# Patient Record
Sex: MALE | Race: WHITE | NOT HISPANIC OR LATINO | Employment: FULL TIME | URBAN - METROPOLITAN AREA
[De-identification: names, ages, dates, MRNs, and addresses within clinical notes are randomized per-mention and may not be internally consistent; named-entity substitution may affect disease eponyms.]

---

## 2017-06-15 ENCOUNTER — RECORDS - HEALTHEAST (OUTPATIENT)
Dept: LAB | Facility: CLINIC | Age: 52
End: 2017-06-15

## 2017-06-15 LAB
CHOLEST SERPL-MCNC: 234 MG/DL
FASTING STATUS PATIENT QL REPORTED: ABNORMAL
HDLC SERPL-MCNC: 64 MG/DL
LDLC SERPL CALC-MCNC: 132 MG/DL
TRIGL SERPL-MCNC: 189 MG/DL

## 2017-12-05 ENCOUNTER — COMMUNICATION - HEALTHEAST (OUTPATIENT)
Dept: TELEHEALTH | Facility: CLINIC | Age: 52
End: 2017-12-05

## 2017-12-05 ENCOUNTER — OFFICE VISIT - HEALTHEAST (OUTPATIENT)
Dept: FAMILY MEDICINE | Facility: CLINIC | Age: 52
End: 2017-12-05

## 2017-12-05 DIAGNOSIS — F41.9 ANXIETY: ICD-10-CM

## 2017-12-05 DIAGNOSIS — K57.90 DIVERTICULOSIS: ICD-10-CM

## 2017-12-05 DIAGNOSIS — K63.5 COLON POLYPS: ICD-10-CM

## 2017-12-05 DIAGNOSIS — J45.20 MILD INTERMITTENT ASTHMA WITHOUT COMPLICATION: ICD-10-CM

## 2017-12-05 DIAGNOSIS — E78.5 HYPERLIPIDEMIA, UNSPECIFIED HYPERLIPIDEMIA TYPE: ICD-10-CM

## 2017-12-05 DIAGNOSIS — M1A.9XX0 CHRONIC GOUT: ICD-10-CM

## 2017-12-05 ASSESSMENT — MIFFLIN-ST. JEOR: SCORE: 2217.9

## 2018-01-23 ENCOUNTER — COMMUNICATION - HEALTHEAST (OUTPATIENT)
Dept: FAMILY MEDICINE | Facility: CLINIC | Age: 53
End: 2018-01-23

## 2018-01-23 DIAGNOSIS — F41.9 ANXIETY: ICD-10-CM

## 2018-02-06 ENCOUNTER — COMMUNICATION - HEALTHEAST (OUTPATIENT)
Dept: FAMILY MEDICINE | Facility: CLINIC | Age: 53
End: 2018-02-06

## 2018-02-06 ENCOUNTER — OFFICE VISIT - HEALTHEAST (OUTPATIENT)
Dept: FAMILY MEDICINE | Facility: CLINIC | Age: 53
End: 2018-02-06

## 2018-02-06 DIAGNOSIS — R07.81 RIB PAIN ON LEFT SIDE: ICD-10-CM

## 2018-03-27 ENCOUNTER — COMMUNICATION - HEALTHEAST (OUTPATIENT)
Dept: FAMILY MEDICINE | Facility: CLINIC | Age: 53
End: 2018-03-27

## 2018-03-27 DIAGNOSIS — F41.9 ANXIETY: ICD-10-CM

## 2018-04-30 ENCOUNTER — COMMUNICATION - HEALTHEAST (OUTPATIENT)
Dept: FAMILY MEDICINE | Facility: CLINIC | Age: 53
End: 2018-04-30

## 2018-04-30 DIAGNOSIS — F41.9 ANXIETY: ICD-10-CM

## 2018-05-23 ENCOUNTER — OFFICE VISIT - HEALTHEAST (OUTPATIENT)
Dept: FAMILY MEDICINE | Facility: CLINIC | Age: 53
End: 2018-05-23

## 2018-05-23 DIAGNOSIS — F41.9 ANXIETY: ICD-10-CM

## 2018-05-23 RX ORDER — LORATADINE 10 MG/1
10 TABLET ORAL DAILY PRN
Status: SHIPPED | COMMUNITY
Start: 2018-05-23

## 2018-05-23 RX ORDER — IBUPROFEN 400 MG/1
400 TABLET, FILM COATED ORAL EVERY 6 HOURS PRN
Status: ON HOLD | COMMUNITY
Start: 2018-05-23 | End: 2023-11-20

## 2018-05-23 RX ORDER — CLONAZEPAM 0.5 MG/1
TABLET ORAL
Qty: 30 TABLET | Refills: 0 | Status: SHIPPED | OUTPATIENT
Start: 2018-05-23 | End: 2021-12-01

## 2018-05-23 ASSESSMENT — MIFFLIN-ST. JEOR: SCORE: 2020.13

## 2018-07-02 ENCOUNTER — OFFICE VISIT - HEALTHEAST (OUTPATIENT)
Dept: FAMILY MEDICINE | Facility: CLINIC | Age: 53
End: 2018-07-02

## 2018-07-02 DIAGNOSIS — N52.9 ED (ERECTILE DYSFUNCTION): ICD-10-CM

## 2018-07-02 DIAGNOSIS — F41.9 ANXIETY: ICD-10-CM

## 2018-07-02 DIAGNOSIS — J45.990 EXERCISE-INDUCED ASTHMA: ICD-10-CM

## 2018-07-02 RX ORDER — PAROXETINE 10 MG/1
10 TABLET, FILM COATED ORAL EVERY MORNING
Status: SHIPPED | COMMUNITY
Start: 2018-07-02 | End: 2021-11-10

## 2018-08-05 ENCOUNTER — COMMUNICATION - HEALTHEAST (OUTPATIENT)
Dept: FAMILY MEDICINE | Facility: CLINIC | Age: 53
End: 2018-08-05

## 2018-08-05 DIAGNOSIS — F41.9 ANXIETY: ICD-10-CM

## 2018-09-10 ENCOUNTER — COMMUNICATION - HEALTHEAST (OUTPATIENT)
Dept: FAMILY MEDICINE | Facility: CLINIC | Age: 53
End: 2018-09-10

## 2018-09-10 DIAGNOSIS — F41.9 ANXIETY: ICD-10-CM

## 2018-09-10 DIAGNOSIS — J45.20 MILD INTERMITTENT ASTHMA WITHOUT COMPLICATION: ICD-10-CM

## 2018-09-10 RX ORDER — AMITRIPTYLINE HYDROCHLORIDE 10 MG/1
10 TABLET ORAL AT BEDTIME
Qty: 90 TABLET | Refills: 2 | Status: SHIPPED | OUTPATIENT
Start: 2018-09-10 | End: 2021-12-01

## 2018-09-10 RX ORDER — ALBUTEROL SULFATE 90 UG/1
2 AEROSOL, METERED RESPIRATORY (INHALATION) EVERY 6 HOURS PRN
Qty: 3 INHALER | Refills: 2 | Status: SHIPPED | OUTPATIENT
Start: 2018-09-10 | End: 2023-04-11

## 2018-09-11 RX ORDER — AMITRIPTYLINE HYDROCHLORIDE 10 MG/1
TABLET ORAL
Qty: 90 TABLET | Refills: 2 | Status: SHIPPED | OUTPATIENT
Start: 2018-09-11 | End: 2021-12-01

## 2018-10-23 ENCOUNTER — COMMUNICATION - HEALTHEAST (OUTPATIENT)
Dept: FAMILY MEDICINE | Facility: CLINIC | Age: 53
End: 2018-10-23

## 2018-10-23 DIAGNOSIS — J45.990 EXERCISE-INDUCED ASTHMA: ICD-10-CM

## 2018-10-26 ENCOUNTER — COMMUNICATION - HEALTHEAST (OUTPATIENT)
Dept: FAMILY MEDICINE | Facility: CLINIC | Age: 53
End: 2018-10-26

## 2018-10-26 DIAGNOSIS — F41.9 ANXIETY: ICD-10-CM

## 2018-10-26 RX ORDER — TADALAFIL 5 MG/1
5 TABLET ORAL DAILY PRN
Qty: 30 TABLET | Refills: 1 | Status: SHIPPED | OUTPATIENT
Start: 2018-10-26

## 2019-01-01 ENCOUNTER — COMMUNICATION - HEALTHEAST (OUTPATIENT)
Dept: FAMILY MEDICINE | Facility: CLINIC | Age: 54
End: 2019-01-01

## 2019-01-01 DIAGNOSIS — F41.9 ANXIETY: ICD-10-CM

## 2019-01-03 ENCOUNTER — COMMUNICATION - HEALTHEAST (OUTPATIENT)
Dept: FAMILY MEDICINE | Facility: CLINIC | Age: 54
End: 2019-01-03

## 2019-01-03 DIAGNOSIS — J45.20 MILD INTERMITTENT ASTHMA WITHOUT COMPLICATION: ICD-10-CM

## 2019-01-09 ENCOUNTER — OFFICE VISIT - HEALTHEAST (OUTPATIENT)
Dept: FAMILY MEDICINE | Facility: CLINIC | Age: 54
End: 2019-01-09

## 2019-01-09 ENCOUNTER — RECORDS - HEALTHEAST (OUTPATIENT)
Dept: GENERAL RADIOLOGY | Facility: CLINIC | Age: 54
End: 2019-01-09

## 2019-01-09 DIAGNOSIS — M25.561 PAIN IN RIGHT KNEE: ICD-10-CM

## 2019-01-09 DIAGNOSIS — M25.561 ACUTE PAIN OF RIGHT KNEE: ICD-10-CM

## 2019-01-09 RX ORDER — NAPROXEN 500 MG/1
500 TABLET ORAL 2 TIMES DAILY WITH MEALS
Qty: 60 TABLET | Refills: 1 | Status: SHIPPED | OUTPATIENT
Start: 2019-01-09 | End: 2022-05-16

## 2019-01-17 ENCOUNTER — COMMUNICATION - HEALTHEAST (OUTPATIENT)
Dept: FAMILY MEDICINE | Facility: CLINIC | Age: 54
End: 2019-01-17

## 2019-01-17 DIAGNOSIS — J45.990 EXERCISE-INDUCED ASTHMA: ICD-10-CM

## 2019-02-12 ENCOUNTER — COMMUNICATION - HEALTHEAST (OUTPATIENT)
Dept: FAMILY MEDICINE | Facility: CLINIC | Age: 54
End: 2019-02-12

## 2019-02-12 DIAGNOSIS — E78.5 HYPERLIPIDEMIA, UNSPECIFIED HYPERLIPIDEMIA TYPE: ICD-10-CM

## 2019-03-26 ENCOUNTER — COMMUNICATION - HEALTHEAST (OUTPATIENT)
Dept: FAMILY MEDICINE | Facility: CLINIC | Age: 54
End: 2019-03-26

## 2019-03-26 DIAGNOSIS — J45.990 EXERCISE-INDUCED ASTHMA: ICD-10-CM

## 2019-04-01 ENCOUNTER — OFFICE VISIT - HEALTHEAST (OUTPATIENT)
Dept: FAMILY MEDICINE | Facility: CLINIC | Age: 54
End: 2019-04-01

## 2019-04-01 DIAGNOSIS — J45.41 MODERATE PERSISTENT ASTHMA WITH EXACERBATION: ICD-10-CM

## 2019-04-01 DIAGNOSIS — F41.1 GENERALIZED ANXIETY DISORDER: ICD-10-CM

## 2019-04-01 DIAGNOSIS — T50.905A ADVERSE EFFECT OF DRUG, INITIAL ENCOUNTER: ICD-10-CM

## 2019-04-01 RX ORDER — ALBUTEROL SULFATE 90 UG/1
2 AEROSOL, METERED RESPIRATORY (INHALATION) EVERY 6 HOURS PRN
Qty: 1 INHALER | Refills: 6 | Status: SHIPPED | OUTPATIENT
Start: 2019-04-01 | End: 2021-11-10

## 2019-04-01 ASSESSMENT — MIFFLIN-ST. JEOR: SCORE: 2025.8

## 2019-05-21 ENCOUNTER — COMMUNICATION - HEALTHEAST (OUTPATIENT)
Dept: FAMILY MEDICINE | Facility: CLINIC | Age: 54
End: 2019-05-21

## 2019-05-21 DIAGNOSIS — F41.9 ANXIETY: ICD-10-CM

## 2019-08-19 ENCOUNTER — COMMUNICATION - HEALTHEAST (OUTPATIENT)
Dept: FAMILY MEDICINE | Facility: CLINIC | Age: 54
End: 2019-08-19

## 2019-08-19 DIAGNOSIS — F41.1 GENERALIZED ANXIETY DISORDER: ICD-10-CM

## 2019-08-26 ENCOUNTER — COMMUNICATION - HEALTHEAST (OUTPATIENT)
Dept: FAMILY MEDICINE | Facility: CLINIC | Age: 54
End: 2019-08-26

## 2019-08-26 DIAGNOSIS — J45.20 MILD INTERMITTENT ASTHMA WITHOUT COMPLICATION: ICD-10-CM

## 2019-11-22 ENCOUNTER — COMMUNICATION - HEALTHEAST (OUTPATIENT)
Dept: FAMILY MEDICINE | Facility: CLINIC | Age: 54
End: 2019-11-22

## 2019-11-22 DIAGNOSIS — J45.20 MILD INTERMITTENT ASTHMA WITHOUT COMPLICATION: ICD-10-CM

## 2020-01-29 ENCOUNTER — COMMUNICATION - HEALTHEAST (OUTPATIENT)
Dept: FAMILY MEDICINE | Facility: CLINIC | Age: 55
End: 2020-01-29

## 2020-01-29 DIAGNOSIS — J45.20 MILD INTERMITTENT ASTHMA WITHOUT COMPLICATION: ICD-10-CM

## 2020-03-28 ENCOUNTER — COMMUNICATION - HEALTHEAST (OUTPATIENT)
Dept: SCHEDULING | Facility: CLINIC | Age: 55
End: 2020-03-28

## 2020-03-28 ENCOUNTER — COMMUNICATION - HEALTHEAST (OUTPATIENT)
Dept: FAMILY MEDICINE | Facility: CLINIC | Age: 55
End: 2020-03-28

## 2020-03-28 DIAGNOSIS — J45.20 MILD INTERMITTENT ASTHMA WITHOUT COMPLICATION: ICD-10-CM

## 2020-04-18 ENCOUNTER — COMMUNICATION - HEALTHEAST (OUTPATIENT)
Dept: SCHEDULING | Facility: CLINIC | Age: 55
End: 2020-04-18

## 2020-04-18 DIAGNOSIS — J45.20 MILD INTERMITTENT ASTHMA WITHOUT COMPLICATION: ICD-10-CM

## 2020-05-11 ENCOUNTER — COMMUNICATION - HEALTHEAST (OUTPATIENT)
Dept: FAMILY MEDICINE | Facility: CLINIC | Age: 55
End: 2020-05-11

## 2020-05-11 DIAGNOSIS — J45.20 MILD INTERMITTENT ASTHMA WITHOUT COMPLICATION: ICD-10-CM

## 2020-06-11 ENCOUNTER — COMMUNICATION - HEALTHEAST (OUTPATIENT)
Dept: FAMILY MEDICINE | Facility: CLINIC | Age: 55
End: 2020-06-11

## 2020-06-11 DIAGNOSIS — F41.9 ANXIETY: ICD-10-CM

## 2020-07-27 ENCOUNTER — COMMUNICATION - HEALTHEAST (OUTPATIENT)
Dept: FAMILY MEDICINE | Facility: CLINIC | Age: 55
End: 2020-07-27

## 2020-07-27 DIAGNOSIS — J45.20 MILD INTERMITTENT ASTHMA WITHOUT COMPLICATION: ICD-10-CM

## 2020-08-14 ENCOUNTER — COMMUNICATION - HEALTHEAST (OUTPATIENT)
Dept: FAMILY MEDICINE | Facility: CLINIC | Age: 55
End: 2020-08-14

## 2020-08-14 DIAGNOSIS — J45.20 MILD INTERMITTENT ASTHMA WITHOUT COMPLICATION: ICD-10-CM

## 2020-08-27 ENCOUNTER — COMMUNICATION - HEALTHEAST (OUTPATIENT)
Dept: FAMILY MEDICINE | Facility: CLINIC | Age: 55
End: 2020-08-27

## 2020-08-27 DIAGNOSIS — J45.20 MILD INTERMITTENT ASTHMA WITHOUT COMPLICATION: ICD-10-CM

## 2020-09-10 ENCOUNTER — COMMUNICATION - HEALTHEAST (OUTPATIENT)
Dept: FAMILY MEDICINE | Facility: CLINIC | Age: 55
End: 2020-09-10

## 2020-09-10 DIAGNOSIS — F41.1 GENERALIZED ANXIETY DISORDER: ICD-10-CM

## 2020-09-10 DIAGNOSIS — F41.9 ANXIETY: ICD-10-CM

## 2020-09-10 DIAGNOSIS — E78.5 HYPERLIPIDEMIA, UNSPECIFIED HYPERLIPIDEMIA TYPE: ICD-10-CM

## 2020-10-06 ENCOUNTER — COMMUNICATION - HEALTHEAST (OUTPATIENT)
Dept: FAMILY MEDICINE | Facility: CLINIC | Age: 55
End: 2020-10-06

## 2020-10-06 DIAGNOSIS — J45.20 MILD INTERMITTENT ASTHMA WITHOUT COMPLICATION: ICD-10-CM

## 2020-10-06 DIAGNOSIS — F41.1 GENERALIZED ANXIETY DISORDER: ICD-10-CM

## 2020-10-12 ENCOUNTER — OFFICE VISIT - HEALTHEAST (OUTPATIENT)
Dept: FAMILY MEDICINE | Facility: CLINIC | Age: 55
End: 2020-10-12

## 2020-10-12 ENCOUNTER — COMMUNICATION - HEALTHEAST (OUTPATIENT)
Dept: FAMILY MEDICINE | Facility: CLINIC | Age: 55
End: 2020-10-12

## 2020-10-12 DIAGNOSIS — J45.20 MILD INTERMITTENT ASTHMA WITHOUT COMPLICATION: ICD-10-CM

## 2020-10-12 DIAGNOSIS — F41.1 GENERALIZED ANXIETY DISORDER: ICD-10-CM

## 2020-10-12 RX ORDER — ALBUTEROL SULFATE 90 UG/1
2 AEROSOL, METERED RESPIRATORY (INHALATION) EVERY 6 HOURS PRN
Qty: 18 G | Refills: 12 | Status: SHIPPED | OUTPATIENT
Start: 2020-10-12 | End: 2021-11-10

## 2020-10-12 ASSESSMENT — MIFFLIN-ST. JEOR: SCORE: 2131.5

## 2020-12-21 ENCOUNTER — COMMUNICATION - HEALTHEAST (OUTPATIENT)
Dept: FAMILY MEDICINE | Facility: CLINIC | Age: 55
End: 2020-12-21

## 2020-12-21 DIAGNOSIS — F41.9 ANXIETY: ICD-10-CM

## 2020-12-23 RX ORDER — HYDROXYZINE HYDROCHLORIDE 10 MG/1
TABLET, FILM COATED ORAL
Qty: 90 TABLET | Refills: 3 | Status: SHIPPED | OUTPATIENT
Start: 2020-12-23 | End: 2021-11-19

## 2021-01-11 ENCOUNTER — COMMUNICATION - HEALTHEAST (OUTPATIENT)
Dept: FAMILY MEDICINE | Facility: CLINIC | Age: 56
End: 2021-01-11

## 2021-01-11 DIAGNOSIS — J45.990 EXERCISE-INDUCED ASTHMA: ICD-10-CM

## 2021-01-11 DIAGNOSIS — E78.5 HYPERLIPIDEMIA, UNSPECIFIED HYPERLIPIDEMIA TYPE: ICD-10-CM

## 2021-03-05 ENCOUNTER — COMMUNICATION - HEALTHEAST (OUTPATIENT)
Dept: FAMILY MEDICINE | Facility: CLINIC | Age: 56
End: 2021-03-05

## 2021-03-05 DIAGNOSIS — J45.990 EXERCISE-INDUCED ASTHMA: ICD-10-CM

## 2021-03-07 RX ORDER — BECLOMETHASONE DIPROPIONATE HFA 40 UG/1
AEROSOL, METERED RESPIRATORY (INHALATION)
Qty: 10.6 G | Refills: 0 | Status: SHIPPED | OUTPATIENT
Start: 2021-03-07 | End: 2022-05-16

## 2021-04-13 ENCOUNTER — IMMUNIZATION (OUTPATIENT)
Dept: NURSING | Facility: CLINIC | Age: 56
End: 2021-04-13
Payer: COMMERCIAL

## 2021-04-13 PROCEDURE — 91300 PR COVID VAC PFIZER DIL RECON 30 MCG/0.3 ML IM: CPT

## 2021-04-13 PROCEDURE — 0001A PR COVID VAC PFIZER DIL RECON 30 MCG/0.3 ML IM: CPT

## 2021-05-01 ENCOUNTER — COMMUNICATION - HEALTHEAST (OUTPATIENT)
Dept: FAMILY MEDICINE | Facility: CLINIC | Age: 56
End: 2021-05-01

## 2021-05-01 DIAGNOSIS — E78.5 HYPERLIPIDEMIA, UNSPECIFIED HYPERLIPIDEMIA TYPE: ICD-10-CM

## 2021-05-02 RX ORDER — SIMVASTATIN 20 MG
20 TABLET ORAL AT BEDTIME
Qty: 90 TABLET | Refills: 1 | Status: SHIPPED | OUTPATIENT
Start: 2021-05-02 | End: 2021-12-02

## 2021-05-04 ENCOUNTER — IMMUNIZATION (OUTPATIENT)
Dept: NURSING | Facility: CLINIC | Age: 56
End: 2021-05-04
Attending: INTERNAL MEDICINE
Payer: COMMERCIAL

## 2021-05-04 PROCEDURE — 0002A PR COVID VAC PFIZER DIL RECON 30 MCG/0.3 ML IM: CPT

## 2021-05-04 PROCEDURE — 91300 PR COVID VAC PFIZER DIL RECON 30 MCG/0.3 ML IM: CPT

## 2021-05-09 ENCOUNTER — HEALTH MAINTENANCE LETTER (OUTPATIENT)
Age: 56
End: 2021-05-09

## 2021-05-27 NOTE — PROGRESS NOTES
Assessment:     1. Generalized anxiety disorder  amitriptyline (ELAVIL) 25 MG tablet   2. Adverse effect of drug, initial encounter     3. Moderate persistent asthma with exacerbation  albuterol (PROAIR HFA;PROVENTIL HFA;VENTOLIN HFA) 90 mcg/actuation inhaler       Plan:     1. Generalized anxiety disorder  We will increase his amitriptyline from 10 mg to 25 mg; he may use Atarax for breakthrough anxiety 10 mg  - amitriptyline (ELAVIL) 25 MG tablet; Take 1 tablet (25 mg total) by mouth at bedtime.  Dispense: 60 tablet; Refill: 1    2. Adverse effect of drug, initial encounter  Patient gets little relief from Proventil inhalers; he uses too many inhalations per day of Proventil; previously the patient is tolerated plain albuterol non-Proventil inhaler; gets best relief from Ventolin inhaler and we wrote a written prescription to dispense the latter    3. Moderate persistent asthma with exacerbation  Prescription for the following  - albuterol (PROAIR HFA;PROVENTIL HFA;VENTOLIN HFA) 90 mcg/actuation inhaler; Inhale 2 puffs every 6 (six) hours as needed for wheezing.  Dispense: 1 Inhaler; Refill: 6      Subjective:   Patient is here for medication adjustment is having breakthrough anxiety and dysphoric symptoms on minimal dosage of amitriptyline specifically 10 mg.  We have titrated him off of his clonazepam successfully but patient is having recurrence of anxiety.  He does use Atarax for acute anxiety.  I plan out the range of effective dose for amitriptyline goes anywhere from 5 mg to 150 mg/day we will increase his individual dosage to 25 mg at at bedtime along with every 4-6 hours Atarax if he develops acute anxiety attacks.  We are not anxious to place the patient on any addictive medications since he successfully been titrated off of benzodiazepines.  Patient is also having difficulty with his asthma medication the only thing that works for him is Ventolin or plain albuterol not Proventil which is insurance  "company insists that he use.  I have written a new prescription for d.a.w. Ventolin in the end the patient may have to go to [place] or [place] to obtain the appropriate medication to provide him with relief.  He uses too many inhalations of this generic Proventil which is not in his best interest.  Of course Ventolin is available here in [place] and in my medical opinion this is what the insurance company should give him.  I will follow him in 2 months.25 minutes    Review of Systems: A complete 14 point review of systems was obtained and is negative or as stated in the history of present illness.    Past Medical History:   Diagnosis Date     Pyloric stenosis      Family History   Problem Relation Age of Onset     Breast cancer Mother      Heart disease Father      Mental illness Father      Hyperlipidemia Father      Mental illness Brother      Past Surgical History:   Procedure Laterality Date     HERNIA REPAIR       ORTHOPEDIC SURGERY      Both ankles, left knee, right shoulder     Social History     Tobacco Use     Smoking status: Former Smoker     Last attempt to quit: 1997     Years since quittin.8     Smokeless tobacco: Never Used   Substance Use Topics     Alcohol use: No     Drug use: No         Objective:   /80   Pulse (!) 101   Ht 6' 4\" (1.93 m)   Wt (!) 241 lb 4 oz (109.4 kg)   BMI 29.37 kg/m      General Appearance:  Alert, cooperative, no distress  Head:  Normocephalic, no obvious abnormality  Ears: TM anatomy normal  Eyes:  PERRL, EOM's intact, conjunctiva and corneas clear  Nose:  Nares symmetrical, septum midline, mucosa pink, no sinus tenderness  Throat:  Lips, tongue, and mucosa are moist, pink, and intact  Neck:  Supple, symmetrical, trachea midline, no adenopathy; thyroid: no enlargement, symmetric,no tenderness/mass/nodules; no carotid bruit, no JVD  Back:  Symmetrical, no curvature, ROM normal, no CVA tenderness  Chest/Breast:  No mass or tenderness  Lungs:  " Clear to auscultation bilaterally, respirations unlabored   Heart:  Normal PMI, regular rate & rhythm, S1 and S2 normal, no murmurs, rubs, or gallops  Abdomen:  Soft, non-tender, bowel sounds active all four quadrants, no mass, or organomegaly  Musculoskeletal:  Tone and strength strong and symmetrical, all extremities  Lymphatic:  No adenopathy  Skin/Hair/Nails:  Skin warm, dry, and intact, no rashes  Neurologic:  Alert and oriented x3, no cranial nerve deficits, normal strength and tone, gait steady  Extremities:  No edema.  Jennifer's sign negative.    Genitourinary: deferred  Pulses:  Equal bilaterally           This note has been dictated using voice recognition software. Any grammatical or context distortions are unintentional and inherent to the the software.

## 2021-05-27 NOTE — TELEPHONE ENCOUNTER
RN cannot approve Refill Request    RN can NOT refill this medication med is not covered by policy/route to provider     . Last office visit: 1/9/2019 Wil Hernandez MD Last Physical: Visit date not found Last MTM visit: Visit date not found Last visit same specialty: 1/9/2019 Wil Hernandez MD.  Next visit within 3 mo: Visit date not found  Next physical within 3 mo: Visit date not found      Lila Nagel, [facility] Triage/Med Refill 3/26/2019    Requested Prescriptions   Pending Prescriptions Disp Refills     QVAR REDIHALER 40 mcg/actuation HFAB inhaler [Pharmacy Med Name: Qvar RediHaler Inhalation Aerosol Breath Activated 40 MCG/ACT] 10.6 g 0     Sig: INHALE TWO PUFFS BY MOUTH TWICE DAILY    There is no refill protocol information for this order

## 2021-05-29 NOTE — TELEPHONE ENCOUNTER
Refill Approved    Rx renewed per Medication Renewal Policy. Medication was last renewed on 1/1/19.    Lila Nagel, [facility] Triage/Med Refill 5/22/2019     Requested Prescriptions   Pending Prescriptions Disp Refills     hydrOXYzine HCl (ATARAX) 10 MG tablet [Pharmacy Med Name: hydrOXYzine HCl Oral Tablet 10 MG] 90 tablet 0     Sig: Take 1 tablet (10 mg total) by mouth 3 (three) times a day as needed for itching       Antihistamine Refill Protocol Passed - 5/21/2019  9:43 AM        Passed - Patient has had office visit/physical in last year     Last office visit with prescriber/PCP: 4/1/2019 Wil Hernandez MD OR same dept: 4/1/2019 Wil Hernandez MD OR same specialty: 4/1/2019 Wil Hernandez MD  Last physical: Visit date not found Last MTM visit: Visit date not found   Next visit within 3 mo: Visit date not found  Next physical within 3 mo: Visit date not found  Prescriber OR PCP: Wil Hernandez MD  Last diagnosis associated with med order: 1. Anxiety  - hydrOXYzine HCl (ATARAX) 10 MG tablet [Pharmacy Med Name: hydrOXYzine HCl Oral Tablet 10 MG]; Take 1 tablet (10 mg total) by mouth 3 (three) times a day as needed for itching.   Dispense: 90 tablet; Refill: 0    If protocol passes may refill for 12 months if within 3 months of last provider visit (or a total of 15 months).

## 2021-05-31 VITALS — WEIGHT: 283.6 LBS | BODY MASS INDEX: 34.54 KG/M2 | HEIGHT: 76 IN

## 2021-05-31 VITALS — WEIGHT: 266 LBS | BODY MASS INDEX: 32.38 KG/M2

## 2021-05-31 NOTE — TELEPHONE ENCOUNTER
Refill Approved    Rx renewed per Medication Renewal Policy. Medication was last renewed on 1/4/19.    Lila Nagel, [facility] Connection Triage/Med Refill 8/27/2019     Requested Prescriptions   Pending Prescriptions Disp Refills     albuterol (PROAIR HFA;PROVENTIL HFA;VENTOLIN HFA) 90 mcg/actuation inhaler [Pharmacy Med Name: Albuterol Sulfate HFA Inhalation Aerosol Solution 108 (90 Base) MCG/ACT] 18 g 3     Sig: Inhale 2 puffs by mouth every 6 (six) hours as needed for wheezing.       Albuterol/Levalbuterol Refill Protocol Passed - 8/26/2019  9:09 PM        Passed - PCP or prescribing provider visit in last year     Last office visit with prescriber/PCP: 12/5/2017 Jennifer José DO OR same dept: 4/1/2019 Wil Hernandez MD OR same specialty: 4/1/2019 Wil Hernandez MD Last physical: Visit date not found       Next appt within 3 mo: Visit date not found  Next physical within 3 mo: Visit date not found  Prescriber OR PCP: Jennifer José DO  Last diagnosis associated with med order: 1. Mild intermittent asthma without complication  - albuterol (PROAIR HFA;PROVENTIL HFA;VENTOLIN HFA) 90 mcg/actuation inhaler [Pharmacy Med Name: Albuterol Sulfate HFA Inhalation Aerosol Solution 108 (90 Base) MCG/ACT]; Inhale 2 puffs by mouth every 6 (six) hours as needed for wheezing.  Dispense: 18 g; Refill: 3    If protocol passes may refill for 6 months if within 3 months of last provider visit (or a total of 9 months). If patient requesting >1 inhaler per month refill x 6 months and have patient make appointment with provider.

## 2021-05-31 NOTE — TELEPHONE ENCOUNTER
Refill Approved    Rx renewed per Medication Renewal Policy. Medication was last renewed on 4/1/2019 for 60/1.  Last OV 4/1/2019  Karine Buck, [facility] Triage/Med Refill 8/20/2019     Requested Prescriptions   Pending Prescriptions Disp Refills     amitriptyline (ELAVIL) 25 MG tablet [Pharmacy Med Name: Amitriptyline HCl Oral Tablet 25 MG] 60 tablet 0     Sig: Take 1 tablet (25 mg total) by mouth at bedtime.       Tricyclics/Misc Antidepressant/Antianxiety Meds Refill Protocol Passed - 8/19/2019 12:26 PM        Passed - PCP or prescribing provider visit in last year     Last office visit with prescriber/PCP: 4/1/2019 Wil Hernandez MD OR same dept: 4/1/2019 Wil Hernandez MD OR same specialty: 4/1/2019 Wil Hernandez MD  Last physical: Visit date not found Last MTM visit: Visit date not found   Next visit within 3 mo: Visit date not found  Next physical within 3 mo: Visit date not found  Prescriber OR PCP: Wil Hernandez MD  Last diagnosis associated with med order: 1. Generalized anxiety disorder  - amitriptyline (ELAVIL) 25 MG tablet [Pharmacy Med Name: Amitriptyline HCl Oral Tablet 25 MG]; Take 1 tablet (25 mg total) by mouth at bedtime.  Dispense: 60 tablet; Refill: 0    If protocol passes may refill for 12 months if within 3 months of last provider visit (or a total of 15 months).

## 2021-06-01 VITALS — HEIGHT: 76 IN | WEIGHT: 240 LBS | BODY MASS INDEX: 29.22 KG/M2

## 2021-06-01 VITALS — WEIGHT: 241.5 LBS | BODY MASS INDEX: 29.4 KG/M2

## 2021-06-02 VITALS — WEIGHT: 260.6 LBS | BODY MASS INDEX: 31.72 KG/M2

## 2021-06-02 VITALS — HEIGHT: 76 IN | BODY MASS INDEX: 29.38 KG/M2 | WEIGHT: 241.25 LBS

## 2021-06-03 NOTE — TELEPHONE ENCOUNTER
Refill Approved    Rx renewed per Medication Renewal Policy. Medication was last renewed on 8/27/19.    Lila Nagel, [facility] Triage/Med Refill 11/22/2019     Requested Prescriptions   Pending Prescriptions Disp Refills     albuterol (PROAIR HFA;PROVENTIL HFA;VENTOLIN HFA) 90 mcg/actuation inhaler [Pharmacy Med Name: Albuterol Sulfate HFA Inhalation Aerosol Solution 108 (90 Base) MCG/ACT] 18 g 2     Sig: INHALE TWO PUFFS BY MOUTH EVERY SIX HOURS AS NEEDED FOR WHEEZING       Albuterol/Levalbuterol Refill Protocol Passed - 11/22/2019  2:30 PM        Passed - PCP or prescribing provider visit in last year     Last office visit with prescriber/PCP: 4/1/2019 Wil Hernandez MD OR same dept: 4/1/2019 Wil Hernandez MD OR same specialty: 4/1/2019 Wil Hernandez MD Last physical: Visit date not found       Next appt within 3 mo: Visit date not found  Next physical within 3 mo: Visit date not found  Prescriber OR PCP: Wil Hernandez MD  Last diagnosis associated with med order: 1. Mild intermittent asthma without complication  - albuterol (PROAIR HFA;PROVENTIL HFA;VENTOLIN HFA) 90 mcg/actuation inhaler [Pharmacy Med Name: Albuterol Sulfate HFA Inhalation Aerosol Solution 108 (90 Base) MCG/ACT]; INHALE TWO PUFFS BY MOUTH EVERY SIX HOURS AS NEEDED FOR WHEEZING  Dispense: 18 g; Refill: 2    If protocol passes may refill for 6 months if within 3 months of last provider visit (or a total of 9 months). If patient requesting >1 inhaler per month refill x 6 months and have patient make appointment with provider.

## 2021-06-05 VITALS
BODY MASS INDEX: 33.12 KG/M2 | HEIGHT: 75 IN | DIASTOLIC BLOOD PRESSURE: 84 MMHG | SYSTOLIC BLOOD PRESSURE: 110 MMHG | HEART RATE: 80 BPM | OXYGEN SATURATION: 97 % | WEIGHT: 266.4 LBS

## 2021-06-05 NOTE — TELEPHONE ENCOUNTER
Refill Approved    Rx renewed per Medication Renewal Policy. Medication was last renewed on 11/22/19.    Lila Nagel, [facility] Connection Triage/Med Refill 1/29/2020     Requested Prescriptions   Pending Prescriptions Disp Refills     albuterol (PROAIR HFA;PROVENTIL HFA;VENTOLIN HFA) 90 mcg/actuation inhaler [Pharmacy Med Name: Albuterol Sulfate HFA Inhalation Aerosol Solution 108 (90 Base) MCG/ACT] 18 g 1     Sig: INHALE TWO PUFFS BY MOUTH EVERY SIX HOURS AS NEEDED FOR WHEEZING       Albuterol/Levalbuterol Refill Protocol Passed - 1/29/2020 11:54 AM        Passed - PCP or prescribing provider visit in last year     Last office visit with prescriber/PCP: 4/1/2019 Wil Hernandez MD OR same dept: 4/1/2019 Wil Hernandez MD OR same specialty: 4/1/2019 Wil Hernandez MD Last physical: Visit date not found       Next appt within 3 mo: Visit date not found  Next physical within 3 mo: Visit date not found  Prescriber OR PCP: Wil Hernandez MD  Last diagnosis associated with med order: 1. Mild intermittent asthma without complication  - albuterol (PROAIR HFA;PROVENTIL HFA;VENTOLIN HFA) 90 mcg/actuation inhaler [Pharmacy Med Name: Albuterol Sulfate HFA Inhalation Aerosol Solution 108 (90 Base) MCG/ACT]; INHALE TWO PUFFS BY MOUTH EVERY SIX HOURS AS NEEDED FOR WHEEZING  Dispense: 18 g; Refill: 1    If protocol passes may refill for 6 months if within 3 months of last provider visit (or a total of 9 months). If patient requesting >1 inhaler per month refill x 6 months and have patient make appointment with provider.

## 2021-06-07 NOTE — TELEPHONE ENCOUNTER
RN cannot approve Refill Request    RN can NOT refill this medication Protocol failed and NO refill given.    Lila Nagel, [facility] Triage/Med Refill 4/20/2020    Requested Prescriptions   Pending Prescriptions Disp Refills     albuterol (VENTOLIN HFA) 90 mcg/actuation inhaler [Pharmacy Med Name: Ventolin HFA Inhalation Aerosol Solution 108 (90 Base) MCG/ACT] 18 g 0     Sig: INHALE TWO PUFFS BY MOUTH EVERY SIX HOURS AS NEEDED FOR WHEEZING       Albuterol/Levalbuterol Refill Protocol Failed - 4/18/2020  4:35 PM        Failed - PCP or prescribing provider visit in last year     Last office visit with prescriber/PCP: 4/1/2019 Wil Hernandez MD OR same dept: Visit date not found OR same specialty: Visit date not found Last physical: Visit date not found       Next appt within 3 mo: Visit date not found  Next physical within 3 mo: Visit date not found  Prescriber OR PCP: Wil Hernandez MD  Last diagnosis associated with med order: 1. Mild intermittent asthma without complication  - VENTOLIN HFA 90 mcg/actuation inhaler [Pharmacy Med Name: Ventolin HFA Inhalation Aerosol Solution 108 (90 Base) MCG/ACT]; INHALE TWO PUFFS BY MOUTH EVERY SIX HOURS AS NEEDED FOR WHEEZING  Dispense: 18 g; Refill: 0    If protocol passes may refill for 6 months if within 3 months of last provider visit (or a total of 9 months). If patient requesting >1 inhaler per month refill x 6 months and have patient make appointment with provider.

## 2021-06-07 NOTE — TELEPHONE ENCOUNTER
"Jim low on inhaler, albuteral. Doing okay but doesn't want to be with out. Denies fever, shortness of breath. Does note slight \"cold\" symptoms.   Sent refill.  Discussed [email or web address] if needed.     COVID 19 Nurse Triage Plan    Please be aware that novel coronavirus (COVID-19) may be circulating in the community. If you develop symptoms such as fever, cough, or SOB or if you have concerns about the presence of another infection including coronavirus (COVID-19), please contact your health care provider or visit [email or web address].     Patient HAS NO known exposure, fever, cough or SOB in addition to reason for call.    Additional General Information About COVID-19    COVID-19 - General Information:  Regardless of if you have been tested or not:  Patient who have symptoms (cough, fever, or shortness of breath), need to isolate for 7 days from when symptoms started AND 72 hours after fever resolves (without fever reducing medications) AND improvement of respiratory symptoms (whichever is longer).      Isolate yourself at home (in own room/own bathroom if possible)    Do Not allow any visitors    Do Not go to work or school    Do Not go to [employer],  centers, shopping, or other public places.    Do Not shake hands.    Avoid close and intimate contact with others (hugging, kissing).    Follow CDC recommendations for household cleaning of frequently touched services.     After the initial 7 days, continue to isolate yourself from household members as much as possible. To continue decrease the risk of community spread and exposure, you and any members of your household should limit activities in public for 14 days after starting home isolation.     You can reference the following CDC link for helpful home isolation/care tips:  https://www.cdc.gov/coronavirus/2019-ncov/downloads/10Things.pdf    COVID-19 - Symptoms:     The COVID-19 can cause a respiratory illness, such as bronchitis or pneumonia.    The most common " symptoms are: cough, fever, and shortness of breath.     Other symptoms are: body aches, chills, diarrhea, fatigue, headache, runny nose, and sore throat     COVID-19 - Exposure Risk Factors:    Exposure to a person who has been diagnosed with COVID-19 .    Travel from an area with recent local transmission of COVID-19 .    The CDC (www.cdc.gov) has the most up-to-date list of where the COVID-19 outbreak is occurring.    COVID-19 - Spreading:     The virus likely spreads through respiratory droplets produced when a person coughs or sneezes. These respiratory droplets can travel approximately 6 feet and can remain on surfaces.  Common disinfectants will kill the virus.    The CDC currently does not recommend healthy people wearing masks.    COVID-19 - Protect Yourself:     Avoid close contact with people known to have this new COVID-19 infection.    Wash hands often with soap and water or alcohol-based hand .    Avoid touching the eyes, nose or mouth.       Thank you for limiting contact with others, wearing a simple mask to cover your cough, practice good hand hygiene habits and accessing our virtual services where possible to limit the spread of this virus.    For more information about COVID19 and options for caring for yourself at home, please visit the CDC website at https://www.cdc.gov/coronavirus/2019-ncov/about/steps-when-sick.html  For more options for care at [facility], please visit our website at [email or web address]

## 2021-06-08 NOTE — TELEPHONE ENCOUNTER
RN cannot approve Refill Request    RN can NOT refill this medication PCP messaged that patient is overdue for Office Visit. Last office visit: 4/1/2019 Wil Hernandez MD Last Physical: Visit date not found Last MTM visit: Visit date not found Last visit same specialty: 4/1/2019 Wil Hernandez MD.  Next visit within 3 mo: Visit date not found  Next physical within 3 mo: Visit date not found      [facility], [facility] Triage/Med Refill 6/13/2020    Requested Prescriptions   Pending Prescriptions Disp Refills     hydrOXYzine HCL (ATARAX) 10 MG tablet [Pharmacy Med Name: hydrOXYzine HCl Oral Tablet 10 MG] 90 tablet 0     Sig: Take 1 tablet (10 mg total) by mouth 3 (three) times a day as needed for itching       Antihistamine Refill Protocol Failed - 6/11/2020  2:18 PM        Failed - Patient has had office visit/physical in last year     Last office visit with prescriber/PCP: 4/1/2019 Wil Hernandez MD OR same dept: Visit date not found OR same specialty: 4/1/2019 Wil Hernandez MD  Last physical: Visit date not found Last MTM visit: Visit date not found   Next visit within 3 mo: Visit date not found  Next physical within 3 mo: Visit date not found  Prescriber OR PCP: Wil Hernandez MD  Last diagnosis associated with med order: 1. Anxiety  - hydrOXYzine HCL (ATARAX) 10 MG tablet [Pharmacy Med Name: hydrOXYzine HCl Oral Tablet 10 MG]; Take 1 tablet (10 mg total) by mouth 3 (three) times a day as needed for itching  Dispense: 90 tablet; Refill: 0    If protocol passes may refill for 12 months if within 3 months of last provider visit (or a total of 15 months).

## 2021-06-08 NOTE — TELEPHONE ENCOUNTER
Fax received from [facility] requesting a refill of Ventolin inhaler.

## 2021-06-10 NOTE — TELEPHONE ENCOUNTER
RN cannot approve Refill Request    RN can NOT refill this medication Protocol failed and NO refill given. Last office visit: 4/1/2019 Wil Hernandez MD Last Physical: Visit date not found Last MTM visit: Visit date not found Last visit same specialty: 4/1/2019 Wil Hernandez MD.  Next visit within 3 mo: Visit date not found  Next physical within 3 mo: Visit date not found      Lila Nagel, [facility] Connection Triage/Med Refill 7/27/2020    Requested Prescriptions   Pending Prescriptions Disp Refills     albuterol (PROAIR HFA;PROVENTIL HFA;VENTOLIN HFA) 90 mcg/actuation inhaler [Pharmacy Med Name: Albuterol Sulfate HFA Inhalation Aerosol Solution 108 (90 Base) MCG/ACT] 18 g 0     Sig: INHALE TWO PUFFS BY MOUTH EVERY SIX HOURS AS NEEDED FOR WHEEZING       Albuterol/Levalbuterol Refill Protocol Failed - 7/27/2020  1:27 PM        Failed - PCP or prescribing provider visit in last year     Last office visit with prescriber/PCP: 4/1/2019 Wil Hernandez MD OR same dept: Visit date not found OR same specialty: 4/1/2019 Wil Hernandez MD Last physical: Visit date not found       Next appt within 3 mo: Visit date not found  Next physical within 3 mo: Visit date not found  Prescriber OR PCP: Wil Hernandez MD  Last diagnosis associated with med order: 1. Mild intermittent asthma without complication  - albuterol (PROAIR HFA;PROVENTIL HFA;VENTOLIN HFA) 90 mcg/actuation inhaler [Pharmacy Med Name: Albuterol Sulfate HFA Inhalation Aerosol Solution 108 (90 Base) MCG/ACT]; INHALE TWO PUFFS BY MOUTH EVERY SIX HOURS AS NEEDED FOR WHEEZING  Dispense: 18 g; Refill: 0    If protocol passes may refill for 6 months if within 3 months of last provider visit (or a total of 9 months). If patient requesting >1 inhaler per month refill x 6 months and have patient make appointment with provider.

## 2021-06-10 NOTE — TELEPHONE ENCOUNTER
RN cannot approve Refill Request    RN can NOT refill this medication Protocol failed and NO refill given. Last office visit: 4/1/2019 Wil Hernandez MD Last Physical: Visit date not found Last MTM visit: Visit date not found Last visit same specialty: 4/1/2019 Wil Hernandez MD.  Next visit within 3 mo: Visit date not found  Next physical within 3 mo: Visit date not found      Lila Nagel, [facility] Connection Triage/Med Refill 8/27/2020    Requested Prescriptions   Pending Prescriptions Disp Refills     albuterol (PROAIR HFA;PROVENTIL HFA;VENTOLIN HFA) 90 mcg/actuation inhaler [Pharmacy Med Name: Albuterol Sulfate HFA Inhalation Aerosol Solution 108 (90 Base) MCG/ACT] 18 g 0     Sig: INHALE 2 PUFFS BY MOUTH EVERY 6 HOURS AS NEEDED FOR WHEEZING       Albuterol/Levalbuterol Refill Protocol Failed - 8/27/2020  2:35 PM        Failed - PCP or prescribing provider visit in last year     Last office visit with prescriber/PCP: 4/1/2019 Wil Hernandez MD OR same dept: Visit date not found OR same specialty: 4/1/2019 Wil Hernandez MD Last physical: Visit date not found       Next appt within 3 mo: Visit date not found  Next physical within 3 mo: Visit date not found  Prescriber OR PCP: Wil Hernandez MD  Last diagnosis associated with med order: 1. Mild intermittent asthma without complication  - albuterol (PROAIR HFA;PROVENTIL HFA;VENTOLIN HFA) 90 mcg/actuation inhaler [Pharmacy Med Name: Albuterol Sulfate HFA Inhalation Aerosol Solution 108 (90 Base) MCG/ACT]; INHALE 2 PUFFS BY MOUTH EVERY 6 HOURS AS NEEDED FOR WHEEZING  Dispense: 18 g; Refill: 0    If protocol passes may refill for 6 months if within 3 months of last provider visit (or a total of 9 months). If patient requesting >1 inhaler per month refill x 6 months and have patient make appointment with provider.

## 2021-06-10 NOTE — TELEPHONE ENCOUNTER
Who is calling:  Patient   Reason for Call:  Caller stated that he is out and is really hoping to  refills today.   Date of last appointment with primary care:   Okay to leave a detailed message: Yes

## 2021-06-10 NOTE — TELEPHONE ENCOUNTER
Patient is requesting for multiple refills on prescription and also patient is out of medication requesting to process as soon as possible.  Refill Request  Did you contact pharmacy: No  Medication name:   Requested Prescriptions     Pending Prescriptions Disp Refills     albuterol (PROAIR HFA;PROVENTIL HFA;VENTOLIN HFA) 90 mcg/actuation inhaler [Pharmacy Med Name: Albuterol Sulfate HFA Inhalation Aerosol Solution 108 (90 Base) MCG/ACT] 18 g 0     Sig: INHALE 2 PUFFS BY MOUTH EVERY 6 HOURS AS NEEDED FOR WHEEZING   Who prescribed the medication: MANJULA NGUYEN  Requested Pharmacy: [facility] # [id number]  Is patient out of medication: Yes  Patient notified refills processed in 3 business days:  yes  Okay to leave a detailed message: yes

## 2021-06-11 NOTE — TELEPHONE ENCOUNTER
RN cannot approve Refill Request    RN can NOT refill this medication Protocol failed and NO refill given. Last office visit: 4/1/2019 Wil Hernandez MD Last Physical: Visit date not found Last MTM visit: Visit date not found Last visit same specialty: 4/1/2019 Wil Hernandez MD.  Next visit within 3 mo: Visit date not found  Next physical within 3 mo: Visit date not found      Lila Nagel, [facility] Connection Triage/Med Refill 9/11/2020    Requested Prescriptions   Pending Prescriptions Disp Refills     amitriptyline (ELAVIL) 25 MG tablet [Pharmacy Med Name: Amitriptyline HCl Oral Tablet 25 MG] 90 tablet 0     Sig: Take 1 tablet (25 mg total) by mouth at bedtime.       Tricyclics/Misc Antidepressant/Antianxiety Meds Refill Protocol Failed - 9/10/2020  6:44 PM        Failed - PCP or prescribing provider visit in last year     Last office visit with prescriber/PCP: 4/1/2019 Wil Hernandez MD OR same dept: Visit date not found OR same specialty: 4/1/2019 Wil Hernandez MD  Last physical: Visit date not found Last MTM visit: Visit date not found   Next visit within 3 mo: Visit date not found  Next physical within 3 mo: Visit date not found  Prescriber OR PCP: Wil Hernandez MD  Last diagnosis associated with med order: 1. Generalized anxiety disorder  - amitriptyline (ELAVIL) 25 MG tablet [Pharmacy Med Name: Amitriptyline HCl Oral Tablet 25 MG]; Take 1 tablet (25 mg total) by mouth at bedtime.   Dispense: 90 tablet; Refill: 0    2. Anxiety  - hydrOXYzine HCL (ATARAX) 10 MG tablet [Pharmacy Med Name: hydrOXYzine HCl Oral Tablet 10 MG]; Take 1 tablet (10 mg total) by mouth 3 (three) times a day as needed for itching  Dispense: 90 tablet; Refill: 0    If protocol passes may refill for 12 months if within 3 months of last provider visit (or a total of 15 months).                hydrOXYzine HCL (ATARAX) 10 MG tablet [Pharmacy Med Name: hydrOXYzine HCl Oral Tablet 10 MG] 90 tablet 0     Sig: Take 1 tablet (10 mg total) by  mouth 3 (three) times a day as needed for itching.       Antihistamine Refill Protocol Failed - 9/10/2020  6:44 PM        Failed - Patient has had office visit/physical in last year     Last office visit with prescriber/PCP: 4/1/2019 Wil Hernandez MD OR same dept: Visit date not found OR same specialty: 4/1/2019 Wil Hernandez MD  Last physical: Visit date not found Last MTM visit: Visit date not found   Next visit within 3 mo: Visit date not found  Next physical within 3 mo: Visit date not found  Prescriber OR PCP: Wil Hernandez MD  Last diagnosis associated with med order: 1. Generalized anxiety disorder  - amitriptyline (ELAVIL) 25 MG tablet [Pharmacy Med Name: Amitriptyline HCl Oral Tablet 25 MG]; Take 1 tablet (25 mg total) by mouth at bedtime.   Dispense: 90 tablet; Refill: 0    2. Anxiety  - hydrOXYzine HCL (ATARAX) 10 MG tablet [Pharmacy Med Name: hydrOXYzine HCl Oral Tablet 10 MG]; Take 1 tablet (10 mg total) by mouth 3 (three) times a day as needed for itching  Dispense: 90 tablet; Refill: 0    If protocol passes may refill for 12 months if within 3 months of last provider visit (or a total of 15 months).

## 2021-06-11 NOTE — TELEPHONE ENCOUNTER
RN cannot approve Refill Request    RN can NOT refill this medication Protocol failed and NO refill given. Last office visit: Visit date not found Last Physical: Visit date not found Last MTM visit: Visit date not found Last visit same specialty: 4/1/2019 Wil Hernandez MD.  Next visit within 3 mo: Visit date not found  Next physical within 3 mo: Visit date not found      Lila Nagel, [facility] Connection Triage/Med Refill 9/11/2020    Requested Prescriptions   Pending Prescriptions Disp Refills     simvastatin (ZOCOR) 20 MG tablet [Pharmacy Med Name: Simvastatin Oral Tablet 20 MG] 90 tablet 0     Sig: TAKE ONE TABLET BY MOUTH AT BEDTIME       Statins Refill Protocol (Hmg CoA Reductase Inhibitors) Failed - 9/10/2020  6:44 PM        Failed - PCP or prescribing provider visit in past 12 months      Last office visit with prescriber/PCP: Visit date not found OR same dept: Visit date not found OR same specialty: 4/1/2019 Wil Hernandez MD  Last physical: Visit date not found Last MTM visit: Visit date not found   Next visit within 3 mo: Visit date not found  Next physical within 3 mo: Visit date not found  Prescriber OR PCP: Velma Waller MD  Last diagnosis associated with med order: 1. Hyperlipidemia, unspecified hyperlipidemia type  - simvastatin (ZOCOR) 20 MG tablet [Pharmacy Med Name: Simvastatin Oral Tablet 20 MG]; TAKE ONE TABLET BY MOUTH AT BEDTIME   Dispense: 90 tablet; Refill: 0    If protocol passes may refill for 12 months if within 3 months of last provider visit (or a total of 15 months).

## 2021-06-12 NOTE — TELEPHONE ENCOUNTER
RN cannot approve Refill Request    RN can NOT refill this medication PCP messaged that patient is overdue for Office Visit. Last office visit: 4/1/2019 Wil Hernandez MD Last Physical: Visit date not found Last MTM visit: Visit date not found Last visit same specialty: 4/1/2019 Wil Hernandez MD.  Next visit within 3 mo: Visit date not found  Next physical within 3 mo: Visit date not found      Nova Lopes, [facility] Triage/Med Refill 10/8/2020    Requested Prescriptions   Pending Prescriptions Disp Refills     albuterol (PROAIR HFA;PROVENTIL HFA;VENTOLIN HFA) 90 mcg/actuation inhaler [Pharmacy Med Name: Albuterol Sulfate HFA Inhalation Aerosol Solution 108 (90 Base) MCG/ACT] 18 g 0     Sig: INHALE 2 PUFFS BY MOUTH EVERY 6 HOURS AS NEEDED FOR WHEEZING       Albuterol/Levalbuterol Refill Protocol Failed - 10/8/2020  4:58 PM        Failed - PCP or prescribing provider visit in last year     Last office visit with prescriber/PCP: 4/1/2019 Wil Hernandez MD OR same dept: Visit date not found OR same specialty: 4/1/2019 Wil Hernandez MD Last physical: Visit date not found       Next appt within 3 mo: Visit date not found  Next physical within 3 mo: Visit date not found  Prescriber OR PCP: Wil Hernandez MD  Last diagnosis associated with med order: 1. Mild intermittent asthma without complication  - albuterol (PROAIR HFA;PROVENTIL HFA;VENTOLIN HFA) 90 mcg/actuation inhaler [Pharmacy Med Name: Albuterol Sulfate HFA Inhalation Aerosol Solution 108 (90 Base) MCG/ACT]; INHALE 2 PUFFS BY MOUTH EVERY 6 HOURS AS NEEDED FOR WHEEZING  Dispense: 18 g; Refill: 0    2. Generalized anxiety disorder  - amitriptyline (ELAVIL) 25 MG tablet [Pharmacy Med Name: Amitriptyline HCl Oral Tablet 25 MG]; Take 1 tablet (25 mg total) by mouth at bedtime.   Dispense: 90 tablet; Refill: 0    If protocol passes may refill for 6 months if within 3 months of last provider visit (or a total of 9 months). If patient requesting >1  inhaler per month refill x 6 months and have patient make appointment with provider.             amitriptyline (ELAVIL) 25 MG tablet [Pharmacy Med Name: Amitriptyline HCl Oral Tablet 25 MG] 90 tablet 0     Sig: Take 1 tablet (25 mg total) by mouth at bedtime.       Tricyclics/Misc Antidepressant/Antianxiety Meds Refill Protocol Failed - 10/8/2020  4:58 PM        Failed - PCP or prescribing provider visit in last year     Last office visit with prescriber/PCP: 4/1/2019 Wil Hernandez MD OR same dept: Visit date not found OR same specialty: 4/1/2019 Wil Hernandez MD  Last physical: Visit date not found Last MTM visit: Visit date not found   Next visit within 3 mo: Visit date not found  Next physical within 3 mo: Visit date not found  Prescriber OR PCP: Wil Hernandez MD  Last diagnosis associated with med order: 1. Mild intermittent asthma without complication  - albuterol (PROAIR HFA;PROVENTIL HFA;VENTOLIN HFA) 90 mcg/actuation inhaler [Pharmacy Med Name: Albuterol Sulfate HFA Inhalation Aerosol Solution 108 (90 Base) MCG/ACT]; INHALE 2 PUFFS BY MOUTH EVERY 6 HOURS AS NEEDED FOR WHEEZING  Dispense: 18 g; Refill: 0    2. Generalized anxiety disorder  - amitriptyline (ELAVIL) 25 MG tablet [Pharmacy Med Name: Amitriptyline HCl Oral Tablet 25 MG]; Take 1 tablet (25 mg total) by mouth at bedtime.   Dispense: 90 tablet; Refill: 0    If protocol passes may refill for 12 months if within 3 months of last provider visit (or a total of 15 months).

## 2021-06-12 NOTE — TELEPHONE ENCOUNTER
Who is calling:  Patient   Reason for Call:  Patient has scheduled an ofv appt to discuss what's needed for the refill. If someone on the care team could please call him as soon as able to discuss anything else that may be needed from him. He could not be transferred to [facility] at the time of call due to work.    Okay to leave a detailed message: Yes

## 2021-06-12 NOTE — TELEPHONE ENCOUNTER
"Refill Request  Did you contact pharmacy: Yes.  Date: 10/6/20  Medication name: albuterol (PROAIR HFA;PROVENTIL HFA;VENTOLIN HFA) 90 mcg/actuation inhaler   Who prescribed the medication: Wil Hernandez MD   Requested [facility]acy[facility]  Is patient out of medication: Yes  Patient notified refills processed in 3 business days:  yes  Okay to leave a detailed message: yes  The patient would like additional refills. The patient is yelling and very angry on the phone with writer because he has to call in each time for a refill \"this is neglect.\"       "

## 2021-06-12 NOTE — PROGRESS NOTES
Assessment:     1. Mild intermittent asthma without complication  albuterol (PROAIR HFA;PROVENTIL HFA;VENTOLIN HFA) 90 mcg/actuation inhaler   2. Generalized anxiety disorder  amitriptyline (ELAVIL) 25 MG tablet       Plan:     1. Mild intermittent asthma without complication  Renew the following medication  - albuterol (PROAIR HFA;PROVENTIL HFA;VENTOLIN HFA) 90 mcg/actuation inhaler; Inhale 2 puffs every 6 (six) hours as needed for wheezing.  Dispense: 18 g; Refill: 12    2. Generalized anxiety disorder  Patient is doing very well we will renew the following medication for 1 year  - amitriptyline (ELAVIL) 25 MG tablet; Take 1 tablet (25 mg total) by mouth at bedtime.  Dispense: 90 tablet; Refill: 3      Subjective:   Jim presents for follow-up examination.  He does have allergies which are more severe this year than last.  He cannot afford his Qvar inhaler because of cost and the fact that he has been furloughed from his job and it is over $300 a month.  He seems to be getting by with his moderate asthma with acute exacerbations by using albuterol more often so we will authorize 12 refills for him.  Hopefully the situation will improve as he is a very vigorous young man.  With regards to his general anxiety disorder as we note and congratulated him on in the past he is off of his benzodiazepines and is doing very well on his amitriptyline 25 mg.  We will authorize refills on this for 1 year.  He feels generally well system review unremarkable we will see him for follow-up for his annual examination when that time comes very nice to see him again and I enjoyed the visit.    Review of Systems: A complete 14 point review of systems was obtained and is negative or as stated in the history of present illness.    Past Medical History:   Diagnosis Date     Pyloric stenosis      Family History   Problem Relation Age of Onset     Breast cancer Mother      Heart disease Father      Mental illness Father      Hyperlipidemia  "Father      Mental illness Brother      Past Surgical History:   Procedure Laterality Date     HERNIA REPAIR       ORTHOPEDIC SURGERY      Both ankles, left knee, right shoulder     Social History     Tobacco Use     Smoking status: Former Smoker     Quit date: 1997     Years since quittin.4     Smokeless tobacco: Never Used   Substance Use Topics     Alcohol use: No     Drug use: No         Objective:   /84 (Patient Site: Left Arm, Patient Position: Sitting, Cuff Size: Adult Large)   Pulse 80   Ht 6' 3.47\" (1.917 m)   Wt (!) 266 lb 6.4 oz (120.8 kg)   SpO2 97%   BMI 32.88 kg/m      General Appearance:  Normal  Head:  Normal  Ears: Normal  Eyes:  Normal  Nose:  Normal  Throat:  Normal  Neck:  Normal  Back:  Normal  Chest/Breast:Normal  Lungs:  Normal  Heart:  Normal  Abdomen:  Normal  Musculoskeletal:  Normal  Lymphatic:  Normal  Skin/Hair/Nails:  Normal  Neurologic:  Normal  Extremities:  Normal  Genitourinary: Normal  Pulses:  Normal           This note has been dictated using voice recognition software. Any grammatical or context distortions are unintentional and inherent to the the software.   "

## 2021-06-14 NOTE — TELEPHONE ENCOUNTER
Refill Approved    Rx renewed per Medication Renewal Policy. Medication was last renewed on 9/13/2020.    Nova Lopes, [facility] Triage/Med Refill 12/23/2020     Requested Prescriptions   Pending Prescriptions Disp Refills     hydrOXYzine HCL (ATARAX) 10 MG tablet [Pharmacy Med Name: hydrOXYzine HCl Oral Tablet 10 MG] 90 tablet 0     Sig: TAKE ONE TABLET BY MOUTH THREE TIMES DAILY AS NEEDED FOR ITCHING       Antihistamine Refill Protocol Passed - 12/21/2020  1:02 PM        Passed - Patient has had office visit/physical in last year     Last office visit with prescriber/PCP: 10/12/2020 Wil Hernandez MD OR same dept: 10/12/2020 Wil Hernandez MD OR same specialty: 10/12/2020 Wil Hernandez MD  Last physical: Visit date not found Last MTM visit: Visit date not found   Next visit within 3 mo: Visit date not found  Next physical within 3 mo: Visit date not found  Prescriber OR PCP: Wil Hernandez MD  Last diagnosis associated with med order: 1. Anxiety  - hydrOXYzine HCL (ATARAX) 10 MG tablet [Pharmacy Med Name: hydrOXYzine HCl Oral Tablet 10 MG]; TAKE ONE TABLET BY MOUTH THREE TIMES DAILY AS NEEDED FOR ITCHING   Dispense: 90 tablet; Refill: 0    If protocol passes may refill for 12 months if within 3 months of last provider visit (or a total of 15 months).

## 2021-06-14 NOTE — PROGRESS NOTES
Assessment/Plan:     1. Anxiety  Spent time discussing medication and that it can potentially be addictive or habit forming medication can develop tolerance as patient has experienced.  He plans to use the medication sparingly.  He declines trial of other daily medications at this time will try some over-the-counter supplements declines psychology let us know if symptoms worsen or do not improve.  - clonazePAM (KLONOPIN) 0.5 MG tablet; Take 1 tablet (0.5 mg total) by mouth 3 (three) times a day as needed for anxiety.  Dispense: 45 tablet; Refill: 1    2. Mild intermittent asthma without complication  Pines daily suppressive inhaler at this time will let us know if you would like to restart Qvar Singulair or similar.  Provided referral of albuterol  - albuterol (PROAIR HFA;PROVENTIL HFA;VENTOLIN HFA) 90 mcg/actuation inhaler; Inhale 2 puffs every 6 (six) hours as needed for wheezing.  Dispense: 1 Inhaler; Refill: 12    3. Colon polyps  4. Diverticulosis  Diverticulosis well controlled with dietary changes.  He is up-to-date on colon cancer screening signed records release.    5. Chronic gout  Currently under fair control intermittent flareups has indomethacin on hand does not need refill at this time.    6.  Hyperlipidemia  He is on simvastatin has refills at this time does not need refill on states he is not due for labs but will plan to give refill when needed.    Subjective:      Jim Titus is a 52 y.o. male comes in today as a new patient to establish care.  States he was previously followed for the last 20 years by an outside provider at a private office.  He states he was then recently seen by the [facility] system.  Twice in the last few months.  He states that he was on clonazepam taking it regularly 3 times a day for his anxiety for many years.  He states that it did work well for his anxiety but he would notice he would sometimes get some jittering in between doses.  He states he was not aware  that this was an addictive or habit forming medication until he then went to switch providers.  They had worked out a plan to wean him off of the medication.  They tried Zoloft which he states seems to make symptoms worse and then had tried Wellbutrin which may have given him some hives so he stopped the medication.  He states he had weaned down and was taking one half tab of clonazepam once daily for the last week or so.  He still has times when he gets anxious he is afraid of flying and in small rooms.  He has had a long history of diarrhea due to his diverticulitis and states that he often gets anxiety if he is in the place and he does not know where that your stress room is.  He would still like to take clonazepam as needed but plans to use it very sparingly.  At this point he would like to hold off on other daily anxiety and depression medications.  He also has asthma.  He was previously on Symbicort and then Qvar for asthma but has been off he states asthma is under fair control but needs to have albuterol on hand.  He also took Singulair at one point.  He is without insurance right now so he does not want to restart a medication other than albuterol does not feel he is an acute flare.  Reviewed past medical surgical social family history up to the chart as necessary.  He also takes simvastatin he has a few refills and does not need them at this time he has had labs within the last few months.  He gets gout occasionally has some indomethacin on hand to use as needed.  Has had colonoscopy about 2-1/2 years ago had some polyps and so will need another one next year around July per patient.  Will sign records for release.  No other acute concerns today.    Current Outpatient Prescriptions   Medication Sig Dispense Refill     albuterol (PROAIR HFA;PROVENTIL HFA;VENTOLIN HFA) 90 mcg/actuation inhaler Inhale 2 puffs every 6 (six) hours as needed for wheezing. 1 Inhaler 12     beclomethasone (QVAR) 40 mcg/actuation  "inhaler Inhale 2 puffs 2 (two) times a day.       clonazePAM (KLONOPIN) 0.5 MG tablet Take 1 tablet (0.5 mg total) by mouth 3 (three) times a day as needed for anxiety. 45 tablet 1     simvastatin (ZOCOR) 20 MG tablet Take 20 mg by mouth at bedtime.       No current facility-administered medications for this visit.      Allergies   Allergen Reactions     Wellbutrin [Bupropion Hcl]      Past Medical History, Family History, and Social History reviewed.  Past Medical History:   Diagnosis Date     Pyloric stenosis      Past Surgical History:   Procedure Laterality Date     HERNIA REPAIR       ORTHOPEDIC SURGERY      Both ankles, left knee, right shoulder     Wellbutrin [bupropion hcl]  Family History   Problem Relation Age of Onset     Breast cancer Mother      Heart disease Father      Mental illness Father      Hyperlipidemia Father      Mental illness Brother      Social History     Social History     Marital status: Single     Spouse name: N/A     Number of children: N/A     Years of education: N/A     Occupational History     Not on file.     Social History Main Topics     Smoking status: Never Smoker     Smokeless tobacco: Never Used     Alcohol use No     Drug use: No     Sexual activity: Not on file     Other Topics Concern     Not on file     Social History Narrative     No narrative on file         Review of systems is as stated in HPI, and the remainder of the 10 system review is otherwise negative.    Objective:     Vitals:    12/05/17 1408   BP: 128/82   Patient Site: Right Arm   Patient Position: Sitting   Cuff Size: Adult Large   Pulse: 81   SpO2: 95%   Weight: (!) 283 lb 9.6 oz (128.6 kg)   Height: 6' 4\" (1.93 m)    Body mass index is 34.52 kg/(m^2).  Wt Readings from Last 3 Encounters:   12/05/17 (!) 283 lb 9.6 oz (128.6 kg)       General Appearance:    Alert, cooperative, no distress, appears stated age    Head:    Normocephalic, without obvious abnormality, atraumatic   Eyes:    PERRL, EOM's intact, " no conjunctivitis    Ears:    Normal TM's and external ear canals   Nose:   Mucosa normal, no drainage     or sinus tenderness   Throat:   Oropharynx is clear   Neck:   Supple, symmetrical, no adenopathy, no thyromegally, no carotid bruit        Lungs:     Clear to auscultation bilaterally, respirations unlabored   Chest Wall:    No tenderness or deformity    Heart:    Regular rate and rhythm, S1 and S2 normal, no murmur, rub    or gallop       Abdomen:     Soft, non-tender, bowel sounds active all four quadrants,     no masses, no organomegaly           Extremities:   Extremities normal, atraumatic, no cyanosis or edema   Pulses:   2+ and symmetric all extremities   Neuro:   cranial nerves grossly intact, grossly normal sensation and reflexes in extremities    Psych:   grossly normal mood and affect without acute anxiety or psychosis    Skin:   No rashes or lesions   :          This note has been dictated using voice recognition software. Any grammatical or context distortions are unintentional and inherent to the software.

## 2021-06-14 NOTE — TELEPHONE ENCOUNTER
RN cannot approve Refill Request    RN can NOT refill this medication Protocol failed and NO refill given. Last office visit: 10/12/2020 Wil Hernandez MD Last Physical: Visit date not found Last MTM visit: Visit date not found Last visit same specialty: 10/12/2020 Wil Hernandez MD.  Next visit within 3 mo: Visit date not found  Next physical within 3 mo: Visit date not found      [facility], [facility] Triage/Med Refill 1/12/2021    Requested Prescriptions   Pending Prescriptions Disp Refills     simvastatin (ZOCOR) 20 MG tablet [Pharmacy Med Name: Simvastatin Oral Tablet 20 MG] 90 tablet 0     Sig: TAKE 1 TABLET BY MOUTH EVERY NIGHT AT BEDTIME       Statins Refill Protocol (Hmg CoA Reductase Inhibitors) Passed - 1/11/2021 11:27 AM        Passed - PCP or prescribing provider visit in past 12 months      Last office visit with prescriber/PCP: 10/12/2020 Wil Hernandez MD OR same dept: 10/12/2020 Wil Hernandez MD OR same specialty: 10/12/2020 Wil Hernandez MD  Last physical: Visit date not found Last MTM visit: Visit date not found   Next visit within 3 mo: Visit date not found  Next physical within 3 mo: Visit date not found  Prescriber OR PCP: Wil Hernandez MD  Last diagnosis associated with med order: 1. Hyperlipidemia, unspecified hyperlipidemia type  - simvastatin (ZOCOR) 20 MG tablet [Pharmacy Med Name: Simvastatin Oral Tablet 20 MG]; TAKE 1 TABLET BY MOUTH EVERY NIGHT AT BEDTIME  Dispense: 90 tablet; Refill: 0    2. Exercise-induced asthma  - QVAR REDIHALER 40 mcg/actuation HFAB inhaler [Pharmacy Med Name: Qvar RediHaler Inhalation Aerosol Breath Activated 40 MCG/ACT]; INHALE TWO PUFFS BY MOUTH TWICE DAILY  Dispense: 10.6 g; Refill: 0    If protocol passes may refill for 12 months if within 3 months of last provider visit (or a total of 15 months).                QVAR REDIHALER 40 mcg/actuation HFAB inhaler [Pharmacy Med Name: Qvar RediHaler Inhalation Aerosol Breath Activated 40 MCG/ACT]  10.6 g 0     Sig: INHALE TWO PUFFS BY MOUTH TWICE DAILY       There is no refill protocol information for this order

## 2021-06-15 NOTE — PROGRESS NOTES
"  Assessment:       Left rib pain, likely secondary to a muscle strain vs joint pain.      Plan:       Provided reassurance  OTC analgesics discussed  Ice/heat pads  Rest, with occasional light stretching  Discussed signs of worsening symptoms and when to follow-up with PCP if no symptom improvement.       Patient Instructions   You were seen today for rib discomfort. This is likely due to a strained muscle or joint. There were no signs of an abnormal mass.    Symptom management:  - Ibuprofen up to 400-600mg at a time with food every 6-8 hours  - Ice/heat pads  - Rest with light stretching    If no symptom improvement in 1-2 weeks, follow-up with your primary care provider.    Subjective:        Jim Titus is a 52 y.o. male here for evaluation of left side pain. Onset was 3-4 days ago. Pain described as cramping. Severity is 7/10 at its worst and 5/10 at its best. No known event that caused the discomfort. Aggravating factors include sitting down or crossing legs. Relieving factors include standing up. Patient also notes a palpable lump at the area of discomfort. Patient denies fever, unintended weight loss, poor appetite, night sweats and new onset fatigue. Patient does note periodic fatigue that has been present for months. Treatment has included magnesium due to patient's previous history of muscle cramps. Patient also notes that they have a history of anxiety and are currently weaning off of those medications. During these symptoms, patient did have a \"panic attack\" and took a quarter pill of the Clonazepam.    The following portions of the patient's history were reviewed and updated as appropriate: allergies, current medications and problem list.    Review of Systems  Pertinent items are noted in HPI.     Allergies  Allergies   Allergen Reactions     Wellbutrin [Bupropion Hcl]          Objective:       /60  Pulse 72  Temp 97.6  F (36.4  C) (Oral)   Resp 18  Wt (!) 266 lb (120.7 kg)  SpO2 96%  BMI " 32.38 kg/m2  General appearance: alert, appears stated age, cooperative, no distress and non-toxic  Head: Normocephalic, without obvious abnormality, atraumatic  Lungs: clear to auscultation bilaterally  Chest wall: tenderness to left anteriolateral ribs directly over false ribs  Heart: regular rate and rhythm, S1, S2 normal, no murmur, click, rub or gallop  Abdomen: soft, non-tender; bowel sounds normal; no masses,  no organomegaly   Skin: no ecchymosis, erythema, or swelling

## 2021-06-15 NOTE — TELEPHONE ENCOUNTER
RN cannot approve Refill Request    RN can NOT refill this medication Protocol failed and NO refill given. Last office visit: 10/12/2020 Wil Hernandez MD Last Physical: Visit date not found Last MTM visit: Visit date not found Last visit same specialty: 10/12/2020 Wil Hernandez MD.  Next visit within 3 mo: Visit date not found  Next physical within 3 mo: Visit date not found      [facility] Triage/Med Refill 3/5/2021    Requested Prescriptions   Pending Prescriptions Disp Refills     QVAR REDIHALER 40 mcg/actuation HFAB inhaler [Pharmacy Med Name: Qvar RediHaler Inhalation Aerosol Breath Activated 40 MCG/ACT] 10.6 g 0     Sig: INHALE TWO PUFFS BY MOUTH TWICE DAILY       There is no refill protocol information for this order

## 2021-06-16 PROBLEM — E78.5 HYPERLIPEMIA: Status: ACTIVE | Noted: 2017-12-05

## 2021-06-16 PROBLEM — K63.5 COLON POLYPS: Status: ACTIVE | Noted: 2017-12-05

## 2021-06-16 PROBLEM — F41.9 ANXIETY: Status: ACTIVE | Noted: 2017-12-05

## 2021-06-17 NOTE — TELEPHONE ENCOUNTER
Refill Approved    Rx renewed per Medication Renewal Policy. Medication was last renewed on 1/12/21, last OV 10/12/20.    [facility] Triage/Med Refill 5/2/2021     Requested Prescriptions   Pending Prescriptions Disp Refills     simvastatin (ZOCOR) 20 MG tablet [Pharmacy Med Name: Simvastatin Oral Tablet 20 MG] 90 tablet 0     Sig: TAKE 1 TABLET BY MOUTH EVERY NIGHT AT BEDTIME       Statins Refill Protocol (Hmg CoA Reductase Inhibitors) Passed - 5/1/2021 10:51 AM        Passed - PCP or prescribing provider visit in past 12 months      Last office visit with prescriber/PCP: 10/12/2020 Wil Hernandez MD OR same dept: 10/12/2020 Wil Hernandez MD OR same specialty: 10/12/2020 Wil Hernandez MD  Last physical: Visit date not found Last MTM visit: Visit date not found   Next visit within 3 mo: Visit date not found  Next physical within 3 mo: Visit date not found  Prescriber OR PCP: Wil Hernandez MD  Last diagnosis associated with med order: 1. Hyperlipidemia, unspecified hyperlipidemia type  - simvastatin (ZOCOR) 20 MG tablet [Pharmacy Med Name: Simvastatin Oral Tablet 20 MG]; TAKE 1 TABLET BY MOUTH EVERY NIGHT AT BEDTIME  Dispense: 90 tablet; Refill: 0    If protocol passes may refill for 12 months if within 3 months of last provider visit (or a total of 15 months).

## 2021-06-18 NOTE — PROGRESS NOTES
Assessment:     1. Anxiety  PARoxetine (PAXIL) 10 MG tablet    clonazePAM (KLONOPIN) 0.5 MG tablet       Plan:     1. Anxiety  Patient has a history of chronic anxiety without breaks of acute anxiety going back 10 years: Patient was on clonazepam continuously for 8-10 years; experience withdrawal when he tried to quit.  We are going to slowly withdraw the patient over 6 week.  By giving him 0.25 mg of clonazepam daily for 2 weeks then 0.125 of clonazepam daily for 2 weeks then 0.125 of clonazepam every other day until the 30 tablets total supply of 0.5 mg is exhausted; I will start him on paroxetine today 10 mg in the evening  - PARoxetine (PAXIL) 10 MG tablet; Take 1 tablet (10 mg total) by mouth every morning.  Dispense: 30 tablet; Refill: 1  - clonazePAM (KLONOPIN) 0.5 MG tablet; Take one half tab let daily for 10 days then one quarter tablet daily for ten days then one quarter tablet every other day for twenty days  Dispense: 30 tablet; Refill: 0      Subjective:   Patient is being seen for follow-up.  He has established primary care here with Dr. Jennifer José who discussed getting off of clonazepam with him.  Patient suffers chronic anxiety with acute outbreaks for the last 10-15 years.  He has never seen a psychiatrist or psychologist for this problem.  We discussed his problem and discussed that he needs to come off the benzodiazepines but slowly therefore I will withdraw him as outlined in the plan and institute therapy with paroxetine at the same time.  The patient will not get active insurance for a month at which time a 1 him to return we will then place a referral for a psychological psychiatric evaluation.  No refill was authorized I did give him 30 tablets of clonazepam and discuss the withdrawal program.  All issues were discussed risk-benefit ratio of withdrawal from benzodiazepines well understood by patient.  Follow-up 6 weeks.  Total chart review was done today.  We will authorize refills of his  "noncontrolled medications as necessary.    Review of Systems: A complete 14 point review of systems was obtained and is negative or as stated in the history of present illness.    Past Medical History:   Diagnosis Date     Pyloric stenosis      Family History   Problem Relation Age of Onset     Breast cancer Mother      Heart disease Father      Mental illness Father      Hyperlipidemia Father      Mental illness Brother      Past Surgical History:   Procedure Laterality Date     HERNIA REPAIR       ORTHOPEDIC SURGERY      Both ankles, left knee, right shoulder     Social History   Substance Use Topics     Smoking status: Former Smoker     Quit date: 5/23/1997     Smokeless tobacco: Never Used     Alcohol use No         Objective:   /76  Pulse (!) 58  Ht 6' 4\" (1.93 m)  Wt (!) 240 lb (108.9 kg)  SpO2 98%  BMI 29.21 kg/m2    General Appearance:  Alert, cooperative, no distress  Head:  Normocephalic, no obvious abnormality  Ears: TM anatomy normal  Eyes:  PERRL, EOM's intact, conjunctiva and corneas clear  Nose:  Nares symmetrical, septum midline, mucosa pink, no sinus tenderness  Throat:  Lips, tongue, and mucosa are moist, pink, and intact  Neck:  Supple, symmetrical, trachea midline, no adenopathy; thyroid: no enlargement, symmetric,no tenderness/mass/nodules; no carotid bruit, no JVD  Back:  Symmetrical, no curvature, ROM normal, no CVA tenderness  Chest/Breast:  No mass or tenderness  Lungs:  Clear to auscultation bilaterally, respirations unlabored   Heart:  Normal PMI, regular rate & rhythm, S1 and S2 normal, no murmurs, rubs, or gallops  Abdomen:  Soft, non-tender, bowel sounds active all four quadrants, no mass, or organomegaly  Musculoskeletal:  Tone and strength strong and symmetrical, all extremities  Lymphatic:  No adenopathy  Skin/Hair/Nails:  Skin warm, dry, and intact, no rashes  Neurologic:  Alert and oriented x3, no cranial nerve deficits, normal strength and tone, gait " steady  Extremities:  No edema.  Jennifer's sign negative.    Genitourinary: deferred  Pulses:  Equal bilaterally           This note has been dictated using voice recognition software. Any grammatical or context distortions are unintentional and inherent to the the software.

## 2021-06-19 NOTE — PROGRESS NOTES
Assessment:     1. Anxiety  amitriptyline (ELAVIL) 10 MG tablet    hydrOXYzine HCl (ATARAX) 10 MG tablet    tadalafil (CIALIS) 5 MG tablet   2. Exercise-induced asthma  beclomethasone (QVAR) 40 mcg/actuation inhaler   3. ED (erectile dysfunction)         Plan:     1. Anxiety  Patient will discontinue the Paxil; institute therapy at bedtime with Elavil; for acute anxiety he may use hydroxyzine  - amitriptyline (ELAVIL) 10 MG tablet; Take 1 tablet (10 mg total) by mouth at bedtime.  Dispense: 60 tablet; Refill: 0  - hydrOXYzine HCl (ATARAX) 10 MG tablet; Take 1 tablet (10 mg total) by mouth 3 (three) times a day as needed for itching.  Dispense: 30 tablet; Refill: 0  - tadalafil (CIALIS) 5 MG tablet; Take 1 tablet (5 mg total) by mouth daily as needed for erectile dysfunction.  Dispense: 30 tablet; Refill: 1    2. Exercise-induced asthma  Refilled his inhaler  - beclomethasone (QVAR) 40 mcg/actuation inhaler; Inhale 2 puffs 2 (two) times a day.  Dispense: 1 Inhaler; Refill: 0    3. ED (erectile dysfunction)  I refilled his Cialis 30 tablets      Subjective:   I am seeing the patient for follow-up; I saw him 6 weeks ago in her decision was to get him off of clonazepam which she had been on for years.  He does have chronic anxiety with exacerbations.  We placed him on paroxetine he did not like feeling and has had for 5 good days but had an anxiety attack today.  Patient has not had clonazepam for 10 days now.  I told him to expect another difficult 20-30 days allowing the lipid like storage effect of benzodiazepines to washout.  I will institute therapy with Elavil 10 mg at bedtime.  For breakthrough anxiety we will use hydroxyzine 1 tablet as needed.  He also needs a refill of his asthma medication which we will honor.  I will also refill his Cialis for ED.  System reviewed no headaches visual disturbances dysphagia shortness of breath dyspnea chest pain abdominal pain diarrhea constipation he is only shaky today  because of anxiety.  Cialis provides good relief for his intermittent erectile dysfunction.  All medical questions or were asked were answered I personally reviewed family social history surgeries allergies problems list I will see the patient for follow-up 2 months.    Review of Systems: A complete 14 point review of systems was obtained and is negative or as stated in the history of present illness.    Past Medical History:   Diagnosis Date     Pyloric stenosis      Family History   Problem Relation Age of Onset     Breast cancer Mother      Heart disease Father      Mental illness Father      Hyperlipidemia Father      Mental illness Brother      Past Surgical History:   Procedure Laterality Date     HERNIA REPAIR       ORTHOPEDIC SURGERY      Both ankles, left knee, right shoulder     Social History   Substance Use Topics     Smoking status: Former Smoker     Quit date: 5/23/1997     Smokeless tobacco: Never Used     Alcohol use No         Objective:   /74 (Patient Site: Left Arm, Patient Position: Sitting, Cuff Size: Adult Regular)  Pulse 76  Wt (!) 241 lb 8 oz (109.5 kg)  BMI 29.4 kg/m2    General Appearance:  Alert, cooperative, no distress  Head:  Normocephalic, no obvious abnormality  Ears: TM anatomy normal  Eyes:  PERRL, EOM's intact, conjunctiva and corneas clear  Nose:  Nares symmetrical, septum midline, mucosa pink, no sinus tenderness  Throat:  Lips, tongue, and mucosa are moist, pink, and intact  Neck:  Supple, symmetrical, trachea midline, no adenopathy; thyroid: no enlargement, symmetric,no tenderness/mass/nodules; no carotid bruit, no JVD  Back:  Symmetrical, no curvature, ROM normal, no CVA tenderness  Chest/Breast:  No mass or tenderness  Lungs:  Clear to auscultation bilaterally, respirations unlabored   Heart:  Normal PMI, regular rate & rhythm, S1 and S2 normal, no murmurs, rubs, or gallops  Abdomen:  Soft, non-tender, bowel sounds active all four quadrants, no mass, or  organomegaly  Musculoskeletal:  Tone and strength strong and symmetrical, all extremities  Lymphatic:  No adenopathy  Skin/Hair/Nails:  Skin warm, dry, and intact, no rashes  Neurologic:  Alert and oriented x3, no cranial nerve deficits, normal strength and tone, gait steady  Extremities:  No edema.  Jennifer's sign negative.    Genitourinary: deferred  Pulses:  Equal bilaterally           This note has been dictated using voice recognition software. Any grammatical or context distortions are unintentional and inherent to the the software.

## 2021-06-21 ENCOUNTER — COMMUNICATION - HEALTHEAST (OUTPATIENT)
Dept: FAMILY MEDICINE | Facility: CLINIC | Age: 56
End: 2021-06-21

## 2021-06-21 DIAGNOSIS — J45.990 EXERCISE-INDUCED ASTHMA: ICD-10-CM

## 2021-06-21 RX ORDER — BECLOMETHASONE DIPROPIONATE HFA 40 UG/1
AEROSOL, METERED RESPIRATORY (INHALATION)
Qty: 10.6 G | Refills: 0 | Status: SHIPPED | OUTPATIENT
Start: 2021-06-21 | End: 2021-11-10

## 2021-06-22 NOTE — TELEPHONE ENCOUNTER
Refill Approved    Rx renewed per Medication Renewal Policy. Medication was last renewed on 9/10/18.    Lila [facility]el, [facility] Triage/Med Refill 1/4/2019     Requested Prescriptions   Pending Prescriptions Disp Refills     VENTOLIN HFA 90 mcg/actuation inhaler [Pharmacy Med Name: Ventolin HFA Inhalation Aerosol Solution 108 (90 Base) MCG/ACT] 18 g 11     Sig: Inhale 2 puffs every 6 (six) hours as needed for wheezing.    Albuterol/Levalbuterol Refill Protocol Passed - 1/3/2019 10:05 AM       Passed - PCP or prescribing provider visit in last year    Last office visit with prescriber/PCP: 12/5/2017 Jennifer José DO OR same dept: 7/2/2018 Wil Hernandez MD OR same specialty: 7/2/2018 Wil Hernandez MD Last physical: Visit date not found       Next appt within 3 mo: Visit date not found  Next physical within 3 mo: Visit date not found  Prescriber OR PCP: Jennifer José DO  Last diagnosis associated with med order: 1. Mild intermittent asthma without complication  - VENTOLIN HFA 90 mcg/actuation inhaler [Pharmacy Med Name: Ventolin HFA Inhalation Aerosol Solution 108 (90 Base) MCG/ACT]; Inhale 2 puffs every 6 (six) hours as needed for wheezing.  Dispense: 18 g; Refill: 11    If protocol passes may refill for 6 months if within 3 months of last provider visit (or a total of 9 months). If patient requesting >1 inhaler per month refill x 6 months and have patient make appointment with provider.

## 2021-06-22 NOTE — PROGRESS NOTES
Assessment:     1. Acute pain of right knee  XR Knee Right 1 or 2 VWS    naproxen (NAPROSYN) 500 MG tablet       Plan:     1. Acute pain of right knee  Patient will ice the area; discontinue his squats as he is trying to get into condition to play baseball; give it 3 weeks with anti-inflammatory treatment he may continue his physical therapy alert his physiatry wrist that he does have a inflammatory condition of his right lateral collateral ligament and follow-up with us as needed  - XR Knee Right 1 or 2 VWS; Future  - naproxen (NAPROSYN) 500 MG tablet; Take 1 tablet (500 mg total) by mouth 2 (two) times a day with meals.  Dispense: 60 tablet; Refill: 1      Subjective:   Patient was in his usual state of health when he decided to get into a fitness program and saw a physical therapy conditioning expert who has prescribed some lower extremity quad strengthening exercises including squats as well as some low back strengthening exercises.  Patient is a  and gets in and out of cars may have inadvertently struck his right lateral collateral ligament area in any event he has a small movable mass measuring 1-1/2 x 2 cm in this area which is tender patient has noticed that it is not red but has been bothering him now for about 10 days.  Patient can walk and run without discomfort and is actively doing his physical therapy.  This may be some type of soft tissue injury there was no change in color of the area hematoma.  We will have radiology review this but I feel this is a soft tissue injury will keep an eye on it prescribe anti-inflammatories will prescribe ice to the area for 20 minutes 3 times daily no elevated at night he may do his other physical therapy but to avoid squats follow-up within 3 weeks if no improvement.  Patient also mentioned he may need some antianxiety agent to fly and this will be okay I will prescribe    Review of Systems: A complete 14 point review of systems was obtained and is  negative or as stated in the history of present illness.    Past Medical History:   Diagnosis Date     Pyloric stenosis      Family History   Problem Relation Age of Onset     Breast cancer Mother      Heart disease Father      Mental illness Father      Hyperlipidemia Father      Mental illness Brother      Past Surgical History:   Procedure Laterality Date     HERNIA REPAIR       ORTHOPEDIC SURGERY      Both ankles, left knee, right shoulder     Social History     Tobacco Use     Smoking status: Former Smoker     Last attempt to quit: 1997     Years since quittin.6     Smokeless tobacco: Never Used   Substance Use Topics     Alcohol use: No     Drug use: No         Objective:   /70   Pulse 72   Wt (!) 260 lb 9.6 oz (118.2 kg)   BMI 31.72 kg/m      General Appearance:  Alert, cooperative, no distress  Head:  Normocephalic, no obvious abnormality  Ears: TM anatomy normal  Eyes:  PERRL, EOM's intact, conjunctiva and corneas clear  Nose:  Nares symmetrical, septum midline, mucosa pink, no sinus tenderness  Throat:  Lips, tongue, and mucosa are moist, pink, and intact  Neck:  Supple, symmetrical, trachea midline, no adenopathy; thyroid: no enlargement, symmetric,no tenderness/mass/nodules; no carotid bruit, no JVD  Back:  Symmetrical, no curvature, ROM normal, no CVA tenderness  Chest/Breast:  No mass or tenderness  Lungs:  Clear to auscultation bilaterally, respirations unlabored   Heart:  Normal PMI, regular rate & rhythm, S1 and S2 normal, no murmurs, rubs, or gallops  Abdomen:  Soft, non-tender, bowel sounds active all four quadrants, no mass, or organomegaly  Musculoskeletal: Patient has a tender subcutaneous swelling in the area of the right lateral collateral ligament area which is soft movable its between that ligament and the lateral part of the infrapatellar ligament x-ray reveals some mild swelling in that area but no definitive mass  Lymphatic:  No adenopathy  Skin/Hair/Nails:  Skin warm,  dry, and intact, no rashes  Neurologic:  Alert and oriented x3, no cranial nerve deficits, normal strength and tone, gait steady  Extremities:  No edema.  Jennifer's sign negative.    Genitourinary: deferred  Pulses:  Equal bilaterally           This note has been dictated using voice recognition software. Any grammatical or context distortions are unintentional and inherent to the the software.

## 2021-06-22 NOTE — TELEPHONE ENCOUNTER
Refill Approved    Rx renewed per Medication Renewal Policy. Medication was last renewed on 8/5/18 .    Eleanor Yoo, [facility] Triage/Med Refill 1/1/2019     Requested Prescriptions   Pending Prescriptions Disp Refills     hydrOXYzine HCl (ATARAX) 10 MG tablet [Pharmacy Med Name: HydrOXYzine HCl Oral Tablet 10 MG] 30 tablet 1     Sig: Take 1 tablet (10 mg total) by mouth 3 (three) times a day as needed for itching.    Antihistamine Refill Protocol Passed - 1/1/2019  5:53 PM       Passed - Patient has had office visit/physical in last year    Last office visit with prescriber/PCP: 7/2/2018 Wil Hernandez MD OR same dept: 7/2/2018 Wil Hernandez MD OR same specialty: 7/2/2018 Wil Hernandez MD  Last physical: Visit date not found Last MTM visit: Visit date not found   Next visit within 3 mo: Visit date not found  Next physical within 3 mo: Visit date not found  Prescriber OR PCP: Wil Hernandez MD  Last diagnosis associated with med order: 1. Anxiety  - hydrOXYzine HCl (ATARAX) 10 MG tablet [Pharmacy Med Name: HydrOXYzine HCl Oral Tablet 10 MG]; Take 1 tablet (10 mg total) by mouth 3 (three) times a day as needed for itching.  Dispense: 30 tablet; Refill: 1    If protocol passes may refill for 12 months if within 3 months of last provider visit (or a total of 15 months).

## 2021-06-24 NOTE — TELEPHONE ENCOUNTER
Refill Approved    Rx renewed per Medication Renewal Policy. Medication was last renewed on 3/27/18.    Lila Nagel, [facility] Triage/Med Refill 2/14/2019     Requested Prescriptions   Pending Prescriptions Disp Refills     simvastatin (ZOCOR) 20 MG tablet [Pharmacy Med Name: Simvastatin Oral Tablet 20 MG] 30 tablet 0     Sig: TAKE ONE TABLET BY MOUTH AT BEDTIME    Statins Refill Protocol (Hmg CoA Reductase Inhibitors) Passed - 2/12/2019  1:08 PM       Passed - PCP or prescribing provider visit in past 12 months     Last office visit with prescriber/PCP: Visit date not found OR same dept: 1/9/2019 Wil Hernandez MD OR same specialty: 1/9/2019 Wil Hernandez MD  Last physical: Visit date not found Last MTM visit: Visit date not found   Next visit within 3 mo: Visit date not found  Next physical within 3 mo: Visit date not found  Prescriber OR PCP: Velma Waller MD  Last diagnosis associated with med order: There are no diagnoses linked to this encounter.  If protocol passes may refill for 12 months if within 3 months of last provider visit (or a total of 15 months).

## 2021-06-26 NOTE — TELEPHONE ENCOUNTER
RN cannot approve Refill Request    RN can NOT refill this medication med is not covered by policy/route to provider. Last office visit: 10/12/2020 Wil Hernandez MD Last Physical: Visit date not found Last MTM visit: Visit date not found Last visit same specialty: 10/12/2020 Wil Hernandez MD.  Next visit within 3 mo: Visit date not found  Next physical within 3 mo: Visit date not found      Velvet Patrick, [facility] Triage/Med Refill 6/21/2021    Requested Prescriptions   Pending Prescriptions Disp Refills     QVAR REDIHALER 40 mcg/actuation HFAB inhaler [Pharmacy Med Name: Qvar RediHaler Inhalation Aerosol Breath Activated 40 MCG/ACT] 10.6 g 0     Sig: INHALE TWO PUFFS BY MOUTH TWICE DAILY       There is no refill protocol information for this order

## 2021-08-10 DIAGNOSIS — J45.20 MILD INTERMITTENT ASTHMA WITHOUT COMPLICATION: Primary | ICD-10-CM

## 2021-08-12 RX ORDER — ALBUTEROL SULFATE 90 UG/1
AEROSOL, METERED RESPIRATORY (INHALATION)
Qty: 18 G | Refills: 0 | Status: SHIPPED | OUTPATIENT
Start: 2021-08-12 | End: 2021-09-14

## 2021-08-12 NOTE — TELEPHONE ENCOUNTER
"Last Written Prescription       Last office visit provider:  10/12/2020     Requested Prescriptions   Pending Prescriptions Disp Refills     albuterol (PROAIR HFA/PROVENTIL HFA/VENTOLIN HFA) 108 (90 Base) MCG/ACT inhaler [Pharmacy Med Name: Albuterol Sulfate HFA Inhalation Aerosol Solution 108 (90 Base) MCG/ACT] 18 g 0     Sig: Inhale 2 puffs every 6 (six) hours as needed for wheezing.       Asthma Maintenance Inhalers - Anticholinergics Passed - 8/10/2021  2:22 PM        Passed - Patient is age 12 years or older        Passed - Recent (12 mo) or future (30 days) visit within the authorizing provider's specialty     Patient has had an office visit with the authorizing provider or a provider within the authorizing providers department within the previous 12 mos or has a future within next 30 days. See \"Patient Info\" tab in inbasket, or \"Choose Columns\" in Meds & Orders section of the refill encounter.              Passed - Medication is active on med list       Short-Acting Beta Agonist Inhalers Protocol  Passed - 8/10/2021  2:22 PM        Passed - Patient is age 12 or older        Passed - Recent (12 mo) or future (30 days) visit within the authorizing provider's specialty     Patient has had an office visit with the authorizing provider or a provider within the authorizing providers department within the previous 12 mos or has a future within next 30 days. See \"Patient Info\" tab in inbasket, or \"Choose Columns\" in Meds & Orders section of the refill encounter.              Passed - Medication is active on med list             Cecilia Morrissey RN 08/12/21 1:50 PM  "

## 2021-09-13 DIAGNOSIS — J45.20 MILD INTERMITTENT ASTHMA WITHOUT COMPLICATION: ICD-10-CM

## 2021-09-14 RX ORDER — ALBUTEROL SULFATE 90 UG/1
AEROSOL, METERED RESPIRATORY (INHALATION)
Qty: 18 G | Refills: 0 | Status: SHIPPED | OUTPATIENT
Start: 2021-09-14 | End: 2021-10-06

## 2021-09-14 NOTE — TELEPHONE ENCOUNTER
"Routing refill request to provider for review/approval because:  ACT score    Last Written Prescription Date:  8/12/21  Last Fill Quantity: 18 g,  # refills: 0   Last office visit provider:  10/12/20     Requested Prescriptions   Pending Prescriptions Disp Refills     albuterol (PROAIR HFA/PROVENTIL HFA/VENTOLIN HFA) 108 (90 Base) MCG/ACT inhaler [Pharmacy Med Name: Albuterol Sulfate HFA Inhalation Aerosol Solution 108 (90 Base) MCG/ACT] 18 g 0     Sig: Inhale 2 puffs by mouth every 6 hours as needed for wheezing.       Asthma Maintenance Inhalers - Anticholinergics Failed - 9/13/2021 10:45 AM        Failed - Asthma control assessment score within normal limits in last 6 months     Please review ACT score.           Failed - Recent (6 mo) or future (30 days) visit within the authorizing provider's specialty     Patient had office visit in the last 6 months or has a visit in the next 30 days with authorizing provider or within the authorizing provider's specialty.  See \"Patient Info\" tab in inbasket, or \"Choose Columns\" in Meds & Orders section of the refill encounter.            Passed - Patient is age 12 years or older        Passed - Medication is active on med list       Short-Acting Beta Agonist Inhalers Protocol  Failed - 9/13/2021 10:45 AM        Failed - Asthma control assessment score within normal limits in last 6 months     Please review ACT score.           Failed - Recent (6 mo) or future (30 days) visit within the authorizing provider's specialty     Patient had office visit in the last 6 months or has a visit in the next 30 days with authorizing provider or within the authorizing provider's specialty.  See \"Patient Info\" tab in inbasket, or \"Choose Columns\" in Meds & Orders section of the refill encounter.            Passed - Patient is age 12 or older        Passed - Medication is active on med list             Dewayne Stiles RN 09/14/21 7:12 AM  "

## 2021-10-05 DIAGNOSIS — J45.20 MILD INTERMITTENT ASTHMA WITHOUT COMPLICATION: ICD-10-CM

## 2021-10-05 NOTE — TELEPHONE ENCOUNTER
"Routing refill request to provider for review/approval because:  Patient needs to be seen because:  It has almost been 1 year since last seen. No upcoming appointments on file.  No current ACT score on file.    Last Written Prescription Date:  09/14/2021  Last Fill Quantity: 18g ,  # refills: 0   Last office visit provider:  10/12/2020 with Dr Hernandez.     Requested Prescriptions   Pending Prescriptions Disp Refills     QVAR REDIHALER 40 MCG/ACT inhaler [Pharmacy Med Name: Qvar RediHaler Inhalation Aerosol Breath Activated 40 MCG/ACT] 10.6 g 0     Sig: INHALE TWO PUFFS BY MOUTH TWICE DAILY       Inhaled Steroids Protocol Failed - 10/5/2021 12:59 PM        Failed - Asthma control assessment score within normal limits in last 6 months     Please review ACT score.           Failed - Recent (6 mo) or future (30 days) visit within the authorizing provider's specialty     Patient had office visit in the last 6 months or has a visit in the next 30 days with authorizing provider or within the authorizing provider's specialty.  See \"Patient Info\" tab in inbasket, or \"Choose Columns\" in Meds & Orders section of the refill encounter.            Passed - Patient is age 12 or older        Passed - Medication is active on med list           albuterol (PROAIR HFA/PROVENTIL HFA/VENTOLIN HFA) 108 (90 Base) MCG/ACT inhaler [Pharmacy Med Name: Albuterol Sulfate HFA Inhalation Aerosol Solution 108 (90 Base) MCG/ACT] 18 g 0     Sig: Inhale 2 puffs by mouth every 6 hours as needed for wheezing       Asthma Maintenance Inhalers - Anticholinergics Failed - 10/5/2021 12:59 PM        Failed - Asthma control assessment score within normal limits in last 6 months     Please review ACT score.           Failed - Recent (6 mo) or future (30 days) visit within the authorizing provider's specialty     Patient had office visit in the last 6 months or has a visit in the next 30 days with authorizing provider or within the authorizing provider's " "specialty.  See \"Patient Info\" tab in inbasket, or \"Choose Columns\" in Meds & Orders section of the refill encounter.            Passed - Patient is age 12 years or older        Passed - Medication is active on med list       Short-Acting Beta Agonist Inhalers Protocol  Failed - 10/5/2021 12:59 PM        Failed - Asthma control assessment score within normal limits in last 6 months     Please review ACT score.           Failed - Recent (6 mo) or future (30 days) visit within the authorizing provider's specialty     Patient had office visit in the last 6 months or has a visit in the next 30 days with authorizing provider or within the authorizing provider's specialty.  See \"Patient Info\" tab in inbasket, or \"Choose Columns\" in Meds & Orders section of the refill encounter.            Passed - Patient is age 12 or older        Passed - Medication is active on med list             Rachel Banks 10/05/21 4:55 PM  "

## 2021-10-06 RX ORDER — BECLOMETHASONE DIPROPIONATE HFA 40 UG/1
AEROSOL, METERED RESPIRATORY (INHALATION)
Qty: 10.6 G | Refills: 0 | Status: SHIPPED | OUTPATIENT
Start: 2021-10-06 | End: 2022-05-16

## 2021-10-06 RX ORDER — ALBUTEROL SULFATE 90 UG/1
AEROSOL, METERED RESPIRATORY (INHALATION)
Qty: 18 G | Refills: 0 | Status: SHIPPED | OUTPATIENT
Start: 2021-10-06 | End: 2021-10-26

## 2021-10-24 ENCOUNTER — HEALTH MAINTENANCE LETTER (OUTPATIENT)
Age: 56
End: 2021-10-24

## 2021-10-26 DIAGNOSIS — J45.20 MILD INTERMITTENT ASTHMA WITHOUT COMPLICATION: ICD-10-CM

## 2021-10-26 RX ORDER — ALBUTEROL SULFATE 90 UG/1
AEROSOL, METERED RESPIRATORY (INHALATION)
Qty: 18 G | Refills: 0 | Status: SHIPPED | OUTPATIENT
Start: 2021-10-26 | End: 2021-11-12

## 2021-10-26 NOTE — TELEPHONE ENCOUNTER
Discharge instructions reviewed by provider; pt verbalized understanding of discharge and medication use. Vitals updated, IV DC'ed and pt ambulated from department with steady gait. No apparent distress noted. Pt called again about his inhaler. He states he needs it filled today. Pt states he will end up with serious complications without it and he thought he had more refills but the pharmacy informed him he did not.     Please approve ASAP.     Pt has office visit scheduled with Dr. Hernandez on 11/10/2021.

## 2021-11-10 ENCOUNTER — OFFICE VISIT (OUTPATIENT)
Dept: FAMILY MEDICINE | Facility: CLINIC | Age: 56
End: 2021-11-10
Payer: COMMERCIAL

## 2021-11-10 VITALS
BODY MASS INDEX: 35.25 KG/M2 | HEART RATE: 81 BPM | HEIGHT: 75 IN | SYSTOLIC BLOOD PRESSURE: 122 MMHG | OXYGEN SATURATION: 98 % | WEIGHT: 283.5 LBS | DIASTOLIC BLOOD PRESSURE: 68 MMHG

## 2021-11-10 DIAGNOSIS — Z00.00 ANNUAL PHYSICAL EXAM: ICD-10-CM

## 2021-11-10 DIAGNOSIS — Z23 HIGH PRIORITY FOR 2019-NCOV VACCINE: Primary | ICD-10-CM

## 2021-11-10 DIAGNOSIS — F41.9 ANXIETY: ICD-10-CM

## 2021-11-10 DIAGNOSIS — E66.01 MORBID OBESITY (H): ICD-10-CM

## 2021-11-10 DIAGNOSIS — J45.20 MILD INTERMITTENT ASTHMA WITHOUT COMPLICATION: ICD-10-CM

## 2021-11-10 LAB
ALBUMIN SERPL-MCNC: 3.9 G/DL (ref 3.5–5)
ALBUMIN UR-MCNC: NEGATIVE MG/DL
ALP SERPL-CCNC: 72 U/L (ref 45–120)
ALT SERPL W P-5'-P-CCNC: 23 U/L (ref 0–45)
ANION GAP SERPL CALCULATED.3IONS-SCNC: 12 MMOL/L (ref 5–18)
APPEARANCE UR: CLEAR
AST SERPL W P-5'-P-CCNC: 16 U/L (ref 0–40)
BILIRUB SERPL-MCNC: 0.5 MG/DL (ref 0–1)
BILIRUB UR QL STRIP: NEGATIVE
BUN SERPL-MCNC: 14 MG/DL (ref 8–22)
CALCIUM SERPL-MCNC: 9.6 MG/DL (ref 8.5–10.5)
CHLORIDE BLD-SCNC: 107 MMOL/L (ref 98–107)
CHOLEST SERPL-MCNC: 233 MG/DL
CO2 SERPL-SCNC: 21 MMOL/L (ref 22–31)
COLOR UR AUTO: YELLOW
CREAT SERPL-MCNC: 0.82 MG/DL (ref 0.7–1.3)
ERYTHROCYTE [DISTWIDTH] IN BLOOD BY AUTOMATED COUNT: 12.1 % (ref 10–15)
FASTING STATUS PATIENT QL REPORTED: NO
GFR SERPL CREATININE-BSD FRML MDRD: >90 ML/MIN/1.73M2
GLUCOSE BLD-MCNC: 97 MG/DL (ref 70–125)
GLUCOSE UR STRIP-MCNC: NEGATIVE MG/DL
HCT VFR BLD AUTO: 39.9 % (ref 40–53)
HDLC SERPL-MCNC: 52 MG/DL
HGB BLD-MCNC: 13.6 G/DL (ref 13.3–17.7)
HGB UR QL STRIP: NEGATIVE
KETONES UR STRIP-MCNC: NEGATIVE MG/DL
LDLC SERPL CALC-MCNC: 106 MG/DL
LEUKOCYTE ESTERASE UR QL STRIP: NEGATIVE
MCH RBC QN AUTO: 32.9 PG (ref 26.5–33)
MCHC RBC AUTO-ENTMCNC: 34.1 G/DL (ref 31.5–36.5)
MCV RBC AUTO: 97 FL (ref 78–100)
NITRATE UR QL: NEGATIVE
PH UR STRIP: 5.5 [PH] (ref 5–8)
PLATELET # BLD AUTO: 235 10E3/UL (ref 150–450)
POTASSIUM BLD-SCNC: 4.3 MMOL/L (ref 3.5–5)
PROT SERPL-MCNC: 6.9 G/DL (ref 6–8)
PSA SERPL-MCNC: 0.41 UG/L (ref 0–3.5)
RBC # BLD AUTO: 4.13 10E6/UL (ref 4.4–5.9)
SODIUM SERPL-SCNC: 140 MMOL/L (ref 136–145)
SP GR UR STRIP: 1.02 (ref 1–1.03)
TRIGL SERPL-MCNC: 374 MG/DL
UROBILINOGEN UR STRIP-ACNC: 0.2 E.U./DL
WBC # BLD AUTO: 5.6 10E3/UL (ref 4–11)

## 2021-11-10 PROCEDURE — 36415 COLL VENOUS BLD VENIPUNCTURE: CPT | Performed by: FAMILY MEDICINE

## 2021-11-10 PROCEDURE — 80061 LIPID PANEL: CPT | Performed by: FAMILY MEDICINE

## 2021-11-10 PROCEDURE — 81003 URINALYSIS AUTO W/O SCOPE: CPT | Performed by: FAMILY MEDICINE

## 2021-11-10 PROCEDURE — 85027 COMPLETE CBC AUTOMATED: CPT | Performed by: FAMILY MEDICINE

## 2021-11-10 PROCEDURE — 99214 OFFICE O/P EST MOD 30 MIN: CPT | Mod: 25 | Performed by: FAMILY MEDICINE

## 2021-11-10 PROCEDURE — 91300 COVID-19,PF,PFIZER (12+ YRS): CPT | Performed by: FAMILY MEDICINE

## 2021-11-10 PROCEDURE — G0103 PSA SCREENING: HCPCS | Performed by: FAMILY MEDICINE

## 2021-11-10 PROCEDURE — 0004A COVID-19,PF,PFIZER (12+ YRS): CPT | Performed by: FAMILY MEDICINE

## 2021-11-10 PROCEDURE — 80053 COMPREHEN METABOLIC PANEL: CPT | Performed by: FAMILY MEDICINE

## 2021-11-10 SDOH — ECONOMIC STABILITY: TRANSPORTATION INSECURITY
IN THE PAST 12 MONTHS, HAS LACK OF TRANSPORTATION KEPT YOU FROM MEETINGS, WORK, OR FROM GETTING THINGS NEEDED FOR DAILY LIVING?: NO

## 2021-11-10 SDOH — ECONOMIC STABILITY: FOOD INSECURITY: WITHIN THE PAST 12 MONTHS, THE FOOD YOU BOUGHT JUST DIDN'T LAST AND YOU DIDN'T HAVE MONEY TO GET MORE.: NEVER TRUE

## 2021-11-10 SDOH — ECONOMIC STABILITY: TRANSPORTATION INSECURITY
IN THE PAST 12 MONTHS, HAS THE LACK OF TRANSPORTATION KEPT YOU FROM MEDICAL APPOINTMENTS OR FROM GETTING MEDICATIONS?: NO

## 2021-11-10 SDOH — ECONOMIC STABILITY: INCOME INSECURITY: IN THE LAST 12 MONTHS, WAS THERE A TIME WHEN YOU WERE NOT ABLE TO PAY THE MORTGAGE OR RENT ON TIME?: NO

## 2021-11-10 SDOH — ECONOMIC STABILITY: INCOME INSECURITY: HOW HARD IS IT FOR YOU TO PAY FOR THE VERY BASICS LIKE FOOD, HOUSING, MEDICAL CARE, AND HEATING?: NOT HARD AT ALL

## 2021-11-10 SDOH — HEALTH STABILITY: PHYSICAL HEALTH: ON AVERAGE, HOW MANY MINUTES DO YOU ENGAGE IN EXERCISE AT THIS LEVEL?: 30 MIN

## 2021-11-10 SDOH — ECONOMIC STABILITY: FOOD INSECURITY: WITHIN THE PAST 12 MONTHS, YOU WORRIED THAT YOUR FOOD WOULD RUN OUT BEFORE YOU GOT MONEY TO BUY MORE.: NEVER TRUE

## 2021-11-10 SDOH — HEALTH STABILITY: PHYSICAL HEALTH: ON AVERAGE, HOW MANY DAYS PER WEEK DO YOU ENGAGE IN MODERATE TO STRENUOUS EXERCISE (LIKE A BRISK WALK)?: 1 DAY

## 2021-11-10 ASSESSMENT — ANXIETY QUESTIONNAIRES
GAD7 TOTAL SCORE: 3
1. FEELING NERVOUS, ANXIOUS, OR ON EDGE: SEVERAL DAYS
7. FEELING AFRAID AS IF SOMETHING AWFUL MIGHT HAPPEN: NOT AT ALL
5. BEING SO RESTLESS THAT IT IS HARD TO SIT STILL: NOT AT ALL
8. IF YOU CHECKED OFF ANY PROBLEMS, HOW DIFFICULT HAVE THESE MADE IT FOR YOU TO DO YOUR WORK, TAKE CARE OF THINGS AT HOME, OR GET ALONG WITH OTHER PEOPLE?: NOT DIFFICULT AT ALL
6. BECOMING EASILY ANNOYED OR IRRITABLE: SEVERAL DAYS
7. FEELING AFRAID AS IF SOMETHING AWFUL MIGHT HAPPEN: NOT AT ALL
2. NOT BEING ABLE TO STOP OR CONTROL WORRYING: NOT AT ALL
GAD7 TOTAL SCORE: 3
4. TROUBLE RELAXING: SEVERAL DAYS
GAD7 TOTAL SCORE: 3
3. WORRYING TOO MUCH ABOUT DIFFERENT THINGS: NOT AT ALL

## 2021-11-10 ASSESSMENT — PATIENT HEALTH QUESTIONNAIRE - PHQ9
10. IF YOU CHECKED OFF ANY PROBLEMS, HOW DIFFICULT HAVE THESE PROBLEMS MADE IT FOR YOU TO DO YOUR WORK, TAKE CARE OF THINGS AT HOME, OR GET ALONG WITH OTHER PEOPLE: NOT DIFFICULT AT ALL
SUM OF ALL RESPONSES TO PHQ QUESTIONS 1-9: 2
SUM OF ALL RESPONSES TO PHQ QUESTIONS 1-9: 2

## 2021-11-10 ASSESSMENT — LIFESTYLE VARIABLES
HOW OFTEN DO YOU HAVE A DRINK CONTAINING ALCOHOL: 4 OR MORE TIMES A WEEK
HOW OFTEN DO YOU HAVE SIX OR MORE DRINKS ON ONE OCCASION: WEEKLY
HOW MANY STANDARD DRINKS CONTAINING ALCOHOL DO YOU HAVE ON A TYPICAL DAY: 5 OR 6

## 2021-11-10 ASSESSMENT — SOCIAL DETERMINANTS OF HEALTH (SDOH)
ARE YOU MARRIED, WIDOWED, DIVORCED, SEPARATED, NEVER MARRIED, OR LIVING WITH A PARTNER?: NEVER MARRIED
HOW OFTEN DO YOU ATTEND CHURCH OR RELIGIOUS SERVICES?: NEVER
HOW OFTEN DO YOU GET TOGETHER WITH FRIENDS OR RELATIVES?: ONCE A WEEK
DO YOU BELONG TO ANY CLUBS OR ORGANIZATIONS SUCH AS CHURCH GROUPS UNIONS, FRATERNAL OR ATHLETIC GROUPS, OR SCHOOL GROUPS?: NO
IN A TYPICAL WEEK, HOW MANY TIMES DO YOU TALK ON THE PHONE WITH FAMILY, FRIENDS, OR NEIGHBORS?: ONCE A WEEK

## 2021-11-10 ASSESSMENT — MIFFLIN-ST. JEOR: SCORE: 2204.7

## 2021-11-10 NOTE — PROGRESS NOTES
SUBJECTIVE:   CC: Jim Titus is an 56 year old male who presents for preventative health visit.     Patient is being seen for a general checkup and renewal of his medications.  He does have chronic anxiety is well managed with hydroxyzine during the day.  Patient does take amitriptyline in the evening.  He reports occasional some floating anxiety he has some clonazepam that he rarely takes may be 1/4-1/2 of a tablet per month.  We did discuss that it is really a good idea to get off of all benzodiazepines and he is done very well by himself he used to take at least 1 tablet/day and is titrated himself off of benzodiazepines over the last few years.  He lives with his mother who needs attention.  He is happy with his work.  His weight is gone up mainly due to inactivity.  He promises to work on that and try to get 30 to 40 minutes of daily aerobic exercise.  We did discuss methodology such as deep breathing fresh air and other techniques to deal with chronic anxiety.  Is gout is under good control he is not on medication for that but occasionally does take a nap naproxen if he does get a slight flare.  All medical questions asked were answered I really enjoyed seeing Jim again here today and we should see him on an annual basis we will do a PSA check and comprehensive profile hemogram today.  Patient has been advised of split billing requirements and indicates understanding: Yes  Healthy Habits:     Getting at least 3 servings of Calcium per day:  NO    Bi-annual eye exam:  NO    Dental care twice a year:  NO    Sleep apnea or symptoms of sleep apnea:  None    Diet:  Regular (no restrictions)    Frequency of exercise:  1 day/week    Duration of exercise:  Less than 15 minutes    Taking medications regularly:  Yes    Medication side effects:  None    PHQ-2 Total Score: 0    Additional concerns today:  Yes          PROBLEMS TO ADD ON...- NA    Today's PHQ-2 Score:   PHQ-2 ( 1999 [employer]) 11/10/2021   Q1: Little  interest or pleasure in doing things 0   Q2: Feeling down, depressed or hopeless 0   PHQ-2 Score 0   Q1: Little interest or pleasure in doing things Not at all   Q2: Feeling down, depressed or hopeless Not at all   PHQ-2 Score 0       Abuse: Current or Past(Physical, Sexual or Emotional)- No  Do you feel safe in your environment? Yes    Have you ever done Advance Care Planning? (For example, a Health Directive, POLST, or a discussion with a medical provider or your loved ones about your wishes): No, advance care planning information given to patient to review.  Patient declined advance care planning discussion at this time.    Social History     Tobacco Use     Smoking status: Former Smoker     Quit date: 1997     Years since quittin.4     Smokeless tobacco: Never Used   Substance Use Topics     Alcohol use: No     If you drink alcohol do you typically have >3 drinks per day or >7 drinks per week? No    Alcohol Use 11/10/2021   Prescreen: >3 drinks/day or >7 drinks/week? Yes   AUDIT SCORE  10     AUDIT - Alcohol Use Disorders Identification Test - Reproduced from the World Health Organization Audit 2001 (Second Edition) 11/10/2021   1.  How often do you have a drink containing alcohol? 2 to 3 times a week   2.  How many drinks containing alcohol do you have on a typical day when you are drinking? 5 or 6   3.  How often do you have five or more drinks on one occasion? Weekly   4.  How often during the last year have you found that you were not able to stop drinking once you had started? Never   5.  How often during the last year have you failed to do what was normally expected of you because of drinking? Never   6.  How often during the last year have you needed a first drink in the morning to get yourself going after a heavy drinking session? Less than monthly   7.  How often during the last year have you had a feeling of guilt or remorse after drinking? Less than monthly   8.  How often during the last  year have you been unable to remember what happened the night before because of your drinking? Never   9.  Have you or someone else been injured because of your drinking? No   10. Has a relative, friend, doctor or other health care worker been concerned about your drinking or suggested you cut down? No   TOTAL SCORE 10       Last PSA: No results found for: PSA    Reviewed orders with patient. Reviewed health maintenance and updated orders accordingly - Yes  Lab work is in process    Reviewed and updated as needed this visit by clinical staff    Meds             Reviewed and updated as needed this visit by Provider                   Review of Systems  CONSTITUTIONAL: NEGATIVE for fever, chills, change in weight  INTEGUMENTARY/SKIN: NEGATIVE for worrisome rashes, moles or lesions  EYES: NEGATIVE for vision changes or irritation  ENT: NEGATIVE for ear, mouth and throat problems  RESP: NEGATIVE for significant cough or SOB  CV: NEGATIVE for chest pain, palpitations or peripheral edema  GI: NEGATIVE for nausea, abdominal pain, heartburn, or change in bowel habits   male: negative for dysuria, hematuria, decreased urinary stream, erectile dysfunction, urethral discharge  MUSCULOSKELETAL: NEGATIVE for significant arthralgias or myalgia  NEURO: NEGATIVE for weakness, dizziness or paresthesias  PSYCHIATRIC: NEGATIVE for changes in mood or affect    OBJECTIVE:   There were no vitals taken for this visit.    Physical Exam  GENERAL: healthy, alert and no distress  NECK: no adenopathy, no asymmetry, masses, or scars and thyroid normal to palpation  RESP: lungs clear to auscultation - no rales, rhonchi or wheezes  CV: regular rate and rhythm, normal S1 S2, no S3 or S4, no murmur, click or rub, no peripheral edema and peripheral pulses strong  ABDOMEN: soft, nontender, no hepatosplenomegaly, no masses and bowel sounds normal  MS: no gross musculoskeletal defects noted, no edema        ASSESSMENT/PLAN:       ICD-10-CM    1. High  "priority for 2019-nCoV vaccine  Z23 COVID-19,PF,PFIZER (12+ Yrs PURPLE LABEL)   2. Annual physical exam  Z00.00    3. Morbid obesity (H)  E66.01    4. Anxiety  F41.9        Patient has been advised of split billing requirements and indicates understanding: Yes  COUNSELING:       Estimated body mass index is 32.88 kg/m  as calculated from the following:    Height as of 10/12/20: 1.917 m (6' 3.47\").    Weight as of 10/12/20: 120.8 kg (266 lb 6.4 oz).     Weight management plan: Discussed healthy diet and exercise guidelines    He reports that he quit smoking about 24 years ago. He has never used smokeless tobacco.      Counseling Resources:  ATP IV Guidelines  Pooled Cohorts Equation Calculator  FRAX Risk Assessment  ICSI Preventive Guidelines  Dietary Guidelines for Americans, 2010  USDA's MyPlate  ASA Prophylaxis  Lung CA Screening    Wil Hernandez MD  [facility]  Answers for HPI/ROS submitted by the patient on 11/10/2021  If you checked off any problems, how difficult have these problems made it for you to do your work, take care of things at home, or get along with other people?: Not difficult at all  PHQ9 TOTAL SCORE: 2  IVY 7 TOTAL SCORE: 3      "

## 2021-11-11 ASSESSMENT — PATIENT HEALTH QUESTIONNAIRE - PHQ9: SUM OF ALL RESPONSES TO PHQ QUESTIONS 1-9: 2

## 2021-11-11 ASSESSMENT — ANXIETY QUESTIONNAIRES: GAD7 TOTAL SCORE: 3

## 2021-11-11 NOTE — TELEPHONE ENCOUNTER
"Routing refill request to provider for review/approval because:  Labs not current:  ACT    Last Written Prescription Date:  10/26/21  Last Fill Quantity: 18G,  # refills: 0   Last office visit provider:   11/10/21    Requested Prescriptions   Pending Prescriptions Disp Refills     albuterol (PROAIR HFA/PROVENTIL HFA/VENTOLIN HFA) 108 (90 Base) MCG/ACT inhaler [Pharmacy Med Name: Albuterol Sulfate HFA Inhalation Aerosol Solution 108 (90 Base) MCG/ACT] 18 g 0     Sig: Inhale 2 puffs by mouth every 6 hours as needed for wheezing       Asthma Maintenance Inhalers - Anticholinergics Failed - 11/10/2021  2:41 PM        Failed - Asthma control assessment score within normal limits in last 6 months     Please review ACT score.           Passed - Patient is age 12 years or older        Passed - Medication is active on med list        Passed - Recent (6 mo) or future (30 days) visit within the authorizing provider's specialty     Patient had office visit in the last 6 months or has a visit in the next 30 days with authorizing provider or within the authorizing provider's specialty.  See \"Patient Info\" tab in inbasket, or \"Choose Columns\" in Meds & Orders section of the refill encounter.           Short-Acting Beta Agonist Inhalers Protocol  Failed - 11/10/2021  2:41 PM        Failed - Asthma control assessment score within normal limits in last 6 months     Please review ACT score.           Passed - Patient is age 12 or older        Passed - Medication is active on med list        Passed - Recent (6 mo) or future (30 days) visit within the authorizing provider's specialty     Patient had office visit in the last 6 months or has a visit in the next 30 days with authorizing provider or within the authorizing provider's specialty.  See \"Patient Info\" tab in inbasket, or \"Choose Columns\" in Meds & Orders section of the refill encounter.                 Susan Treadwell RN 11/11/21 3:44 PM  "

## 2021-11-12 RX ORDER — ALBUTEROL SULFATE 90 UG/1
AEROSOL, METERED RESPIRATORY (INHALATION)
Qty: 18 G | Refills: 0 | Status: SHIPPED | OUTPATIENT
Start: 2021-11-12 | End: 2021-12-01

## 2021-11-17 ENCOUNTER — TELEPHONE (OUTPATIENT)
Dept: FAMILY MEDICINE | Facility: CLINIC | Age: 56
End: 2021-11-17
Payer: COMMERCIAL

## 2021-11-17 DIAGNOSIS — F41.9 ANXIETY: ICD-10-CM

## 2021-11-19 RX ORDER — HYDROXYZINE HYDROCHLORIDE 10 MG/1
TABLET, FILM COATED ORAL
Qty: 270 TABLET | Refills: 3 | Status: SHIPPED | OUTPATIENT
Start: 2021-11-19 | End: 2022-05-16

## 2021-11-19 NOTE — TELEPHONE ENCOUNTER
Disp Refills Start End BERNARD   amitriptyline (ELAVIL) 25 MG tablet (Discontinued) 90 tablet 3 10/12/2020 11/10/2021 No   Sig: [AMITRIPTYLINE (ELAVIL) 25 MG TABLET] Take 1 tablet (25 mg total) by mouth at bedtime.   Patient not taking: Reported on 11/10/2021        Class: Normal   Reason for Discontinue: Therapy completed   Order: [id number]

## 2021-11-19 NOTE — TELEPHONE ENCOUNTER
"Last Written Prescription Date:  12/23/20  Last Fill Quantity: 90,  # refills: 3   Last office visit provider:  11/10/21     Requested Prescriptions   Pending Prescriptions Disp Refills     amitriptyline (ELAVIL) 25 MG tablet [Pharmacy Med Name: Amitriptyline HCl Oral Tablet 25 MG] 90 tablet 0     Sig: Take 1 tablet (25 mg total) by mouth at bedtime.       Tricyclic Agents ( Annual appt and no PHQ9) Passed - 11/17/2021  3:37 PM        Passed - Blood Pressure under 140/90 in past 12 mos     BP Readings from Last 3 Encounters:   11/10/21 122/68   10/12/20 110/84                 Passed - Recent (12 mo) or future (30 days) visit within authorizing provider's specialty     Patient has had an office visit with the authorizing provider or a provider within the authorizing providers department within the previous 12 mos or has a future within next 30 days. See \"Patient Info\" tab in inbasket, or \"Choose Columns\" in Meds & Orders section of the refill encounter.              Passed - Medication is active on med list        Passed - Patient is age 18 or older           hydrOXYzine (ATARAX) 10 MG tablet [Pharmacy Med Name: hydrOXYzine HCl Oral Tablet 10 MG] 90 tablet 0     Sig: TAKE ONE TABLET BY MOUTH THREE TIMES DAILY AS NEEDED FOR ITCHING       Antihistamines Protocol Passed - 11/17/2021  3:37 PM        Passed - Recent (12 mo) or future (30 days) visit within the authorizing provider's specialty     Patient has had an office visit with the authorizing provider or a provider within the authorizing providers department within the previous 12 mos or has a future within next 30 days. See \"Patient Info\" tab in inbasket, or \"Choose Columns\" in Meds & Orders section of the refill encounter.              Passed - Patient is age 3 or older     Apply age and/or weight-based dosing for peds patients age 3 and older.    Forward request to provider for patients under the age of 3.          Passed - Medication is active on med list       "       Dewayne Stiles RN 11/19/21 1:13 PM

## 2021-11-30 DIAGNOSIS — E78.5 HYPERLIPIDEMIA, UNSPECIFIED HYPERLIPIDEMIA TYPE: ICD-10-CM

## 2021-12-02 RX ORDER — SIMVASTATIN 20 MG
TABLET ORAL
Qty: 90 TABLET | Refills: 3 | Status: SHIPPED | OUTPATIENT
Start: 2021-12-02 | End: 2022-05-16

## 2021-12-02 NOTE — TELEPHONE ENCOUNTER
"Last Written Prescription Date:  5/2/21  Last Fill Quantity: 90,  # refills: 1   Last office visit provider:  11/10/21     Requested Prescriptions   Pending Prescriptions Disp Refills     simvastatin (ZOCOR) 20 MG tablet [Pharmacy Med Name: Simvastatin Oral Tablet 20 MG] 90 tablet 0     Sig: Take 1 tablet (20 mg total) by mouth at bedtime       Statins Protocol Passed - 11/30/2021  5:02 PM        Passed - LDL on file in past 12 months     Recent Labs   Lab Test 11/10/21  1427                Passed - No abnormal creatine kinase in past 12 months     No lab results found.             Passed - Recent (12 mo) or future (30 days) visit within the authorizing provider's specialty     Patient has had an office visit with the authorizing provider or a provider within the authorizing providers department within the previous 12 mos or has a future within next 30 days. See \"Patient Info\" tab in inbasket, or \"Choose Columns\" in Meds & Orders section of the refill encounter.              Passed - Medication is active on med list        Passed - Patient is age 18 or older             Dewayne Stiles RN 12/02/21 12:40 PM  "

## 2022-02-21 DIAGNOSIS — J45.990 EXERCISE-INDUCED ASTHMA: Primary | ICD-10-CM

## 2022-02-22 RX ORDER — ALBUTEROL SULFATE 90 UG/1
AEROSOL, METERED RESPIRATORY (INHALATION)
Qty: 18 G | Refills: 0 | Status: SHIPPED | OUTPATIENT
Start: 2022-02-22 | End: 2022-03-23

## 2022-02-22 NOTE — TELEPHONE ENCOUNTER
"Received a call from patient who is currently at the pharmacy wanting to  his rx before leaving to go out of town. Needing this sent \"right now\" he stated.  "

## 2022-03-21 DIAGNOSIS — J45.990 EXERCISE-INDUCED ASTHMA: ICD-10-CM

## 2022-03-22 NOTE — TELEPHONE ENCOUNTER
"Routing refill request to provider for review/approval because:  No ACT score ever done.    Last Written Prescription Date:  02/22/2022  Last Fill Quantity: 18 g,  # refills: 0   Last office visit provider:   11/10/2021 with Dr Hernandez.    Requested Prescriptions   Pending Prescriptions Disp Refills     albuterol (PROAIR HFA/PROVENTIL HFA/VENTOLIN HFA) 108 (90 Base) MCG/ACT inhaler [Pharmacy Med Name: Albuterol Sulfate HFA Inhalation Aerosol Solution 108 (90 Base) MCG/ACT] 18 g 0     Sig: INHALE TWO PUFFS BY MOUTH EVERY SIX HOURS AS NEEDED FOR WHEEZING       Asthma Maintenance Inhalers - Anticholinergics Failed - 3/21/2022  5:23 PM        Failed - Asthma control assessment score within normal limits in last 6 months     Please review ACT score.           Passed - Patient is age 12 years or older        Passed - Medication is active on med list        Passed - Recent (6 mo) or future (30 days) visit within the authorizing provider's specialty     Patient had office visit in the last 6 months or has a visit in the next 30 days with authorizing provider or within the authorizing provider's specialty.  See \"Patient Info\" tab in inbasket, or \"Choose Columns\" in Meds & Orders section of the refill encounter.           Short-Acting Beta Agonist Inhalers Protocol  Failed - 3/21/2022  5:23 PM        Failed - Asthma control assessment score within normal limits in last 6 months     Please review ACT score.           Passed - Patient is age 12 or older        Passed - Medication is active on med list        Passed - Recent (6 mo) or future (30 days) visit within the authorizing provider's specialty     Patient had office visit in the last 6 months or has a visit in the next 30 days with authorizing provider or within the authorizing provider's specialty.  See \"Patient Info\" tab in inbasket, or \"Choose Columns\" in Meds & Orders section of the refill encounter.                 Rachel Banks 03/22/22 3:10 PM  "

## 2022-03-23 RX ORDER — ALBUTEROL SULFATE 90 UG/1
AEROSOL, METERED RESPIRATORY (INHALATION)
Qty: 18 G | Refills: 0 | Status: SHIPPED | OUTPATIENT
Start: 2022-03-23 | End: 2022-04-17

## 2022-04-10 DIAGNOSIS — J45.990 EXERCISE-INDUCED ASTHMA: ICD-10-CM

## 2022-04-12 RX ORDER — ALBUTEROL SULFATE 90 UG/1
AEROSOL, METERED RESPIRATORY (INHALATION)
Qty: 18 G | Refills: 0 | OUTPATIENT
Start: 2022-04-12

## 2022-04-12 NOTE — TELEPHONE ENCOUNTER
"Routing refill request to provider for review/approval because:  Less than 1 month since last refill, see below.   Message sent to pharmacy.     Last Written Prescription Date:  3/23/2022  Last filled 3/23--verified per pharmacy, it may have been an \"auto-fill request\". Too soon for refill.   Last Fill Quantity:18 g,  # refills: 0   Last office visit provider: 11/10/2021 with PCP Dr ELENA Hernandez     Requested Prescriptions   Pending Prescriptions Disp Refills     albuterol (PROAIR HFA/PROVENTIL HFA/VENTOLIN HFA) 108 (90 Base) MCG/ACT inhaler [Pharmacy Med Name: Albuterol Sulfate HFA Inhalation Aerosol Solution 108 (90 Base) MCG/ACT] 18 g 0     Sig: INHALE TWO PUFFS BY MOUTH EVERY SIX HOURS AS NEEDED FOR WHEEZING       Asthma Maintenance Inhalers - Anticholinergics Failed - 4/10/2022  4:55 PM        Failed - Asthma control assessment score within normal limits in last 6 months     Please review ACT score.           Passed - Patient is age 12 years or older        Passed - Medication is active on med list        Passed - Recent (6 mo) or future (30 days) visit within the authorizing provider's specialty     Patient had office visit in the last 6 months or has a visit in the next 30 days with authorizing provider or within the authorizing provider's specialty.  See \"Patient Info\" tab in inbasket, or \"Choose Columns\" in Meds & Orders section of the refill encounter.           Short-Acting Beta Agonist Inhalers Protocol  Failed - 4/10/2022  4:55 PM        Failed - Asthma control assessment score within normal limits in last 6 months     Please review ACT score.           Passed - Patient is age 12 or older        Passed - Medication is active on med list        Passed - Recent (6 mo) or future (30 days) visit within the authorizing provider's specialty     Patient had office visit in the last 6 months or has a visit in the next 30 days with authorizing provider or within the authorizing provider's specialty.  See \"Patient " "Info\" tab in inbasket, or \"Choose Columns\" in Meds & Orders section of the refill encounter.                 Meredith Queen RN 04/12/22 5:11 PM  "

## 2022-04-15 DIAGNOSIS — J45.990 EXERCISE-INDUCED ASTHMA: ICD-10-CM

## 2022-04-15 NOTE — TELEPHONE ENCOUNTER
Pt is out of his inhaler at this time. Pt stated he was seen in Nov and was to have refills for 1 yr on his inhaler. Pt is frustrated that he has to call every month to get a refill. Please refill asap.

## 2022-04-17 NOTE — TELEPHONE ENCOUNTER
"Routing refill request to provider for review/approval because:  Overdue for ACT  Patient requesting additional refills on Rx. RN added note for pharmacy that pt needs f/u appt for additional refills (as it's been almost 6 months since last visit.)  Last Written Prescription Date:  3/23/22  Last Fill Quantity: 18g,  # refills: 0   Last office visit provider:  11/10/21    Requested Prescriptions   Pending Prescriptions Disp Refills     albuterol (PROAIR HFA/PROVENTIL HFA/VENTOLIN HFA) 108 (90 Base) MCG/ACT inhaler [Pharmacy Med Name: Albuterol Sulfate HFA Inhalation Aerosol Solution 108 (90 Base) MCG/ACT] 18 g 0     Sig: INHALE TWO PUFFS BY MOUTH EVERY SIX HOURS AS NEEDED FOR WHEEZING       Asthma Maintenance Inhalers - Anticholinergics Failed - 4/15/2022 12:35 PM        Failed - Asthma control assessment score within normal limits in last 6 months     Please review ACT score.           Passed - Patient is age 12 years or older        Passed - Medication is active on med list        Passed - Recent (6 mo) or future (30 days) visit within the authorizing provider's specialty     Patient had office visit in the last 6 months or has a visit in the next 30 days with authorizing provider or within the authorizing provider's specialty.  See \"Patient Info\" tab in inbasket, or \"Choose Columns\" in Meds & Orders section of the refill encounter.           Short-Acting Beta Agonist Inhalers Protocol  Failed - 4/15/2022 12:35 PM        Failed - Asthma control assessment score within normal limits in last 6 months     Please review ACT score.           Passed - Patient is age 12 or older        Passed - Medication is active on med list        Passed - Recent (6 mo) or future (30 days) visit within the authorizing provider's specialty     Patient had office visit in the last 6 months or has a visit in the next 30 days with authorizing provider or within the authorizing provider's specialty.  See \"Patient Info\" tab in inbasket, or " "\"Choose Columns\" in Meds & Orders section of the refill encounter.                 Anuradha Willett RN 04/17/22 11:50 AM  "

## 2022-04-18 RX ORDER — ALBUTEROL SULFATE 90 UG/1
AEROSOL, METERED RESPIRATORY (INHALATION)
Qty: 18 G | Refills: 0 | Status: SHIPPED | OUTPATIENT
Start: 2022-04-18 | End: 2022-05-16

## 2022-05-16 ENCOUNTER — OFFICE VISIT (OUTPATIENT)
Dept: FAMILY MEDICINE | Facility: CLINIC | Age: 57
End: 2022-05-16
Payer: COMMERCIAL

## 2022-05-16 VITALS
HEART RATE: 80 BPM | SYSTOLIC BLOOD PRESSURE: 120 MMHG | TEMPERATURE: 97.2 F | WEIGHT: 266.6 LBS | RESPIRATION RATE: 14 BRPM | BODY MASS INDEX: 33.15 KG/M2 | DIASTOLIC BLOOD PRESSURE: 72 MMHG | OXYGEN SATURATION: 93 %

## 2022-05-16 DIAGNOSIS — J30.1 SEASONAL ALLERGIC RHINITIS DUE TO POLLEN: ICD-10-CM

## 2022-05-16 DIAGNOSIS — M1A.0791 CHRONIC IDIOPATHIC GOUT INVOLVING TOE WITH TOPHUS, UNSPECIFIED LATERALITY: ICD-10-CM

## 2022-05-16 DIAGNOSIS — Z11.4 SCREENING FOR HIV (HUMAN IMMUNODEFICIENCY VIRUS): ICD-10-CM

## 2022-05-16 DIAGNOSIS — Z23 HIGH PRIORITY FOR 2019-NCOV VACCINE: Primary | ICD-10-CM

## 2022-05-16 DIAGNOSIS — F41.9 ANXIETY: ICD-10-CM

## 2022-05-16 DIAGNOSIS — J45.990 EXERCISE-INDUCED ASTHMA: ICD-10-CM

## 2022-05-16 DIAGNOSIS — Z00.00 HEALTHCARE MAINTENANCE: ICD-10-CM

## 2022-05-16 DIAGNOSIS — K57.90 DIVERTICULOSIS: ICD-10-CM

## 2022-05-16 DIAGNOSIS — J45.40 MODERATE PERSISTENT ASTHMA WITHOUT COMPLICATION: ICD-10-CM

## 2022-05-16 DIAGNOSIS — F41.1 GAD (GENERALIZED ANXIETY DISORDER): ICD-10-CM

## 2022-05-16 DIAGNOSIS — E78.5 HYPERLIPIDEMIA, UNSPECIFIED HYPERLIPIDEMIA TYPE: ICD-10-CM

## 2022-05-16 DIAGNOSIS — J45.20 MILD INTERMITTENT ASTHMA WITHOUT COMPLICATION: ICD-10-CM

## 2022-05-16 DIAGNOSIS — Z11.59 ENCOUNTER FOR HEPATITIS C SCREENING TEST FOR LOW RISK PATIENT: ICD-10-CM

## 2022-05-16 PROBLEM — M1A.9XX0 CHRONIC GOUT: Status: ACTIVE | Noted: 2017-12-05

## 2022-05-16 PROBLEM — E66.811 CLASS 1 OBESITY DUE TO EXCESS CALORIES WITHOUT SERIOUS COMORBIDITY WITH BODY MASS INDEX (BMI) OF 33.0 TO 33.9 IN ADULT: Status: ACTIVE | Noted: 2021-11-10

## 2022-05-16 PROBLEM — E66.09 CLASS 1 OBESITY DUE TO EXCESS CALORIES WITHOUT SERIOUS COMORBIDITY WITH BODY MASS INDEX (BMI) OF 33.0 TO 33.9 IN ADULT: Status: ACTIVE | Noted: 2021-11-10

## 2022-05-16 LAB — HIV 1+2 AB+HIV1 P24 AG SERPL QL IA: NEGATIVE

## 2022-05-16 PROCEDURE — 36415 COLL VENOUS BLD VENIPUNCTURE: CPT | Performed by: STUDENT IN AN ORGANIZED HEALTH CARE EDUCATION/TRAINING PROGRAM

## 2022-05-16 PROCEDURE — 0054A COVID-19,PF,PFIZER (12+ YRS): CPT | Performed by: STUDENT IN AN ORGANIZED HEALTH CARE EDUCATION/TRAINING PROGRAM

## 2022-05-16 PROCEDURE — 90732 PPSV23 VACC 2 YRS+ SUBQ/IM: CPT | Performed by: STUDENT IN AN ORGANIZED HEALTH CARE EDUCATION/TRAINING PROGRAM

## 2022-05-16 PROCEDURE — 99214 OFFICE O/P EST MOD 30 MIN: CPT | Mod: 25 | Performed by: STUDENT IN AN ORGANIZED HEALTH CARE EDUCATION/TRAINING PROGRAM

## 2022-05-16 PROCEDURE — 90471 IMMUNIZATION ADMIN: CPT | Performed by: STUDENT IN AN ORGANIZED HEALTH CARE EDUCATION/TRAINING PROGRAM

## 2022-05-16 PROCEDURE — 86803 HEPATITIS C AB TEST: CPT | Performed by: STUDENT IN AN ORGANIZED HEALTH CARE EDUCATION/TRAINING PROGRAM

## 2022-05-16 PROCEDURE — 87389 HIV-1 AG W/HIV-1&-2 AB AG IA: CPT | Performed by: STUDENT IN AN ORGANIZED HEALTH CARE EDUCATION/TRAINING PROGRAM

## 2022-05-16 PROCEDURE — 91305 COVID-19,PF,PFIZER (12+ YRS): CPT | Performed by: STUDENT IN AN ORGANIZED HEALTH CARE EDUCATION/TRAINING PROGRAM

## 2022-05-16 RX ORDER — BECLOMETHASONE DIPROPIONATE HFA 40 UG/1
2 AEROSOL, METERED RESPIRATORY (INHALATION) 2 TIMES DAILY
Qty: 10.6 G | Refills: 11 | Status: SHIPPED | OUTPATIENT
Start: 2022-05-16 | End: 2023-04-03

## 2022-05-16 RX ORDER — MIRTAZAPINE 15 MG/1
TABLET, FILM COATED ORAL
COMMUNITY
Start: 2022-04-23 | End: 2022-05-16

## 2022-05-16 RX ORDER — CLONAZEPAM 0.5 MG/1
.5-1 TABLET ORAL DAILY PRN
COMMUNITY
Start: 2022-04-23

## 2022-05-16 RX ORDER — SIMVASTATIN 40 MG
40 TABLET ORAL AT BEDTIME
Qty: 90 TABLET | Refills: 3 | Status: SHIPPED | OUTPATIENT
Start: 2022-05-16 | End: 2023-06-16

## 2022-05-16 RX ORDER — HYDROXYZINE HYDROCHLORIDE 25 MG/1
25 TABLET, FILM COATED ORAL EVERY 6 HOURS PRN
Qty: 90 TABLET | Refills: 3 | Status: SHIPPED | OUTPATIENT
Start: 2022-05-16 | End: 2023-08-16

## 2022-05-16 RX ORDER — ALBUTEROL SULFATE 90 UG/1
2 AEROSOL, METERED RESPIRATORY (INHALATION) EVERY 4 HOURS PRN
Qty: 18 G | Refills: 11 | Status: SHIPPED | OUTPATIENT
Start: 2022-05-16 | End: 2022-11-15

## 2022-05-16 ASSESSMENT — PAIN SCALES - GENERAL: PAINLEVEL: MILD PAIN (2)

## 2022-05-16 ASSESSMENT — ASTHMA QUESTIONNAIRES: ACT_TOTALSCORE: 20

## 2022-05-16 NOTE — PATIENT INSTRUCTIONS
Take 500-1000 mg tylenol instead of ibuprofen. Concerned about your stomach    Hydroxyzine as needed at night to sleep, 1-2 tabs    Call insurance about shingles vaccine

## 2022-05-16 NOTE — PROGRESS NOTES
Assessment and Plan     57-year-old male with possible history of gout, hyperlipidemia, asthma, diverticulosis, generalized anxiety disorder who presents for establishment of care and for medication refills.    1. Hyperlipidemia, unspecified hyperlipidemia type  10 year ASCVD 6.8%. decreased to 5% with increased statin dose so increased from 20 to 40 5/16/2. Cholesterol from 11/10/22.  - simvastatin (ZOCOR) 40 MG tablet; Take 1 tablet (40 mg) by mouth At Bedtime  Dispense: 90 tablet; Refill: 3    2. Moderate persistent asthma without complication  3. Mild intermittent asthma without complication  Has not had spirometry. Was told exercise induced asthma.   - albuterol (PROAIR HFA/PROVENTIL HFA/VENTOLIN HFA) 108 (90 Base) MCG/ACT inhaler; Inhale 2 puffs into the lungs every 4 hours as needed for shortness of breath / dyspnea or wheezing  Dispense: 18 g; Refill: 11  - beclomethasone HFA (QVAR REDIHALER) 40 MCG/ACT inhaler; Inhale 2 puffs into the lungs 2 times daily  Dispense: 10.6 g; Refill: 11    4. Diverticulosis  Patient was told her had crohns or IBS at one. Time significant anxiety around needing a bathroom. Told to increase fiber and has resolved frequent stooling. Also will take imodium prophylactically.     5. Anxiety  7. IVY (generalized anxiety disorder)  Patient has a long history of anxiety.  Has been on various antidepressants though taking this incorrectly as needed instead of daily.  He was once on clonazepam and felt this worked well for him.  Was titrated off this by his previous PCP and so sought the care of a psychiatric physician to prescribe this to him.  He now does regular phone visits with this provider who prescribes it.  We discussed in detail that if I were to take over management of this I would attempt to titrate him off this from daily use to as needed no more than once per week  And start him on a consistent SSRI.  - hydrOXYzine (ATARAX) 25 MG tablet; Take 1 tablet (25 mg) by mouth  every 6 hours as needed for itching  Dispense: 90 tablet; Refill: 3    6. 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 Also removed a bunion. Last gout attack in 2020.     8. Seasonal allergic rhinitis due to pollen    9. High priority for 2019-nCoV vaccine  - COVID-19,PF,PFIZER (12+ Yrs GRAY LABEL)    10. Encounter for hepatitis C screening test for low risk patient  - Hepatitis C antibody; Future    11. Screening for HIV (human immunodeficiency virus)  - HIV Antigen Antibody Combo; Future      Follow up: 1 year for physical  Options for treatment and follow-up care were reviewed with the patient and/or guardian. Jim Titus and/or guardian engaged in the decision making process and verbalized understanding of the options discussed and agreed with the final plan.    Dr. Luis Alberto Shoemaker         HPI:   Jim Titus is a 57 year old  male who presents for:    Chief Complaint   Patient presents with     Establish Care     Refills      Patient is here to establish care with myself also to have several medicines refilled.  He feels his last provider was not adequately addressing his problems and not communicating effectively.         PMHX:     Patient Active Problem List   Diagnosis     Diverticulosis     Colon polyps     Anxiety     Chronic gout     Hyperlipemia     Morbid obesity (H)       Current Outpatient Medications   Medication Sig Dispense Refill     albuterol (PROAIR HFA/PROVENTIL HFA/VENTOLIN HFA) 108 (90 Base) MCG/ACT inhaler INHALE TWO PUFFS BY MOUTH EVERY SIX HOURS AS NEEDED FOR WHEEZING 18 g 0     albuterol (PROAIR HFA/PROVENTIL HFA/VENTOLIN HFA) 108 (90 Base) MCG/ACT inhaler Inhale 2 puffs by mouth every 6 hours as needed for wheezing 18 g 0     albuterol (PROAIR HFA;PROVENTIL HFA;VENTOLIN HFA) 90 mcg/actuation inhaler [ALBUTEROL (PROAIR HFA;PROVENTIL HFA;VENTOLIN HFA) 90 MCG/ACTUATION INHALER]  Inhale 2 puffs every 6 (six) hours as needed for wheezing. 3 Inhaler 2     amitriptyline (ELAVIL) 10 MG tablet TAKE 1 TABLET BY MOUTH AT BEDTIME. 90 tablet 3     hydrOXYzine (ATARAX) 10 MG tablet TAKE ONE TABLET BY MOUTH THREE TIMES DAILY AS NEEDED FOR ITCHING 270 tablet 3     ibuprofen (ADVIL,MOTRIN) 400 MG tablet [IBUPROFEN (ADVIL,MOTRIN) 400 MG TABLET] Take 400 mg by mouth every 6 (six) hours as needed for pain.       loperamide HCl (IMODIUM A-D ORAL) [LOPERAMIDE HCL (IMODIUM A-D ORAL)] Take by mouth.       loratadine (CLARITIN) 10 mg tablet [LORATADINE (CLARITIN) 10 MG TABLET] Take 10 mg by mouth daily.       QVAR REDIHALER 40 MCG/ACT inhaler INHALE TWO PUFFS BY MOUTH TWICE DAILY   10.6 g 0     QVAR REDIHALER 40 mcg/actuation HFAB inhaler [QVAR REDIHALER 40 MCG/ACTUATION HFAB INHALER] INHALE TWO PUFFS BY MOUTH TWICE DAILY  10.6 g 0     simvastatin (ZOCOR) 20 MG tablet Take 1 tablet (20 mg total) by mouth at bedtime 90 tablet 3     tadalafil (CIALIS) 5 MG tablet [TADALAFIL (CIALIS) 5 MG TABLET] Take 1 tablet (5 mg total) by mouth daily as needed for erectile dysfunction. 30 tablet 1     clonazePAM (KLONOPIN) 0.5 MG tablet Take 1-2 tablets by mouth daily as needed       cyanocobalamin 1000 MCG tablet [CYANOCOBALAMIN 1000 MCG TABLET] Take 1,000 mcg by mouth daily. (Patient not taking: No sig reported)       mirtazapine (REMERON) 15 MG tablet Take 1 tablet by mouth every night       multivitamin therapeutic tablet [MULTIVITAMIN THERAPEUTIC TABLET] Take 1 tablet by mouth daily. (Patient not taking: Reported on 2022)       naproxen (NAPROSYN) 500 MG tablet [NAPROXEN (NAPROSYN) 500 MG TABLET] Take 1 tablet (500 mg total) by mouth 2 (two) times a day with meals. (Patient not taking: Reported on 2022) 60 tablet 1       Social History     Tobacco Use     Smoking status: Former Smoker     Quit date: 1997     Years since quittin.9     Smokeless tobacco: Never Used   Substance Use Topics     Alcohol use:  No     Drug use: No       Social History     Social History Narrative     Not on file       Allergies   Allergen Reactions     Chicken [Chicken Protein] Other (See Comments)     Throat closes to turkey as well.     Cantaloupe [Cantaloupe Extract Allergy Skin Test] Unknown     Wellbutrin [Bupropion] Unknown     Egg Derived [Chicken-Derived Products (Egg)] Rash     Grass Pollen [Grass] Rash     Turkey [Poultry Meal] Rash       No results found for this or any previous visit (from the past 24 hour(s)).         Review of Systems:    ROS: 10 point ROS neg other than the symptoms noted above in the HPI.         Physical Exam:     Vitals:    05/16/22 1318   BP: 120/72   BP Location: Right arm   Patient Position: Sitting   Cuff Size: Adult Large   Pulse: 80   Resp: 14   Temp: 97.2  F (36.2  C)   SpO2: 93%   Weight: 120.9 kg (266 lb 9.6 oz)     Body mass index is 33.15 kg/m .    General appearance: Alert, cooperative, no distress, appears stated age  Head: Normocephalic, atraumatic, without obvious abnormality  Eyes: Pupils equal round, reactive.  Conjunctiva clear.  Nose: Nares normal, no drainage.  Throat: Lips, mucosa, tongue normal mucosa pink and moist  Neck: Supple, symmetric, trachea midline

## 2022-05-17 LAB — HCV AB SERPL QL IA: NEGATIVE

## 2022-05-17 RX ORDER — ALBUTEROL SULFATE 90 UG/1
AEROSOL, METERED RESPIRATORY (INHALATION)
Qty: 8.5 G | Refills: 0 | OUTPATIENT
Start: 2022-05-17

## 2022-05-17 NOTE — RESULT ENCOUNTER NOTE
I called the patient but no answer. Left message explaining recent clinic results and next steps. Advised to call clinic or send [employer] message with questions.    Dr. Luis Alberto Shoemaker

## 2022-10-16 ENCOUNTER — HEALTH MAINTENANCE LETTER (OUTPATIENT)
Age: 57
End: 2022-10-16

## 2022-11-15 DIAGNOSIS — J45.20 MILD INTERMITTENT ASTHMA WITHOUT COMPLICATION: ICD-10-CM

## 2022-11-15 RX ORDER — ALBUTEROL SULFATE 90 UG/1
2 AEROSOL, METERED RESPIRATORY (INHALATION) EVERY 4 HOURS PRN
Qty: 18 G | Refills: 11 | Status: SHIPPED | OUTPATIENT
Start: 2022-11-15 | End: 2023-04-11

## 2022-11-15 NOTE — TELEPHONE ENCOUNTER
Pending Prescriptions:                       Disp   Refills    albuterol (PROAIR HFA/PROVENTIL HFA/MARIA R*18 g   11           Sig: Inhale 2 puffs into the lungs every 4 hours as           needed for shortness of breath / dyspnea or           wheezing    Pt is out of medication.

## 2022-12-03 ENCOUNTER — HEALTH MAINTENANCE LETTER (OUTPATIENT)
Age: 57
End: 2022-12-03

## 2023-03-28 ENCOUNTER — APPOINTMENT (OUTPATIENT)
Dept: URBAN - METROPOLITAN AREA CLINIC 256 | Age: 58
Setting detail: DERMATOLOGY
End: 2023-03-28

## 2023-03-28 VITALS — HEIGHT: 76 IN | WEIGHT: 245 LBS

## 2023-03-28 DIAGNOSIS — L82.1 OTHER SEBORRHEIC KERATOSIS: ICD-10-CM

## 2023-03-28 DIAGNOSIS — L57.8 OTHER SKIN CHANGES DUE TO CHRONIC EXPOSURE TO NONIONIZING RADIATION: ICD-10-CM

## 2023-03-28 DIAGNOSIS — D485 NEOPLASM OF UNCERTAIN BEHAVIOR OF SKIN: ICD-10-CM

## 2023-03-28 PROBLEM — D48.5 NEOPLASM OF UNCERTAIN BEHAVIOR OF SKIN: Status: ACTIVE | Noted: 2023-03-28

## 2023-03-28 PROCEDURE — 11102 TANGNTL BX SKIN SINGLE LES: CPT

## 2023-03-28 PROCEDURE — OTHER BIOPSY BY SHAVE METHOD: OTHER

## 2023-03-28 PROCEDURE — 99203 OFFICE O/P NEW LOW 30 MIN: CPT | Mod: 25

## 2023-03-28 PROCEDURE — OTHER REASSURANCE: OTHER

## 2023-03-28 PROCEDURE — OTHER MIPS QUALITY: OTHER

## 2023-03-28 PROCEDURE — OTHER COUNSELING: OTHER

## 2023-03-28 ASSESSMENT — LOCATION SIMPLE DESCRIPTION DERM
LOCATION SIMPLE: LEFT SHOULDER
LOCATION SIMPLE: CHEST
LOCATION SIMPLE: LEFT UPPER BACK
LOCATION SIMPLE: LEFT UPPER BACK
LOCATION SIMPLE: RIGHT LOWER BACK
LOCATION SIMPLE: RIGHT SHOULDER
LOCATION SIMPLE: RIGHT FOREHEAD
LOCATION SIMPLE: RIGHT UPPER BACK

## 2023-03-28 ASSESSMENT — LOCATION DETAILED DESCRIPTION DERM
LOCATION DETAILED: LEFT LATERAL UPPER BACK
LOCATION DETAILED: LEFT SUPERIOR UPPER BACK
LOCATION DETAILED: RIGHT ANTERIOR SHOULDER
LOCATION DETAILED: LEFT MEDIAL SUPERIOR CHEST
LOCATION DETAILED: LEFT ANTERIOR SHOULDER
LOCATION DETAILED: RIGHT SUPERIOR UPPER BACK
LOCATION DETAILED: RIGHT SUPERIOR MEDIAL MIDBACK
LOCATION DETAILED: RIGHT SUPERIOR LATERAL FOREHEAD

## 2023-03-28 ASSESSMENT — LOCATION ZONE DERM
LOCATION ZONE: TRUNK
LOCATION ZONE: ARM
LOCATION ZONE: FACE
LOCATION ZONE: TRUNK

## 2023-03-28 NOTE — PROCEDURE: BIOPSY BY SHAVE METHOD
Dressing: bandage
Render Post-Care Instructions In Note?: no
X Size Of Lesion In Cm: 0
Electrodesiccation Text: The wound bed was treated with electrodesiccation after the biopsy was performed.
Cryotherapy Text: The wound bed was treated with cryotherapy after the biopsy was performed.
Post-Care Instructions: I reviewed with the patient in detail post-care instructions. Patient is to keep the biopsy site dry overnight, and then apply bacitracin twice daily until healed. Patient may apply hydrogen peroxide soaks to remove any crusting.
Silver Nitrate Text: The wound bed was treated with silver nitrate after the biopsy was performed.
Wound Care: Petrolatum
Electrodesiccation And Curettage Text: The wound bed was treated with electrodesiccation and curettage after the biopsy was performed.
Information: Selecting Yes will display possible errors in your note based on the variables you have selected. This validation is only offered as a suggestion for you. PLEASE NOTE THAT THE VALIDATION TEXT WILL BE REMOVED WHEN YOU FINALIZE YOUR NOTE. IF YOU WANT TO FAX A PRELIMINARY NOTE YOU WILL NEED TO TOGGLE THIS TO 'NO' IF YOU DO NOT WANT IT IN YOUR FAXED NOTE.
Anesthesia Type: 1% lidocaine with epinephrine
Detail Level: Detailed
Was A Bandage Applied: Yes
Type Of Destruction Used: Curettage
Anesthesia Volume In Cc (Will Not Render If 0): 0.5
Biopsy Type: H and E
Consent: Written consent was obtained and risks were reviewed including but not limited to scarring, infection, bleeding, scabbing, incomplete removal, nerve damage and allergy to anesthesia.
Notification Instructions: Patient will be notified of biopsy results. However, patient instructed to call the office if not contacted within 2 weeks.
Curettage Text: The wound bed was treated with curettage after the biopsy was performed.
Depth Of Biopsy: dermis
Biopsy Method: Dermablade
Hemostasis: Drysol
Billing Type: Third-Party Bill

## 2023-03-28 NOTE — PROCEDURE: MIPS QUALITY
Quality 130: Documentation Of Current Medications In The Medical Record: Current Medications Documented
Detail Level: Detailed
Quality 265: Biopsy Follow-Up: Biopsy results reviewed, communicated, tracked, and documented
Quality 431: Preventive Care And Screening: Unhealthy Alcohol Use - Screening: Patient not identified as an unhealthy alcohol user when screened for unhealthy alcohol use using a systematic screening method

## 2023-03-28 NOTE — HPI: EVALUATION OF SKIN LESION(S)
Have Your Spot(S) Been Treated In The Past?: has not been treated
Hpi Title: Evaluation of Skin Lesions
Additional History: Patient reports no self history or family history of skin cancer. Patient reports having an irritated lesion on his lower right back that he noticed recently. He reports that the lesion bleed. Patient also has several lesions that are new on the chest and face that he would like evaluated as well.

## 2023-04-03 ENCOUNTER — TELEPHONE (OUTPATIENT)
Dept: FAMILY MEDICINE | Facility: CLINIC | Age: 58
End: 2023-04-03
Payer: COMMERCIAL

## 2023-04-03 DIAGNOSIS — J45.40 MODERATE PERSISTENT ASTHMA WITHOUT COMPLICATION: Primary | ICD-10-CM

## 2023-04-03 RX ORDER — FLUTICASONE PROPIONATE 110 UG/1
1 AEROSOL, METERED RESPIRATORY (INHALATION) 2 TIMES DAILY
Qty: 12 G | Refills: 11 | Status: SHIPPED | OUTPATIENT
Start: 2023-04-03 | End: 2024-01-10

## 2023-04-03 NOTE — TELEPHONE ENCOUNTER
Reason for call:  Other   Patient called regarding (reason for call): prescription  Additional comments: annika from [facility] pharmacy is calling to ask for an alterative for  (QVAR REDIHALER. Other options are flovent or pulmicort due to insurance please advise and call pharmacy back please and thank you.    Phone number to reach patient:  Other phone number:  [phone]    Best Time: ant     Can we leave a detailed message on this number?  YES

## 2023-04-03 NOTE — TELEPHONE ENCOUNTER
I have sent flovent in for the patient to try instead. May not be covered by insurance as well so sent a referral to [facility] pharmacist to assist patient in finding an inhaler covered by insurance    Luis Alberto Shoemaker MD

## 2023-04-04 ENCOUNTER — TELEPHONE (OUTPATIENT)
Dept: FAMILY MEDICINE | Facility: CLINIC | Age: 58
End: 2023-04-04
Payer: COMMERCIAL

## 2023-04-04 NOTE — TELEPHONE ENCOUNTER
MTM referral from: [facility] visit (referral by provider) for asthma inhaler coverage    MTM referral outreach attempt on April 4, 2023 at 10:33 AM      Outcome: Patient is not interested at this time because he is happy with his pharmacist whom is assisting him at the pharmacy does not want to schedule an appointment just for coverage, will route to [facility] Pharmacist/Provider as an FYI. Thank you for the referral.     Jose Galvin [facility]     Patient has Private Pay coverage

## 2023-04-06 ENCOUNTER — TELEPHONE (OUTPATIENT)
Dept: FAMILY MEDICINE | Facility: CLINIC | Age: 58
End: 2023-04-06

## 2023-04-06 ENCOUNTER — TRANSFERRED RECORDS (OUTPATIENT)
Dept: HEALTH INFORMATION MANAGEMENT | Facility: CLINIC | Age: 58
End: 2023-04-06
Payer: COMMERCIAL

## 2023-04-06 NOTE — TELEPHONE ENCOUNTER
Reason for Call:  Appointment Request    Patient requesting this type of appt: Pre-op    Requested provider: Luis Alberto Fatima    Reason patient unable to be scheduled: Not within requested timeframe    When does patient want to be seen/preferred time: 3-7 days    Comments: Patient having exploratory surgery on lymph nodes with possible biopsy, [facility], Dr. Bowling     Could we send this information to you in [employer] or would you prefer to receive a phone call?:   Patient would prefer a phone call   Okay to leave a detailed message?: Yes at Cell number on file:    Telephone Information:   Mobile [phone]       Call taken on 4/6/2023 at 1:09 PM by Iris Juan, [facility]

## 2023-04-07 NOTE — TELEPHONE ENCOUNTER
Contacted Jim on 04/07/23 and left a message. If patient calls back please help Pt schedule a pre-op apt. Ok to use approval request slots .

## 2023-04-10 ASSESSMENT — ASTHMA QUESTIONNAIRES
QUESTION_3 LAST FOUR WEEKS HOW OFTEN DID YOUR ASTHMA SYMPTOMS (WHEEZING, COUGHING, SHORTNESS OF BREATH, CHEST TIGHTNESS OR PAIN) WAKE YOU UP AT NIGHT OR EARLIER THAN USUAL IN THE MORNING: NOT AT ALL
QUESTION_1 LAST FOUR WEEKS HOW MUCH OF THE TIME DID YOUR ASTHMA KEEP YOU FROM GETTING AS MUCH DONE AT WORK, SCHOOL OR AT HOME: NONE OF THE TIME
QUESTION_2 LAST FOUR WEEKS HOW OFTEN HAVE YOU HAD SHORTNESS OF BREATH: NOT AT ALL
QUESTION_4 LAST FOUR WEEKS HOW OFTEN HAVE YOU USED YOUR RESCUE INHALER OR NEBULIZER MEDICATION (SUCH AS ALBUTEROL): THREE OR MORE TIMES PER DAY
ACT_TOTALSCORE: 20
ACT_TOTALSCORE: 20
QUESTION_5 LAST FOUR WEEKS HOW WOULD YOU RATE YOUR ASTHMA CONTROL: WELL CONTROLLED

## 2023-04-11 ENCOUNTER — OFFICE VISIT (OUTPATIENT)
Dept: FAMILY MEDICINE | Facility: CLINIC | Age: 58
End: 2023-04-11
Payer: COMMERCIAL

## 2023-04-11 VITALS
DIASTOLIC BLOOD PRESSURE: 70 MMHG | HEIGHT: 76 IN | TEMPERATURE: 98.2 F | OXYGEN SATURATION: 97 % | SYSTOLIC BLOOD PRESSURE: 110 MMHG | HEART RATE: 72 BPM | WEIGHT: 268.19 LBS | BODY MASS INDEX: 32.66 KG/M2

## 2023-04-11 DIAGNOSIS — Z00.00 ANNUAL PHYSICAL EXAM: ICD-10-CM

## 2023-04-11 DIAGNOSIS — J45.40 MODERATE PERSISTENT ASTHMA WITHOUT COMPLICATION: ICD-10-CM

## 2023-04-11 DIAGNOSIS — J45.20 MILD INTERMITTENT ASTHMA WITHOUT COMPLICATION: ICD-10-CM

## 2023-04-11 DIAGNOSIS — F41.1 GAD (GENERALIZED ANXIETY DISORDER): ICD-10-CM

## 2023-04-11 DIAGNOSIS — Z01.818 PREOP GENERAL PHYSICAL EXAM: Primary | ICD-10-CM

## 2023-04-11 DIAGNOSIS — Z12.5 SCREENING FOR PROSTATE CANCER: ICD-10-CM

## 2023-04-11 LAB
CHOLEST SERPL-MCNC: 189 MG/DL
ERYTHROCYTE [DISTWIDTH] IN BLOOD BY AUTOMATED COUNT: 13.4 % (ref 10–15)
HCT VFR BLD AUTO: 41.2 % (ref 40–53)
HDLC SERPL-MCNC: 54 MG/DL
HGB BLD-MCNC: 14 G/DL (ref 13.3–17.7)
LDLC SERPL CALC-MCNC: 96 MG/DL
MCH RBC QN AUTO: 31.5 PG (ref 26.5–33)
MCHC RBC AUTO-ENTMCNC: 34 G/DL (ref 31.5–36.5)
MCV RBC AUTO: 93 FL (ref 78–100)
NONHDLC SERPL-MCNC: 135 MG/DL
PLATELET # BLD AUTO: 259 10E3/UL (ref 150–450)
PSA SERPL DL<=0.01 NG/ML-MCNC: 0.42 NG/ML (ref 0–3.5)
RBC # BLD AUTO: 4.44 10E6/UL (ref 4.4–5.9)
TRIGL SERPL-MCNC: 193 MG/DL
WBC # BLD AUTO: 4.9 10E3/UL (ref 4–11)

## 2023-04-11 PROCEDURE — 80061 LIPID PANEL: CPT | Performed by: STUDENT IN AN ORGANIZED HEALTH CARE EDUCATION/TRAINING PROGRAM

## 2023-04-11 PROCEDURE — 36415 COLL VENOUS BLD VENIPUNCTURE: CPT | Performed by: STUDENT IN AN ORGANIZED HEALTH CARE EDUCATION/TRAINING PROGRAM

## 2023-04-11 PROCEDURE — G0103 PSA SCREENING: HCPCS | Performed by: STUDENT IN AN ORGANIZED HEALTH CARE EDUCATION/TRAINING PROGRAM

## 2023-04-11 PROCEDURE — 80053 COMPREHEN METABOLIC PANEL: CPT | Performed by: STUDENT IN AN ORGANIZED HEALTH CARE EDUCATION/TRAINING PROGRAM

## 2023-04-11 PROCEDURE — 99214 OFFICE O/P EST MOD 30 MIN: CPT | Performed by: STUDENT IN AN ORGANIZED HEALTH CARE EDUCATION/TRAINING PROGRAM

## 2023-04-11 PROCEDURE — 85027 COMPLETE CBC AUTOMATED: CPT | Performed by: STUDENT IN AN ORGANIZED HEALTH CARE EDUCATION/TRAINING PROGRAM

## 2023-04-11 RX ORDER — ALBUTEROL SULFATE 90 UG/1
2 AEROSOL, METERED RESPIRATORY (INHALATION) EVERY 6 HOURS PRN
Qty: 18 G | Refills: 11 | Status: ON HOLD | OUTPATIENT
Start: 2023-04-11 | End: 2024-02-27

## 2023-04-11 RX ORDER — ESCITALOPRAM OXALATE 10 MG/1
1 TABLET ORAL
COMMUNITY
Start: 2023-03-13 | End: 2023-10-23

## 2023-04-11 RX ORDER — METHYLPREDNISOLONE 4 MG
TABLET, DOSE PACK ORAL
COMMUNITY
Start: 2023-01-18 | End: 2023-04-11

## 2023-04-11 RX ORDER — SIMVASTATIN 20 MG
1 TABLET ORAL AT BEDTIME
COMMUNITY
Start: 2021-05-02 | End: 2023-04-11

## 2023-04-11 RX ORDER — INDOMETHACIN 50 MG/1
50 CAPSULE ORAL 3 TIMES DAILY PRN
Status: ON HOLD | COMMUNITY
Start: 2023-03-24 | End: 2023-11-20

## 2023-04-11 ASSESSMENT — PAIN SCALES - GENERAL: PAINLEVEL: NO PAIN (0)

## 2023-04-11 NOTE — PROGRESS NOTES
[facility]  [address]  [facility] [address]  Phone: [phone]  Fax: [phone]  Primary Provider: Codi Fatima  Pre-op Performing Provider: CODI FATIMA    PREOPERATIVE EVALUATION:  Today's date: 4/11/2023    Jim Titus is a 58 year old male who presents for a preoperative evaluation.      5/16/2022     1:15 PM   Additional Questions   Roomed by Gina     Surgical Information:  Surgery/Procedure: Plastic and reconstructive surgery   Surgery Location: [facility]   Surgeon: Zach Bellamy MD  Surgery Date: 04/14/2023  Time of Surgery: 10:16  Where patient plans to recover: At home with family  Fax number for surgical facility: [phone]    Assessment & Plan     58-year-old male with past with history of moderate persistent asthma, gout, generalized anxiety disorder, obesity who presents for preop evaluation to have Mohs procedure for melanoma.  Patient is overall low risk with well-controlled medical issues including asthma.  He recently had a disruption in his controller inhaler but has Flovent ready to  at the pharmacy which she will start using.  His anxiety is controlled with SSRI as well as a daily benzodiazepine prescribed through his psychiatrist.  I do not believe he needs any lab evaluation but patient is due regardless as he has not had a physical in 2 years we will obtain his physical labs and make sure these are not in critical range for surgery.  Patient is approved to proceed with surgery as long as no critical values on these    1. Annual physical exam  - Comprehensive metabolic panel (BMP + Alb, Alk Phos, ALT, AST, Total. Bili, TP); Future  - CBC with platelets; Future  - Lipid panel reflex to direct LDL Fasting; Future    2. Screening for prostate cancer  - PSA, screen; Future    3. Preop general physical exam    4. Moderate persistent asthma without complication    5. IVY (generalized anxiety  disorder)      The proposed surgical procedure is considered LOW risk.    Medication Instructions:  hold tablets take inhalers day of    RECOMMENDATION:  APPROVAL GIVEN to proceed with proposed procedure pending review of diagnostic evaluation.    Subjective     HPI related to upcoming procedure: Patient tells me approximately month or 2 ago he was toweling off and noticed blood on the towel from his back.  He went and had a skin exam from a dermatologist who recommended he have a biopsy due to a concerning lesion.  This was found to be melanoma 2 weeks ago.  He had a PET scan recently that was negative.  He is undergoing Mohs procedure in 3 days.          4/10/2023     8:02 AM   Preop Questions   1. Have you ever had a heart attack or stroke? No   2. Have you ever had surgery on your heart or blood vessels, such as a stent placement, a coronary artery bypass, or surgery on an artery in your head, neck, heart, or legs? No   3. Do you have chest pain with activity? No   4. Do you have a history of  heart failure? No   5. Do you currently have a cold, bronchitis or symptoms of other infection? No   6. Do you have a cough, shortness of breath, or wheezing? No   7. Do you or anyone in your family have previous history of blood clots? No   8. Do you or does anyone in your family have a serious bleeding problem such as prolonged bleeding following surgeries or cuts? No   9. Have you ever had problems with anemia or been told to take iron pills? No   10. Have you had any abnormal blood loss such as black, tarry or bloody stools? No   11. Have you ever had a blood transfusion? No   12. Are you willing to have a blood transfusion if it is medically needed before, during, or after your surgery? Yes   13. Have you or any of your relatives ever had problems with anesthesia? No   14. Do you have sleep apnea, excessive snoring or daytime drowsiness? No   15. Do you have any artifical heart valves or other implanted medical  devices like a pacemaker, defibrillator, or continuous glucose monitor? No   16. Do you have artificial joints? No   17. Are you allergic to latex? No       METS >4? No    Greater than 65? No    Health Care Directive:  Patient does not have a Health Care Directive or Living Will: Full COde    Preoperative Review of :   reviewed - no record of controlled substances prescribed.    Status of Chronic Conditions:  See problem list for active medical problems.  Problems all longstanding and stable, except as noted/documented.  See ROS for pertinent symptoms related to these conditions.      Review of Systems  Complete ROS is negative except as noted in HPI    Patient Active Problem List    Diagnosis Date Noted     Moderate persistent asthma without complication 05/16/2022     Priority: Medium     Has not had spirometry. Was told exercise induced asthma.        Allergic rhinitis due to pollen 05/16/2022     Priority: Medium     Healthcare maintenance 05/16/2022     Priority: Medium     10 year ASCVD 6.8%. decreased to 5% with increased statin dose so increased from 20 to 40 5/16/2. Cholesterol from 11/10/22.       Class 1 obesity due to excess calories without serious comorbidity with body mass index (BMI) of 33.0 to 33.9 in adult 11/10/2021     Priority: Medium     Diverticulosis 12/05/2017     Priority: Medium     Patient was told her had crohns or IBS at one. Time significant anxiety around needing a bathroom. Told to increase fiber and has resolved frequent stooling. Also will take imodium prophylactically.        Colon polyps 12/05/2017     Priority: Medium     IVY (generalized anxiety disorder) 12/05/2017     Priority: Medium     Patient has a long history of anxiety.  Has been on various antidepressants though taking this incorrectly as needed instead of daily.  He was once on clonazepam and felt this worked well for him.  Was titrated off this by his previous PCP and so sought the care of a psychiatric  physician to prescribe this to him.  He now does regular phone visits with this provider who prescribes it.  We discussed in detail that if I were to take over management of this I would attempt to titrate him off this from daily use to as needed no more than once per week  And start him on a consistent SSRI.       Chronic gout 12/05/2017     Priority: Medium     Hx of gout. 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 Also removed a bunion. Last gout attack in 2020.        Hyperlipemia 12/05/2017     Priority: Medium         Past Surgical History:   Procedure Laterality Date     HERNIA REPAIR       ORTHOPEDIC SURGERY      Both ankles, left knee, right shoulder     pyloric stenosis repair         Current Outpatient Medications   Medication     albuterol (PROAIR HFA;PROVENTIL HFA;VENTOLIN HFA) 90 mcg/actuation inhaler     clonazePAM (KLONOPIN) 0.5 MG tablet     escitalopram (LEXAPRO) 10 MG tablet     hydrOXYzine (ATARAX) 25 MG tablet     ibuprofen (ADVIL,MOTRIN) 400 MG tablet     indomethacin (INDOCIN) 50 MG capsule     loperamide HCl (IMODIUM A-D ORAL)     loratadine (CLARITIN) 10 mg tablet     simvastatin (ZOCOR) 40 MG tablet     tadalafil (CIALIS) 5 MG tablet     albuterol (PROAIR HFA/PROVENTIL HFA/VENTOLIN HFA) 108 (90 Base) MCG/ACT inhaler     fluticasone (FLOVENT HFA) 110 MCG/ACT inhaler     methylPREDNISolone (MEDROL DOSEPAK) 4 MG tablet therapy pack     simvastatin (ZOCOR) 20 MG tablet     No current facility-administered medications for this visit.          Allergies   Allergen Reactions     Chicken [Chicken Protein] Other (See Comments)     Throat closes to turkey as well.     Cantaloupe [Cantaloupe Extract Allergy Skin Test] Unknown     Wellbutrin [Bupropion] Unknown     Egg Derived [Chicken-Derived Products (Egg)] Rash     Grass Pollen [Grass] Rash     Turkey [Poultry Meal] Rash        Social History     Socioeconomic History     Marital status: Single     Spouse name: Not on file      Number of children: Not on file     Years of education: Not on file     Highest education level: Not on file   Occupational History     Not on file   Tobacco Use     Smoking status: Never     Smokeless tobacco: Former     Types: Chew     Quit date: 1998   Vaping Use     Vaping status: Not on file   Substance and Sexual Activity     Alcohol use: Yes     Comment: 3-4 times per week, 5-6 beers     Drug use: Yes     Types: Marijuana     Sexual activity: Not Currently     Partners: Female   Other Topics Concern     Not on file   Social History Narrative     Not on file     Social Determinants of Health     Financial Resource Strain: Low Risk  (11/10/2021)    Overall Financial Resource Strain (CARDIA)      Difficulty of Paying Living Expenses: Not hard at all   Food Insecurity: No Food Insecurity (11/10/2021)    Hunger Vital Sign      Worried About Running Out of Food in the Last Year: Never true      Ran Out of Food in the Last Year: Never true   Transportation Needs: No Transportation Needs (11/10/2021)    PRAPARE - Transportation      Lack of Transportation (Medical): No      Lack of Transportation (Non-Medical): No   Physical Activity: Insufficiently Active (11/10/2021)    Exercise Vital Sign      Days of Exercise per Week: 1 day      Minutes of Exercise per Session: 30 min   Stress: Stress Concern Present (11/10/2021)    [employer] [place] [employer] - Occupational Stress Questionnaire      Feeling of Stress : To some extent   Social Connections: Socially Isolated (11/10/2021)    Social Connection and Isolation Panel [NHANES]      Frequency of Communication with Friends and Family: Once a week      Frequency of Social Gatherings with Friends and Family: Once a week      Attends Druze Services: Never      Active Member of Clubs or Organizations: No      Attends Club or Organization Meetings: Not on file      Marital Status: Never    Intimate Partner Violence: Not on file   Housing Stability:  "Low Risk  (11/10/2021)    Housing Stability Vital Sign      Unable to Pay for Housing in the Last Year: No      Number of Places Lived in the Last Year: 1      Unstable Housing in the Last Year: No       Family History   Problem Relation Age of Onset     Breast Cancer Mother      Heart Disease Father      Mental Illness Father      Hyperlipidemia Father      Mental Illness Brother          Objective   /70   Pulse 72   Temp 98.2  F (36.8  C)   Ht 1.93 m (6' 4\")   Wt 121.6 kg (268 lb 3 oz)   SpO2 97%   BMI 32.64 kg/m      General appearance: Alert, cooperative, no distress, appears stated age  Head: Normocephalic, atraumatic, without obvious abnormality  Eyes: Pupils equal round, reactive.  Conjunctiva clear.  Nose: Nares normal, no drainage.  Throat: Lips, mucosa, tongue normal mucosa pink and moist  Neck: Supple, symmetric, trachea midline, no adenopathy.  No thyroid enlargement, tenderness or nodules.    Back: Symmetric, no CVA tenderness.  Lungs: Clear to auscultation bilaterally, no wheezing or crackles present.  Respirations unlabored  Heart: Regular rate and rhythm, normal S1 and S2, no murmur, rub or gallop.  Abdomen: Soft, nontender, nondistended.  Bowel sounds active in all 4 quadrants.  No masses or organomegaly.  Extremities: Extremities normal, atraumatic.  No cyanosis or edema.  Skin: Skin color, texture, turgor normal no rashes or lesions on limited skin exam  Neurologic: CN II through XII intact, normal strength.    Diagnostics:  Labs pending at this time.  Results will be reviewed when available.   No EKG required for low risk surgery (cataract, skin procedure, breast biopsy, etc).    Revised Cardiac Risk Index (RCRI):  The patient has the following serious cardiovascular risks for perioperative complications:   - No serious cardiac risks = 0 points     RCRI Interpretation: 0 points: Class I (very low risk - 0.4% complication rate)       Signed Electronically by: Luis Alberto Fatima MD  Copy " of this evaluation report is provided to requesting physician.}

## 2023-04-12 PROBLEM — Z00.00 HEALTHCARE MAINTENANCE: Status: ACTIVE | Noted: 2022-05-16

## 2023-04-12 LAB
ALBUMIN SERPL BCG-MCNC: 4.6 G/DL (ref 3.5–5.2)
ALP SERPL-CCNC: 78 U/L (ref 40–129)
ALT SERPL W P-5'-P-CCNC: 23 U/L (ref 10–50)
ANION GAP SERPL CALCULATED.3IONS-SCNC: 16 MMOL/L (ref 7–15)
AST SERPL W P-5'-P-CCNC: 21 U/L (ref 10–50)
BILIRUB SERPL-MCNC: 0.4 MG/DL
BUN SERPL-MCNC: 16.4 MG/DL (ref 6–20)
CALCIUM SERPL-MCNC: 10 MG/DL (ref 8.6–10)
CHLORIDE SERPL-SCNC: 103 MMOL/L (ref 98–107)
CREAT SERPL-MCNC: 0.87 MG/DL (ref 0.67–1.17)
DEPRECATED HCO3 PLAS-SCNC: 21 MMOL/L (ref 22–29)
GFR SERPL CREATININE-BSD FRML MDRD: >90 ML/MIN/1.73M2
GLUCOSE SERPL-MCNC: 95 MG/DL (ref 70–99)
POTASSIUM SERPL-SCNC: 4.8 MMOL/L (ref 3.4–5.3)
PROT SERPL-MCNC: 7.5 G/DL (ref 6.4–8.3)
SODIUM SERPL-SCNC: 140 MMOL/L (ref 136–145)

## 2023-04-12 NOTE — RESULT ENCOUNTER NOTE
Jim,  Your results from your recent clinic visit show:  Your CMP was normal with normal electrolytes, kidney function, and liver function  Your cholesterol is at goal  Your PSA, which is a screen for prostate cancer, was normal  Your CBC is normal with no anemia or signs of infection seen    If you have more questions please call the clinic at [phone] or send me a [email or web address] message    Dr. Luis Alberto Shoemaker

## 2023-04-27 ENCOUNTER — TRANSFERRED RECORDS (OUTPATIENT)
Dept: HEALTH INFORMATION MANAGEMENT | Facility: CLINIC | Age: 58
End: 2023-04-27
Payer: COMMERCIAL

## 2023-05-04 ENCOUNTER — TRANSFERRED RECORDS (OUTPATIENT)
Dept: HEALTH INFORMATION MANAGEMENT | Facility: CLINIC | Age: 58
End: 2023-05-04
Payer: COMMERCIAL

## 2023-05-05 ENCOUNTER — TRANSFERRED RECORDS (OUTPATIENT)
Dept: HEALTH INFORMATION MANAGEMENT | Facility: CLINIC | Age: 58
End: 2023-05-05
Payer: COMMERCIAL

## 2023-06-02 ENCOUNTER — TRANSFERRED RECORDS (OUTPATIENT)
Dept: HEALTH INFORMATION MANAGEMENT | Facility: CLINIC | Age: 58
End: 2023-06-02
Payer: COMMERCIAL

## 2023-06-15 DIAGNOSIS — E78.5 HYPERLIPIDEMIA, UNSPECIFIED HYPERLIPIDEMIA TYPE: ICD-10-CM

## 2023-06-16 RX ORDER — SIMVASTATIN 40 MG
40 TABLET ORAL AT BEDTIME
Qty: 90 TABLET | Refills: 2 | Status: ON HOLD | OUTPATIENT
Start: 2023-06-16 | End: 2023-11-13

## 2023-06-16 NOTE — TELEPHONE ENCOUNTER
Prescription approved per [facility] Refill Protocol.  EDDIE GarciaN, RN  [facility]

## 2023-06-23 ENCOUNTER — TRANSFERRED RECORDS (OUTPATIENT)
Dept: HEALTH INFORMATION MANAGEMENT | Facility: CLINIC | Age: 58
End: 2023-06-23
Payer: COMMERCIAL

## 2023-06-23 NOTE — TELEPHONE ENCOUNTER
RN cannot approve Refill Request    RN can NOT refill this medication med is not covered by policy/route to provider     . Last office visit: 1/9/2019 Wil Hernandez MD Last Physical: Visit date not found Last MTM visit: Visit date not found Last visit same specialty: 1/9/2019 Wil Hernandez MD.  Next visit within 3 mo: Visit date not found  Next physical within 3 mo: Visit date not found      Lila Nagel, [facility] Triage/Med Refill 1/18/2019    Requested Prescriptions   Pending Prescriptions Disp Refills     QVAR REDIHALER 40 mcg/actuation HFAB inhaler [Pharmacy Med Name: Qvar RediHaler Inhalation Aerosol Breath Activated 40 MCG/ACT] 10.6 g 0     Sig: INHALE TWO PUFFS BY MOUTH TWICE DAILY    There is no refill protocol information for this order            Dr Umer Hughes [phone] Dr Umer Hughes [phone] Fax [phone]

## 2023-08-04 ENCOUNTER — TRANSFERRED RECORDS (OUTPATIENT)
Dept: HEALTH INFORMATION MANAGEMENT | Facility: CLINIC | Age: 58
End: 2023-08-04
Payer: COMMERCIAL

## 2023-08-16 ENCOUNTER — OFFICE VISIT (OUTPATIENT)
Dept: FAMILY MEDICINE | Facility: CLINIC | Age: 58
End: 2023-08-16
Payer: COMMERCIAL

## 2023-08-16 VITALS
DIASTOLIC BLOOD PRESSURE: 82 MMHG | TEMPERATURE: 98.6 F | RESPIRATION RATE: 18 BRPM | BODY MASS INDEX: 34.54 KG/M2 | HEART RATE: 74 BPM | WEIGHT: 283.6 LBS | OXYGEN SATURATION: 96 % | HEIGHT: 76 IN | SYSTOLIC BLOOD PRESSURE: 128 MMHG

## 2023-08-16 DIAGNOSIS — M1A.0791 CHRONIC IDIOPATHIC GOUT INVOLVING TOE WITH TOPHUS, UNSPECIFIED LATERALITY: ICD-10-CM

## 2023-08-16 DIAGNOSIS — E78.5 HYPERLIPIDEMIA, UNSPECIFIED HYPERLIPIDEMIA TYPE: ICD-10-CM

## 2023-08-16 DIAGNOSIS — Z00.00 HEALTHCARE MAINTENANCE: ICD-10-CM

## 2023-08-16 DIAGNOSIS — C43.9 MELANOMA OF SKIN (H): ICD-10-CM

## 2023-08-16 DIAGNOSIS — E66.811 CLASS 1 OBESITY DUE TO EXCESS CALORIES WITHOUT SERIOUS COMORBIDITY WITH BODY MASS INDEX (BMI) OF 33.0 TO 33.9 IN ADULT: ICD-10-CM

## 2023-08-16 DIAGNOSIS — J45.40 MODERATE PERSISTENT ASTHMA WITHOUT COMPLICATION: ICD-10-CM

## 2023-08-16 DIAGNOSIS — E66.09 CLASS 1 OBESITY DUE TO EXCESS CALORIES WITHOUT SERIOUS COMORBIDITY WITH BODY MASS INDEX (BMI) OF 33.0 TO 33.9 IN ADULT: ICD-10-CM

## 2023-08-16 DIAGNOSIS — Z12.11 SCREENING FOR COLON CANCER: ICD-10-CM

## 2023-08-16 DIAGNOSIS — M25.511 PAIN, JOINT, SHOULDER, RIGHT: Primary | ICD-10-CM

## 2023-08-16 DIAGNOSIS — F41.1 GAD (GENERALIZED ANXIETY DISORDER): ICD-10-CM

## 2023-08-16 DIAGNOSIS — K57.90 DIVERTICULOSIS: ICD-10-CM

## 2023-08-16 PROBLEM — K63.5 COLON POLYPS: Status: RESOLVED | Noted: 2017-12-05 | Resolved: 2023-08-16

## 2023-08-16 PROCEDURE — 90471 IMMUNIZATION ADMIN: CPT | Performed by: STUDENT IN AN ORGANIZED HEALTH CARE EDUCATION/TRAINING PROGRAM

## 2023-08-16 PROCEDURE — 99214 OFFICE O/P EST MOD 30 MIN: CPT | Mod: 25 | Performed by: STUDENT IN AN ORGANIZED HEALTH CARE EDUCATION/TRAINING PROGRAM

## 2023-08-16 PROCEDURE — 99396 PREV VISIT EST AGE 40-64: CPT | Mod: 25 | Performed by: STUDENT IN AN ORGANIZED HEALTH CARE EDUCATION/TRAINING PROGRAM

## 2023-08-16 PROCEDURE — 90677 PCV20 VACCINE IM: CPT | Performed by: STUDENT IN AN ORGANIZED HEALTH CARE EDUCATION/TRAINING PROGRAM

## 2023-08-16 RX ORDER — HYDROCODONE BITARTRATE AND ACETAMINOPHEN 5; 325 MG/1; MG/1
TABLET ORAL
COMMUNITY
Start: 2023-04-14 | End: 2023-08-16

## 2023-08-16 RX ORDER — MIRTAZAPINE 15 MG/1
15 TABLET, FILM COATED ORAL AT BEDTIME
COMMUNITY
Start: 2023-08-11

## 2023-08-16 RX ORDER — OXYCODONE HYDROCHLORIDE 5 MG/1
5 TABLET ORAL EVERY 6 HOURS PRN
Qty: 10 TABLET | Refills: 0 | Status: SHIPPED | OUTPATIENT
Start: 2023-08-16 | End: 2023-08-19

## 2023-08-16 ASSESSMENT — PAIN SCALES - GENERAL: PAINLEVEL: EXTREME PAIN (9)

## 2023-08-16 NOTE — PROGRESS NOTES
Assessment/ Plan   58-year-old male with past with history of obesity, asthma, melanoma, gout, hyperlipidemia, generalized anxiety disorder who presents for annual physical exam but with concern of severe right-sided shoulder pain over the last 2 weeks.    1. Pain, joint, shoulder, right  Patient having significant pain in his right shoulder going on the last 2 weeks.  Seems to be intermittent as he actually played golf this last weekend.  Today he can barely move his arm without significant pain.  Arm is hanging.  He does have strength though.  On testing unable to do any active range of motion due to pain but on his passive range of motion this actually appears normal.  Given the stiffness and amount of pain I think frozen shoulder is a possibility but his passive range of motion being intact goes against this.  Difficult to know what sort of tendon injury or joint injury he could have as he cannot do any maneuvers with amount of pain he is in.  Given his history of previous surgery on his shoulder and playing baseball I recommended more detailed work-up through orthopedics with likely imaging.  I placed an urgent referral to them.  I gave him 10 tablets of oxycodone but I discussed that he should not take this with his clonazepam he is chronically on through his psychiatrist.  Discussed respiratory suppression risk.  I discussed that I would not fill more tabs.  Recommended to use Tylenol and ibuprofen before this.  Checked  and no concerns.  - Orthopedic  Referral; Future  - oxyCODONE (ROXICODONE) 5 MG tablet; Take 1 tablet (5 mg) by mouth every 6 hours as needed for severe pain  Dispense: 10 tablet; Refill: 0    2. IVY (generalized anxiety disorder)  Depression/IVY HX:  Anxiety with crowds and public bathrooms    Current Treatment:  Lexapro 10 mg daily, clonezepam takes 0.5 to 1 daily, remeron 15 mg daily        11/10/2021     9:43 AM 4/4/2023     8:27 AM   PHQ   PHQ-9 Total Score 2 4   Q9: Thoughts  of better off dead/self-harm past 2 weeks Not at all Not at all           11/10/2021     9:44 AM   IVY-7 SCORE   Total Score 3 (minimal anxiety)   Total Score 3       3. Melanoma of skin (H)  Goes to [facility]. Had Mohs in 2023. Doing keytruda for a year    4. Healthcare maintenance    5. Screening for colon cancer  - Colonoscopy Screening  Referral; Future    6. Moderate persistent asthma without complication  Has not had spirometry. Was told exercise induced asthma. On flovent daily and albuterol as needed        5/16/2022     2:34 PM 4/10/2023     8:05 AM   ACT Total Scores   ACT TOTAL SCORE (Goal Greater than or Equal to 20) 20 20   In the past 12 months, how many times did you visit the emergency room for your asthma without being admitted to the hospital? 0 0   In the past 12 months, how many times were you hospitalized overnight because of your asthma? 0 0       7. Class 1 obesity due to excess calories without serious comorbidity with body mass index (BMI) of 33.0 to 33.9 in adult    8. 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 Also removed a bunion. Last gout attack in 2020.     9. Diverticulosis  Patient was told her had crohns or IBS at one. Time significant anxiety around needing a bathroom. Told to increase fiber and has resolved frequent stooling. Also will take imodium prophylactically.     10. Hyperlipidemia, unspecified hyperlipidemia type    Follow-up in: 1 year for physical    Luis Alberto Shoemaker MD    Subjective:     Jim Titus is a 58 year old male who presents for an annual exam.     Chief Complaint   Patient presents with    Recheck Medication    Physical    Musculoskeletal Problem     R arm pain worsening over two weeks     HPI: pain in right shoulder severe with significant stiffness and inability to move due to pain. Was a  professionally and has had two  shoulder surgeries there. Played golf last weekend with shoulder  Right Shoulder pain:   Location: entire shoulder   Duration: 2 weeks   Injury? Inciting activity? none   Radiation: none   Numbness/tingling? none   Weakness? none   Instability? none   Clicking/ catching? none   Imaging? none   Treatment? Tylenol and ibuprofen    Colonoscopy:  PSA:  Prostate Specific Antigen Screen   Date Value Ref Range Status   04/11/2023 0.42 0.00 - 3.50 ng/mL Final   11/10/2021 0.41 0.00 - 3.50 ug/L Final       Immunization History   Administered Date(s) Administered    COVID-19 Bivalent 18+ (Moderna) 12/23/2022    COVID-19 MONOVALENT 12+ (Pfizer) 04/13/2021, 05/04/2021, 11/10/2021    COVID-19 Monovalent 12+ (Pfizer 2022) 05/16/2022    Flu, Unspecified 11/01/2018    Influenza Vaccine 50-64 or 18-64 w/egg allergy (Flublok) 09/14/2021    Influenza Vaccine >6 months (Alfuria,Fluzone) 10/23/2018, 12/23/2022    Pneumococcal 23 valent 05/16/2022    TD,PF 7+ (Tenivac) 07/19/2017    TDAP (Adacel,Boostrix) 07/19/2017     Immunization status: due today.     Current Outpatient Medications   Medication Sig Dispense Refill    albuterol (PROAIR HFA/PROVENTIL HFA/VENTOLIN HFA) 108 (90 Base) MCG/ACT inhaler Inhale 2 puffs into the lungs every 6 hours as needed 18 g 11    clonazePAM (KLONOPIN) 0.5 MG tablet Take 1-2 tablets by mouth daily as needed      escitalopram (LEXAPRO) 10 MG tablet Take 1 tablet by mouth daily at 2 pm      fluticasone (FLOVENT HFA) 110 MCG/ACT inhaler Inhale 1 puff into the lungs 2 times daily 12 g 11    HYDROcodone-acetaminophen (NORCO) 5-325 MG tablet Take 1 to 2 Tablets by mouth every 4 hours if needed for Pain. Max acetaminophen dose: 4000 mg in 24 hrs.      hydrOXYzine (ATARAX) 25 MG tablet Take 1 tablet (25 mg) by mouth every 6 hours as needed for itching 90 tablet 3    ibuprofen (ADVIL,MOTRIN) 400 MG tablet [IBUPROFEN (ADVIL,MOTRIN) 400 MG TABLET] Take 400 mg by mouth every 6 (six) hours as needed for pain.       indomethacin (INDOCIN) 50 MG capsule Take 50 mg by mouth 3 times daily      loperamide HCl (IMODIUM A-D ORAL) [LOPERAMIDE HCL (IMODIUM A-D ORAL)] Take by mouth.      loratadine (CLARITIN) 10 mg tablet [LORATADINE (CLARITIN) 10 MG TABLET] Take 10 mg by mouth daily.      mirtazapine (REMERON) 15 MG tablet TAKE ONE TABLET BY MOUTH AT BEDTIME*      simvastatin (ZOCOR) 40 MG tablet Take 1 tablet (40 mg) by mouth At Bedtime 90 tablet 2    tadalafil (CIALIS) 5 MG tablet [TADALAFIL (CIALIS) 5 MG TABLET] Take 1 tablet (5 mg total) by mouth daily as needed for erectile dysfunction. 30 tablet 1     No past medical history on file.  Past Surgical History:   Procedure Laterality Date    HERNIA REPAIR      ORTHOPEDIC SURGERY      Both ankles, left knee, right shoulder    pyloric stenosis repair       Chicken [chicken protein], Cantaloupe [cantaloupe extract allergy skin test], Wellbutrin [bupropion], Egg derived [chicken-derived products (egg)], Grass pollen [grass], and Turkey [poultry meal]  Family History   Problem Relation Age of Onset    Breast Cancer Mother     Heart Disease Father     Mental Illness Father     Hyperlipidemia Father     Mental Illness Brother      Social History     Socioeconomic History    Marital status: Single     Spouse name: Not on file    Number of children: Not on file    Years of education: Not on file    Highest education level: Not on file   Occupational History    Not on file   Tobacco Use    Smoking status: Never    Smokeless tobacco: Former     Types: Chew     Quit date: 1998   Substance and Sexual Activity    Alcohol use: Yes     Comment: 3-4 times per week, 5-6 beers    Drug use: Yes     Types: Marijuana    Sexual activity: Not Currently     Partners: Female   Other Topics Concern    Not on file   Social History Narrative    Not on file     Social Determinants of Health     Financial Resource Strain: Low Risk  (11/10/2021)    Overall Financial Resource Strain (CARDIA)     Difficulty of Paying  "Living Expenses: Not hard at all   Food Insecurity: No Food Insecurity (11/10/2021)    Hunger Vital Sign     Worried About Running Out of Food in the Last Year: Never true     Ran Out of Food in the Last Year: Never true   Transportation Needs: No Transportation Needs (11/10/2021)    PRAPARE - Transportation     Lack of Transportation (Medical): No     Lack of Transportation (Non-Medical): No   Physical Activity: Insufficiently Active (11/10/2021)    Exercise Vital Sign     Days of Exercise per Week: 1 day     Minutes of Exercise per Session: 30 min   Stress: Stress Concern Present (11/10/2021)    [employer] - Occupational Stress Questionnaire     Feeling of Stress : To some extent   Social Connections: Socially Isolated (11/10/2021)    Social Connection and Isolation Panel [NHANES]     Frequency of Communication with Friends and Family: Once a week     Frequency of Social Gatherings with Friends and Family: Once a week     Attends Zoroastrian Services: Never     Active Member of Clubs or Organizations: No     Attends Club or Organization Meetings: Not on file     Marital Status: Never    Intimate Partner Violence: Not on file   Housing Stability: Low Risk  (11/10/2021)    Housing Stability Vital Sign     Unable to Pay for Housing in the Last Year: No     Number of Places Lived in the Last Year: 1     Unstable Housing in the Last Year: No       Review of Systems  Complete ROS negative except as noted in the HPI    Objective:      Vitals:    08/16/23 1010   BP: 128/82   BP Location: Left arm   Patient Position: Sitting   Cuff Size: Adult Large   Pulse: 74   Resp: 18   Temp: 98.6  F (37  C)   TempSrc: Temporal   SpO2: 96%   Weight: 128.6 kg (283 lb 9.6 oz)   Height: 1.922 m (6' 3.67\")       General appearance: Alert, cooperative, no distress, appears stated age, obese  Head: Normocephalic, atraumatic, without obvious abnormality  EARS: TM's gray dull with structures seen " bilaterally  Eyes: Pupils equal round, reactive.  Conjunctiva clear.  Nose: Nares normal, no drainage.  Throat: Lips, mucosa, tongue normal mucosa pink and moist  Neck: Supple, symmetric, trachea midline, no adenopathy.  No thyroid enlargement, tenderness or nodules.    Lungs: Clear to auscultation bilaterally, no wheezing or crackles present.  Respirations unlabored  Heart: Regular rate and rhythm, normal S1 and S2, no murmur, rub or gallop.  Abdomen: Soft, nontender, nondistended.  Bowel sounds active in all 4 quadrants.  No masses or organomegaly.  Extremities: Extremities normal, atraumatic.  No cyanosis or edema.  Skin: Skin color, texture, turgor normal no rashes or lesions on limited skin exam  Neurologic: CN II through XII intact, normal strength.    Right Shoulder:   Inspection: asymmetry? none; dyskinesis? yes   ROM:   Active - all motions limited by pain  Passive- forward flexion - full/ abduction - full   Strength: deltoid/supraspinatous - 5/5; infraspinatous/ teres minor - 5/5; subscapularis - 5/5       Luis Alberto Fatima MD

## 2023-08-25 ENCOUNTER — TRANSFERRED RECORDS (OUTPATIENT)
Dept: HEALTH INFORMATION MANAGEMENT | Facility: CLINIC | Age: 58
End: 2023-08-25
Payer: COMMERCIAL

## 2023-09-15 ENCOUNTER — TRANSFERRED RECORDS (OUTPATIENT)
Dept: HEALTH INFORMATION MANAGEMENT | Facility: CLINIC | Age: 58
End: 2023-09-15
Payer: COMMERCIAL

## 2023-10-06 ENCOUNTER — TRANSFERRED RECORDS (OUTPATIENT)
Dept: HEALTH INFORMATION MANAGEMENT | Facility: CLINIC | Age: 58
End: 2023-10-06
Payer: COMMERCIAL

## 2023-10-19 ENCOUNTER — TELEPHONE (OUTPATIENT)
Dept: FAMILY MEDICINE | Facility: CLINIC | Age: 58
End: 2023-10-19
Payer: COMMERCIAL

## 2023-10-19 ENCOUNTER — TELEPHONE (OUTPATIENT)
Dept: NURSING | Facility: CLINIC | Age: 58
End: 2023-10-19
Payer: COMMERCIAL

## 2023-10-19 NOTE — TELEPHONE ENCOUNTER
Reason for Call:  Appointment Request    Patient requesting this type of appt:  Office Visit to rule out pneumonia     Requested provider: Luis Alberto Fatima    Reason patient unable to be scheduled: Not within requested timeframe    When does patient want to be seen/preferred time: Same day    Comments: Patient states that his oncology team won't allow him to have treatment done until pneumonia is ruled out after patient started having a cough/congestion. Patient is currently scheduled with [facility] next Wednesday but is hoping to be fit in with PCP. Pt offered urgent care , virtual visit or evisit, all declined.     Could we send this information to you in [employer] or would you prefer to receive a phone call?:   Patient would prefer a phone call   Okay to leave a detailed message?: Yes at Cell number on file:    Telephone Information:   Mobile [phone]       Call taken on 10/19/2023 at 3:18 PM by Jose Perez

## 2023-10-19 NOTE — TELEPHONE ENCOUNTER
Left message to pt that looks like he is already scheduled to be seen by Dr Fatima on 10/23/23. If he has any other questions then to return call.    EDDIE LiN, RN  [facility]

## 2023-10-19 NOTE — TELEPHONE ENCOUNTER
Pt called stating he needs to get in asap with his clinic for a repeat xray to be able to continue his CA treatments. Scheduling can't get pt in until next Wednesday. Writer transferred pt to his clinic to speak with the nurses to see if there's any gaps the provider can squeeze him into sooner. No need for triage at this time.        Bekah Pruett RN on 10/19/2023 at 3:23 PM

## 2023-10-23 ENCOUNTER — APPOINTMENT (OUTPATIENT)
Dept: CT IMAGING | Facility: CLINIC | Age: 58
End: 2023-10-23
Attending: EMERGENCY MEDICINE
Payer: COMMERCIAL

## 2023-10-23 ENCOUNTER — OFFICE VISIT (OUTPATIENT)
Dept: FAMILY MEDICINE | Facility: CLINIC | Age: 58
End: 2023-10-23
Payer: COMMERCIAL

## 2023-10-23 ENCOUNTER — HOSPITAL ENCOUNTER (INPATIENT)
Facility: CLINIC | Age: 58
LOS: 2 days | Discharge: HOME | End: 2023-10-25
Attending: EMERGENCY MEDICINE | Admitting: HOSPITALIST
Payer: COMMERCIAL

## 2023-10-23 VITALS
DIASTOLIC BLOOD PRESSURE: 70 MMHG | HEIGHT: 75 IN | RESPIRATION RATE: 18 BRPM | WEIGHT: 260.4 LBS | OXYGEN SATURATION: 92 % | SYSTOLIC BLOOD PRESSURE: 110 MMHG | HEART RATE: 95 BPM | BODY MASS INDEX: 32.38 KG/M2 | TEMPERATURE: 99.5 F

## 2023-10-23 DIAGNOSIS — J96.01 ACUTE RESPIRATORY FAILURE WITH HYPOXIA (H): ICD-10-CM

## 2023-10-23 DIAGNOSIS — J45.40 MODERATE PERSISTENT ASTHMA WITHOUT COMPLICATION: Primary | ICD-10-CM

## 2023-10-23 DIAGNOSIS — J18.9 PNEUMONIA OF BOTH LUNGS DUE TO INFECTIOUS ORGANISM, UNSPECIFIED PART OF LUNG: ICD-10-CM

## 2023-10-23 DIAGNOSIS — J18.9 PNEUMONIA DUE TO INFECTIOUS ORGANISM, UNSPECIFIED LATERALITY, UNSPECIFIED PART OF LUNG: ICD-10-CM

## 2023-10-23 LAB
ANION GAP SERPL CALCULATED.3IONS-SCNC: 10 MMOL/L (ref 7–15)
BASO+EOS+MONOS # BLD AUTO: ABNORMAL 10*3/UL
BASO+EOS+MONOS NFR BLD AUTO: ABNORMAL %
BASOPHILS # BLD AUTO: 0.1 10E3/UL (ref 0–0.2)
BASOPHILS NFR BLD AUTO: 1 %
BUN SERPL-MCNC: 11.6 MG/DL (ref 6–20)
CALCIUM SERPL-MCNC: 9.8 MG/DL (ref 8.6–10)
CHLORIDE SERPL-SCNC: 101 MMOL/L (ref 98–107)
CREAT SERPL-MCNC: 0.94 MG/DL (ref 0.67–1.17)
DEPRECATED HCO3 PLAS-SCNC: 26 MMOL/L (ref 22–29)
EGFRCR SERPLBLD CKD-EPI 2021: >90 ML/MIN/1.73M2
EOSINOPHIL # BLD AUTO: 0.3 10E3/UL (ref 0–0.7)
EOSINOPHIL NFR BLD AUTO: 3 %
ERYTHROCYTE [DISTWIDTH] IN BLOOD BY AUTOMATED COUNT: 13.2 % (ref 10–15)
FLUAV RNA SPEC QL NAA+PROBE: NEGATIVE
FLUBV RNA RESP QL NAA+PROBE: NEGATIVE
GLUCOSE SERPL-MCNC: 118 MG/DL (ref 70–99)
HCT VFR BLD AUTO: 35.4 % (ref 40–53)
HGB BLD-MCNC: 11.2 G/DL (ref 13.3–17.7)
IMM GRANULOCYTES # BLD: 0.1 10E3/UL
IMM GRANULOCYTES NFR BLD: 1 %
LACTATE SERPL-SCNC: 0.8 MMOL/L (ref 0.7–2)
LYMPHOCYTES # BLD AUTO: 0.8 10E3/UL (ref 0.8–5.3)
LYMPHOCYTES NFR BLD AUTO: 7 %
MCH RBC QN AUTO: 29.8 PG (ref 26.5–33)
MCHC RBC AUTO-ENTMCNC: 31.6 G/DL (ref 31.5–36.5)
MCV RBC AUTO: 94 FL (ref 78–100)
MONOCYTES # BLD AUTO: 0.7 10E3/UL (ref 0–1.3)
MONOCYTES NFR BLD AUTO: 7 %
MRSA DNA SPEC QL NAA+PROBE: NEGATIVE
NEUTROPHILS # BLD AUTO: 9.2 10E3/UL (ref 1.6–8.3)
NEUTROPHILS NFR BLD AUTO: 81 %
NRBC # BLD AUTO: 0 10E3/UL
NRBC BLD AUTO-RTO: 0 /100
NT-PROBNP SERPL-MCNC: 96 PG/ML (ref 0–900)
PLATELET # BLD AUTO: 548 10E3/UL (ref 150–450)
POTASSIUM SERPL-SCNC: 4.3 MMOL/L (ref 3.4–5.3)
RBC # BLD AUTO: 3.76 10E6/UL (ref 4.4–5.9)
RSV RNA SPEC NAA+PROBE: NEGATIVE
SA TARGET DNA: NEGATIVE
SARS-COV-2 RNA RESP QL NAA+PROBE: NEGATIVE
SODIUM SERPL-SCNC: 137 MMOL/L (ref 135–145)
TROPONIN T SERPL HS-MCNC: 7 NG/L
WBC # BLD AUTO: 11.1 10E3/UL (ref 4–11)

## 2023-10-23 PROCEDURE — 258N000003 HC RX IP 258 OP 636: Performed by: HOSPITALIST

## 2023-10-23 PROCEDURE — 87637 SARSCOV2&INF A&B&RSV AMP PRB: CPT | Performed by: EMERGENCY MEDICINE

## 2023-10-23 PROCEDURE — 94640 AIRWAY INHALATION TREATMENT: CPT

## 2023-10-23 PROCEDURE — 250N000011 HC RX IP 250 OP 636: Performed by: EMERGENCY MEDICINE

## 2023-10-23 PROCEDURE — 99285 EMERGENCY DEPT VISIT HI MDM: CPT | Mod: 25

## 2023-10-23 PROCEDURE — 71275 CT ANGIOGRAPHY CHEST: CPT

## 2023-10-23 PROCEDURE — 83880 ASSAY OF NATRIURETIC PEPTIDE: CPT | Performed by: EMERGENCY MEDICINE

## 2023-10-23 PROCEDURE — 84484 ASSAY OF TROPONIN QUANT: CPT | Performed by: EMERGENCY MEDICINE

## 2023-10-23 PROCEDURE — 82310 ASSAY OF CALCIUM: CPT | Performed by: EMERGENCY MEDICINE

## 2023-10-23 PROCEDURE — 83605 ASSAY OF LACTIC ACID: CPT | Performed by: EMERGENCY MEDICINE

## 2023-10-23 PROCEDURE — 250N000011 HC RX IP 250 OP 636: Performed by: HOSPITALIST

## 2023-10-23 PROCEDURE — 258N000003 HC RX IP 258 OP 636: Performed by: EMERGENCY MEDICINE

## 2023-10-23 PROCEDURE — 120N000001 HC R&B MED SURG/OB

## 2023-10-23 PROCEDURE — 99213 OFFICE O/P EST LOW 20 MIN: CPT | Performed by: STUDENT IN AN ORGANIZED HEALTH CARE EDUCATION/TRAINING PROGRAM

## 2023-10-23 PROCEDURE — 999N000157 HC STATISTIC RCP TIME EA 10 MIN

## 2023-10-23 PROCEDURE — 250N000013 HC RX MED GY IP 250 OP 250 PS 637: Performed by: HOSPITALIST

## 2023-10-23 PROCEDURE — 87641 MR-STAPH DNA AMP PROBE: CPT | Performed by: HOSPITALIST

## 2023-10-23 PROCEDURE — 250N000009 HC RX 250: Performed by: EMERGENCY MEDICINE

## 2023-10-23 PROCEDURE — 85004 AUTOMATED DIFF WBC COUNT: CPT | Performed by: EMERGENCY MEDICINE

## 2023-10-23 PROCEDURE — 36415 COLL VENOUS BLD VENIPUNCTURE: CPT | Performed by: EMERGENCY MEDICINE

## 2023-10-23 PROCEDURE — 99222 1ST HOSP IP/OBS MODERATE 55: CPT | Performed by: HOSPITALIST

## 2023-10-23 PROCEDURE — 93005 ELECTROCARDIOGRAM TRACING: CPT | Performed by: EMERGENCY MEDICINE

## 2023-10-23 PROCEDURE — 87040 BLOOD CULTURE FOR BACTERIA: CPT | Performed by: EMERGENCY MEDICINE

## 2023-10-23 RX ORDER — ESCITALOPRAM OXALATE 20 MG/1
20 TABLET ORAL
Status: DISCONTINUED | OUTPATIENT
Start: 2023-10-23 | End: 2023-10-25 | Stop reason: HOSPADM

## 2023-10-23 RX ORDER — SIMVASTATIN 40 MG
40 TABLET ORAL AT BEDTIME
Status: DISCONTINUED | OUTPATIENT
Start: 2023-10-23 | End: 2023-10-25 | Stop reason: HOSPADM

## 2023-10-23 RX ORDER — FLUTICASONE PROPIONATE 110 UG/1
1 AEROSOL, METERED RESPIRATORY (INHALATION) 2 TIMES DAILY
Status: DISCONTINUED | OUTPATIENT
Start: 2023-10-23 | End: 2023-10-25 | Stop reason: HOSPADM

## 2023-10-23 RX ORDER — PIPERACILLIN SODIUM, TAZOBACTAM SODIUM 3; .375 G/15ML; G/15ML
3.38 INJECTION, POWDER, LYOPHILIZED, FOR SOLUTION INTRAVENOUS ONCE
Status: COMPLETED | OUTPATIENT
Start: 2023-10-23 | End: 2023-10-23

## 2023-10-23 RX ORDER — IPRATROPIUM BROMIDE AND ALBUTEROL SULFATE 2.5; .5 MG/3ML; MG/3ML
3 SOLUTION RESPIRATORY (INHALATION) ONCE
Status: COMPLETED | OUTPATIENT
Start: 2023-10-23 | End: 2023-10-23

## 2023-10-23 RX ORDER — ESCITALOPRAM OXALATE 20 MG/1
20 TABLET ORAL
COMMUNITY

## 2023-10-23 RX ORDER — MIRTAZAPINE 15 MG/1
15 TABLET, FILM COATED ORAL AT BEDTIME
Status: DISCONTINUED | OUTPATIENT
Start: 2023-10-23 | End: 2023-10-25 | Stop reason: HOSPADM

## 2023-10-23 RX ORDER — PIPERACILLIN SODIUM, TAZOBACTAM SODIUM 3; .375 G/15ML; G/15ML
3.38 INJECTION, POWDER, LYOPHILIZED, FOR SOLUTION INTRAVENOUS EVERY 8 HOURS
Status: DISCONTINUED | OUTPATIENT
Start: 2023-10-23 | End: 2023-10-25

## 2023-10-23 RX ORDER — CEFTRIAXONE 2 G/1
2 INJECTION, POWDER, FOR SOLUTION INTRAMUSCULAR; INTRAVENOUS ONCE
Status: COMPLETED | OUTPATIENT
Start: 2023-10-23 | End: 2023-10-23

## 2023-10-23 RX ORDER — IOPAMIDOL 755 MG/ML
90 INJECTION, SOLUTION INTRAVASCULAR ONCE
Status: COMPLETED | OUTPATIENT
Start: 2023-10-23 | End: 2023-10-23

## 2023-10-23 RX ORDER — ENOXAPARIN SODIUM 100 MG/ML
40 INJECTION SUBCUTANEOUS DAILY
Status: DISCONTINUED | OUTPATIENT
Start: 2023-10-24 | End: 2023-10-25 | Stop reason: HOSPADM

## 2023-10-23 RX ORDER — AZITHROMYCIN 500 MG/5ML
500 INJECTION, POWDER, LYOPHILIZED, FOR SOLUTION INTRAVENOUS ONCE
Status: COMPLETED | OUTPATIENT
Start: 2023-10-23 | End: 2023-10-23

## 2023-10-23 RX ADMIN — IOPAMIDOL 90 ML: 755 INJECTION, SOLUTION INTRAVENOUS at 11:46

## 2023-10-23 RX ADMIN — CEFTRIAXONE SODIUM 2 G: 2 INJECTION, POWDER, FOR SOLUTION INTRAMUSCULAR; INTRAVENOUS at 13:58

## 2023-10-23 RX ADMIN — VANCOMYCIN HYDROCHLORIDE 2000 MG: 5 INJECTION, POWDER, LYOPHILIZED, FOR SOLUTION INTRAVENOUS at 15:41

## 2023-10-23 RX ADMIN — IPRATROPIUM BROMIDE AND ALBUTEROL SULFATE 3 ML: .5; 3 SOLUTION RESPIRATORY (INHALATION) at 10:56

## 2023-10-23 RX ADMIN — PIPERACILLIN AND TAZOBACTAM 3.38 G: 3; .375 INJECTION, POWDER, LYOPHILIZED, FOR SOLUTION INTRAVENOUS at 16:17

## 2023-10-23 RX ADMIN — FLUTICASONE PROPIONATE 1 PUFF: 110 AEROSOL, METERED RESPIRATORY (INHALATION) at 21:50

## 2023-10-23 RX ADMIN — SODIUM CHLORIDE 1000 ML: 9 INJECTION, SOLUTION INTRAVENOUS at 13:59

## 2023-10-23 RX ADMIN — AZITHROMYCIN MONOHYDRATE 500 MG: 500 INJECTION, POWDER, LYOPHILIZED, FOR SOLUTION INTRAVENOUS at 14:15

## 2023-10-23 RX ADMIN — MIRTAZAPINE 15 MG: 15 TABLET, FILM COATED ORAL at 21:51

## 2023-10-23 RX ADMIN — ESCITALOPRAM OXALATE 20 MG: 20 TABLET ORAL at 15:42

## 2023-10-23 RX ADMIN — SODIUM CHLORIDE 1000 ML: 9 INJECTION, SOLUTION INTRAVENOUS at 16:18

## 2023-10-23 RX ADMIN — PIPERACILLIN AND TAZOBACTAM 3.38 G: 3; .375 INJECTION, POWDER, LYOPHILIZED, FOR SOLUTION INTRAVENOUS at 21:50

## 2023-10-23 RX ADMIN — SIMVASTATIN 40 MG: 40 TABLET, FILM COATED ORAL at 21:51

## 2023-10-23 ASSESSMENT — ENCOUNTER SYMPTOMS
COUGH: 1
SHORTNESS OF BREATH: 1
FEVER: 1
LIGHT-HEADEDNESS: 1

## 2023-10-23 ASSESSMENT — ACTIVITIES OF DAILY LIVING (ADL)
HEARING_DIFFICULTY_OR_DEAF: NO
ADLS_ACUITY_SCORE: 35
DRESSING/BATHING_DIFFICULTY: NO
ADLS_ACUITY_SCORE: 35
DIFFICULTY_EATING/SWALLOWING: NO
ADLS_ACUITY_SCORE: 35
ADLS_ACUITY_SCORE: 18
CHANGE_IN_FUNCTIONAL_STATUS_SINCE_ONSET_OF_CURRENT_ILLNESS/INJURY: NO
ADLS_ACUITY_SCORE: 18
CONCENTRATING,_REMEMBERING_OR_MAKING_DECISIONS_DIFFICULTY: NO
ADLS_ACUITY_SCORE: 35
WALKING_OR_CLIMBING_STAIRS_DIFFICULTY: NO
DOING_ERRANDS_INDEPENDENTLY_DIFFICULTY: NO
DEPENDENT_IADLS:: INDEPENDENT
TOILETING_ISSUES: NO
ADLS_ACUITY_SCORE: 18
FALL_HISTORY_WITHIN_LAST_SIX_MONTHS: NO
WEAR_GLASSES_OR_BLIND: NO
DIFFICULTY_COMMUNICATING: NO

## 2023-10-23 ASSESSMENT — PAIN SCALES - GENERAL: PAINLEVEL: NO PAIN (0)

## 2023-10-23 ASSESSMENT — ASTHMA QUESTIONNAIRES: ACT_TOTALSCORE: 15

## 2023-10-23 NOTE — H&P
"[facility]    History and Physical - Hospitalist Service       Date of Admission:  10/23/2023    Assessment & Plan      Jim Titus is a 58 year old male presenting with cough.  Evaluation is notable for crackles on exam which correlate with multifocal infiltrates on imaging.  He is clinically stable and requires supplemental oxygen.  He already completed a course of antibiotics and had worsening of symptoms after completion of therapy.  Presentation most concerning for a resistant organisms, such as MRSA or pseudomonas, as the underlying cause.  A viral process is also in the differential.  Lower on the differential is an immune mediated pneumonitis due to keytruda.  Lack of clinical improvement on broad spectrum abx would prompt further evaluation of this etiology.    # Multifocal pneumonia  - vancomycin, pip/tazo  - MRSA nasal swab  - sputum cultures    # Hx melanoma on keytruda    # HLD  - simvastatin            Diet: Regular Diet Adult  Lamas Catheter: Not present  Lines: None     Cardiac Monitoring: None  Code Status: Full Code    Clinically Significant Risk Factors Present on Admission                       # Obesity: Estimated body mass index is 31.65 kg/m  as calculated from the following:    Height as of this encounter: 1.93 m (6' 4\").    Weight as of this encounter: 117.9 kg (260 lb).       # Asthma: noted on problem list        Disposition Plan      Expected Discharge Date: 10/25/2023                  Ezra Lee MD  Hospitalist Service  [facility]  Securely message with [employer] (more info)  Text page via [employer] Paging/Directory     ______________________________________________________________________    Chief Complaint   Cough    History is obtained from the patient    History of Present Illness   Jim Titus is a 58 year old male who presents with a chief complaint of cough.    The patient reports he takes immunotherapy every few weeks.  He " went for an appointment on 10/6/23.  He reports having general malaise at that time.  There was concern for an infection and he was diagnosed with a pneumonia.  He was treated with appx a 10 day course of augmentin.  After completing antibiotics a week ago, he felt better overall but not back to normal.  In the last week, his cough has become worse.  It is nonproductive.  He has associated dyspnea when he coughs, as well as headache.  He notes more pronounced dyspnea on exertion in the last week.  Endorses fevers and chills.  Denies chest pain/pressure.  Denies sick contacts.  He endorses nausea with coughing.  Denies vomiting or abdominal pain. Denies taking other antibiotics.    He reports a six month history of melanoma, which was excised.  He denies having known metastases.  He started keytruda in May 2023.      Past Medical History    No past medical history on file.    Past Surgical History   Past Surgical History:   Procedure Laterality Date    HERNIA REPAIR      ORTHOPEDIC SURGERY      Both ankles, left knee, right shoulder    pyloric stenosis repair         Prior to Admission Medications   Prior to Admission Medications   Prescriptions Last Dose Informant Patient Reported? Taking?   albuterol (PROAIR HFA/PROVENTIL HFA/VENTOLIN HFA) 108 (90 Base) MCG/ACT inhaler Past Week at Has with  No Yes   Sig: Inhale 2 puffs into the lungs every 6 hours as needed   clonazePAM (KLONOPIN) 0.5 MG tablet 10/23/2023 at am  Yes Yes   Sig: Take 0.5-1 mg by mouth daily as needed for anxiety   escitalopram (LEXAPRO) 20 MG tablet 10/22/2023 at lunch  Yes Yes   Sig: Take 20 mg by mouth daily (with lunch)   fluticasone (FLOVENT HFA) 110 MCG/ACT inhaler Past Week at Ran out  No Yes   Sig: Inhale 1 puff into the lungs 2 times daily   ibuprofen (ADVIL,MOTRIN) 400 MG tablet Past Month  Yes Yes   Sig: Take 400 mg by mouth every 6 hours as needed for moderate pain   indomethacin (INDOCIN) 50 MG capsule More than a month  Yes Yes   Sig:  Take 50 mg by mouth 3 times daily as needed for other (for gout flair)   loperamide HCl (IMODIUM A-D ORAL) Past Month  Yes Yes   Sig: Take 2 mg by mouth 4 times daily as needed (diarrhea)   loratadine (CLARITIN) 10 mg tablet Past Week  Yes Yes   Sig: Take 10 mg by mouth daily as needed for allergies   mirtazapine (REMERON) 15 MG tablet 10/22/2023 at pm  Yes Yes   Sig: Take 15 mg by mouth at bedtime   simvastatin (ZOCOR) 40 MG tablet 10/22/2023 at pm  No Yes   Sig: Take 1 tablet (40 mg) by mouth At Bedtime   tadalafil (CIALIS) 5 MG tablet More than a month  No Yes   Sig: [TADALAFIL (CIALIS) 5 MG TABLET] Take 1 tablet (5 mg total) by mouth daily as needed for erectile dysfunction.      Facility-Administered Medications: None           Physical Exam   Vital Signs: Temp: 97.6  F (36.4  C) Temp src: Temporal BP: 111/72 Pulse: 82   Resp: 24 SpO2: 95 % O2 Device: Nasal cannula    Weight: 260 lbs 0 oz    Gen: lying in bed in on acute distress  Neuro: alert, conversant  CV: borderline bradycarda, regular rhythm  Pulm: no acute resp distress, bibasilar crackles R>L  GI:  abdomen soft, NTTP    Medical Decision Making             Data   Reviewed:  Na 137  K 4.3  BUN 12  Cr 0.94    WBC 11  Hgb 11  Plts 548    CT PE  1.  Asymmetric, right greater than left multilobar pneumonia with significant involvement of the right lower, middle and upper lobes. No parapneumonic effusions.  2.  Minimal reactive adenopathy.  3.  No evidence of pulmonary emboli.  4.  No evidence of metastasis.

## 2023-10-23 NOTE — ED NOTES
Expected Patient Referral to ED  9:23 AM    Referring Clinic/Provider:  [facility]    Reason for referral/Clinical facts:  History of melanoma.  Recently diagnosed with pneumonia and on Augmentin.  Reportedly ill-appearing. Normal blood pressure, O2 sat 92%.  Feels may need IV antibiotics or nebs    Recommendations provided:  Send to ED for further evaluation    Caller was informed that this institution does possess the capabilities and/or resources to provide for patient and should be transferred to our facility.    Discussed that if direct admit is sought and any hurdles are encountered, this ED would be happy to see the patient and evaluate.    Informed caller that recommendations provided are recommendations based only on the facts provided and that they responsible to accept or reject the advice, or to seek a formal in person consultation as needed and that this ED will see/treat patient should they arrive.      Ludwin Linares MD  [facility] EMERGENCY ROOM  [address] [id number]  [phone]       Ludwin Linares MD  10/23/23 0936

## 2023-10-23 NOTE — PROGRESS NOTES
"  Assessment and Plan     58-year-old male with relevant past medical history of moderate asthma, melanoma on Keytruda immunotherapy who presents with cough and significant shortness of breath occurring over the last 5 weeks.  Patient seen by his oncologist in early October and treated for pneumonia after chest x-ray that I cannot see apparently showed this.  Treated with Augmentin.  This helped mildly but symptoms returned after.  Today his oxygen is low for a 58-year-old male with a mild temp and pulse that is almost tachycardic with lower blood pressures.  Patient also appears unwell and his lungs show wheezing throughout on exam.  Due to these things I recommended more immediate work-up and management through ER.  I called and gave signout to [facility] ER physician.  Patient will go there by private vehicle.    1. Moderate persistent asthma without complication    2. Pneumonia due to infectious organism, unspecified laterality, unspecified part of lung    Follow up: PRN    Options for treatment and follow-up care were reviewed with the patient and/or guardian. Jim Titus and/or guardian engaged in the decision making process and verbalized understanding of the options discussed and agreed with the final plan.    Dr. Luis Alberto Shoemaekr         HPI:   Jim Titus is a 58 year old  male who presents for:    Chief Complaint   Patient presents with    Cough     Since 10/06     Patient tells me he has had a cough and shortness of breath of the last 5 weeks.  He was seen by his oncologist in early October and had a CBC that showed abnormal blood counts.  Apparently had a chest x-ray that showed a pneumonia and patient was treated with a course of Augmentin.  Felt better a little bit better for a time with this but worsening symptoms after.  He tells me this is the worst \"he is ever felt in his life\".  He feels very short of breath now and is coughing constantly.  His vitals show temp of 99.5, pulse of 95 /70, " "respirations of 18.  Most concerning way his oxygen looks lower at 92%.         PMHX:     Patient Active Problem List   Diagnosis    Diverticulosis    IVY (generalized anxiety disorder)    Chronic gout    Hyperlipemia    Class 1 obesity due to excess calories without serious comorbidity with body mass index (BMI) of 33.0 to 33.9 in adult    Moderate persistent asthma without complication    Allergic rhinitis due to pollen    Healthcare maintenance    Melanoma of skin (H)       Social History     Tobacco Use    Smoking status: Never    Smokeless tobacco: Former     Types: Chew     Quit date: 1998   Substance Use Topics    Alcohol use: Yes     Comment: 3-4 times per week, 5-6 beers    Drug use: Yes     Types: Marijuana       Social History     Social History Narrative    Not on file       Allergies   Allergen Reactions    Chicken [Chicken Protein] Other (See Comments)     Throat closes to turkey as well.    Cantaloupe [Cantaloupe Extract Allergy Skin Test] Unknown    Wellbutrin [Bupropion] Unknown    Egg Derived [Chicken-Derived Products (Egg)] Rash    Grass Pollen [Grass] Rash    Turkey [Poultry Meal] Rash       No results found for this or any previous visit (from the past 24 hour(s)).         Review of Systems:    ROS: 10 point ROS neg other than the symptoms noted above in the HPI.         Physical Exam:     Vitals:    10/23/23 0905   BP: 110/70   Pulse: 95   Resp: 18   Temp: 99.5  F (37.5  C)   TempSrc: Temporal   SpO2: 92%   Weight: 118.1 kg (260 lb 6.4 oz)   Height: 1.905 m (6' 3\")     Body mass index is 32.55 kg/m .    General appearance: Alert, cooperative, no distress, appears stated age  Head: Normocephalic, atraumatic, without obvious abnormality  Eyes: Pupils equal round, reactive.  Conjunctiva clear.  Nose: Nares normal, no drainage.  Throat: Lips, mucosa, tongue normal mucosa pink and moist  Neck: Supple, symmetric, trachea midline,  Lungs: wheezing throughout. Does not appear to be in respiratory " distress  Heart: Regular rate and rhythm, normal S1 and S2, no murmur, rub or gallop.  Extremities: Extremities normal, atraumatic.  No cyanosis or edema.  Skin: Skin color, texture, turgor normal no rashes or lesions on limited skin exam  Neurologic: CN II through XII intact, normal strength.

## 2023-10-23 NOTE — PHARMACY-VANCOMYCIN DOSING SERVICE
"Pharmacy Vancomycin Initial Note  Date of Service 2023  Patient's  1965  58 year old, male    Indication: Community Acquired Pneumonia    Current estimated CrCl = Estimated Creatinine Clearance: 120.2 mL/min (based on SCr of 0.94 mg/dL).    Creatinine for last 3 days  10/23/2023: 10:41 AM Creatinine 0.94 mg/dL    Recent Vancomycin Level(s) for last 3 days  No results found for requested labs within last 3 days.      Vancomycin IV Administrations (past 72 hours)        No vancomycin orders with administrations in past 72 hours.                    Nephrotoxins and other renal medications (From now, onward)      Start     Dose/Rate Route Frequency Ordered Stop    10/24/23 0330  vancomycin (VANCOCIN) 1,250 mg in 0.9% NaCl 250 mL intermittent infusion        See Hyperspace for full Linked Orders Report.    1,250 mg  over 90 Minutes Intravenous EVERY 12 HOURS 10/23/23 1516      10/23/23 2230  piperacillin-tazobactam (ZOSYN) 3.375 g vial to attach to  mL bag        Note to Pharmacy: For [facility], [facility] and [facility]: For Zosyn-naive patients, use the \"Zosyn initial dose + extended infusion\" order panel.    3.375 g  over 240 Minutes Intravenous EVERY 8 HOURS 10/23/23 1442      10/23/23 1630  piperacillin-tazobactam (ZOSYN) 3.375 g vial to attach to  mL bag         3.375 g  over 30 Minutes Intravenous ONCE 10/23/23 1442      10/23/23 1530  vancomycin (VANCOCIN) 2,000 mg in 0.9% NaCl 500 mL intermittent infusion        See Hyperspace for full Linked Orders Report.    2,000 mg  over 2 Hours Intravenous ONCE 10/23/23 1516              Contrast Orders - past 72 hours (72h ago, onward)      Start     Dose/Rate Route Frequency Stop    10/23/23 1200  iopamidol (ISOVUE-370) solution 90 mL         90 mL Intravenous ONCE 10/23/23 1146            Insight[employer] Prediction of Planned Initial Vancomycin Regimen  Loading dose: 2000 mg at 16:00 10/23/2023.  Regimen: 1250 mg IV every 12 hours.  Start time: 15:15 on " 10/23/2023  Exposure target: AUC24 (range)400-600 mg/L.hr   AUC24,ss: 479 mg/L.hr  Probability of AUC24 > 400: 69 %  Ctrough,ss: 15.5 mg/L  Probability of Ctrough,ss > 20: 29 %  Probability of nephrotoxicity (Lodise BELIA 2009): 11 %          Plan:  Start vancomycin  1250 mg IV q12h with a 2000 mg loading dose.   Vancomycin monitoring method: AUC  Vancomycin therapeutic monitoring goal: 400-600 mg*h/L  Pharmacy will check vancomycin levels as appropriate in 1-3 Days.    Serum creatinine levels will be ordered daily for the first week of therapy and at least twice weekly for subsequent weeks.      Justin Hernandez RPH

## 2023-10-23 NOTE — ED PROVIDER NOTES
EMERGENCY DEPARTMENT ENCOUNTER      NAME: Jim Titus  AGE: 58 year old male  YOB: 1965  MRN: [id number]  EVALUATION DATE & TIME: No admission date for patient encounter.    PCP: Luis Alberto Fatima    ED PROVIDER: Adeel Linares MD        Chief Complaint   Patient presents with    Shortness of Breath         FINAL IMPRESSION:  1. Pneumonia of both lungs due to infectious organism, unspecified part of lung    2. Acute respiratory failure with hypoxia (H)          ED COURSE & MEDICAL DECISION MAKING:    Pertinent Labs & Imaging studies reviewed. (See chart for details)  58 year old male presents to the Emergency Department for evaluation of cough and shortness of breath.  He is on Keytruda for melanoma.    Recently had x-ray done through his oncology clinic and was told he had pneumonia and was given Augmentin.  Symptoms have not improved    Went to clinic and was sent here.  Mildly hypoxic here.  On 2 L of oxygen.  Inspiratory crackles and wheezing on right.  Differential includes metastasis to lung, pneumonia, asthma exacerbation, pulmonary emboli, pneumonitis    Plan for CTA PE, CBC, blood cultures, BMP, DuoNeb, EKG, troponin, influenza and COVID          ED Course as of 10/23/23 1359   Mon Oct 23, 2023   1037 I met with the patient, obtained history, performed an initial exam, and discussed options and plan for diagnostics and treatment here in the ED.    1349 1:49 PM I spoke with the accepting hospitalist, Dr. Lee.   1358 SARS CoV2 PCR: Negative   1358 WBC(!): 11.1   1358 Hemoglobin(!): 11.2   1358 Platelet Count(!): 548   1358 Lactic Acid: 0.8   1358 Troponin T, High Sensitivity: 7   1358 N-Terminal Pro Bnp: 96   1358 CTA shows multifocal pneumonia right greater than left.  With patient being hypoxic plan for admission.  Ceftriaxone azithromycin ordered.   1359 Lactic Acid: 0.8   1359 Spoke with hospitalist who agreed to admit patient to [facility].   1359 Community-acquired pneumonia but also  considered pneumonitis secondary to Keytruda         Medical Decision Making    History:  Supplemental history from: Documented in chart, if applicable  External Record(s) reviewed: Documented in chart, if applicable.    Work Up:  Chart documentation includes differential considered and any EKGs or imaging independently interpreted by provider, where specified.  In additional to work up documented, I considered the following work up: Documented in chart, if applicable.    External consultation:  Discussion of management with another provider: Documented in chart, if applicable    Complicating factors:  Care impacted by chronic illness: Cancer/Chemotherapy and Hyperlipidemia  Care affected by social determinants of health: N/A    Disposition considerations: Admit.          Voice recognition software was used in the creation of this note. Any grammatical or nonsensical errors are due to inherent errors with the software and are not the intention of the writer.         At the conclusion of the encounter I discussed the results of all of the tests and the disposition. The questions were answered. The patient or family acknowledged understanding and was agreeable with the care plan.               MEDICATIONS GIVEN IN THE EMERGENCY:  Medications   cefTRIAXone (ROCEPHIN) 2 g vial to attach to  ml bag for ADULTS or NS 50 ml bag for PEDS (2 g Intravenous $New Bag 10/23/23 1355)   azithromycin 500 mg (ZITHROMAX) in 0.9% NaCl 250 mL intermittent infusion 500 mg (has no administration in time range)   ipratropium - albuterol 0.5 mg/2.5 mg/3 mL (DUONEB) neb solution 3 mL (3 mLs Nebulization $Given 10/23/23 1056)   iopamidol (ISOVUE-370) solution 90 mL (90 mLs Intravenous $Given 10/23/23 1146)   sodium chloride 0.9% BOLUS 1,000 mL (1,000 mLs Intravenous $New Bag 10/23/23 1358)       NEW PRESCRIPTIONS STARTED AT TODAY'S ER VISIT  New Prescriptions    No medications on file       "    =================================================================    HPI    Triage note  \"Pt arrives to ED with c/o ongoing and worsening shortness of breath since he was diagnosed with pneumonia on 10/6. Finished antibiotics 1 week ago. Pt has asthma been using inhaler with little to no relief. Denies chest pain. Arrived on 89%. Applied 2L nasal cannula and now 93%. Less labored breathing now.      Triage Assessment (Adult)       Row Name 10/23/23 0935          Triage Assessment    Airway WDL WDL        Respiratory WDL    Respiratory WDL X;rhythm/pattern     Rhythm/Pattern, Respiratory shortness of breath;dyspnea upon exertion        Cardiac WDL    Cardiac WDL WDL        Peripheral/Neurovascular WDL    Peripheral Neurovascular WDL WDL        Cognitive/Neuro/Behavioral WDL    Cognitive/Neuro/Behavioral WDL WDL                     \"      Patient information was obtained from: patient     Use of : N/A      Jim Titus is a 58 year old male with a pertinent history of hyperlipidemia and melanoma who presents to this ED via walk-in for evaluation of shortness of breath.    The patient arrives from PCP clinic with 2 weeks of worsening shortness of breath since he was diagnosed with pneumonia on 10/6. He has also had intermittent fevers, lightheadedness on exertion, and a cough. He had been prescribed Augmentin and finished the course 1 week ago. Symptoms never fully resolved and have slowly worsened over the last week. Over the last 2 weeks he has taken 6 COVID tests that have all been negative. He has an SpO2 of 95% here on 2L. He has a history of asthma and has a Flovent inhaler and albuterol inhaler. He took 2 puffs of the albuterol inhaler at 8:00 AM this morning. He does not smoke and denies any pain breathing or any other complaints at this time.     Of note, the patient gets Keytruda (pembrolizumab) infusions at [facility]. He had a melanoma excised on his mid back in 4/2023 and began infusions " "on 5/2023. On 10/6, the day he was diagnosed with pneumonia, he was going to receive the infusion but told the doctor he felt \"crummy\" so he was sent to get an x-ray and blood work done at a clinic. He was diagnosed with pneumonia there.      Chart Review:  The patient was seen earlier this morning by Dr. Fatima (PCP) for evaluation of shortness of breath and cough. He was sent to the ED because of his low SpO2 and for further pneumonia workup.       REVIEW OF SYSTEMS   Review of Systems   Constitutional:  Positive for fever.   Respiratory:  Positive for cough and shortness of breath.    Neurological:  Positive for light-headedness.   All other systems reviewed and are negative.       PAST MEDICAL HISTORY:  No past medical history on file.    PAST SURGICAL HISTORY:  Past Surgical History:   Procedure Laterality Date    HERNIA REPAIR      ORTHOPEDIC SURGERY      Both ankles, left knee, right shoulder    pyloric stenosis repair             CURRENT MEDICATIONS:    albuterol (PROAIR HFA/PROVENTIL HFA/VENTOLIN HFA) 108 (90 Base) MCG/ACT inhaler  clonazePAM (KLONOPIN) 0.5 MG tablet  escitalopram (LEXAPRO) 20 MG tablet  fluticasone (FLOVENT HFA) 110 MCG/ACT inhaler  ibuprofen (ADVIL,MOTRIN) 400 MG tablet  indomethacin (INDOCIN) 50 MG capsule  loperamide HCl (IMODIUM A-D ORAL)  loratadine (CLARITIN) 10 mg tablet  mirtazapine (REMERON) 15 MG tablet  simvastatin (ZOCOR) 40 MG tablet  tadalafil (CIALIS) 5 MG tablet        ALLERGIES:  Allergies   Allergen Reactions    Chicken [Chicken Protein] Other (See Comments)     Throat closes to turkey as well.    Cantaloupe [Cantaloupe Extract Allergy Skin Test] Unknown    Wellbutrin [Bupropion] Unknown    Grass Pollen [Grass] Rash    Turkey [Poultry Meal] Rash       FAMILY HISTORY:  Family History   Problem Relation Age of Onset    Breast Cancer Mother     Heart Disease Father     Mental Illness Father     Hyperlipidemia Father     Mental Illness Brother        SOCIAL HISTORY:   Social " History     Socioeconomic History    Marital status: Single   Tobacco Use    Smoking status: Never    Smokeless tobacco: Former     Types: Chew     Quit date: 1998   Substance and Sexual Activity    Alcohol use: Yes     Comment: 3-4 times per week, 5-6 beers    Drug use: Yes     Types: Marijuana    Sexual activity: Not Currently     Partners: Female     Social Determinants of Health     Financial Resource Strain: Low Risk  (10/23/2023)    Financial Resource Strain     Within the past 12 months, have you or your family members you live with been unable to get utilities (heat, electricity) when it was really needed?: No   Food Insecurity: Low Risk  (10/23/2023)    Food Insecurity     Within the past 12 months, did you worry that your food would run out before you got money to buy more?: No     Within the past 12 months, did the food you bought just not last and you didn t have money to get more?: No   Transportation Needs: Low Risk  (10/23/2023)    Transportation Needs     Within the past 12 months, has lack of transportation kept you from medical appointments, getting your medicines, non-medical meetings or appointments, work, or from getting things that you need?: No   Physical Activity: Insufficiently Active (11/10/2021)    Exercise Vital Sign     Days of Exercise per Week: 1 day     Minutes of Exercise per Session: 30 min   Stress: Stress Concern Present (11/10/2021)    [place] [employer] - Occupational Stress Questionnaire     Feeling of Stress : To some extent   Social Connections: Socially Isolated (11/10/2021)    Social Connection and Isolation Panel [NHANES]     Frequency of Communication with Friends and Family: Once a week     Frequency of Social Gatherings with Friends and Family: Once a week     Attends Jew Services: Never     Active Member of Clubs or Organizations: No     Marital Status: Never    Interpersonal Safety: Low Risk  (10/23/2023)    Interpersonal Safety      "Do you feel physically and emotionally safe where you currently live?: Yes     Within the past 12 months, have you been hit, slapped, kicked or otherwise physically hurt by someone?: No     Within the past 12 months, have you been humiliated or emotionally abused in other ways by your partner or ex-partner?: No   Housing Stability: Low Risk  (10/23/2023)    Housing Stability     Do you have housing? : Yes     Are you worried about losing your housing?: No       VITALS:  /72   Pulse 82   Temp 97.6  F (36.4  C) (Temporal)   Resp 24   Ht 1.93 m (6' 4\")   Wt 117.9 kg (260 lb)   SpO2 95%   BMI 31.65 kg/m      PHYSICAL EXAM      Vitals: /72   Pulse 82   Temp 97.6  F (36.4  C) (Temporal)   Resp 24   Ht 1.93 m (6' 4\")   Wt 117.9 kg (260 lb)   SpO2 95%   BMI 31.65 kg/m    General: Appears in no acute distress, awake, alert, interactive.  Eyes: Conjunctivae non-injected. Sclera anicteric.  HENT: Atraumatic.  Neck: Supple.  Respiratory/Chest: Respiration unlabored. Inspiratory wheezes. Crackles and wheezes in right posterior lung field. Dry cough.  Heart: Regular rate and rhythm.  2+ radial pulses  Abdomen: non distended  Musculoskeletal: Normal extremities. No edema or erythema.  Skin: Normal color. No rash or diaphoresis.  Neurologic: Face symmetric, moves all extremities spontaneously. Speech clear.  Psychiatric: Oriented to person, place, and time. Affect appropriate.       LAB:  All pertinent labs reviewed and interpreted.  Results for orders placed or performed during the hospital encounter of 10/23/23   CT Chest Pulmonary Embolism w Contrast    Impression    IMPRESSION:  1.  Asymmetric, right greater than left multilobar pneumonia with significant involvement of the right lower, middle and upper lobes. No parapneumonic effusions.  2.  Minimal reactive adenopathy.  3.  No evidence of pulmonary emboli.  4.  No evidence of metastasis.   Basic metabolic panel   Result Value Ref Range    Sodium 137 " 135 - 145 mmol/L    Potassium 4.3 3.4 - 5.3 mmol/L    Chloride 101 98 - 107 mmol/L    Carbon Dioxide (CO2) 26 22 - 29 mmol/L    Anion Gap 10 7 - 15 mmol/L    Urea Nitrogen 11.6 6.0 - 20.0 mg/dL    Creatinine 0.94 0.67 - 1.17 mg/dL    GFR Estimate >90 >60 mL/min/1.73m2    Calcium 9.8 8.6 - 10.0 mg/dL    Glucose 118 (H) 70 - 99 mg/dL   Troponin T, High Sensitivity (now)   Result Value Ref Range    Troponin T, High Sensitivity 7 <=22 ng/L   N terminal pro BNP outpatient   Result Value Ref Range    N Terminal Pro BNP Outpatient 96 0 - 900 pg/mL   Symptomatic Influenza A/B, RSV, & SARS-CoV2 PCR (COVID-19) Nose    Specimen: Nose; Swab   Result Value Ref Range    Influenza A PCR Negative Negative    Influenza B PCR Negative Negative    RSV PCR Negative Negative    SARS CoV2 PCR Negative Negative   Lactic acid whole blood   Result Value Ref Range    Lactic Acid 0.8 0.7 - 2.0 mmol/L   CBC with platelets and differential   Result Value Ref Range    WBC Count 11.1 (H) 4.0 - 11.0 10e3/uL    RBC Count 3.76 (L) 4.40 - 5.90 10e6/uL    Hemoglobin 11.2 (L) 13.3 - 17.7 g/dL    Hematocrit 35.4 (L) 40.0 - 53.0 %    MCV 94 78 - 100 fL    MCH 29.8 26.5 - 33.0 pg    MCHC 31.6 31.5 - 36.5 g/dL    RDW 13.2 10.0 - 15.0 %    Platelet Count 548 (H) 150 - 450 10e3/uL    % Neutrophils 81 %    % Lymphocytes 7 %    % Monocytes 7 %    Mids % (Monos, Eos, Basos)      % Eosinophils 3 %    % Basophils 1 %    % Immature Granulocytes 1 %    NRBCs per 100 WBC 0 <1 /100    Absolute Neutrophils 9.2 (H) 1.6 - 8.3 10e3/uL    Absolute Lymphocytes 0.8 0.8 - 5.3 10e3/uL    Absolute Monocytes 0.7 0.0 - 1.3 10e3/uL    Mids Abs (Monos, Eos, Basos)      Absolute Eosinophils 0.3 0.0 - 0.7 10e3/uL    Absolute Basophils 0.1 0.0 - 0.2 10e3/uL    Absolute Immature Granulocytes 0.1 <=0.4 10e3/uL    Absolute NRBCs 0.0 10e3/uL       RADIOLOGY:  Reviewed all pertinent imaging. Please see official radiology report.  CT Chest Pulmonary Embolism w Contrast   Final Result    IMPRESSION:   1.  Asymmetric, right greater than left multilobar pneumonia with significant involvement of the right lower, middle and upper lobes. No parapneumonic effusions.   2.  Minimal reactive adenopathy.   3.  No evidence of pulmonary emboli.   4.  No evidence of metastasis.          EKG:    Performed at: October 23, 2023, 9:44 AM, [facility] EMERGENCY ROOM    Impression: Sinus tachycardia. When compared with ECG from 1-MAR-2014, no significant change was found.    Rate: 103 BPM  Rhythm: Sinus tachycardia  Axis: 24 30 31   MO Interval: 148 ms  QRS Interval: 84 ms  QTc Interval: 334/437 ms  ST Changes: No ST changes  Comparison: When compared with ECG from 1-MAR-2014, no significant change was found.    I have independently reviewed and interpreted the EKG(s) documented above.      I, Surendra French, am serving as a scribe to document services personally performed by Ludwin Linares MD based on my observation and the provider's statements to me. I, Dr. Ludwin Linares, attest that Surendra French is acting in a scribe capacity, has observed my performance of the services and has documented them in accordance with my direction.    Ludwin Linares MD  Emergency Medicine  [facility] EMERGENCY ROOM  [address] [facility] [address]  [phone]       Ludwin Linares MD  10/23/23 1400

## 2023-10-23 NOTE — PROGRESS NOTES
Care Management Initial Consult    General Information  Assessment completed with: Patient,       Primary Care Provider verified and updated as needed: Yes   Readmission within the last 30 days: no previous admission in last 30 days         Advance Care Planning: Advance Care Planning Reviewed:  (no HCD on file, declines HCD at this time)       Communication Assessment  Patient's communication style: spoken language (English or Bilingual)      Cognitive  Cognitive/Neuro/Behavioral: WDL                      Living Environment:   People in home: parent(s) (Mom Nishi)  Mom Nishi  Current living Arrangements: house (2 level)      Able to return to prior arrangements: yes     Family/Social Support:  Care provided by: self  Provides care for: no one  Marital Status: Single       Description of Support System: supportive, involved.            Current Resources:   Patient receiving home care services: No     Community Resources:  (OP Immunotherapy every 3 weeks. OP Oncology at [facility].)  Equipment/supplies currently used at home: None    Employment/Financial:  Employment Status: employed full-time (bathroom designs)     Employment/ Comments: no  or [employer]  Financial Concerns: none   Referral to Financial Worker: No     Does the patient's insurance plan have a 3 day qualifying hospital stay waiver?  No    Lifestyle & Psychosocial Needs:  Social Determinants of Health     Food Insecurity: Low Risk  (10/23/2023)    Food Insecurity     Within the past 12 months, did you worry that your food would run out before you got money to buy more?: No     Within the past 12 months, did the food you bought just not last and you didn t have money to get more?: No   Depression: Not at risk (4/11/2023)    PHQ-2     PHQ-2 Score: 1   Housing Stability: Low Risk  (10/23/2023)    Housing Stability     Do you have housing? : Yes     Are you worried about losing your housing?: No   Tobacco Use: Medium Risk (10/23/2023)    Patient  History     Smoking Tobacco Use: Never     Smokeless Tobacco Use: Former     Passive Exposure: Not on file   Financial Resource Strain: Low Risk  (10/23/2023)    Financial Resource Strain     Within the past 12 months, have you or your family members you live with been unable to get utilities (heat, electricity) when it was really needed?: No   Alcohol Use: Alcohol Misuse (11/10/2021)    AUDIT-C     Frequency of Alcohol Consumption: 4 or more times a week     Average Number of Drinks: 5 or 6     Frequency of Binge Drinking: Weekly   Transportation Needs: Low Risk  (10/23/2023)    Transportation Needs     Within the past 12 months, has lack of transportation kept you from medical appointments, getting your medicines, non-medical meetings or appointments, work, or from getting things that you need?: No   Physical Activity: Insufficiently Active (11/10/2021)    Exercise Vital Sign     Days of Exercise per Week: 1 day     Minutes of Exercise per Session: 30 min   Interpersonal Safety: Low Risk  (10/23/2023)    Interpersonal Safety     Do you feel physically and emotionally safe where you currently live?: Yes     Within the past 12 months, have you been hit, slapped, kicked or otherwise physically hurt by someone?: No     Within the past 12 months, have you been humiliated or emotionally abused in other ways by your partner or ex-partner?: No   Stress: Stress Concern Present (11/10/2021)    [place] [employer] - Occupational Stress Questionnaire     Feeling of Stress : To some extent   Social Connections: Socially Isolated (11/10/2021)    Social Connection and Isolation Panel [NHANES]     Frequency of Communication with Friends and Family: Once a week     Frequency of Social Gatherings with Friends and Family: Once a week     Attends Evangelical Services: Never     Active Member of Clubs or Organizations: No     Attends Club or Organization Meetings: Not on file     Marital Status: Never   "    Functional Status:  Prior to admission patient needed assistance:   Dependent ADLs:: Independent  Dependent IADLs:: Independent     Mental Health Status:  Mental Health Status:  (hx of anxiety, has Psychiatrist. No Hx of IP MH admits.)  Mental Health Management: Medication, Psychiatrist    Chemical Dependency Status:  Chemical Dependency Status: No Current Concerns (denies.)           Values/Beliefs:  Spiritual, Cultural Beliefs, Bahai Practices, Values that affect care: no          Values/Beliefs Comment: \"Samaritan\"    Additional Information:  Chart reviewed.     CM met with patient; assessment completed. Lives in private home with his Mom. He is independent with all cares, including driving and still works full time. No assistive devices. No private duty or skilled HC services. PCP confirmed.     He is hopeful to return home (with his Mom) without additional needs or services.   Currently requiring 02 and not on at home/baseline.   Plans to drive self home- car in hospital parking lot.   He states his Mom is also independent and safe to be at home alone while he is in the hospital.       Jessica Lal RN      "

## 2023-10-23 NOTE — PLAN OF CARE
Goal Outcome Evaluation:    Patient receiving IV antibiotics and received a 1000ml IV fluid bolus. Blood pressure is improving at 103/65. Patient ambulating in room independently. Occasional non-productive cough. Lungs have fine crackles. Patient is on 2L of oxygen via [place]. Has some SOB with activity.

## 2023-10-23 NOTE — ED TRIAGE NOTES
Pt arrives to ED with c/o ongoing and worsening shortness of breath since he was diagnosed with pneumonia on 10/6. Finished antibiotics 1 week ago. Pt has asthma been using inhaler with little to no relief. Denies chest pain. Arrived on 89%. Applied 2L nasal cannula and now 93%. Less labored breathing now.      Triage Assessment (Adult)       Row Name 10/23/23 0935          Triage Assessment    Airway WDL WDL        Respiratory WDL    Respiratory WDL X;rhythm/pattern     Rhythm/Pattern, Respiratory shortness of breath;dyspnea upon exertion        Cardiac WDL    Cardiac WDL WDL        Peripheral/Neurovascular WDL    Peripheral Neurovascular WDL WDL        Cognitive/Neuro/Behavioral WDL    Cognitive/Neuro/Behavioral WDL WDL

## 2023-10-23 NOTE — PHARMACY-ADMISSION MEDICATION HISTORY
Pharmacist Admission Medication History    Admission medication history is complete. The information provided in this note is only as accurate as the sources available at the time of the update.    Information Source(s): Patient and CareEverywhere/SureScripts via in-person    Pertinent Information:     Changes made to PTA medication list:  Added: None  Deleted: None  Changed: Escitalopram increased from 10 mg, indomethacin to prn    Medication Affordability:  Not including over the counter (OTC) medications, was there a time in the past 3 months when you did not take your medications as prescribed because of cost?: No    Allergies reviewed with patient and updates made in EHR: yes    Medication History Completed By: Justin Hernandez [facility] 10/23/2023 1:32 PM    PTA Med List   Medication Sig Last Dose    albuterol (PROAIR HFA/PROVENTIL HFA/VENTOLIN HFA) 108 (90 Base) MCG/ACT inhaler Inhale 2 puffs into the lungs every 6 hours as needed Past Week at Has with    clonazePAM (KLONOPIN) 0.5 MG tablet Take 0.5-1 mg by mouth daily as needed for anxiety 10/23/2023 at am    escitalopram (LEXAPRO) 20 MG tablet Take 20 mg by mouth daily (with lunch) 10/22/2023 at lunch    fluticasone (FLOVENT HFA) 110 MCG/ACT inhaler Inhale 1 puff into the lungs 2 times daily Past Week at Ran out    ibuprofen (ADVIL,MOTRIN) 400 MG tablet Take 400 mg by mouth every 6 hours as needed for moderate pain Past Month    indomethacin (INDOCIN) 50 MG capsule Take 50 mg by mouth 3 times daily as needed for other (for gout flair) More than a month    loperamide HCl (IMODIUM A-D ORAL) Take 2 mg by mouth 4 times daily as needed (diarrhea) Past Month    loratadine (CLARITIN) 10 mg tablet Take 10 mg by mouth daily as needed for allergies Past Week    mirtazapine (REMERON) 15 MG tablet Take 15 mg by mouth at bedtime 10/22/2023 at pm    simvastatin (ZOCOR) 40 MG tablet Take 1 tablet (40 mg) by mouth At Bedtime 10/22/2023 at pm    tadalafil (CIALIS) 5 MG tablet  [TADALAFIL (CIALIS) 5 MG TABLET] Take 1 tablet (5 mg total) by mouth daily as needed for erectile dysfunction. More than a month

## 2023-10-24 PROCEDURE — 258N000003 HC RX IP 258 OP 636: Performed by: HOSPITALIST

## 2023-10-24 PROCEDURE — 250N000013 HC RX MED GY IP 250 OP 250 PS 637: Performed by: INTERNAL MEDICINE

## 2023-10-24 PROCEDURE — 99232 SBSQ HOSP IP/OBS MODERATE 35: CPT | Performed by: HOSPITALIST

## 2023-10-24 PROCEDURE — 120N000001 HC R&B MED SURG/OB

## 2023-10-24 PROCEDURE — 87205 SMEAR GRAM STAIN: CPT | Performed by: HOSPITALIST

## 2023-10-24 PROCEDURE — 87070 CULTURE OTHR SPECIMN AEROBIC: CPT | Performed by: HOSPITALIST

## 2023-10-24 PROCEDURE — 250N000011 HC RX IP 250 OP 636: Performed by: HOSPITALIST

## 2023-10-24 PROCEDURE — 250N000013 HC RX MED GY IP 250 OP 250 PS 637: Performed by: HOSPITALIST

## 2023-10-24 RX ORDER — ACETAMINOPHEN 325 MG/1
650 TABLET ORAL EVERY 4 HOURS PRN
Status: DISCONTINUED | OUTPATIENT
Start: 2023-10-24 | End: 2023-10-25 | Stop reason: HOSPADM

## 2023-10-24 RX ORDER — CLONAZEPAM 0.5 MG/1
0.5 TABLET ORAL DAILY PRN
Status: DISCONTINUED | OUTPATIENT
Start: 2023-10-24 | End: 2023-10-25 | Stop reason: HOSPADM

## 2023-10-24 RX ORDER — LOPERAMIDE HCL 2 MG
2 CAPSULE ORAL ONCE
Status: COMPLETED | OUTPATIENT
Start: 2023-10-24 | End: 2023-10-24

## 2023-10-24 RX ADMIN — SIMVASTATIN 40 MG: 40 TABLET, FILM COATED ORAL at 21:04

## 2023-10-24 RX ADMIN — CLONAZEPAM 0.5 MG: 0.5 TABLET ORAL at 13:05

## 2023-10-24 RX ADMIN — PIPERACILLIN AND TAZOBACTAM 3.38 G: 3; .375 INJECTION, POWDER, LYOPHILIZED, FOR SOLUTION INTRAVENOUS at 06:37

## 2023-10-24 RX ADMIN — PIPERACILLIN AND TAZOBACTAM 3.38 G: 3; .375 INJECTION, POWDER, LYOPHILIZED, FOR SOLUTION INTRAVENOUS at 21:50

## 2023-10-24 RX ADMIN — FLUTICASONE PROPIONATE 1 PUFF: 110 AEROSOL, METERED RESPIRATORY (INHALATION) at 21:05

## 2023-10-24 RX ADMIN — LOPERAMIDE HYDROCHLORIDE 2 MG: 2 CAPSULE ORAL at 14:02

## 2023-10-24 RX ADMIN — PIPERACILLIN AND TAZOBACTAM 3.38 G: 3; .375 INJECTION, POWDER, LYOPHILIZED, FOR SOLUTION INTRAVENOUS at 14:03

## 2023-10-24 RX ADMIN — ACETAMINOPHEN 650 MG: 325 TABLET ORAL at 21:17

## 2023-10-24 RX ADMIN — Medication 1 MG: at 21:05

## 2023-10-24 RX ADMIN — FLUTICASONE PROPIONATE 1 PUFF: 110 AEROSOL, METERED RESPIRATORY (INHALATION) at 08:41

## 2023-10-24 RX ADMIN — VANCOMYCIN HYDROCHLORIDE 1250 MG: 5 INJECTION, POWDER, LYOPHILIZED, FOR SOLUTION INTRAVENOUS at 02:28

## 2023-10-24 RX ADMIN — ENOXAPARIN SODIUM 40 MG: 100 INJECTION SUBCUTANEOUS at 08:41

## 2023-10-24 RX ADMIN — MIRTAZAPINE 15 MG: 15 TABLET, FILM COATED ORAL at 21:04

## 2023-10-24 RX ADMIN — ESCITALOPRAM OXALATE 20 MG: 20 TABLET ORAL at 12:28

## 2023-10-24 RX ADMIN — Medication 1 MG: at 02:28

## 2023-10-24 ASSESSMENT — ACTIVITIES OF DAILY LIVING (ADL)
ADLS_ACUITY_SCORE: 18

## 2023-10-24 NOTE — PLAN OF CARE
Problem: Pneumonia  Goal: Fluid Balance  Outcome: Progressing     Problem: Pneumonia  Goal: Effective Oxygenation and Ventilation  Outcome: Progressing   Goal Outcome Evaluation:         VSS. Remains on 2 L nasal cannula. Patient reports feeling short of breath with activity and non-productive cough. Lungs diminished with crackles. Receiving IV antibiotics. Encouraging PO intake for hydration. Pt is voiding adequate amounts. Up independently in room.

## 2023-10-24 NOTE — PROGRESS NOTES
"CLINICAL NUTRITION SERVICES - ASSESSMENT NOTE     Nutrition Prescription    RECOMMENDATIONS FOR MDs/PROVIDERS TO ORDER:  None    Malnutrition Status:    % Weight Loss:  weight loss >5% with in the past 2mo  % Intake:  </= 50% for >/= 1 month (severe malnutrition)  Subcutaneous Fat Loss:  None observed  Muscle Loss:  None observed  Fluid Retention:  None noted    Malnutrition Diagnosis:  Severe malnutrition  In Context of:  Chronic illness or disease    Recommendations already ordered by Registered Dietitian (RD):  Increase intake     Future/Additional Recommendations:  Will monitor progress towards goals     REASON FOR ASSESSMENT  Jim Titus is a/an 58 year old male assessed by the dietitian for Admission Nutrition Risk Screen for positive    Pertinent Medical Admission/History: Pt admitted with hypoxia suspected 2/2 multifocal bacterial pneumonia.    NUTRITION HISTORY  Allergies: Chicken protein(High), cantaloupe extract, Turkey (poultry meal)  Pt report he had poor appetite for the past 5 week and he had loss his taste for food and drink. Suspect related to melanoma related with the treatment.    Pt had different meal from the usual during summer (such as Grills, Beer).  He had use  premier protein supplement at his home.     CURRENT NUTRITION ORDERS  Diet: Regular  Intake/Tolerance: He report 10/24 he had only take one Bagel     PHYSICAL FINDINGS  See malnutrition section below.  Per flowsheet:  Edema- None noted   GI- None noted  Skin- No skin breakdown   Pain- denies  Oral- None noted     LABS  Labs reviewed  Glucose=118(H)    MEDICATIONS  Medications reviewed  Lovenox, Zosyn    ANTHROPOMETRICS  Height: 193 cm (6' 4\")  Most Recent Weight: 117.9 kg (260 lb)    IBW: 91.8kg (202lb)  BMI: Obesity Grade I BMI 30-34.9  Weight History:   Wt Readings from Last 15 Encounters:   10/23/23 117.9 kg (260 lb)   10/23/23 118.1 kg (260 lb 6.4 oz)   08/16/23 128.6 kg (283 lb 9.6 oz)   04/11/23 121.6 kg (268 lb 3 oz) "   05/16/22 120.9 kg (266 lb 9.6 oz)   11/10/21 128.6 kg (283 lb 8 oz)   10/12/20 120.8 kg (266 lb 6.4 oz)   04/01/19 109.4 kg (241 lb 4 oz)   01/09/19 118.2 kg (260 lb 9.6 oz)   07/02/18 109.5 kg (241 lb 8 oz)   05/23/18 108.9 kg (240 lb)   02/06/18 120.7 kg (266 lb)   12/05/17 128.6 kg (283 lb 9.6 oz)   Patient loss weight about 9% the past 2mo. Patient report recently loss his weight about  24-30lb.  Pt  think that  seasonal change of his meal might be the reason for fluctuate his weight.    ASSESSED NUTRITION NEEDS  Dosing Weight: 91.8kg (IBW)  Estimated Energy Needs: (7007-5663)kcals/day (20 - 25 kcals/kg)  Justification: Maintenance  Estimated Protein Needs: (119.3-183.6)grams protein/day (1.3 - 2 grams of pro/kg)  Justification: Maintenance  Estimated Fluid Needs: (5009-1063)mL/day (1 mL/kcal)   Justification: Maintenance        MALNUTRITION:  % Weight Loss:  weight loss >5% with in the past 2mo  % Intake:  </= 50% for >/= 1 month (severe malnutrition)  Subcutaneous Fat Loss:  None observed  Muscle Loss:  None observed  Fluid Retention:  None noted    Malnutrition Diagnosis:  Severe malnutrition  In Context of:  Chronic illness or disease    NUTRITION DIAGNOSIS  Inadequate oral intake related to poor appetite and loss of taste  as  evidenced by  weight loss and less PO intake.    INTERVENTIONS  Implementation  Encourage intake including nutrition supplement drink. Pt did not want to use ensure supplement at this time.  Education Needs- None    Goals  Patient to consume % of nutritionally adequate meals three times per day, or the equivalent with supplements/snacks.     Monitoring/Evaluation  Monitoring PO intake and weight maintenance

## 2023-10-24 NOTE — PROGRESS NOTES
"[facility]    Medicine Progress Note - Hospitalist Service    Date of Admission:  10/23/2023    Assessment & Plan   Jim Titus is a 58 year old male admitted with hypoxia suspected 2/2 multifocal bacterial pneumonia.  He is overall clinically stable.  Vancomycin discontinued due to negative MRSA nasal swab.  Continue with empiric pip/tazo, particularly for pseudomonas coverage.      # Multifocal pneumonia  - pip/tazo  - sputum cultures, blood cultures     # Hx melanoma on keytruda     # HLD  - simvastatin          Diet: Regular Diet Adult    Lamas Catheter: Not present  Lines: None     Cardiac Monitoring: None  Code Status: Full Code      Clinically Significant Risk Factors Present on Admission                       # Obesity: Estimated body mass index is 31.65 kg/m  as calculated from the following:    Height as of this encounter: 1.93 m (6' 4\").    Weight as of this encounter: 117.9 kg (260 lb).       # Financial/Environmental Concerns: none  # Asthma: noted on problem list        Disposition Plan     Expected Discharge Date: 10/25/2023      Destination: home with family              Ezra Lee MD  Hospitalist Service  [facility]  Securely message with [employer] (more info)  Text page via [employer] Paging/Directory   ______________________________________________________________________    Interval History   Breathing mildly improved since yesterday.  Denies chest pain/pressure.  Had trouble sleeping last night.  Cough persists.    Physical Exam   Vital Signs: Temp: 98.6  F (37  C) Temp src: Oral BP: 107/58 Pulse: 67   Resp: 18 SpO2: 98 % O2 Device: Nasal cannula Oxygen Delivery: 2 LPM  Weight: 260 lbs 0 oz    Gen: lying in bed in no acute distress  Neuro: alert, conversant  CV:  nl rate, regular rhythm  Pulm: no acute resp distress, decreased breath sounds and crackles at right base  GI:  abdomen soft, NTTP    Medical Decision Making             Data     "

## 2023-10-25 VITALS
WEIGHT: 260 LBS | DIASTOLIC BLOOD PRESSURE: 67 MMHG | TEMPERATURE: 100 F | HEART RATE: 80 BPM | OXYGEN SATURATION: 93 % | SYSTOLIC BLOOD PRESSURE: 114 MMHG | HEIGHT: 76 IN | BODY MASS INDEX: 31.66 KG/M2 | RESPIRATION RATE: 16 BRPM

## 2023-10-25 LAB
BACTERIA SPT CULT: NORMAL
GRAM STAIN RESULT: NORMAL

## 2023-10-25 PROCEDURE — 99238 HOSP IP/OBS DSCHRG MGMT 30/<: CPT | Performed by: HOSPITALIST

## 2023-10-25 PROCEDURE — 250N000011 HC RX IP 250 OP 636: Mod: JZ | Performed by: HOSPITALIST

## 2023-10-25 PROCEDURE — 250N000013 HC RX MED GY IP 250 OP 250 PS 637: Performed by: INTERNAL MEDICINE

## 2023-10-25 PROCEDURE — 250N000013 HC RX MED GY IP 250 OP 250 PS 637: Performed by: HOSPITALIST

## 2023-10-25 RX ORDER — LEVOFLOXACIN 750 MG/1
750 TABLET, FILM COATED ORAL DAILY
Qty: 6 TABLET | Refills: 0 | Status: SHIPPED | OUTPATIENT
Start: 2023-10-26 | End: 2023-11-02

## 2023-10-25 RX ORDER — GUAIFENESIN 200 MG/10ML
100 LIQUID ORAL EVERY 4 HOURS PRN
Qty: 236 ML | Refills: 0 | Status: ON HOLD | OUTPATIENT
Start: 2023-10-25 | End: 2023-11-20

## 2023-10-25 RX ORDER — LEVOFLOXACIN 750 MG/1
750 TABLET, FILM COATED ORAL DAILY
Status: DISCONTINUED | OUTPATIENT
Start: 2023-10-25 | End: 2023-10-25 | Stop reason: HOSPADM

## 2023-10-25 RX ORDER — AZITHROMYCIN 250 MG/1
500 TABLET, FILM COATED ORAL DAILY
Status: DISCONTINUED | OUTPATIENT
Start: 2023-10-25 | End: 2023-10-25

## 2023-10-25 RX ADMIN — FLUTICASONE PROPIONATE 1 PUFF: 110 AEROSOL, METERED RESPIRATORY (INHALATION) at 08:21

## 2023-10-25 RX ADMIN — CLONAZEPAM 0.5 MG: 0.5 TABLET ORAL at 08:26

## 2023-10-25 RX ADMIN — LEVOFLOXACIN 750 MG: 750 TABLET, FILM COATED ORAL at 10:42

## 2023-10-25 RX ADMIN — ENOXAPARIN SODIUM 40 MG: 100 INJECTION SUBCUTANEOUS at 08:16

## 2023-10-25 RX ADMIN — ACETAMINOPHEN 650 MG: 325 TABLET ORAL at 08:16

## 2023-10-25 RX ADMIN — PIPERACILLIN AND TAZOBACTAM 3.38 G: 3; .375 INJECTION, POWDER, LYOPHILIZED, FOR SOLUTION INTRAVENOUS at 06:24

## 2023-10-25 ASSESSMENT — ACTIVITIES OF DAILY LIVING (ADL)
ADLS_ACUITY_SCORE: 18

## 2023-10-25 NOTE — PROGRESS NOTES
Care Management Discharge Note    Discharge Date: 10/25/2023       Discharge Disposition: Home    Discharge Services: None    Discharge DME:  (TBD- currently requiring 02 and not on at home/baseline)    Discharge Transportation: car, drives self (car in hospital parking lot)    Private pay costs discussed: Not applicable    PAS Confirmation Code:  not applicable   Patient/family educated on Medicare website which has current facility and service quality ratings:  (Declines at this time)    Education Provided on the Discharge Plan: Yes (AVS per bedside RN)  Persons Notified of Discharge Plans: yes  Patient/Family in Agreement with the Plan: yes    Handoff Referral Completed: NA    Additional Information:  Patient discharging home to prior living environment.  No CM needs identified. Family transporting.     JENNIFER Heredia

## 2023-10-25 NOTE — PROGRESS NOTES
"[facility]    Medicine Progress Note - Hospitalist Service    Date of Admission:  10/23/2023    Assessment & Plan   Jim Titus is a 58 year old male admitted with hypoxia suspected 2/2 multifocal bacterial pneumonia.  He is overall clinically stable.  Borderline fever.  Hypoxia has resolved.  Transition to oral antibiotics.  Medically ready for discharge today.  I advised him to communicate with his oncology office regarding upcoming planned immunotherapy.    # Multifocal pneumonia  - levofloxacin to complete 7 days of therapy     # Hx melanoma on keytruda     # HLD  - simvastatin          Diet: Regular Diet Adult    Lamas Catheter: Not present  Lines: None     Cardiac Monitoring: None  Code Status: Full Code      Clinically Significant Risk Factors                         # Obesity: Estimated body mass index is 31.65 kg/m  as calculated from the following:    Height as of this encounter: 1.93 m (6' 4\").    Weight as of this encounter: 117.9 kg (260 lb)., PRESENT ON ADMISSION  # Severe Malnutrition: based on nutrition assessment, PRESENT ON ADMISSION   # Financial/Environmental Concerns: none  # Asthma: noted on problem list        Disposition Plan     Expected Discharge Date: 10/25/2023      Destination: home with family              Ezra Lee MD  Hospitalist Service  [facility]  Securely message with [employer] (more info)  Text page via [employer] Paging/Directory   ______________________________________________________________________    Interval History   Denies dyspnea at rest or with exertion.  Had notable coughing last night.  Feels overall better.  Reports chest soreness worse with coughing.    Physical Exam   Vital Signs: Temp: 100  F (37.8  C) Temp src: Oral BP: 114/67 Pulse: 80   Resp: 16 SpO2: 93 % O2 Device: None (Room air)    Weight: 260 lbs 0 oz    Gen:  lying in bed in no acute distress  Neuro: alert, conversant  CV:  nl rate, regular rhythm  Pulm: "  no acute resp distress, crackles on right senior care up the posterior fields  GI:  abdomen soft, NTTP    Medical Decision Making             Data

## 2023-10-25 NOTE — PLAN OF CARE
Problem: Adult Inpatient Plan of Care  Goal: Optimal Comfort and Wellbeing  Outcome: Progressing  Intervention: Monitor Pain and Promote Comfort  Recent Flowsheet Documentation  Taken 10/24/2023 2117 by Jose Millard, RN  Pain Management Interventions:   medication (see MAR)   rest   Goal Outcome Evaluation:               VSS. Prn tylenol given for soreness in chest due to coughing. Lung sounds diminished with some crackles present. Pt receiving IV zosyn. Able to rest between cares.

## 2023-10-25 NOTE — PLAN OF CARE
Goal Outcome Evaluation:       Patient denies pain. Vitals have been stable. Weaned off of oxygen and is on RA. Some SOB present with activity. Patient coughing up small amounts of clear secretions. Up independently in room. Receiving IV Zosyn. Patient has a very poor appetite and only ate 1 bagel today.       Problem: Adult Inpatient Plan of Care  Goal: Absence of Hospital-Acquired Illness or Injury  Outcome: Progressing  Intervention: Identify and Manage Fall Risk  Recent Flowsheet Documentation  Taken 10/24/2023 0820 by Sivan Ignacio, RN  Safety Promotion/Fall Prevention:   clutter free environment maintained   room near nurse's station

## 2023-10-25 NOTE — DISCHARGE SUMMARY
"[facility]  Hospitalist Discharge Summary      Date of Admission:  10/23/2023  Date of Discharge:  10/25/2023  Discharging Provider: Ezra Lee MD  Discharge Service: Hospitalist Service    Discharge Diagnoses   Community acquired pneumonia    Clinically Significant Risk Factors     # Obesity: Estimated body mass index is 31.65 kg/m  as calculated from the following:    Height as of this encounter: 1.93 m (6' 4\").    Weight as of this encounter: 117.9 kg (260 lb).  # Severe Malnutrition: based on nutrition assessment      Follow-ups Needed After Discharge   Follow-up Appointments     Follow-up and recommended labs and tests       Follow up with primary care provider, Luis Alberto Fatima, within 7 days for   hospital follow- up.  No follow up labs or test are needed.    Follow up with your oncologist to inform them of your hospital stay as   this may affect your treatment for melanoma.            Unresulted Labs Ordered in the Past 30 Days of this Admission       Date and Time Order Name Status Description    10/23/2023 10:29 AM Blood Culture Line, venous Preliminary     10/23/2023 10:29 AM Blood Culture Peripheral Blood Preliminary         These results will be followed up by PCP    Discharge Disposition   Discharged to home  Condition at discharge: Stable    Hospital Course   The patient was admitted with hypoxia in the setting of community acquired pneumonia.  He was treated with IV antibiotics.  He had resolution of hypoxia by discharge.  He was transitioned to oral antibiotics and discharged home to complete 9 days of therapy.    Consultations This Hospital Stay   PHARMACY TO DOSE VANCO    Code Status   Full Code    Time Spent on this Encounter   I, Ezra Lee MD, personally saw the patient today and spent less than or equal to 30 minutes discharging this patient.       Ezra Lee MD  [facility] [address]  [facility] [facility] " [id number]  Phone: [phone]  Fax: [phone]  ______________________________________________________________________    Physical Exam   Vital Signs: Temp: 100  F (37.8  C) Temp src: Oral BP: 114/67 Pulse: 80   Resp: 16 SpO2: 93 % O2 Device: None (Room air)    Weight: 260 lbs 0 oz    See progress note       Primary Care Physician   Luis Alberto Fatima    Discharge Orders      Reason for your hospital stay    You were admitted to the hospital with low oxygen levels due to pneumonia.  You were treated with IV antibiotics and did not require oxygen by discharge.  You had overall improvement and were discharged home.     Follow-up and recommended labs and tests     Follow up with primary care provider, Luis Alberto Fatima, within 7 days for hospital follow- up.  No follow up labs or test are needed.    Follow up with your oncologist to inform them of your hospital stay as this may affect your treatment for melanoma.     Activity    Your activity upon discharge: activity as tolerated     When to contact your care team    Seek medical attention if you have any of the following: persistent fevers, difficulty breathing, or chest pressure.     Diet    Follow this diet upon discharge: Orders Placed This Encounter      Regular Diet Adult       Significant Results and Procedures   Most Recent 3 CBC's:  Recent Labs   Lab Test 10/23/23  1041 04/11/23  1255 11/10/21  1427   WBC 11.1* 4.9 5.6   HGB 11.2* 14.0 13.6   MCV 94 93 97   * 259 235     Most Recent 3 BMP's:  Recent Labs   Lab Test 10/23/23  1041 04/11/23  1255 11/10/21  1427    140 140   POTASSIUM 4.3 4.8 4.3   CHLORIDE 101 103 107   CO2 26 21* 21*   BUN 11.6 16.4 14   CR 0.94 0.87 0.82   ANIONGAP 10 16* 12   MELISSA 9.8 10.0 9.6   * 95 97   ,   Results for orders placed or performed during the hospital encounter of 10/23/23   CT Chest Pulmonary Embolism w Contrast    Narrative    EXAM: CT CHEST PULMONARY EMBOLISM W CONTRAST  LOCATION: [facility]  HOSPITAL  DATE: 10/23/2023    INDICATION: Right back melanoma diagnosed April 2023. On immunotherapy. Two weeks of cough and morrison, finished augmentin 1 week ago for pneumonia  COMPARISON: PET  CT 04/10/2023  TECHNIQUE: CT chest pulmonary angiogram during arterial phase injection of IV contrast. Multiplanar reformats and MIP reconstructions were performed. Dose reduction techniques were used.   CONTRAST: Isovue 370 90mL    FINDINGS:  ANGIOGRAM CHEST: Adequate opacification of the pulmonary arteries and branches. No evidence of pulmonary emboli. Aorta and great vessels are normal.      LUNGS AND PLEURA: Asymmetric, right greater than left areas of consolidation with air bronchograms are noted. This involves most of the right upper, lower and middle lobes with smaller area of involvement of the left upper lobe and in the medial basilar   segments of the left lower lobe. Consolidated lung enhances normally. No effusions.    MEDIASTINUM/AXILLAE: Few less than 1 cm right paratracheal and subcarinal nodes are present.    CORONARY ARTERY CALCIFICATION: Minimal    UPPER ABDOMEN: No liver lesions. Fatty atrophy of the spleen.    MUSCULOSKELETAL: No suspicious  lesions. Marked gynecomastia.      Impression    IMPRESSION:  1.  Asymmetric, right greater than left multilobar pneumonia with significant involvement of the right lower, middle and upper lobes. No parapneumonic effusions.  2.  Minimal reactive adenopathy.  3.  No evidence of pulmonary emboli.  4.  No evidence of metastasis.       Discharge Medications   Current Discharge Medication List        START taking these medications    Details   guaiFENesin (ROBITUSSIN) 20 mg/mL liquid Take 5 mLs (100 mg) by mouth every 4 hours as needed for cough  Qty: 236 mL, Refills: 0    Associated Diagnoses: Pneumonia of both lungs due to infectious organism, unspecified part of lung      levofloxacin (LEVAQUIN) 750 MG tablet Take 1 tablet (750 mg) by mouth daily  Qty: 6 tablet, Refills: 0     Associated Diagnoses: Pneumonia of both lungs due to infectious organism, unspecified part of lung           CONTINUE these medications which have NOT CHANGED    Details   albuterol (PROAIR HFA/PROVENTIL HFA/VENTOLIN HFA) 108 (90 Base) MCG/ACT inhaler Inhale 2 puffs into the lungs every 6 hours as needed  Qty: 18 g, Refills: 11    Comments: Pharmacy may dispense brand covered by insurance (Proair, or proventil or ventolin or generic albuterol inhaler)  Associated Diagnoses: Mild intermittent asthma without complication      clonazePAM (KLONOPIN) 0.5 MG tablet Take 0.5-1 mg by mouth daily as needed for anxiety      escitalopram (LEXAPRO) 20 MG tablet Take 20 mg by mouth daily (with lunch)      fluticasone (FLOVENT HFA) 110 MCG/ACT inhaler Inhale 1 puff into the lungs 2 times daily  Qty: 12 g, Refills: 11    Comments: Pharmacy may dispense brand if preferred by insurance.  Associated Diagnoses: Moderate persistent asthma without complication      ibuprofen (ADVIL,MOTRIN) 400 MG tablet Take 400 mg by mouth every 6 hours as needed for moderate pain      indomethacin (INDOCIN) 50 MG capsule Take 50 mg by mouth 3 times daily as needed for other (for gout flair)      loperamide HCl (IMODIUM A-D ORAL) Take 2 mg by mouth 4 times daily as needed (diarrhea)      loratadine (CLARITIN) 10 mg tablet Take 10 mg by mouth daily as needed for allergies      mirtazapine (REMERON) 15 MG tablet Take 15 mg by mouth at bedtime      simvastatin (ZOCOR) 40 MG tablet Take 1 tablet (40 mg) by mouth At Bedtime  Qty: 90 tablet, Refills: 2    Associated Diagnoses: Hyperlipidemia, unspecified hyperlipidemia type      tadalafil (CIALIS) 5 MG tablet [TADALAFIL (CIALIS) 5 MG TABLET] Take 1 tablet (5 mg total) by mouth daily as needed for erectile dysfunction.  Qty: 30 tablet, Refills: 1    Associated Diagnoses: Anxiety           Allergies   Allergies   Allergen Reactions    Chicken [Chicken Protein] Other (See Comments)     Throat closes to  turkey as well.    Cantaloupe [Cantaloupe Extract Allergy Skin Test] Unknown    Wellbutrin [Bupropion] Unknown    Grass Pollen [Grass] Rash    Turkey [Poultry Meal] Rash

## 2023-10-27 ENCOUNTER — PATIENT OUTREACH (OUTPATIENT)
Dept: CARE COORDINATION | Facility: CLINIC | Age: 58
End: 2023-10-27
Payer: COMMERCIAL

## 2023-10-27 NOTE — PROGRESS NOTES
[facility] Care Resource Center Contact  [facility]/Voicemail     Clinical Data: Post-Discharge Outreach     Outreach attempted x 2.  Left message on patient's voicemail, providing [facility]'s central phone number of [phone] ([phone]) for questions/concerns and/or to schedule an appt with an [facility] provider, if they do not have a PCP.      Plan:  [facility] will do no further outreaches at this time.       Brynn Mathias  Community Health Worker  [facility], [facility]  [phone]      *Connected Care Resource Team does NOT follow patient ongoing. Referrals are identified based on internal discharge reports and the outreach is to ensure patient has an understanding of their discharge instructions.

## 2023-10-28 LAB
BACTERIA BLD CULT: NO GROWTH
BACTERIA BLD CULT: NO GROWTH

## 2023-11-02 ENCOUNTER — APPOINTMENT (OUTPATIENT)
Dept: CT IMAGING | Facility: CLINIC | Age: 58
End: 2023-11-02
Payer: COMMERCIAL

## 2023-11-02 ENCOUNTER — HOSPITAL ENCOUNTER (INPATIENT)
Facility: CLINIC | Age: 58
LOS: 18 days | Discharge: HOME | End: 2023-11-20
Attending: EMERGENCY MEDICINE | Admitting: STUDENT IN AN ORGANIZED HEALTH CARE EDUCATION/TRAINING PROGRAM
Payer: COMMERCIAL

## 2023-11-02 ENCOUNTER — APPOINTMENT (OUTPATIENT)
Dept: ULTRASOUND IMAGING | Facility: CLINIC | Age: 58
End: 2023-11-02
Attending: EMERGENCY MEDICINE
Payer: COMMERCIAL

## 2023-11-02 DIAGNOSIS — A41.9 SEPSIS, DUE TO UNSPECIFIED ORGANISM, UNSPECIFIED WHETHER ACUTE ORGAN DYSFUNCTION PRESENT (H): ICD-10-CM

## 2023-11-02 DIAGNOSIS — T50.905A DRUG-INDUCED PNEUMONITIS: ICD-10-CM

## 2023-11-02 DIAGNOSIS — I48.91 ATRIAL FIBRILLATION WITH RVR (H): ICD-10-CM

## 2023-11-02 DIAGNOSIS — G93.40 ACUTE ENCEPHALOPATHY: ICD-10-CM

## 2023-11-02 DIAGNOSIS — J45.40 MODERATE PERSISTENT ASTHMA WITHOUT COMPLICATION: ICD-10-CM

## 2023-11-02 DIAGNOSIS — G47.33 OSA (OBSTRUCTIVE SLEEP APNEA): Primary | ICD-10-CM

## 2023-11-02 DIAGNOSIS — D64.9 NORMOCYTIC ANEMIA: ICD-10-CM

## 2023-11-02 DIAGNOSIS — J98.4 DRUG-INDUCED PNEUMONITIS: ICD-10-CM

## 2023-11-02 DIAGNOSIS — J96.01 ACUTE RESPIRATORY FAILURE WITH HYPOXIA (H): ICD-10-CM

## 2023-11-02 DIAGNOSIS — R74.8 ELEVATED ALKALINE PHOSPHATASE LEVEL: ICD-10-CM

## 2023-11-02 DIAGNOSIS — I26.99 ACUTE PULMONARY EMBOLISM, UNSPECIFIED PULMONARY EMBOLISM TYPE, UNSPECIFIED WHETHER ACUTE COR PULMONALE PRESENT (H): ICD-10-CM

## 2023-11-02 DIAGNOSIS — M17.0 OSTEOARTHRITIS OF BOTH KNEES, UNSPECIFIED OSTEOARTHRITIS TYPE: ICD-10-CM

## 2023-11-02 LAB
ALBUMIN SERPL BCG-MCNC: 3.3 G/DL (ref 3.5–5.2)
ALP SERPL-CCNC: 275 U/L (ref 40–129)
ALT SERPL W P-5'-P-CCNC: 39 U/L (ref 0–70)
ANION GAP SERPL CALCULATED.3IONS-SCNC: 13 MMOL/L (ref 7–15)
AST SERPL W P-5'-P-CCNC: 34 U/L (ref 0–45)
ATRIAL RATE - MUSE: 109 BPM
BASE EXCESS BLDA CALC-SCNC: 2.2 MMOL/L
BASE EXCESS BLDV CALC-SCNC: 3.4 MMOL/L
BASOPHILS # BLD AUTO: 0.1 10E3/UL (ref 0–0.2)
BASOPHILS NFR BLD AUTO: 1 %
BILIRUB SERPL-MCNC: 0.4 MG/DL
BUN SERPL-MCNC: 5.4 MG/DL (ref 6–20)
CALCIUM SERPL-MCNC: 9.3 MG/DL (ref 8.6–10)
CHLORIDE SERPL-SCNC: 99 MMOL/L (ref 98–107)
COHGB MFR BLD: 96.7 % (ref 96–97)
CREAT SERPL-MCNC: 0.79 MG/DL (ref 0.67–1.17)
CREAT SERPL-MCNC: 0.81 MG/DL (ref 0.67–1.17)
CRP SERPL-MCNC: 199 MG/L
DEPRECATED HCO3 PLAS-SCNC: 24 MMOL/L (ref 22–29)
DIASTOLIC BLOOD PRESSURE - MUSE: NORMAL MMHG
EGFRCR SERPLBLD CKD-EPI 2021: >90 ML/MIN/1.73M2
EGFRCR SERPLBLD CKD-EPI 2021: >90 ML/MIN/1.73M2
EOSINOPHIL # BLD AUTO: 0.4 10E3/UL (ref 0–0.7)
EOSINOPHIL NFR BLD AUTO: 4 %
ERYTHROCYTE [DISTWIDTH] IN BLOOD BY AUTOMATED COUNT: 14.4 % (ref 10–15)
FLUAV RNA SPEC QL NAA+PROBE: NEGATIVE
FLUBV RNA RESP QL NAA+PROBE: NEGATIVE
GLUCOSE SERPL-MCNC: 123 MG/DL (ref 70–99)
HCO3 BLD-SCNC: 26 MMOL/L (ref 23–29)
HCO3 BLDV-SCNC: 27 MMOL/L (ref 24–30)
HCT VFR BLD AUTO: 31.8 % (ref 40–53)
HGB BLD-MCNC: 10.1 G/DL (ref 13.3–17.7)
HOLD SPECIMEN: NORMAL
IMM GRANULOCYTES # BLD: 0.1 10E3/UL
IMM GRANULOCYTES NFR BLD: 1 %
INTERPRETATION ECG - MUSE: NORMAL
L PNEUMO1 AG UR QL IA: NEGATIVE
LACTATE SERPL-SCNC: 1.1 MMOL/L (ref 0.7–2)
LYMPHOCYTES # BLD AUTO: 0.8 10E3/UL (ref 0.8–5.3)
LYMPHOCYTES NFR BLD AUTO: 7 %
MCH RBC QN AUTO: 29 PG (ref 26.5–33)
MCHC RBC AUTO-ENTMCNC: 31.8 G/DL (ref 31.5–36.5)
MCV RBC AUTO: 91 FL (ref 78–100)
MONOCYTES # BLD AUTO: 0.8 10E3/UL (ref 0–1.3)
MONOCYTES NFR BLD AUTO: 8 %
MRSA DNA SPEC QL NAA+PROBE: NEGATIVE
NEUTROPHILS # BLD AUTO: 9 10E3/UL (ref 1.6–8.3)
NEUTROPHILS NFR BLD AUTO: 79 %
NRBC # BLD AUTO: 0 10E3/UL
NRBC BLD AUTO-RTO: 0 /100
NT-PROBNP SERPL-MCNC: 247 PG/ML (ref 0–900)
OXYHGB MFR BLD: 94.9 % (ref 96–97)
OXYHGB MFR BLDV: 87.2 % (ref 70–75)
P AXIS - MUSE: 26 DEGREES
PCO2 BLD: 38 MM HG (ref 35–45)
PCO2 BLDV: 39 MM HG (ref 35–50)
PH BLD: 7.45 [PH] (ref 7.37–7.44)
PH BLDV: 7.45 [PH] (ref 7.35–7.45)
PLATELET # BLD AUTO: 668 10E3/UL (ref 150–450)
PO2 BLD: 77 MM HG (ref 80–90)
PO2 BLDV: 53 MM HG (ref 25–47)
POTASSIUM SERPL-SCNC: 3.9 MMOL/L (ref 3.4–5.3)
PR INTERVAL - MUSE: 154 MS
PROCALCITONIN SERPL IA-MCNC: 0.11 NG/ML
PROT SERPL-MCNC: 6.9 G/DL (ref 6.4–8.3)
QRS DURATION - MUSE: 96 MS
QT - MUSE: 330 MS
QTC - MUSE: 444 MS
R AXIS - MUSE: 42 DEGREES
RBC # BLD AUTO: 3.48 10E6/UL (ref 4.4–5.9)
RSV RNA SPEC NAA+PROBE: NEGATIVE
S PNEUM AG SPEC QL: NEGATIVE
SA TARGET DNA: NEGATIVE
SAO2 % BLDV: 88.2 % (ref 70–75)
SARS-COV-2 RNA RESP QL NAA+PROBE: NEGATIVE
SODIUM SERPL-SCNC: 136 MMOL/L (ref 135–145)
SYSTOLIC BLOOD PRESSURE - MUSE: NORMAL MMHG
T AXIS - MUSE: 48 DEGREES
TEMPERATURE: 37 DEGREES C
TROPONIN T SERPL HS-MCNC: 8 NG/L
VENTRICULAR RATE- MUSE: 109 BPM
WBC # BLD AUTO: 11.3 10E3/UL (ref 4–11)

## 2023-11-02 PROCEDURE — 94640 AIRWAY INHALATION TREATMENT: CPT

## 2023-11-02 PROCEDURE — 87899 AGENT NOS ASSAY W/OPTIC: CPT | Performed by: STUDENT IN AN ORGANIZED HEALTH CARE EDUCATION/TRAINING PROGRAM

## 2023-11-02 PROCEDURE — 250N000011 HC RX IP 250 OP 636: Performed by: STUDENT IN AN ORGANIZED HEALTH CARE EDUCATION/TRAINING PROGRAM

## 2023-11-02 PROCEDURE — 87899 AGENT NOS ASSAY W/OPTIC: CPT | Performed by: INTERNAL MEDICINE

## 2023-11-02 PROCEDURE — 999N000157 HC STATISTIC RCP TIME EA 10 MIN

## 2023-11-02 PROCEDURE — 258N000003 HC RX IP 258 OP 636: Performed by: STUDENT IN AN ORGANIZED HEALTH CARE EDUCATION/TRAINING PROGRAM

## 2023-11-02 PROCEDURE — 250N000013 HC RX MED GY IP 250 OP 250 PS 637

## 2023-11-02 PROCEDURE — 200N000001 HC R&B ICU

## 2023-11-02 PROCEDURE — 96365 THER/PROPH/DIAG IV INF INIT: CPT | Mod: 59

## 2023-11-02 PROCEDURE — 250N000013 HC RX MED GY IP 250 OP 250 PS 637: Performed by: INTERNAL MEDICINE

## 2023-11-02 PROCEDURE — 80053 COMPREHEN METABOLIC PANEL: CPT

## 2023-11-02 PROCEDURE — 71275 CT ANGIOGRAPHY CHEST: CPT

## 2023-11-02 PROCEDURE — 87637 SARSCOV2&INF A&B&RSV AMP PRB: CPT

## 2023-11-02 PROCEDURE — 250N000009 HC RX 250: Performed by: STUDENT IN AN ORGANIZED HEALTH CARE EDUCATION/TRAINING PROGRAM

## 2023-11-02 PROCEDURE — 86140 C-REACTIVE PROTEIN: CPT | Performed by: STUDENT IN AN ORGANIZED HEALTH CARE EDUCATION/TRAINING PROGRAM

## 2023-11-02 PROCEDURE — 36415 COLL VENOUS BLD VENIPUNCTURE: CPT | Performed by: INTERNAL MEDICINE

## 2023-11-02 PROCEDURE — 83605 ASSAY OF LACTIC ACID: CPT

## 2023-11-02 PROCEDURE — 36600 WITHDRAWAL OF ARTERIAL BLOOD: CPT

## 2023-11-02 PROCEDURE — 84484 ASSAY OF TROPONIN QUANT: CPT

## 2023-11-02 PROCEDURE — 83880 ASSAY OF NATRIURETIC PEPTIDE: CPT

## 2023-11-02 PROCEDURE — 250N000011 HC RX IP 250 OP 636

## 2023-11-02 PROCEDURE — 96361 HYDRATE IV INFUSION ADD-ON: CPT

## 2023-11-02 PROCEDURE — 87449 NOS EACH ORGANISM AG IA: CPT | Performed by: INTERNAL MEDICINE

## 2023-11-02 PROCEDURE — 94150 VITAL CAPACITY TEST: CPT

## 2023-11-02 PROCEDURE — 250N000013 HC RX MED GY IP 250 OP 250 PS 637: Performed by: STUDENT IN AN ORGANIZED HEALTH CARE EDUCATION/TRAINING PROGRAM

## 2023-11-02 PROCEDURE — 250N000011 HC RX IP 250 OP 636: Performed by: EMERGENCY MEDICINE

## 2023-11-02 PROCEDURE — 99285 EMERGENCY DEPT VISIT HI MDM: CPT | Mod: 25

## 2023-11-02 PROCEDURE — 82805 BLOOD GASES W/O2 SATURATION: CPT

## 2023-11-02 PROCEDURE — 99223 1ST HOSP IP/OBS HIGH 75: CPT | Performed by: STUDENT IN AN ORGANIZED HEALTH CARE EDUCATION/TRAINING PROGRAM

## 2023-11-02 PROCEDURE — 87641 MR-STAPH DNA AMP PROBE: CPT | Performed by: STUDENT IN AN ORGANIZED HEALTH CARE EDUCATION/TRAINING PROGRAM

## 2023-11-02 PROCEDURE — 87385 HISTOPLASMA CAPSUL AG IA: CPT | Performed by: INTERNAL MEDICINE

## 2023-11-02 PROCEDURE — 85025 COMPLETE CBC W/AUTO DIFF WBC: CPT

## 2023-11-02 PROCEDURE — 86612 BLASTOMYCES ANTIBODY: CPT | Performed by: INTERNAL MEDICINE

## 2023-11-02 PROCEDURE — 36415 COLL VENOUS BLD VENIPUNCTURE: CPT

## 2023-11-02 PROCEDURE — 87040 BLOOD CULTURE FOR BACTERIA: CPT

## 2023-11-02 PROCEDURE — 84145 PROCALCITONIN (PCT): CPT | Performed by: STUDENT IN AN ORGANIZED HEALTH CARE EDUCATION/TRAINING PROGRAM

## 2023-11-02 PROCEDURE — 82565 ASSAY OF CREATININE: CPT | Performed by: STUDENT IN AN ORGANIZED HEALTH CARE EDUCATION/TRAINING PROGRAM

## 2023-11-02 PROCEDURE — 76705 ECHO EXAM OF ABDOMEN: CPT

## 2023-11-02 PROCEDURE — 258N000003 HC RX IP 258 OP 636

## 2023-11-02 PROCEDURE — 99254 IP/OBS CNSLTJ NEW/EST MOD 60: CPT | Performed by: INTERNAL MEDICINE

## 2023-11-02 PROCEDURE — 36415 COLL VENOUS BLD VENIPUNCTURE: CPT | Performed by: STUDENT IN AN ORGANIZED HEALTH CARE EDUCATION/TRAINING PROGRAM

## 2023-11-02 PROCEDURE — 93005 ELECTROCARDIOGRAM TRACING: CPT | Performed by: EMERGENCY MEDICINE

## 2023-11-02 PROCEDURE — 250N000009 HC RX 250

## 2023-11-02 RX ORDER — ALBUTEROL SULFATE 90 UG/1
2 AEROSOL, METERED RESPIRATORY (INHALATION) EVERY 6 HOURS PRN
Status: DISCONTINUED | OUTPATIENT
Start: 2023-11-02 | End: 2023-11-20 | Stop reason: HOSPADM

## 2023-11-02 RX ORDER — PIPERACILLIN SODIUM, TAZOBACTAM SODIUM 3; .375 G/15ML; G/15ML
3.38 INJECTION, POWDER, LYOPHILIZED, FOR SOLUTION INTRAVENOUS EVERY 8 HOURS
Status: COMPLETED | OUTPATIENT
Start: 2023-11-02 | End: 2023-11-11

## 2023-11-02 RX ORDER — IPRATROPIUM BROMIDE AND ALBUTEROL SULFATE 2.5; .5 MG/3ML; MG/3ML
3 SOLUTION RESPIRATORY (INHALATION) 4 TIMES DAILY
Status: DISCONTINUED | OUTPATIENT
Start: 2023-11-02 | End: 2023-11-04

## 2023-11-02 RX ORDER — IOPAMIDOL 755 MG/ML
100 INJECTION, SOLUTION INTRAVASCULAR ONCE
Status: COMPLETED | OUTPATIENT
Start: 2023-11-02 | End: 2023-11-02

## 2023-11-02 RX ORDER — IPRATROPIUM BROMIDE AND ALBUTEROL SULFATE 2.5; .5 MG/3ML; MG/3ML
3 SOLUTION RESPIRATORY (INHALATION) ONCE
Status: COMPLETED | OUTPATIENT
Start: 2023-11-02 | End: 2023-11-02

## 2023-11-02 RX ORDER — CLONAZEPAM 0.5 MG/1
.5-1 TABLET ORAL 2 TIMES DAILY PRN
Status: DISCONTINUED | OUTPATIENT
Start: 2023-11-02 | End: 2023-11-07

## 2023-11-02 RX ORDER — ESCITALOPRAM OXALATE 20 MG/1
20 TABLET ORAL
Status: DISCONTINUED | OUTPATIENT
Start: 2023-11-03 | End: 2023-11-02

## 2023-11-02 RX ORDER — ACETAMINOPHEN 325 MG/1
325-650 TABLET ORAL EVERY 6 HOURS PRN
COMMUNITY

## 2023-11-02 RX ORDER — ENOXAPARIN SODIUM 100 MG/ML
40 INJECTION SUBCUTANEOUS EVERY 24 HOURS
Status: DISCONTINUED | OUTPATIENT
Start: 2023-11-02 | End: 2023-11-13

## 2023-11-02 RX ORDER — MIRTAZAPINE 15 MG/1
15 TABLET, FILM COATED ORAL AT BEDTIME
Status: DISCONTINUED | OUTPATIENT
Start: 2023-11-02 | End: 2023-11-13

## 2023-11-02 RX ORDER — CLONAZEPAM 0.5 MG/1
.5-1 TABLET ORAL DAILY PRN
Status: DISCONTINUED | OUTPATIENT
Start: 2023-11-02 | End: 2023-11-02

## 2023-11-02 RX ORDER — ACETAMINOPHEN 325 MG/1
975 TABLET ORAL ONCE
Status: COMPLETED | OUTPATIENT
Start: 2023-11-02 | End: 2023-11-02

## 2023-11-02 RX ORDER — PIPERACILLIN SODIUM, TAZOBACTAM SODIUM 3; .375 G/15ML; G/15ML
3.38 INJECTION, POWDER, LYOPHILIZED, FOR SOLUTION INTRAVENOUS ONCE
Status: COMPLETED | OUTPATIENT
Start: 2023-11-02 | End: 2023-11-02

## 2023-11-02 RX ORDER — SODIUM CHLORIDE 9 MG/ML
INJECTION, SOLUTION INTRAVENOUS CONTINUOUS
Status: DISCONTINUED | OUTPATIENT
Start: 2023-11-02 | End: 2023-11-04

## 2023-11-02 RX ORDER — AZITHROMYCIN 500 MG/5ML
500 INJECTION, POWDER, LYOPHILIZED, FOR SOLUTION INTRAVENOUS ONCE
Status: COMPLETED | OUTPATIENT
Start: 2023-11-02 | End: 2023-11-02

## 2023-11-02 RX ORDER — AZITHROMYCIN 500 MG/5ML
500 INJECTION, POWDER, LYOPHILIZED, FOR SOLUTION INTRAVENOUS EVERY 24 HOURS
Status: DISCONTINUED | OUTPATIENT
Start: 2023-11-02 | End: 2023-11-02

## 2023-11-02 RX ORDER — ESCITALOPRAM OXALATE 10 MG/1
20 TABLET ORAL
Status: DISCONTINUED | OUTPATIENT
Start: 2023-11-02 | End: 2023-11-20 | Stop reason: HOSPADM

## 2023-11-02 RX ADMIN — IOPAMIDOL 100 ML: 755 INJECTION, SOLUTION INTRAVENOUS at 09:31

## 2023-11-02 RX ADMIN — PIPERACILLIN AND TAZOBACTAM 3.38 G: 3; .375 INJECTION, POWDER, LYOPHILIZED, FOR SOLUTION INTRAVENOUS at 18:08

## 2023-11-02 RX ADMIN — IPRATROPIUM BROMIDE AND ALBUTEROL SULFATE 3 ML: .5; 3 SOLUTION RESPIRATORY (INHALATION) at 09:46

## 2023-11-02 RX ADMIN — ACETAMINOPHEN 975 MG: 325 TABLET ORAL at 11:12

## 2023-11-02 RX ADMIN — AZITHROMYCIN MONOHYDRATE 500 MG: 500 INJECTION, POWDER, LYOPHILIZED, FOR SOLUTION INTRAVENOUS at 09:51

## 2023-11-02 RX ADMIN — MIRTAZAPINE 15 MG: 15 TABLET, FILM COATED ORAL at 22:03

## 2023-11-02 RX ADMIN — IPRATROPIUM BROMIDE AND ALBUTEROL SULFATE 3 ML: .5; 3 SOLUTION RESPIRATORY (INHALATION) at 19:53

## 2023-11-02 RX ADMIN — SODIUM CHLORIDE: 9 INJECTION, SOLUTION INTRAVENOUS at 22:03

## 2023-11-02 RX ADMIN — ESCITALOPRAM OXALATE 20 MG: 20 TABLET ORAL at 19:27

## 2023-11-02 RX ADMIN — SODIUM CHLORIDE: 9 INJECTION, SOLUTION INTRAVENOUS at 11:14

## 2023-11-02 RX ADMIN — SODIUM CHLORIDE: 9 INJECTION, SOLUTION INTRAVENOUS at 08:54

## 2023-11-02 RX ADMIN — CLONAZEPAM 0.5 MG: 0.5 TABLET ORAL at 17:10

## 2023-11-02 RX ADMIN — PIPERACILLIN AND TAZOBACTAM 3.38 G: 3; .375 INJECTION, POWDER, LYOPHILIZED, FOR SOLUTION INTRAVENOUS at 08:52

## 2023-11-02 RX ADMIN — VANCOMYCIN HYDROCHLORIDE 2000 MG: 5 INJECTION, POWDER, LYOPHILIZED, FOR SOLUTION INTRAVENOUS at 18:37

## 2023-11-02 RX ADMIN — Medication 5 MG: at 22:03

## 2023-11-02 ASSESSMENT — ACTIVITIES OF DAILY LIVING (ADL)
ADLS_ACUITY_SCORE: 35
DEPENDENT_IADLS:: INDEPENDENT
ADLS_ACUITY_SCORE: 35

## 2023-11-02 NOTE — PHARMACY-ADMISSION MEDICATION HISTORY
Pharmacist Admission Medication History    Admission medication history is complete. The information provided in this note is only as accurate as the sources available at the time of the update.    Information Source(s): Patient via in-person    Pertinent Information:     Finished levoflox since last admission    Changes made to PTA medication list:  Added: apap  Deleted: levoflox  Changed: None    Medication Affordability:       Allergies reviewed with patient and updates made in EHR: yes    Medication History Completed By: Markie Nice [facility] 11/2/2023 11:06 AM    PTA Med List   Medication Sig Last Dose    acetaminophen (TYLENOL) 325 MG tablet Take 325-650 mg by mouth every 6 hours as needed for mild pain 11/2/2023 at 0900    albuterol (PROAIR HFA/PROVENTIL HFA/VENTOLIN HFA) 108 (90 Base) MCG/ACT inhaler Inhale 2 puffs into the lungs every 6 hours as needed 11/1/2023    clonazePAM (KLONOPIN) 0.5 MG tablet Take 0.5-1 mg by mouth daily as needed for anxiety Past Week    escitalopram (LEXAPRO) 20 MG tablet Take 20 mg by mouth daily (with lunch) 11/1/2023 at 1200    fluticasone (FLOVENT HFA) 110 MCG/ACT inhaler Inhale 1 puff into the lungs 2 times daily 11/2/2023 at 0900    guaiFENesin (ROBITUSSIN) 20 mg/mL liquid Take 5 mLs (100 mg) by mouth every 4 hours as needed for cough 11/2/2023 at 0900    ibuprofen (ADVIL,MOTRIN) 400 MG tablet Take 400 mg by mouth every 6 hours as needed for moderate pain 11/1/2023    indomethacin (INDOCIN) 50 MG capsule Take 50 mg by mouth 3 times daily as needed for other (for gout flair) Unknown    loperamide HCl (IMODIUM A-D ORAL) Take 2 mg by mouth 4 times daily as needed (diarrhea) Unknown    loratadine (CLARITIN) 10 mg tablet Take 10 mg by mouth daily as needed for allergies Unknown    mirtazapine (REMERON) 15 MG tablet Take 15 mg by mouth at bedtime 11/1/2023 at 2100    simvastatin (ZOCOR) 40 MG tablet Take 1 tablet (40 mg) by mouth At Bedtime 11/1/2023 at 2100    tadalafil  (CIALIS) 5 MG tablet [TADALAFIL (CIALIS) 5 MG TABLET] Take 1 tablet (5 mg total) by mouth daily as needed for erectile dysfunction. Unknown

## 2023-11-02 NOTE — CONSULTS
[facility] Pulmonary/Critical Care Consult Team Note    Jim Titus,  1965, MRN [id number]  Admitting Dx: Elevated alkaline phosphatase level [R74.8]  Date / Time of Admission:  2023  7:52 AM    Recent Events:  He states the first time he was treated as an outpt with oral antibiotics and he felt better and then it came back and he was admitted to the hospital with IV antibiotics and then transitioned to oral and he felt better then the first time. However he never felt that his infection every really went away. He has a dry cough. He has dyspnea with any activity. He denies fevers. No new exposures. He denies wheezing. He denies sick contacts. He has not been traveling.     Assessment/Plan: Jim Titus is a 58 year old male with PMHx of asthma, multiple myeloma on Keytruda, depression/anxiety recurrent hospitalization for pneumonia, discharged home on 10/25/2023 return to the hospital due to worsening cough and shortness of breath found to have increased multifocal pneumonia.     - legionella, histo, blasto, fungal antibodies, Fungitel, coccidio  - plan for bronch tomorrow  - NPO at midnight  - O2 to keep sats >90  - agree with broad spectrum Abx for now, vanc, zosyn, and azithro      Medical Care Time excluding procedures and family discussions greater than: 1 Hour        Risk Factors Present on Admission:  Clinically Significant Risk Factors Present on Admission              # Hypoalbuminemia: Lowest albumin = 3.3 g/dL at 2023  8:45 AM, will monitor as appropriate          # Obesity: Estimated body mass index is 33.91 kg/m  as calculated from the following:    Height as of this encounter: 1.829 m (6').    Weight as of this encounter: 113.4 kg (250 lb).       # Financial/Environmental Concerns:    # Asthma: noted on problem list     # Anemia: based on hgb <11           Reny Garcia DO  Pulmonary and Critical Care Attending  pgr [phone]    Allergies   Allergen Reactions    Chicken  [Chicken Protein] Other (See Comments)     Throat closes to turkey as well.    Cantaloupe [Cantaloupe Extract Allergy Skin Test] Unknown    Wellbutrin [Bupropion] Unknown    Grass Pollen [Grass] Rash    Turkey [Poultry Meal] Rash       Meds: See MAR    Physical Exam:  /57   Pulse 93   Temp (!) 101.4  F (38.6  C) (Oral)   Resp 21   Ht 1.829 m (6')   Wt 113.4 kg (250 lb)   SpO2 94%   BMI 33.91 kg/m    Intake/Output this shift:  I/O this shift:  In: 100 [IV Piggyback:100]  Out: -   GEN: sitting up in bed, NAD  HEENT: MMM, NC in place  CVS: regular rhythm, no murmurs  RESP: Coarse diffuse rhonchi, no wheezing  ABD: Soft, No abdominal pain with palpation, no guarding, no rigidity  EXT: Warm, well perfused, no edema  NEURO: moving all extremities, nonfocal  PSYCH: pleasant    Pertinent Labs: Latest lab results in EHR personally reviewed.   CMP  Recent Labs   Lab 11/02/23  0845      POTASSIUM 3.9   CHLORIDE 99   CO2 24   ANIONGAP 13   *   BUN 5.4*   CR 0.79   GFRESTIMATED >90   MELISSA 9.3   PROTTOTAL 6.9   ALBUMIN 3.3*   BILITOTAL 0.4   ALKPHOS 275*   AST 34   ALT 39     CBC  Recent Labs   Lab 11/02/23  0845   WBC 11.3*   RBC 3.48*   HGB 10.1*   HCT 31.8*   MCV 91   MCH 29.0   MCHC 31.8   RDW 14.4   *     INRNo lab results found in last 7 days.  Arterial Blood Gas  Recent Labs   Lab 11/02/23  0957   PH 7.45*   PCO2 38   PO2 77*   HCO3 26       Cultures: not yet available.    Imaging: personally reviewed.   Results for orders placed or performed during the hospital encounter of 11/02/23   CT Chest Pulmonary Embolism w Contrast    Narrative    EXAM: CT CHEST PULMONARY EMBOLISM W CONTRAST  LOCATION: [facility]  DATE: 11/2/2023    INDICATION: cough, hypoxia, recent hospitalization x 9 days  for CAP  COMPARISON: CTA chest 10/23/2023  TECHNIQUE: CT chest pulmonary angiogram during arterial phase injection of IV contrast. Multiplanar reformats and MIP reconstructions were  performed. Dose reduction techniques were used.   CONTRAST: Isovue 370 100mL     FINDINGS:  ANGIOGRAM CHEST: Pulmonary arteries are normal caliber and negative for pulmonary emboli. Thoracic aorta is negative for dissection. No CT evidence of right heart strain.    LUNGS AND PLEURA: Increased consolidation within the right upper, right middle, right lower, left upper, and left lower lobes. Atoll sign in the left upper lobe (7/46). No effusion or empyema. No pneumothorax.    MEDIASTINUM/AXILLAE: Normal heart size. No pericardial effusion. Reactive mediastinal lymphadenopathy.    CORONARY ARTERY CALCIFICATION: Mild.    UPPER ABDOMEN: Hepatic steatosis.    MUSCULOSKELETAL: No acute abnormality.      Impression    IMPRESSION:  1.  No pulmonary embolism.   2.  Increased consolidation associated with the multifocal pneumonia (right greater than left lungs). Appearance is nonspecific but could be seen in the setting of atypical infection (including fungal organisms) or bacterial pneumonia. No empyema or   other complication.   Abdomen US, limited (RUQ only)    Narrative    EXAM: US ABDOMEN LIMITED  LOCATION: [facility]  DATE: 11/2/2023    INDICATION: sepsis, cough, difficulty taking in big breath, poor appetite, elevating alk phos with recent hospitalization.  COMPARISON: None.  TECHNIQUE: Limited abdominal ultrasound.    FINDINGS:    GALLBLADDER: Normal. No gallstones, wall thickening, or pericholecystic fluid. Negative sonographic Aguilar's sign.    BILE DUCTS: No biliary dilatation. The common duct measures 3 mm.    LIVER: Normal parenchyma with smooth contour. No focal mass.    RIGHT KIDNEY: No hydronephrosis.    PANCREAS: The visualized portions are normal.    No ascites.      Impression    IMPRESSION:  Normal limited abdominal ultrasound.           Patient Active Problem List   Diagnosis    Diverticulosis    IVY (generalized anxiety disorder)    Chronic gout    Hyperlipemia    Class 1 obesity  due to excess calories without serious comorbidity with body mass index (BMI) of 33.0 to 33.9 in adult    Moderate persistent asthma without complication    Allergic rhinitis due to pollen    Healthcare maintenance    Melanoma of skin (H)    Pneumonia    Elevated alkaline phosphatase level    Acute respiratory failure with hypoxia (H)    Sepsis, due to unspecified organism, unspecified whether acute organ dysfunction present (H)         Surgical History  He  has a past surgical history that includes hernia repair; orthopedic surgery; and pyloric stenosis repair.    Family History  Reviewed, and family history includes Breast Cancer in his mother; Heart Disease in his father; Hyperlipidemia in his father; Mental Illness in his brother and father.    Social History  Reviewed, and he  reports that he has never smoked. He quit smokeless tobacco use about 25 years ago.  His smokeless tobacco use included chew. He reports current alcohol use. He reports current drug use. Drug: Marijuana.    Allergies  Allergies   Allergen Reactions    Chicken [Chicken Protein] Other (See Comments)     Throat closes to turkey as well.    Cantaloupe [Cantaloupe Extract Allergy Skin Test] Unknown    Wellbutrin [Bupropion] Unknown    Grass Pollen [Grass] Rash    Turkey [Poultry Meal] Rash              Reny Garcia,   Pulmonary and Critical Care Attending  [facility] [phone]    Securely message with the Vocera Web Console (learn more here)

## 2023-11-02 NOTE — ED PROVIDER NOTES
Emergency Department Encounter   NAME: Jim Titus ; AGE: 58 year old male ; YOB: 1965 ; MRN: [id number] ; PCP: Luis Alberto Fatima   ED PROVIDER: Zulema Holly PA-C    Evaluation Date & Time:   11/2/2023  7:52 AM    CHIEF COMPLAINT:  Shortness of Breath      Impression and Plan   Medical Decision Making    Jim Titus is a 58 year old male with a history of moderate asthma and melanoma currently treated on immunotherapy every 3 weeks who presents for evaluation of cough and worsening shortness of breath.  The symptoms have been ongoing for 6 weeks.  Was seen by his oncologist for his routine annual immunotherapy which is every 3 weeks.  At that time, told his oncologist that he was not feeling very well, they did blood work and imaging.  Was diagnosed with pneumonia.  Placed on 10 days of antibiotics outpatient.  He was not getting better.  So he was seen in the emergency department on 10/23/2023.  She was still on Augmentin at that time.  Oxygen was satting at 92%.  He was admitted for IV antibiotics and nebulizer therapy.  He was in the hospital for 9 days.  After receiving IV antibiotics and feeling a bit better.  Was discharged home on 7 days of Levaquin.  He finished that last tablet on Tuesday.  Wednesday states his cough was worsening again.  Increasing shortness of breath.  Was up all night Wednesday into Thursday.  This morning he was having difficulty breathing.  Came to the ED to be evaluated. He was febrile with a temperature of 101.4.  Respiratory rate 30.  In triage his oxygen was 81% on room air.  It jumped up to 92% on 3 L.  Speaking in shorter sentences with labored breathing crackles in the right lower and middle lung bases.    Differential diagnosis includes but not limited to resistant community acquired pneumonia, hospital acquired pneumonia, legionella, pneumonitis secondary to immunotherapy, asthma exacerbation, endocarditis, pulmonary edema secondary to CHF, COVID/Flu/RSV,  to name a few.   Patient was given tylenol, IV fluids, and a duoneb for symptoms.   Work up revealed evidence of infiltrates in the lung, specifically the right lung>left. pH 7.45 and spco2 38. Covid/flu/rsv negative.  Procalcitonin elevated at 11. CBC showed mild leukocytosis at 11.3. Hgb 10.1. Got a dose of IV zosyn and azithro here in the ED to cover for higher risk CAP. Spoke to Dr. Porfirio Cui regarding the admission. Could be pneumonitis secondary to immunotherapy which matches the timeline with onset in May versus resistant community acquired pneumonia. Sputum cultures are pending. He will consult pulmonology for further recommendations including adding on steroids or further lung testing. Patient will need to be admitted for further management with IV antibiotics given his oxygen requirements and failure of outpatient therapy.    History:  Supplemental history from: Documented in chart, if applicable and N/A  External Record(s) reviewed: Documented in chart, if applicable. and Outpatient Record: seen in the ED on 10/23/23 with cough, worsening SOB, and confirmed diagnosis of CAP on Augmentin. Symptoms were worsening then. Was admitted to the  hospital     Work Up:  Chart documentation includes differential considered and any EKGs or imaging independently interpreted by provider, where specified.  In additional to work up documented, I considered the following work up: n/a    External consultation:  Discussion of management with another provider: admitting doctor    Complicating factors:  Care impacted by chronic illness: Other: immunosuppression being treated with immunotherapy for melanoma  Care affected by social determinants of health: n/a    Disposition considerations: Admit.    ED COURSE:  0804 I met and introduced myself to the patient. I gathered initial history and performed my physical exam. We discussed plan for initial workup.   9260 I have staffed the patient with Dr. Eileen Rodriguez, ED MD, who  has evaluated the patient and agrees with all aspects of today's care.     ED Course as of 11/02/23 1446   Thu Nov 02, 2023   1055 Spoke to Dr. Porfirio Cui hospitalist regarding the admission. He will get pulmonology involved regarding pneumonitis secondary to immunotherapy vs resistant pneumonia and maybe adding on steroids.      FINAL IMPRESSION:    ICD-10-CM    1. Acute respiratory failure with hypoxia (H)  J96.01       2. Sepsis, due to unspecified organism, unspecified whether acute organ dysfunction present (H)  A41.9       3. Elevated alkaline phosphatase level  R74.8           MEDICATIONS GIVEN IN THE EMERGENCY DEPARTMENT:  Medications   sodium chloride (PF) 0.9% PF flush 3 mL (has no administration in time range)   sodium chloride (PF) 0.9% PF flush 3 mL (3 mLs Intracatheter Not Given 11/2/23 0900)   sodium chloride 0.9 % infusion ( Intravenous $New Bag 11/2/23 1114)   piperacillin-tazobactam (ZOSYN) 3.375 g vial to attach to  mL bag (0 g Intravenous Stopped 11/2/23 0930)   azithromycin 500 mg (ZITHROMAX) in 0.9% NaCl 250 mL intermittent infusion 500 mg (0 mg Intravenous Stopped 11/2/23 1114)   ipratropium - albuterol 0.5 mg/2.5 mg/3 mL (DUONEB) neb solution 3 mL (3 mLs Nebulization $Given 11/2/23 0946)   iopamidol (ISOVUE-370) solution 100 mL (100 mLs Intravenous $Given 11/2/23 0931)   acetaminophen (TYLENOL) tablet 975 mg (975 mg Oral $Given 11/2/23 1112)       NEW PRESCRIPTIONS STARTED AT TODAY'S ED VISIT:  New Prescriptions    No medications on file       HPI   Patient information was obtained from: patient   Use of Intrepreter: N/A     Jim Titus is a 58 year old male with a history of moderate asthma and melanoma currently treated on immunotherapy every 3 weeks who presents for evaluation of cough and worsening shortness of breath.    The symptoms have been ongoing for 6 weeks.  Was seen by his oncologist for his routine annual immunotherapy which is every 3 weeks.  At that time, told his  oncologist that he was not feeling very well, they did blood work and imaging.  Was diagnosed with pneumonia.  Placed on 10 days of antibiotics outpatient.  He was not getting better.  So he was seen in the emergency department on 10/23/2023.  She was still on Augmentin at that time.  Oxygen was satting at 92%.  He was admitted for IV antibiotics and nebulizer therapy.  He was in the hospital for 9 days.  After receiving IV antibiotics and feeling a bit better.  Was discharged home on 7 days of Levaquin.  He finished that last tablet on Tuesday.  Wednesday states his cough was worsening again.  Increasing shortness of breath.  Was up all night Wednesday into Thursday.  This morning he was having difficulty breathing.  Came to the ED to be evaluated. He was febrile with a temperature of 101.4.  Respiratory rate 30.  In triage his oxygen was 81% on room air.  It jumped up to 92% on 3 L.  Speaking in shorter sentences with labored breathing crackles in the right lower and middle lung bases.    Medical History     History reviewed. No pertinent past medical history.    Past Surgical History:   Procedure Laterality Date    HERNIA REPAIR      ORTHOPEDIC SURGERY      Both ankles, left knee, right shoulder    pyloric stenosis repair         Family History   Problem Relation Age of Onset    Breast Cancer Mother     Heart Disease Father     Mental Illness Father     Hyperlipidemia Father     Mental Illness Brother        Social History     Tobacco Use    Smoking status: Never    Smokeless tobacco: Former     Types: Chew     Quit date: 1998   Substance Use Topics    Alcohol use: Yes     Comment: 3-4 times per week, 5-6 beers    Drug use: Yes     Types: Marijuana       acetaminophen (TYLENOL) 325 MG tablet  albuterol (PROAIR HFA/PROVENTIL HFA/VENTOLIN HFA) 108 (90 Base) MCG/ACT inhaler  clonazePAM (KLONOPIN) 0.5 MG tablet  escitalopram (LEXAPRO) 20 MG tablet  fluticasone (FLOVENT HFA) 110 MCG/ACT inhaler  guaiFENesin  (ROBITUSSIN) 20 mg/mL liquid  ibuprofen (ADVIL,MOTRIN) 400 MG tablet  indomethacin (INDOCIN) 50 MG capsule  loperamide HCl (IMODIUM A-D ORAL)  loratadine (CLARITIN) 10 mg tablet  mirtazapine (REMERON) 15 MG tablet  simvastatin (ZOCOR) 40 MG tablet  tadalafil (CIALIS) 5 MG tablet        Physical Exam     First Vitals:  Patient Vitals for the past 24 hrs:   BP Temp Temp src Pulse Resp SpO2 Height Weight   11/02/23 1356 -- -- -- 73 -- 96 % -- --   11/02/23 1311 -- -- -- 73 -- 95 % -- --   11/02/23 1233 -- 98.6  F (37  C) Oral -- -- 96 % -- --   11/02/23 1211 -- -- -- 80 21 95 % -- --   11/02/23 1130 96/54 -- -- 86 27 94 % -- --   11/02/23 1100 103/57 -- -- 93 -- 94 % -- --   11/02/23 1017 -- (!) 101.4  F (38.6  C) Oral -- -- -- -- --   11/02/23 0831 106/49 -- -- 100 21 95 % -- --   11/02/23 0807 128/72 -- -- 109 30 92 % -- --   11/02/23 0805 -- -- -- -- 27 93 % -- --   11/02/23 0753 126/74 -- -- 113 -- -- -- --   11/02/23 0748 109/57 (!) 101.4  F (38.6  C) -- 115 24 (!) 81 % 1.829 m (6') 113.4 kg (250 lb)       PHYSICAL EXAM:   Physical Exam  Vitals and nursing note reviewed.   Constitutional:       General: He is in acute distress.      Appearance: Normal appearance. He is ill-appearing. He is not diaphoretic.   HENT:      Head: Atraumatic.      Nose: Nose normal. No congestion or rhinorrhea.      Mouth/Throat:      Mouth: Mucous membranes are moist.   Eyes:      General: No scleral icterus.        Right eye: No discharge.         Left eye: No discharge.      Extraocular Movements: Extraocular movements intact.      Conjunctiva/sclera: Conjunctivae normal.      Pupils: Pupils are equal, round, and reactive to light.   Cardiovascular:      Rate and Rhythm: Normal rate and regular rhythm.   Pulmonary:      Effort: Tachypnea and accessory muscle usage present. No respiratory distress.      Breath sounds: Examination of the right-upper field reveals rales. Examination of the right-middle field reveals rales. Examination  of the right-lower field reveals rales. Examination of the left-lower field reveals rales. Rales present.   Abdominal:      General: Abdomen is flat. There is no distension.      Palpations: Abdomen is soft.      Tenderness: There is no abdominal tenderness. There is no guarding or rebound.   Musculoskeletal:         General: No swelling or signs of injury. Normal range of motion.      Cervical back: Normal range of motion and neck supple.      Right lower leg: No edema.      Left lower leg: No edema.   Skin:     General: Skin is warm and dry.      Capillary Refill: Capillary refill takes less than 2 seconds.      Coloration: Skin is not jaundiced.      Findings: No erythema or rash.   Neurological:      General: No focal deficit present.      Mental Status: He is alert.      Cranial Nerves: No cranial nerve deficit.      Sensory: No sensory deficit.      Motor: No weakness.   Psychiatric:         Mood and Affect: Mood normal.       Constitutional: Generally well-appearing male , no acute distress.  EYES: Conjunctivae clear  HENT:  Normocephalic, atraumatic. Normal neck range of motion, supple. Moist mucous membranes. No scleral icterus.   Respiratory:  Respirations even, unlabored, in no acute respiratory distress.  Cardiovascular:  Regular rate and rhythm, good peripheral perfusion.  GI: Soft, nondistended, nontender, no palpable masses, no rebound, no guarding.   Musculoskeletal:  No edema. No cyanosis. Range of motion of major extremities intact.    Integument: Warm, Dry, No erythema, No rash.   Neurologic: AxOx4. CN II-XII grossly intact, but not individually tested. No focal neurological deficits.   Psych: Thoughts linear and responses appropriate. Cooperative. Appropriate mood and affect.    Results     LAB:  All pertinent labs reviewed and interpreted  Labs Ordered and Resulted from Time of ED Arrival to Time of ED Departure   COMPREHENSIVE METABOLIC PANEL - Abnormal       Result Value    Sodium 136       Potassium 3.9      Carbon Dioxide (CO2) 24      Anion Gap 13      Urea Nitrogen 5.4 (*)     Creatinine 0.79      GFR Estimate >90      Calcium 9.3      Chloride 99      Glucose 123 (*)     Alkaline Phosphatase 275 (*)     AST 34      ALT 39      Protein Total 6.9      Albumin 3.3 (*)     Bilirubin Total 0.4     BLOOD GAS VENOUS - Abnormal    pH Venous 7.45      pCO2 Venous 39      pO2 Venous 53 (*)     Bicarbonate Venous 27      Base Excess/Deficit 3.4      Oxyhemoglobin Venous 87.2 (*)     O2 Sat, Venous 88.2 (*)    CBC WITH PLATELETS AND DIFFERENTIAL - Abnormal    WBC Count 11.3 (*)     RBC Count 3.48 (*)     Hemoglobin 10.1 (*)     Hematocrit 31.8 (*)     MCV 91      MCH 29.0      MCHC 31.8      RDW 14.4      Platelet Count 668 (*)     % Neutrophils 79      % Lymphocytes 7      % Monocytes 8      % Eosinophils 4      % Basophils 1      % Immature Granulocytes 1      NRBCs per 100 WBC 0      Absolute Neutrophils 9.0 (*)     Absolute Lymphocytes 0.8      Absolute Monocytes 0.8      Absolute Eosinophils 0.4      Absolute Basophils 0.1      Absolute Immature Granulocytes 0.1      Absolute NRBCs 0.0     BLOOD GAS ARTERIAL - Abnormal    pH Arterial 7.45 (*)     pCO2 Arterial 38      pO2 Arterial 77 (*)     Bicarbonate Arterial 26      O2 Sat, Arterial 96.7      Oxyhemoglobin 94.9 (*)     Base Excess/Deficit 2.2      Sample Stabilized Temperature 37.0     PROCALCITONIN - Abnormal    Procalcitonin 0.11 (*)    LACTIC ACID WHOLE BLOOD - Normal    Lactic Acid 1.1     INFLUENZA A/B, RSV, & SARS-COV2 PCR - Normal    Influenza A PCR Negative      Influenza B PCR Negative      RSV PCR Negative      SARS CoV2 PCR Negative     TROPONIN T, HIGH SENSITIVITY - Normal    Troponin T, High Sensitivity 8     N TERMINAL PRO BNP OUTPATIENT - Normal    N Terminal Pro BNP Outpatient 247     1,3-BETA D GLUCAN FUNGITELL   BLOOD CULTURE   BLOOD CULTURE   RESPIRATORY AEROBIC BACTERIAL CULTURE       RADIOLOGY:  Abdomen US, limited (RUQ only)    Final Result   IMPRESSION:   Normal limited abdominal ultrasound.            CT Chest Pulmonary Embolism w Contrast   Final Result   IMPRESSION:   1.  No pulmonary embolism.    2.  Increased consolidation associated with the multifocal pneumonia (right greater than left lungs). Appearance is nonspecific but could be seen in the setting of atypical infection (including fungal organisms) or bacterial pneumonia. No empyema or    other complication.        ECG:    Performed at: 0805  Impression: sinus tachycardia    Rate: 109  Rhythm: sinus tachycardia    W[place] Interval: 154  QRS Interval: 96  QTc Interval: 444  ST Changes: none  Comparison: none    EKG results reviewed and interpreted by Dr. Eileen Rodriguez, ED MD.       Zulema Holly PA-C  Emergency Medicine   [facility] EMERGENCY ROOM       Zulema Holly PA-C  11/02/23 0253

## 2023-11-02 NOTE — ED TRIAGE NOTES
Was in the hospital Monday-Wednesaday last week.  Went home with an antibiotic, but it has completed the course.  Has had difficulty breathing for the last couple for days, and not sleeping.  Drove self tot he emergency department.     Triage Assessment (Adult)       Row Name 11/02/23 0749          Triage Assessment    Airway WDL WDL        Respiratory WDL    Respiratory WDL X;rhythm/pattern        Skin Circulation/Temperature WDL    Skin Circulation/Temperature WDL WDL        Cardiac WDL    Cardiac WDL WDL        Peripheral/Neurovascular WDL    Peripheral Neurovascular WDL WDL        Cognitive/Neuro/Behavioral WDL    Cognitive/Neuro/Behavioral WDL WDL

## 2023-11-02 NOTE — H&P
[facility]    History and Physical - Hospitalist Service       Date of Admission:  11/2/2023    Assessment & Plan      Jim Titus is a 58 year old male admitted on 11/2/2023.  Has a past medical history significant for asthma, multiple myeloma on Keytruda, depression/anxiety recurrent hospitalization for pneumonia, discharged home on 10/25/2023 return to the hospital due to worsening cough and shortness of breath.    Sepsis due multifocal pneumonia  Acute hypoxic respiratory failure  -3 weeks of persistent/recurrent pneumonia  -Patient is on Keytruda.  Discussed case with pulmonologist--> suggested less likely immune mediated pneumonitis, more likely pneumonia.  -Patient is high risk for MRSA given recent antibiotic use    Plan  -Consult pulmonology  -Start vancomycin, Zosyn and azithromycin  -Sputum culture if possible  -MRSA nares  -Check CRP  -Continue normal saline at 150 cc/h    Asthma  -No signs of exacerbation    Plan  -Albuterol as needed  -DuoNebs  -Patient pulmonology recommendation    Anxiety/depression    Plan  -Continue home Lexapro and clonazepam                  Diet: Combination Diet Regular Diet AdultRegular diet  DVT Prophylaxis: Enoxaparin (Lovenox) SQ  Lamas Catheter: Not present  Lines: None     Cardiac Monitoring: None  Code Status: Full CodeFull code    Clinically Significant Risk Factors Present on Admission              # Hypoalbuminemia: Lowest albumin = 3.3 g/dL at 11/2/2023  8:45 AM, will monitor as appropriate          # Obesity: Estimated body mass index is 33.91 kg/m  as calculated from the following:    Height as of this encounter: 1.829 m (6').    Weight as of this encounter: 113.4 kg (250 lb).         # Financial/Environmental Concerns:    # Asthma: noted on problem list        Disposition Plan admit to inpatient     Expected Discharge Date: 11/04/2023                  LA CHEN MD  Hospitalist Service  [facility]  Securely  message with Emanuel (more info)  Text page via [facility] Paging/Directory     ______________________________________________________________________    Chief Complaint   Fever, shortness of breath    History is obtained from the patient    History of Present Illness   Jim Titus is a 58 year old male admitted on 11/2/2023.  Has a past medical history significant for asthma, multiple myeloma on Keytruda, depression/anxiety recurrent hospitalization for pneumonia, discharged home on 10/25/2023 return to the hospital due to worsening cough and shortness of breath.    Per patient, he was first diagnosed with possible pneumonia around 3 weeks ago and received a course of?  Augmentin.  His symptoms initially improved, however, he started to experience recurrences of shortness of breath, cough, fevers and therefore he was hospitalized at [facility] on 10/23 until 10/25.  At that time he was again diagnosed with pneumonia and discharged home on oral antibiotic (Levaquin) for 6 days.  Per patient, completed his antibiotic 2 days ago.  Over the last 2 days he started to experience dry cough, intermittent fever, worsening shortness of breath.  Denies any chest pain.  He reported generalized weakness and decreased appetite.    Vitals on presentation: Blood pressure around 120/70, heart rate around 110, SPO2 around 80% on room air.  Labs significant for mildly elevated leukocytosis, positive procalcitonin.  CTA chest showed consolidation mainly on the right side suggestive of multifocal pneumonia.  No pulmonary embolism.         Past Medical History    History reviewed. No pertinent past medical history.    Past Surgical History   Past Surgical History:   Procedure Laterality Date    HERNIA REPAIR      ORTHOPEDIC SURGERY      Both ankles, left knee, right shoulder    pyloric stenosis repair         Prior to Admission Medications   Prior to Admission Medications   Prescriptions Last Dose Informant Patient Reported? Taking?    acetaminophen (TYLENOL) 325 MG tablet 11/2/2023 at 0900  Yes Yes   Sig: Take 325-650 mg by mouth every 6 hours as needed for mild pain   albuterol (PROAIR HFA/PROVENTIL HFA/VENTOLIN HFA) 108 (90 Base) MCG/ACT inhaler 11/1/2023  No Yes   Sig: Inhale 2 puffs into the lungs every 6 hours as needed   clonazePAM (KLONOPIN) 0.5 MG tablet Past Week  Yes Yes   Sig: Take 0.5-1 mg by mouth daily as needed for anxiety   escitalopram (LEXAPRO) 20 MG tablet 11/1/2023 at 1200  Yes Yes   Sig: Take 20 mg by mouth daily (with lunch)   fluticasone (FLOVENT HFA) 110 MCG/ACT inhaler 11/2/2023 at 0900  No Yes   Sig: Inhale 1 puff into the lungs 2 times daily   guaiFENesin (ROBITUSSIN) 20 mg/mL liquid 11/2/2023 at 0900  No Yes   Sig: Take 5 mLs (100 mg) by mouth every 4 hours as needed for cough   ibuprofen (ADVIL,MOTRIN) 400 MG tablet 11/1/2023  Yes Yes   Sig: Take 400 mg by mouth every 6 hours as needed for moderate pain   indomethacin (INDOCIN) 50 MG capsule Unknown  Yes Yes   Sig: Take 50 mg by mouth 3 times daily as needed for other (for gout flair)   loperamide HCl (IMODIUM A-D ORAL) Unknown  Yes Yes   Sig: Take 2 mg by mouth 4 times daily as needed (diarrhea)   loratadine (CLARITIN) 10 mg tablet Unknown  Yes Yes   Sig: Take 10 mg by mouth daily as needed for allergies   mirtazapine (REMERON) 15 MG tablet 11/1/2023 at 2100  Yes Yes   Sig: Take 15 mg by mouth at bedtime   simvastatin (ZOCOR) 40 MG tablet 11/1/2023 at 2100  No Yes   Sig: Take 1 tablet (40 mg) by mouth At Bedtime   tadalafil (CIALIS) 5 MG tablet Unknown  No Yes   Sig: [TADALAFIL (CIALIS) 5 MG TABLET] Take 1 tablet (5 mg total) by mouth daily as needed for erectile dysfunction.      Facility-Administered Medications: None          Physical Exam   Vital Signs: Temp: 98.7  F (37.1  C) Temp src: Oral BP: 103/58 Pulse: 76   Resp: 20 SpO2: 93 % O2 Device: Nasal cannula Oxygen Delivery: 3 LPM  Weight: 250 lbs 0 oz    Physical Exam  Constitutional:       General: He is not  in acute distress.     Appearance: He is ill-appearing. He is not toxic-appearing.   Cardiovascular:      Rate and Rhythm: Normal rate.   Pulmonary:      Effort: Pulmonary effort is normal. No respiratory distress.      Comments: Crackles on the right lung.  No wheezing.  Skin:     General: Skin is warm and dry.   Psychiatric:         Mood and Affect: Mood normal.          Medical Decision Making       75 MINUTES SPENT BY ME on the date of service doing chart review, history, exam, documentation & further activities per the note.      Data     I have personally reviewed the following data over the past 24 hrs:    11.3 (H)  \   10.1 (L)   / 668 (H)     136 99 5.4 (L) /  123 (H)   3.9 24 0.79 \     ALT: 39 AST: 34 AP: 275 (H) TBILI: 0.4   ALB: 3.3 (L) TOT PROTEIN: 6.9 LIPASE: N/A     Trop: 8 BNP: 247     Procal: 0.11 (H) CRP: N/A Lactic Acid: 1.1         Imaging results reviewed over the past 24 hrs:   Recent Results (from the past 24 hour(s))   CT Chest Pulmonary Embolism w Contrast    Narrative    EXAM: CT CHEST PULMONARY EMBOLISM W CONTRAST  LOCATION: [facility]  DATE: 11/2/2023    INDICATION: cough, hypoxia, recent hospitalization x 9 days  for CAP  COMPARISON: CTA chest 10/23/2023  TECHNIQUE: CT chest pulmonary angiogram during arterial phase injection of IV contrast. Multiplanar reformats and MIP reconstructions were performed. Dose reduction techniques were used.   CONTRAST: Isovue 370 100mL     FINDINGS:  ANGIOGRAM CHEST: Pulmonary arteries are normal caliber and negative for pulmonary emboli. Thoracic aorta is negative for dissection. No CT evidence of right heart strain.    LUNGS AND PLEURA: Increased consolidation within the right upper, right middle, right lower, left upper, and left lower lobes. Atoll sign in the left upper lobe (7/46). No effusion or empyema. No pneumothorax.    MEDIASTINUM/AXILLAE: Normal heart size. No pericardial effusion. Reactive mediastinal  lymphadenopathy.    CORONARY ARTERY CALCIFICATION: Mild.    UPPER ABDOMEN: Hepatic steatosis.    MUSCULOSKELETAL: No acute abnormality.      Impression    IMPRESSION:  1.  No pulmonary embolism.   2.  Increased consolidation associated with the multifocal pneumonia (right greater than left lungs). Appearance is nonspecific but could be seen in the setting of atypical infection (including fungal organisms) or bacterial pneumonia. No empyema or   other complication.   Abdomen US, limited (RUQ only)    Narrative    EXAM: US ABDOMEN LIMITED  LOCATION: [facility]  DATE: 11/2/2023    INDICATION: sepsis, cough, difficulty taking in big breath, poor appetite, elevating alk phos with recent hospitalization.  COMPARISON: None.  TECHNIQUE: Limited abdominal ultrasound.    FINDINGS:    GALLBLADDER: Normal. No gallstones, wall thickening, or pericholecystic fluid. Negative sonographic Aguilar's sign.    BILE DUCTS: No biliary dilatation. The common duct measures 3 mm.    LIVER: Normal parenchyma with smooth contour. No focal mass.    RIGHT KIDNEY: No hydronephrosis.    PANCREAS: The visualized portions are normal.    No ascites.      Impression    IMPRESSION:  Normal limited abdominal ultrasound.

## 2023-11-02 NOTE — ED PROVIDER NOTES
History:  I have seen the patient with Zulema Holly PA-C and agree with their assessment, physical, and plan.  I was present for key parts of the physical and any procedures performed.  I personally saw the patient and performed a substantive portion of the visit including all aspects of the medical decision making.  My additional impression on seeing the patient is that Jim Titus is a 58 year old  who presents with shortness of breath. He has had ~6 weeks of symptoms. Patient was recently admitted to the hospital for pneumonia where he was treated with IV antibiotics after he had been treated prior with Augmentin for ~10 days without relief of symptoms. He was given a week long course of Levaquin and finished a few days ago. He notes that he still has a productive cough and shortness of breath. Chest pain while coughing. Started to have nausea and dry heaves prior to arrival which prompted him to the ED. Started immunosuppressive therapy in May (~6 months ago).    Medical Decision Making:  Patient is having recurrent bouts of cough, chest tightness, fever over the course of the past 6 weeks.  Certainly may be that because of his immune suppression from his treatment for skin cancer, he has difficulty fighting off of a bacterial pneumonia.  Also would have to consider something like Boop's in case he is sort of loculating off his pneumonia because of underlying lung damage from previous asthma and or immunologic medications.  Lastly would also consider the possibility of interstitial lung disease like pneumonitis because of his immune therapy.  Certainly also if he has had effusions developed he may be developing an empyema that will be more difficult to treat.  Given the fever I think it is less likely but would have to consider the possibility also of something like myositis or congestive heart failure complicating his recovery again could be tied to his medications.  Additionally because of recurrent  hospitalizations and being sedentary more with these illnesses over the last 6 weeks he can see the fever cough due to pleural irritation from PE.  I do not see any signs of DVT in his extremities but again may need to do CT imaging to evaluate that.  Otherwise this may be low asthma exacerbation with that feeling of difficulty taking air in.  He was hypoxic initially and so do recommend hospitalization for further treatment.  Because he has a fever and known recent infection we will start him on antibiotics with signs of sepsis guided towards resistant community-acquired bacteria.  Otherwise, he may end up needing steroids but will leave that up to the primary care/admitting physician as he is not in distress at this moment and does not appear to be fatiguing.  Also I do not hear wheezing at this time.  Will likely need pulmonary consult and so we are getting baseline pulmonary function test considering the possibility of pneumonitis.  Consider legionella as well for atypical.    10:08 AM ct reviewed and shows worsening lung involvement.  There is some bilateral findings so consider endocarditis but I do not hear a murmur and other causes are ore likely.  Findings on left lung are more firm.  Will see radiology read to see if concerning for mass on left lung or just b infiltrates.  Cannot see through to gallbladder so will do ultrasound for the newly elevated alk phos.    Medications:  Medications   sodium chloride (PF) 0.9% PF flush 3 mL (has no administration in time range)   sodium chloride (PF) 0.9% PF flush 3 mL (3 mLs Intracatheter Not Given 11/2/23 0900)   azithromycin 500 mg (ZITHROMAX) in 0.9% NaCl 250 mL intermittent infusion 500 mg (500 mg Intravenous $New Bag 11/2/23 1373)   sodium chloride 0.9 % infusion ( Intravenous $New Bag 11/2/23 2730)   piperacillin-tazobactam (ZOSYN) 3.375 g vial to attach to  mL bag (3.375 g Intravenous $New Bag 11/2/23 6367)   ipratropium - albuterol 0.5 mg/2.5 mg/3 mL  (DUONEB) neb solution 3 mL (3 mLs Nebulization $Given 11/2/23 0928)   iopamidol (ISOVUE-370) solution 100 mL (100 mLs Intravenous $Given 11/2/23 0939)       Additional ROS: Positive for productive cough, chest pain, shortness of breath, nausea, and dry heaves.  All other systems are otherwise negative.    Additional PE:   Constitutional: Sitting up in bed   HENT:  Normocephalic, posterior pharynx wnl, wearing mask  Eyes:  PERRL, EOMI, Conjunctiva normal, No discharge, no scleral icterus.  Respiratory: Crackles diffusely on right side and at left lung base, able to have good air exchange, uses abdominal muscles to aid in respiration, mild tachypnea, struggles to get out words because of tightness in the chest  Cardiovascular:  Regular rate and rhythm, nl s1s2 0 murmurs, rubs, or gallops.  Peripheral pulses dp, pt, and radial are wnl.  No peripheral edema   GI:  Bowel sounds normal, Soft, No tenderness, No flank tenderness, nondistended.  :No CVA tenderness.   Musculoskeletal:  Moves all extremities.  No erythematous or swollen major joints,   Integument:  Normal color  Lymphatic:  No cervical lymphadenopathy  Neurologic:  Alert & oriented x 3, Normal motor function, Normal sensory function, No focal deficits noted. Normal speech.  Psychiatric:  Affect normal, Judgment normal, Mood normal.     Results for orders placed or performed during the hospital encounter of 11/02/23   CT Chest Pulmonary Embolism w Contrast     Status: None    Narrative    EXAM: CT CHEST PULMONARY EMBOLISM W CONTRAST  LOCATION: [facility]  DATE: 11/2/2023    INDICATION: cough, hypoxia, recent hospitalization x 9 days  for CAP  COMPARISON: CTA chest 10/23/2023  TECHNIQUE: CT chest pulmonary angiogram during arterial phase injection of IV contrast. Multiplanar reformats and MIP reconstructions were performed. Dose reduction techniques were used.   CONTRAST: Isovue 370 100mL     FINDINGS:  ANGIOGRAM CHEST: Pulmonary  arteries are normal caliber and negative for pulmonary emboli. Thoracic aorta is negative for dissection. No CT evidence of right heart strain.    LUNGS AND PLEURA: Increased consolidation within the right upper, right middle, right lower, left upper, and left lower lobes. Atoll sign in the left upper lobe (7/46). No effusion or empyema. No pneumothorax.    MEDIASTINUM/AXILLAE: Normal heart size. No pericardial effusion. Reactive mediastinal lymphadenopathy.    CORONARY ARTERY CALCIFICATION: Mild.    UPPER ABDOMEN: Hepatic steatosis.    MUSCULOSKELETAL: No acute abnormality.      Impression    IMPRESSION:  1.  No pulmonary embolism.   2.  Increased consolidation associated with the multifocal pneumonia (right greater than left lungs). Appearance is nonspecific but could be seen in the setting of atypical infection (including fungal organisms) or bacterial pneumonia. No empyema or   other complication.   Comprehensive metabolic panel     Status: Abnormal   Result Value Ref Range    Sodium 136 135 - 145 mmol/L    Potassium 3.9 3.4 - 5.3 mmol/L    Carbon Dioxide (CO2) 24 22 - 29 mmol/L    Anion Gap 13 7 - 15 mmol/L    Urea Nitrogen 5.4 (L) 6.0 - 20.0 mg/dL    Creatinine 0.79 0.67 - 1.17 mg/dL    GFR Estimate >90 >60 mL/min/1.73m2    Calcium 9.3 8.6 - 10.0 mg/dL    Chloride 99 98 - 107 mmol/L    Glucose 123 (H) 70 - 99 mg/dL    Alkaline Phosphatase 275 (H) 40 - 129 U/L    AST 34 0 - 45 U/L    ALT 39 0 - 70 U/L    Protein Total 6.9 6.4 - 8.3 g/dL    Albumin 3.3 (L) 3.5 - 5.2 g/dL    Bilirubin Total 0.4 <=1.2 mg/dL   Lactic acid whole blood     Status: Normal   Result Value Ref Range    Lactic Acid 1.1 0.7 - 2.0 mmol/L   Blood gas venous     Status: Abnormal   Result Value Ref Range    pH Venous 7.45 7.35 - 7.45    pCO2 Venous 39 35 - 50 mm Hg    pO2 Venous 53 (H) 25 - 47 mm Hg    Bicarbonate Venous 27 24 - 30 mmol/L    Base Excess/Deficit 3.4   mmol/L    Oxyhemoglobin Venous 87.2 (H) 70.0 - 75.0 %    O2 Sat, Venous 88.2  (H) 70.0 - 75.0 %   Symptomatic Influenza A/B, RSV, & SARS-CoV2 PCR (COVID-19) Nasopharyngeal     Status: Normal    Specimen: Nasopharyngeal; Swab   Result Value Ref Range    Influenza A PCR Negative Negative    Influenza B PCR Negative Negative    RSV PCR Negative Negative    SARS CoV2 PCR Negative Negative    Narrative    Testing was performed using the Xpert Xpress CoV2/Flu/RSV Assay on the [employer] GeneXpert Instrument. This test should be ordered for the detection of SARS-CoV-2, influenza, and RSV viruses in individuals who meet clinical and/or epidemiological criteria. Test performance is unknown in asymptomatic patients. This test is for in vitro diagnostic use under the FDA EUA for laboratories certified under CLIA to perform high or moderate complexity testing. This test has not been FDA cleared or approved. A negative result does not rule out the presence of PCR inhibitors in the specimen or target RNA in concentration below the limit of detection for the assay. If only one viral target is positive but coinfection with multiple targets is suspected, the sample should be re-tested with another FDA cleared, approved, or authorized test, if coinfection would change clinical management. This test was validated by the [facility]. These laboratories are certified under the Clinical Laboratory Improvement Amendments of 1988 (CLIA-88) as qualified to perform high complexity laboratory testing.   CBC with platelets and differential     Status: Abnormal   Result Value Ref Range    WBC Count 11.3 (H) 4.0 - 11.0 10e3/uL    RBC Count 3.48 (L) 4.40 - 5.90 10e6/uL    Hemoglobin 10.1 (L) 13.3 - 17.7 g/dL    Hematocrit 31.8 (L) 40.0 - 53.0 %    MCV 91 78 - 100 fL    MCH 29.0 26.5 - 33.0 pg    MCHC 31.8 31.5 - 36.5 g/dL    RDW 14.4 10.0 - 15.0 %    Platelet Count 668 (H) 150 - 450 10e3/uL    % Neutrophils 79 %    % Lymphocytes 7 %    % Monocytes 8 %    % Eosinophils 4 %    % Basophils 1 %    % Immature  Granulocytes 1 %    NRBCs per 100 WBC 0 <1 /100    Absolute Neutrophils 9.0 (H) 1.6 - 8.3 10e3/uL    Absolute Lymphocytes 0.8 0.8 - 5.3 10e3/uL    Absolute Monocytes 0.8 0.0 - 1.3 10e3/uL    Absolute Eosinophils 0.4 0.0 - 0.7 10e3/uL    Absolute Basophils 0.1 0.0 - 0.2 10e3/uL    Absolute Immature Granulocytes 0.1 <=0.4 10e3/uL    Absolute NRBCs 0.0 10e3/uL   Blood gas arterial     Status: Abnormal   Result Value Ref Range    pH Arterial 7.45 (H) 7.37 - 7.44    pCO2 Arterial 38 35 - 45 mm Hg    pO2 Arterial 77 (L) 80 - 90 mm Hg    Bicarbonate Arterial 26 23 - 29 mmol/L    O2 Sat, Arterial 96.7 96.0 - 97.0 %    Oxyhemoglobin 94.9 (L) 96.0 - 97.0 %    Base Excess/Deficit 2.2   mmol/L    Sample Stabilized Temperature 37.0 degrees C   Troponin T, High Sensitivity (now)     Status: Normal   Result Value Ref Range    Troponin T, High Sensitivity 8 <=22 ng/L   N terminal pro BNP outpatient     Status: Normal   Result Value Ref Range    N Terminal Pro BNP Outpatient 247 0 - 900 pg/mL   ECG 12-LEAD WITH MUSE ([facility])     Status: None   Result Value Ref Range    Systolic Blood Pressure  mmHg    Diastolic Blood Pressure  mmHg    Ventricular Rate 109 BPM    Atrial Rate 109 BPM    MN Interval 154 ms    QRS Duration 96 ms     ms    QTc 444 ms    P Axis 26 degrees    R AXIS 42 degrees    T Axis 48 degrees    Interpretation ECG       Sinus tachycardia  Otherwise normal ECG  When compared with ECG of 23-OCT-2023 09:44,  No significant change was found  Confirmed by SEE ED PROVIDER NOTE FOR, ECG INTERPRETATION ([id number]),  MADAN MIN ([id number]) on 11/2/2023 8:19:13 AM     CBC with platelets differential     Status: Abnormal    Narrative    The following orders were created for panel order CBC with platelets differential.  Procedure                               Abnormality         Status                     ---------                               -----------         ------                     CBC with platelets and  d...[[id number]]  Abnormal            Final result                 Please view results for these tests on the individual orders.         EKG:   Performed at: 0805    Impression: sinus tachycardia, normal EKG    Rate: 109  Rhythm: sinus tachycardia  Axis: 42  NC Interval: 154  QRS Interval: 96  QTc Interval: 444  ST Changes: none  Comparison: when compared to EKG from 10/23, no change found    I have independently reviewed and interpreted the EKG(s) documented above.        Diagnosis:    1. Acute respiratory failure with hypoxia (H)    2. Sepsis, due to unspecified organism, unspecified whether acute organ dysfunction present (H)    3. Elevated alkaline phosphatase level         Eileen Rodriguez MD  11/02/23 6306

## 2023-11-02 NOTE — PHARMACY-VANCOMYCIN DOSING SERVICE
"Pharmacy Vancomycin Initial Note  Date of Service 2023  Patient's  1965  58 year old, male    Indication: Community Acquired Pneumonia    Current estimated CrCl = Estimated Creatinine Clearance: 132.5 mL/min (based on SCr of 0.79 mg/dL).    Creatinine for last 3 days  2023:  8:45 AM Creatinine 0.79 mg/dL    Recent Vancomycin Level(s) for last 3 days  No results found for requested labs within last 3 days.      Vancomycin IV Administrations (past 72 hours)        No vancomycin orders with administrations in past 72 hours.                    Nephrotoxins and other renal medications (From now, onward)      Start     Dose/Rate Route Frequency Ordered Stop    23 0600  vancomycin (VANCOCIN) 1,250 mg in 0.9% NaCl 250 mL intermittent infusion         1,250 mg  over 90 Minutes Intravenous EVERY 12 HOURS 23 1751      23 1800  vancomycin (VANCOCIN) 2,000 mg in 0.9% NaCl 500 mL intermittent infusion         2,000 mg  over 2 Hours Intravenous ONCE 23 1748      23 1730  piperacillin-tazobactam (ZOSYN) 3.375 g vial to attach to  mL bag        Note to Pharmacy: For [facility], [facility] and [facility]: For Zosyn-naive patients, use the \"Zosyn initial dose + extended infusion\" order panel.    3.375 g  over 240 Minutes Intravenous EVERY 8 HOURS 23 1719              Contrast Orders - past 72 hours (72h ago, onward)      Start     Dose/Rate Route Frequency Stop    23 0930  iopamidol (ISOVUE-370) solution 100 mL         100 mL Intravenous ONCE 23 0931            [employer] Prediction of Planned Initial Vancomycin Regimen  Loading dose: 2000 mg at 18:00 2023.  Regimen: 1250 mg IV every 12 hours.  Start time: 17:53 on 2023  Exposure target: AUC24 (range)400-600 mg/L.hr   AUC24,ss: 460 mg/L.hr  Probability of AUC24 > 400: 65 %  Ctrough,ss: 14.5 mg/L  Probability of Ctrough,ss > 20: 25 %  Probability of nephrotoxicity (Lodise BELIA ): 10 %        Plan:  Start vancomycin "  2000 mg IV, followed by 1250 mg IV q12h.   Vancomycin monitoring method: AUC  Vancomycin therapeutic monitoring goal: 400-600 mg*h/L  Pharmacy will check vancomycin levels as appropriate in 1-3 Days.    Serum creatinine levels will be ordered daily for the first week of therapy and at least twice weekly for subsequent weeks.      Teresa Madrid RP

## 2023-11-03 ENCOUNTER — APPOINTMENT (OUTPATIENT)
Dept: GASTROENTEROLOGY | Facility: CLINIC | Age: 58
End: 2023-11-03
Attending: INTERNAL MEDICINE
Payer: COMMERCIAL

## 2023-11-03 LAB
ALBUMIN SERPL BCG-MCNC: 2.7 G/DL (ref 3.5–5.2)
ALP SERPL-CCNC: 226 U/L (ref 40–129)
ALT SERPL W P-5'-P-CCNC: 23 U/L (ref 0–70)
ANION GAP SERPL CALCULATED.3IONS-SCNC: 11 MMOL/L (ref 7–15)
APPEARANCE FLD: CLEAR
AST SERPL W P-5'-P-CCNC: 14 U/L (ref 0–45)
BASOPHILS # BLD AUTO: 0.1 10E3/UL (ref 0–0.2)
BASOPHILS NFR BLD AUTO: 1 %
BILIRUB SERPL-MCNC: 0.5 MG/DL
BUN SERPL-MCNC: 5.1 MG/DL (ref 6–20)
C PNEUM DNA SPEC QL NAA+PROBE: NOT DETECTED
CALCIUM SERPL-MCNC: 8.4 MG/DL (ref 8.6–10)
CELL COUNT BODY FLUID SOURCE: NORMAL
CHLORIDE SERPL-SCNC: 104 MMOL/L (ref 98–107)
COLOR FLD: COLORLESS
CREAT SERPL-MCNC: 0.84 MG/DL (ref 0.67–1.17)
DEPRECATED HCO3 PLAS-SCNC: 25 MMOL/L (ref 22–29)
EGFRCR SERPLBLD CKD-EPI 2021: >90 ML/MIN/1.73M2
EOSINOPHIL # BLD AUTO: 0.4 10E3/UL (ref 0–0.7)
EOSINOPHIL NFR BLD AUTO: 5 %
ERYTHROCYTE [DISTWIDTH] IN BLOOD BY AUTOMATED COUNT: 14.7 % (ref 10–15)
FLUAV H1 2009 PAND RNA SPEC QL NAA+PROBE: NOT DETECTED
FLUAV H1 RNA SPEC QL NAA+PROBE: NOT DETECTED
FLUAV H3 RNA SPEC QL NAA+PROBE: NOT DETECTED
FLUAV RNA SPEC QL NAA+PROBE: NOT DETECTED
FLUBV RNA SPEC QL NAA+PROBE: NOT DETECTED
GLUCOSE SERPL-MCNC: 99 MG/DL (ref 70–99)
GRAM STAIN RESULT: ABNORMAL
GRAM STAIN RESULT: ABNORMAL
HADV DNA SPEC QL NAA+PROBE: NOT DETECTED
HCOV PNL SPEC NAA+PROBE: NOT DETECTED
HCT VFR BLD AUTO: 29.5 % (ref 40–53)
HGB BLD-MCNC: 9 G/DL (ref 13.3–17.7)
HMPV RNA SPEC QL NAA+PROBE: NOT DETECTED
HOLD SPECIMEN: NORMAL
HPIV1 RNA SPEC QL NAA+PROBE: NOT DETECTED
HPIV2 RNA SPEC QL NAA+PROBE: NOT DETECTED
HPIV3 RNA SPEC QL NAA+PROBE: NOT DETECTED
HPIV4 RNA SPEC QL NAA+PROBE: NOT DETECTED
IMM GRANULOCYTES # BLD: 0.1 10E3/UL
IMM GRANULOCYTES NFR BLD: 1 %
KOH PREPARATION: NORMAL
KOH PREPARATION: NORMAL
LYMPHOCYTES # BLD AUTO: 1 10E3/UL (ref 0.8–5.3)
LYMPHOCYTES NFR BLD AUTO: 10 %
LYMPHOCYTES NFR FLD MANUAL: 31 %
M PNEUMO DNA SPEC QL NAA+PROBE: NOT DETECTED
MCH RBC QN AUTO: 28.6 PG (ref 26.5–33)
MCHC RBC AUTO-ENTMCNC: 30.5 G/DL (ref 31.5–36.5)
MCV RBC AUTO: 94 FL (ref 78–100)
MONOCYTES # BLD AUTO: 0.8 10E3/UL (ref 0–1.3)
MONOCYTES NFR BLD AUTO: 9 %
MONOS+MACROS NFR FLD MANUAL: 8 %
NEUTROPHILS # BLD AUTO: 7.1 10E3/UL (ref 1.6–8.3)
NEUTROPHILS NFR BLD AUTO: 74 %
NEUTS BAND NFR FLD MANUAL: 61 %
NRBC # BLD AUTO: 0 10E3/UL
NRBC BLD AUTO-RTO: 0 /100
PLATELET # BLD AUTO: 541 10E3/UL (ref 150–450)
POTASSIUM SERPL-SCNC: 3.7 MMOL/L (ref 3.4–5.3)
PROT SERPL-MCNC: 5.9 G/DL (ref 6.4–8.3)
RBC # BLD AUTO: 3.15 10E6/UL (ref 4.4–5.9)
RSV RNA SPEC QL NAA+PROBE: NOT DETECTED
RSV RNA SPEC QL NAA+PROBE: NOT DETECTED
RV+EV RNA SPEC QL NAA+PROBE: NOT DETECTED
SODIUM SERPL-SCNC: 140 MMOL/L (ref 135–145)
WBC # BLD AUTO: 9.5 10E3/UL (ref 4–11)
WBC # FLD AUTO: 4 /UL

## 2023-11-03 PROCEDURE — 88365 INSITU HYBRIDIZATION (FISH): CPT | Mod: 26 | Performed by: PATHOLOGY

## 2023-11-03 PROCEDURE — 31624 DX BRONCHOSCOPE/LAVAGE: CPT | Performed by: INTERNAL MEDICINE

## 2023-11-03 PROCEDURE — 99254 IP/OBS CNSLTJ NEW/EST MOD 60: CPT | Performed by: INTERNAL MEDICINE

## 2023-11-03 PROCEDURE — 87106 FUNGI IDENTIFICATION YEAST: CPT | Performed by: INTERNAL MEDICINE

## 2023-11-03 PROCEDURE — 88341 IMHCHEM/IMCYTCHM EA ADD ANTB: CPT | Mod: 26 | Performed by: PATHOLOGY

## 2023-11-03 PROCEDURE — 31624 DX BRONCHOSCOPE/LAVAGE: CPT

## 2023-11-03 PROCEDURE — G0500 MOD SEDAT ENDO SERVICE >5YRS: HCPCS | Performed by: INTERNAL MEDICINE

## 2023-11-03 PROCEDURE — 87081 CULTURE SCREEN ONLY: CPT | Performed by: INTERNAL MEDICINE

## 2023-11-03 PROCEDURE — 250N000013 HC RX MED GY IP 250 OP 250 PS 637: Performed by: STUDENT IN AN ORGANIZED HEALTH CARE EDUCATION/TRAINING PROGRAM

## 2023-11-03 PROCEDURE — 87210 SMEAR WET MOUNT SALINE/INK: CPT | Performed by: INTERNAL MEDICINE

## 2023-11-03 PROCEDURE — 250N000011 HC RX IP 250 OP 636: Mod: JZ | Performed by: STUDENT IN AN ORGANIZED HEALTH CARE EDUCATION/TRAINING PROGRAM

## 2023-11-03 PROCEDURE — 87798 DETECT AGENT NOS DNA AMP: CPT | Performed by: INTERNAL MEDICINE

## 2023-11-03 PROCEDURE — 36415 COLL VENOUS BLD VENIPUNCTURE: CPT | Performed by: STUDENT IN AN ORGANIZED HEALTH CARE EDUCATION/TRAINING PROGRAM

## 2023-11-03 PROCEDURE — 87071 CULTURE AEROBIC QUANT OTHER: CPT | Performed by: INTERNAL MEDICINE

## 2023-11-03 PROCEDURE — 120N000004 HC R&B MS OVERFLOW

## 2023-11-03 PROCEDURE — 87102 FUNGUS ISOLATION CULTURE: CPT | Performed by: INTERNAL MEDICINE

## 2023-11-03 PROCEDURE — 250N000013 HC RX MED GY IP 250 OP 250 PS 637: Performed by: INTERNAL MEDICINE

## 2023-11-03 PROCEDURE — 85025 COMPLETE CBC W/AUTO DIFF WBC: CPT | Performed by: STUDENT IN AN ORGANIZED HEALTH CARE EDUCATION/TRAINING PROGRAM

## 2023-11-03 PROCEDURE — 80053 COMPREHEN METABOLIC PANEL: CPT | Performed by: STUDENT IN AN ORGANIZED HEALTH CARE EDUCATION/TRAINING PROGRAM

## 2023-11-03 PROCEDURE — 250N000009 HC RX 250: Performed by: STUDENT IN AN ORGANIZED HEALTH CARE EDUCATION/TRAINING PROGRAM

## 2023-11-03 PROCEDURE — 87206 SMEAR FLUORESCENT/ACID STAI: CPT | Performed by: INTERNAL MEDICINE

## 2023-11-03 PROCEDURE — 99233 SBSQ HOSP IP/OBS HIGH 50: CPT | Performed by: STUDENT IN AN ORGANIZED HEALTH CARE EDUCATION/TRAINING PROGRAM

## 2023-11-03 PROCEDURE — 88305 TISSUE EXAM BY PATHOLOGIST: CPT | Mod: 26 | Performed by: PATHOLOGY

## 2023-11-03 PROCEDURE — 258N000003 HC RX IP 258 OP 636: Performed by: INTERNAL MEDICINE

## 2023-11-03 PROCEDURE — 87581 M.PNEUMON DNA AMP PROBE: CPT | Performed by: INTERNAL MEDICINE

## 2023-11-03 PROCEDURE — 88342 IMHCHEM/IMCYTCHM 1ST ANTB: CPT | Mod: 26 | Performed by: PATHOLOGY

## 2023-11-03 PROCEDURE — 999N000157 HC STATISTIC RCP TIME EA 10 MIN

## 2023-11-03 PROCEDURE — 250N000009 HC RX 250: Performed by: INTERNAL MEDICINE

## 2023-11-03 PROCEDURE — 89050 BODY FLUID CELL COUNT: CPT | Performed by: INTERNAL MEDICINE

## 2023-11-03 PROCEDURE — 258N000003 HC RX IP 258 OP 636: Performed by: STUDENT IN AN ORGANIZED HEALTH CARE EDUCATION/TRAINING PROGRAM

## 2023-11-03 PROCEDURE — 999N000215 HC STATISTIC HFNC ADULT NON-CPAP

## 2023-11-03 PROCEDURE — 250N000011 HC RX IP 250 OP 636: Performed by: INTERNAL MEDICINE

## 2023-11-03 PROCEDURE — 87205 SMEAR GRAM STAIN: CPT | Performed by: INTERNAL MEDICINE

## 2023-11-03 PROCEDURE — 99418 PROLNG IP/OBS E/M EA 15 MIN: CPT | Performed by: STUDENT IN AN ORGANIZED HEALTH CARE EDUCATION/TRAINING PROGRAM

## 2023-11-03 PROCEDURE — 94640 AIRWAY INHALATION TREATMENT: CPT

## 2023-11-03 PROCEDURE — 0B9D8ZX DRAINAGE OF RIGHT MIDDLE LUNG LOBE, VIA NATURAL OR ARTIFICIAL OPENING ENDOSCOPIC, DIAGNOSTIC: ICD-10-PCS | Performed by: INTERNAL MEDICINE

## 2023-11-03 PROCEDURE — 88312 SPECIAL STAINS GROUP 1: CPT | Mod: 26 | Performed by: PATHOLOGY

## 2023-11-03 PROCEDURE — 94799 UNLISTED PULMONARY SVC/PX: CPT

## 2023-11-03 PROCEDURE — 94640 AIRWAY INHALATION TREATMENT: CPT | Mod: 76

## 2023-11-03 PROCEDURE — 88305 TISSUE EXAM BY PATHOLOGIST: CPT | Mod: TC | Performed by: INTERNAL MEDICINE

## 2023-11-03 PROCEDURE — 258N000003 HC RX IP 258 OP 636

## 2023-11-03 RX ORDER — LIDOCAINE HYDROCHLORIDE 40 MG/ML
INJECTION, SOLUTION RETROBULBAR PRN
Status: DISCONTINUED | OUTPATIENT
Start: 2023-11-03 | End: 2023-11-04

## 2023-11-03 RX ORDER — SODIUM CHLORIDE 9 MG/ML
INJECTION, SOLUTION INTRAVENOUS CONTINUOUS PRN
Status: DISCONTINUED | OUTPATIENT
Start: 2023-11-03 | End: 2023-11-06

## 2023-11-03 RX ORDER — LOPERAMIDE HCL 2 MG
2 CAPSULE ORAL 4 TIMES DAILY PRN
Status: DISCONTINUED | OUTPATIENT
Start: 2023-11-03 | End: 2023-11-12

## 2023-11-03 RX ORDER — LIDOCAINE HYDROCHLORIDE 20 MG/ML
SOLUTION OROPHARYNGEAL PRN
Status: DISCONTINUED | OUTPATIENT
Start: 2023-11-03 | End: 2023-11-20 | Stop reason: HOSPADM

## 2023-11-03 RX ORDER — ACETAMINOPHEN 325 MG/1
650 TABLET ORAL EVERY 6 HOURS PRN
Status: DISCONTINUED | OUTPATIENT
Start: 2023-11-03 | End: 2023-11-20 | Stop reason: HOSPADM

## 2023-11-03 RX ORDER — LIDOCAINE HYDROCHLORIDE 10 MG/ML
INJECTION, SOLUTION INFILTRATION; PERINEURAL PRN
Status: DISCONTINUED | OUTPATIENT
Start: 2023-11-03 | End: 2023-11-20 | Stop reason: HOSPADM

## 2023-11-03 RX ORDER — FENTANYL CITRATE 50 UG/ML
INJECTION, SOLUTION INTRAMUSCULAR; INTRAVENOUS PRN
Status: DISCONTINUED | OUTPATIENT
Start: 2023-11-03 | End: 2023-11-03

## 2023-11-03 RX ORDER — ALBUTEROL SULFATE 0.83 MG/ML
2.5 SOLUTION RESPIRATORY (INHALATION)
Status: DISCONTINUED | OUTPATIENT
Start: 2023-11-03 | End: 2023-11-12

## 2023-11-03 RX ORDER — LIDOCAINE 40 MG/G
CREAM TOPICAL
Status: DISCONTINUED | OUTPATIENT
Start: 2023-11-03 | End: 2023-11-06

## 2023-11-03 RX ADMIN — MIDAZOLAM 0.5 MG: 1 INJECTION INTRAMUSCULAR; INTRAVENOUS at 11:32

## 2023-11-03 RX ADMIN — FENTANYL CITRATE 25 MCG: 50 INJECTION INTRAMUSCULAR; INTRAVENOUS at 11:24

## 2023-11-03 RX ADMIN — LIDOCAINE HYDROCHLORIDE 15 ML: 20 SOLUTION ORAL; TOPICAL at 11:15

## 2023-11-03 RX ADMIN — SODIUM CHLORIDE: 9 INJECTION, SOLUTION INTRAVENOUS at 20:34

## 2023-11-03 RX ADMIN — IPRATROPIUM BROMIDE AND ALBUTEROL SULFATE 3 ML: .5; 3 SOLUTION RESPIRATORY (INHALATION) at 16:03

## 2023-11-03 RX ADMIN — AZITHROMYCIN MONOHYDRATE 250 MG: 500 INJECTION, POWDER, LYOPHILIZED, FOR SOLUTION INTRAVENOUS at 16:24

## 2023-11-03 RX ADMIN — CLONAZEPAM 1 MG: 0.5 TABLET ORAL at 12:21

## 2023-11-03 RX ADMIN — ENOXAPARIN SODIUM 40 MG: 100 INJECTION SUBCUTANEOUS at 19:09

## 2023-11-03 RX ADMIN — VANCOMYCIN HYDROCHLORIDE 1250 MG: 5 INJECTION, POWDER, LYOPHILIZED, FOR SOLUTION INTRAVENOUS at 06:34

## 2023-11-03 RX ADMIN — MIDAZOLAM 0.5 MG: 1 INJECTION INTRAMUSCULAR; INTRAVENOUS at 11:33

## 2023-11-03 RX ADMIN — SODIUM CHLORIDE: 9 INJECTION, SOLUTION INTRAVENOUS at 12:32

## 2023-11-03 RX ADMIN — LIDOCAINE HYDROCHLORIDE 4 ML: 40 INJECTION, SOLUTION RETROBULBAR; TOPICAL at 11:56

## 2023-11-03 RX ADMIN — LIDOCAINE HYDROCHLORIDE 6 ML: 10 INJECTION, SOLUTION INFILTRATION; PERINEURAL at 11:30

## 2023-11-03 RX ADMIN — ALBUTEROL SULFATE 3 ML: 2.5 SOLUTION RESPIRATORY (INHALATION) at 11:44

## 2023-11-03 RX ADMIN — IPRATROPIUM BROMIDE AND ALBUTEROL SULFATE 3 ML: .5; 3 SOLUTION RESPIRATORY (INHALATION) at 12:56

## 2023-11-03 RX ADMIN — ESCITALOPRAM OXALATE 20 MG: 20 TABLET ORAL at 12:32

## 2023-11-03 RX ADMIN — SODIUM CHLORIDE: 9 INJECTION, SOLUTION INTRAVENOUS at 04:53

## 2023-11-03 RX ADMIN — PIPERACILLIN AND TAZOBACTAM 3.38 G: 3; .375 INJECTION, POWDER, LYOPHILIZED, FOR SOLUTION INTRAVENOUS at 12:26

## 2023-11-03 RX ADMIN — ACETAMINOPHEN 650 MG: 325 TABLET ORAL at 21:01

## 2023-11-03 RX ADMIN — MIRTAZAPINE 15 MG: 15 TABLET, FILM COATED ORAL at 21:44

## 2023-11-03 RX ADMIN — Medication 5 MG: at 21:53

## 2023-11-03 RX ADMIN — FENTANYL CITRATE 50 MCG: 50 INJECTION INTRAMUSCULAR; INTRAVENOUS at 11:21

## 2023-11-03 RX ADMIN — IPRATROPIUM BROMIDE AND ALBUTEROL SULFATE 3 ML: .5; 3 SOLUTION RESPIRATORY (INHALATION) at 19:32

## 2023-11-03 RX ADMIN — MIDAZOLAM 1.5 MG: 1 INJECTION INTRAMUSCULAR; INTRAVENOUS at 11:21

## 2023-11-03 RX ADMIN — PIPERACILLIN AND TAZOBACTAM 3.38 G: 3; .375 INJECTION, POWDER, LYOPHILIZED, FOR SOLUTION INTRAVENOUS at 19:09

## 2023-11-03 RX ADMIN — CLONAZEPAM 0.5 MG: 0.5 TABLET ORAL at 19:09

## 2023-11-03 RX ADMIN — IPRATROPIUM BROMIDE AND ALBUTEROL SULFATE 3 ML: .5; 3 SOLUTION RESPIRATORY (INHALATION) at 07:47

## 2023-11-03 RX ADMIN — SODIUM CHLORIDE 50 ML/HR: 9 INJECTION, SOLUTION INTRAVENOUS at 10:40

## 2023-11-03 RX ADMIN — PIPERACILLIN AND TAZOBACTAM 3.38 G: 3; .375 INJECTION, POWDER, LYOPHILIZED, FOR SOLUTION INTRAVENOUS at 02:11

## 2023-11-03 RX ADMIN — MIDAZOLAM 1 MG: 1 INJECTION INTRAMUSCULAR; INTRAVENOUS at 11:24

## 2023-11-03 RX ADMIN — FENTANYL CITRATE 50 MCG: 50 INJECTION INTRAMUSCULAR; INTRAVENOUS at 11:30

## 2023-11-03 RX ADMIN — LOPERAMIDE HYDROCHLORIDE 2 MG: 2 CAPSULE ORAL at 16:28

## 2023-11-03 RX ADMIN — LIDOCAINE HYDROCHLORIDE 5 ML: 40 INJECTION, SOLUTION RETROBULBAR; TOPICAL at 10:34

## 2023-11-03 ASSESSMENT — ACTIVITIES OF DAILY LIVING (ADL)
ADLS_ACUITY_SCORE: 35
ADLS_ACUITY_SCORE: 35
ADLS_ACUITY_SCORE: 37
ADLS_ACUITY_SCORE: 37
ADLS_ACUITY_SCORE: 35
ADLS_ACUITY_SCORE: 37
ADLS_ACUITY_SCORE: 35
ADLS_ACUITY_SCORE: 35
ADLS_ACUITY_SCORE: 37
ADLS_ACUITY_SCORE: 37

## 2023-11-03 NOTE — PLAN OF CARE
Problem: Pneumonia  Goal: Resolution of Infection Signs and Symptoms  Outcome: Not Progressing  Goal: Effective Oxygenation and Ventilation  Outcome: Not Progressing  Intervention: Promote Airway Secretion Clearance  Recent Flowsheet Documentation  Taken 11/2/2023 1515 by Gerardo Raines RN  Cough And Deep Breathing: done independently per patient    Pt alert and oriented x 4. VSS. Lung sounds diminished with crackles to RML, RLL and LLL. On 3 L/min NC; sat in mid 90s. Continues to be on duo nebs. Has non productive cough. Sputum sample still needed. Will be NPO @ [facility] for possible Bronchoscopy in am. Schedule lovenox held per MD. Urine sample sent.

## 2023-11-03 NOTE — PROGRESS NOTES
[facility]    Medicine Progress Note - Hospitalist Service    Date of Admission:  11/2/2023    Assessment & Plan      Jim Titus is a 58 year old male admitted on 11/2/2023.  Has a past medical history significant for asthma, multiple myeloma on Keytruda, depression/anxiety recurrent hospitalization for pneumonia, discharged home on 10/25/2023 return to the hospital due to worsening cough and shortness of breath.  CT scan showed bilateral infiltrate worse on the right side.  Evaluated by pulmonology.  Currently being treated with broad-spectrum antibiotic for multifocal pneumonia.  Worsening respiratory status over the last 24 hours.  Increased oxygen requirement.  Status post bronchoscopy on 11/3, aborted early as the patient was not tolerating the procedure.  It showed erythema without significant secretion.  Less likely bacterial pneumonia.      Acute hypoxic respiratory failure  Bilateral infiltrate possibly due to multifocal pneumonia  Sepsis due to possible lung infection  -3 weeks of persistent/recurrent pneumonia  -Patient is on Keytruda.  Discussed case with pulmonologist--> suggested less likely immune mediated pneumonitis, more likely pneumonia.  -Despite starting antibiotic respiratory status appears to be worsening over the last 24 hours.  -Per patient, his mother was diagnosed with cryptogenic organizing pneumonia after radiation therapy.  -Status post bronchoscopy  -Procalcitonin is mildly elevated, CRP is significantly elevated above 200.  -His presentation is not quite consistent with bacterial pneumonia at this point.  If his symptoms are not improving over the next 24 hours, it is reasonable to consider other diagnoses such as pneumonitis and possibly start steroid.    Plan  -Continue current antibiotic.  Will most likely stop vancomycin on 11/4 given negative MRSA nares.  -Follow-up with cultures collected during bronchoscopy.  -Continue normal saline at 150  cc/h    Asthma  -No signs of exacerbation    Plan  -Albuterol as needed  -DuoNebs  -Appreciate pulmonary recommendations    Anxiety/depression    Plan  -Continue home Lexapro and clonazepam                  Diet: NPO per Anesthesia Guidelines for Procedure/Surgery Except for: Meds    DVT Prophylaxis: Enoxaparin (Lovenox) SQ  Lamas Catheter: Not present  Lines: None     Cardiac Monitoring: None  Code Status: Full Code      Clinically Significant Risk Factors           # Hypercalcemia: corrected calcium is >10.1, will monitor as appropriate    # Hypoalbuminemia: Lowest albumin = 2.7 g/dL at 11/3/2023  5:57 AM, will monitor as appropriate            # Obesity: Estimated body mass index is 33.91 kg/m  as calculated from the following:    Height as of this encounter: 1.829 m (6').    Weight as of this encounter: 113.4 kg (250 lb)., PRESENT ON ADMISSION       # Financial/Environmental Concerns: none  # Asthma: noted on problem list        Disposition Plan      Expected Discharge Date: 11/06/2023,  9:00 AM    Destination: home with family              LA CHEN MD  Hospitalist Service  [facility]  Securely message with [employer] (more info)  Text page via [facility] Paging/Directory   ______________________________________________________________________    Interval History   Patient is feeling more short of breath and tired over the last 24 hours.  Continues to experience intermittent dry cough.  Denies any chest pain.  Stated that he was very winded with ambulation.  Continues to experience intermittent fever.    Physical Exam   Vital Signs: Temp: 99.1  F (37.3  C) Temp src: Oral BP: 104/68 Pulse: 100   Resp: 26 SpO2: 92 % O2 Device: High Flow Nasal Cannula (HFNC) Oxygen Delivery: 60 LPM  Weight: 250 lbs 0 oz    Physical Exam  Constitutional:       General: He is not in acute distress.     Appearance: He is not toxic-appearing.   Cardiovascular:      Rate and Rhythm: Tachycardia present.    Pulmonary:      Effort: Pulmonary effort is normal. No respiratory distress.      Breath sounds: No wheezing.      Comments: Crackles mainly on the right lung.  Skin:     General: Skin is warm and dry.   Neurological:      Mental Status: He is alert.   Psychiatric:         Mood and Affect: Mood normal.          Medical Decision Making       65 MINUTES SPENT BY ME on the date of service doing chart review, history, exam, documentation & further activities per the note.      Data     I have personally reviewed the following data over the past 24 hrs:    9.5  \   9.0 (L)   / 541 (H)     140 104 5.1 (L) /  99   3.7 25 0.84 \     ALT: 23 AST: 14 AP: 226 (H) TBILI: 0.5   ALB: 2.7 (L) TOT PROTEIN: 5.9 (L) LIPASE: N/A     Procal: N/A CRP: N/A Lactic Acid: N/A         Imaging results reviewed over the past 24 hrs:   No results found for this or any previous visit (from the past 24 hour(s)).

## 2023-11-03 NOTE — ED NOTES
Pt ambulated to the bathroom with no oxygen. Patient's oxygen saturation dropped to 60% on room air and started to have increased work of breathing and coughing. Placed on 15L oxymask with improvement.     Patient now on 4LPM nasal canula with an oxygen saturation at 92%, work of breathing has improved.

## 2023-11-03 NOTE — PROGRESS NOTES
Pt has been alternating between HFNC and oxymask. Currently pt on 15L oxymask and 5L nc sating 93%. Volara and neb treatment done as scheduled. BS diminished;crackles. RT will continue to follow.

## 2023-11-03 NOTE — PROVIDER NOTIFICATION
11/02/23 1953   Vital Signs   Resp 20   Pulse 72   Oximeter Heart Rate 78 bpm   Patient Position Fowlers   Oxygen Therapy   SpO2 98 %   O2 Device Nasal cannula   Oxygen Delivery 3 LPM   Assessment   Respiratory WDL X;breath sounds   Rhythm/Pattern, Respiratory dyspnea upon exertion   Expansion/Accessory Muscles/Retractions no use of accessory muscles   Cough Frequency infrequent   Cough Type nonproductive   Breath Sounds   Breath Sounds RLL;RML;JENNA;LLL;RUL   LLL Breath Sounds Posterior:;clear   RLL Breath Sounds Posterior:;diminished   RML Breath Sounds Posterior:;diminished   RUL Breath Sounds clear   JENNA Breath Sounds clear   Nebulizer Assessment & Treatment   $RT Use ONLY Delivery Method Nebulizer - Initial   Nebulizer Device Mouthpiece   Pretreatment Heart Rate (beats/min) 78   Pretreatment Resp Rate (breaths/min) 20   Pretreatment O2 sats - (TCU only) 98   Pretreat Breath Sounds - Bilat - All Lobes diminished;clear   Pretreat Breath Sounds - JENNA clear   Pretreat Breath Sounds - LLL clear   Pretreat Breath Sounds - RUL clear   Pretreat Breath Sounds - RML diminished   Pretreat Breath Sounds - RLL diminished   Patient Position HOB elevated   Respiratory Treatment Status (SVN) given   Breath Sounds Post-Respiratory Treatment   Posttreatment Heart Rate (beats/min) 79   Posttreatment Resp Rate (breaths/min) 20   Post treatment O2 Sats - (TCU only) 99   Posttreatment Assessment (SVN) increased aeration   Signs of Intolerance (SVN) none   Breath Sounds Posttreatment All Fields JENNA;LLL;RUL;RML;RLL   Breath Sounds Posttreatment All Fields Posterior:;aeration increased   Breath Sounds Posttreatment JENNA diminished   Breath Sounds Posttreatment LLL diminished   Breath Sounds Posttreatment RUL crackles   Breath Sounds Posttreatment RML crackles   Breath Sounds Posttreatment RLL crackles   Treatment/Therapy   Cough And Deep Breathing done independently per patient     RT to follow as needed

## 2023-11-03 NOTE — PROVIDER NOTIFICATION
Pt was on 3L oxymask at rest. Pt ambulated to the bathroom on 6L oxymask. Pt's work of breathing and oxygen need increased and  Pt oxygen saturation dropped to 80%.Pt now on 12L oxymask sating 94%.

## 2023-11-03 NOTE — PROCEDURES
FIBEROPTIC BRONCHOSCOPY PROCEDURE NOTE (NON-OR)  Date of Procedure: 11/3/2023  Performing Physician: Reny Garcia DO  Pre-Procedure Diagnosis:   multifocal PNA  Post-Procedure Diagnosis:  Same as Pre-Procedure Diagnosis  Procedure:  Diagnostic Flexible Fiberoptic Bronchoscopy   Indications:  Jim Titus is a 58 year old male with PMHx of asthma, multiple myeloma on Keytruda, depression/anxiety recurrent hospitalization for pneumonia, discharged home on 10/25/2023 return to the hospital due to worsening cough and shortness of breath found to have increased multifocal pneumonia.     Preop evaluation:  Procedure:  Intravenous Sedation.    Expected level:  Moderate sedation  ASA Class: 3  Mallampati:  2.  Anesthesia: Please see RN flowsheet for medications, 4ml of 4% nebulized lidocaine, 4ml 4% lidocaine to vocal cords,  4ml 1% Xylocaine sub glottic   Specimen:  BAL RML  Estimated Blood Loss:  Minimal ml  Complications:  Pt had desaturation and significant coughing during the procedure. Was able to get BAL and briefly see right side of lungs, however had to abort the procedure for pt safety (hypoxia) on 15L mask and nasal cannula with O2 sats <90.  TB RIsk: low and therefore procedure not done in a negative pressure room    Findings:  After obtaining informed consent and time out performed the bronchoscope was introduced through the mouth. The nasopharynx/oropharynx appears:  Normal .  The larynx appears normal.  The vocal cords are normal and moved normally with phonation and breathing rate is normal.  The trachea is of  Normal  caliber.  The ingrid is sharp.  Pt had desaturation and significant coughing during the procedure. Was able to get BAL (15cc) and briefly see right side of lungs, however had to abort the procedure for pt safety (hypoxia) on 15L mask and nasal cannula with O2 sats <90. In my limited exam bronchial mucosa and anatomy were  Abnormal, with erythema and easily friable mucosa. There were not  significant secretions and thin and clear. Not indicative of bacterial PNA   .      Specimens were sent for microbiological analysis, cytology, pathology.     Procedure Details:   A history and physical exam has been performed. Patients medications and allergies reviewed. The risks, benefits of the procedure as well  sedation options and risks were discussed as were potential complications and expected outcomes were discussed with patien. The risks and potential complications of their problem and proposed treatment include but are not limited to infection, bleeding, pain, adverse drug reaction, pulmonary aspiration, the need for additional procedures, failure to diagnose a condition, creating a complication requiring transfusion or operation, and complication secondary to the anesthetic. All questions were answered and informed consent was obtained.     Patient identification and proposed procedure were verified prior to the procedure by the physician.  Mental status examination: Normal, Airway examination: Normal, ASA grade assessment: 3.  After reviewing the risks and benefits the patient was deemed in satisfactory condition to undergo the procedure.  Immediately prior to administration of medications patient was reassessed for adequacy to receive sedatives.  Heart rate, respiratory rate, oxygen saturations, blood pressure, adequacy of pulmonary ventilation, response to care were monitored throughout the procedure.  The physical status of the patient was reassessed after the procedure.      Reny Garcia DO, 11/3/2023, 12:23 PM    Referring Physician: * No referring provider recorded for this case *  Attending Physician: Porfirio Cui MD  Primary Care Physician: Luis Alberto Fatima

## 2023-11-03 NOTE — PROGRESS NOTES
Assisted with a bronchoscopy in the procedure room with DOMINIQUE and Dr. Gonzalez. Patient tolerated the procedure fairly well, patient did desaturate with procedure and needed increased FIO2 to recover. Specimens sent. Will continue to follow pt.     Nan Bhatti RT

## 2023-11-03 NOTE — CONSULTS
Care Management Initial Consult    General Information  Assessment completed with: Patient,    Type of CM/SW Visit: Initial Assessment  Primary Care Provider verified and updated as needed: Yes   Readmission within the last 30 days: previous discharge plan unsuccessful   Return Category: Exacerbation of disease  Reason for Consult: discharge planning, health care directive, transportation  Advance Care Planning: Advance Care Planning Reviewed: no concerns identified        Communication Assessment  Patient's communication style: spoken language (English or Bilingual)        Cognitive  Cognitive/Neuro/Behavioral: WDL                      Living Environment:   People in home: parent(s) (Pt's mother lives in the home as well)     Current living Arrangements: house      Able to return to prior arrangements: yes     Family/Social Support:  Care provided by: self  Provides care for: parent(s) (As needed)  Marital Status: Single  Parent(s), Sibling(s)          Description of Support System: Involved, Supportive (As needed)    Support Assessment: Adequate family and caregiver support, Adequate social supports    Current Resources:   Patient receiving home care services: No  Community Resources: None  Equipment currently used at home:    Supplies currently used at home: None    Employment/Financial:  Employment Status: employed full-time     Financial Concerns: none   Referral to Financial Worker: No     Does the patient's insurance plan have a 3 day qualifying hospital stay waiver?  No    Lifestyle & Psychosocial Needs:  Social Determinants of Health     Food Insecurity: Low Risk  (10/23/2023)    Food Insecurity     Within the past 12 months, did you worry that your food would run out before you got money to buy more?: No     Within the past 12 months, did the food you bought just not last and you didn t have money to get more?: No   Depression: Not at risk (4/11/2023)    PHQ-2     PHQ-2 Score: 1   Housing Stability: Low  Risk  (10/23/2023)    Housing Stability     Do you have housing? : Yes     Are you worried about losing your housing?: No   Tobacco Use: Medium Risk (11/2/2023)    Patient History     Smoking Tobacco Use: Never     Smokeless Tobacco Use: Former     Passive Exposure: Not on file   Financial Resource Strain: Low Risk  (10/23/2023)    Financial Resource Strain     Within the past 12 months, have you or your family members you live with been unable to get utilities (heat, electricity) when it was really needed?: No   Alcohol Use: Alcohol Misuse (11/10/2021)    AUDIT-C     Frequency of Alcohol Consumption: 4 or more times a week     Average Number of Drinks: 5 or 6     Frequency of Binge Drinking: Weekly   Transportation Needs: Low Risk  (10/23/2023)    Transportation Needs     Within the past 12 months, has lack of transportation kept you from medical appointments, getting your medicines, non-medical meetings or appointments, work, or from getting things that you need?: No   Physical Activity: Insufficiently Active (11/10/2021)    Exercise Vital Sign     Days of Exercise per Week: 1 day     Minutes of Exercise per Session: 30 min   Interpersonal Safety: Low Risk  (10/23/2023)    Interpersonal Safety     Do you feel physically and emotionally safe where you currently live?: Yes     Within the past 12 months, have you been hit, slapped, kicked or otherwise physically hurt by someone?: No     Within the past 12 months, have you been humiliated or emotionally abused in other ways by your partner or ex-partner?: No   Stress: Stress Concern Present (11/10/2021)    [employer] [employer] - Occupational Stress Questionnaire     Feeling of Stress : To some extent   Social Connections: Socially Isolated (11/10/2021)    Social Connection and Isolation Panel [NHANES]     Frequency of Communication with Friends and Family: Once a week     Frequency of Social Gatherings with Friends and Family: Once a week      "Attends Mosque Services: Never     Active Member of Clubs or Organizations: No     Attends Club or Organization Meetings: Not on file     Marital Status: Never      Functional Status:  Prior to admission patient needed assistance:   Dependent ADLs:: Independent  Dependent IADLs:: Independent  Assessment of Functional Status: Not at  functional baseline    Mental Health Status:  Mental Health Status: Past Concern  Mental Health Management: Medication, Psychiatrist    Chemical Dependency Status:  Chemical Dependency Status: No Current Concerns           Values/Beliefs:  Spiritual, Cultural Beliefs, Mosque Practices, Values that affect care: no (None identified)             Additional Information:  Writer met with pt to introduce Care Management(CM), obtain an initial assessment, and discuss discharge. Pt shares a house with his mother Nishi and reports total I/ADL independence when at baseline. No services, DME, or discharge concerns expressed. Pt anticipates improvement and return home at time of discharge.    11/2 per , N.-\"Patient is having recurrent bouts of cough, chest tightness, fever over the course of the past 6 weeks.  Certainly may be that because of his immune suppression from his treatment for skin cancer, he has difficulty fighting off of a bacterial pneumonia.  Also would have to consider something like Boop's in case he is sort of loculating off his pneumonia because of underlying lung damage from previous asthma and or immunologic medications.  Lastly would also consider the possibility of interstitial lung disease like pneumonitis because of his immune therapy.  Certainly also if he has had effusions developed he may be developing an empyema that will be more difficult to treat.  Given the fever I think it is less likely but would have to consider the possibility also of something like myositis or congestive heart failure complicating his recovery again could be tied to his " "medications.  Additionally because of recurrent hospitalizations and being sedentary more with these illnesses over the last 6 weeks he can see the fever cough due to pleural irritation from PE.  I do not see any signs of DVT in his extremities but again may need to do CT imaging to evaluate that.  Otherwise this may be low asthma exacerbation with that feeling of difficulty taking air in.  He was hypoxic initially and so do recommend hospitalization for further treatment.  Because he has a fever and known recent infection we will start him on antibiotics with signs of sepsis guided towards resistant community-acquired bacteria.  Otherwise, he may end up needing steroids but will leave that up to the primary care/admitting physician as he is not in distress at this moment and does not appear to be fatiguing.  Also I do not hear wheezing at this time.  Will likely need pulmonary consult and so we are getting baseline pulmonary function test considering the possibility of pneumonitis.  Consider legionella as well for atypical.\"    Infectious Disease Consult pending. Anticipate improvement and return home when medically ready. CM to follow for changes in current anticipated discharge disposition. Pt has his own personal vehicle in the hospital parking lot and intends on self transport at discharge.    Ra Morales RN      "

## 2023-11-03 NOTE — PRE-PROCEDURE
GENERAL PRE-PROCEDURE:   Procedure:  Breonchoscopy with BAL  Date/Time:  11/3/2023 10:47 AM    Verbal consent obtained?: Yes    Written consent obtained?: Yes    Risks and benefits: Risks, benefits and alternatives were discussed    Consent given by:  Patient  Patient states understanding of procedure being performed: Yes    Patient's understanding of procedure matches consent: Yes    Procedure consent matches procedure scheduled: Yes    Expected level of sedation:  Moderate  Appropriately NPO:  Yes  ASA Class:  3  Mallampati  :  Grade 2- soft palate, base of uvula, tonsillar pillars, and portion of posterior pharyngeal wall visible  Lungs:  Crackles left base and crackles right base  Heart:  Normal heart sounds and rate  History & Physical reviewed:  History and physical reviewed and no updates needed  Statement of review:  I have reviewed the lab findings, diagnostic data, medications, and the plan for sedation

## 2023-11-03 NOTE — CONSULTS
Consultation - INFECTIOUS DISEASE CONSULTATION  Jim Titus ,  1965, MRN [id number]      Elevated alkaline phosphatase level [R74.8]    PCP: Luis Alberto Fatima, [phone]   Code status:  Full Code               Assessment:  Multifocal pneumonia, hypoxic respiratory failure: worsening despite recent courses of antibiotics. Elevated inflammatory markers. CT with increased consolidation R>L consistent. Bronchoscopy 11/3. Many results pending.   Recent hospitalization 10/23 for pneumonia-received 2 days vanc/zosyn before transitioning to levofloxacin on discharge  Hx melanoma on keytruda    Principal Problem:    Elevated alkaline phosphatase level  Active Problems:    Acute respiratory failure with hypoxia (H)    Sepsis, due to unspecified organism, unspecified whether acute organ dysfunction present (H)      Recommendations:   - continue zosyn IV  - continue vanc IV for now-if BC NGTD consider discontinuing (nares negative for staph)  - continue azithromycin for now-note has completed around 10 days of legionella coverage  - pending: fungitell, histo, fungal antibodies, BAL studies   - negative: urine legionella, strep  - further recommendations to follow clinical course  - ID will follow, thanks    Alyce Gallardo MD  [facility]  [phone]       HPI:    Jim Titus is a 58 year old male. History is provided by patient, chart, family.    Diagnosed with pneumonia a month ago, received augmentin, felt a bit better briefly but not back to baseline and worsened after abx completed.   10/23 hospitalized, received vanc/zosyn and azithromycin for 2 days, felt better and transitioned to levofloxacin at discharge.   Felt worse, increased dyspnea, increased coughing. No rashes. Felt much better after hospitalization than after PO abx but not back to baseline. Unclear if fevers or chills.   CT with increased multifocal pneumonia. Started on broad abx. Bronchoscopy planned today. Overall  is feeling worse since admission.   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 Hobbies are mostly golf.     Chief complaint: Principal Problem:    Elevated alkaline phosphatase level  Active Problems:    Acute respiratory failure with hypoxia (H)    Sepsis, due to unspecified organism, unspecified whether acute organ dysfunction present (H)      Medical History  Active Ambulatory Problems     Diagnosis Date Noted    Diverticulosis 12/05/2017    IVY (generalized anxiety disorder) 12/05/2017    Chronic gout 12/05/2017    Hyperlipemia 12/05/2017    Class 1 obesity due to excess calories without serious comorbidity with body mass index (BMI) of 33.0 to 33.9 in adult 11/10/2021    Moderate persistent asthma without complication 05/16/2022    Allergic rhinitis due to pollen 05/16/2022    Healthcare maintenance 05/16/2022    Melanoma of skin (H) 08/16/2023    Pneumonia 10/23/2023     Resolved Ambulatory Problems     Diagnosis Date Noted    Colon polyps 12/05/2017     No Additional Past Medical History         Surgical History  He  has a past surgical history that includes hernia repair; orthopedic surgery; and pyloric stenosis repair.       Social History  Reviewed, and he  reports that he has never smoked. He quit smokeless tobacco use about 25 years ago.  His smokeless tobacco use included chew. He reports current alcohol use. He reports current drug use. Drug: Marijuana.        Family History  Reviewed and noncontributory to present problem, and family history includes Breast Cancer in his mother; Heart Disease in his father; Hyperlipidemia in his father; Mental Illness in his brother and father. No FH frequent infections Psychosocial Needs  Social History     Social History Narrative    Not on file     Additional psychosocial needs reviewed per nursing assessment.       Allergies   Allergen Reactions    Chicken [Chicken Protein] Other (See Comments)     Throat closes to turkey as well.    Cantaloupe [Cantaloupe Extract Allergy Skin Test]  Unknown    Wellbutrin [Bupropion] Unknown    Grass Pollen [Grass] Rash    Turkey [Poultry Meal] Rash      (Not in a hospital admission)       Review of Systems:  A 12 point comprehensive review of systems was negative except as noted. Physical Exam:  Temp:  [98  F (36.7  C)-101.2  F (38.4  C)] 99.8  F (37.7  C)  Pulse:  [] 93  Resp:  [16-29] 20  BP: (101-135)/(58-79) 125/70  FiO2 (%):  [45 %] 45 %  SpO2:  [88 %-100 %] 96 %    GEN: alert and oriented x3, NAD  HEAD: atraumatic  ENT: moist membranes, no thrush, anicteric sclera   NECK: supple, no nuchal rigidity  CARDIOVASCULAR: regular rate and rhythm, no murmurs, rubs, or gallops  PULMONARY: course R>L, high flow O2.   ABDOMEN: soft, nontender, nondistended. Normal bowel sounds  SKIN: no rashes or lesions. No stigma of endocarditis  PSYCH: grossly intact  MUSCULOSKELETAL: no synovitis               Pertinent Labs  personally reviewed.   CBC RESULTS:   Recent Labs   Lab Test 11/03/23  0557   WBC 9.5   RBC 3.15*   HGB 9.0*   HCT 29.5*   MCV 94   MCH 28.6   MCHC 30.5*   RDW 14.7   *        Last Comprehensive Metabolic Panel:  Sodium   Date Value Ref Range Status   11/03/2023 140 135 - 145 mmol/L Final     Comment:     Reference intervals for this test were updated on 09/26/2023 to more accurately reflect our healthy population. There may be differences in the flagging of prior results with similar values performed with this method. Interpretation of those prior results can be made in the context of the updated reference intervals.      Potassium   Date Value Ref Range Status   11/03/2023 3.7 3.4 - 5.3 mmol/L Final   11/10/2021 4.3 3.5 - 5.0 mmol/L Final     Chloride   Date Value Ref Range Status   11/03/2023 104 98 - 107 mmol/L Final   11/10/2021 107 98 - 107 mmol/L Final     Carbon Dioxide (CO2)   Date Value Ref Range Status   11/03/2023 25 22 - 29 mmol/L Final   11/10/2021 21 (L) 22 - 31 mmol/L Final     Anion Gap   Date Value Ref Range Status  "  11/03/2023 11 7 - 15 mmol/L Final   11/10/2021 12 5 - 18 mmol/L Final     Glucose   Date Value Ref Range Status   11/03/2023 99 70 - 99 mg/dL Final   11/10/2021 97 70 - 125 mg/dL Final     Urea Nitrogen   Date Value Ref Range Status   11/03/2023 5.1 (L) 6.0 - 20.0 mg/dL Final   11/10/2021 14 8 - 22 mg/dL Final     Creatinine   Date Value Ref Range Status   11/03/2023 0.84 0.67 - 1.17 mg/dL Final     GFR Estimate   Date Value Ref Range Status   11/03/2023 >90 >60 mL/min/1.73m2 Final     Calcium   Date Value Ref Range Status   11/03/2023 8.4 (L) 8.6 - 10.0 mg/dL Final       No results found for: \"CRP\"     The following microbiology studies were personally reviewed:  No results found for: \"CULT\"    Urine Studies    Recent Labs   Lab Test 11/10/21  1438   LEUKEST Negative       Vancomycin Levels  No lab results found.    Invalid input(s): \"VANCO\"    MICROBIOLOGY DATA:    All cultures:  7-Day Micro Results       Collected Updated Procedure Result Status      11/02/2023 2126 11/02/2023 2341 Strep pneumo Agn Ur greater or equal to 13yrs or CSF any age [[id number]]   Urine, NOS    Final result Component Value   Streptococcus pneumoniae antigen Negative   A negative Streptococcus pneumoniae antigen result does not rule out infection with Streptococcus pneumoniae.            11/02/2023 2126 11/02/2023 2130 Histoplasma Galactomannan Antigen Quant by EIA, Urine [[id number]]   Urine, NOS    In process Component Value   No component results            11/02/2023 2126 11/02/2023 2341 Legionella pneumophila antigen urine [[id number]]   Urine, NOS    Final result Component Value   Legionella pneumophila serogroup 1 urinary antigen Negative   Suggests no recent or current infection. Infection due to Legionella cannot be ruled out, since other serogroups and species may cause disease, antigen may not be present in urine in early infection, and the level of antigen present in the urine may be below detectable limits of the test. "            11/02/2023 2050 11/02/2023 2054 1,3 Beta D glucan fungitell [[id number]]   Blood from Arm, Right    In process Component Value   No component results            11/02/2023 1842 11/02/2023 2324 MRSA/MSSA PCR [[id number]]    Swab from Nares, Bilateral    Final result Component Value   MRSA Target DNA Negative   SA Target DNA Negative            11/02/2023 1611 11/02/2023 1951 Fungal Antibodies with Reflex to Blastomyces dermatitidis Antibodies by Immunodiffusion [[id number]]   Blood from Arm, Right    In process Component Value   No component results            11/02/2023 0852 11/02/2023 0949 Symptomatic Influenza A/B, RSV, & SARS-CoV2 PCR (COVID-19) Nasopharyngeal [[id number]]    Swab from Nasopharyngeal    Final result Component Value   Influenza A PCR Negative   Influenza B PCR Negative   RSV PCR Negative   SARS CoV2 PCR Negative   NEGATIVE: SARS-CoV-2 (COVID-19) RNA not detected, presumed negative.            11/02/2023 0845 11/03/2023 0046 Blood Culture Peripheral Blood [[id number]]   Peripheral Blood    Preliminary result Component Value   Culture No growth after 12 hours  [P]                11/02/2023 0842 11/03/2023 0046 Blood Culture Peripheral Blood [[id number]]   Peripheral Blood    Preliminary result Component Value   Culture No growth after 12 hours  [P]                         Pertinent Radiology  personally reviewed.     Abdomen US, limited (RUQ only)    Result Date: 11/2/2023  EXAM: US ABDOMEN LIMITED LOCATION: [facility] DATE: 11/2/2023 INDICATION: sepsis, cough, difficulty taking in big breath, poor appetite, elevating alk phos with recent hospitalization. COMPARISON: None. TECHNIQUE: Limited abdominal ultrasound. FINDINGS: GALLBLADDER: Normal. No gallstones, wall thickening, or pericholecystic fluid. Negative sonographic Aguilar's sign. BILE DUCTS: No biliary dilatation. The common duct measures 3 mm. LIVER: Normal parenchyma with smooth contour. No  focal mass. RIGHT KIDNEY: No hydronephrosis. PANCREAS: The visualized portions are normal. No ascites.     IMPRESSION: Normal limited abdominal ultrasound.     CT Chest Pulmonary Embolism w Contrast    Result Date: 11/2/2023  EXAM: CT CHEST PULMONARY EMBOLISM W CONTRAST LOCATION: [facility] DATE: 11/2/2023 INDICATION: cough, hypoxia, recent hospitalization x 9 days  for CAP COMPARISON: CTA chest 10/23/2023 TECHNIQUE: CT chest pulmonary angiogram during arterial phase injection of IV contrast. Multiplanar reformats and MIP reconstructions were performed. Dose reduction techniques were used. CONTRAST: Isovue 370 100mL FINDINGS: ANGIOGRAM CHEST: Pulmonary arteries are normal caliber and negative for pulmonary emboli. Thoracic aorta is negative for dissection. No CT evidence of right heart strain. LUNGS AND PLEURA: Increased consolidation within the right upper, right middle, right lower, left upper, and left lower lobes. Atoll sign in the left upper lobe (7/46). No effusion or empyema. No pneumothorax. MEDIASTINUM/AXILLAE: Normal heart size. No pericardial effusion. Reactive mediastinal lymphadenopathy. CORONARY ARTERY CALCIFICATION: Mild. UPPER ABDOMEN: Hepatic steatosis. MUSCULOSKELETAL: No acute abnormality.     IMPRESSION: 1.  No pulmonary embolism. 2.  Increased consolidation associated with the multifocal pneumonia (right greater than left lungs). Appearance is nonspecific but could be seen in the setting of atypical infection (including fungal organisms) or bacterial pneumonia. No empyema or other complication.

## 2023-11-03 NOTE — PLAN OF CARE
Problem: Pneumonia  Goal: Effective Oxygenation and Ventilation  Outcome: Not Progressing  Intervention: Promote Airway Secretion Clearance  Recent Flowsheet Documentation  Taken 11/3/2023 0018 by Reyes, Dora, RN  Cough And Deep Breathing: done independently per patient     Problem: Adult Inpatient Plan of Care  Goal: Optimal Comfort and Wellbeing  Outcome: Progressing   Goal Outcome Evaluation:    Pt AxO, no acute changes, pt denies pain. Pt remains NPO since 00. Pt VSS on 3L via oxymask, pt continues to desaturate w/ambulation. IV w/NS running at 150ml/hr. IV zosyn and vanco administered this shift. Sputum sample was not able to be collected this shift. Pt resting comfortably in between cares, respirations even and unlabored, no acute distress noted w/assessment.

## 2023-11-04 ENCOUNTER — APPOINTMENT (OUTPATIENT)
Dept: RADIOLOGY | Facility: CLINIC | Age: 58
End: 2023-11-04
Attending: INTERNAL MEDICINE
Payer: COMMERCIAL

## 2023-11-04 LAB
1,3 BETA GLUCAN SER-MCNC: <31 PG/ML
ALBUMIN SERPL BCG-MCNC: 2.8 G/DL (ref 3.5–5.2)
ALP SERPL-CCNC: 293 U/L (ref 40–129)
ALT SERPL W P-5'-P-CCNC: 23 U/L (ref 0–70)
ANION GAP SERPL CALCULATED.3IONS-SCNC: 10 MMOL/L (ref 7–15)
AST SERPL W P-5'-P-CCNC: 21 U/L (ref 0–45)
BASE EXCESS BLDA CALC-SCNC: -2.4 MMOL/L
BASE EXCESS BLDA CALC-SCNC: -5.1 MMOL/L
BASE EXCESS BLDA CALC-SCNC: -6.8 MMOL/L
BASE EXCESS BLDV CALC-SCNC: 1.9 MMOL/L
BASOPHILS # BLD AUTO: 0.1 10E3/UL (ref 0–0.2)
BASOPHILS NFR BLD AUTO: 0 %
BILIRUB SERPL-MCNC: 0.9 MG/DL
BUN SERPL-MCNC: 4.3 MG/DL (ref 6–20)
CALCIUM SERPL-MCNC: 8.4 MG/DL (ref 8.6–10)
CHLORIDE SERPL-SCNC: 104 MMOL/L (ref 98–107)
COHGB MFR BLD: 99.2 % (ref 96–97)
COHGB MFR BLD: 99.9 % (ref 96–97)
COHGB MFR BLD: 99.9 % (ref 96–97)
CREAT SERPL-MCNC: 0.85 MG/DL (ref 0.67–1.17)
DEPRECATED HCO3 PLAS-SCNC: 25 MMOL/L (ref 22–29)
EGFRCR SERPLBLD CKD-EPI 2021: >90 ML/MIN/1.73M2
EOSINOPHIL # BLD AUTO: 0.2 10E3/UL (ref 0–0.7)
EOSINOPHIL NFR BLD AUTO: 2 %
ERYTHROCYTE [DISTWIDTH] IN BLOOD BY AUTOMATED COUNT: 14.7 % (ref 10–15)
GLUCOSE BLDC GLUCOMTR-MCNC: 158 MG/DL (ref 70–99)
GLUCOSE BLDC GLUCOMTR-MCNC: 281 MG/DL (ref 70–99)
GLUCOSE SERPL-MCNC: 102 MG/DL (ref 70–99)
HCO3 BLD-SCNC: 20 MMOL/L (ref 23–29)
HCO3 BLD-SCNC: 22 MMOL/L (ref 23–29)
HCO3 BLD-SCNC: 24 MMOL/L (ref 23–29)
HCO3 BLDV-SCNC: 27 MMOL/L (ref 24–30)
HCT VFR BLD AUTO: 27.9 % (ref 40–53)
HGB BLD-MCNC: 8.7 G/DL (ref 13.3–17.7)
IMM GRANULOCYTES # BLD: 0.1 10E3/UL
IMM GRANULOCYTES NFR BLD: 1 %
LACTATE SERPL-SCNC: 0.5 MMOL/L (ref 0.7–2)
LYMPHOCYTES # BLD AUTO: 0.7 10E3/UL (ref 0.8–5.3)
LYMPHOCYTES NFR BLD AUTO: 6 %
MAGNESIUM SERPL-MCNC: 2 MG/DL (ref 1.7–2.3)
MCH RBC QN AUTO: 28.8 PG (ref 26.5–33)
MCHC RBC AUTO-ENTMCNC: 31.2 G/DL (ref 31.5–36.5)
MCV RBC AUTO: 92 FL (ref 78–100)
MONOCYTES # BLD AUTO: 0.8 10E3/UL (ref 0–1.3)
MONOCYTES NFR BLD AUTO: 7 %
NEUTROPHILS # BLD AUTO: 9.5 10E3/UL (ref 1.6–8.3)
NEUTROPHILS NFR BLD AUTO: 84 %
NRBC # BLD AUTO: 0 10E3/UL
NRBC BLD AUTO-RTO: 0 /100
NT-PROBNP SERPL-MCNC: 1264 PG/ML (ref 0–900)
OBSERVATION IMP: NEGATIVE
OXYHGB MFR BLD: 97.5 % (ref 96–97)
OXYHGB MFR BLD: 98.3 % (ref 96–97)
OXYHGB MFR BLD: 98.5 % (ref 96–97)
OXYHGB MFR BLDV: 59.9 % (ref 70–75)
PCO2 BLD: 46 MM HG (ref 35–45)
PCO2 BLD: 49 MM HG (ref 35–45)
PCO2 BLD: 51 MM HG (ref 35–45)
PCO2 BLDV: 46 MM HG (ref 35–50)
PH BLD: 7.26 [PH] (ref 7.37–7.44)
PH BLD: 7.26 [PH] (ref 7.37–7.44)
PH BLD: 7.29 [PH] (ref 7.37–7.44)
PH BLDV: 7.38 [PH] (ref 7.35–7.45)
PLATELET # BLD AUTO: 452 10E3/UL (ref 150–450)
PO2 BLD: 132 MM HG (ref 80–90)
PO2 BLD: 149 MM HG (ref 80–90)
PO2 BLD: 155 MM HG (ref 80–90)
PO2 BLDV: 33 MM HG (ref 25–47)
POTASSIUM SERPL-SCNC: 3.7 MMOL/L (ref 3.4–5.3)
POTASSIUM SERPL-SCNC: 3.7 MMOL/L (ref 3.4–5.3)
PROCALCITONIN SERPL IA-MCNC: 0.27 NG/ML
PROT SERPL-MCNC: 6.2 G/DL (ref 6.4–8.3)
RBC # BLD AUTO: 3.02 10E6/UL (ref 4.4–5.9)
SAO2 % BLDV: 60.7 % (ref 70–75)
SODIUM SERPL-SCNC: 139 MMOL/L (ref 135–145)
TEMPERATURE: 37 DEGREES C
TROPONIN T SERPL HS-MCNC: 60 NG/L
TROPONIN T SERPL HS-MCNC: 64 NG/L
WBC # BLD AUTO: 11.3 10E3/UL (ref 4–11)

## 2023-11-04 PROCEDURE — 99207 PR NO BILLABLE SERVICE THIS VISIT: CPT | Performed by: INTERNAL MEDICINE

## 2023-11-04 PROCEDURE — 999N000065 XR CHEST PORT 1 VIEW

## 2023-11-04 PROCEDURE — 250N000009 HC RX 250: Performed by: INTERNAL MEDICINE

## 2023-11-04 PROCEDURE — 36569 INSJ PICC 5 YR+ W/O IMAGING: CPT

## 2023-11-04 PROCEDURE — 36415 COLL VENOUS BLD VENIPUNCTURE: CPT | Performed by: STUDENT IN AN ORGANIZED HEALTH CARE EDUCATION/TRAINING PROGRAM

## 2023-11-04 PROCEDURE — 200N000001 HC R&B ICU

## 2023-11-04 PROCEDURE — 83605 ASSAY OF LACTIC ACID: CPT | Performed by: STUDENT IN AN ORGANIZED HEALTH CARE EDUCATION/TRAINING PROGRAM

## 2023-11-04 PROCEDURE — 258N000003 HC RX IP 258 OP 636: Performed by: INTERNAL MEDICINE

## 2023-11-04 PROCEDURE — 99232 SBSQ HOSP IP/OBS MODERATE 35: CPT | Performed by: INTERNAL MEDICINE

## 2023-11-04 PROCEDURE — 83880 ASSAY OF NATRIURETIC PEPTIDE: CPT | Performed by: INTERNAL MEDICINE

## 2023-11-04 PROCEDURE — 83735 ASSAY OF MAGNESIUM: CPT | Performed by: INTERNAL MEDICINE

## 2023-11-04 PROCEDURE — 250N000012 HC RX MED GY IP 250 OP 636 PS 637: Performed by: INTERNAL MEDICINE

## 2023-11-04 PROCEDURE — 250N000013 HC RX MED GY IP 250 OP 250 PS 637: Performed by: INTERNAL MEDICINE

## 2023-11-04 PROCEDURE — 03HY32Z INSERTION OF MONITORING DEVICE INTO UPPER ARTERY, PERCUTANEOUS APPROACH: ICD-10-PCS | Performed by: INTERNAL MEDICINE

## 2023-11-04 PROCEDURE — 31624 DX BRONCHOSCOPE/LAVAGE: CPT | Performed by: INTERNAL MEDICINE

## 2023-11-04 PROCEDURE — 82805 BLOOD GASES W/O2 SATURATION: CPT | Performed by: INTERNAL MEDICINE

## 2023-11-04 PROCEDURE — 36620 INSERTION CATHETER ARTERY: CPT | Mod: 59 | Performed by: INTERNAL MEDICINE

## 2023-11-04 PROCEDURE — 250N000013 HC RX MED GY IP 250 OP 250 PS 637: Performed by: STUDENT IN AN ORGANIZED HEALTH CARE EDUCATION/TRAINING PROGRAM

## 2023-11-04 PROCEDURE — 3E043XZ INTRODUCTION OF VASOPRESSOR INTO CENTRAL VEIN, PERCUTANEOUS APPROACH: ICD-10-PCS | Performed by: INTERNAL MEDICINE

## 2023-11-04 PROCEDURE — 85025 COMPLETE CBC W/AUTO DIFF WBC: CPT | Performed by: STUDENT IN AN ORGANIZED HEALTH CARE EDUCATION/TRAINING PROGRAM

## 2023-11-04 PROCEDURE — 82565 ASSAY OF CREATININE: CPT | Performed by: STUDENT IN AN ORGANIZED HEALTH CARE EDUCATION/TRAINING PROGRAM

## 2023-11-04 PROCEDURE — 250N000011 HC RX IP 250 OP 636: Performed by: STUDENT IN AN ORGANIZED HEALTH CARE EDUCATION/TRAINING PROGRAM

## 2023-11-04 PROCEDURE — 94002 VENT MGMT INPAT INIT DAY: CPT

## 2023-11-04 PROCEDURE — 250N000009 HC RX 250: Performed by: STUDENT IN AN ORGANIZED HEALTH CARE EDUCATION/TRAINING PROGRAM

## 2023-11-04 PROCEDURE — 258N000003 HC RX IP 258 OP 636

## 2023-11-04 PROCEDURE — 258N000003 HC RX IP 258 OP 636: Performed by: STUDENT IN AN ORGANIZED HEALTH CARE EDUCATION/TRAINING PROGRAM

## 2023-11-04 PROCEDURE — 999N000157 HC STATISTIC RCP TIME EA 10 MIN

## 2023-11-04 PROCEDURE — 80053 COMPREHEN METABOLIC PANEL: CPT | Performed by: INTERNAL MEDICINE

## 2023-11-04 PROCEDURE — 5A1945Z RESPIRATORY VENTILATION, 24-96 CONSECUTIVE HOURS: ICD-10-PCS | Performed by: INTERNAL MEDICINE

## 2023-11-04 PROCEDURE — 84145 PROCALCITONIN (PCT): CPT | Performed by: STUDENT IN AN ORGANIZED HEALTH CARE EDUCATION/TRAINING PROGRAM

## 2023-11-04 PROCEDURE — 999N000289 HC STATISTIC BRONCHOSCOPY ASST

## 2023-11-04 PROCEDURE — 87040 BLOOD CULTURE FOR BACTERIA: CPT | Performed by: STUDENT IN AN ORGANIZED HEALTH CARE EDUCATION/TRAINING PROGRAM

## 2023-11-04 PROCEDURE — 94640 AIRWAY INHALATION TREATMENT: CPT | Mod: 76

## 2023-11-04 PROCEDURE — 84132 ASSAY OF SERUM POTASSIUM: CPT | Performed by: STUDENT IN AN ORGANIZED HEALTH CARE EDUCATION/TRAINING PROGRAM

## 2023-11-04 PROCEDURE — 250N000011 HC RX IP 250 OP 636: Mod: JZ

## 2023-11-04 PROCEDURE — 4A133B1 MONITORING OF ARTERIAL PRESSURE, PERIPHERAL, PERCUTANEOUS APPROACH: ICD-10-PCS | Performed by: INTERNAL MEDICINE

## 2023-11-04 PROCEDURE — 0BJ08ZZ INSPECTION OF TRACHEOBRONCHIAL TREE, VIA NATURAL OR ARTIFICIAL OPENING ENDOSCOPIC: ICD-10-PCS | Performed by: INTERNAL MEDICINE

## 2023-11-04 PROCEDURE — 83735 ASSAY OF MAGNESIUM: CPT | Performed by: STUDENT IN AN ORGANIZED HEALTH CARE EDUCATION/TRAINING PROGRAM

## 2023-11-04 PROCEDURE — 99291 CRITICAL CARE FIRST HOUR: CPT | Mod: 25 | Performed by: INTERNAL MEDICINE

## 2023-11-04 PROCEDURE — 272N000220 HC BRONCHOSCOPE, DISPOSABLE

## 2023-11-04 PROCEDURE — 250N000011 HC RX IP 250 OP 636: Mod: JZ | Performed by: INTERNAL MEDICINE

## 2023-11-04 PROCEDURE — 99233 SBSQ HOSP IP/OBS HIGH 50: CPT | Performed by: STUDENT IN AN ORGANIZED HEALTH CARE EDUCATION/TRAINING PROGRAM

## 2023-11-04 PROCEDURE — 272N000451 HC KIT SHRLOCK 5FR POWER PICC DOUBLE LUMEN

## 2023-11-04 PROCEDURE — 94660 CPAP INITIATION&MGMT: CPT

## 2023-11-04 PROCEDURE — 84484 ASSAY OF TROPONIN QUANT: CPT | Performed by: INTERNAL MEDICINE

## 2023-11-04 PROCEDURE — 31500 INSERT EMERGENCY AIRWAY: CPT | Mod: 59 | Performed by: INTERNAL MEDICINE

## 2023-11-04 RX ORDER — NOREPINEPHRINE BITARTRATE 0.02 MG/ML
.01-.6 INJECTION, SOLUTION INTRAVENOUS CONTINUOUS
Status: DISCONTINUED | OUTPATIENT
Start: 2023-11-04 | End: 2023-11-09

## 2023-11-04 RX ORDER — LIDOCAINE 40 MG/G
CREAM TOPICAL
Status: ACTIVE | OUTPATIENT
Start: 2023-11-04 | End: 2023-11-07

## 2023-11-04 RX ORDER — NALOXONE HYDROCHLORIDE 0.4 MG/ML
0.4 INJECTION, SOLUTION INTRAMUSCULAR; INTRAVENOUS; SUBCUTANEOUS
Status: DISCONTINUED | OUTPATIENT
Start: 2023-11-04 | End: 2023-11-20 | Stop reason: HOSPADM

## 2023-11-04 RX ORDER — PROPOFOL 10 MG/ML
5-75 INJECTION, EMULSION INTRAVENOUS CONTINUOUS
Status: DISCONTINUED | OUTPATIENT
Start: 2023-11-04 | End: 2023-11-08

## 2023-11-04 RX ORDER — METHYLPREDNISOLONE SODIUM SUCCINATE 125 MG/2ML
60 INJECTION, POWDER, LYOPHILIZED, FOR SOLUTION INTRAMUSCULAR; INTRAVENOUS EVERY 8 HOURS
Status: DISCONTINUED | OUTPATIENT
Start: 2023-11-04 | End: 2023-11-13

## 2023-11-04 RX ORDER — MINERAL OIL AND WHITE PETROLATUM 150; 830 MG/G; MG/G
1 OINTMENT OPHTHALMIC EVERY 8 HOURS
Status: DISCONTINUED | OUTPATIENT
Start: 2023-11-04 | End: 2023-11-04

## 2023-11-04 RX ORDER — NOREPINEPHRINE BITARTRATE 0.02 MG/ML
INJECTION, SOLUTION INTRAVENOUS
Status: DISPENSED
Start: 2023-11-04 | End: 2023-11-05

## 2023-11-04 RX ORDER — IPRATROPIUM BROMIDE AND ALBUTEROL SULFATE 2.5; .5 MG/3ML; MG/3ML
3 SOLUTION RESPIRATORY (INHALATION)
Status: DISCONTINUED | OUTPATIENT
Start: 2023-11-04 | End: 2023-11-07

## 2023-11-04 RX ORDER — ACETYLCYSTEINE 200 MG/ML
2 SOLUTION ORAL; RESPIRATORY (INHALATION) EVERY 6 HOURS
Status: DISCONTINUED | OUTPATIENT
Start: 2023-11-04 | End: 2023-11-04

## 2023-11-04 RX ORDER — FENTANYL CITRATE 50 UG/ML
50 INJECTION, SOLUTION INTRAMUSCULAR; INTRAVENOUS ONCE
Status: COMPLETED | OUTPATIENT
Start: 2023-11-04 | End: 2023-11-04

## 2023-11-04 RX ORDER — CHLORHEXIDINE GLUCONATE ORAL RINSE 1.2 MG/ML
15 SOLUTION DENTAL EVERY 12 HOURS
Status: DISCONTINUED | OUTPATIENT
Start: 2023-11-04 | End: 2023-11-09

## 2023-11-04 RX ORDER — DEXTROSE MONOHYDRATE 50 MG/ML
10-20 INJECTION, SOLUTION INTRAVENOUS EVERY 24 HOURS
Status: DISCONTINUED | OUTPATIENT
Start: 2023-11-04 | End: 2023-11-05

## 2023-11-04 RX ORDER — DEXTROSE MONOHYDRATE 25 G/50ML
25-50 INJECTION, SOLUTION INTRAVENOUS
Status: DISCONTINUED | OUTPATIENT
Start: 2023-11-04 | End: 2023-11-20 | Stop reason: HOSPADM

## 2023-11-04 RX ORDER — DIPHENHYDRAMINE HCL 25 MG
25 CAPSULE ORAL EVERY 24 HOURS
Status: DISCONTINUED | OUTPATIENT
Start: 2023-11-04 | End: 2023-11-05

## 2023-11-04 RX ORDER — NICOTINE POLACRILEX 4 MG
15-30 LOZENGE BUCCAL
Status: DISCONTINUED | OUTPATIENT
Start: 2023-11-04 | End: 2023-11-20 | Stop reason: HOSPADM

## 2023-11-04 RX ORDER — ACETYLCYSTEINE 200 MG/ML
2 SOLUTION ORAL; RESPIRATORY (INHALATION) EVERY 6 HOURS
Status: DISCONTINUED | OUTPATIENT
Start: 2023-11-04 | End: 2023-11-07

## 2023-11-04 RX ORDER — NALOXONE HYDROCHLORIDE 0.4 MG/ML
0.2 INJECTION, SOLUTION INTRAMUSCULAR; INTRAVENOUS; SUBCUTANEOUS
Status: DISCONTINUED | OUTPATIENT
Start: 2023-11-04 | End: 2023-11-20 | Stop reason: HOSPADM

## 2023-11-04 RX ORDER — DIPHENHYDRAMINE HYDROCHLORIDE 50 MG/ML
25 INJECTION INTRAMUSCULAR; INTRAVENOUS EVERY 6 HOURS PRN
Status: DISCONTINUED | OUTPATIENT
Start: 2023-11-04 | End: 2023-11-11

## 2023-11-04 RX ORDER — DEXMEDETOMIDINE HYDROCHLORIDE 4 UG/ML
.1-1.2 INJECTION, SOLUTION INTRAVENOUS CONTINUOUS
Status: DISCONTINUED | OUTPATIENT
Start: 2023-11-04 | End: 2023-11-05

## 2023-11-04 RX ORDER — ACETAMINOPHEN 325 MG/1
650 TABLET ORAL EVERY 24 HOURS
Status: DISCONTINUED | OUTPATIENT
Start: 2023-11-04 | End: 2023-11-05

## 2023-11-04 RX ORDER — FUROSEMIDE 10 MG/ML
20 INJECTION INTRAMUSCULAR; INTRAVENOUS EVERY 12 HOURS
Status: DISCONTINUED | OUTPATIENT
Start: 2023-11-04 | End: 2023-11-05

## 2023-11-04 RX ORDER — DIPHENHYDRAMINE HCL 25 MG
25 CAPSULE ORAL EVERY 6 HOURS PRN
Status: DISCONTINUED | OUTPATIENT
Start: 2023-11-04 | End: 2023-11-11

## 2023-11-04 RX ORDER — FENTANYL CITRATE 50 UG/ML
INJECTION, SOLUTION INTRAMUSCULAR; INTRAVENOUS
Status: CANCELLED | OUTPATIENT
Start: 2023-11-04

## 2023-11-04 RX ORDER — ETOMIDATE 2 MG/ML
20 INJECTION INTRAVENOUS ONCE
Status: COMPLETED | OUTPATIENT
Start: 2023-11-04 | End: 2023-11-04

## 2023-11-04 RX ORDER — DIPHENHYDRAMINE HYDROCHLORIDE 50 MG/ML
25 INJECTION INTRAMUSCULAR; INTRAVENOUS EVERY 24 HOURS
Status: DISCONTINUED | OUTPATIENT
Start: 2023-11-04 | End: 2023-11-05

## 2023-11-04 RX ADMIN — LOPERAMIDE HYDROCHLORIDE 2 MG: 2 CAPSULE ORAL at 06:32

## 2023-11-04 RX ADMIN — PIPERACILLIN AND TAZOBACTAM 3.38 G: 3; .375 INJECTION, POWDER, LYOPHILIZED, FOR SOLUTION INTRAVENOUS at 09:42

## 2023-11-04 RX ADMIN — PIPERACILLIN AND TAZOBACTAM 3.38 G: 3; .375 INJECTION, POWDER, LYOPHILIZED, FOR SOLUTION INTRAVENOUS at 20:28

## 2023-11-04 RX ADMIN — Medication 125 MCG/HR: at 16:12

## 2023-11-04 RX ADMIN — SODIUM CHLORIDE: 9 INJECTION, SOLUTION INTRAVENOUS at 03:14

## 2023-11-04 RX ADMIN — METHYLPREDNISOLONE SODIUM SUCCINATE 62.5 MG: 125 INJECTION, POWDER, FOR SOLUTION INTRAMUSCULAR; INTRAVENOUS at 20:23

## 2023-11-04 RX ADMIN — CHLORHEXIDINE GLUCONATE 15 ML: 1.2 SOLUTION ORAL at 20:17

## 2023-11-04 RX ADMIN — ACETAMINOPHEN 650 MG: 325 TABLET ORAL at 06:32

## 2023-11-04 RX ADMIN — PROPOFOL 60 MCG/KG/MIN: 10 INJECTION, EMULSION INTRAVENOUS at 23:39

## 2023-11-04 RX ADMIN — INSULIN ASPART 3 UNITS: 100 INJECTION, SOLUTION INTRAVENOUS; SUBCUTANEOUS at 22:02

## 2023-11-04 RX ADMIN — PROPOFOL 60 MCG/KG/MIN: 10 INJECTION, EMULSION INTRAVENOUS at 16:40

## 2023-11-04 RX ADMIN — NOREPINEPHRINE BITARTRATE 0.07 MCG/KG/MIN: 0.02 INJECTION, SOLUTION INTRAVENOUS at 20:19

## 2023-11-04 RX ADMIN — METHYLPREDNISOLONE SODIUM SUCCINATE 62.5 MG: 125 INJECTION, POWDER, FOR SOLUTION INTRAMUSCULAR; INTRAVENOUS at 09:43

## 2023-11-04 RX ADMIN — CISATRACURIUM BESYLATE 3 MCG/KG/MIN: 10 INJECTION, SOLUTION INTRAVENOUS at 17:40

## 2023-11-04 RX ADMIN — PIPERACILLIN AND TAZOBACTAM 3.38 G: 3; .375 INJECTION, POWDER, LYOPHILIZED, FOR SOLUTION INTRAVENOUS at 02:17

## 2023-11-04 RX ADMIN — PIPERACILLIN AND TAZOBACTAM 3.38 G: 3; .375 INJECTION, POWDER, LYOPHILIZED, FOR SOLUTION INTRAVENOUS at 21:47

## 2023-11-04 RX ADMIN — IPRATROPIUM BROMIDE AND ALBUTEROL SULFATE 3 ML: .5; 3 SOLUTION RESPIRATORY (INHALATION) at 11:25

## 2023-11-04 RX ADMIN — ENOXAPARIN SODIUM 40 MG: 100 INJECTION SUBCUTANEOUS at 20:17

## 2023-11-04 RX ADMIN — Medication 50 MCG/HR: at 14:37

## 2023-11-04 RX ADMIN — SODIUM CHLORIDE 500 ML: 9 INJECTION, SOLUTION INTRAVENOUS at 18:34

## 2023-11-04 RX ADMIN — DEXTROSE MONOHYDRATE 20 ML: 50 INJECTION, SOLUTION INTRAVENOUS at 18:03

## 2023-11-04 RX ADMIN — DIPHENHYDRAMINE HYDROCHLORIDE 25 MG: 50 INJECTION, SOLUTION INTRAMUSCULAR; INTRAVENOUS at 18:06

## 2023-11-04 RX ADMIN — CLONAZEPAM 1 MG: 0.5 TABLET ORAL at 06:32

## 2023-11-04 RX ADMIN — PROPOFOL 25 MCG/KG/MIN: 10 INJECTION, EMULSION INTRAVENOUS at 14:20

## 2023-11-04 RX ADMIN — FUROSEMIDE 20 MG: 10 INJECTION, SOLUTION INTRAMUSCULAR; INTRAVENOUS at 23:17

## 2023-11-04 RX ADMIN — ACETAMINOPHEN 650 MG: 325 TABLET ORAL at 17:56

## 2023-11-04 RX ADMIN — Medication 10 MG: at 16:11

## 2023-11-04 RX ADMIN — AZITHROMYCIN MONOHYDRATE 250 MG: 500 INJECTION, POWDER, LYOPHILIZED, FOR SOLUTION INTRAVENOUS at 09:49

## 2023-11-04 RX ADMIN — PROPOFOL 60 MCG/KG/MIN: 10 INJECTION, EMULSION INTRAVENOUS at 21:21

## 2023-11-04 RX ADMIN — VANCOMYCIN HYDROCHLORIDE 1250 MG: 5 INJECTION, POWDER, LYOPHILIZED, FOR SOLUTION INTRAVENOUS at 06:18

## 2023-11-04 RX ADMIN — MIDAZOLAM HYDROCHLORIDE 2 MG: 1 INJECTION, SOLUTION INTRAMUSCULAR; INTRAVENOUS at 16:17

## 2023-11-04 RX ADMIN — IPRATROPIUM BROMIDE AND ALBUTEROL SULFATE 3 ML: .5; 3 SOLUTION RESPIRATORY (INHALATION) at 07:51

## 2023-11-04 RX ADMIN — FENTANYL CITRATE 50 MCG: 50 INJECTION INTRAMUSCULAR; INTRAVENOUS at 14:20

## 2023-11-04 RX ADMIN — NOREPINEPHRINE BITARTRATE 0.03 MCG/KG/MIN: 0.02 INJECTION, SOLUTION INTRAVENOUS at 14:48

## 2023-11-04 RX ADMIN — FUROSEMIDE 20 MG: 10 INJECTION, SOLUTION INTRAMUSCULAR; INTRAVENOUS at 09:48

## 2023-11-04 RX ADMIN — ACETYLCYSTEINE 2 ML: 200 SOLUTION ORAL; RESPIRATORY (INHALATION) at 19:33

## 2023-11-04 RX ADMIN — LIDOCAINE HYDROCHLORIDE 2 ML: 10 INJECTION, SOLUTION EPIDURAL; INFILTRATION; INTRACAUDAL; PERINEURAL at 11:15

## 2023-11-04 RX ADMIN — Medication 10 MG: at 16:28

## 2023-11-04 RX ADMIN — VANCOMYCIN HYDROCHLORIDE 1250 MG: 5 INJECTION, POWDER, LYOPHILIZED, FOR SOLUTION INTRAVENOUS at 21:54

## 2023-11-04 RX ADMIN — SUCCINYLCHOLINE CHLORIDE 120 MG: 20 INJECTION, SOLUTION INTRAMUSCULAR; INTRAVENOUS; PARENTERAL at 14:20

## 2023-11-04 RX ADMIN — AMPHOTERICIN B 600 MG: 50 INJECTION, POWDER, LYOPHILIZED, FOR SOLUTION INTRAVENOUS at 19:04

## 2023-11-04 RX ADMIN — PROPOFOL 60 MCG/KG/MIN: 10 INJECTION, EMULSION INTRAVENOUS at 19:00

## 2023-11-04 RX ADMIN — DEXTROSE MONOHYDRATE 20 ML: 50 INJECTION, SOLUTION INTRAVENOUS at 21:39

## 2023-11-04 RX ADMIN — ETOMIDATE 20 MG: 2 INJECTION INTRAVENOUS at 14:16

## 2023-11-04 RX ADMIN — IPRATROPIUM BROMIDE AND ALBUTEROL SULFATE 3 ML: .5; 3 SOLUTION RESPIRATORY (INHALATION) at 19:33

## 2023-11-04 RX ADMIN — Medication 10 MG: at 15:55

## 2023-11-04 ASSESSMENT — ACTIVITIES OF DAILY LIVING (ADL)
ADLS_ACUITY_SCORE: 37
ADLS_ACUITY_SCORE: 39
ADLS_ACUITY_SCORE: 37

## 2023-11-04 NOTE — PROCEDURES
ENDOTRACHEAL INTUBATION PROCEDURE NOTE (NON-OR)    Date of Procedure:  11/4/2023  Procedural Physician: Jamie Huitron MD    Indication for endotracheal intubation:  respiratory failure  Route: orotracheal  Sedation: etomidate 20 mg IV  Paralytic: succinylcholine 120 mg IV  Lidocaine: no  Atropine: no  Equipment: GlideScope 4 blade, 7.5-mm endotracheal tube  Cricoid Pressure: no  Number of attempts: 2  ETT location confirmed by:  by auscultation, by CXR, and ETCO2 monitor.    Complications: none immediate.    Procedure Details:   Procedure done under emergency: yes  Verbal consent was obtained from the patient.     The patient was identified as Jim Titus with date of birth 1965 and the procedure verified as endotracheal intubation. A time out was held and the above information confirmed.    The vocal cords were visualized and the patient was intubated via the oral route. Initially attempted with glide 3 with unsuccessful intubation, without color change and SpO2 rambo of 65%. The airway appeared to be quite anterior. The ET tube was removed, oral airway inserted and the patient was bagged. His SpO2 recovered to 96% after a minute or so. We then reattempted intubation with a glide 4 blade. The anterior airway was able to be visualized and the ET tube was successfully placed, with positive bilateral BS and color change. The tip of the ET tube was also visualized in the airway with bronchoscopy (see separate procedure note).     The tube was taped at 24 cm at the teeth. Post intubation examination was carried out with auscultation, end tidal carbon dioxide detector, and a stat portable chest x-ray. SpO2 rambo was 65% during the procedure.    Condition: critical and unchanged.    Jamie (Demian) MD Elana  [facility] Pulmonary & Critical Care ([facility])  Clinic [phone]  Fax [phone]

## 2023-11-04 NOTE — PROGRESS NOTES
Intensivist update    Checked in on patient in the afternoon. He had to be placed back on BiPAP with FiO2 0.7-1.0. he got up to the side of the bed to use the urinal and desaturated with very slow recovery.   He appeared fairly comfortable on the BiPAP.  Unfortunately CXR done after PICC placement showed marked worsening of right-sided air space disease with near complete opacification of the right hemithorax concerning for mucus plugging and atelecasis.    Discussed intubation and bronchoscopy with the patient. He agreed.    Intubation and bronchoscopy were performed; see separate procedure notes.  Arterial line placed.  OG tube and norwood placed.     Bedside lung US: +lung sliding bilaterally (anterior lung windows). No significant pleural fluid seen on right.    Initial vent settings:  20/470/12/1.0  Vt 6cc/kg IBW  PIP 25, Pplat 21 with PEEP of 12 with DP of 9; Pplat increased to 24 with PEEP of 14 with DP of 10. Placed back on PEEP of 12 as optimal PEEP.    NE @0.07  Sedation: propofol, fentanyl infusions. Multiple fentanyl and propofol boluses given.    Initial p:f ratio 149 on FiO2 1.0 and 12 of PEEP -> will plan to manually prone for severe ARDS.    No indication for Veletri at the moment.   He may require cis-atra for paralysis.     Discussed clinical worsening with Dr. Tom from ID, who recommends addition of liposomal amphoterician     I updated his sister Luz Maria on the phone. She is going to update the rest of the family.    Jamie (Fawn Huitron MD  [facility] Pulmonary & Critical Care ([facility])  Clinic [phone]  Fax [phone]     Additional critical care time: 45 minutes excluding billable procedures, managing ARDS with severe hypoxemic respiratory failure, circulatory shock requiring continuous vasopressor infusions, ventilator management, ARDS management.

## 2023-11-04 NOTE — PROCEDURES
ARTERIAL LINE INSERTION PROCEDURE NOTE  (NON-OR)    Procedure Date:  11/4/2023   Performing Physician:  Jamie Huitron MD    Pre-Procedure Diagnosis:     SEPTIC SHOCK and RESPIRATORY FAILURE  Post-Procedure Diagnosis:  Same as Pre-Procedure Diagnosis    Procedure:  Arterial line insertion  Left  axillary  Indications:  HYPOTENSION, RESPIRATORY FAILURE, and HEMODYNAMIC SHOCK      Estimated Blood Loss: Minimal   Complications: none      Procedure Details: Emergent procedure due to respiratory failure and hemodynamic instability requiring emergent invasive BP monitoring    The site of the procedure was properly noted/marked. The patient was identified as Jim Titus with Date of Birth 1965 and the procedure verified as Arterial Line Insertion.  A Time Out was held and the above information confirmed.    In sterile fashion, the line site was prepped with Chlorhexidine.  Strict sterile conditions were maintained,  Cap, mask, and sterile gloves were worn by all participants.  The arterial line was placed in the Left  axillary  artery percutaneously,  without  difficulty.  The linewas  sutured in place  and an occlusive sterile dressing was applied.  The total number of needle stick attempts was 1.      Condition: critical and unchanged    Jamie Huitron MD, 11/4/2023, 3:52 PM

## 2023-11-04 NOTE — PLAN OF CARE
Problem: Pneumonia  Goal: Resolution of Infection Signs and Symptoms  Outcome: Not Progressing  Goal: Effective Oxygenation and Ventilation  Outcome: Not Progressing  Intervention: Promote Airway Secretion Clearance  Recent Flowsheet Documentation  Taken 11/4/2023 0400 by Tawnya Schneider RN  Cough And Deep Breathing: done independently per patient     Problem: Adult Inpatient Plan of Care  Goal: Absence of Hospital-Acquired Illness or Injury  Intervention: Identify and Manage Fall Risk  Recent Flowsheet Documentation  Taken 11/4/2023 0400 by Tawnya Schneider RN  Safety Promotion/Fall Prevention:   activity supervised   lighting adjusted   mobility aid in reach   nonskid shoes/slippers when out of bed   room door open   room near nurse's station   room organization consistent   safety round/check completed  Intervention: Prevent Skin Injury  Recent Flowsheet Documentation  Taken 11/4/2023 0400 by Tawnya Schneider RN  Body Position: position changed independently  Intervention: Prevent Infection  Recent Flowsheet Documentation  Taken 11/4/2023 0400 by Tawnya Schneider RN  Infection Prevention: rest/sleep promoted  Goal: Optimal Comfort and Wellbeing  Intervention: Provide Person-Centered Care  Recent Flowsheet Documentation  Taken 11/4/2023 0400 by Tawnya Schneider RN  Trust Relationship/Rapport:   care explained   choices provided   emotional support provided   empathic listening provided   Goal Outcome Evaluation:    Assumed care of approx 0200. Pt upgraded to ICU status for increased O2 needs. HFNC 60L / 70-90%. Pt tachnypnic and dyspneic w/ minimal activity. BiPap orders obtained. Placed by RT. Tylenol given per MAR for fevers. Tmax 101.9. IV abx administered per MAR.   Pt reports anxiety r/t no improvement in respiratory status. Prn clonazepam given. Safety rounds completed, bed alarm on for safety. Pt resting between cares. Using call light appropriately. Will continue to monitor and follow  plan of care.

## 2023-11-04 NOTE — PROCEDURES
FIBEROPTIC BRONCHOSCOPY PROCEDURE NOTE (NON-OR)  Date of Procedure: 11/4/2023  Performing Physician: Jamie Huitron MD  Pre-Procedure Diagnosis: respiratory failure, mucus plugging. ARDS.  Post-Procedure Diagnosis: same as pre-procedure diagnosis  Procedure: diagnostic flexible fiberoptic bronchoscopy   Indications: respiratory failure, mucus plugging  Preop evaluation:  Procedural Sedation: intravenous sedation   Expected level: moderate sedation  ASA Class: ASA 2 - Patient with mild systemic disease with no functional limitations  Mallampati: n/a, patient intubated  Respiratory Precautions: The patient was evaluated for TB risk prior to the procedure. The risk was judged to be low.  Anesthesia:  See ICU sedation MAR  Multiple propofol and fentanyl boluses needed prior to bronchoscopy  8mg IV vecuronium x1  4mg midazolam  Specimen: none  Estimated Blood Loss: minimal  Complications: desaturation to 84% with slow recovery with bagging.     Procedure Details and Findings: emergent procedure due to severe hypoxemic respiratory failure and concern for mucus plugging on the right.     The patient was identified as Jim Titus with date of birth 1965 and the procedure verified as diagnostic flexible fiberoptic bronchoscopy. A time out was held and the above information confirmed.    After application of anesthesia, the patient was placed in the supine position and the bronchoscope was passed through the ET tube. The ET tube tip was in good position above the main ingrid. Initial inspection of the right sided airways revealed significant erythema and edema with endobronchial collapse of the RML and RLL. The patient desaturated to low 80s and required bagging to bring the SPO2 back up.    After additional sedation and bolus injection of NMB, the bronchoscope was reinserted. This time the RML and RLL airways were more patent and able to be visualized. No obvious mucus plugs, endobronchial masses or lesions were seen.  There was no bleeding.     Treatment Plan Based on Findings:  N/a    Jamie Huitron MD (Avi)  [facility] Pulmonary & Critical Care [facility])  Clinic [phone]  Fax [phone]

## 2023-11-04 NOTE — PROGRESS NOTES
[facility]    Medicine Progress Note - Hospitalist Service    Date of Admission:  11/2/2023    Assessment & Plan   Jim Titus is a 58 year old male admitted on 11/2/2023.  Has a past medical history significant for asthma, multiple myeloma on Keytruda, depression/anxiety recurrent hospitalization for pneumonia, discharged home on 10/25/2023 return to the hospital due to worsening cough and shortness of breath.  CT scan showed bilateral infiltrate worse on the right side.  Evaluated by pulmonology.  Started on broad-spectrum antibiotic. Status post bronchoscopy on 11/3, aborted early as the patient was not tolerating the procedure.  It showed erythema without significant secretion.  Less likely bacterial pneumonia.  Worsening respiratory status over the last 24 hours.  Currently on high flow nasal cannula 60 L and 80%.  Working diagnosis multifocal pneumonia, versus organizing pneumonia versus immune mediated pneumonitis.  Currently in the ICU.  Started on steroid on 11/4.          Acute hypoxic respiratory failure  Bilateral infiltrate possibly due to multifocal pneumonia versus pneumonitis  Sepsis due to possible lung infection  -3 weeks of persistent/recurrent pneumonia  -Patient is on Keytruda.  On presentation, discussed case with pulmonologist--> suggested less likely immune mediated pneumonitis, more likely pneumonia.  -Despite starting antibiotic respiratory status appears to be worsening over the last 48 hours.  -Per patient, his mother was diagnosed with cryptogenic organizing pneumonia after radiation therapy.  -Status post bronchoscopy  -Procalcitonin is mildly elevated, CRP is significantly elevated above 200.  -His presentation is not quite consistent with bacterial pneumonia at this point.        Plan  -Continue broad-spectrum antibiotic.  -Appreciate intensivist recommendations--> started on IV steroid for possible pneumonitis  -Wean off high flow nasal cannula as  tolerated.  -Follow-up with cultures collected during bronchoscopy.  -IV fluids stopped and patient was started on Lasix as per intensivist.    Asthma  -No signs of exacerbation    Plan  -Albuterol as needed  -DuoNebs  -Appreciate pulmonary recommendations    Anxiety/depression    Plan  -Continue home Lexapro and clonazepam    Anemia  -No signs of active bleeding    Plan   -continue to monitor.                  Diet: NPO per Anesthesia Guidelines for Procedure/Surgery Except for: Meds    DVT Prophylaxis: Enoxaparin (Lovenox) SQ  Lamas Catheter: Not present  Lines: PRESENT      PICC 11/04/23 Double Lumen Right Basilic medication drips-Site Assessment: WDL    Cardiac Monitoring: None  Code Status: Full Code             Disposition Plan       Expected Discharge Date: 11/07/2023,  9:00 AM    Destination: home with family  Discharge Comments: ICU- BiPAP/HFNC, IV ABX            LA CHEN MD  Hospitalist Service  [facility]  Securely message with [id number] (more info)  Text page via [employer] Paging/Directory   ______________________________________________________________________    Interval History   Overnight, patient became more tachypneic and short of breath and therefore he was upgraded to the ICU.  He was placed on high flow nasal cannula.  This morning stated that he states feels slightly better than yesterday.  Continues to experience shortness of breath.  Reported intermittent fevers.  Denies any chest pain.    Physical Exam   Vital Signs: Temp: 98.9  F (37.2  C) Temp src: Oral BP: 107/65 Pulse: 84   Resp: 22 SpO2: 96 % O2 Device: BiPAP/CPAP Oxygen Delivery: 60 LPM  Weight: 266 lbs 9.6 oz    Physical Exam  Constitutional:       General: He is not in acute distress.     Appearance: He is not toxic-appearing.   Cardiovascular:      Rate and Rhythm: Normal rate.   Pulmonary:      Effort: Pulmonary effort is normal. No respiratory distress.      Breath sounds: No wheezing.      Comments:  Crackles mainly on the right lung.  Skin:     General: Skin is warm and dry.   Neurological:      General: No focal deficit present.      Mental Status: He is alert.   Psychiatric:         Mood and Affect: Mood normal.          Medical Decision Making       55 MINUTES SPENT BY ME on the date of service doing chart review, history, exam, documentation & further activities per the note.      Data     I have personally reviewed the following data over the past 24 hrs:    11.3 (H)  \   8.7 (L)   / 452 (H)     139 104 4.3 (L) /  102 (H)   3.7; 3.7 25 0.85 \     ALT: 23 AST: 21 AP: 293 (H) TBILI: 0.9   ALB: 2.8 (L) TOT PROTEIN: 6.2 (L) LIPASE: N/A     Trop: 60 (H) BNP: 1,264 (H)     Procal: 0.27 (H) CRP: N/A Lactic Acid: 0.5 (L)         Imaging results reviewed over the past 24 hrs:   Recent Results (from the past 24 hour(s))   XR Chest Port 1 View    Narrative    EXAM: XR CHEST PORT 1 VIEW  LOCATION: [facility]  DATE: 11/4/2023    INDICATION: PICC placement  COMPARISON: Chest radiograph 03/01/2014 and CTA chest 11/02/2023.      Impression    IMPRESSION: Right PICC is in place with tip overlying the low SVC. Increased right lung opacities with near complete opacification of the right hemithorax. Mild left upper lung opacities. No pneumothorax.

## 2023-11-04 NOTE — PROGRESS NOTES
On High Flow 60% -- 94%  Admitted for Multifocal PNA  Seen bedside - looked tachyp    Plans - Transfer to ICU. BiPAP. Check VBG, BNP, Drop IVF to 50 ml/hr (from 150 ml/hr). ICU consult     4A - Did talk to remote ICU Dr Burns - Agreed with BiPAP     5A - BNP over 1200.

## 2023-11-04 NOTE — PROCEDURES
"PICC Line Insertion Procedure Note        Pt. Name:  Jim Titus       MRN:   [id number]       Procedure: Insertion of a  Dual Lumen  5 fr  Bard SOLO (valved) Power PICC, Lot number [id number]     Indications: multiple medication drips     Contraindications : none     Procedure Details      Patient identified with 2 identifiers and \"Time Out\" conducted.  .      Central line insertion bundle followed: hand hygiene performed prior to procedure, site cleansed with cholraprep, hat, mask, sterile gloves,sterile gown worn, patient draped with maximum barrier head to toe drape, sterile field maintained.      The vein was assessed and found to be compressible and of adequate size. 2 ml 1% Lidocaine administered sq to the insertion site. A 5 Fr PICC was inserted into the basilic vein of the right arm with ultrasound guidance. 1 attempt(s) required to access vein.   Catheter threaded without difficulty. Good blood return noted.     Modified Seldinger Technique used for insertion.     The 8 sharps that are included in the PICC insertion kit were accounted for and disposed of in the sharps container prior to breakdown of the sterile field.     Catheter secured with Statlock, biopatch and Tegaderm dressing applied.     Findings:     Total catheter length  49 cm, with 1 cm exposed. Mid upper arm circumference is 35 cm. Catheter was flushed with 30 cc NS. Patient  tolerated procedure well.     Tip placement verified by Xray. Xray read by Dr. Rah Thompson. \"Right PICC is in place with tip overlying the low SVC.\"     CLABSI prevention brochure left at bedside.     Patient's primary RN notified PICC is ready for use.     Comments:   PICC OK TO USE    Floridalma Mccain RN     Vascular [facility] - [facility]   "

## 2023-11-04 NOTE — PROGRESS NOTES
INFECTIOUS DISEASE FOLLOW UP NOTE      ASSESSMENT:  Multifocal pneumonia, hypoxic respiratory failure: worsening despite recent courses of antibiotics. Elevated inflammatory markers. CT with increased consolidation R>L consistent. Bronchoscopy 11/3. Many results pending. worsening respiratory status 11/3-->4. Steroids starting . GPC on gram stain of BAL, await culture.   Recent hospitalization 10/23 for pneumonia-received 2 days vanc/zosyn before transitioning to levofloxacin on discharge  Hx melanoma on keytruda    PLAN:  continue zosyn IV  - continue vanc IV for pending BAL culture.  - continue azithromycin for now-note has completed around 10 days of legionella coverage  - pending: fungitell, histo, fungal antibodies, BAL studies   - negative: urine legionella, strep  - further recommendations to follow clinical course  Steroid started    Jonny Tom MD  [facility]  [phone] [facility] paging    ______________________________________________________________________    SUBJECTIVE / INTERVAL HISTORY: now on BiPAP. Steroid started today.     Dyspnea. Denies pain. Fevers ongoing.     ROS: All other systems negative except as listed above.        OBJECTIVE:  /65   Pulse 84   Temp 98.9  F (37.2  C) (Oral)   Resp 22   Ht 1.829 m (6')   Wt 120.9 kg (266 lb 9.6 oz)   SpO2 96%   BMI 36.16 kg/m    FiO2 (%): (S) 90 %    Vital Signs  Temp: 98.9  F (37.2  C)  Temp src: Oral  Resp: 22  Pulse: 84  Pulse Rate Source: Monitor  BP: 107/65  BP Location: Right arm  Pain Score: No Pain (0)    Temp (24hrs), Av.6  F (38.1  C), Min:98.9  F (37.2  C), Max:103.2  F (39.6  C)      GEN: bipap  RESPIRATORY:  clear anterior  CARDIOVASCULAR:  Regular rate and rhythm. Normal S1 and S2. No murmur  ABDOMEN:  Soft, normal bowel sounds, non-tender  EXTREMITIES: No edema.  SKIN/HAIR/NAILS:  No rashes  IV: peripheral        Antibiotics:  Vancomycin 11/2-  Pip/tazo 11/2-  Azithromycin  11/2-    Pertinent labs:    Recent Labs   Lab 11/04/23  0833 11/03/23  0557 11/02/23  0845   WBC 11.3* 9.5 11.3*   HGB 8.7* 9.0* 10.1*   HCT 27.9* 29.5* 31.8*   * 541* 668*        Recent Labs   Lab 11/04/23  0421 11/03/23  0557 11/02/23  0845    140 136   CO2 25 25 24   BUN 4.3* 5.1* 5.4*         Lab Results   Component Value Date    ALT 23 11/04/2023    AST 21 11/04/2023    ALKPHOS 293 (H) 11/04/2023         MICROBIOLOGY DATA:  BAL gram stain GPC    RADIOLOGY:  XR Chest Port 1 View    Result Date: 11/4/2023  EXAM: XR CHEST PORT 1 VIEW LOCATION: [facility] DATE: 11/4/2023 INDICATION: PICC placement COMPARISON: Chest radiograph 03/01/2014 and CTA chest 11/02/2023.     IMPRESSION: Right PICC is in place with tip overlying the low SVC. Increased right lung opacities with near complete opacification of the right hemithorax. Mild left upper lung opacities. No pneumothorax.    Abdomen US, limited (RUQ only)    Result Date: 11/2/2023  EXAM: US ABDOMEN LIMITED LOCATION: [facility] DATE: 11/2/2023 INDICATION: sepsis, cough, difficulty taking in big breath, poor appetite, elevating alk phos with recent hospitalization. COMPARISON: None. TECHNIQUE: Limited abdominal ultrasound. FINDINGS: GALLBLADDER: Normal. No gallstones, wall thickening, or pericholecystic fluid. Negative sonographic Aguilar's sign. BILE DUCTS: No biliary dilatation. The common duct measures 3 mm. LIVER: Normal parenchyma with smooth contour. No focal mass. RIGHT KIDNEY: No hydronephrosis. PANCREAS: The visualized portions are normal. No ascites.     IMPRESSION: Normal limited abdominal ultrasound.     CT Chest Pulmonary Embolism w Contrast    Result Date: 11/2/2023  EXAM: CT CHEST PULMONARY EMBOLISM W CONTRAST LOCATION: [facility] DATE: 11/2/2023 INDICATION: cough, hypoxia, recent hospitalization x 9 days  for CAP COMPARISON: CTA chest 10/23/2023 TECHNIQUE: CT chest  pulmonary angiogram during arterial phase injection of IV contrast. Multiplanar reformats and MIP reconstructions were performed. Dose reduction techniques were used. CONTRAST: Isovue 370 100mL FINDINGS: ANGIOGRAM CHEST: Pulmonary arteries are normal caliber and negative for pulmonary emboli. Thoracic aorta is negative for dissection. No CT evidence of right heart strain. LUNGS AND PLEURA: Increased consolidation within the right upper, right middle, right lower, left upper, and left lower lobes. Atoll sign in the left upper lobe (7/46). No effusion or empyema. No pneumothorax. MEDIASTINUM/AXILLAE: Normal heart size. No pericardial effusion. Reactive mediastinal lymphadenopathy. CORONARY ARTERY CALCIFICATION: Mild. UPPER ABDOMEN: Hepatic steatosis. MUSCULOSKELETAL: No acute abnormality.     IMPRESSION: 1.  No pulmonary embolism. 2.  Increased consolidation associated with the multifocal pneumonia (right greater than left lungs). Appearance is nonspecific but could be seen in the setting of atypical infection (including fungal organisms) or bacterial pneumonia. No empyema or other complication.    CT Chest Pulmonary Embolism w Contrast    Result Date: 10/23/2023  EXAM: CT CHEST PULMONARY EMBOLISM W CONTRAST LOCATION: [facility] DATE: 10/23/2023 INDICATION: Right back melanoma diagnosed April 2023. On immunotherapy. Two weeks of cough and morrison, finished augmentin 1 week ago for pneumonia COMPARISON: PET  CT 04/10/2023 TECHNIQUE: CT chest pulmonary angiogram during arterial phase injection of IV contrast. Multiplanar reformats and MIP reconstructions were performed. Dose reduction techniques were used. CONTRAST: Isovue 370 90mL FINDINGS: ANGIOGRAM CHEST: Adequate opacification of the pulmonary arteries and branches. No evidence of pulmonary emboli. Aorta and great vessels are normal.  LUNGS AND PLEURA: Asymmetric, right greater than left areas of consolidation with air bronchograms are noted.  This involves most of the right upper, lower and middle lobes with smaller area of involvement of the left upper lobe and in the medial basilar segments of the left lower lobe. Consolidated lung enhances normally. No effusions. MEDIASTINUM/AXILLAE: Few less than 1 cm right paratracheal and subcarinal nodes are present. CORONARY ARTERY CALCIFICATION: Minimal UPPER ABDOMEN: No liver lesions. Fatty atrophy of the spleen. MUSCULOSKELETAL: No suspicious  lesions. Marked gynecomastia.     IMPRESSION: 1.  Asymmetric, right greater than left multilobar pneumonia with significant involvement of the right lower, middle and upper lobes. No parapneumonic effusions. 2.  Minimal reactive adenopathy. 3.  No evidence of pulmonary emboli. 4.  No evidence of metastasis.

## 2023-11-04 NOTE — PROGRESS NOTES
CRITICAL CARE PROGRESS NOTE:    Assessment/Plan:  58M wih hx of asthma MM on keytruda since May 2023, depression/anxiety, recent hospitalization for CAP and respiratory failure, readmitted for worsening cough and SOB. CT showed worsening of right-sided infiltrates thought to be 2/2 worsening pneumonia. In ICU on HFNC and requiring intermittent BiPAP.     RESP:  Acute respiratory failure with hypoxemia requiring BiPAP/HFNC. Worsening right-sided infiltrates could be c/w recurrent pneumonia (HAP vs. HCAP) vs. Organizing pneumonia vs. Malignant infiltrates vs. Pneumonitis from immunotherapy.   Titrate FiO2 for goal SpO2 92% or greater  Encourage OOB, PT/OT, push IS when able  Abx per ID section below  Continue BiPAP prn increased WOB.   Start 60mg IV methylpred q8h for possible drug-related pneumonitis.   Continue duonebs QID    CV:  No issues, normal BP and HR. Bedside echo with grossly intact LV function, probably normal diastolic function, IVC looked somewhat plump with reduced inspiratory variation. RV not well seen. No formal echo on file.   Tele monitoring  MAP >65, not requiring pressors  Furosemide 20mg IV BID to keep on dry side  Stop continuous IVF  Holding PTA statin for now    NEURO:  No issues, no pain, normal mental status.   Tylenol prn pain  Avoid benzo's  Cautious use of narcotics  Continue PTA selective serotonin reuptake inhibitor   Cont PTA mirtazapine.     GI:  No issues. Elevated ALP likely from MM  ADAT  Bowel regimen prn    RENAL:  No issues, normal renal function.   Avoid nephrotoxins  Furosemide as above  Monitor BUN/Scr, lytes  No indication for norwood; can use external cath if needed    ID:  Possible recurrent pneumonia, HAP vs. HCAP. Immunosuppressed; at risk for fungal infection.  ID following, appreciate input. Abx per ID service  Follow up culture data    HEMATOLOGIC:  Hx of MM, on immunotherapy. Followed by [facility]. Stable mild anemia.   Hgb >7  Follow counts    ENDOCRINE:  No issues.  Expect FSBG to rise with steroids.   FSBG checks, insulin sliding scale/drip per ICU protocol     ICU PROPHYLAXIS:  LMWH daily    CODE STATUS, DISPOSITION, FAMILY COMMUNICATION: full code  Updated patient directly    Lines/Drains/Tubes:  PIV x2  Get PICC line today. High risk for decompensation.     Restraints  Not needed    Jamie (MD SAROJ Jimenez [facility] Pulmonary & Critical Care  Pager [phone]  Clinic [phone]  Fax [phone]     Clinically Significant Risk Factors              # Hypoalbuminemia: Lowest albumin = 2.7 g/dL at 11/3/2023  5:57 AM, will monitor as appropriate            # Obesity: Estimated body mass index is 36.16 kg/m  as calculated from the following:    Height as of this encounter: 1.829 m (6').    Weight as of this encounter: 120.9 kg (266 lb 9.6 oz)., PRESENT ON ADMISSION     # Financial/Environmental Concerns: none  # Asthma: noted on problem list                Overnight events:  No major events  Tolerated BiPAP overnight, 12/5 FiO2 0.55. he feels it helped with his breathing.  This AM, HFNC 0.8, 60Lpm with SpO2 high 90's.   Feels worse than when he came in.     Subjective:  Denies pain.     Objective:  Physical Exam:  Vent settings for last 24 hours:  FiO2 (%): 80 %  Resp: (!) 31      /73 (BP Location: Right arm)   Pulse 84   Temp (!) 101.9  F (38.8  C) (Axillary)   Resp (!) 31   Ht 1.829 m (6')   Wt 120.9 kg (266 lb 9.6 oz)   SpO2 97%   BMI 36.16 kg/m      Intake/Output last 3 shifts:  I/O last 3 completed shifts:  In: 919.71 [P.O.:480; I.V.:439.71]  Out: 2100 [Urine:2100]  Intake/Output this shift:  No intake/output data recorded.    Physical Exam  Gen: awake, alert, oriented, no distress  HEENT: no OP lesions, no DAVID  CV: RRR, no m/g/r  Resp: diminished BS anteriorly.   Abd: soft, nontender, BS+  Neuro: PERRL, nonfocal  Ext: no edema    LAB:  Recent Labs   Lab 11/04/23  0833   WBC 11.3*   HGB 8.7*   HCT 27.9*   *     Recent Labs    Lab 11/04/23  0421 11/03/23  0557 11/02/23  0845    140 136   CO2 25 25 24   BUN 4.3* 5.1* 5.4*   ALKPHOS 293* 226* 275*   ALT 23 23 39   AST 21 14 34     Micro  PCT 0.11 on 11/2  Covid neg  Flu A/B neg  RSV neg  Sputum pending  MRSA neg  Blood NGTD  Legionella urine Ag neg  Strep pneumo urine Ag neg    Fungitell assay pending  Histo urine Ag pending  Fungal Ab panel pending    Bronch/BAL 11/3  Gram stain + for GPC, culture pending  KOH prep neg, fungal culture pending  Total nuc cells 4, 61% PMN, 31% lymphs  RVP neg        No current outpatient medications on file.       Critical care attestation: 45 minutes spent managing the following issues: acute respiratory failure requiring NIPPV alternating with HFNC; severe pneumonia vs. Drug related pneumonitis with acute respiratory failure, at risk for progression to ARDS. Immunosuppressed patient.. High risk for organ deterioration and death requiring ICU level care.

## 2023-11-04 NOTE — PROGRESS NOTES
RT assisted intubation and bronch in ICU. Pt was intubated with ETT 7.5, 24@teeth. EtCO2 was positive and bilateral BS following intubation. Disposable bronch was used for bronch. No BAL was collected. Pt has soft bite block. RT will continue to follow.    Kike Bland, RT

## 2023-11-04 NOTE — PROGRESS NOTES
"/60 (BP Location: Right arm, Cuff Size: Adult Regular)   Pulse 88   Temp 99.7  F (37.6  C) (Oral)   Resp 30   Ht 1.829 m (6')   Wt 113.4 kg (250 lb)   SpO2 95%   BMI 33.91 kg/m        The PT was provided a neb and VOLARA TXs per MD order. BS were c/w scattered crackles, but not coarse both pre and post BOTH TX. The PT had good technique with the VOLARA in both HFO and PEP modes and his SATs remained acceptable (he was on the HFNC).    At the start of my shift, I was able to convince the PT to remain on the HFNC rather than remain on an OXYMASK (at 20 liters with SATs of 90%-92%). I overcame his primary objection (\"I'm a mouth breather\") by explaining that a high flow going through the nose (in this case 60 liters) makes that largely irrelevant. On the HFNC 60 liters @ 70%, SATs have been maintained 95% to 97%.    RT will follow as directed.      Addendum: The PT was established on the BIPAP per MD order. The settings of 12 over 5 are c/w that order. While the PT O2 needs are less on the BIPAP and he reports improved WOB, his mechanics remain elevated with a Ve of ~mid 20s (RR in the low to mid 20's with VT frequently > than a liter).  "

## 2023-11-04 NOTE — PROVIDER NOTIFICATION
11/04/23 1615   Ventilator Data   Vent Owner On Site Equipment   Ventilation Method Invasive   Ventilator Start - Date 11/04/23   Ventilator   Ventilator Adult   Ventilator Settings    Vent Mode CMV/AC  (Continuous Mandatory Ventilation/ Assist Control)   Resp Rate (Set) 24 breaths/min   Tidal Volume (Set, mL) 470 mL   FiO2 100 %   PEEP (cm H2O) 12 cmH2O   Inspiratory Time (sec) 0.75 sec   Vent Humidifier - Heater/HME Heater   Ventilator - Patient    Patient Resp Rate  29 breaths/min   Expiratory Vt (ml) 588   Minute Volume (ml) 13 L/min   Peak Inspiratory Pressure (cm H2O) 22 cmH2O   Mean Airway Pressure (cm H2O) 15 cmH2O   Dynamic Compliance (mL/cm H2O) 34 mL/cm H2O   Weaning Assessment   Pulse 90   Oxygen Therapy   SpO2 97 %   O2 Device Mechanical Ventilator   FiO2 (%) 100 %   ETT Cuffed Single Subglottic Suction 7.5 mm   Placement Date/Time: 11/04/23 1210   Mask Ventilation: Vent by mask  Induction Type: RSI  Ease of Intubation: Difficult (abnormal)  Laryngoscopy Technique: Video laryngoscopy  Cuffed: Cuffed  ETT Type: Single Subglottic Suction  Single Lumen Tube Size...   Secured at (cm) 24 cm   Measured from Teeth/Gums   Position Center   Secured by Cloth tape   Site Appearance Clean;Dry   Tube Care Securement device changed   Safety Measures Manual resuscitator/PEEP valve in room;Manual resuscitator/mask/valve in room   Respiratory Secretions Assessment   Suction Device ETT   Secretions Amount small   Secretions Color white;clear   Secretions Consistency thin   Suction Tolerance Tolerated well   Suctioning Adverse Effects None   Breath Sounds   Breath Sounds All Fields   All Lung Fields Breath Sounds coarse;diminished     Pt had an eventful shift. Transitioned from high-flow nasal cannula to Bipap due to desaturations. Ultimately pt was intubated approx 1415 due to impending respiratory failure. Pt was proned approximately 1700. Pt received Duonebs x3 this shift; Volera treatment x1 whiuch pt tolerated  well. RT will continue to follow.

## 2023-11-04 NOTE — PROGRESS NOTES
Progress Note  Restraint Application    I recognize that restraints are physical and/or chemical interventions intended to restrict a person's movements. Restraints are currently needed to ensure the safety of this patient and/or others. My clinical rationale appears below.    Category/Type of Restraint  Non Violent:  Soft limb restraint x 2  --  Behavior  Pulling at tubes/lines  --  Root Cause of the Behavior  Sedation/intubation  --  Less-Restrictive Measures that Failed  Non Violent Measures:  Close Observation  --  Response to the Restraint  Patient unable to pull at tubes/lines  --  Criteria for Release from the Restraint  Patient calm and off sedation

## 2023-11-05 ENCOUNTER — APPOINTMENT (OUTPATIENT)
Dept: CARDIOLOGY | Facility: CLINIC | Age: 58
End: 2023-11-05
Attending: INTERNAL MEDICINE
Payer: COMMERCIAL

## 2023-11-05 ENCOUNTER — APPOINTMENT (OUTPATIENT)
Dept: RADIOLOGY | Facility: CLINIC | Age: 58
End: 2023-11-05
Attending: INTERNAL MEDICINE
Payer: COMMERCIAL

## 2023-11-05 LAB
ANION GAP SERPL CALCULATED.3IONS-SCNC: 14 MMOL/L (ref 7–15)
BACTERIA BRONCH: NORMAL
BASE EXCESS BLDA CALC-SCNC: -0.1 MMOL/L
BASE EXCESS BLDA CALC-SCNC: -1 MMOL/L
BASE EXCESS BLDA CALC-SCNC: 0.6 MMOL/L
BUN SERPL-MCNC: 10.8 MG/DL (ref 6–20)
CALCIUM SERPL-MCNC: 8.7 MG/DL (ref 8.6–10)
CHLORIDE SERPL-SCNC: 104 MMOL/L (ref 98–107)
COHGB MFR BLD: 100 % (ref 96–97)
COHGB MFR BLD: 100 % (ref 96–97)
COHGB MFR BLD: 99.4 % (ref 96–97)
CREAT SERPL-MCNC: 0.81 MG/DL (ref 0.67–1.17)
CREAT SERPL-MCNC: 1.05 MG/DL (ref 0.67–1.17)
DEPRECATED HCO3 PLAS-SCNC: 23 MMOL/L (ref 22–29)
EGFRCR SERPLBLD CKD-EPI 2021: 82 ML/MIN/1.73M2
EGFRCR SERPLBLD CKD-EPI 2021: >90 ML/MIN/1.73M2
ERYTHROCYTE [DISTWIDTH] IN BLOOD BY AUTOMATED COUNT: 14.2 % (ref 10–15)
GLUCOSE BLDC GLUCOMTR-MCNC: 141 MG/DL (ref 70–99)
GLUCOSE BLDC GLUCOMTR-MCNC: 141 MG/DL (ref 70–99)
GLUCOSE BLDC GLUCOMTR-MCNC: 147 MG/DL (ref 70–99)
GLUCOSE BLDC GLUCOMTR-MCNC: 148 MG/DL (ref 70–99)
GLUCOSE BLDC GLUCOMTR-MCNC: 149 MG/DL (ref 70–99)
GLUCOSE BLDC GLUCOMTR-MCNC: 157 MG/DL (ref 70–99)
GLUCOSE BLDC GLUCOMTR-MCNC: 159 MG/DL (ref 70–99)
GLUCOSE BLDC GLUCOMTR-MCNC: 175 MG/DL (ref 70–99)
GLUCOSE BLDC GLUCOMTR-MCNC: 183 MG/DL (ref 70–99)
GLUCOSE BLDC GLUCOMTR-MCNC: 200 MG/DL (ref 70–99)
GLUCOSE BLDC GLUCOMTR-MCNC: 218 MG/DL (ref 70–99)
GLUCOSE BLDC GLUCOMTR-MCNC: 239 MG/DL (ref 70–99)
GLUCOSE BLDC GLUCOMTR-MCNC: 243 MG/DL (ref 70–99)
GLUCOSE BLDC GLUCOMTR-MCNC: 248 MG/DL (ref 70–99)
GLUCOSE SERPL-MCNC: 252 MG/DL (ref 70–99)
H CAPSUL AG UR QL IA: NOT DETECTED
H CAPSUL AG UR-MCNC: NOT DETECTED NG/ML
HBA1C MFR BLD: 6.3 %
HCO3 BLD-SCNC: 25 MMOL/L (ref 23–29)
HCO3 BLD-SCNC: 25 MMOL/L (ref 23–29)
HCO3 BLD-SCNC: 26 MMOL/L (ref 23–29)
HCT VFR BLD AUTO: 29.8 % (ref 40–53)
HGB BLD-MCNC: 9.2 G/DL (ref 13.3–17.7)
MAGNESIUM SERPL-MCNC: 2.1 MG/DL (ref 1.7–2.3)
MAGNESIUM SERPL-MCNC: 2.2 MG/DL (ref 1.7–2.3)
MCH RBC QN AUTO: 28.7 PG (ref 26.5–33)
MCHC RBC AUTO-ENTMCNC: 30.9 G/DL (ref 31.5–36.5)
MCV RBC AUTO: 93 FL (ref 78–100)
OXYHGB MFR BLD: 98.4 % (ref 96–97)
OXYHGB MFR BLD: 98.4 % (ref 96–97)
OXYHGB MFR BLD: >98.5 % (ref 96–97)
PCO2 BLD: 42 MM HG (ref 35–45)
PCO2 BLD: 43 MM HG (ref 35–45)
PCO2 BLD: 46 MM HG (ref 35–45)
PH BLD: 7.34 [PH] (ref 7.37–7.44)
PH BLD: 7.38 [PH] (ref 7.37–7.44)
PH BLD: 7.4 [PH] (ref 7.37–7.44)
PHOSPHATE SERPL-MCNC: 2 MG/DL (ref 2.5–4.5)
PLATELET # BLD AUTO: 622 10E3/UL (ref 150–450)
PO2 BLD: 123 MM HG (ref 80–90)
PO2 BLD: 127 MM HG (ref 80–90)
PO2 BLD: 212 MM HG (ref 80–90)
POTASSIUM SERPL-SCNC: 2.8 MMOL/L (ref 3.4–5.3)
POTASSIUM SERPL-SCNC: 2.9 MMOL/L (ref 3.4–5.3)
POTASSIUM SERPL-SCNC: 3.8 MMOL/L (ref 3.4–5.3)
RBC # BLD AUTO: 3.21 10E6/UL (ref 4.4–5.9)
SODIUM SERPL-SCNC: 141 MMOL/L (ref 135–145)
TEMPERATURE: 37 DEGREES C
WBC # BLD AUTO: 16.1 10E3/UL (ref 4–11)

## 2023-11-05 PROCEDURE — 250N000011 HC RX IP 250 OP 636: Performed by: STUDENT IN AN ORGANIZED HEALTH CARE EDUCATION/TRAINING PROGRAM

## 2023-11-05 PROCEDURE — 82310 ASSAY OF CALCIUM: CPT | Performed by: INTERNAL MEDICINE

## 2023-11-05 PROCEDURE — 85027 COMPLETE CBC AUTOMATED: CPT | Performed by: INTERNAL MEDICINE

## 2023-11-05 PROCEDURE — 94640 AIRWAY INHALATION TREATMENT: CPT | Mod: 76

## 2023-11-05 PROCEDURE — 250N000011 HC RX IP 250 OP 636: Mod: JZ | Performed by: INTERNAL MEDICINE

## 2023-11-05 PROCEDURE — 250N000012 HC RX MED GY IP 250 OP 636 PS 637: Performed by: INTERNAL MEDICINE

## 2023-11-05 PROCEDURE — 250N000009 HC RX 250: Performed by: INTERNAL MEDICINE

## 2023-11-05 PROCEDURE — 99233 SBSQ HOSP IP/OBS HIGH 50: CPT | Performed by: INTERNAL MEDICINE

## 2023-11-05 PROCEDURE — 258N000003 HC RX IP 258 OP 636: Performed by: STUDENT IN AN ORGANIZED HEALTH CARE EDUCATION/TRAINING PROGRAM

## 2023-11-05 PROCEDURE — 83735 ASSAY OF MAGNESIUM: CPT | Performed by: STUDENT IN AN ORGANIZED HEALTH CARE EDUCATION/TRAINING PROGRAM

## 2023-11-05 PROCEDURE — 84100 ASSAY OF PHOSPHORUS: CPT | Performed by: STUDENT IN AN ORGANIZED HEALTH CARE EDUCATION/TRAINING PROGRAM

## 2023-11-05 PROCEDURE — 255N000002 HC RX 255 OP 636: Performed by: STUDENT IN AN ORGANIZED HEALTH CARE EDUCATION/TRAINING PROGRAM

## 2023-11-05 PROCEDURE — 999N000157 HC STATISTIC RCP TIME EA 10 MIN

## 2023-11-05 PROCEDURE — 94640 AIRWAY INHALATION TREATMENT: CPT

## 2023-11-05 PROCEDURE — 99291 CRITICAL CARE FIRST HOUR: CPT | Performed by: INTERNAL MEDICINE

## 2023-11-05 PROCEDURE — 93306 TTE W/DOPPLER COMPLETE: CPT | Mod: 26 | Performed by: INTERNAL MEDICINE

## 2023-11-05 PROCEDURE — 99233 SBSQ HOSP IP/OBS HIGH 50: CPT | Performed by: STUDENT IN AN ORGANIZED HEALTH CARE EDUCATION/TRAINING PROGRAM

## 2023-11-05 PROCEDURE — 94003 VENT MGMT INPAT SUBQ DAY: CPT

## 2023-11-05 PROCEDURE — 94799 UNLISTED PULMONARY SVC/PX: CPT

## 2023-11-05 PROCEDURE — 82805 BLOOD GASES W/O2 SATURATION: CPT | Performed by: INTERNAL MEDICINE

## 2023-11-05 PROCEDURE — 84132 ASSAY OF SERUM POTASSIUM: CPT | Performed by: INTERNAL MEDICINE

## 2023-11-05 PROCEDURE — 83036 HEMOGLOBIN GLYCOSYLATED A1C: CPT | Performed by: INTERNAL MEDICINE

## 2023-11-05 PROCEDURE — 258N000003 HC RX IP 258 OP 636: Performed by: INTERNAL MEDICINE

## 2023-11-05 PROCEDURE — 71045 X-RAY EXAM CHEST 1 VIEW: CPT

## 2023-11-05 PROCEDURE — C9113 INJ PANTOPRAZOLE SODIUM, VIA: HCPCS | Mod: JZ | Performed by: INTERNAL MEDICINE

## 2023-11-05 PROCEDURE — 250N000013 HC RX MED GY IP 250 OP 250 PS 637: Performed by: INTERNAL MEDICINE

## 2023-11-05 PROCEDURE — 200N000001 HC R&B ICU

## 2023-11-05 RX ORDER — DEXTROSE MONOHYDRATE 25 G/50ML
25-50 INJECTION, SOLUTION INTRAVENOUS
Status: DISCONTINUED | OUTPATIENT
Start: 2023-11-05 | End: 2023-11-06

## 2023-11-05 RX ORDER — POTASSIUM CHLORIDE 20MEQ/15ML
40 LIQUID (ML) ORAL ONCE
Status: COMPLETED | OUTPATIENT
Start: 2023-11-05 | End: 2023-11-05

## 2023-11-05 RX ORDER — POTASSIUM CHLORIDE 29.8 MG/ML
20 INJECTION INTRAVENOUS
Status: ACTIVE | OUTPATIENT
Start: 2023-11-05 | End: 2023-11-05

## 2023-11-05 RX ORDER — POTASSIUM CHLORIDE 20MEQ/15ML
20 LIQUID (ML) ORAL ONCE
Status: COMPLETED | OUTPATIENT
Start: 2023-11-05 | End: 2023-11-05

## 2023-11-05 RX ORDER — FUROSEMIDE 10 MG/ML
40 INJECTION INTRAMUSCULAR; INTRAVENOUS EVERY 12 HOURS
Status: DISCONTINUED | OUTPATIENT
Start: 2023-11-05 | End: 2023-11-09

## 2023-11-05 RX ORDER — NICOTINE POLACRILEX 4 MG
15-30 LOZENGE BUCCAL
Status: DISCONTINUED | OUTPATIENT
Start: 2023-11-05 | End: 2023-11-06

## 2023-11-05 RX ORDER — DEXTROSE MONOHYDRATE 100 MG/ML
INJECTION, SOLUTION INTRAVENOUS CONTINUOUS PRN
Status: DISCONTINUED | OUTPATIENT
Start: 2023-11-05 | End: 2023-11-06

## 2023-11-05 RX ADMIN — Medication 50 MCG: at 19:10

## 2023-11-05 RX ADMIN — ACETYLCYSTEINE 2 ML: 200 SOLUTION ORAL; RESPIRATORY (INHALATION) at 19:56

## 2023-11-05 RX ADMIN — FUROSEMIDE 40 MG: 10 INJECTION, SOLUTION INTRAMUSCULAR; INTRAVENOUS at 22:18

## 2023-11-05 RX ADMIN — PROPOFOL 50 MCG/KG/MIN: 10 INJECTION, EMULSION INTRAVENOUS at 19:59

## 2023-11-05 RX ADMIN — CISATRACURIUM BESYLATE 4 MCG/KG/MIN: 10 INJECTION, SOLUTION INTRAVENOUS at 11:50

## 2023-11-05 RX ADMIN — INSULIN ASPART 2 UNITS: 100 INJECTION, SOLUTION INTRAVENOUS; SUBCUTANEOUS at 01:00

## 2023-11-05 RX ADMIN — MINERAL OIL AND WHITE PETROLATUM: 30; 940 OINTMENT OPHTHALMIC at 01:51

## 2023-11-05 RX ADMIN — INSULIN HUMAN 4 UNITS/HR: 1 INJECTION, SOLUTION INTRAVENOUS at 15:00

## 2023-11-05 RX ADMIN — CISATRACURIUM BESYLATE 4 MCG/KG/MIN: 10 INJECTION, SOLUTION INTRAVENOUS at 01:53

## 2023-11-05 RX ADMIN — PROPOFOL 60 MCG/KG/MIN: 10 INJECTION, EMULSION INTRAVENOUS at 09:40

## 2023-11-05 RX ADMIN — MICAFUNGIN SODIUM 100 MG: 50 INJECTION, POWDER, LYOPHILIZED, FOR SOLUTION INTRAVENOUS at 16:33

## 2023-11-05 RX ADMIN — ACETYLCYSTEINE 2 ML: 200 SOLUTION ORAL; RESPIRATORY (INHALATION) at 07:46

## 2023-11-05 RX ADMIN — POTASSIUM CHLORIDE 20 MEQ: 20 SOLUTION ORAL at 19:59

## 2023-11-05 RX ADMIN — Medication 10 MG: at 19:32

## 2023-11-05 RX ADMIN — VANCOMYCIN HYDROCHLORIDE 1250 MG: 5 INJECTION, POWDER, LYOPHILIZED, FOR SOLUTION INTRAVENOUS at 11:08

## 2023-11-05 RX ADMIN — IPRATROPIUM BROMIDE AND ALBUTEROL SULFATE 3 ML: .5; 3 SOLUTION RESPIRATORY (INHALATION) at 07:46

## 2023-11-05 RX ADMIN — FUROSEMIDE 20 MG: 10 INJECTION, SOLUTION INTRAMUSCULAR; INTRAVENOUS at 10:53

## 2023-11-05 RX ADMIN — POTASSIUM CHLORIDE 40 MEQ: 20 SOLUTION ORAL at 13:47

## 2023-11-05 RX ADMIN — POTASSIUM & SODIUM PHOSPHATES POWDER PACK 280-160-250 MG 1 PACKET: 280-160-250 PACK at 16:09

## 2023-11-05 RX ADMIN — CHLORHEXIDINE GLUCONATE 15 ML: 1.2 SOLUTION ORAL at 19:59

## 2023-11-05 RX ADMIN — METHYLPREDNISOLONE SODIUM SUCCINATE 62.5 MG: 125 INJECTION, POWDER, FOR SOLUTION INTRAMUSCULAR; INTRAVENOUS at 10:53

## 2023-11-05 RX ADMIN — PROPOFOL 60 MCG/KG/MIN: 10 INJECTION, EMULSION INTRAVENOUS at 07:16

## 2023-11-05 RX ADMIN — CHLORHEXIDINE GLUCONATE 15 ML: 1.2 SOLUTION ORAL at 09:51

## 2023-11-05 RX ADMIN — PIPERACILLIN AND TAZOBACTAM 3.38 G: 3; .375 INJECTION, POWDER, LYOPHILIZED, FOR SOLUTION INTRAVENOUS at 04:09

## 2023-11-05 RX ADMIN — Medication 200 MCG/HR: at 01:34

## 2023-11-05 RX ADMIN — MINERAL OIL AND WHITE PETROLATUM: 30; 940 OINTMENT OPHTHALMIC at 16:10

## 2023-11-05 RX ADMIN — IPRATROPIUM BROMIDE AND ALBUTEROL SULFATE 3 ML: .5; 3 SOLUTION RESPIRATORY (INHALATION) at 19:56

## 2023-11-05 RX ADMIN — PANTOPRAZOLE SODIUM 40 MG: 40 INJECTION, POWDER, FOR SOLUTION INTRAVENOUS at 08:42

## 2023-11-05 RX ADMIN — METHYLPREDNISOLONE SODIUM SUCCINATE 62.5 MG: 125 INJECTION, POWDER, FOR SOLUTION INTRAMUSCULAR; INTRAVENOUS at 18:41

## 2023-11-05 RX ADMIN — METHYLPREDNISOLONE SODIUM SUCCINATE 62.5 MG: 125 INJECTION, POWDER, FOR SOLUTION INTRAMUSCULAR; INTRAVENOUS at 02:23

## 2023-11-05 RX ADMIN — NOREPINEPHRINE BITARTRATE 0.07 MCG/KG/MIN: 0.02 INJECTION, SOLUTION INTRAVENOUS at 03:03

## 2023-11-05 RX ADMIN — PROPOFOL 60 MCG/KG/MIN: 10 INJECTION, EMULSION INTRAVENOUS at 05:22

## 2023-11-05 RX ADMIN — PIPERACILLIN AND TAZOBACTAM 3.38 G: 3; .375 INJECTION, POWDER, LYOPHILIZED, FOR SOLUTION INTRAVENOUS at 19:59

## 2023-11-05 RX ADMIN — PIPERACILLIN AND TAZOBACTAM 3.38 G: 3; .375 INJECTION, POWDER, LYOPHILIZED, FOR SOLUTION INTRAVENOUS at 13:40

## 2023-11-05 RX ADMIN — NOREPINEPHRINE BITARTRATE 0.05 MCG/KG/MIN: 0.02 INJECTION, SOLUTION INTRAVENOUS at 16:09

## 2023-11-05 RX ADMIN — IPRATROPIUM BROMIDE AND ALBUTEROL SULFATE 3 ML: .5; 3 SOLUTION RESPIRATORY (INHALATION) at 14:31

## 2023-11-05 RX ADMIN — PROPOFOL 60 MCG/KG/MIN: 10 INJECTION, EMULSION INTRAVENOUS at 11:40

## 2023-11-05 RX ADMIN — AZITHROMYCIN MONOHYDRATE 250 MG: 500 INJECTION, POWDER, LYOPHILIZED, FOR SOLUTION INTRAVENOUS at 09:29

## 2023-11-05 RX ADMIN — INSULIN ASPART 2 UNITS: 100 INJECTION, SOLUTION INTRAVENOUS; SUBCUTANEOUS at 04:16

## 2023-11-05 RX ADMIN — POTASSIUM CHLORIDE 40 MEQ: 20 SOLUTION ORAL at 08:52

## 2023-11-05 RX ADMIN — PROPOFOL 60 MCG/KG/MIN: 10 INJECTION, EMULSION INTRAVENOUS at 01:51

## 2023-11-05 RX ADMIN — Medication 200 MCG/HR: at 14:43

## 2023-11-05 RX ADMIN — POTASSIUM CHLORIDE 20 MEQ: 20 SOLUTION ORAL at 10:53

## 2023-11-05 RX ADMIN — INSULIN GLARGINE 15 UNITS: 100 INJECTION, SOLUTION SUBCUTANEOUS at 22:14

## 2023-11-05 RX ADMIN — ACETYLCYSTEINE 2 ML: 200 SOLUTION ORAL; RESPIRATORY (INHALATION) at 01:43

## 2023-11-05 RX ADMIN — ACETYLCYSTEINE 2 ML: 200 SOLUTION ORAL; RESPIRATORY (INHALATION) at 14:31

## 2023-11-05 RX ADMIN — INSULIN HUMAN 2.5 UNITS/HR: 1 INJECTION, SOLUTION INTRAVENOUS at 09:36

## 2023-11-05 RX ADMIN — PROPOFOL 50 MCG/KG/MIN: 10 INJECTION, EMULSION INTRAVENOUS at 22:49

## 2023-11-05 RX ADMIN — ENOXAPARIN SODIUM 40 MG: 100 INJECTION SUBCUTANEOUS at 19:59

## 2023-11-05 RX ADMIN — POTASSIUM CHLORIDE 20 MEQ: 20 SOLUTION ORAL at 16:09

## 2023-11-05 RX ADMIN — CISATRACURIUM BESYLATE 4 MCG/KG/MIN: 10 INJECTION, SOLUTION INTRAVENOUS at 22:49

## 2023-11-05 RX ADMIN — POTASSIUM & SODIUM PHOSPHATES POWDER PACK 280-160-250 MG 1 PACKET: 280-160-250 PACK at 13:45

## 2023-11-05 RX ADMIN — MINERAL OIL AND WHITE PETROLATUM: 30; 940 OINTMENT OPHTHALMIC at 09:51

## 2023-11-05 RX ADMIN — IPRATROPIUM BROMIDE AND ALBUTEROL SULFATE 3 ML: .5; 3 SOLUTION RESPIRATORY (INHALATION) at 01:42

## 2023-11-05 RX ADMIN — PROPOFOL 30 MCG/KG/MIN: 10 INJECTION, EMULSION INTRAVENOUS at 16:04

## 2023-11-05 RX ADMIN — PROPOFOL 60 MCG/KG/MIN: 10 INJECTION, EMULSION INTRAVENOUS at 03:03

## 2023-11-05 RX ADMIN — PERFLUTREN 2 ML: 6.52 INJECTION, SUSPENSION INTRAVENOUS at 11:42

## 2023-11-05 RX ADMIN — POTASSIUM & SODIUM PHOSPHATES POWDER PACK 280-160-250 MG 1 PACKET: 280-160-250 PACK at 08:52

## 2023-11-05 ASSESSMENT — ACTIVITIES OF DAILY LIVING (ADL)
ADLS_ACUITY_SCORE: 39
ADLS_ACUITY_SCORE: 47
ADLS_ACUITY_SCORE: 47
ADLS_ACUITY_SCORE: 39

## 2023-11-05 NOTE — PROGRESS NOTES
Intensivist update    Patient proned ~2:30pm without complications.    Right lung US performed in prone position: minimal lung sliding on right with consolidated lung. He has not been getting volara treatments while prone. RT to try some manual percussion to the right lung. Consider repeat bronchoscopy tomorrow after supination if his oxygenation continues to improve.    Jamie (Demian) MD Elana  [facility] Pulmonary & Critical Care ([facility])  Clinic [phone]  Fax [phone]

## 2023-11-05 NOTE — PROGRESS NOTES
RT PROGRESS NOTE    VENT DAY# 2    CURRENT SETTINGS:   Vent Mode: CMV/AC  (Continuous Mandatory Ventilation/ Assist Control)  FiO2 (%): 60 %  Resp Rate (Set): 28 breaths/min  Tidal Volume (Set, mL): 470 mL  PEEP (cm H2O): 10 cmH2O  Resp: 25      PATIENT PARAMETERS:  PIP 24-28  Pplat:  20-21  Pmean:  14-16  Compliance: 47.5-49.8  No Weaning done  Secretions:  scant thin white  02 Sats:  97-99%  BS: diminished for most of this shift, but with increased aeration after patient was in prone position and Volara treatment was completed at 1430.    ETT SIZE 7.5 Secured at 24 cm at teeth/gums    Respiratory Medications: 2 ml 20% Mucomyst/Duoneb Q6     ABG: @1026 pH 7.38; pCO2 43; pO2 127; HCO3 25, %O2 Sat 98.4, BE -0.1 on 70% FIO2.    NOTE / SHIFT SUMMARY:   Jim remained on ventilator this shift with the above settings. FIO2 weaned down from 90% to 60% and PEEP weaned from 12 to 10. Jim is to remain on these settings overnight without any further weaning. MM/Duonebs given Q6 per orders. Volara completed X 1 this shift. CHFO set at 23 and patient tolerated treatment X 20 minutes. Patient was placed in supine position at 0925 and was placed in prone position at 1430. ETT remains taped in place.    Chiquita Spangler, EDDIE, RRT, CTTS

## 2023-11-05 NOTE — PROGRESS NOTES
RT Note:    Received pt prone on mechanical ventilator. Vent settings:RR:28 VT:470 PEEP:+12 FiO2:90%. Pt head turned every 2 hours to prevent skin break down. Scheduled neb treatment given per order. Will continue to monitor and assess pt.

## 2023-11-05 NOTE — PROGRESS NOTES
CRITICAL CARE PROGRESS NOTE:    Assessment/Plan:  58M wih hx of asthma, MM on keytruda since May 2023, depression/anxiety, recent hospitalization for CAP and respiratory failure, readmitted for worsening cough and SOB. CT showed worsening of right-sided infiltrates thought to be 2/2 worsening pneumonia. In ICU on HFNC and requiring intermittent BiPAP. Failed NIPPV and required intubation on 11/4.     RESP:  ARDS. Acute respiratory failure with hypoxemia requiring intubation/IMV on 11/4 PM. Worsening right-sided infiltrates could be c/w recurrent pneumonia (HAP vs. HCAP) vs. Organizing pneumonia vs. Malignant infiltrates vs. Pneumonitis from immunotherapy.   Cont LPV, Vt 6cc/kg IBW  Titrate FiO2 for goal SpO2 88-92% or PaO2 55 mm Hg or greater  Low PEEP/high FiO2 ARDSnet protocol  Pplat <30; DP goal 10-15. Optimal PEEP based on DP seems to be 10-12.   Abx per ID section below  Continue 60mg IV methylpred q8h for possible drug-related pneumonitis.   Continue scheduled duonebs, mucomyst nebs  Supinate this AM  CXR after supination  Recheck ABG around noon; will decide if he needs to be re-proned based on supine p:f ratio midday.     CV:  Shock, likely vasoplegia from sedation. Requiring low dose NE. Bedside echo 11/4 with grossly intact LV function, probably normal diastolic function, IVC looked somewhat plump with reduced inspiratory variation. RV not well seen. No formal echo on file. More bradycardic today compared to yesterday; HR in high 40s - likely from propofol   Tele monitoring  MAP >65, not requiring pressors  Furosemide 20mg IV BID to keep on dry side  Formal TTE today  Holding PTA statin for now  Lactate low on 11/4; no need to trend further.     NEURO:  Requiring therapeutic sedation and NMB for severe ARDS.   Continue prop, fent for RASS goal -2 to -3  Monitor HR closely while on propofol   Continue cis-atra for NMB  Tylenol prn pain  Midazolam 2mg IV q1h prn additional sedation  Hold PTA mirtazapine and  selective serotonin reuptake inhibitor while on fentanyl.     GI:  No issues. Elevated ALP likely from MM. OG tube in place  RD consult for TF initiation today  IV PPI for stress ulcer ppx  Bowel regimen prn    RENAL:  No issues, normal renal function. Slight bump in Scr today  Avoid nephrotoxins  Furosemide as above  Monitor BUN/Scr, lytes  Maintain norwood    ID:  Possible recurrent pneumonia, HAP vs. HCAP. Immunosuppressed; at risk for fungal infection. Multiple tests from BAL still pending.   ID following, appreciate input  Continue vanco + pip/tazo + azithromycin  Continue ampho B - discussed with ID yesterday. May be able to stop as fungitell assay was negative; will review with ID.   Follow up BAL cultures.    HEMATOLOGIC:  Hx of MM, on immunotherapy. Followed by [facility]DIS[facility]. Stable mild anemia.   Hgb >7  Follow counts    ENDOCRINE:  Hyperglycemia due to critical illness, steroids.   Insulin drip today    ICU PROPHYLAXIS:  LMWH daily  IV PPI  Chlorhexidine rinses    CODE STATUS, DISPOSITION, FAMILY COMMUNICATION: full code  Updated his sister Luz Maria yesterday.    Lines/Drains/Tubes:  PICC 11/4  L ax art line 11/4  PIV 11/4  OG tube 11/4  Norwood 11/4  ETT 7.5mm 11/4    Restraints  Progress Note  Restraint Application    I recognize that restraints are physical and/or chemical interventions intended to restrict a person's movements. Restraints are currently needed to ensure the safety of this patient and/or others. My clinical rationale appears below.    Category/Type of Restraint  Non Violent:  Soft limb restraint x 2  --  Behavior  Pulling at tubes/lines  --  Root Cause of the Behavior  Sedation/intubation  --  Less-Restrictive Measures that Failed  Non Violent Measures:  Close Observation  --  Response to the Restraint  Patient unable to pull at tubes/lines  --  Criteria for Release from the Restraint  Patient calm and off sedation     Jamie Huitron MD (Avi)  [facility]  Pager [phone]  Clinic [phone]  Fax [phone]     Clinically Significant Risk Factors        # Hypokalemia: Lowest K = 2.9 mmol/L in last 2 days, will replace as needed       # Hypoalbuminemia: Lowest albumin = 2.7 g/dL at 11/3/2023  5:57 AM, will monitor as appropriate            # Obesity: Estimated body mass index is 36.16 kg/m  as calculated from the following:    Height as of this encounter: 1.829 m (6').    Weight as of this encounter: 120.9 kg (266 lb 9.6 oz)., PRESENT ON ADMISSION       # Financial/Environmental Concerns: none  # Asthma: noted on problem list                Overnight events:  No major events overnight.  Remains proned, paralyzed, sedated    P:f 236 prone this AM. 149 supine yesterday    NE @0.05  Bradycardic compared to yesterday - HR in high 40s.   Good UOP with furosemide.     Prop/fent for sedation.  Cis-atra    Subjective:  Denies pain.     Objective:  Physical Exam:  Vent settings for last 24 hours:  Vent Mode: CMV/AC  (Continuous Mandatory Ventilation/ Assist Control)  FiO2 (%): 90 %  Resp Rate (Set): 28 breaths/min  Tidal Volume (Set, mL): 470 mL  PEEP (cm H2O): 12 cmH2O  Resp: 28      /68   Pulse (!) 47   Temp (!) 95.2  F (35.1  C)   Resp 28   Ht 1.829 m (6')   Wt 120.9 kg (266 lb 9.6 oz)   SpO2 99%   BMI 36.16 kg/m      Intake/Output last 3 shifts:  I/O last 3 completed shifts:  In: 3304.52 [I.V.:2704.52; NG/GT:100; IV Piggyback:500]  Out: 3525 [Urine:3525]  Intake/Output this shift:  No intake/output data recorded.    Physical Exam  Gen: prone; intubated, paralyzed, sedated.   HEENT: no OP lesions, no DAVID  CV: RRR, no m/g/r  Resp: diminished posteriorly (examined in prone position)  Abd: soft, nontender, BS+  Neuro: PERRL, nonfocal  Ext: no edema    LAB:  Recent Labs   Lab 11/05/23  0532   WBC 16.1*   HGB 9.2*   HCT 29.8*   *     Recent Labs   Lab 11/05/23  0532 11/04/23  0421 11/03/23  0557 11/02/23  0845    139 140 136   CO2 23 25  25 24   BUN 10.8 4.3* 5.1* 5.4*   ALKPHOS  --  293* 226* 275*   ALT  --  23 23 39   AST  --  21 14 34     Micro  PCT 0.11 on 11/2  Covid neg  Flu A/B neg  RSV neg  Sputum pending  MRSA neg  Blood NGTD  Legionella urine Ag neg  Strep pneumo urine Ag neg    Fungitell assay neg  Histo urine Ag pending  Fungal Ab panel pending    Bronch/BAL 11/3  Gram stain + for GPC, culture pending  KOH prep neg, fungal culture pending  Total nuc cells 4, 61% PMN, 31% lymphs  RVP neg  Legionella cx pending        No current outpatient medications on file.       Critical care attestation: 45 minutes spent managing the following issues: acute respiratory failure requiring intubation/IMV; severe pneumonia vs. Drug related pneumonitis with acute respiratory failure, ARDS. Immunosuppressed patient.. High risk for organ deterioration and death requiring ICU level care.

## 2023-11-05 NOTE — PROGRESS NOTES
[facility]    Medicine Progress Note - Hospitalist Service    Date of Admission:  11/2/2023    Assessment & Plan   Jim Ttius is a 58 year old male admitted on 11/2/2023.  Has a past medical history significant for asthma, multiple myeloma on Keytruda, depression/anxiety recurrent hospitalization for pneumonia, discharged home on 10/25/2023 return to the hospital due to worsening cough and shortness of breath.  CT scan showed bilateral infiltrate worse on the right side.  Evaluated by pulmonology.  Started on broad-spectrum antibiotic. Status post bronchoscopy on 11/3, aborted early as the patient was not tolerating the procedure.  It showed erythema without significant secretion.  Less likely bacterial pneumonia.  Worsening respiratory status over the last 24 hours--> status post intubation on 11/4. Working diagnosis multifocal pneumonia, versus organizing pneumonia versus immune mediated pneumonitis. Started on steroid on 11/4.      Acute hypoxic respiratory failure  Bilateral infiltrate possibly due to multifocal pneumonia versus pneumonitis  Sepsis due to possible lung infection  -3 weeks of persistent/recurrent pneumonia  -Patient is on Keytruda.  On presentation, discussed case with pulmonologist--> suggested less likely immune mediated pneumonitis, more likely pneumonia.  -Procalcitonin is mildly elevated, CRP is significantly elevated above 200.  -Status post bronchoscopy on 11/3.  -Despite starting antibiotic respiratory worsened over last 48 hours.  Status post intubation on 11/4.  -Started on steroid for possible pneumonitis on 11/4.  -11/5, hypotension requiring Levophed most likely distributive shock.    Plan  -Currently patient being managed by intensivist.  -Continue broad-spectrum antibiotic and antifungal.  -Continue steroid.  -Follow-up with cultures collected during bronchoscopy.    Hyperglycemia  -Worsening hyperglycemia due to steroid use    Plan  -Started on regular  insulin drip by intensivist.    Asthma  -No signs of exacerbation    Anxiety/depression  -At home on Lexapro on mirtazapine    Plan  -Holding given respiratory status and status post intubation    Anemia  -No signs of active bleeding  -Hemoglobin stable around 9    Plan   -Continue to monitor.            Diet: NPO per Anesthesia Guidelines for Procedure/Surgery Except for: Meds    DVT Prophylaxis: Enoxaparin (Lovenox) SQ  Lamas Catheter: PRESENT, indication: Deep Sedation/Paralysis  Lines: PRESENT      PICC 11/04/23 Double Lumen Right Basilic medication drips-Site Assessment: WDL  Arterial Line 11/04/23 Other (Comment)-Site Assessment: WDL    Cardiac Monitoring: None  Code Status: Full Code             Disposition Plan remains in the ICU.  Expected prolonged hospitalization.     Expected Discharge Date: 11/10/2023,  9:00 AM    Destination: home with family  Discharge Comments: ICU- BiPAP/HFNC, IV ABX            LA CHEN MD  Hospitalist Service  [facility]  Securely message with [employer] (more info)  Text page via [employer] Paging/Directory   ______________________________________________________________________    Interval History   Intubated and sedated.    Physical Exam   Vital Signs: Temp: (!) 96.1  F (35.6  C) Temp src: Esophageal BP: 138/68 Pulse: 53   Resp: 28 SpO2: 98 % O2 Device: Mechanical Ventilator    Weight: 266 lbs 9.6 oz    Physical Exam  Constitutional:       Comments: Intubated, proned, sedated   Pulmonary:      Comments: On mechanical ventilation, significant crackles across the right lung  Skin:     General: Skin is warm and dry.          Medical Decision Making       50 MINUTES SPENT BY ME on the date of service doing chart review, history, exam, documentation & further activities per the note.      Data     I have personally reviewed the following data over the past 24 hrs:    16.1 (H)  \   9.2 (L)   / 622 (H)     141 104 10.8 /  218 (H)   2.9 (L) 23 1.05 \       Imaging  results reviewed over the past 24 hrs:   Recent Results (from the past 24 hour(s))   XR Chest Port 1 View    Narrative    EXAM: XR CHEST PORT 1 VIEW  LOCATION: [facility]  DATE: 2023    INDICATION: Endotracheal tube positioning  COMPARISON: 2023 at 1200 hours and chest CTA 2023      Impression    IMPRESSION: Endotracheal tube is 3.5 cm above the ingrid. Orogastric tube tip is below diaphragm. Heart size magnified in AP projection. Unchanged near complete opacification of the right hemithorax do to airspace consolidation +/- small pleural   effusion, with some sparing of the apex. Minimal left basilar subsegmental atelectasis. No pneumothorax.   Echocardiogram Complete    Narrative    [id number]  [id number]  [id number]  [id number]^DANE^[place] [id number]     Name: MISSY ALMANZA  MRN: [id number]  : 1965  Study Date: 2023 11:09 AM  Age: 58 yrs  Gender: Male  Patient Location: [facility]  Reason For Study: Abn EKG  Ordering Physician: FIDEL VELA  Performed By: MB     BSA: 2.4 m2  Height: 72 in  Weight: 266 lb  HR: 65  BP: 105/56 mmHg  ______________________________________________________________________________  Procedure  Complete Echo Adult. Definity (NDC #92120-085) given intravenously.  ______________________________________________________________________________  Interpretation Summary     1. Technically difficult study (useful imaging essentially could only be  obtained from the subcostal view).  2. The left ventricle is normal in size. Image quality does not provide for  detailed assessment of LV systolic function, but is felt to be normal with a  visually estimated ejection fraction of roughly 55-60%.  3. No significant valvular heart disease is identified on this study though  the sensitivity, particularly of regurgitant lesions, is reduced due to poor  Doppler acoustics.  4. On selected views, the right  ventricle appears mildly enlarged with  moderately reduced systolic performance (the right ventricle is only  visualized from the subcostal view).  5. There is biatrial enlargement (difficult to quantify due to suboptimal  acoustic imaging)  6. The IVC appears dilated with decreased phasic variation in caval diameter  consistent with elevated right atrial pressure.  ______________________________________________________________________________  Left ventricle:  The left ventricle is normal in size. Image quality does not provide for  detailed assessment of LV systolic function, but is felt to be normal with a  visually estimated ejection fraction of roughly 55-60%. There is normal  regional wall motion based on limited views available. Left ventricular wall  thickness is normal.     Assessment of LV Diastolic Function: The cumulative findings are indeterminate  in the evaluation of diastolic function.     Right ventricle:  On selected views, the right ventricle appears mildly enlarged with moderately  reduced systolic performance (the right ventricle is only visualized from the  subcostal view).     Left atrium:  The left atrium appears enlarged (difficult to quantify)     Right atrium:  The right atrium appears enlarged (difficult to quantify).     IVC:  The IVC appears dilated with decreased phasic variation in caval diameter  consistent with elevated right atrial pressure.     Aortic valve:  The aortic valve is not well visualized, but suspected to be comprised of  three cusps. No significant aortic stenosis or aortic insufficiency is  detected on this study.     Mitral valve:  The mitral valve appears morphologically normal.     Tricuspid valve:  The tricuspid valve is grossly morphologically normal.     Pulmonic valve:  The pulmonic valve is not well visualized.     Thoracic aorta:  The aortic root and proximal ascending aorta are of normal dimension.     Pericardium:  There is no significant pericardial  effusion.  ______________________________________________________________________________  ______________________________________________________________________________  MMode/2D Measurements & Calculations  IVSd: 0.94 cm  LVIDd: 5.0 cm  LVIDs: 3.5 cm  LVPWd: 0.84 cm  FS: 30.1 %  LV mass(C)d: 155.0 grams  LV mass(C)dI: 64.5 grams/m2  LVOT diam: 2.3 cm  LVOT area: 4.1 cm2  RWT: 0.34     Doppler Measurements & Calculations  MV E max ryne: 56.1 cm/sec  MV A max ryne: 53.1 cm/sec  MV E/A: 1.1  MV dec slope: 164.8 cm/sec2  MV dec time: 0.34 sec  Ao V2 max: 98.9 cm/sec  Ao max P.0 mmHg  Ao V2 mean: 64.2 cm/sec  Ao mean P.9 mmHg  Ao V2 VTI: 19.0 cm  JACQUE(I,D): 4.2 cm2  JACQUE(V,D): 3.8 cm2  LV V1 max PG: 3.3 mmHg  LV V1 max: 90.4 cm/sec  LV V1 VTI: 19.3 cm  SV(LVOT): 79.8 ml  SI(LVOT): 33.2 ml/m2  AV Ryne Ratio (DI): 0.91     JACQUE Index (cm2/m2): 1.7  E/E' av.6  Lateral E/e': 5.2  Medial E/e': 7.9     ______________________________________________________________________________  Report approved by: Mark Lomeli 2023 12:00 PM

## 2023-11-05 NOTE — PLAN OF CARE
Problem: Adult Inpatient Plan of Care  Goal: Absence of Hospital-Acquired Illness or Injury  Intervention: Prevent Skin Injury  Recent Flowsheet Documentation  Taken 11/4/2023 1600 by Genesis Kennedy RN  Body Position:   position maintained   sitting up in bed  Taken 11/4/2023 1200 by Genesis Kennedy RN  Body Position:   position maintained   sitting up in bed  Goal: Optimal Comfort and Wellbeing  Intervention: Provide Person-Centered Care  Recent Flowsheet Documentation  Taken 11/4/2023 1200 by Genesis Kennedy RN  Trust Relationship/Rapport: care explained     Problem: Pneumonia  Goal: Fluid Balance  Outcome: Progressing  Goal: Resolution of Infection Signs and Symptoms  Outcome: Not Progressing  Pt placed on vent and proned.  Goal: Effective Oxygenation and Ventilation  Outcome: Not Progressing  Intervention: Optimize Oxygenation and Ventilation  Recent Flowsheet Documentation  Taken 11/4/2023 1600 by Genesis Kennedy RN  Airway/Ventilation Management: calming measures promoted  Head of Bed (HOB) Positioning: HOB at 30-45 degrees  Taken 11/4/2023 1200 by Genesis Kennedy RN  Airway/Ventilation Management: calming measures promoted  Head of Bed (HOB) Positioning: HOB at 30-45 degrees  Pt proned and paralyzed, adjustment to vent as needed.     Problem: Risk for Delirium  Goal: Optimal Coping  Outcome: Unable to Meet  Goal: Improved Behavioral Control  Outcome: Unable to Meet  Pt sedated on vent.      Intervention: Minimize Safety Risk  Recent Flowsheet Documentation  Taken 11/4/2023 1600 by Genesis Kennedy RN  Communication Enhancement Strategies:   call light answered in person   communication board used   device use encouraged  Taken 11/4/2023 1200 by Genesis Kennedy RN  Communication Enhancement Strategies:   call light answered in person   communication board used   device use encouraged  Trust Relationship/Rapport: care explained  Goal: Improved Attention and  Thought Clarity  Outcome: Unable to Meet  Intervention: Maximize Cognitive Function  Recent Flowsheet Documentation  Taken 11/4/2023 1600 by Genesis Kennedy RN  Reorientation Measures: clock in view  Taken 11/4/2023 1200 by Genesis Kennedy RN  Reorientation Measures: clock in view  Goal: Improved Sleep  Outcome: Unable to Meet     Problem: Mechanical Ventilation Invasive  Goal: Effective Communication  Outcome: Unable to Meet  Intervention: Ensure Effective Communication  Recent Flowsheet Documentation  Taken 11/4/2023 1600 by Genesis Kennedy RN  Communication Enhancement Strategies:   call light answered in person   communication board used   device use encouraged  Taken 11/4/2023 1200 by Genesis Kennedy RN  Communication Enhancement Strategies:   call light answered in person   communication board used   device use encouraged  Trust Relationship/Rapport: care explained  Goal: Optimal Device Function  Outcome: Progressing  Intervention: Optimize Device Care and Function  Recent Flowsheet Documentation  Taken 11/4/2023 1600 by Genesis Kennedy RN  Airway Safety Measures: all equipment/monitors on and audible  Airway/Ventilation Management: calming measures promoted  Taken 11/4/2023 1200 by Genesis Kennedy RN  Airway Safety Measures: all equipment/monitors on and audible  Airway/Ventilation Management: calming measures promoted  Goal: Mechanical Ventilation Liberation  Outcome: Not Progressing  Goal: Optimal Nutrition Delivery  Outcome: Not Progressing  Goal: Absence of Device-Related Skin and Tissue Injury  Outcome: Progressing  Goal: Absence of Ventilator-Induced Lung Injury  Outcome: Progressing  Intervention: Prevent Ventilator-Associated Pneumonia  Recent Flowsheet Documentation  Taken 11/4/2023 1600 by Genesis Kennedy RN  Head of Bed (HOB) Positioning: HOB at 30-45 degrees  Taken 11/4/2023 1200 by Genesis Kennedy RN  Head of Bed (HOB) Positioning: HOB at  30-45 degrees   Goal Outcome Evaluation:Pt palced on vent and proned with paralyzation. Pt resp status adjusted based on improvement.

## 2023-11-05 NOTE — PROGRESS NOTES
INFECTIOUS DISEASE FOLLOW UP NOTE      ASSESSMENT:  Multifocal pneumonia, hypoxic respiratory failure: worsening despite recent courses of antibiotics. Elevated inflammatory markers. CT with increased consolidation R>L. Bronchoscopy 11/3. Worsening respiratory status 11/3-->4 required intubated 11/4. Steroids starting 11/4. GPC on gram stain of BAL, culture with normal dustin including yeast (usually candida not requiring treatment). Beta-D-glucan negative, makes PJP unlikely (and lung abnormality is unilateral, would be atypical for PJP); also makes invasive candidiasis unlikely. Urine histo antigen negative. Seems most likely either bacterial pneumonia or post-pneumonia inflammation.  Keytruda pneumonitis also possible, although I would think this would show bilateral lung involvement. MRSA screen negative. At this point, looking at risk/benefit of empiric therapies, can stop vancomycin and amphotericin.   Recent hospitalization 10/23 for pneumonia-received 2 days vanc/zosyn before transitioning to levofloxacin on discharge  Hx melanoma on keytruda    PLAN:  continue zosyn IV and azithromycin  - note has completed around 10 days of legionella coverage  - stop vancomycin and hold more ampho dosing  Will have on micafungin for yeast coverage at minimum for prophylaxis with broad spectrum antibiotic and steroid ongoing.   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 Voriconazole can affect fentanyl levels, so we went with ambisome.    Bronchoscopy yesterday due to white-out lung on x ray; no mucus plugging, but airways were inflamed.      O2 was better with proned position, now supine but may need to be proned again.     Reviewed results with  family at the bedside. They recall that he had been sick for weeks to a couple of months.       ROS: All other systems negative except as listed above.        OBJECTIVE:  /68   Pulse (!) 49   Temp (!) 96.1  F (35.6  C)   Resp 28   Ht 1.829 m (6')   Wt 120.9 kg (266 lb 9.6 oz)   SpO2 98%   BMI 36.16 kg/m    FiO2 (%): (S) 90 %    Vital Signs  Temp: 98.9  F (37.2  C)  Temp src: Oral  Resp: 22  Pulse: 84  Pulse Rate Source: Monitor  BP: 107/65  BP Location: Right arm  Pain Score: No Pain (0)    Temp (24hrs), Av.6  F (38.1  C), Min:98.9  F (37.2  C), Max:103.2  F (39.6  C)      GEN: intubated, sedated  RESPIRATORY: breath sounds bilaterally  CARDIOVASCULAR:  Regular rate and rhythm. Normal S1 and S2. No murmur  ABDOMEN:  Soft, normal bowel sounds, non-tender  EXTREMITIES: No edema.  SKIN/HAIR/NAILS:  No rashes  IV: PICC L UE        Antibiotics:  Vancomycin -  Pip/tazo -  Azithromycin -  Ampho liposomal     Pertinent labs:    Recent Labs   Lab 23  0532 23  0833 23  0557   WBC 16.1* 11.3* 9.5   HGB 9.2* 8.7* 9.0*   HCT 29.8* 27.9* 29.5*   * 452* 541*        Recent Labs   Lab 23  0532 23  0421 23  0557    139 140   CO2 23 25 25   BUN 10.8 4.3* 5.1*         Lab Results   Component Value Date    ALT 23 2023    AST 21 2023    ALKPHOS 293 (H) 2023         MICROBIOLOGY DATA:  BAL gram stain GPC; culture with normal dustin including yeast  Beta-D-glucan negative   MRSA negative  Strep pn, Leg ag negative  Histo urine Ag negative.       RADIOLOGY:  Echocardiogram Complete    Result Date: 2023  [id number] ^SOFER^[place] [address]  Name: MISSY ALMANZA MRN: [id number] : 1965 Study Date: 2023 11:09 AM Age: 58 yrs Gender: Male [place] [facility] Reason For Study: Abn EKG Ordering Physician: FIDEL VELA Performed By: MB  BSA: 2.4 m2 Height:  72 in Weight: 266 lb HR: 65 BP: 105/56 mmHg ______________________________________________________________________________ Procedure Complete Echo Adult. Definity (NDC #64658-862) given intravenously. ______________________________________________________________________________ Interpretation Summary  1. Technically difficult study (useful imaging essentially could only be obtained from the subcostal view). 2. The left ventricle is normal in size. Image quality does not provide for detailed assessment of LV systolic function, but is felt to be normal with a visually estimated ejection fraction of roughly 55-60%. 3. No significant valvular heart disease is identified on this study though the sensitivity, particularly of regurgitant lesions, is reduced due to poor Doppler acoustics. 4. On selected views, the right ventricle appears mildly enlarged with moderately reduced systolic performance (the right ventricle is only visualized from the subcostal view). 5. There is biatrial enlargement (difficult to quantify due to suboptimal acoustic imaging) 6. The IVC appears dilated with decreased phasic variation in caval diameter consistent with elevated right atrial pressure. ______________________________________________________________________________ Left ventricle: The left ventricle is normal in size. Image quality does not provide for detailed assessment of LV systolic function, but is felt to be normal with a visually estimated ejection fraction of roughly 55-60%. There is normal regional wall motion based on limited views available. Left ventricular wall thickness is normal.  Assessment of LV Diastolic Function: The cumulative findings are indeterminate in the evaluation of diastolic function.  Right ventricle: On selected views, the right ventricle appears mildly enlarged with moderately reduced systolic performance (the right ventricle is only visualized from the subcostal view).  Left atrium: The left atrium  appears enlarged (difficult to quantify)  Right atrium: The right atrium appears enlarged (difficult to quantify).  IVC: The IVC appears dilated with decreased phasic variation in caval diameter consistent with elevated right atrial pressure.  Aortic valve: The aortic valve is not well visualized, but suspected to be comprised of three cusps. No significant aortic stenosis or aortic insufficiency is detected on this study.  Mitral valve: The mitral valve appears morphologically normal.  Tricuspid valve: The tricuspid valve is grossly morphologically normal.  Pulmonic valve: The pulmonic valve is not well visualized.  Thoracic aorta: The aortic root and proximal ascending aorta are of normal dimension.  Pericardium: There is no significant pericardial effusion. ______________________________________________________________________________ ______________________________________________________________________________ MMode/2D Measurements & Calculations IVSd: 0.94 cm LVIDd: 5.0 cm LVIDs: 3.5 cm LVPWd: 0.84 cm FS: 30.1 % LV mass(C)d: 155.0 grams LV mass(C)dI: 64.5 grams/m2 LVOT diam: 2.3 cm LVOT area: 4.1 cm2 RWT: 0.34  Doppler Measurements & Calculations MV E max ryne: 56.1 cm/sec MV A max ryne: 53.1 cm/sec MV E/A: 1.1 MV dec slope: 164.8 cm/sec2 MV dec time: 0.34 sec Ao V2 max: 98.9 cm/sec Ao max P.0 mmHg Ao V2 mean: 64.2 cm/sec Ao mean P.9 mmHg Ao V2 VTI: 19.0 cm JACQUE(I,D): 4.2 cm2 JACQUE(V,D): 3.8 cm2 LV V1 max PG: 3.3 mmHg LV V1 max: 90.4 cm/sec LV V1 VTI: 19.3 cm SV(LVOT): 79.8 ml SI(LVOT): 33.2 ml/m2 AV Ryne Ratio (DI): 0.91  JACQUE Index (cm2/m2): 1.7 E/E' av.6 Lateral E/e': 5.2 Medial E/e': 7.9  ______________________________________________________________________________ Report approved by: Mark Lomeli 2023 12:00 PM       XR Chest Port 1 View    Result Date: 2023  EXAM: XR CHEST PORT 1 VIEW LOCATION: [facility] DATE: 2023 INDICATION: Endotracheal tube  positioning COMPARISON: 11/04/2023 at 1200 hours and chest CTA 11/02/2023     IMPRESSION: Endotracheal tube is 3.5 cm above the ingrid. Orogastric tube tip is below diaphragm. Heart size magnified in AP projection. Unchanged near complete opacification of the right hemithorax do to airspace consolidation +/- small pleural effusion, with some sparing of the apex. Minimal left basilar subsegmental atelectasis. No pneumothorax.    XR Chest Port 1 View    Result Date: 11/4/2023  EXAM: XR CHEST PORT 1 VIEW LOCATION: [facility] DATE: 11/4/2023 INDICATION: PICC placement COMPARISON: Chest radiograph 03/01/2014 and CTA chest 11/02/2023.     IMPRESSION: Right PICC is in place with tip overlying the low SVC. Increased right lung opacities with near complete opacification of the right hemithorax. Mild left upper lung opacities. No pneumothorax.    Abdomen US, limited (RUQ only)    Result Date: 11/2/2023  EXAM: US ABDOMEN LIMITED LOCATION: [facility] DATE: 11/2/2023 INDICATION: sepsis, cough, difficulty taking in big breath, poor appetite, elevating alk phos with recent hospitalization. COMPARISON: None. TECHNIQUE: Limited abdominal ultrasound. FINDINGS: GALLBLADDER: Normal. No gallstones, wall thickening, or pericholecystic fluid. Negative sonographic Aguilar's sign. BILE DUCTS: No biliary dilatation. The common duct measures 3 mm. LIVER: Normal parenchyma with smooth contour. No focal mass. RIGHT KIDNEY: No hydronephrosis. PANCREAS: The visualized portions are normal. No ascites.     IMPRESSION: Normal limited abdominal ultrasound.     CT Chest Pulmonary Embolism w Contrast    Result Date: 11/2/2023  EXAM: CT CHEST PULMONARY EMBOLISM W CONTRAST LOCATION: [facility] DATE: 11/2/2023 INDICATION: cough, hypoxia, recent hospitalization x 9 days  for CAP COMPARISON: CTA chest 10/23/2023 TECHNIQUE: CT chest pulmonary angiogram during arterial phase  injection of IV contrast. Multiplanar reformats and MIP reconstructions were performed. Dose reduction techniques were used. CONTRAST: Isovue 370 100mL FINDINGS: ANGIOGRAM CHEST: Pulmonary arteries are normal caliber and negative for pulmonary emboli. Thoracic aorta is negative for dissection. No CT evidence of right heart strain. LUNGS AND PLEURA: Increased consolidation within the right upper, right middle, right lower, left upper, and left lower lobes. Atoll sign in the left upper lobe (7/46). No effusion or empyema. No pneumothorax. MEDIASTINUM/AXILLAE: Normal heart size. No pericardial effusion. Reactive mediastinal lymphadenopathy. CORONARY ARTERY CALCIFICATION: Mild. UPPER ABDOMEN: Hepatic steatosis. MUSCULOSKELETAL: No acute abnormality.     IMPRESSION: 1.  No pulmonary embolism. 2.  Increased consolidation associated with the multifocal pneumonia (right greater than left lungs). Appearance is nonspecific but could be seen in the setting of atypical infection (including fungal organisms) or bacterial pneumonia. No empyema or other complication.    CT Chest Pulmonary Embolism w Contrast    Result Date: 10/23/2023  EXAM: CT CHEST PULMONARY EMBOLISM W CONTRAST LOCATION: [facility] DATE: 10/23/2023 INDICATION: Right back melanoma diagnosed April 2023. On immunotherapy. Two weeks of cough and morrison, finished augmentin 1 week ago for pneumonia COMPARISON: PET  CT 04/10/2023 TECHNIQUE: CT chest pulmonary angiogram during arterial phase injection of IV contrast. Multiplanar reformats and MIP reconstructions were performed. Dose reduction techniques were used. CONTRAST: Isovue 370 90mL FINDINGS: ANGIOGRAM CHEST: Adequate opacification of the pulmonary arteries and branches. No evidence of pulmonary emboli. Aorta and great vessels are normal.  LUNGS AND PLEURA: Asymmetric, right greater than left areas of consolidation with air bronchograms are noted. This involves most of the right upper,  lower and middle lobes with smaller area of involvement of the left upper lobe and in the medial basilar segments of the left lower lobe. Consolidated lung enhances normally. No effusions. MEDIASTINUM/AXILLAE: Few less than 1 cm right paratracheal and subcarinal nodes are present. CORONARY ARTERY CALCIFICATION: Minimal UPPER ABDOMEN: No liver lesions. Fatty atrophy of the spleen. MUSCULOSKELETAL: No suspicious  lesions. Marked gynecomastia.     IMPRESSION: 1.  Asymmetric, right greater than left multilobar pneumonia with significant involvement of the right lower, middle and upper lobes. No parapneumonic effusions. 2.  Minimal reactive adenopathy. 3.  No evidence of pulmonary emboli. 4.  No evidence of metastasis.

## 2023-11-05 NOTE — PLAN OF CARE
Problem: Adult Inpatient Plan of Care  Goal: Absence of Hospital-Acquired Illness or Injury  Outcome: Progressing  Intervention: Identify and Manage Fall Risk  Recent Flowsheet Documentation  Taken 11/5/2023 0400 by Tawnya Schneider RN  Safety Promotion/Fall Prevention: safety round/check completed  Taken 11/5/2023 0000 by Tawnya Schneider RN  Safety Promotion/Fall Prevention: safety round/check completed  Taken 11/4/2023 2000 by Tawnya Schneider RN  Safety Promotion/Fall Prevention: safety round/check completed  Intervention: Prevent Skin Injury  Recent Flowsheet Documentation  Taken 11/5/2023 0600 by Tawnya Schneider RN  Body Position: (swim right) prone  Taken 11/5/2023 0400 by Tawnya Schneider RN  Body Position: (swim left) prone  Taken 11/5/2023 0200 by Tawnya Schneider RN  Body Position: (swim right) prone  Taken 11/5/2023 0000 by Tawnya Schneider RN  Body Position: (swim left) prone  Taken 11/4/2023 2200 by Tawnya Schneider RN  Body Position: (swim right) prone  Taken 11/4/2023 2000 by Tawnya Schneider RN  Body Position: (swim left) prone     Problem: Pneumonia  Goal: Fluid Balance  Outcome: Progressing     Problem: Mechanical Ventilation Invasive  Goal: Optimal Device Function  Intervention: Optimize Device Care and Function  Recent Flowsheet Documentation  Taken 11/5/2023 0400 by Tawnya Schneider RN  Airway Safety Measures: all equipment/monitors on and audible  Oral Care:   swabbed with antiseptic solution   lip/mouth moisturizer applied  Taken 11/5/2023 0000 by Tawnya Schneider RN  Airway Safety Measures: all equipment/monitors on and audible  Oral Care: swabbed with antiseptic solution  Taken 11/4/2023 2000 by Tawnya Schneider RN  Airway Safety Measures: all equipment/monitors on and audible  Oral Care: swabbed with antiseptic solution  Goal: Mechanical Ventilation Liberation  Intervention: Promote Extubation and Mechanical Ventilation Liberation  Recent Flowsheet  Documentation  Taken 11/5/2023 0400 by Tawnya Schneider RN  Medication Review/Management: medications reviewed  Taken 11/5/2023 0000 by Tawnya Schneider RN  Medication Review/Management: medications reviewed  Taken 11/4/2023 2000 by Tawnya Schneider RN  Medication Review/Management: medications reviewed  Goal: Absence of Ventilator-Induced Lung Injury  Intervention: Prevent Ventilator-Associated Pneumonia  Recent Flowsheet Documentation  Taken 11/5/2023 0400 by Tawnya Schneider RN  Oral Care:   swabbed with antiseptic solution   lip/mouth moisturizer applied  Taken 11/5/2023 0000 by Tawnya Schneider RN  Oral Care: swabbed with antiseptic solution  Taken 11/4/2023 2000 by Tawnya Schneider RN  Oral Care: swabbed with antiseptic solution   Goal Outcome Evaluation:                  Pt remains intubated, sedated, paralyzed. BIS scores within range- 40-60. TOF 2/4. Sedation infusing per MAR. Pt repositioned q2h. Assisted pt to swim positions left/right q2h with RT and Rns. Oral cares provided. Pt running hypothermic. Giuseppe hugger and warm blankets applied. Safety checks completed. Plan to run protocols when AM labs result. Will continue to monitor and follow plan of care.

## 2023-11-06 ENCOUNTER — APPOINTMENT (OUTPATIENT)
Dept: RADIOLOGY | Facility: CLINIC | Age: 58
End: 2023-11-06
Attending: INTERNAL MEDICINE
Payer: COMMERCIAL

## 2023-11-06 LAB
ANION GAP SERPL CALCULATED.3IONS-SCNC: 14 MMOL/L (ref 7–15)
ASPERGILLUS AB TITR SER CF: NORMAL {TITER}
B DERMAT AB SER-ACNC: 0.5 IV
BASE EXCESS BLDA CALC-SCNC: 1.3 MMOL/L
BASE EXCESS BLDA CALC-SCNC: 3.9 MMOL/L
BUN SERPL-MCNC: 17.9 MG/DL (ref 6–20)
CALCIUM SERPL-MCNC: 9.4 MG/DL (ref 8.6–10)
CHLORIDE SERPL-SCNC: 108 MMOL/L (ref 98–107)
COCCIDIOIDES AB TITR SER CF: NORMAL {TITER}
COHGB MFR BLD: 99.7 % (ref 96–97)
COHGB MFR BLD: 99.9 % (ref 96–97)
CREAT SERPL-MCNC: 1.79 MG/DL (ref 0.67–1.17)
DEPRECATED HCO3 PLAS-SCNC: 24 MMOL/L (ref 22–29)
EGFRCR SERPLBLD CKD-EPI 2021: 43 ML/MIN/1.73M2
ERYTHROCYTE [DISTWIDTH] IN BLOOD BY AUTOMATED COUNT: 14.4 % (ref 10–15)
GLUCOSE BLDC GLUCOMTR-MCNC: 105 MG/DL (ref 70–99)
GLUCOSE BLDC GLUCOMTR-MCNC: 117 MG/DL (ref 70–99)
GLUCOSE BLDC GLUCOMTR-MCNC: 120 MG/DL (ref 70–99)
GLUCOSE BLDC GLUCOMTR-MCNC: 121 MG/DL (ref 70–99)
GLUCOSE BLDC GLUCOMTR-MCNC: 123 MG/DL (ref 70–99)
GLUCOSE BLDC GLUCOMTR-MCNC: 124 MG/DL (ref 70–99)
GLUCOSE BLDC GLUCOMTR-MCNC: 124 MG/DL (ref 70–99)
GLUCOSE BLDC GLUCOMTR-MCNC: 130 MG/DL (ref 70–99)
GLUCOSE BLDC GLUCOMTR-MCNC: 131 MG/DL (ref 70–99)
GLUCOSE BLDC GLUCOMTR-MCNC: 133 MG/DL (ref 70–99)
GLUCOSE BLDC GLUCOMTR-MCNC: 133 MG/DL (ref 70–99)
GLUCOSE BLDC GLUCOMTR-MCNC: 134 MG/DL (ref 70–99)
GLUCOSE BLDC GLUCOMTR-MCNC: 136 MG/DL (ref 70–99)
GLUCOSE BLDC GLUCOMTR-MCNC: 136 MG/DL (ref 70–99)
GLUCOSE BLDC GLUCOMTR-MCNC: 137 MG/DL (ref 70–99)
GLUCOSE BLDC GLUCOMTR-MCNC: 138 MG/DL (ref 70–99)
GLUCOSE BLDC GLUCOMTR-MCNC: 141 MG/DL (ref 70–99)
GLUCOSE BLDC GLUCOMTR-MCNC: 141 MG/DL (ref 70–99)
GLUCOSE BLDC GLUCOMTR-MCNC: 144 MG/DL (ref 70–99)
GLUCOSE BLDC GLUCOMTR-MCNC: 146 MG/DL (ref 70–99)
GLUCOSE BLDC GLUCOMTR-MCNC: 148 MG/DL (ref 70–99)
GLUCOSE BLDC GLUCOMTR-MCNC: 225 MG/DL (ref 70–99)
GLUCOSE SERPL-MCNC: 154 MG/DL (ref 70–99)
H CAPSUL MYC AB TITR SER CF: NORMAL {TITER}
H CAPSUL YST AB TITR SER CF: NORMAL {TITER}
HCO3 BLD-SCNC: 26 MMOL/L (ref 23–29)
HCO3 BLD-SCNC: 28 MMOL/L (ref 23–29)
HCT VFR BLD AUTO: 29.6 % (ref 40–53)
HGB BLD-MCNC: 9.3 G/DL (ref 13.3–17.7)
MAGNESIUM SERPL-MCNC: 2.6 MG/DL (ref 1.7–2.3)
MCH RBC QN AUTO: 28.9 PG (ref 26.5–33)
MCHC RBC AUTO-ENTMCNC: 31.4 G/DL (ref 31.5–36.5)
MCV RBC AUTO: 92 FL (ref 78–100)
OXYHGB MFR BLD: 97.9 % (ref 96–97)
OXYHGB MFR BLD: 98.5 % (ref 96–97)
P JIROVECII DNA SPEC QL NAA+PROBE: NOT DETECTED
PCO2 BLD: 39 MM HG (ref 35–45)
PCO2 BLD: 44 MM HG (ref 35–45)
PH BLD: 7.38 [PH] (ref 7.37–7.44)
PH BLD: 7.46 [PH] (ref 7.37–7.44)
PHOSPHATE SERPL-MCNC: 4 MG/DL (ref 2.5–4.5)
PLATELET # BLD AUTO: 710 10E3/UL (ref 150–450)
PO2 BLD: 122 MM HG (ref 80–90)
PO2 BLD: 128 MM HG (ref 80–90)
POTASSIUM SERPL-SCNC: 3.9 MMOL/L (ref 3.4–5.3)
RBC # BLD AUTO: 3.22 10E6/UL (ref 4.4–5.9)
SODIUM SERPL-SCNC: 146 MMOL/L (ref 135–145)
TEMPERATURE: 37 DEGREES C
TEMPERATURE: 37 DEGREES C
WBC # BLD AUTO: 23 10E3/UL (ref 4–11)

## 2023-11-06 PROCEDURE — 250N000011 HC RX IP 250 OP 636: Performed by: INTERNAL MEDICINE

## 2023-11-06 PROCEDURE — 250N000011 HC RX IP 250 OP 636: Mod: JZ | Performed by: INTERNAL MEDICINE

## 2023-11-06 PROCEDURE — 71045 X-RAY EXAM CHEST 1 VIEW: CPT

## 2023-11-06 PROCEDURE — 83735 ASSAY OF MAGNESIUM: CPT | Performed by: STUDENT IN AN ORGANIZED HEALTH CARE EDUCATION/TRAINING PROGRAM

## 2023-11-06 PROCEDURE — 200N000001 HC R&B ICU

## 2023-11-06 PROCEDURE — 99232 SBSQ HOSP IP/OBS MODERATE 35: CPT | Performed by: INTERNAL MEDICINE

## 2023-11-06 PROCEDURE — 250N000013 HC RX MED GY IP 250 OP 250 PS 637: Performed by: INTERNAL MEDICINE

## 2023-11-06 PROCEDURE — 82565 ASSAY OF CREATININE: CPT | Performed by: INTERNAL MEDICINE

## 2023-11-06 PROCEDURE — 80048 BASIC METABOLIC PNL TOTAL CA: CPT | Performed by: INTERNAL MEDICINE

## 2023-11-06 PROCEDURE — 258N000003 HC RX IP 258 OP 636: Performed by: INTERNAL MEDICINE

## 2023-11-06 PROCEDURE — 82805 BLOOD GASES W/O2 SATURATION: CPT | Performed by: INTERNAL MEDICINE

## 2023-11-06 PROCEDURE — 94640 AIRWAY INHALATION TREATMENT: CPT

## 2023-11-06 PROCEDURE — 250N000009 HC RX 250: Performed by: INTERNAL MEDICINE

## 2023-11-06 PROCEDURE — 99291 CRITICAL CARE FIRST HOUR: CPT | Performed by: INTERNAL MEDICINE

## 2023-11-06 PROCEDURE — 99233 SBSQ HOSP IP/OBS HIGH 50: CPT | Performed by: STUDENT IN AN ORGANIZED HEALTH CARE EDUCATION/TRAINING PROGRAM

## 2023-11-06 PROCEDURE — 999N000157 HC STATISTIC RCP TIME EA 10 MIN

## 2023-11-06 PROCEDURE — 258N000003 HC RX IP 258 OP 636: Performed by: STUDENT IN AN ORGANIZED HEALTH CARE EDUCATION/TRAINING PROGRAM

## 2023-11-06 PROCEDURE — C9113 INJ PANTOPRAZOLE SODIUM, VIA: HCPCS | Mod: JZ | Performed by: INTERNAL MEDICINE

## 2023-11-06 PROCEDURE — 250N000011 HC RX IP 250 OP 636: Performed by: STUDENT IN AN ORGANIZED HEALTH CARE EDUCATION/TRAINING PROGRAM

## 2023-11-06 PROCEDURE — 94640 AIRWAY INHALATION TREATMENT: CPT | Mod: 76

## 2023-11-06 PROCEDURE — 94003 VENT MGMT INPAT SUBQ DAY: CPT

## 2023-11-06 PROCEDURE — 84100 ASSAY OF PHOSPHORUS: CPT | Performed by: STUDENT IN AN ORGANIZED HEALTH CARE EDUCATION/TRAINING PROGRAM

## 2023-11-06 PROCEDURE — 85027 COMPLETE CBC AUTOMATED: CPT | Performed by: INTERNAL MEDICINE

## 2023-11-06 RX ORDER — DEXTROSE MONOHYDRATE 100 MG/ML
INJECTION, SOLUTION INTRAVENOUS CONTINUOUS PRN
Status: DISCONTINUED | OUTPATIENT
Start: 2023-11-06 | End: 2023-11-20 | Stop reason: HOSPADM

## 2023-11-06 RX ADMIN — INSULIN GLARGINE 15 UNITS: 100 INJECTION, SOLUTION SUBCUTANEOUS at 09:45

## 2023-11-06 RX ADMIN — METHYLPREDNISOLONE SODIUM SUCCINATE 62.5 MG: 125 INJECTION, POWDER, FOR SOLUTION INTRAMUSCULAR; INTRAVENOUS at 18:06

## 2023-11-06 RX ADMIN — METHYLPREDNISOLONE SODIUM SUCCINATE 62.5 MG: 125 INJECTION, POWDER, FOR SOLUTION INTRAMUSCULAR; INTRAVENOUS at 02:13

## 2023-11-06 RX ADMIN — CHLORHEXIDINE GLUCONATE 15 ML: 1.2 SOLUTION ORAL at 20:11

## 2023-11-06 RX ADMIN — CHLORHEXIDINE GLUCONATE 15 ML: 1.2 SOLUTION ORAL at 07:52

## 2023-11-06 RX ADMIN — Medication 50 MCG: at 00:47

## 2023-11-06 RX ADMIN — IPRATROPIUM BROMIDE AND ALBUTEROL SULFATE 3 ML: .5; 3 SOLUTION RESPIRATORY (INHALATION) at 01:42

## 2023-11-06 RX ADMIN — ACETYLCYSTEINE 2 ML: 200 SOLUTION ORAL; RESPIRATORY (INHALATION) at 01:42

## 2023-11-06 RX ADMIN — METHYLPREDNISOLONE SODIUM SUCCINATE 62.5 MG: 125 INJECTION, POWDER, FOR SOLUTION INTRAMUSCULAR; INTRAVENOUS at 10:19

## 2023-11-06 RX ADMIN — Medication 50 MCG: at 23:57

## 2023-11-06 RX ADMIN — IPRATROPIUM BROMIDE AND ALBUTEROL SULFATE 3 ML: .5; 3 SOLUTION RESPIRATORY (INHALATION) at 12:57

## 2023-11-06 RX ADMIN — PROPOFOL 15 MCG/KG/MIN: 10 INJECTION, EMULSION INTRAVENOUS at 20:45

## 2023-11-06 RX ADMIN — ACETYLCYSTEINE 2 ML: 200 SOLUTION ORAL; RESPIRATORY (INHALATION) at 12:57

## 2023-11-06 RX ADMIN — PIPERACILLIN AND TAZOBACTAM 3.38 G: 3; .375 INJECTION, POWDER, LYOPHILIZED, FOR SOLUTION INTRAVENOUS at 03:17

## 2023-11-06 RX ADMIN — PROPOFOL 15 MCG/KG/MIN: 10 INJECTION, EMULSION INTRAVENOUS at 15:11

## 2023-11-06 RX ADMIN — PROPOFOL 55 MCG/KG/MIN: 10 INJECTION, EMULSION INTRAVENOUS at 01:30

## 2023-11-06 RX ADMIN — NOREPINEPHRINE BITARTRATE 0.05 MCG/KG/MIN: 0.02 INJECTION, SOLUTION INTRAVENOUS at 03:47

## 2023-11-06 RX ADMIN — PROPOFOL 30 MCG/KG/MIN: 10 INJECTION, EMULSION INTRAVENOUS at 07:39

## 2023-11-06 RX ADMIN — INSULIN GLARGINE 15 UNITS: 100 INJECTION, SOLUTION SUBCUTANEOUS at 20:46

## 2023-11-06 RX ADMIN — MINERAL OIL AND WHITE PETROLATUM: 30; 940 OINTMENT OPHTHALMIC at 15:49

## 2023-11-06 RX ADMIN — PROPOFOL 55 MCG/KG/MIN: 10 INJECTION, EMULSION INTRAVENOUS at 03:46

## 2023-11-06 RX ADMIN — PIPERACILLIN AND TAZOBACTAM 3.38 G: 3; .375 INJECTION, POWDER, LYOPHILIZED, FOR SOLUTION INTRAVENOUS at 20:17

## 2023-11-06 RX ADMIN — Medication 200 MCG/HR: at 03:23

## 2023-11-06 RX ADMIN — Medication 50 MCG: at 22:02

## 2023-11-06 RX ADMIN — INSULIN HUMAN 3 UNITS/HR: 1 INJECTION, SOLUTION INTRAVENOUS at 15:19

## 2023-11-06 RX ADMIN — IPRATROPIUM BROMIDE AND ALBUTEROL SULFATE 3 ML: .5; 3 SOLUTION RESPIRATORY (INHALATION) at 19:45

## 2023-11-06 RX ADMIN — ACETYLCYSTEINE 2 ML: 200 SOLUTION ORAL; RESPIRATORY (INHALATION) at 07:50

## 2023-11-06 RX ADMIN — PIPERACILLIN AND TAZOBACTAM 3.38 G: 3; .375 INJECTION, POWDER, LYOPHILIZED, FOR SOLUTION INTRAVENOUS at 12:45

## 2023-11-06 RX ADMIN — MICAFUNGIN SODIUM 100 MG: 50 INJECTION, POWDER, LYOPHILIZED, FOR SOLUTION INTRAVENOUS at 15:52

## 2023-11-06 RX ADMIN — Medication 150 MCG/HR: at 17:17

## 2023-11-06 RX ADMIN — FUROSEMIDE 40 MG: 10 INJECTION, SOLUTION INTRAMUSCULAR; INTRAVENOUS at 10:23

## 2023-11-06 RX ADMIN — ENOXAPARIN SODIUM 40 MG: 100 INJECTION SUBCUTANEOUS at 20:11

## 2023-11-06 RX ADMIN — ACETYLCYSTEINE 2 ML: 200 SOLUTION ORAL; RESPIRATORY (INHALATION) at 19:45

## 2023-11-06 RX ADMIN — IPRATROPIUM BROMIDE AND ALBUTEROL SULFATE 3 ML: .5; 3 SOLUTION RESPIRATORY (INHALATION) at 07:50

## 2023-11-06 RX ADMIN — CISATRACURIUM BESYLATE 4 MCG/KG/MIN: 10 INJECTION, SOLUTION INTRAVENOUS at 21:52

## 2023-11-06 RX ADMIN — MINERAL OIL AND WHITE PETROLATUM: 30; 940 OINTMENT OPHTHALMIC at 07:52

## 2023-11-06 RX ADMIN — AZITHROMYCIN MONOHYDRATE 250 MG: 500 INJECTION, POWDER, LYOPHILIZED, FOR SOLUTION INTRAVENOUS at 10:34

## 2023-11-06 RX ADMIN — MINERAL OIL AND WHITE PETROLATUM: 30; 940 OINTMENT OPHTHALMIC at 00:18

## 2023-11-06 RX ADMIN — PANTOPRAZOLE SODIUM 40 MG: 40 INJECTION, POWDER, FOR SOLUTION INTRAVENOUS at 07:45

## 2023-11-06 RX ADMIN — CISATRACURIUM BESYLATE 3.5 MCG/KG/MIN: 10 INJECTION, SOLUTION INTRAVENOUS at 10:06

## 2023-11-06 ASSESSMENT — ACTIVITIES OF DAILY LIVING (ADL)
ADLS_ACUITY_SCORE: 47

## 2023-11-06 NOTE — PROGRESS NOTES
CRITICAL CARE PROGRESS NOTE:    Assessment/Plan:  58M wih hx of asthma, MM on keytruda since May 2023, depression/anxiety, recent hospitalization for CAP and respiratory failure, readmitted for worsening cough and SOB. CT showed worsening of right-sided infiltrates thought to be 2/2 worsening pneumonia. In ICU on HFNC and requiring intermittent BiPAP. Failed NIPPV and required intubation on 11/4.     RESP:  ARDS. Acute respiratory failure with hypoxemia requiring intubation/IMV on 11/4 PM. Worsening right-sided infiltrates could be c/w recurrent pneumonia (HAP vs. HCAP) vs. Organizing pneumonia vs. Malignant infiltrates vs. Pneumonitis from immunotherapy.   Vent Mode: CMV/AC  (Continuous Mandatory Ventilation/ Assist Control)  FiO2 (%): 60 %  Resp Rate (Set): 28 breaths/min  Tidal Volume (Set, mL): 470 mL  PEEP (cm H2O): 10 cmH2O  Resp: 28    Abx per ID section below  Continue 60mg IV methylpred q8h for possible drug-related pneumonitis.   Continue scheduled duonebs, mucomyst nebs  Supinate this AM    CV:  Shock, likely vasoplegia from sedation. Requiring low dose NE. Bedside echo 11/4 with grossly intact LV function, probably normal diastolic function, IVC looked somewhat plump with reduced inspiratory variation. RV not well seen. No formal echo on file. More bradycardic today compared to yesterday; HR in high 40s - likely from propofol   Tele monitoring  MAP >65  Furosemide 20mg IV BID - on hold  Holding PTA statin for now    NEURO:  Requiring therapeutic sedation and NMB for severe ARDS.   Continue prop, fent for RASS goal -5  Continue cis-atra for NMB  Tylenol prn pain  Midazolam 2mg IV q1h prn additional sedation  Hold PTA mirtazapine and selective serotonin reuptake inhibitor while on fentanyl.     GI:  No issues. Elevated ALP likely from MM. OG tube in place  Dietary consulted for tube feeds  IV PPI for stress ulcer ppx  Bowel regimen prn    RENAL:  No issues, normal renal function. bump in Scr today  Avoid  nephrotoxins  Furosemide on hold due to increase in Cr  Monitor BUN/Scr, lytes  Maintain norwood    ID:  Possible recurrent pneumonia, HAP vs. HCAP. Immunosuppressed; at risk for fungal infection. Multiple tests from BAL still pending.   ID following, appreciate input  Continue vanco + pip/tazo + azithromycin  Continue ampho B - discussed with ID yesterday. May be able to stop as fungitell assay was negative; will review with ID.   Follow up BAL cultures.    HEMATOLOGIC:  Hx of MM, on immunotherapy. Followed by [facility]. Stable mild anemia.   Hgb >7  Follow counts    ENDOCRINE:  Hyperglycemia due to critical illness, steroids.   Insulin drip     ICU PROPHYLAXIS:  LMWH daily  IV PPI  Chlorhexidine rinses    CODE STATUS, DISPOSITION, FAMILY COMMUNICATION: full code  Updated his sister Luz Maria 11/6.    Restraints  Progress Note  Restraint Application    I recognize that restraints are physical and/or chemical interventions intended to restrict a person's movements. Restraints are currently needed to ensure the safety of this patient and/or others. My clinical rationale appears below.    Category/Type of Restraint  Non Violent:  Soft limb restraint x 2  --  Behavior  Pulling at tubes/lines  --  Root Cause of the Behavior  Sedation/intubation  --  Less-Restrictive Measures that Failed  Non Violent Measures:  Close Observation  --  Response to the Restraint  Patient unable to pull at tubes/lines  --  Criteria for Release from the Restraint  Patient calm and off sedation     Clinically Significant Risk Factors        # Hypokalemia: Lowest K = 2.8 mmol/L in last 2 days, will replace as needed  # Hypernatremia: Highest Na = 146 mmol/L in last 2 days, will monitor as appropriate      # Hypoalbuminemia: Lowest albumin = 2.7 g/dL at 11/3/2023  5:57 AM, will monitor as appropriate    # Acute Kidney Injury, unspecified: based on a >150% or 0.3 mg/dL increase in last creatinine compared to past 90 day average, will monitor renal function          # Obesity: Estimated body mass index is 36.16 kg/m  as calculated from the following:    Height as of this encounter: 1.829 m (6').    Weight as of this encounter: 120.9 kg (266 lb 9.6 oz)., PRESENT ON ADMISSION       # Financial/Environmental Concerns: none  # Asthma: noted on problem list                Overnight events:  No major events overnight.  Still prone  Will flip this morning    Objective:  Physical Exam:  Vent settings for last 24 hours:  Vent Mode: CMV/AC  (Continuous Mandatory Ventilation/ Assist Control)  FiO2 (%): 60 %  Resp Rate (Set): 28 breaths/min  Tidal Volume (Set, mL): 470 mL  PEEP (cm H2O): 10 cmH2O  Resp: 28      /68   Pulse 58   Temp (!) 96.4  F (35.8  C)   Resp 28   Ht 1.829 m (6')   Wt 120.9 kg (266 lb 9.6 oz)   SpO2 98%   BMI 36.16 kg/m      Intake/Output last 3 shifts:  I/O last 3 completed shifts:  In: 3026.08 [I.V.:2501.08; NG/GT:525]  Out: 1960 [Urine:1960]  Intake/Output this shift:  I/O this shift:  In: 178.46 [I.V.:178.46]  Out: 215 [Urine:215]    Gen: prone; intubated, paralyzed, sedated.   HEENT: face swollen, et tube in place, OG in place  CV: unable to auscultate, prone  Resp: diminished posteriorly, no wheezing  Abd: prone, unable to examine   Neuro:sedated and paralyzed   Ext: no edema    LAB:  Recent Labs   Lab 11/06/23  0343   WBC 23.0*   HGB 9.3*   HCT 29.6*   *     Recent Labs   Lab 11/06/23  0343 11/05/23  0532 11/04/23  0421 11/03/23  0557 11/02/23  0845   * 141 139 140 136   CO2 24 23 25 25 24   BUN 17.9 10.8 4.3* 5.1* 5.4*   ALKPHOS  --   --  293* 226* 275*   ALT  --   --  23 23 39   AST  --   --  21 14 34     Micro  PCT 0.11 on 11/2  Covid neg  Flu A/B neg  RSV neg  Sputum 1+ gram pos  MRSA neg  Blood NGTD  Legionella urine Ag neg  Strep pneumo urine Ag neg    Fungitell assay neg  Histo urine Ag pending  Fungal Ab panel pending    Bronch/BAL 11/3  Gram stain + for GPC, culture pending  KOH prep neg, fungal culture pending  Total nuc  cells 4, 61% PMN, 31% lymphs  RVP neg  Legionella cx NGTD        No current outpatient medications on file.       Critical care attestation: 45 minutes spent managing the following issues: acute respiratory failure requiring intubation/IMV; severe pneumonia vs. Drug related pneumonitis with acute respiratory failure, ARDS. Immunosuppressed patient.. High risk for organ deterioration and death requiring ICU level care.

## 2023-11-06 NOTE — PLAN OF CARE
Problem: Pneumonia  Goal: Effective Oxygenation and Ventilation  Outcome: Progressing  Intervention: Optimize Oxygenation and Ventilation  Recent Flowsheet Documentation  Taken 11/6/2023 0200 by Tawnya Schneider RN  Head of Bed (HOB) Positioning: HOB at 30-45 degrees     Problem: Adult Inpatient Plan of Care  Goal: Absence of Hospital-Acquired Illness or Injury  Outcome: Progressing  Intervention: Identify and Manage Fall Risk  Recent Flowsheet Documentation  Taken 11/6/2023 0400 by Tawnya Schneider RN  Safety Promotion/Fall Prevention: safety round/check completed  Taken 11/6/2023 0000 by Tawnya Schneider RN  Safety Promotion/Fall Prevention: safety round/check completed  Taken 11/5/2023 2000 by Tawnya Schneider RN  Safety Promotion/Fall Prevention: safety round/check completed  Intervention: Prevent Skin Injury  Recent Flowsheet Documentation  Taken 11/6/2023 0600 by Tawnya Schneider RN  Body Position:   prone   head repositioned, left   turned   weight shifting  Taken 11/6/2023 0400 by Tawnya Schneider RN  Body Position:   prone   head repositioned, right   weight shifting   turned  Taken 11/6/2023 0200 by Tawnya Schneider RN  Body Position:   prone   head repositioned, left   weight shifting  Taken 11/6/2023 0000 by Tawnya Schneider RN  Body Position:   prone   head repositioned, right   turned   weight shifting   Goal Outcome Evaluation:               Pt remains intubated, sedated, proned, and paralyzed. RASS -5. Sedation and paralytic infusing per MAR. Pt repositioned q2h. Assisted pt to swim positions left/right q2h with RT and Rns. Oral cares provided. Safety checks completed. AM labs drawn, protocols ran. Will continue to monitor and follow plan of care.

## 2023-11-06 NOTE — PROGRESS NOTES
CLINICAL NUTRITION SERVICES - ASSESSMENT NOTE     Nutrition Prescription    RECOMMENDATIONS FOR MDs/PROVIDERS TO ORDER:  Will be difficult to meet needs with obesity, high propofol dosing, and proning. May want to get off propofol.     Malnutrition Status:    Does not meet 2 criteria     Recommendations already ordered by Registered Dietitian (RD):  Start Tf of Vital High Protein at 20ml/hr and increase by 20ml q 8hrs to goal rate of 60ml/hr (goal rate for propofol dosage) with 100ml free water flush q 4hrs   =1440kcal (2015kcal with propofol), 125g protein, 159g CHO, 33g fat, 0g fiber, 1203ml water (1803 with FWF)     Adjust FWF as needed for Na and adjust TF rate and formula as needed for propofol dosage. Pt may need additional protein pending labs    Pt noted to be proned, will need to hold TF an hour before and after proning. Unclear how often this will need to happen at this time. Therefore estimated needs will be difficult to meet with propofol and proning.     Future/Additional Recommendations:  Will monitor progress towards goals     REASON FOR ASSESSMENT  Jim Titus is a/an 58 year old male assessed by the dietitian for Provider Order - Registered Dietitian to Assess and Order TF per Medical Nutrition Therapy Protocol    Pertinent Medical Admission/History: respiratory failure (intubated), pneumonia, sepsis, hyperglycemia, asthma, anemia, no DM dx noted     NUTRITION HISTORY  Allergies: chicken, cantaloupe, turkey  Pt intubated and no family present    CURRENT NUTRITION ORDERS  Diet: NPO  Propofol: 575kcal/d    PHYSICAL FINDINGS  See malnutrition section below.  Per flowsheet:  Edema- bilateral feet +2  GI- WDL, last BM on 11/3, OG in place   Skin- no skin breakdown noted   Pain- none noted, pt intubated   Oral- no concerns of note     LABS  Labs reviewed, Na-146, Cr-1.79, Mg-2.6, Glucose-wnl, Hgb A1c on 11/5/23-6.3    MEDICATIONS  Medications reviewed, lasix, Novolog, Lantus, Solumedrol, Remeron,  "insulin drip, propofol, norepinephrine    ANTHROPOMETRICS  Height: 182.9 cm (6' 0\")  Most Recent Weight: 120.9 kg (266 lb 9.6 oz)    IBW: 81 kg  BMI: Obesity Grade II BMI 35-39.9  Weight History:   Wt Readings from Last 10 Encounters:   11/04/23 120.9 kg (266 lb 9.6 oz)   10/23/23 117.9 kg (260 lb)   10/23/23 118.1 kg (260 lb 6.4 oz)   08/16/23 128.6 kg (283 lb 9.6 oz)   04/11/23 121.6 kg (268 lb 3 oz)   05/16/22 120.9 kg (266 lb 9.6 oz)   11/10/21 128.6 kg (283 lb 8 oz)   10/12/20 120.8 kg (266 lb 6.4 oz)   04/01/19 109.4 kg (241 lb 4 oz)   01/09/19 118.2 kg (260 lb 9.6 oz)         ASSESSED NUTRITION NEEDS  Estimated Energy Needs: 2894-9492 kcals/day (14 - 17 kcals/kg actual wt 120.9kg)  Justification: Obese/ Critical Care  Estimated Protein Needs: 121-162 grams protein/day (1.5-2g/kg IBW 81kg)  Justification: Obesity guidelines/ critical care   Estimated Fluid Needs: 2537-1595 mL/day (25 - 30 mL/kg IBW of 81kg)   Justification: Maintenance        MALNUTRITION:  % Weight Loss:  None noted  % Intake:  No decreased intake noted  Subcutaneous Fat Loss:  None observed  Muscle Loss:  None observed  Fluid Retention:  Mild +2 bilateral feet     Malnutrition Diagnosis: Patient does not meet two of the above criteria necessary for diagnosing malnutrition      NUTRITION DIAGNOSIS  Inadequate protein-energy intake related to mechanical ventilation as evidenced by need for TF and difficult to meet needs due to obesity, propofol, and proning     INTERVENTIONS  Implementation  Start Tf of Vital High Protein at 20ml/hr and increase by 20ml q 8hrs to goal rate of 60ml/hr (goal rate for propofol dosage) with 100ml free water flush q 4hrs   =1440kcal (2015kcal with propofol), 125g protein, 159g CHO, 33g fat, 0g fiber, 1203ml water (1803 with FWF)     Adjust FWF as needed for Na and adjust TF rate and formula as needed for propofol dosage. Pt may need additional protein pending labs    Pt noted to be proned, will need to hold TF an " hour before and after proning. Unclear how often this will need to happen at this time. Therefore estimated needs will be difficult to meet with propofol and proning.     Education Needs- None at this time     Goals  Tolerate Enteral nutrition   Meet estimated needs with enteral nutrition   Glycemic control   Electrolytes wnl  Maintain wt around 120kg     Monitoring/Evaluation  Will monitor TF tolerance, labs, proning, propofol dosing, and wt

## 2023-11-06 NOTE — PROGRESS NOTES
INFECTIOUS DISEASE FOLLOW UP NOTE      ASSESSMENT:  Multifocal pneumonia, hypoxic respiratory failure: worsening despite recent courses of antibiotics. Elevated inflammatory markers. CT with increased consolidation R>L. Bronchoscopy 11/3. Worsening respiratory status 11/3-->4 required intubated 11/4. Steroids starting 11/4. GPC on gram stain of BAL, culture with normal dustin including yeast (usually candida not requiring treatment). Beta-D-glucan negative, makes PJP unlikely (and lung abnormality is unilateral, would be atypical for PJP); also makes invasive candidiasis unlikely. Urine histo antigen negative. Seems most likely either bacterial pneumonia or post-pneumonia inflammation.  Keytruda pneumonitis also possible, although I would think this would show bilateral lung involvement. MRSA screen negative. At this point, looking at risk/benefit of empiric therapies, can stop vancomycin and amphotericin.   Recent hospitalization 10/23 for pneumonia-received 2 days vanc/zosyn before transitioning to levofloxacin on discharge  Hx melanoma on keytruda    PLAN:  continue zosyn IV and azithromycin  - note has completed around 10 days of legionella coverage  - vancomycin and ampho stopped  Will have on micafungin for yeast coverage at minimum for prophylaxis with broad spectrum antibiotic and steroid ongoing.   Continue steroid      Payam Glez MD  [facility]  Direct messaging: [employer] Paging   On-Call ID provider: [phone], option: 9     ______________________________________________________________________    SUBJECTIVE / INTERVAL HISTORY:   First visit by me. Pronation yesterday. Remains intubated and sedated. No fevers         OBJECTIVE:  /68   Pulse 67   Temp 97.5  F (36.4  C) (Axillary)   Resp 17   Ht 1.829 m (6')   Wt 120.9 kg (266 lb 9.6 oz)   SpO2 98%   BMI 36.16 kg/m      Vent Mode: CMV/AC  (Continuous Mandatory Ventilation/ Assist Control)  FiO2 (%): 60 %  Resp  Rate (Set): 28 breaths/min  Tidal Volume (Set, mL): 470 mL  PEEP (cm H2O): 10 cmH2O  Resp: 17       GEN: intubated, sedated  RESPIRATORY: breath sounds bilaterally  CARDIOVASCULAR:  Regular rate and rhythm. Normal S1 and S2. No murmur  ABDOMEN:  Soft, normal bowel sounds, non-tender  EXTREMITIES: No edema.  SKIN/HAIR/NAILS:  No rashes  IV: PICC L UE        Antibiotics:  Pip/tazo 11/2-  Azithromycin 11/2-  Micafungin 11/5-    Vancomycin 11/2-11/5  Ampho liposomal 11/4    Pertinent labs:    Recent Labs   Lab 11/06/23 0343 11/05/23  0532 11/04/23  0833   WBC 23.0* 16.1* 11.3*   HGB 9.3* 9.2* 8.7*   HCT 29.6* 29.8* 27.9*   * 622* 452*        Recent Labs   Lab 11/06/23 0343 11/05/23  0532 11/04/23  0421   * 141 139   CO2 24 23 25   BUN 17.9 10.8 4.3*         Lab Results   Component Value Date    ALT 23 11/04/2023    AST 21 11/04/2023    ALKPHOS 293 (H) 11/04/2023         MICROBIOLOGY DATA:  BAL gram stain GPC; culture with normal dustin including yeast  Beta-D-glucan negative   MRSA negative  Strep pn, Leg ag negative  Histo urine Ag negative.       RADIOLOGY:  XR Chest Port 1 View    Result Date: 11/6/2023  EXAM: XR CHEST PORT 1 VIEW LOCATION: [facility] DATE: 11/6/2023 INDICATION: back supine, et tube placement COMPARISON: 11/05/2023     IMPRESSION: Tracheostomy tube 3 cm from the ingrid. Enteric tube entering the stomach. Esophageal probe in the mid esophagus. Right central line tip in the low SVC/right atrial junction similar to previous. No significant change in the patchy infiltrates right lung laterally with elevation of the right hemidiaphragm. Left lung remains clear and expanded.    XR Chest Port 1 View    Result Date: 11/5/2023  EXAM: XR CHEST PORT 1 VIEW LOCATION: [facility] DATE: 11/5/2023 INDICATION: ARDS, intubated. right lung opacification. eval for interval change COMPARISON: Chest radiograph 11/04/2023.     IMPRESSION: Stable size of  cardiomediastinal silhouette. Endotracheal tube with tip 2.7 cm above the ingrid. Right upper extremity PICC with tip overlying the right atrium. Nasogastric tube courses below the diaphragm, tip beyond the field-of-view. New esophageal temperature probe noted. No significant change in volume loss and airspace opacification in the right lung. Possible small associated pleural effusion. Left lung is grossly clear. No pneumothorax. Bones are unchanged.    Echocardiogram Complete    Result Date: 2023  [id number]ANE^[place] [address]  Name: MISSY ALMANZA MRN: [id number] : 1965 Study Date: 2023 11:09 AM Age: 58 yrs Gender: Male Patient Location: [facility] Reason For Study: Abn EKG Ordering Physician: FIDEL VELA Performed By: MB  BSA: 2.4 m2 Height: 72 in Weight: 266 lb HR: 65 BP: 105/56 mmHg ______________________________________________________________________________ Procedure Complete Echo Adult. Definity (NDC #67627-126) given intravenously. ______________________________________________________________________________ Interpretation Summary  1. Technically difficult study (useful imaging essentially could only be obtained from the subcostal view). 2. The left ventricle is normal in size. Image quality does not provide for detailed assessment of LV systolic function, but is felt to be normal with a visually estimated ejection fraction of roughly 55-60%. 3. No significant valvular heart disease is identified on this study though the sensitivity, particularly of regurgitant lesions, is reduced due to poor Doppler acoustics. 4. On selected views, the right ventricle appears mildly enlarged with moderately reduced systolic performance (the right ventricle is only visualized from the subcostal view). 5. There is biatrial enlargement (difficult to quantify due to suboptimal acoustic imaging) 6. The IVC appears dilated with  decreased phasic variation in caval diameter consistent with elevated right atrial pressure. ______________________________________________________________________________ Left ventricle: The left ventricle is normal in size. Image quality does not provide for detailed assessment of LV systolic function, but is felt to be normal with a visually estimated ejection fraction of roughly 55-60%. There is normal regional wall motion based on limited views available. Left ventricular wall thickness is normal.  Assessment of LV Diastolic Function: The cumulative findings are indeterminate in the evaluation of diastolic function.  Right ventricle: On selected views, the right ventricle appears mildly enlarged with moderately reduced systolic performance (the right ventricle is only visualized from the subcostal view).  Left atrium: The left atrium appears enlarged (difficult to quantify)  Right atrium: The right atrium appears enlarged (difficult to quantify).  IVC: The IVC appears dilated with decreased phasic variation in caval diameter consistent with elevated right atrial pressure.  Aortic valve: The aortic valve is not well visualized, but suspected to be comprised of three cusps. No significant aortic stenosis or aortic insufficiency is detected on this study.  Mitral valve: The mitral valve appears morphologically normal.  Tricuspid valve: The tricuspid valve is grossly morphologically normal.  Pulmonic valve: The pulmonic valve is not well visualized.  Thoracic aorta: The aortic root and proximal ascending aorta are of normal dimension.  Pericardium: There is no significant pericardial effusion. ______________________________________________________________________________ ______________________________________________________________________________ MMode/2D Measurements & Calculations IVSd: 0.94 cm LVIDd: 5.0 cm LVIDs: 3.5 cm LVPWd: 0.84 cm FS: 30.1 % LV mass(C)d: 155.0 grams LV mass(C)dI: 64.5 grams/m2 LVOT diam:  2.3 cm LVOT area: 4.1 cm2 RWT: 0.34  Doppler Measurements & Calculations MV E max ryne: 56.1 cm/sec MV A max ryne: 53.1 cm/sec MV E/A: 1.1 MV dec slope: 164.8 cm/sec2 MV dec time: 0.34 sec Ao V2 max: 98.9 cm/sec Ao max P.0 mmHg Ao V2 mean: 64.2 cm/sec Ao mean P.9 mmHg Ao V2 VTI: 19.0 cm JACQUE(I,D): 4.2 cm2 JACQUE(V,D): 3.8 cm2 LV V1 max PG: 3.3 mmHg LV V1 max: 90.4 cm/sec LV V1 VTI: 19.3 cm SV(LVOT): 79.8 ml SI(LVOT): 33.2 ml/m2 AV Ryne Ratio (DI): 0.91  JACQUE Index (cm2/m2): 1.7 E/E' av.6 Lateral E/e': 5.2 Medial E/e': 7.9  ______________________________________________________________________________ Report approved by: Mark Lomeli 2023 12:00 PM       XR Chest Port 1 View    Result Date: 2023  EXAM: XR CHEST PORT 1 VIEW LOCATION: [facility] DATE: 2023 INDICATION: Endotracheal tube positioning COMPARISON: 2023 at 1200 hours and chest CTA 2023     IMPRESSION: Endotracheal tube is 3.5 cm above the ingrid. Orogastric tube tip is below diaphragm. Heart size magnified in AP projection. Unchanged near complete opacification of the right hemithorax do to airspace consolidation +/- small pleural effusion, with some sparing of the apex. Minimal left basilar subsegmental atelectasis. No pneumothorax.    XR Chest Port 1 View    Result Date: 2023  EXAM: XR CHEST PORT 1 VIEW LOCATION: [facility] DATE: 2023 INDICATION: PICC placement COMPARISON: Chest radiograph 2014 and CTA chest 2023.     IMPRESSION: Right PICC is in place with tip overlying the low SVC. Increased right lung opacities with near complete opacification of the right hemithorax. Mild left upper lung opacities. No pneumothorax.    Abdomen US, limited (RUQ only)    Result Date: 2023  EXAM: US ABDOMEN LIMITED LOCATION: [facility] DATE: 2023 INDICATION: sepsis, cough, difficulty taking in big breath, poor appetite,  elevating alk phos with recent hospitalization. COMPARISON: None. TECHNIQUE: Limited abdominal ultrasound. FINDINGS: GALLBLADDER: Normal. No gallstones, wall thickening, or pericholecystic fluid. Negative sonographic Aguilar's sign. BILE DUCTS: No biliary dilatation. The common duct measures 3 mm. LIVER: Normal parenchyma with smooth contour. No focal mass. RIGHT KIDNEY: No hydronephrosis. PANCREAS: The visualized portions are normal. No ascites.     IMPRESSION: Normal limited abdominal ultrasound.     CT Chest Pulmonary Embolism w Contrast    Result Date: 11/2/2023  EXAM: CT CHEST PULMONARY EMBOLISM W CONTRAST LOCATION: [facility] DATE: 11/2/2023 INDICATION: cough, hypoxia, recent hospitalization x 9 days  for CAP COMPARISON: CTA chest 10/23/2023 TECHNIQUE: CT chest pulmonary angiogram during arterial phase injection of IV contrast. Multiplanar reformats and MIP reconstructions were performed. Dose reduction techniques were used. CONTRAST: Isovue 370 100mL FINDINGS: ANGIOGRAM CHEST: Pulmonary arteries are normal caliber and negative for pulmonary emboli. Thoracic aorta is negative for dissection. No CT evidence of right heart strain. LUNGS AND PLEURA: Increased consolidation within the right upper, right middle, right lower, left upper, and left lower lobes. Atoll sign in the left upper lobe (7/46). No effusion or empyema. No pneumothorax. MEDIASTINUM/AXILLAE: Normal heart size. No pericardial effusion. Reactive mediastinal lymphadenopathy. CORONARY ARTERY CALCIFICATION: Mild. UPPER ABDOMEN: Hepatic steatosis. MUSCULOSKELETAL: No acute abnormality.     IMPRESSION: 1.  No pulmonary embolism. 2.  Increased consolidation associated with the multifocal pneumonia (right greater than left lungs). Appearance is nonspecific but could be seen in the setting of atypical infection (including fungal organisms) or bacterial pneumonia. No empyema or other complication.    CT Chest Pulmonary Embolism w  Contrast    Result Date: 10/23/2023  EXAM: CT CHEST PULMONARY EMBOLISM W CONTRAST LOCATION: [facility] DATE: 10/23/2023 INDICATION: Right back melanoma diagnosed April 2023. On immunotherapy. Two weeks of cough and morrison, finished augmentin 1 week ago for pneumonia COMPARISON: PET  CT 04/10/2023 TECHNIQUE: CT chest pulmonary angiogram during arterial phase injection of IV contrast. Multiplanar reformats and MIP reconstructions were performed. Dose reduction techniques were used. CONTRAST: Isovue 370 90mL FINDINGS: ANGIOGRAM CHEST: Adequate opacification of the pulmonary arteries and branches. No evidence of pulmonary emboli. Aorta and great vessels are normal.  LUNGS AND PLEURA: Asymmetric, right greater than left areas of consolidation with air bronchograms are noted. This involves most of the right upper, lower and middle lobes with smaller area of involvement of the left upper lobe and in the medial basilar segments of the left lower lobe. Consolidated lung enhances normally. No effusions. MEDIASTINUM/AXILLAE: Few less than 1 cm right paratracheal and subcarinal nodes are present. CORONARY ARTERY CALCIFICATION: Minimal UPPER ABDOMEN: No liver lesions. Fatty atrophy of the spleen. MUSCULOSKELETAL: No suspicious  lesions. Marked gynecomastia.     IMPRESSION: 1.  Asymmetric, right greater than left multilobar pneumonia with significant involvement of the right lower, middle and upper lobes. No parapneumonic effusions. 2.  Minimal reactive adenopathy. 3.  No evidence of pulmonary emboli. 4.  No evidence of metastasis.

## 2023-11-06 NOTE — PROGRESS NOTES
RT Note:    Assumed care for pt on full mechanical ventilator support and in prone position. No changes this shift. Current vent settings: RR28 Vt:470 FiO2:60% PEEP:+10. Scheduled duoneb, mucomyst and volera done per order. Suction scant amount of secretions whole shift, PIP <28 cmH2O when assessed. Head turn Q2 hour to prevent skin break down. Will continue to monitor and assess.

## 2023-11-06 NOTE — PROGRESS NOTES
[facility]    Medicine Progress Note - Hospitalist Service    Date of Admission:  11/2/2023    Assessment & Plan   Jim Titus is a 58 year old male admitted on 11/2/2023.  Has a past medical history significant for asthma, multiple myeloma on Keytruda, depression/anxiety recurrent hospitalization for pneumonia, discharged home on 10/25/2023 return to the hospital due to worsening cough and shortness of breath.  CT scan showed bilateral infiltrate worse on the right side.  Evaluated by pulmonology.  Started on broad-spectrum antibiotic. Status post bronchoscopy on 11/3, aborted early as the patient was not tolerating the procedure.  It showed erythema without significant secretion.  Respiratory status worsened on 11/4 was eventually intubated. Working diagnosis multifocal pneumonia, versus organizing pneumonia versus immune mediated pneumonitis. Started on steroid on 11/4.  Intensivist managing.      Acute hypoxic respiratory failure  Bilateral infiltrate possibly due to multifocal pneumonia versus pneumonitis  Sepsis due to possible lung infection  -3 weeks of persistent/recurrent pneumonia  -Patient is on Keytruda.  On presentation, discussed case with pulmonologist--> suggested less likely immune mediated pneumonitis, more likely pneumonia.  -Procalcitonin is mildly elevated, CRP is significantly elevated above 200.  -Status post bronchoscopy on 11/3.  -Worsening respiratory status, intubated on 11/4.  -Started on steroid for possible pneumonitis on 11/4.  -11/5, hypotension requiring Levophed most likely distributive shock.  -Worsening leukocytosis.  WBC is currently at 23.    Plan  -Currently patient being managed by intensivist.  -Continue broad-spectrum antibiotic and antifungal.  -Appreciate infectious disease recommendation  -Continue steroid.  -Follow-up with cultures collected during bronchoscopy.    Acute kidney injury  -Baseline creatinine around 0.8, creatinine worsened over  the last 24 hours to 1.7.  Most likely prerenal due to diuretic use and hypotension.    Plan  -Lasix held by intensivist.  -Continue to monitor creatinine.    Hyperglycemia  -Worsening hyperglycemia due to steroid use    Plan  -Started on regular insulin drip by intensivist.    Asthma  -No signs of exacerbation    Anxiety/depression  -At home on Lexapro on mirtazapine    Plan  -Holding given respiratory status and status post intubation    Anemia  -No signs of active bleeding  -Hemoglobin stable around 9    Plan   -Continue to monitor.            Diet: NPO per Anesthesia Guidelines for Procedure/Surgery Except for: Meds  Adult Formula Drip Feeding: Continuous Vital High Protein; Orogastric tube; Goal Rate: 60; mL/hr; start at 20ml/hr and increase by 20ml/hr q 8hrs to goal rate of 60ml/hr. Hold 1hr before and after proning if fed gastrically; Do not advance tube fe...    DVT Prophylaxis: Enoxaparin (Lovenox) SQ  Lamas Catheter: PRESENT, indication: Deep Sedation/Paralysis  Lines: PRESENT      PICC 11/04/23 Double Lumen Right Basilic medication drips-Site Assessment: WDL  Arterial Line 11/04/23 Other (Comment)-Site Assessment: WDL    Cardiac Monitoring: None  Code Status: Full Code             Disposition Plan remains in the ICU.  Expected prolonged hospitalization.     Expected Discharge Date: 11/10/2023,  9:00 AM    Destination: home with family  Discharge Comments: ICU- BiPAP/HFNC, IV ABX            LA CHEN MD  Hospitalist Service  [facility]  Securely message with [employer] (more info)  Text page via [employer] Paging/Directory   ______________________________________________________________________    Interval History   Intubated and sedated.    Physical Exam   Vital Signs: Temp: 97.5  F (36.4  C) Temp src: Axillary   Pulse: 67   Resp: 17 SpO2: 98 % O2 Device: Mechanical Ventilator    Weight: 266 lbs 9.6 oz    Physical Exam  Constitutional:       Comments: Intubated, proned, sedated    Pulmonary:      Comments: On mechanical ventilation, significant crackles across the right lung  Skin:     General: Skin is warm and dry.          Medical Decision Making       55 MINUTES SPENT BY ME on the date of service doing chart review, history, exam, documentation & further activities per the note.      Data     I have personally reviewed the following data over the past 24 hrs:    23.0 (H)  \   9.3 (L)   / 710 (H)     146 (H) 108 (H) 17.9 /  134 (H)   3.9 24 1.79 (H) \       Imaging results reviewed over the past 24 hrs:   Recent Results (from the past 24 hour(s))   XR Chest Port 1 View    Narrative    EXAM: XR CHEST PORT 1 VIEW  LOCATION: [facility]  DATE: 11/6/2023    INDICATION: back supine, et tube placement  COMPARISON: 11/05/2023      Impression    IMPRESSION: Tracheostomy tube 3 cm from the ingrid. Enteric tube entering the stomach. Esophageal probe in the mid esophagus. Right central line tip in the low SVC/right atrial junction similar to previous.    No significant change in the patchy infiltrates right lung laterally with elevation of the right hemidiaphragm. Left lung remains clear and expanded.

## 2023-11-06 NOTE — PLAN OF CARE
Patient remains vented, on 60%FiO2.  Transitioned from prone to supine at 1045, sats and vent setting remained the same following. Per communication with Dr. Radha rossi to remain supine. Remains sedated and paralyzed.     Tube feeding and water flushes initiated at 1200, currently running at 20 mL/hr, next increase at 2000.    Sister updated over the phone, plans to visit this afternoon.  Gina Martinez RN on 11/6/2023 at 2:48 PM    Problem: Pneumonia  Goal: Effective Oxygenation and Ventilation  Outcome: Progressing  Intervention: Optimize Oxygenation and Ventilation  Recent Flowsheet Documentation  Taken 11/6/2023 1400 by Gina Martinez, RN  Head of Bed (HOB) Positioning: HOB at 30 degrees  Taken 11/6/2023 1200 by Gina Martinez, RN  Head of Bed (HOB) Positioning: HOB at 30 degrees  Taken 11/6/2023 1045 by Gina Martinez, RN  Head of Bed (HOB) Positioning: HOB at 30 degrees     Problem: Mechanical Ventilation Invasive  Goal: Optimal Nutrition Delivery  Outcome: Progressing   Goal Outcome Evaluation:

## 2023-11-06 NOTE — PROGRESS NOTES
Patient placed to the supine position without issue, patient tolerated well. ETT re-secured with E-TAD. Will continue to follow.

## 2023-11-06 NOTE — PROGRESS NOTES
Care Management Follow Up    Length of Stay (days): 4    Expected Discharge Date: 11/10/2023     Concerns to be Addressed: medical progression   Patient plan of care discussed at interdisciplinary rounds: Yes    Anticipated Discharge Disposition: Home     Anticipated Discharge Services: None  Anticipated Discharge DME: None    Patient/family educated on Medicare website which has current facility and service quality ratings: no  Education Provided on the Discharge Plan: No  Patient/Family in Agreement with the Plan: unable to assess    Referrals Placed by CM/SW:  (Not currently indicated)  Private pay costs discussed: Not applicable    Additional Information:  SW reviewed chart and discussed updates with hospitalist.  Pt intubated 11/4.  Unknown discharge needs at this time.  CM following care progression to assist as needed with safe discharge planning and follow up on team recommendations when appropriate.     JENNIFER Heredia

## 2023-11-07 ENCOUNTER — APPOINTMENT (OUTPATIENT)
Dept: RADIOLOGY | Facility: CLINIC | Age: 58
End: 2023-11-07
Attending: INTERNAL MEDICINE
Payer: COMMERCIAL

## 2023-11-07 LAB
ANION GAP SERPL CALCULATED.3IONS-SCNC: 15 MMOL/L (ref 7–15)
BACTERIA BLD CULT: NO GROWTH
BACTERIA BLD CULT: NO GROWTH
BASE EXCESS BLDA CALC-SCNC: 3.3 MMOL/L
BASE EXCESS BLDA CALC-SCNC: 3.3 MMOL/L
BUN SERPL-MCNC: 37.5 MG/DL (ref 6–20)
CALCIUM SERPL-MCNC: 9.2 MG/DL (ref 8.6–10)
CHLORIDE SERPL-SCNC: 110 MMOL/L (ref 98–107)
COHGB MFR BLD: 98.3 % (ref 96–97)
COHGB MFR BLD: 99.2 % (ref 96–97)
CREAT SERPL-MCNC: 2.01 MG/DL (ref 0.67–1.17)
CREAT SERPL-MCNC: 2.07 MG/DL (ref 0.67–1.17)
DEPRECATED HCO3 PLAS-SCNC: 24 MMOL/L (ref 22–29)
EGFRCR SERPLBLD CKD-EPI 2021: 36 ML/MIN/1.73M2
EGFRCR SERPLBLD CKD-EPI 2021: 38 ML/MIN/1.73M2
ERYTHROCYTE [DISTWIDTH] IN BLOOD BY AUTOMATED COUNT: 15.3 % (ref 10–15)
GLUCOSE BLDC GLUCOMTR-MCNC: 115 MG/DL (ref 70–99)
GLUCOSE BLDC GLUCOMTR-MCNC: 122 MG/DL (ref 70–99)
GLUCOSE BLDC GLUCOMTR-MCNC: 125 MG/DL (ref 70–99)
GLUCOSE BLDC GLUCOMTR-MCNC: 127 MG/DL (ref 70–99)
GLUCOSE BLDC GLUCOMTR-MCNC: 130 MG/DL (ref 70–99)
GLUCOSE BLDC GLUCOMTR-MCNC: 130 MG/DL (ref 70–99)
GLUCOSE BLDC GLUCOMTR-MCNC: 132 MG/DL (ref 70–99)
GLUCOSE BLDC GLUCOMTR-MCNC: 140 MG/DL (ref 70–99)
GLUCOSE BLDC GLUCOMTR-MCNC: 149 MG/DL (ref 70–99)
GLUCOSE BLDC GLUCOMTR-MCNC: 152 MG/DL (ref 70–99)
GLUCOSE BLDC GLUCOMTR-MCNC: 157 MG/DL (ref 70–99)
GLUCOSE BLDC GLUCOMTR-MCNC: 157 MG/DL (ref 70–99)
GLUCOSE BLDC GLUCOMTR-MCNC: 175 MG/DL (ref 70–99)
GLUCOSE SERPL-MCNC: 141 MG/DL (ref 70–99)
HCO3 BLD-SCNC: 27 MMOL/L (ref 23–29)
HCO3 BLD-SCNC: 27 MMOL/L (ref 23–29)
HCT VFR BLD AUTO: 27.6 % (ref 40–53)
HGB BLD-MCNC: 8.5 G/DL (ref 13.3–17.7)
MAGNESIUM SERPL-MCNC: 2.6 MG/DL (ref 1.7–2.3)
MCH RBC QN AUTO: 28.1 PG (ref 26.5–33)
MCHC RBC AUTO-ENTMCNC: 30.8 G/DL (ref 31.5–36.5)
MCV RBC AUTO: 91 FL (ref 78–100)
OXYHGB MFR BLD: 96.5 % (ref 96–97)
OXYHGB MFR BLD: 97.8 % (ref 96–97)
PCO2 BLD: 37 MM HG (ref 35–45)
PCO2 BLD: 40 MM HG (ref 35–45)
PH BLD: 7.44 [PH] (ref 7.37–7.44)
PH BLD: 7.47 [PH] (ref 7.37–7.44)
PHOSPHATE SERPL-MCNC: 4.8 MG/DL (ref 2.5–4.5)
PLATELET # BLD AUTO: 551 10E3/UL (ref 150–450)
PO2 BLD: 108 MM HG (ref 80–90)
PO2 BLD: 92 MM HG (ref 80–90)
POTASSIUM SERPL-SCNC: 3.8 MMOL/L (ref 3.4–5.3)
POTASSIUM SERPL-SCNC: 3.8 MMOL/L (ref 3.4–5.3)
RBC # BLD AUTO: 3.03 10E6/UL (ref 4.4–5.9)
SODIUM SERPL-SCNC: 147 MMOL/L (ref 135–145)
SODIUM SERPL-SCNC: 149 MMOL/L (ref 135–145)
TEMPERATURE: 37 DEGREES C
TEMPERATURE: 37 DEGREES C
WBC # BLD AUTO: 15.4 10E3/UL (ref 4–11)

## 2023-11-07 PROCEDURE — 250N000009 HC RX 250: Performed by: INTERNAL MEDICINE

## 2023-11-07 PROCEDURE — 85041 AUTOMATED RBC COUNT: CPT

## 2023-11-07 PROCEDURE — 250N000011 HC RX IP 250 OP 636: Mod: JZ | Performed by: INTERNAL MEDICINE

## 2023-11-07 PROCEDURE — 99232 SBSQ HOSP IP/OBS MODERATE 35: CPT | Performed by: INTERNAL MEDICINE

## 2023-11-07 PROCEDURE — 83735 ASSAY OF MAGNESIUM: CPT | Performed by: STUDENT IN AN ORGANIZED HEALTH CARE EDUCATION/TRAINING PROGRAM

## 2023-11-07 PROCEDURE — 999N000065 XR ABDOMEN PORT 1 VIEW

## 2023-11-07 PROCEDURE — 82805 BLOOD GASES W/O2 SATURATION: CPT | Performed by: INTERNAL MEDICINE

## 2023-11-07 PROCEDURE — 99233 SBSQ HOSP IP/OBS HIGH 50: CPT | Performed by: INTERNAL MEDICINE

## 2023-11-07 PROCEDURE — 82565 ASSAY OF CREATININE: CPT | Performed by: STUDENT IN AN ORGANIZED HEALTH CARE EDUCATION/TRAINING PROGRAM

## 2023-11-07 PROCEDURE — 94640 AIRWAY INHALATION TREATMENT: CPT | Mod: 76

## 2023-11-07 PROCEDURE — 250N000013 HC RX MED GY IP 250 OP 250 PS 637: Performed by: STUDENT IN AN ORGANIZED HEALTH CARE EDUCATION/TRAINING PROGRAM

## 2023-11-07 PROCEDURE — C9113 INJ PANTOPRAZOLE SODIUM, VIA: HCPCS | Mod: JZ | Performed by: INTERNAL MEDICINE

## 2023-11-07 PROCEDURE — 94003 VENT MGMT INPAT SUBQ DAY: CPT

## 2023-11-07 PROCEDURE — 200N000001 HC R&B ICU

## 2023-11-07 PROCEDURE — 99291 CRITICAL CARE FIRST HOUR: CPT | Performed by: INTERNAL MEDICINE

## 2023-11-07 PROCEDURE — 250N000013 HC RX MED GY IP 250 OP 250 PS 637: Performed by: INTERNAL MEDICINE

## 2023-11-07 PROCEDURE — 250N000011 HC RX IP 250 OP 636: Mod: JZ | Performed by: STUDENT IN AN ORGANIZED HEALTH CARE EDUCATION/TRAINING PROGRAM

## 2023-11-07 PROCEDURE — 80048 BASIC METABOLIC PNL TOTAL CA: CPT | Performed by: STUDENT IN AN ORGANIZED HEALTH CARE EDUCATION/TRAINING PROGRAM

## 2023-11-07 PROCEDURE — 84100 ASSAY OF PHOSPHORUS: CPT | Performed by: STUDENT IN AN ORGANIZED HEALTH CARE EDUCATION/TRAINING PROGRAM

## 2023-11-07 PROCEDURE — 999N000157 HC STATISTIC RCP TIME EA 10 MIN

## 2023-11-07 PROCEDURE — 87103 BLOOD FUNGUS CULTURE: CPT | Performed by: INTERNAL MEDICINE

## 2023-11-07 PROCEDURE — 94640 AIRWAY INHALATION TREATMENT: CPT

## 2023-11-07 PROCEDURE — 80048 BASIC METABOLIC PNL TOTAL CA: CPT

## 2023-11-07 PROCEDURE — 258N000003 HC RX IP 258 OP 636: Performed by: INTERNAL MEDICINE

## 2023-11-07 PROCEDURE — 84295 ASSAY OF SERUM SODIUM: CPT | Performed by: INTERNAL MEDICINE

## 2023-11-07 PROCEDURE — 250N000011 HC RX IP 250 OP 636: Performed by: INTERNAL MEDICINE

## 2023-11-07 RX ORDER — MIDAZOLAM HCL IN 0.9 % NACL/PF 1 MG/ML
1-8 PLASTIC BAG, INJECTION (ML) INTRAVENOUS CONTINUOUS
Status: DISCONTINUED | OUTPATIENT
Start: 2023-11-07 | End: 2023-11-08

## 2023-11-07 RX ORDER — AMOXICILLIN 250 MG
1 CAPSULE ORAL 2 TIMES DAILY PRN
Status: DISCONTINUED | OUTPATIENT
Start: 2023-11-07 | End: 2023-11-07

## 2023-11-07 RX ORDER — AMOXICILLIN 250 MG
1 CAPSULE ORAL 2 TIMES DAILY PRN
Status: DISCONTINUED | OUTPATIENT
Start: 2023-11-07 | End: 2023-11-11

## 2023-11-07 RX ORDER — IPRATROPIUM BROMIDE AND ALBUTEROL SULFATE 2.5; .5 MG/3ML; MG/3ML
3 SOLUTION RESPIRATORY (INHALATION) EVERY 8 HOURS
Status: DISCONTINUED | OUTPATIENT
Start: 2023-11-07 | End: 2023-11-20 | Stop reason: HOSPADM

## 2023-11-07 RX ORDER — POTASSIUM CHLORIDE 20MEQ/15ML
20 LIQUID (ML) ORAL ONCE
Status: COMPLETED | OUTPATIENT
Start: 2023-11-07 | End: 2023-11-07

## 2023-11-07 RX ORDER — POLYETHYLENE GLYCOL 3350 17 G/17G
17 POWDER, FOR SOLUTION ORAL DAILY PRN
Status: DISCONTINUED | OUTPATIENT
Start: 2023-11-07 | End: 2023-11-11

## 2023-11-07 RX ORDER — CLONAZEPAM 0.5 MG/1
1 TABLET ORAL 2 TIMES DAILY
Status: DISCONTINUED | OUTPATIENT
Start: 2023-11-07 | End: 2023-11-11

## 2023-11-07 RX ADMIN — INSULIN GLARGINE 15 UNITS: 100 INJECTION, SOLUTION SUBCUTANEOUS at 20:52

## 2023-11-07 RX ADMIN — INSULIN ASPART 1 UNITS: 100 INJECTION, SOLUTION INTRAVENOUS; SUBCUTANEOUS at 20:49

## 2023-11-07 RX ADMIN — PIPERACILLIN AND TAZOBACTAM 3.38 G: 3; .375 INJECTION, POWDER, LYOPHILIZED, FOR SOLUTION INTRAVENOUS at 20:35

## 2023-11-07 RX ADMIN — Medication 50 MCG: at 09:19

## 2023-11-07 RX ADMIN — NOREPINEPHRINE BITARTRATE 0.02 MCG/KG/MIN: 0.02 INJECTION, SOLUTION INTRAVENOUS at 04:14

## 2023-11-07 RX ADMIN — IPRATROPIUM BROMIDE AND ALBUTEROL SULFATE 3 ML: .5; 3 SOLUTION RESPIRATORY (INHALATION) at 07:47

## 2023-11-07 RX ADMIN — POTASSIUM CHLORIDE 20 MEQ: 20 SOLUTION ORAL at 06:23

## 2023-11-07 RX ADMIN — PIPERACILLIN AND TAZOBACTAM 3.38 G: 3; .375 INJECTION, POWDER, LYOPHILIZED, FOR SOLUTION INTRAVENOUS at 12:08

## 2023-11-07 RX ADMIN — INSULIN ASPART 1 UNITS: 100 INJECTION, SOLUTION INTRAVENOUS; SUBCUTANEOUS at 16:23

## 2023-11-07 RX ADMIN — PROPOFOL 25 MCG/KG/MIN: 10 INJECTION, EMULSION INTRAVENOUS at 20:34

## 2023-11-07 RX ADMIN — CLONAZEPAM 1 MG: 0.5 TABLET ORAL at 09:40

## 2023-11-07 RX ADMIN — SENNOSIDES AND DOCUSATE SODIUM 1 TABLET: 50; 8.6 TABLET ORAL at 09:41

## 2023-11-07 RX ADMIN — METHYLPREDNISOLONE SODIUM SUCCINATE 62.5 MG: 125 INJECTION, POWDER, FOR SOLUTION INTRAMUSCULAR; INTRAVENOUS at 09:38

## 2023-11-07 RX ADMIN — METHYLPREDNISOLONE SODIUM SUCCINATE 62.5 MG: 125 INJECTION, POWDER, FOR SOLUTION INTRAMUSCULAR; INTRAVENOUS at 18:12

## 2023-11-07 RX ADMIN — MIDAZOLAM 2 MG: 1 INJECTION INTRAMUSCULAR; INTRAVENOUS at 12:07

## 2023-11-07 RX ADMIN — INSULIN ASPART 1 UNITS: 100 INJECTION, SOLUTION INTRAVENOUS; SUBCUTANEOUS at 12:23

## 2023-11-07 RX ADMIN — MIDAZOLAM 2 MG: 1 INJECTION INTRAMUSCULAR; INTRAVENOUS at 09:23

## 2023-11-07 RX ADMIN — MICAFUNGIN SODIUM 100 MG: 50 INJECTION, POWDER, LYOPHILIZED, FOR SOLUTION INTRAVENOUS at 16:23

## 2023-11-07 RX ADMIN — PROPOFOL 25 MCG/KG/MIN: 10 INJECTION, EMULSION INTRAVENOUS at 15:56

## 2023-11-07 RX ADMIN — IPRATROPIUM BROMIDE AND ALBUTEROL SULFATE 3 ML: .5; 3 SOLUTION RESPIRATORY (INHALATION) at 02:28

## 2023-11-07 RX ADMIN — PANTOPRAZOLE SODIUM 40 MG: 40 INJECTION, POWDER, FOR SOLUTION INTRAVENOUS at 08:17

## 2023-11-07 RX ADMIN — CHLORHEXIDINE GLUCONATE 15 ML: 1.2 SOLUTION ORAL at 20:35

## 2023-11-07 RX ADMIN — ACETYLCYSTEINE 2 ML: 200 SOLUTION ORAL; RESPIRATORY (INHALATION) at 02:28

## 2023-11-07 RX ADMIN — Medication 50 MCG: at 16:00

## 2023-11-07 RX ADMIN — METHYLPREDNISOLONE SODIUM SUCCINATE 62.5 MG: 125 INJECTION, POWDER, FOR SOLUTION INTRAMUSCULAR; INTRAVENOUS at 02:12

## 2023-11-07 RX ADMIN — ENOXAPARIN SODIUM 40 MG: 100 INJECTION SUBCUTANEOUS at 20:35

## 2023-11-07 RX ADMIN — PROPOFOL 20 MCG/KG/MIN: 10 INJECTION, EMULSION INTRAVENOUS at 04:06

## 2023-11-07 RX ADMIN — MINERAL OIL AND WHITE PETROLATUM: 30; 940 OINTMENT OPHTHALMIC at 08:20

## 2023-11-07 RX ADMIN — INSULIN GLARGINE 15 UNITS: 100 INJECTION, SOLUTION SUBCUTANEOUS at 08:21

## 2023-11-07 RX ADMIN — ACETYLCYSTEINE 2 ML: 200 SOLUTION ORAL; RESPIRATORY (INHALATION) at 07:47

## 2023-11-07 RX ADMIN — MIDAZOLAM HYDROCHLORIDE 3 MG/HR: 1 INJECTION, SOLUTION INTRAVENOUS at 09:12

## 2023-11-07 RX ADMIN — IPRATROPIUM BROMIDE AND ALBUTEROL SULFATE 3 ML: .5; 3 SOLUTION RESPIRATORY (INHALATION) at 15:17

## 2023-11-07 RX ADMIN — MINERAL OIL AND WHITE PETROLATUM: 30; 940 OINTMENT OPHTHALMIC at 00:10

## 2023-11-07 RX ADMIN — CHLORHEXIDINE GLUCONATE 15 ML: 1.2 SOLUTION ORAL at 08:22

## 2023-11-07 RX ADMIN — Medication 10 MG: at 09:26

## 2023-11-07 RX ADMIN — Medication 10 MG: at 12:07

## 2023-11-07 RX ADMIN — CLONAZEPAM 1 MG: 0.5 TABLET ORAL at 21:00

## 2023-11-07 RX ADMIN — PIPERACILLIN AND TAZOBACTAM 3.38 G: 3; .375 INJECTION, POWDER, LYOPHILIZED, FOR SOLUTION INTRAVENOUS at 04:06

## 2023-11-07 RX ADMIN — Medication 150 MCG/HR: at 08:55

## 2023-11-07 RX ADMIN — PROPOFOL 25 MCG/KG/MIN: 10 INJECTION, EMULSION INTRAVENOUS at 10:02

## 2023-11-07 ASSESSMENT — ACTIVITIES OF DAILY LIVING (ADL)
ADLS_ACUITY_SCORE: 47
ADLS_ACUITY_SCORE: 43
ADLS_ACUITY_SCORE: 47
ADLS_ACUITY_SCORE: 47

## 2023-11-07 NOTE — PROGRESS NOTES
CLINICAL NUTRITION SERVICES - BRIEF TF FOLLOW-UP      EVALUATION OF THE PROGRESS TOWARD GOALS   Diet: NPO   Nutrition Support:   Vital High Protein at 60ml/hr with 100ml free water flush q 4hrs and pt was switched from prone to supine position and has remained in supine since 1045am on 11/6  =1440kcal (1822kcal with propofol), 125g protein, 159g CHO, 33g fat, 0g fiber, 1203ml water (1803ml with FWF)      Propofol now providing 382kcal         ANTHROPOMETRICS  No new wts         PHYSICAL FINDINGS  Per flowsheet:   Edema- +1 trace generalized   GI- last BM on 11/4  Skin- no skin breakdown    LABS  Reviewed, CR-2.01 (up) , Phos-4.8 (up from 4.0 on 11/6 and 2.0 on 11/5), no new Na or Mg (Mg ordered to be drawn daily)     MEDICATIONS  Reviewed, lasix, remeron, and novolog held, lantus, solumedrol, insulin drip, propofol, norepinephrine   Nutraphos last given on 11/5      INTERVENTIONS  Implementation  Pt in acute renal failure plan to keep on Vital High Protein today and increase due to drop in propofol to 65ml/hr with holding 1hr before and after proning if proning resumes  =1560kcal (1942kcal), 135g protein (1.2g/kg), 173g CHO, 35g fat, 0g fiber, 1304ml water (1904ml with FWF of 100ml q 4hrs)     If renal status continues to worsen may need to consider switching to Novasource renal on 11/8 or Nepro if pt has a bowel movement and Norepinephrine dosage has not increased)     Will also notify MD of last BM on 11/4 with possible need for a stronger bowel regimen     Monitoring/Evaluation  Progress toward goals will be monitored and evaluated per protocol.

## 2023-11-07 NOTE — PROGRESS NOTES
INFECTIOUS DISEASE FOLLOW UP NOTE      ASSESSMENT:  Multifocal pneumonia, hypoxic respiratory failure: worsening despite recent courses of antibiotics. Elevated inflammatory markers. CT with increased consolidation R>L. Bronchoscopy 11/3. Worsening respiratory status 11/3-->4 required intubated 11/4. Steroids starting 11/4. GPC on gram stain of BAL, culture with normal dustin including yeast (usually candida not requiring treatment). Beta-D-glucan negative and PJP pcr neg, makes PJP unlikely (and lung abnormality is unilateral, would be atypical for PJP); also makes invasive candidiasis unlikely. Urine histo antigen negative. Seems most likely either bacterial pneumonia or post-pneumonia inflammation - however BAL cultures soon after admission show usual dustin.  Keytruda pneumonitis also possible, although I would think this would show bilateral lung involvement. MRSA screen negative. At this point, looking at risk/benefit of empiric therapies, can stop vancomycin and amphotericin.   Recent hospitalization 10/23 for pneumonia-received 2 days vanc/zosyn before transitioning to levofloxacin on discharge  Hx melanoma on keytruda    PLAN:  -finished azithromycin course  -continue zosyn IV   -continue micafungin, plan 7-days  -Continue steroid      Payam Glez MD  [facility]  Direct messaging: [employer] P[employer]   On-Call ID provider: [phone], option: 9     ______________________________________________________________________    SUBJECTIVE / INTERVAL HISTORY:   No events. Fio2 down to 50%. Sedation coming off today. Off pressors this morning.  No fevers    OBJECTIVE:  /68   Pulse 81   Temp 97.5  F (36.4  C) (Esophageal)   Resp 28   Ht 1.829 m (6')   Wt 120.9 kg (266 lb 9.6 oz)   SpO2 100%   BMI 36.16 kg/m      Vent Mode: CMV/AC  (Continuous Mandatory Ventilation/ Assist Control)  FiO2 (%): 50 %  Resp Rate (Set): 28 breaths/min  Tidal Volume (Set, mL): 470 mL  PEEP (cm H2O):  10 cmH2O  Resp: 28       GEN: intubated  RESPIRATORY: breath sounds bilaterally  CARDIOVASCULAR:  Regular rate and rhythm. Normal S1 and S2. No murmur  ABDOMEN:  Soft, normal bowel sounds, non-tender  EXTREMITIES: No edema.  SKIN/HAIR/NAILS:  No rashes  IV: PICC L UE        Antibiotics:  Pip/tazo 11/2-  Micafungin 11/5-    Azithromycin 11/2-11/7  Vancomycin 11/2-11/5  Ampho liposomal 11/4    Pertinent labs:    Recent Labs   Lab 11/07/23  0840 11/06/23  0343 11/05/23  0532   WBC 15.4* 23.0* 16.1*   HGB 8.5* 9.3* 9.2*   HCT 27.6* 29.6* 29.8*   * 710* 622*        Recent Labs   Lab 11/07/23  0529 11/06/23 0343 11/05/23  0532   *  147* 146* 141   CO2 24 24 23   BUN 37.5* 17.9 10.8         Lab Results   Component Value Date    ALT 23 11/04/2023    AST 21 11/04/2023    ALKPHOS 293 (H) 11/04/2023         MICROBIOLOGY DATA:  BAL gram stain GPC; culture with normal dustin including yeast  Beta-D-glucan negative   MRSA negative  Strep pn, Leg ag negative  Histo urine Ag negative.       RADIOLOGY:  XR Abdomen Port 1 View    Result Date: 11/7/2023  EXAM: XR ABDOMEN PORT 1 VIEW LOCATION: [facility] DATE: 11/7/2023 INDICATION: Confirm TF placement. COMPARISON: None.     IMPRESSION: Scattered gas and stool seen throughout nondistended large and small bowel. An enteric feeding tube courses below the diaphragm with the distal tip overlying the expected position of the gastric body.    XR Chest Port 1 View    Result Date: 11/6/2023  EXAM: XR CHEST PORT 1 VIEW LOCATION: [facility] DATE: 11/6/2023 INDICATION: back supine, et tube placement COMPARISON: 11/05/2023     IMPRESSION: Tracheostomy tube 3 cm from the ingrid. Enteric tube entering the stomach. Esophageal probe in the mid esophagus. Right central line tip in the low SVC/right atrial junction similar to previous. No significant change in the patchy infiltrates right lung laterally with elevation of the right  hemidiaphragm. Left lung remains clear and expanded.    XR Chest Port 1 View    Result Date: 2023  EXAM: XR CHEST PORT 1 VIEW LOCATION: [facility] DATE: 2023 INDICATION: ARDS, intubated. right lung opacification. eval for interval change COMPARISON: Chest radiograph 2023.     IMPRESSION: Stable size of cardiomediastinal silhouette. Endotracheal tube with tip 2.7 cm above the ingrid. Right upper extremity PICC with tip overlying the right atrium. Nasogastric tube courses below the diaphragm, tip beyond the field-of-view. New esophageal temperature probe noted. No significant change in volume loss and airspace opacification in the right lung. Possible small associated pleural effusion. Left lung is grossly clear. No pneumothorax. Bones are unchanged.    Echocardiogram Complete    Result Date: 2023  [id number]^[address]ANE^[place] [address]  Name: MISSY ALMANZA MRN: [id number] : 1965 Study Date: 2023 11:09 AM Age: 58 yrs Gender: Male Patient Location: [facility] Reason For Study: Abn EKG Ordering Physician: FIDEL VELA Performed By: MB  BSA: 2.4 m2 Height: 72 in Weight: 266 lb HR: 65 BP: 105/56 mmHg ______________________________________________________________________________ Procedure Complete Echo Adult. Definity (NDC #71298-153) given intravenously. ______________________________________________________________________________ Interpretation Summary  1. Technically difficult study (useful imaging essentially could only be obtained from the subcostal view). 2. The left ventricle is normal in size. Image quality does not provide for detailed assessment of LV systolic function, but is felt to be normal with a visually estimated ejection fraction of roughly 55-60%. 3. No significant valvular heart disease is identified on this study though the sensitivity, particularly of regurgitant lesions, is  reduced due to poor Doppler acoustics. 4. On selected views, the right ventricle appears mildly enlarged with moderately reduced systolic performance (the right ventricle is only visualized from the subcostal view). 5. There is biatrial enlargement (difficult to quantify due to suboptimal acoustic imaging) 6. The IVC appears dilated with decreased phasic variation in caval diameter consistent with elevated right atrial pressure. ______________________________________________________________________________ Left ventricle: The left ventricle is normal in size. Image quality does not provide for detailed assessment of LV systolic function, but is felt to be normal with a visually estimated ejection fraction of roughly 55-60%. There is normal regional wall motion based on limited views available. Left ventricular wall thickness is normal.  Assessment of LV Diastolic Function: The cumulative findings are indeterminate in the evaluation of diastolic function.  Right ventricle: On selected views, the right ventricle appears mildly enlarged with moderately reduced systolic performance (the right ventricle is only visualized from the subcostal view).  Left atrium: The left atrium appears enlarged (difficult to quantify)  Right atrium: The right atrium appears enlarged (difficult to quantify).  IVC: The IVC appears dilated with decreased phasic variation in caval diameter consistent with elevated right atrial pressure.  Aortic valve: The aortic valve is not well visualized, but suspected to be comprised of three cusps. No significant aortic stenosis or aortic insufficiency is detected on this study.  Mitral valve: The mitral valve appears morphologically normal.  Tricuspid valve: The tricuspid valve is grossly morphologically normal.  Pulmonic valve: The pulmonic valve is not well visualized.  Thoracic aorta: The aortic root and proximal ascending aorta are of normal dimension.  Pericardium: There is no significant  pericardial effusion. ______________________________________________________________________________ ______________________________________________________________________________ MMode/2D Measurements & Calculations IVSd: 0.94 cm LVIDd: 5.0 cm LVIDs: 3.5 cm LVPWd: 0.84 cm FS: 30.1 % LV mass(C)d: 155.0 grams LV mass(C)dI: 64.5 grams/m2 LVOT diam: 2.3 cm LVOT area: 4.1 cm2 RWT: 0.34  Doppler Measurements & Calculations MV E max ryne: 56.1 cm/sec MV A max ryne: 53.1 cm/sec MV E/A: 1.1 MV dec slope: 164.8 cm/sec2 MV dec time: 0.34 sec Ao V2 max: 98.9 cm/sec Ao max P.0 mmHg Ao V2 mean: 64.2 cm/sec Ao mean P.9 mmHg Ao V2 VTI: 19.0 cm JACQUE(I,D): 4.2 cm2 JACQUE(V,D): 3.8 cm2 LV V1 max PG: 3.3 mmHg LV V1 max: 90.4 cm/sec LV V1 VTI: 19.3 cm SV(LVOT): 79.8 ml SI(LVOT): 33.2 ml/m2 AV Ryne Ratio (DI): 0.91  JACQUE Index (cm2/m2): 1.7 E/E' av.6 Lateral E/e': 5.2 Medial E/e': 7.9  ______________________________________________________________________________ Report approved by: Mark Lomeli 2023 12:00 PM       XR Chest Port 1 View    Result Date: 2023  EXAM: XR CHEST PORT 1 VIEW LOCATION: [facility] DATE: 2023 INDICATION: Endotracheal tube positioning COMPARISON: 2023 at 1200 hours and chest CTA 2023     IMPRESSION: Endotracheal tube is 3.5 cm above the ingrid. Orogastric tube tip is below diaphragm. Heart size magnified in AP projection. Unchanged near complete opacification of the right hemithorax do to airspace consolidation +/- small pleural effusion, with some sparing of the apex. Minimal left basilar subsegmental atelectasis. No pneumothorax.    XR Chest Port 1 View    Result Date: 2023  EXAM: XR CHEST PORT 1 VIEW LOCATION: [facility] DATE: 2023 INDICATION: PICC placement COMPARISON: Chest radiograph 2014 and CTA chest 2023.     IMPRESSION: Right PICC is in place with tip overlying the low SVC. Increased right  lung opacities with near complete opacification of the right hemithorax. Mild left upper lung opacities. No pneumothorax.    Abdomen US, limited (RUQ only)    Result Date: 11/2/2023  EXAM: US ABDOMEN LIMITED LOCATION: [facility] DATE: 11/2/2023 INDICATION: sepsis, cough, difficulty taking in big breath, poor appetite, elevating alk phos with recent hospitalization. COMPARISON: None. TECHNIQUE: Limited abdominal ultrasound. FINDINGS: GALLBLADDER: Normal. No gallstones, wall thickening, or pericholecystic fluid. Negative sonographic Aguilar's sign. BILE DUCTS: No biliary dilatation. The common duct measures 3 mm. LIVER: Normal parenchyma with smooth contour. No focal mass. RIGHT KIDNEY: No hydronephrosis. PANCREAS: The visualized portions are normal. No ascites.     IMPRESSION: Normal limited abdominal ultrasound.     CT Chest Pulmonary Embolism w Contrast    Result Date: 11/2/2023  EXAM: CT CHEST PULMONARY EMBOLISM W CONTRAST LOCATION: [facility] DATE: 11/2/2023 INDICATION: cough, hypoxia, recent hospitalization x 9 days  for CAP COMPARISON: CTA chest 10/23/2023 TECHNIQUE: CT chest pulmonary angiogram during arterial phase injection of IV contrast. Multiplanar reformats and MIP reconstructions were performed. Dose reduction techniques were used. CONTRAST: Isovue 370 100mL FINDINGS: ANGIOGRAM CHEST: Pulmonary arteries are normal caliber and negative for pulmonary emboli. Thoracic aorta is negative for dissection. No CT evidence of right heart strain. LUNGS AND PLEURA: Increased consolidation within the right upper, right middle, right lower, left upper, and left lower lobes. Atoll sign in the left upper lobe (7/46). No effusion or empyema. No pneumothorax. MEDIASTINUM/AXILLAE: Normal heart size. No pericardial effusion. Reactive mediastinal lymphadenopathy. CORONARY ARTERY CALCIFICATION: Mild. UPPER ABDOMEN: Hepatic steatosis. MUSCULOSKELETAL: No acute abnormality.      IMPRESSION: 1.  No pulmonary embolism. 2.  Increased consolidation associated with the multifocal pneumonia (right greater than left lungs). Appearance is nonspecific but could be seen in the setting of atypical infection (including fungal organisms) or bacterial pneumonia. No empyema or other complication.    CT Chest Pulmonary Embolism w Contrast    Result Date: 10/23/2023  EXAM: CT CHEST PULMONARY EMBOLISM W CONTRAST LOCATION: [facility] DATE: 10/23/2023 INDICATION: Right back melanoma diagnosed April 2023. On immunotherapy. Two weeks of cough and morrison, finished augmentin 1 week ago for pneumonia COMPARISON: PET  CT 04/10/2023 TECHNIQUE: CT chest pulmonary angiogram during arterial phase injection of IV contrast. Multiplanar reformats and MIP reconstructions were performed. Dose reduction techniques were used. CONTRAST: Isovue 370 90mL FINDINGS: ANGIOGRAM CHEST: Adequate opacification of the pulmonary arteries and branches. No evidence of pulmonary emboli. Aorta and great vessels are normal.  LUNGS AND PLEURA: Asymmetric, right greater than left areas of consolidation with air bronchograms are noted. This involves most of the right upper, lower and middle lobes with smaller area of involvement of the left upper lobe and in the medial basilar segments of the left lower lobe. Consolidated lung enhances normally. No effusions. MEDIASTINUM/AXILLAE: Few less than 1 cm right paratracheal and subcarinal nodes are present. CORONARY ARTERY CALCIFICATION: Minimal UPPER ABDOMEN: No liver lesions. Fatty atrophy of the spleen. MUSCULOSKELETAL: No suspicious  lesions. Marked gynecomastia.     IMPRESSION: 1.  Asymmetric, right greater than left multilobar pneumonia with significant involvement of the right lower, middle and upper lobes. No parapneumonic effusions. 2.  Minimal reactive adenopathy. 3.  No evidence of pulmonary emboli. 4.  No evidence of metastasis.

## 2023-11-07 NOTE — PLAN OF CARE
Pt remained supine overnight and has been maintaining adequate oxygenation on vent settings. Morning pH within range so FiO2 decreased to 55%. Propofol titrated up due to elevated vitals with touch and repositioning. Temps within normal range. Tolerating tube feedings with minimal residual volumes. Continues on insulin gtt. Requiring levo but able to titrate down. Tele ICU ordered abd x-ray to verify OG placement. OG noted to be in proper positioning at 60 cm at teeth.     Problem: Adult Inpatient Plan of Care  Goal: Absence of Hospital-Acquired Illness or Injury  Outcome: Progressing  Intervention: Identify and Manage Fall Risk  Recent Flowsheet Documentation  Taken 11/7/2023 0600 by Arabella Dukes, RN  Safety Promotion/Fall Prevention:   room door open   room near nurse's station   room organization consistent   safety round/check completed  Taken 11/7/2023 0400 by Arabella Dukes, RN  Safety Promotion/Fall Prevention:   room door open   room near nurse's station   room organization consistent   safety round/check completed  Taken 11/7/2023 0200 by Arabella Dukes, RN  Safety Promotion/Fall Prevention:   room door open   room near nurse's station   room organization consistent   safety round/check completed  Taken 11/7/2023 0000 by Arabella Dukes, RN  Safety Promotion/Fall Prevention:   room door open   room near nurse's station   room organization consistent   safety round/check completed  Taken 11/6/2023 2200 by Arabella Dukes, RN  Safety Promotion/Fall Prevention:   room door open   room near nurse's station   room organization consistent   safety round/check completed  Taken 11/6/2023 2000 by Arabella Dukes, RN  Safety Promotion/Fall Prevention:   room door open   room near nurse's station   room organization consistent   safety round/check completed  Intervention: Prevent Skin Injury  Recent Flowsheet Documentation  Taken 11/7/2023 0600 by Arabella Dukes, RN  Body Position:   weight shifting    supine, head elevated   supine, legs elevated  Taken 11/7/2023 0400 by Arabella Dukes RN  Body Position:   weight shifting   turned   right   side-lying 30 degrees  Taken 11/7/2023 0200 by Arabella Dukes RN  Body Position:   weight shifting   supine, legs elevated   supine, head elevated  Taken 11/7/2023 0000 by Arabella Dukes RN  Body Position:   weight shifting   turned   right   side-lying 30 degrees  Taken 11/6/2023 2200 by Arabella Dukes RN  Body Position:   weight shifting   supine, legs elevated   supine, head elevated  Taken 11/6/2023 2000 by Arabella Dukes RN  Body Position: weight shifting  Intervention: Prevent Infection  Recent Flowsheet Documentation  Taken 11/7/2023 0600 by Arabella Dukes RN  Infection Prevention:   environmental surveillance performed   hand hygiene promoted   single patient room provided  Taken 11/7/2023 0400 by Arabella Dukes RN  Infection Prevention:   environmental surveillance performed   hand hygiene promoted   single patient room provided  Taken 11/7/2023 0200 by Arabella Dukes RN  Infection Prevention:   environmental surveillance performed   hand hygiene promoted   single patient room provided  Taken 11/7/2023 0000 by Arabella Dukes RN  Infection Prevention:   environmental surveillance performed   hand hygiene promoted   single patient room provided  Taken 11/6/2023 2200 by Arabella Dukes RN  Infection Prevention:   environmental surveillance performed   hand hygiene promoted   single patient room provided  Taken 11/6/2023 2000 by Arabella Dukes RN  Infection Prevention:   environmental surveillance performed   hand hygiene promoted   single patient room provided  Goal: Optimal Comfort and Wellbeing  Outcome: Progressing  Intervention: Provide Person-Centered Care  Recent Flowsheet Documentation  Taken 11/7/2023 0600 by Arabella Dukes RN  Trust Relationship/Rapport: care explained  Taken 11/7/2023 0400 by Arabella Dukes RN  Trust  Relationship/Rapport: care explained  Taken 11/7/2023 0200 by Arabella Dukes RN  Trust Relationship/Rapport: care explained  Taken 11/7/2023 0000 by Arabella Dukes RN  Trust Relationship/Rapport: care explained  Taken 11/6/2023 2200 by Arabella Dukes RN  Trust Relationship/Rapport: care explained  Taken 11/6/2023 2000 by Arabella Dukes RN  Trust Relationship/Rapport: care explained     Problem: Enteral Nutrition  Goal: Absence of Aspiration Signs and Symptoms  Outcome: Progressing  Intervention: Minimize Aspiration Risk  Recent Flowsheet Documentation  Taken 11/7/2023 0600 by Arabella Dukes RN  Head of Bed (HOB) Positioning: HOB at 30 degrees  Taken 11/7/2023 0400 by Arabella Dukes RN  Head of Bed (HOB) Positioning: HOB at 30 degrees  Taken 11/7/2023 0200 by Arabella Dukes RN  Head of Bed (HOB) Positioning: HOB at 30 degrees  Taken 11/7/2023 0000 by Arabella Dukes RN  Head of Bed (HOB) Positioning: HOB at 30 degrees  Taken 11/6/2023 2200 by Arabella Dukes RN  Head of Bed (HOB) Positioning: HOB at 30 degrees  Taken 11/6/2023 2000 by Arabella Dukes RN  Head of Bed (HOB) Positioning: HOB at 30 degrees  Goal: Feeding Tolerance  Outcome: Progressing   Goal Outcome Evaluation:

## 2023-11-07 NOTE — PROGRESS NOTES
Patient remains on current ordered settings rate 28 Vt 470 Peep 10 and 60% oxygen. ETT 7.5/24 at teeth, rotated Q4. Moderate amount of oral secretions, scant in the ETT. Breath sounds are clear bilaterally. SAT 98%-100% throughout the night. Inline nebulizer tx given with Duoneb/Mucomyst via Aerogen. Will continue to follow as needed.

## 2023-11-07 NOTE — PROGRESS NOTES
CRITICAL CARE PROGRESS NOTE:    Assessment/Plan:  58M wih hx of asthma, Melanoma on keytruda since May 2023, depression/anxiety, recent hospitalization for CAP and respiratory failure, readmitted for worsening cough and SOB. CT showed worsening of right-sided infiltrates thought to be 2/2 worsening pneumonia. In ICU on HFNC and requiring intermittent BiPAP. Failed NIPPV and required intubation on 11/4.     Overnight Events:  Able to titrate off levophed  FiO2 down to 50%  Not fully paralyzed on nimbex gtt    RESP:  ARDS. Acute respiratory failure with hypoxemia requiring intubation/IMV on 11/4 PM. Worsening right-sided infiltrates could be c/w recurrent pneumonia (HAP vs. HCAP) vs. Organizing pneumonia vs. Malignant infiltrates vs. Pneumonitis from immunotherapy.   Vent Mode: CMV/AC  (Continuous Mandatory Ventilation/ Assist Control)  FiO2 (%): 50 %  Resp Rate (Set): 28 breaths/min  Tidal Volume (Set, mL): 470 mL  PEEP (cm H2O): 10 cmH2O  Resp: 28    Abx per ID section below  Bronchoscopy on 11/3  Continue 60mg IV methylpred q8h for possible drug-related pneumonitis.   Continue scheduled duonebs q8, stopped mucomyst nebs  Will remain supine today    CV:  Shock, likely vasoplegia from sedation. Requiring low dose NE. Bedside echo 11/4 with grossly intact LV function, probably normal diastolic function, IVC looked somewhat plump with reduced inspiratory variation. RV not well seen. No formal echo on file. More bradycardic today compared to yesterday; HR in high 40s - likely from propofol   Tele monitoring  MAP >65  Furosemide 20mg IV BID - on hold  Holding PTA statin for now    NEURO:  Requiring therapeutic sedation and NMB for severe ARDS.   Continue prop, fent for RASS goal -3  Stopping cis-atra paralytic  Tylenol prn pain  Midazolam 2mg IV q1h prn additional sedation  Midazolam gtt for sedation  Hold PTA mirtazapine and selective serotonin reuptake inhibitor while on fentanyl.     GI:  No issues. Elevated ALP  likely from MM. OG tube in place  Dietary consulted for tube feeds  IV PPI for stress ulcer ppx  Bowel regimen prn    RENAL:  No issues, normal renal function. bump in Scr today  Avoid nephrotoxins  Furosemide on hold due to increase in Cr  Will increase free water flushes to 150  Monitor BUN/Scr, lytes  Maintain norwood    ID:  Possible recurrent pneumonia, HAP vs. HCAP. Immunosuppressed; at risk for fungal infection. Multiple tests from BAL still pending.   ID following, appreciate input  Continue vanco + pip/tazo   Continue Micafungin  Finished course of Azithromycin 11/3-11/7  Follow up BAL cultures.    HEMATOLOGIC:  Hx of Melanoma, on immunotherapy. Followed by [facility]. Stable mild anemia.   Hgb >7  Follow counts    ENDOCRINE:  Hyperglycemia due to critical illness, steroids.   Insulin drip - stopping  Changing to sliding scale q4    ICU PROPHYLAXIS:  LMWH daily  IV PPI  Chlorhexidine rinses    CODE STATUS, DISPOSITION, FAMILY COMMUNICATION: full code  Updated his sister Luz Maria 11/7.    Restraints  Progress Note  Restraint Application    I recognize that restraints are physical and/or chemical interventions intended to restrict a person's movements. Restraints are currently needed to ensure the safety of this patient and/or others. My clinical rationale appears below.    Category/Type of Restraint  Non Violent:  Soft limb restraint x 2  --  Behavior  Pulling at tubes/lines  --  Root Cause of the Behavior  Sedation/intubation  --  Less-Restrictive Measures that Failed  Non Violent Measures:  Close Observation  --  Response to the Restraint  Patient unable to pull at tubes/lines  --  Criteria for Release from the Restraint  Patient calm and off sedation     Clinically Significant Risk Factors        # Hypokalemia: Lowest K = 2.8 mmol/L in last 2 days, will replace as needed  # Hypernatremia: Highest Na = 147 mmol/L in last 2 days, will monitor as appropriate      # Hypoalbuminemia: Lowest albumin = 2.7 g/dL at  11/3/2023  5:57 AM, will monitor as appropriate    # Acute Kidney Injury, unspecified: based on a >150% or 0.3 mg/dL increase in last creatinine compared to past 90 day average, will monitor renal function         # Obesity: Estimated body mass index is 36.16 kg/m  as calculated from the following:    Height as of this encounter: 1.829 m (6').    Weight as of this encounter: 120.9 kg (266 lb 9.6 oz).        # Financial/Environmental Concerns: none  # Asthma: noted on problem list                Objective:  Physical Exam:  Vent settings for last 24 hours:  Vent Mode: CMV/AC  (Continuous Mandatory Ventilation/ Assist Control)  FiO2 (%): 50 %  Resp Rate (Set): 28 breaths/min  Tidal Volume (Set, mL): 470 mL  PEEP (cm H2O): 10 cmH2O  Resp: 28      /68   Pulse 81   Temp 97.5  F (36.4  C) (Esophageal)   Resp 28   Ht 1.829 m (6')   Wt 120.9 kg (266 lb 9.6 oz)   SpO2 100%   BMI 36.16 kg/m      Intake/Output last 3 shifts:  I/O last 3 completed shifts:  In: 1776.4 [I.V.:916.4; NG/GT:500]  Out: 2440 [Urine:2440]  Intake/Output this shift:  I/O this shift:  In: 584.54 [I.V.:524.54]  Out: 100 [Urine:100]    Gen:  sedated.   HEENT:  et tube in place, OG in place  CV: RRR  Resp: coarse mechanical breath sounds, no wheezing  Abd: soft, +BS  Neuro: sedated, but eyes are open, no meaningful mvmts  Ext: no edema    LAB:  Recent Labs   Lab 11/07/23  0840   WBC 15.4*   HGB 8.5*   HCT 27.6*   *     Recent Labs   Lab 11/07/23  0529 11/06/23  0343 11/05/23  0532 11/04/23  0421 11/03/23  0557 11/02/23  0845   * 146* 141 139 140 136   CO2  --  24 23 25 25 24   BUN  --  17.9 10.8 4.3* 5.1* 5.4*   ALKPHOS  --   --   --  293* 226* 275*   ALT  --   --   --  23 23 39   AST  --   --   --  21 14 34     Micro  PCT 0.11 on 11/2  Covid neg  Flu A/B neg  RSV neg  Sputum 1+ gram pos  MRSA neg  Blood NGTD  Legionella urine Ag neg  Strep pneumo urine Ag neg    Fungitell assay neg  Histo urine Ag negative  Fungal Ab panel  negative    Bronch/BAL 11/3  Gram stain + for GPC, culture pending  KOH prep neg, fungal culture pending  Total nuc cells 4, 61% PMN, 31% lymphs  RVP neg  Legionella cx NGTD    No current outpatient medications on file.       Critical care attestation: 45 minutes spent managing the following issues: acute respiratory failure requiring intubation/IMV; severe pneumonia vs. Drug related pneumonitis with acute respiratory failure, ARDS. Immunosuppressed patient.. High risk for organ deterioration and death requiring ICU level care.

## 2023-11-07 NOTE — PROGRESS NOTES
RT PROGRESS NOTE    DATA:    VENT DAY#  4    CURRENT SETTINGS:  VENT SETTINGS   AC 28, 470, +10        FIO2:   40%    PATIENT PARAMETERS:    PIP: 25-26  Pplat: 21  Pmean: 15  SBT: No  SECRETIONS:  Scant Thin White  02 SATS: %    ETT SIZE 7.5 Secured at 24 cm at teeth    Respiratory Medications: Duoneb Q8    AB.47, 30, 92, 27    NOTE / PLAN:   RT will continue to monitor.

## 2023-11-07 NOTE — PROGRESS NOTES
[facility]    Medicine Progress Note - Hospitalist Service    Date of Admission:  11/2/2023    Assessment & Plan   58M wih hx of asthma, multiple myeloma on keytruda since May 2023, depression/anxiety, recent hospitalization for CAP and respiratory failure, readmitted for worsening cough and SOB. CT showed worsening of right-sided infiltrates thought to be 2/2 worsening pneumonia. In ICU on HFNC and requiring intermittent BiPAP. Failed NIPPV and required intubation on 11/4.  Treating for multifocal pneumonia, acute respiratory failure hypoxia, ARDS, shock. Transitioned off of vasopressor today, and sedation vacation per intensivist.     Acute hypoxic respiratory failure  ARDS  Multifocal pneumonia  Sepsis due to possible lung infection  Procalcitonin is mildly elevated, CRP is significantly elevated above 200.  Status post bronchoscopy on 11/3.  Beta-D-glucan, urine histo ag, negative.   Worsening respiratory status, intubated on 11/4.  Keytruda pneumonitis less likely per ID.  -Started on steroid for possible pneumonitis on 11/4.  BAL cultures usual dustin.   MRSA culture negative.   -11/5, hypotension requiring Levophed most likely vasoplegia from sedation.  Vent management per intensivist.   Continue duoneb q8h.  Mucomyst nebs discontinued.   Continue zosyn  Completed azithromycin.  Continue micafungin for 7 days.   Continue steroid.      Acute kidney injury  -Baseline creatinine around 0.8,   creatinine worsened over the last  48 hours to 2.07.    Repeat BMP, monitor I/O, urine output.  Increase free water flushes per Intensivist.    Hold lasix, avoid nephrotoxins.     Steroid induced hyperglycemia.  Continue correction insulin q4h. ICU protocol.     Asthma  -No signs of exacerbation     Anxiety/depression  -At home on Lexapro on mirtazapine  -Holding given respiratory status and status post intubation     Anemia  -No signs of active bleeding  -Hemoglobin stable around 9        Diet: NPO  per Anesthesia Guidelines for Procedure/Surgery Except for: Meds  Adult Formula Drip Feeding: Continuous Vital High Protein; Orogastric tube; Goal Rate: 65; mL/hr; Hold 1hr before and after proning if fed gastrically; Do not advance tube feeding rate unless K+ is = or > 3.0, Mg++ is = or > 1.5, and Phos is = or ...    DVT Prophylaxis: Enoxaparin (Lovenox) SQ  Lamas Catheter: PRESENT, indication: Strict 1-2 Hour I&O;Deep Sedation/Paralysis  Lines: PRESENT      PICC 11/04/23 Double Lumen Right Basilic medication drips-Site Assessment: WDL  Arterial Line 11/04/23 Other (Comment)-Site Assessment: WDL      Cardiac Monitoring: None  Code Status: Full Code      Clinically Significant Risk Factors        # Hypokalemia: Lowest K = 2.8 mmol/L in last 2 days, will replace as needed  # Hypernatremia: Highest Na = 146 mmol/L in last 2 days, will monitor as appropriate      # Hypoalbuminemia: Lowest albumin = 2.7 g/dL at 11/3/2023  5:57 AM, will monitor as appropriate    # Acute Kidney Injury, unspecified: based on a >150% or 0.3 mg/dL increase in last creatinine compared to past 90 day average, will monitor renal function         # Obesity: Estimated body mass index is 36.16 kg/m  as calculated from the following:    Height as of this encounter: 1.829 m (6').    Weight as of this encounter: 120.9 kg (266 lb 9.6 oz).      # Financial/Environmental Concerns: none  # Asthma: noted on problem list        Disposition Plan     Expected Discharge Date: 11/10/2023      Destination: home with family  Discharge Comments: ICU/Intubated/prone/sedated/gtts            Avery Barraza DO  Hospitalist Service  [facility]  Securely message with [employer] (more info)  Text page via [employer] Paging/Directory   ______________________________________________________________________    Interval History   No events overnight.  FIO2 50%.  Sedation adjustments today per intensivist.     Physical Exam   Vital Signs: Temp: 97.2  F  (36.2  C) Temp src: Esophageal   Pulse: 66   Resp: 28 SpO2: 100 % O2 Device: Mechanical Ventilator    Weight: 266 lbs 9.6 oz    GEN: intubated  RESPIRATORY: breath sounds bilaterally  CARDIOVASCULAR:  Regular rate and rhythm. Normal S1 and S2. No murmur  ABDOMEN:  Soft, normal bowel sounds, non-tender  EXTREMITIES: No edema.  SKIN/HAIR/NAILS:  No rashes  IV: PICC L UE    Medical Decision Making       50 MINUTES SPENT BY ME on the date of service doing chart review, history, exam, documentation & further activities per the note.      Data     I have personally reviewed the following data over the past 24 hrs:    15.4 (H)  \   8.5 (L)   / 551 (H)     147 (H); 149 (H) 110 (H) 37.5 (H) /  149 (H)   3.8; 3.8 24 2.01 (H); 2.07 (H) \       Imaging results reviewed over the past 24 hrs:   Recent Results (from the past 24 hour(s))   XR Chest Port 1 View    Narrative    EXAM: XR CHEST PORT 1 VIEW  LOCATION: [facility]  DATE: 11/6/2023    INDICATION: back supine, et tube placement  COMPARISON: 11/05/2023      Impression    IMPRESSION: Tracheostomy tube 3 cm from the ingrid. Enteric tube entering the stomach. Esophageal probe in the mid esophagus. Right central line tip in the low SVC/right atrial junction similar to previous.    No significant change in the patchy infiltrates right lung laterally with elevation of the right hemidiaphragm. Left lung remains clear and expanded.   XR Abdomen Port 1 View    Narrative    EXAM: XR ABDOMEN PORT 1 VIEW  LOCATION: [facility]  DATE: 11/7/2023    INDICATION: Confirm TF placement.  COMPARISON: None.      Impression    IMPRESSION: Scattered gas and stool seen throughout nondistended large and small bowel. An enteric feeding tube courses below the diaphragm with the distal tip overlying the expected position of the gastric body.

## 2023-11-08 ENCOUNTER — APPOINTMENT (OUTPATIENT)
Dept: RADIOLOGY | Facility: CLINIC | Age: 58
End: 2023-11-08
Attending: INTERNAL MEDICINE
Payer: COMMERCIAL

## 2023-11-08 LAB
BASE EXCESS BLDA CALC-SCNC: 2.3 MMOL/L
BASE EXCESS BLDA CALC-SCNC: 2.4 MMOL/L
BASE EXCESS BLDA CALC-SCNC: 3.2 MMOL/L
COHGB MFR BLD: 94.6 % (ref 96–97)
COHGB MFR BLD: 95.1 % (ref 96–97)
COHGB MFR BLD: 96.1 % (ref 96–97)
CREAT SERPL-MCNC: 1.88 MG/DL (ref 0.67–1.17)
EGFRCR SERPLBLD CKD-EPI 2021: 41 ML/MIN/1.73M2
ERYTHROCYTE [DISTWIDTH] IN BLOOD BY AUTOMATED COUNT: 15.5 % (ref 10–15)
GLUCOSE BLDC GLUCOMTR-MCNC: 134 MG/DL (ref 70–99)
GLUCOSE BLDC GLUCOMTR-MCNC: 140 MG/DL (ref 70–99)
GLUCOSE BLDC GLUCOMTR-MCNC: 148 MG/DL (ref 70–99)
GLUCOSE BLDC GLUCOMTR-MCNC: 160 MG/DL (ref 70–99)
GLUCOSE BLDC GLUCOMTR-MCNC: 163 MG/DL (ref 70–99)
GLUCOSE BLDC GLUCOMTR-MCNC: 172 MG/DL (ref 70–99)
HCO3 BLD-SCNC: 26 MMOL/L (ref 23–29)
HCO3 BLD-SCNC: 27 MMOL/L (ref 23–29)
HCO3 BLD-SCNC: 27 MMOL/L (ref 23–29)
HCT VFR BLD AUTO: 27.8 % (ref 40–53)
HGB BLD-MCNC: 8.6 G/DL (ref 13.3–17.7)
MAGNESIUM SERPL-MCNC: 3.4 MG/DL (ref 1.7–2.3)
MCH RBC QN AUTO: 28.8 PG (ref 26.5–33)
MCHC RBC AUTO-ENTMCNC: 30.9 G/DL (ref 31.5–36.5)
MCV RBC AUTO: 93 FL (ref 78–100)
OXYHGB MFR BLD: 92.8 % (ref 96–97)
OXYHGB MFR BLD: 94.1 % (ref 96–97)
OXYHGB MFR BLD: 94.3 % (ref 96–97)
PATH REPORT.COMMENTS IMP SPEC: NORMAL
PATH REPORT.FINAL DX SPEC: NORMAL
PATH REPORT.GROSS SPEC: NORMAL
PATH REPORT.MICROSCOPIC SPEC OTHER STN: NORMAL
PATH REPORT.RELEVANT HX SPEC: NORMAL
PCO2 BLD: 39 MM HG (ref 35–45)
PCO2 BLD: 39 MM HG (ref 35–45)
PCO2 BLD: 45 MM HG (ref 35–45)
PH BLD: 7.39 [PH] (ref 7.37–7.44)
PH BLD: 7.44 [PH] (ref 7.37–7.44)
PH BLD: 7.45 [PH] (ref 7.37–7.44)
PHOSPHATE SERPL-MCNC: 4 MG/DL (ref 2.5–4.5)
PLATELET # BLD AUTO: 546 10E3/UL (ref 150–450)
PO2 BLD: 69 MM HG (ref 80–90)
PO2 BLD: 74 MM HG (ref 80–90)
PO2 BLD: 80 MM HG (ref 80–90)
POTASSIUM SERPL-SCNC: 4 MMOL/L (ref 3.4–5.3)
RBC # BLD AUTO: 2.99 10E6/UL (ref 4.4–5.9)
TEMPERATURE: 37 DEGREES C
WBC # BLD AUTO: 13.4 10E3/UL (ref 4–11)

## 2023-11-08 PROCEDURE — 999N000157 HC STATISTIC RCP TIME EA 10 MIN

## 2023-11-08 PROCEDURE — 82565 ASSAY OF CREATININE: CPT | Performed by: STUDENT IN AN ORGANIZED HEALTH CARE EDUCATION/TRAINING PROGRAM

## 2023-11-08 PROCEDURE — 250N000011 HC RX IP 250 OP 636: Mod: JZ | Performed by: INTERNAL MEDICINE

## 2023-11-08 PROCEDURE — 82805 BLOOD GASES W/O2 SATURATION: CPT | Performed by: INTERNAL MEDICINE

## 2023-11-08 PROCEDURE — 999N000259 HC STATISTIC EXTUBATION

## 2023-11-08 PROCEDURE — 94640 AIRWAY INHALATION TREATMENT: CPT | Mod: 76

## 2023-11-08 PROCEDURE — 250N000013 HC RX MED GY IP 250 OP 250 PS 637: Performed by: INTERNAL MEDICINE

## 2023-11-08 PROCEDURE — 258N000003 HC RX IP 258 OP 636: Performed by: INTERNAL MEDICINE

## 2023-11-08 PROCEDURE — 99232 SBSQ HOSP IP/OBS MODERATE 35: CPT | Performed by: INTERNAL MEDICINE

## 2023-11-08 PROCEDURE — 99291 CRITICAL CARE FIRST HOUR: CPT | Performed by: INTERNAL MEDICINE

## 2023-11-08 PROCEDURE — 250N000009 HC RX 250: Performed by: INTERNAL MEDICINE

## 2023-11-08 PROCEDURE — 99232 SBSQ HOSP IP/OBS MODERATE 35: CPT | Performed by: FAMILY MEDICINE

## 2023-11-08 PROCEDURE — 71045 X-RAY EXAM CHEST 1 VIEW: CPT

## 2023-11-08 PROCEDURE — 94003 VENT MGMT INPAT SUBQ DAY: CPT

## 2023-11-08 PROCEDURE — 999N000253 HC STATISTIC WEANING TRIALS

## 2023-11-08 PROCEDURE — C9113 INJ PANTOPRAZOLE SODIUM, VIA: HCPCS | Mod: JZ | Performed by: INTERNAL MEDICINE

## 2023-11-08 PROCEDURE — 83735 ASSAY OF MAGNESIUM: CPT | Performed by: STUDENT IN AN ORGANIZED HEALTH CARE EDUCATION/TRAINING PROGRAM

## 2023-11-08 PROCEDURE — 94640 AIRWAY INHALATION TREATMENT: CPT

## 2023-11-08 PROCEDURE — 85027 COMPLETE CBC AUTOMATED: CPT

## 2023-11-08 PROCEDURE — 200N000001 HC R&B ICU

## 2023-11-08 PROCEDURE — 84132 ASSAY OF SERUM POTASSIUM: CPT | Performed by: STUDENT IN AN ORGANIZED HEALTH CARE EDUCATION/TRAINING PROGRAM

## 2023-11-08 PROCEDURE — 250N000011 HC RX IP 250 OP 636: Mod: JZ | Performed by: STUDENT IN AN ORGANIZED HEALTH CARE EDUCATION/TRAINING PROGRAM

## 2023-11-08 PROCEDURE — 84100 ASSAY OF PHOSPHORUS: CPT | Performed by: STUDENT IN AN ORGANIZED HEALTH CARE EDUCATION/TRAINING PROGRAM

## 2023-11-08 RX ORDER — FLUTICASONE PROPIONATE 110 UG/1
1 AEROSOL, METERED RESPIRATORY (INHALATION) 2 TIMES DAILY
Status: DISCONTINUED | OUTPATIENT
Start: 2023-11-08 | End: 2023-11-10

## 2023-11-08 RX ORDER — HALOPERIDOL 5 MG/ML
2 INJECTION INTRAMUSCULAR EVERY 6 HOURS PRN
Status: DISCONTINUED | OUTPATIENT
Start: 2023-11-08 | End: 2023-11-10

## 2023-11-08 RX ORDER — OLANZAPINE 10 MG/1
5 INJECTION, POWDER, LYOPHILIZED, FOR SOLUTION INTRAMUSCULAR ONCE
Status: COMPLETED | OUTPATIENT
Start: 2023-11-08 | End: 2023-11-08

## 2023-11-08 RX ORDER — DEXMEDETOMIDINE HYDROCHLORIDE 4 UG/ML
.1-1.2 INJECTION, SOLUTION INTRAVENOUS CONTINUOUS
Status: DISCONTINUED | OUTPATIENT
Start: 2023-11-08 | End: 2023-11-10

## 2023-11-08 RX ORDER — FENTANYL CITRATE 50 UG/ML
50 INJECTION, SOLUTION INTRAMUSCULAR; INTRAVENOUS
Status: DISCONTINUED | OUTPATIENT
Start: 2023-11-08 | End: 2023-11-10

## 2023-11-08 RX ADMIN — MICAFUNGIN SODIUM 100 MG: 50 INJECTION, POWDER, LYOPHILIZED, FOR SOLUTION INTRAVENOUS at 18:00

## 2023-11-08 RX ADMIN — DEXMEDETOMIDINE HYDROCHLORIDE 0.6 MCG/KG/HR: 4 INJECTION, SOLUTION INTRAVENOUS at 16:37

## 2023-11-08 RX ADMIN — DEXMEDETOMIDINE HYDROCHLORIDE 0.7 MCG/KG/HR: 4 INJECTION, SOLUTION INTRAVENOUS at 20:24

## 2023-11-08 RX ADMIN — POLYETHYLENE GLYCOL 3350 17 G: 17 POWDER, FOR SOLUTION ORAL at 08:00

## 2023-11-08 RX ADMIN — CLONAZEPAM 1 MG: 0.5 TABLET ORAL at 08:00

## 2023-11-08 RX ADMIN — METHYLPREDNISOLONE SODIUM SUCCINATE 62.5 MG: 125 INJECTION, POWDER, FOR SOLUTION INTRAMUSCULAR; INTRAVENOUS at 02:15

## 2023-11-08 RX ADMIN — INSULIN ASPART 1 UNITS: 100 INJECTION, SOLUTION INTRAVENOUS; SUBCUTANEOUS at 11:56

## 2023-11-08 RX ADMIN — INSULIN GLARGINE 15 UNITS: 100 INJECTION, SOLUTION SUBCUTANEOUS at 08:00

## 2023-11-08 RX ADMIN — HALOPERIDOL LACTATE 2 MG: 5 INJECTION, SOLUTION INTRAMUSCULAR at 23:57

## 2023-11-08 RX ADMIN — Medication 50 MCG: at 09:29

## 2023-11-08 RX ADMIN — IPRATROPIUM BROMIDE AND ALBUTEROL SULFATE 3 ML: .5; 3 SOLUTION RESPIRATORY (INHALATION) at 00:44

## 2023-11-08 RX ADMIN — INSULIN ASPART 1 UNITS: 100 INJECTION, SOLUTION INTRAVENOUS; SUBCUTANEOUS at 04:17

## 2023-11-08 RX ADMIN — PIPERACILLIN AND TAZOBACTAM 3.38 G: 3; .375 INJECTION, POWDER, LYOPHILIZED, FOR SOLUTION INTRAVENOUS at 11:21

## 2023-11-08 RX ADMIN — SENNOSIDES 5 ML: 8.8 LIQUID ORAL at 08:00

## 2023-11-08 RX ADMIN — ENOXAPARIN SODIUM 40 MG: 100 INJECTION SUBCUTANEOUS at 20:05

## 2023-11-08 RX ADMIN — IPRATROPIUM BROMIDE AND ALBUTEROL SULFATE 3 ML: .5; 3 SOLUTION RESPIRATORY (INHALATION) at 15:03

## 2023-11-08 RX ADMIN — IPRATROPIUM BROMIDE AND ALBUTEROL SULFATE 3 ML: .5; 3 SOLUTION RESPIRATORY (INHALATION) at 07:31

## 2023-11-08 RX ADMIN — PANTOPRAZOLE SODIUM 40 MG: 40 INJECTION, POWDER, FOR SOLUTION INTRAVENOUS at 07:48

## 2023-11-08 RX ADMIN — PIPERACILLIN AND TAZOBACTAM 3.38 G: 3; .375 INJECTION, POWDER, LYOPHILIZED, FOR SOLUTION INTRAVENOUS at 04:15

## 2023-11-08 RX ADMIN — DEXMEDETOMIDINE HYDROCHLORIDE 0.2 MCG/KG/HR: 4 INJECTION, SOLUTION INTRAVENOUS at 09:30

## 2023-11-08 RX ADMIN — PROPOFOL 25 MCG/KG/MIN: 10 INJECTION, EMULSION INTRAVENOUS at 06:30

## 2023-11-08 RX ADMIN — CHLORHEXIDINE GLUCONATE 15 ML: 1.2 SOLUTION ORAL at 07:53

## 2023-11-08 RX ADMIN — Medication 125 MCG/HR: at 00:48

## 2023-11-08 RX ADMIN — METHYLPREDNISOLONE SODIUM SUCCINATE 62.5 MG: 125 INJECTION, POWDER, FOR SOLUTION INTRAMUSCULAR; INTRAVENOUS at 18:00

## 2023-11-08 RX ADMIN — INSULIN ASPART 1 UNITS: 100 INJECTION, SOLUTION INTRAVENOUS; SUBCUTANEOUS at 08:02

## 2023-11-08 RX ADMIN — PROPOFOL 25 MCG/KG/MIN: 10 INJECTION, EMULSION INTRAVENOUS at 01:44

## 2023-11-08 RX ADMIN — METHYLPREDNISOLONE SODIUM SUCCINATE 62.5 MG: 125 INJECTION, POWDER, FOR SOLUTION INTRAMUSCULAR; INTRAVENOUS at 10:16

## 2023-11-08 RX ADMIN — MIDAZOLAM 2 MG: 1 INJECTION INTRAMUSCULAR; INTRAVENOUS at 13:10

## 2023-11-08 RX ADMIN — INSULIN ASPART 1 UNITS: 100 INJECTION, SOLUTION INTRAVENOUS; SUBCUTANEOUS at 00:16

## 2023-11-08 RX ADMIN — Medication 50 MCG: at 08:39

## 2023-11-08 RX ADMIN — MIDAZOLAM 2 MG: 1 INJECTION INTRAMUSCULAR; INTRAVENOUS at 09:29

## 2023-11-08 RX ADMIN — PIPERACILLIN AND TAZOBACTAM 3.38 G: 3; .375 INJECTION, POWDER, LYOPHILIZED, FOR SOLUTION INTRAVENOUS at 20:05

## 2023-11-08 ASSESSMENT — ACTIVITIES OF DAILY LIVING (ADL)
ADLS_ACUITY_SCORE: 43
ADLS_ACUITY_SCORE: 43
ADLS_ACUITY_SCORE: 47
ADLS_ACUITY_SCORE: 47
ADLS_ACUITY_SCORE: 43
ADLS_ACUITY_SCORE: 47
ADLS_ACUITY_SCORE: 47
ADLS_ACUITY_SCORE: 43
ADLS_ACUITY_SCORE: 47
ADLS_ACUITY_SCORE: 47

## 2023-11-08 NOTE — PROGRESS NOTES
INFECTIOUS DISEASE FOLLOW UP NOTE      ASSESSMENT:  Multifocal pneumonia, hypoxic respiratory failure: worsening despite recent courses of antibiotics. Elevated inflammatory markers. CT with increased consolidation R>L. Bronchoscopy 11/3. Worsening respiratory status 11/3-->4 required intubated 11/4. Steroids starting 11/4. GPC on gram stain of BAL, culture with normal dustin including yeast (usually candida not requiring treatment). Beta-D-glucan negative and PJP pcr neg, makes PJP unlikely (and lung abnormality is unilateral, would be atypical for PJP); also makes invasive candidiasis unlikely. Urine histo antigen negative. Seems most likely either bacterial pneumonia or post-pneumonia inflammation - however BAL cultures soon after admission show usual dustin.  Keytruda pneumonitis also possible, although I would think this would show bilateral lung involvement. MRSA screen negative. At this point, looking at risk/benefit of empiric therapies, can stop vancomycin and amphotericin.   Recent hospitalization 10/23 for pneumonia-received 2 days vanc/zosyn before transitioning to levofloxacin on discharge  Hx melanoma on keytruda    PLAN:  -continue zosyn IV 10-14 days  -continue micafungin, plan 7-days  -steroids per pulmonary       Payam Glez MD  [facility]  Direct messaging: [email or web address] Paging   On-Call ID provider: [phone], option: 9     ______________________________________________________________________    SUBJECTIVE / INTERVAL HISTORY:   Extubated today. No fevers. No acute events.     OBJECTIVE:  /68   Pulse 61   Temp 97.5  F (36.4  C)   Resp 20   Ht 1.829 m (6')   Wt 130.1 kg (286 lb 12.8 oz)   SpO2 93%   BMI 38.90 kg/m      Vent Mode: CMV/AC  (Continuous Mandatory Ventilation/ Assist Control)  FiO2 (%): 50 %  Resp Rate (Set): 22 breaths/min  Tidal Volume (Set, mL): 470 mL  PEEP (cm H2O): 5 cmH2O  Pressure Support (cm H2O): 5 cmH2O  Resp: 20       GEN: lethargic,  no distress  RESPIRATORY: breath sounds bilaterally  CARDIOVASCULAR:  Regular rate and rhythm. Normal S1 and S2. No murmur  ABDOMEN:  Soft, normal bowel sounds, non-tender  EXTREMITIES: No edema.  SKIN/HAIR/NAILS:  No rashes  IV: PICC L UE        Antibiotics:  Pip/tazo 11/2-  Micafungin 11/5-    Azithromycin 11/2-11/7  Vancomycin 11/2-11/5  Ampho liposomal 11/4    Pertinent labs:    Recent Labs   Lab 11/08/23  0515 11/07/23  0840 11/06/23  0343   WBC 13.4* 15.4* 23.0*   HGB 8.6* 8.5* 9.3*   HCT 27.8* 27.6* 29.6*   * 551* 710*        Recent Labs   Lab 11/07/23  0529 11/06/23  0343 11/05/23  0532   *  147* 146* 141   CO2 24 24 23   BUN 37.5* 17.9 10.8         Lab Results   Component Value Date    ALT 23 11/04/2023    AST 21 11/04/2023    ALKPHOS 293 (H) 11/04/2023         MICROBIOLOGY DATA:  BAL gram stain GPC; culture with normal dustin including yeast  Beta-D-glucan negative   MRSA negative  Strep pn, Leg ag negative  Histo urine Ag negative.       RADIOLOGY:  XR Abdomen Port 1 View    Result Date: 11/7/2023  EXAM: XR ABDOMEN PORT 1 VIEW LOCATION: [facility] DATE: 11/7/2023 INDICATION: Confirm TF placement. COMPARISON: None.     IMPRESSION: Scattered gas and stool seen throughout nondistended large and small bowel. An enteric feeding tube courses below the diaphragm with the distal tip overlying the expected position of the gastric body.    XR Chest Port 1 View    Result Date: 11/6/2023  EXAM: XR CHEST PORT 1 VIEW LOCATION: [facility] DATE: 11/6/2023 INDICATION: back supine, et tube placement COMPARISON: 11/05/2023     IMPRESSION: Tracheostomy tube 3 cm from the ingrid. Enteric tube entering the stomach. Esophageal probe in the mid esophagus. Right central line tip in the low SVC/right atrial junction similar to previous. No significant change in the patchy infiltrates right lung laterally with elevation of the right hemidiaphragm. Left lung remains  clear and expanded.    XR Chest Port 1 View    Result Date: 2023  EXAM: XR CHEST PORT 1 VIEW LOCATION: [facility] DATE: 2023 INDICATION: ARDS, intubated. right lung opacification. eval for interval change COMPARISON: Chest radiograph 2023.     IMPRESSION: Stable size of cardiomediastinal silhouette. Endotracheal tube with tip 2.7 cm above the ingrid. Right upper extremity PICC with tip overlying the right atrium. Nasogastric tube courses below the diaphragm, tip beyond the field-of-view. New esophageal temperature probe noted. No significant change in volume loss and airspace opacification in the right lung. Possible small associated pleural effusion. Left lung is grossly clear. No pneumothorax. Bones are unchanged.    Echocardiogram Complete    Result Date: 2023  [id number]^[place] [address]  Name: MISSY ALMANZA MRN: [id number] : 1965 Study Date: 2023 11:09 AM Age: 58 yrs Gender: Male Patient Location: [facility] Reason For Study: Abn EKG Ordering Physician: FIDEL VELA Performed By: MB  BSA: 2.4 m2 Height: 72 in Weight: 266 lb HR: 65 BP: 105/56 mmHg ______________________________________________________________________________ Procedure Complete Echo Adult. Definity (NDC #90516-898) given intravenously. ______________________________________________________________________________ Interpretation Summary  1. Technically difficult study (useful imaging essentially could only be obtained from the subcostal view). 2. The left ventricle is normal in size. Image quality does not provide for detailed assessment of LV systolic function, but is felt to be normal with a visually estimated ejection fraction of roughly 55-60%. 3. No significant valvular heart disease is identified on this study though the sensitivity, particularly of regurgitant lesions, is reduced due to poor Doppler  acoustics. 4. On selected views, the right ventricle appears mildly enlarged with moderately reduced systolic performance (the right ventricle is only visualized from the subcostal view). 5. There is biatrial enlargement (difficult to quantify due to suboptimal acoustic imaging) 6. The IVC appears dilated with decreased phasic variation in caval diameter consistent with elevated right atrial pressure. ______________________________________________________________________________ Left ventricle: The left ventricle is normal in size. Image quality does not provide for detailed assessment of LV systolic function, but is felt to be normal with a visually estimated ejection fraction of roughly 55-60%. There is normal regional wall motion based on limited views available. Left ventricular wall thickness is normal.  Assessment of LV Diastolic Function: The cumulative findings are indeterminate in the evaluation of diastolic function.  Right ventricle: On selected views, the right ventricle appears mildly enlarged with moderately reduced systolic performance (the right ventricle is only visualized from the subcostal view).  Left atrium: The left atrium appears enlarged (difficult to quantify)  Right atrium: The right atrium appears enlarged (difficult to quantify).  IVC: The IVC appears dilated with decreased phasic variation in caval diameter consistent with elevated right atrial pressure.  Aortic valve: The aortic valve is not well visualized, but suspected to be comprised of three cusps. No significant aortic stenosis or aortic insufficiency is detected on this study.  Mitral valve: The mitral valve appears morphologically normal.  Tricuspid valve: The tricuspid valve is grossly morphologically normal.  Pulmonic valve: The pulmonic valve is not well visualized.  Thoracic aorta: The aortic root and proximal ascending aorta are of normal dimension.  Pericardium: There is no significant pericardial effusion.  ______________________________________________________________________________ ______________________________________________________________________________ MMode/2D Measurements & Calculations IVSd: 0.94 cm LVIDd: 5.0 cm LVIDs: 3.5 cm LVPWd: 0.84 cm FS: 30.1 % LV mass(C)d: 155.0 grams LV mass(C)dI: 64.5 grams/m2 LVOT diam: 2.3 cm LVOT area: 4.1 cm2 RWT: 0.34  Doppler Measurements & Calculations MV E max ryne: 56.1 cm/sec MV A max ryne: 53.1 cm/sec MV E/A: 1.1 MV dec slope: 164.8 cm/sec2 MV dec time: 0.34 sec Ao V2 max: 98.9 cm/sec Ao max P.0 mmHg Ao V2 mean: 64.2 cm/sec Ao mean P.9 mmHg Ao V2 VTI: 19.0 cm JACQUE(I,D): 4.2 cm2 JACQUE(V,D): 3.8 cm2 LV V1 max PG: 3.3 mmHg LV V1 max: 90.4 cm/sec LV V1 VTI: 19.3 cm SV(LVOT): 79.8 ml SI(LVOT): 33.2 ml/m2 AV Ryne Ratio (DI): 0.91  JACQUE Index (cm2/m2): 1.7 E/E' av.6 Lateral E/e': 5.2 Medial E/e': 7.9  ______________________________________________________________________________ Report approved by: Mark Lomeli 2023 12:00 PM       XR Chest Port 1 View    Result Date: 2023  EXAM: XR CHEST PORT 1 VIEW LOCATION: [facility] DATE: 2023 INDICATION: Endotracheal tube positioning COMPARISON: 2023 at 1200 hours and chest CTA 2023     IMPRESSION: Endotracheal tube is 3.5 cm above the ingrid. Orogastric tube tip is below diaphragm. Heart size magnified in AP projection. Unchanged near complete opacification of the right hemithorax do to airspace consolidation +/- small pleural effusion, with some sparing of the apex. Minimal left basilar subsegmental atelectasis. No pneumothorax.    XR Chest Port 1 View    Result Date: 2023  EXAM: XR CHEST PORT 1 VIEW LOCATION: [facility] DATE: 2023 INDICATION: PICC placement COMPARISON: Chest radiograph 2014 and CTA chest 2023.     IMPRESSION: Right PICC is in place with tip overlying the low SVC. Increased right lung opacities with  near complete opacification of the right hemithorax. Mild left upper lung opacities. No pneumothorax.    Abdomen US, limited (RUQ only)    Result Date: 11/2/2023  EXAM: US ABDOMEN LIMITED LOCATION: [facility] DATE: 11/2/2023 INDICATION: sepsis, cough, difficulty taking in big breath, poor appetite, elevating alk phos with recent hospitalization. COMPARISON: None. TECHNIQUE: Limited abdominal ultrasound. FINDINGS: GALLBLADDER: Normal. No gallstones, wall thickening, or pericholecystic fluid. Negative sonographic Aguilar's sign. BILE DUCTS: No biliary dilatation. The common duct measures 3 mm. LIVER: Normal parenchyma with smooth contour. No focal mass. RIGHT KIDNEY: No hydronephrosis. PANCREAS: The visualized portions are normal. No ascites.     IMPRESSION: Normal limited abdominal ultrasound.     CT Chest Pulmonary Embolism w Contrast    Result Date: 11/2/2023  EXAM: CT CHEST PULMONARY EMBOLISM W CONTRAST LOCATION: [facility] DATE: 11/2/2023 INDICATION: cough, hypoxia, recent hospitalization x 9 days  for CAP COMPARISON: CTA chest 10/23/2023 TECHNIQUE: CT chest pulmonary angiogram during arterial phase injection of IV contrast. Multiplanar reformats and MIP reconstructions were performed. Dose reduction techniques were used. CONTRAST: Isovue 370 100mL FINDINGS: ANGIOGRAM CHEST: Pulmonary arteries are normal caliber and negative for pulmonary emboli. Thoracic aorta is negative for dissection. No CT evidence of right heart strain. LUNGS AND PLEURA: Increased consolidation within the right upper, right middle, right lower, left upper, and left lower lobes. Atoll sign in the left upper lobe (7/46). No effusion or empyema. No pneumothorax. MEDIASTINUM/AXILLAE: Normal heart size. No pericardial effusion. Reactive mediastinal lymphadenopathy. CORONARY ARTERY CALCIFICATION: Mild. UPPER ABDOMEN: Hepatic steatosis. MUSCULOSKELETAL: No acute abnormality.     IMPRESSION: 1.  No  pulmonary embolism. 2.  Increased consolidation associated with the multifocal pneumonia (right greater than left lungs). Appearance is nonspecific but could be seen in the setting of atypical infection (including fungal organisms) or bacterial pneumonia. No empyema or other complication.    CT Chest Pulmonary Embolism w Contrast    Result Date: 10/23/2023  EXAM: CT CHEST PULMONARY EMBOLISM W CONTRAST LOCATION: [facility] DATE: 10/23/2023 INDICATION: Right back melanoma diagnosed April 2023. On immunotherapy. Two weeks of cough and morrison, finished augmentin 1 week ago for pneumonia COMPARISON: PET  CT 04/10/2023 TECHNIQUE: CT chest pulmonary angiogram during arterial phase injection of IV contrast. Multiplanar reformats and MIP reconstructions were performed. Dose reduction techniques were used. CONTRAST: Isovue 370 90mL FINDINGS: ANGIOGRAM CHEST: Adequate opacification of the pulmonary arteries and branches. No evidence of pulmonary emboli. Aorta and great vessels are normal.  LUNGS AND PLEURA: Asymmetric, right greater than left areas of consolidation with air bronchograms are noted. This involves most of the right upper, lower and middle lobes with smaller area of involvement of the left upper lobe and in the medial basilar segments of the left lower lobe. Consolidated lung enhances normally. No effusions. MEDIASTINUM/AXILLAE: Few less than 1 cm right paratracheal and subcarinal nodes are present. CORONARY ARTERY CALCIFICATION: Minimal UPPER ABDOMEN: No liver lesions. Fatty atrophy of the spleen. MUSCULOSKELETAL: No suspicious  lesions. Marked gynecomastia.     IMPRESSION: 1.  Asymmetric, right greater than left multilobar pneumonia with significant involvement of the right lower, middle and upper lobes. No parapneumonic effusions. 2.  Minimal reactive adenopathy. 3.  No evidence of pulmonary emboli. 4.  No evidence of metastasis.

## 2023-11-08 NOTE — PROGRESS NOTES
CRITICAL CARE PROGRESS NOTE:    Assessment/Plan:  58M wih hx of asthma, Melanoma on keytruda since May 2023, depression/anxiety, recent hospitalization for CAP and respiratory failure, readmitted for worsening cough and SOB. CT showed worsening of right-sided infiltrates thought to be 2/2 worsening pneumonia. In ICU on HFNC and requiring intermittent BiPAP. Failed NIPPV and required intubation on 11/4.     Overnight Events:  Able to titrate FiO2 down to 40% and PEEP to 5  Reduced sedation this am  Will attempt a weaning trial    RESP:  ARDS. Acute respiratory failure with hypoxemia requiring intubation/IMV on 11/4 PM. Worsening right-sided infiltrates could be c/w recurrent pneumonia (HAP vs. HCAP) vs. Organizing pneumonia vs. Malignant infiltrates vs. Pneumonitis from immunotherapy.   Vent Mode: CPAP/PS  (Continuous positive airway pressure with Pressure Support)  FiO2 (%): 40 %  Resp Rate (Set): 22 breaths/min  Tidal Volume (Set, mL): 470 mL  PEEP (cm H2O): 5 cmH2O  Pressure Support (cm H2O): 5 cmH2O  Resp: 16    Abx per ID section below  Bronchoscopy on 11/3  Continue 60mg IV methylpred q8h for possible drug-related pneumonitis.   Continue scheduled duonebs q8     CV:  Shock, likely vasoplegia from sedation. Requiring low dose NE. Bedside echo 11/4 with grossly intact LV function, probably normal diastolic function, IVC looked somewhat plump with reduced inspiratory variation. RV not well seen. No formal echo on file. More bradycardic today compared to yesterday; HR in high 40s - likely from propofol   Tele monitoring  MAP >65  Furosemide 20mg IV BID - on hold  Holding PTA statin for now    NEURO:  Requiring therapeutic sedation and NMB for severe ARDS.   Continue prop, fent for RASS goal -3  Stopped cis-atra paralytic 11/7  Tylenol prn pain  Midazolam 2mg IV q1h prn additional sedation  Midazolam gtt for sedation  Hold PTA mirtazapine and selective serotonin reuptake inhibitor while on fentanyl.     GI:  Dietary  consulted for tube feeds  IV PPI for stress ulcer ppx  Bowel regimen prn    RENAL: MIKKI Cr 1.88 <-- 2.07 <--- 1.79 <---1.05  Avoid nephrotoxins  Furosemide on hold due to increase in Cr  increased free water flushes to 150 11/7  Monitor BUN/Scr, lytes  Maintain norwood    ID:  Possible recurrent pneumonia, HAP vs. HCAP. Immunosuppressed; at risk for fungal infection. Multiple tests from BAL still pending.   ID following, appreciate input  Continue pip/tazo   Continue Micafungin  Finished course of Azithromycin 11/3-11/7  BAL cultures.    HEMATOLOGIC:  Hx of Melanoma, on immunotherapy. Followed by [facility]. Stable mild anemia.   Hgb >7  Follow counts    ENDOCRINE:  Hyperglycemia due to critical illness, steroids.    sliding scale q4    ICU PROPHYLAXIS:  LMWH daily  IV PPI  Chlorhexidine rinses    CODE STATUS, DISPOSITION, FAMILY COMMUNICATION: full code  Updated his sister Luz Maria 11/8.    Restraints  Progress Note  Restraint Application    I recognize that restraints are physical and/or chemical interventions intended to restrict a person's movements. Restraints are currently needed to ensure the safety of this patient and/or others. My clinical rationale appears below.    Category/Type of Restraint  Non Violent:  Soft limb restraint x 2  --  Behavior  Pulling at tubes/lines  --  Root Cause of the Behavior  Sedation/intubation  --  Less-Restrictive Measures that Failed  Non Violent Measures:  Close Observation  --  Response to the Restraint  Patient unable to pull at tubes/lines  --  Criteria for Release from the Restraint  Patient calm and off sedation     Clinically Significant Risk Factors         # Hypernatremia: Highest Na = 149 mmol/L in last 2 days, will monitor as appropriate      # Hypoalbuminemia: Lowest albumin = 2.7 g/dL at 11/3/2023  5:57 AM, will monitor as appropriate    # Acute Kidney Injury, unspecified: based on a >150% or 0.3 mg/dL increase in last creatinine compared to past 90 day average, will monitor  renal function         # Obesity: Estimated body mass index is 38.9 kg/m  as calculated from the following:    Height as of this encounter: 1.829 m (6').    Weight as of this encounter: 130.1 kg (286 lb 12.8 oz).        # Financial/Environmental Concerns: none  # Asthma: noted on problem list                Objective:  Physical Exam:  Vent settings for last 24 hours:  Vent Mode: CPAP/PS  (Continuous positive airway pressure with Pressure Support)  FiO2 (%): 40 %  Resp Rate (Set): 22 breaths/min  Tidal Volume (Set, mL): 470 mL  PEEP (cm H2O): 5 cmH2O  Pressure Support (cm H2O): 5 cmH2O  Resp: 16      /68   Pulse 70   Temp 97.2  F (36.2  C)   Resp 16   Ht 1.829 m (6')   Wt 130.1 kg (286 lb 12.8 oz)   SpO2 91%   BMI 38.90 kg/m      Intake/Output last 3 shifts:  I/O last 3 completed shifts:  In: 3599.07 [I.V.:1261.07; NG/GT:800]  Out: 1175 [Urine:1175]  Intake/Output this shift:  I/O this shift:  In: 42.19 [I.V.:42.19]  Out: -   Gen:  sedated.   HEENT:  et tube in place   CV: RRR  Resp: coarse mechanical breath sounds, no wheezing  Abd: soft, +BS  Neuro: sedated, but eyes are open, no meaningful mvmts  Ext: no edema    LAB:  Recent Labs   Lab 11/08/23  0515   WBC 13.4*   HGB 8.6*   HCT 27.8*   *     Recent Labs   Lab 11/07/23  0529 11/06/23  0343 11/05/23  0532 11/04/23  0421 11/03/23  0557 11/02/23  0845   *  147* 146* 141 139 140 136   CO2 24 24 23 25 25 24   BUN 37.5* 17.9 10.8 4.3* 5.1* 5.4*   ALKPHOS  --   --   --  293* 226* 275*   ALT  --   --   --  23 23 39   AST  --   --   --  21 14 34     Micro  PCT 0.11 on 11/2  Covid neg  Flu A/B neg  RSV neg  Sputum 1+ gram pos  MRSA neg  Blood NGTD  Legionella urine Ag neg  Strep pneumo urine Ag neg    Fungitell assay neg  Histo urine Ag negative  Fungal Ab panel negative    Bronch/BAL 11/3  Gram stain + for GPC, culture pending  KOH prep neg, fungal culture pending  Total nuc cells 4, 61% PMN, 31% lymphs  RVP neg  Legionella cx NGTD    No current  outpatient medications on file.       Critical care attestation: 45 minutes spent managing the following issues: acute respiratory failure requiring intubation/IMV; severe pneumonia vs. Drug related pneumonitis with acute respiratory failure, ARDS. Immunosuppressed patient.. High risk for organ deterioration and death requiring ICU level care.

## 2023-11-08 NOTE — PROGRESS NOTES
CLINICAL NUTRITION SERVICES - REASSESSMENT NOTE     Nutrition Prescription    RECOMMENDATIONS FOR MDs/PROVIDERS TO ORDER:  - Increased FWF to 265 to adjust for new TF formula that uses less volume  - Consider decreasing FWF pending improvement in renal labs with new TF formula, as pt is becoming increasingly edematous    Recommendations already ordered by Registered Dietitian (RD):  Prior to feeds starting:  -Obtain and verify enteral access.  -Patient is hemodynamically stable.    Once the above are met, recommend the following TF regimen as below:    1) Novasource Renal via OGT at 35 mL/hr x 24 hrs.  At goal rate, formula provides total volume of 840 mL, 2032 kcal (23 kcal/kg, 100% needs), 116 gm Pro (1.3 gm/kg, 100% needs), 153 gm CHO, 84 gm fat, 0 gm fiber, and 602 mL free water per DW of 88.3 kg.    2) Free water flushes of 265 mL q 4 hrs. Total fluid (free water + flushes) = 2192 mL per day.    3) HOB should be elevated at least 30-40 degrees when supine.    4) If plan is to prone, hold feeds x 1 hr prior to turning from supine to prone, and from prone to supine.    Future/Additional Recommendations:  Monitor TF tolerance, labs, Bms, hydration     EVALUATION OF THE PROGRESS TOWARD GOALS   Diet: NPO  Nutrition Support:   - Vital High Protein @ 65ml/hr,  holding 1hr before and after proning  - TF provides: 1560kcal (1942kcal), 135g protein (1.2g/kg), 173g CHO, 35g fat, 0g fiber, 1304ml water (1904ml with FWF of 100ml q 4hrs)   - FWF increased 100 --> 150 Q4 by MD on   - Hydration w/ new FWF: 2204 ml/day     NEW FINDINGS   Pt now in supine position x2 days    Labs:  Na: 149 (H) trending up x2 days  BUN: 37.5 (H) trending up  Cr: 1.88 (H) improving  Mag: 3.4 (H) trending up x2 days  B-175 (H) past 24 hrs    Meds:  Novolog  Lantus  Protonix  Propofol @7.3 mL/hr = 175 ml/day = 192 kcal/day  Miralax  Senna    GI:  LBM 11/4 - on senna and miralax, planning to try lactulose today as well per MD to encourage  BM    Skin:   Edema: 2+ Mild in BLE ankles, feet, 1+ generalized - edema increasing  I/O: +3.7 L since admit, trending up    Anthropometrics:  11/08/23  130.1 kg (286 lb 12.8 oz) Bed scale  11/04/23  120.9 kg (266 lb 9.6 oz) Bed scale  11/02/23  113.4 kg (250 lb)   Pt's wt is up since admission 2/2 fluid retention/edema    ASSESSED NUTRITION NEEDS   Dosing wt: 88.3 kg adjusted body wt based on driest admit wt  Estimated Energy Needs: 2783-9489 kcals/day (20-25 kcal/kg)  Justification: Obese/ Critical Care  Estimated Protein Needs: 105-132 grams protein/day (1.2-1.5 g/kg)  Justification: Obesity guidelines/ critical care   Estimated Fluid Needs: 3567-9660 mL/day (25 - 30 mL/kg IBW of 81kg)   Justification: Maintenance    Previous Goals   Tolerate Enteral nutrition - met  Meet estimated needs with enteral nutrition - met  Glycemic control  - met  Electrolytes wnl - not met, Mag and Na high  Maintain wt around 120kg - unable to assess 2/2 fluid wt    CURRENT NUTRITION DIAGNOSIS  Inadequate protein-energy intake related to mechanical ventilation as evidenced by need for TF and difficult to meet needs due to obesity, propofol, and proning      INTERVENTIONS  Implementation  Enteral Nutrition - Modify composition, concentration, rate, and volume:  Novasource renal @35 mL/hr + 2 pkt Prosource TF20  TF provides: 2032 kcal with propofol (23 kcal/kg), 116 g protein (1.3 g/kg), 153g CHO, 84g fat, 0g fiber, 602 ml water (2192 ml with FWF)   - FWF: 265 mL Q4    Goals  Tolerate Enteral nutrition   Meet estimated needs with enteral nutrition   Glycemic control   Electrolytes wnl    Monitoring/Evaluation  Progress toward goals will be monitored and evaluated per protocol.

## 2023-11-08 NOTE — PLAN OF CARE
Problem: Restraint for Non-Violent/Non-Self-Destructive Behavior  Goal: Prevent/Manage Potential Problems  Description: Maintain safety of patient and others during period of restraint.  Promote psychological and physical wellbeing.  Prevent injury to skin and involved body parts.  Promote nutrition, hydration, and elimination.  Outcome: Progressing     Problem: Mechanical Ventilation Invasive  Goal: Optimal Device Function  Intervention: Optimize Device Care and Function  Recent Flowsheet Documentation  Taken 11/8/2023 0400 by Shravan Moore RN  Airway Safety Measures:   all equipment/monitors on and audible   mask at bedside   suction at bedside  Oral Care: swabbed with antiseptic solution  Taken 11/8/2023 0000 by Shravan Moore RN  Airway Safety Measures:   all equipment/monitors on and audible   mask at bedside   suction at bedside  Taken 11/7/2023 2000 by Shravan Moore RN  Airway Safety Measures:   all equipment/monitors on and audible   mask at bedside   suction at bedside  Oral Care: swabbed with antiseptic solution   Goal Outcome Evaluation: Patient is redirectable but continues to need restraints to prevent accidental pulling of lines. Mechanical ventilator PEEP adjusted from 10 to 8 per RT. 0500 ABG drawn, plan for RT to adjust PEEP down to 5 and perform breathing trial today.

## 2023-11-08 NOTE — PLAN OF CARE
Insulin gtt, paralytic, and stopped this AM. Versed gtt initiated for agitation post-paralytic, has needed occasional boluses during cares as RAAS goal is -2.  Glucose stable. Tube feeds increased to 65mL/hr, goal met, tolerating feeds. Q4 Free water flushes increased to 150mL. Lytes stable. OG remains @ 60cm to the teeth. ETT 24 @ teeth. Family at bedside, care of plan discussed, questions answered.     ABG resulted, RR decreased to 22 per Dr. Arias, FiO2 weaned to 40%.

## 2023-11-08 NOTE — PLAN OF CARE
Pt extubated at 1000, now on HiFlow 50L/50%. Accommodating well per ABG. Continues to be anxious, dex gtt initiated. At times needing doses of PRN versed and zyprexa to control agitation. Oriented only to self, somewhat able to follow commands but not well enough to attempt bedside swallow eval. Remains clammy/diaphoretic, blood glucose stable. NSR/SB on tele, QT normal after prop gtt stopped. Family updated by Dr. Arias. PT/OT ordered for tomorrow.

## 2023-11-08 NOTE — PROGRESS NOTES
[facility] MEDICINE  PROGRESS NOTE     Code Status: Full Code       Identification/Summary:   Jim Titus is a 58 year old male with a PMH of multiple myeloma on Keytruda since May 2023, asthma, depression anxiety.  Recent hospitalization for pneumonia and respiratory failure.  11/2 readmitted for shortness of breath.  CT showed worsening right-sided infiltrates.  Admitted to the ICU on HFNC and BiPAP.  11/3 underwent bronchoscopy.  11/4 worsening respiratory status and intubated and started pressors due to ARDS/sepsis.  Receiving broad-spectrum antibiotics, steroids and aggressive hydration.  11/8 extubated and requiring now HFNC.  Confused.  Remains ICU status.     Assessment and Plan:    Acute hypoxic respiratory failure  ARDS  Multifocal pneumonia  Sepsis due to lung infection  History of asthma  Status post bronchoscopy 11/3/2023  Intubation 11/4/2023  Extubation 11/8/2023  Procalcitonin is mildly elevated, CRP is significantly elevated above 200.  Beta-D-glucan, urine histo ag, negative.   Keytruda pneumonitis less likely per ID.  11/3 bronchoscopy under done  11/4 intubated and receiving steroids for possible pneumonitis.  BAL cultures usual dustin.   MRSA culture negative.   11/5 hypotension requiring Levophed   Vent management per intensivist.   Continue duoneb q8h.  Mucomyst nebs discontinued.   Continue zosyn  Completed azithromycin.  Continue micafungin for 7 days.   Continue steroid.   11/8 extubated and transition to HFNC.  CXR pending.  Further management per intensivist.  Acute metabolic encephalopathy  After extubation patient quite confused and disoriented.  Hopefully things will clear as he removes further from his sedation.  Acute kidney injury  Hypernatremia  -Baseline creatinine around 0.8,   11/7 creatinine worsened to 2.07 and sodium to 149  Increased free water hydration per intensivist.  Holding Lasix.  11/8 creatinine improved to 1.88.  Follow daily  BMP.  Steroid induced hyperglycemia.  Continue correction insulin q4h. ICU protocol.  Anxiety/depression  -At home on Lexapro on mirtazapine  -Holding given respiratory status and status post intubation  Anemia likely dilutional  -No signs of active bleeding  -Hemoglobin stable around 9  Multiple myeloma  Receiving Keytruda as an outpatient managed by [facility].  We will consult their service to be notified of his current status.     Diet: NPO per Anesthesia Guidelines for Procedure/Surgery Except for: Meds  Adult Formula Drip Feeding: Continuous Vital High Protein; Orogastric tube; Goal Rate: 65; mL/hr; Hold 1hr before and after proning if fed gastrically; Do not advance tube feeding rate unless K+ is = or > 3.0, Mg++ is = or > 1.5, and Phos is = or ...    DVT Prophylaxis: Enoxaparin (Lovenox) SQ    COVID-19 PCR/influenza A/B/RSV negative from 11/2/2023    Fluids: Saline lock  Pain meds: N/A  Therapy: N/A  Lamas:PRESENT, indication: Strict 1-2 Hour I&O;Deep Sedation/Paralysis  Lines: PRESENT      PICC 11/04/23 Double Lumen Right Basilic medication drips-Site Assessment: WDL  Arterial Line 11/04/23 Other (Comment)-Site Assessment: WDL      Current Diet  None    Supplements  None    Barriers to Discharge: ICU status, HFNC, encephalopathy    Disposition: To be determined    Clinically Significant Risk Factors         # Hypernatremia: Highest Na = 149 mmol/L in last 2 days, will monitor as appropriate      # Hypoalbuminemia: Lowest albumin = 2.7 g/dL at 11/3/2023  5:57 AM, will monitor as appropriate    # Acute Kidney Injury, unspecified: based on a >150% or 0.3 mg/dL increase in last creatinine compared to past 90 day average, will monitor renal function         # Obesity: Estimated body mass index is 38.9 kg/m  as calculated from the following:    Height as of this encounter: 1.829 m (6').    Weight as of this encounter: 130.1 kg (286 lb 12.8 oz).      # Financial/Environmental Concerns: none  # Asthma:  noted on problem list        Interval History/Subjective:  Patient successfully extubated this morning and then transitioned over to high flow nasal cannular oxygen.  He is quite confused.  Cannot give current date or his month of his birth.  Difficult time answering questions.  No family present at this time.        Last 24H PRN:     midazolam (VERSED) injection 2 mg, 2 mg at 11/08/23 0929    polyethylene glycol (MIRALAX) Packet 17 g, 17 g at 11/08/23 0800    senna-docusate (SENOKOT-S/PERICOLACE) 8.6-50 MG per tablet 1 tablet **OR** sennosides (SENOKOT) syrup 5 mL, 5 mL at 11/08/23 0800    Physical Exam/Objective:  Temp:  [96.3  F (35.7  C)-97.5  F (36.4  C)] 97.5  F (36.4  C)  Pulse:  [41-74] 61  Resp:  [3-42] 20  FiO2 (%):  [40 %-50 %] 50 %  SpO2:  [88 %-100 %] 93 %  Wt Readings from Last 4 Encounters:   11/08/23 130.1 kg (286 lb 12.8 oz)   10/23/23 117.9 kg (260 lb)   10/23/23 118.1 kg (260 lb 6.4 oz)   08/16/23 128.6 kg (283 lb 9.6 oz)     Body mass index is 38.9 kg/m .    Constitutional: awake, alert, cooperative to simple instructions, no apparent distress, appears stated age, and mildly obese  ENT: Normocephalic, without obvious abnormality, atraumatic, external ears without lesions, oral pharynx with moist mucous membranes, tonsils without erythema or exudates, gums normal and good dentition.  Respiratory: Poor air movement and substantially decreased breath sounds at the right side but no rhonchi rales nor wheezing.  Cardiovascular: Normal apical impulse, regular rate and rhythm, normal S1 and S2, no S3 or S4, and no murmur noted  GI: No scars, normal bowel sounds, soft, non-distended, non-tender, no masses palpated, no hepatosplenomegally  Skin: normal skin color, texture, turgor, no redness, warmth, or swelling, and no rashes  Musculoskeletal: There is no redness, warmth, or swelling of the joints.  Motor strength is 5 out of 5 all extremities bilaterally.  Tone is normal.  Trace lower extremity edema  bilaterally  Neurologic: Cranial nerves II-XII are grossly intact. Sensory:  Sensory intact  Neuropsychiatric: General: normal, calm, and poor eye contact Level of consciousness: drowsy Affect: flat Orientation: oriented only to self Memory and insight: impaired:       Medications:   Personally Reviewed.  Medications    dexmedeTOMIDine 0.2 mcg/kg/hr (11/08/23 0930)    dextrose      insulin regular Stopped (11/07/23 0850)    norepinephrine Stopped (11/07/23 0715)      chlorhexidine  15 mL Mouth/Throat Q12H    clonazePAM  1 mg Oral BID    enoxaparin ANTICOAGULANT  40 mg Subcutaneous Q24H    [Held by provider] escitalopram  20 mg Oral Daily with lunch    [Held by provider] fluticasone  1 puff Inhalation BID    [Held by provider] furosemide  40 mg Intravenous Q12H    insulin aspart  1-6 Units Subcutaneous Q4H    insulin glargine  15 Units Subcutaneous BID    ipratropium - albuterol 0.5 mg/2.5 mg/3 mL  3 mL Nebulization Q8H    methylPREDNISolone  62.5 mg Intravenous Q8H    micafungin  100 mg Intravenous Q24H    [Held by provider] mirtazapine  15 mg Oral At Bedtime    pantoprazole  40 mg Per Feeding Tube QAM AC    Or    pantoprazole  40 mg Intravenous QAM AC    piperacillin-tazobactam  3.375 g Intravenous Q8H    sodium chloride (PF)  10-40 mL Intracatheter Q7 Days    sodium chloride (PF)  3 mL Intracatheter Q8H    sodium chloride (PF)  3 mL Intracatheter Q8H       Data reviewed today: I personally reviewed all new medications, labs, imaging/diagnostics reports over the past 24 hours. Pertinent findings include:    Imaging:   No results found for this or any previous visit (from the past 24 hour(s)).    Labs:  XR Abdomen Port 1 View   Final Result   IMPRESSION: Scattered gas and stool seen throughout nondistended large and small bowel. An enteric feeding tube courses below the diaphragm with the distal tip overlying the expected position of the gastric body.      XR Chest Port 1 View   Final Result   IMPRESSION:  Tracheostomy tube 3 cm from the ingrid. Enteric tube entering the stomach. Esophageal probe in the mid esophagus. Right central line tip in the low SVC/right atrial junction similar to previous.      No significant change in the patchy infiltrates right lung laterally with elevation of the right hemidiaphragm. Left lung remains clear and expanded.      Echocardiogram Complete   Final Result      XR Chest Port 1 View   Final Result   IMPRESSION: Stable size of cardiomediastinal silhouette. Endotracheal tube with tip 2.7 cm above the ingrid. Right upper extremity PICC with tip overlying the right atrium. Nasogastric tube courses below the diaphragm, tip beyond the field-of-view. New    esophageal temperature probe noted. No significant change in volume loss and airspace opacification in the right lung. Possible small associated pleural effusion. Left lung is grossly clear. No pneumothorax. Bones are unchanged.      XR Chest Port 1 View   Final Result   IMPRESSION: Endotracheal tube is 3.5 cm above the ingrid. Orogastric tube tip is below diaphragm. Heart size magnified in AP projection. Unchanged near complete opacification of the right hemithorax do to airspace consolidation +/- small pleural    effusion, with some sparing of the apex. Minimal left basilar subsegmental atelectasis. No pneumothorax.      XR Chest Port 1 View   Final Result   IMPRESSION: Right PICC is in place with tip overlying the low SVC. Increased right lung opacities with near complete opacification of the right hemithorax. Mild left upper lung opacities. No pneumothorax.      Abdomen US, limited (RUQ only)   Final Result   IMPRESSION:   Normal limited abdominal ultrasound.            CT Chest Pulmonary Embolism w Contrast   Final Result   IMPRESSION:   1.  No pulmonary embolism.    2.  Increased consolidation associated with the multifocal pneumonia (right greater than left lungs). Appearance is nonspecific but could be seen in the setting of  atypical infection (including fungal organisms) or bacterial pneumonia. No empyema or    other complication.        Recent Results (from the past 24 hour(s))   Glucose by meter    Collection Time: 11/07/23 12:21 PM   Result Value Ref Range    GLUCOSE BY METER POCT 157 (H) 70 - 99 mg/dL   Glucose by meter    Collection Time: 11/07/23  4:19 PM   Result Value Ref Range    GLUCOSE BY METER POCT 175 (H) 70 - 99 mg/dL   Blood gas arterial    Collection Time: 11/07/23  6:09 PM   Result Value Ref Range    pH Arterial 7.47 (H) 7.37 - 7.44    pCO2 Arterial 37 35 - 45 mm Hg    pO2 Arterial 92 (H) 80 - 90 mm Hg    Bicarbonate Arterial 27 23 - 29 mmol/L    O2 Sat, Arterial 98.3 (H) 96.0 - 97.0 %    Oxyhemoglobin 96.5 96.0 - 97.0 %    Base Excess/Deficit 3.3   mmol/L    Sample Stabilized Temperature 37.0 degrees C   Glucose by meter    Collection Time: 11/07/23  6:24 PM   Result Value Ref Range    GLUCOSE BY METER POCT 157 (H) 70 - 99 mg/dL   Glucose by meter    Collection Time: 11/07/23  8:49 PM   Result Value Ref Range    GLUCOSE BY METER POCT 152 (H) 70 - 99 mg/dL   Glucose by meter    Collection Time: 11/08/23 12:15 AM   Result Value Ref Range    GLUCOSE BY METER POCT 163 (H) 70 - 99 mg/dL   Glucose by meter    Collection Time: 11/08/23  4:17 AM   Result Value Ref Range    GLUCOSE BY METER POCT 172 (H) 70 - 99 mg/dL   Potassium    Collection Time: 11/08/23  5:15 AM   Result Value Ref Range    Potassium 4.0 3.4 - 5.3 mmol/L   Phosphorus    Collection Time: 11/08/23  5:15 AM   Result Value Ref Range    Phosphorus 4.0 2.5 - 4.5 mg/dL   Magnesium    Collection Time: 11/08/23  5:15 AM   Result Value Ref Range    Magnesium 3.4 (H) 1.7 - 2.3 mg/dL   Creatinine    Collection Time: 11/08/23  5:15 AM   Result Value Ref Range    Creatinine 1.88 (H) 0.67 - 1.17 mg/dL    GFR Estimate 41 (L) >60 mL/min/1.73m2   CBC with platelets    Collection Time: 11/08/23  5:15 AM   Result Value Ref Range    WBC Count 13.4 (H) 4.0 - 11.0 10e3/uL    RBC  Count 2.99 (L) 4.40 - 5.90 10e6/uL    Hemoglobin 8.6 (L) 13.3 - 17.7 g/dL    Hematocrit 27.8 (L) 40.0 - 53.0 %    MCV 93 78 - 100 fL    MCH 28.8 26.5 - 33.0 pg    MCHC 30.9 (L) 31.5 - 36.5 g/dL    RDW 15.5 (H) 10.0 - 15.0 %    Platelet Count 546 (H) 150 - 450 10e3/uL   Blood gas arterial    Collection Time: 11/08/23  5:16 AM   Result Value Ref Range    pH Arterial 7.39 7.37 - 7.44    pCO2 Arterial 45 35 - 45 mm Hg    pO2 Arterial 80 80 - 90 mm Hg    Bicarbonate Arterial 27 23 - 29 mmol/L    O2 Sat, Arterial 96.1 96.0 - 97.0 %    Oxyhemoglobin 94.3 (L) 96.0 - 97.0 %    Base Excess/Deficit 2.4   mmol/L    Sample Stabilized Temperature 37.0 degrees C   Glucose by meter    Collection Time: 11/08/23  7:47 AM   Result Value Ref Range    GLUCOSE BY METER POCT 148 (H) 70 - 99 mg/dL   Blood gas arterial    Collection Time: 11/08/23 11:17 AM   Result Value Ref Range    pH Arterial 7.44 7.37 - 7.44    pCO2 Arterial 39 35 - 45 mm Hg    pO2 Arterial 74 (L) 80 - 90 mm Hg    Bicarbonate Arterial 26 23 - 29 mmol/L    O2 Sat, Arterial 95.1 (L) 96.0 - 97.0 %    Oxyhemoglobin 94.1 (L) 96.0 - 97.0 %    Base Excess/Deficit 2.3   mmol/L    Sample Stabilized Temperature 37.0 degrees C       Pending Labs:  Unresulted Labs Ordered in the Past 30 Days of this Admission       Date and Time Order Name Status Description    11/6/2023  9:33 PM Fungal or Yeast Culture Routine In process     11/4/2023  8:06 AM Blood Culture Hand, Right Preliminary     11/4/2023  8:06 AM Blood Culture Hand, Right Preliminary     11/3/2023 10:46 AM Nocardia culture - BAL Site 1 Preliminary     11/3/2023 10:46 AM Legionella culture - BAL Site 1 Preliminary     11/3/2023 10:46 AM Fungus Culture, non-blood - BAL Site 1 Preliminary     11/3/2023 10:46 AM Acid-Fast Bacilli Culture and Stain with AFB Stain In process     11/3/2023 10:46 AM Cytology non gyn - BAL Sites In process               Reynaldo Emerson MD  [facility]  [facility]  Phone: #[phone]

## 2023-11-08 NOTE — PROGRESS NOTES
Care Management Follow Up    Length of Stay (days): 6    Expected Discharge Date: 11/11/2023     Concerns to be Addressed: no discharge needs identified, denies needs/concerns at this time     Patient plan of care discussed at interdisciplinary rounds: Yes    Anticipated Discharge Disposition: Home     Anticipated Discharge Services: None  Anticipated Discharge DME: None    Patient/family educated on Medicare website which has current facility and service quality ratings: no  Education Provided on the Discharge Plan: No  Patient/Family in Agreement with the Plan: unable to assess    Referrals Placed by CM/SW:  (Not currently indicated)  Private pay costs discussed: Not applicable    Additional Information:  SW reviewed chart and discussed updates with charge RN.  Pt extubated 11/8.  CM following care progression to assist with safe discharge planning and follow up on team recommendations.      JENNIFER Heredia

## 2023-11-08 NOTE — PROGRESS NOTES
Pt is currently on 50%/50L HFNC.  Sating 88-95%, HR 44-70, RR 16-24, BBS- Clear/Diminished.  ABG on HFNC: 7.44, 39, 74, 26.  Duoneb Q8.  RT will continue to monitor.

## 2023-11-08 NOTE — PLAN OF CARE
Problem: Enteral Nutrition  Goal: Absence of Aspiration Signs and Symptoms  Outcome: Unable to Meet  Goal: Safe, Effective Therapy Delivery  Outcome: Unable to Meet  Goal: Feeding Tolerance  Outcome: Unable to Meet   Goal Outcome Evaluation:  Pt now extubated and pulled out his FT. Discontinued TF and FWF orders.

## 2023-11-08 NOTE — PROGRESS NOTES
Peep was titrated down to 8 per ARDS protocol and MD order. RN notified.  Will follow up at 0530 following AM ABG for next wean. Patient tolerating well.

## 2023-11-08 NOTE — PROGRESS NOTES
Peep decreased to 5 per ARDS protocol/MD order and post ABG results PaO2 of 80 this AM. Pt remains on 40% rate of 22 and Tidal Volume 470.

## 2023-11-08 NOTE — PROGRESS NOTES
Patient received on rate 22/470/+10/40% O2. ETT 7.5/24 cm at the teeth. Bite block in place. Patient has remained stable throughout the night. Scant secretions from the ETT, small white orally. Breath sounds clear bilaterally. Nebulizer given with Duoneb via inline with vent. Peep was titrated to 8 per MD order following ARDS protocol at 0130 11/8. ABG drawn in the AM 0500 Peep was titrated to 5 following results due to PaO2 of 80 on ABG FIO2 remains at 40%. Patient currently SAT 93%, tolerating wean well. Will continue to follow as needed.

## 2023-11-09 ENCOUNTER — APPOINTMENT (OUTPATIENT)
Dept: PHYSICAL THERAPY | Facility: CLINIC | Age: 58
End: 2023-11-09
Attending: INTERNAL MEDICINE
Payer: COMMERCIAL

## 2023-11-09 ENCOUNTER — APPOINTMENT (OUTPATIENT)
Dept: RADIOLOGY | Facility: CLINIC | Age: 58
End: 2023-11-09
Attending: STUDENT IN AN ORGANIZED HEALTH CARE EDUCATION/TRAINING PROGRAM
Payer: COMMERCIAL

## 2023-11-09 LAB
ANION GAP SERPL CALCULATED.3IONS-SCNC: 11 MMOL/L (ref 7–15)
BACTERIA BLD CULT: NO GROWTH
BACTERIA BLD CULT: NO GROWTH
BASE EXCESS BLDA CALC-SCNC: 1 MMOL/L
BASE EXCESS BLDA CALC-SCNC: 2.1 MMOL/L
BASE EXCESS BLDA CALC-SCNC: 2.8 MMOL/L
BUN SERPL-MCNC: 58.8 MG/DL (ref 6–20)
CALCIUM SERPL-MCNC: 8.6 MG/DL (ref 8.6–10)
CHLORIDE SERPL-SCNC: 116 MMOL/L (ref 98–107)
COHGB MFR BLD: 94 % (ref 96–97)
COHGB MFR BLD: 94.3 % (ref 96–97)
COHGB MFR BLD: 97.4 % (ref 96–97)
CREAT SERPL-MCNC: 1.47 MG/DL (ref 0.67–1.17)
DEPRECATED HCO3 PLAS-SCNC: 23 MMOL/L (ref 22–29)
EGFRCR SERPLBLD CKD-EPI 2021: 55 ML/MIN/1.73M2
ERYTHROCYTE [DISTWIDTH] IN BLOOD BY AUTOMATED COUNT: 15.2 % (ref 10–15)
GLUCOSE BLDC GLUCOMTR-MCNC: 108 MG/DL (ref 70–99)
GLUCOSE BLDC GLUCOMTR-MCNC: 122 MG/DL (ref 70–99)
GLUCOSE BLDC GLUCOMTR-MCNC: 134 MG/DL (ref 70–99)
GLUCOSE BLDC GLUCOMTR-MCNC: 146 MG/DL (ref 70–99)
GLUCOSE BLDC GLUCOMTR-MCNC: 147 MG/DL (ref 70–99)
GLUCOSE BLDC GLUCOMTR-MCNC: 156 MG/DL (ref 70–99)
GLUCOSE BLDC GLUCOMTR-MCNC: 171 MG/DL (ref 70–99)
GLUCOSE SERPL-MCNC: 147 MG/DL (ref 70–99)
HCO3 BLD-SCNC: 25 MMOL/L (ref 23–29)
HCO3 BLD-SCNC: 26 MMOL/L (ref 23–29)
HCO3 BLD-SCNC: 26 MMOL/L (ref 23–29)
HCT VFR BLD AUTO: 29 % (ref 40–53)
HGB BLD-MCNC: 8.9 G/DL (ref 13.3–17.7)
MAGNESIUM SERPL-MCNC: 2.7 MG/DL (ref 1.7–2.3)
MCH RBC QN AUTO: 28.3 PG (ref 26.5–33)
MCHC RBC AUTO-ENTMCNC: 30.7 G/DL (ref 31.5–36.5)
MCV RBC AUTO: 92 FL (ref 78–100)
OXYHGB MFR BLD: 92.2 % (ref 96–97)
OXYHGB MFR BLD: 93.1 % (ref 96–97)
OXYHGB MFR BLD: 96.3 % (ref 96–97)
PCO2 BLD: 33 MM HG (ref 35–45)
PCO2 BLD: 38 MM HG (ref 35–45)
PCO2 BLD: 38 MM HG (ref 35–45)
PH BLD: 7.43 [PH] (ref 7.37–7.44)
PH BLD: 7.45 [PH] (ref 7.37–7.44)
PH BLD: 7.5 [PH] (ref 7.37–7.44)
PHOSPHATE SERPL-MCNC: 3.6 MG/DL (ref 2.5–4.5)
PLATELET # BLD AUTO: 536 10E3/UL (ref 150–450)
PO2 BLD: 68 MM HG (ref 80–90)
PO2 BLD: 73 MM HG (ref 80–90)
PO2 BLD: 85 MM HG (ref 80–90)
POTASSIUM SERPL-SCNC: 3.8 MMOL/L (ref 3.4–5.3)
RBC # BLD AUTO: 3.14 10E6/UL (ref 4.4–5.9)
SODIUM SERPL-SCNC: 150 MMOL/L (ref 135–145)
T4 FREE SERPL-MCNC: 0.9 NG/DL (ref 0.9–1.7)
TEMPERATURE: 37 DEGREES C
TSH SERPL DL<=0.005 MIU/L-ACNC: 0.13 UIU/ML (ref 0.3–4.2)
WBC # BLD AUTO: 15.8 10E3/UL (ref 4–11)

## 2023-11-09 PROCEDURE — 250N000009 HC RX 250: Performed by: INTERNAL MEDICINE

## 2023-11-09 PROCEDURE — 258N000003 HC RX IP 258 OP 636: Performed by: INTERNAL MEDICINE

## 2023-11-09 PROCEDURE — 250N000011 HC RX IP 250 OP 636: Mod: JZ | Performed by: STUDENT IN AN ORGANIZED HEALTH CARE EDUCATION/TRAINING PROGRAM

## 2023-11-09 PROCEDURE — 99418 PROLNG IP/OBS E/M EA 15 MIN: CPT | Performed by: STUDENT IN AN ORGANIZED HEALTH CARE EDUCATION/TRAINING PROGRAM

## 2023-11-09 PROCEDURE — 250N000011 HC RX IP 250 OP 636: Mod: JZ | Performed by: INTERNAL MEDICINE

## 2023-11-09 PROCEDURE — 97161 PT EVAL LOW COMPLEX 20 MIN: CPT | Mod: GP

## 2023-11-09 PROCEDURE — 999N000157 HC STATISTIC RCP TIME EA 10 MIN

## 2023-11-09 PROCEDURE — 250N000013 HC RX MED GY IP 250 OP 250 PS 637: Performed by: INTERNAL MEDICINE

## 2023-11-09 PROCEDURE — 85027 COMPLETE CBC AUTOMATED: CPT

## 2023-11-09 PROCEDURE — 71045 X-RAY EXAM CHEST 1 VIEW: CPT

## 2023-11-09 PROCEDURE — 94640 AIRWAY INHALATION TREATMENT: CPT | Mod: 76

## 2023-11-09 PROCEDURE — 97530 THERAPEUTIC ACTIVITIES: CPT | Mod: GP

## 2023-11-09 PROCEDURE — 272N000054 HC CANNULA HIGH FLOW, ADULT

## 2023-11-09 PROCEDURE — 999N000156 HC STATISTIC RCP CONSULT EA 30 MIN

## 2023-11-09 PROCEDURE — 83735 ASSAY OF MAGNESIUM: CPT | Performed by: STUDENT IN AN ORGANIZED HEALTH CARE EDUCATION/TRAINING PROGRAM

## 2023-11-09 PROCEDURE — 94640 AIRWAY INHALATION TREATMENT: CPT

## 2023-11-09 PROCEDURE — 83880 ASSAY OF NATRIURETIC PEPTIDE: CPT | Performed by: STUDENT IN AN ORGANIZED HEALTH CARE EDUCATION/TRAINING PROGRAM

## 2023-11-09 PROCEDURE — 84439 ASSAY OF FREE THYROXINE: CPT | Performed by: NURSE PRACTITIONER

## 2023-11-09 PROCEDURE — 250N000011 HC RX IP 250 OP 636: Performed by: INTERNAL MEDICINE

## 2023-11-09 PROCEDURE — 99291 CRITICAL CARE FIRST HOUR: CPT | Performed by: INTERNAL MEDICINE

## 2023-11-09 PROCEDURE — 84481 FREE ASSAY (FT-3): CPT | Performed by: NURSE PRACTITIONER

## 2023-11-09 PROCEDURE — 84100 ASSAY OF PHOSPHORUS: CPT | Performed by: INTERNAL MEDICINE

## 2023-11-09 PROCEDURE — 999N000215 HC STATISTIC HFNC ADULT NON-CPAP

## 2023-11-09 PROCEDURE — 82805 BLOOD GASES W/O2 SATURATION: CPT | Performed by: INTERNAL MEDICINE

## 2023-11-09 PROCEDURE — 84443 ASSAY THYROID STIM HORMONE: CPT | Performed by: NURSE PRACTITIONER

## 2023-11-09 PROCEDURE — 99233 SBSQ HOSP IP/OBS HIGH 50: CPT | Performed by: STUDENT IN AN ORGANIZED HEALTH CARE EDUCATION/TRAINING PROGRAM

## 2023-11-09 PROCEDURE — 99232 SBSQ HOSP IP/OBS MODERATE 35: CPT | Performed by: INTERNAL MEDICINE

## 2023-11-09 PROCEDURE — 200N000001 HC R&B ICU

## 2023-11-09 PROCEDURE — 250N000013 HC RX MED GY IP 250 OP 250 PS 637: Performed by: STUDENT IN AN ORGANIZED HEALTH CARE EDUCATION/TRAINING PROGRAM

## 2023-11-09 PROCEDURE — 36600 WITHDRAWAL OF ARTERIAL BLOOD: CPT

## 2023-11-09 PROCEDURE — 94660 CPAP INITIATION&MGMT: CPT

## 2023-11-09 PROCEDURE — 250N000011 HC RX IP 250 OP 636: Mod: JZ | Performed by: FAMILY MEDICINE

## 2023-11-09 PROCEDURE — C9113 INJ PANTOPRAZOLE SODIUM, VIA: HCPCS | Mod: JZ | Performed by: INTERNAL MEDICINE

## 2023-11-09 PROCEDURE — 97110 THERAPEUTIC EXERCISES: CPT | Mod: GP

## 2023-11-09 PROCEDURE — 82310 ASSAY OF CALCIUM: CPT | Performed by: INTERNAL MEDICINE

## 2023-11-09 RX ORDER — HALOPERIDOL 5 MG/ML
5 INJECTION INTRAMUSCULAR EVERY 6 HOURS PRN
Status: DISCONTINUED | OUTPATIENT
Start: 2023-11-09 | End: 2023-11-11

## 2023-11-09 RX ORDER — POTASSIUM CHLORIDE 29.8 MG/ML
20 INJECTION INTRAVENOUS ONCE
Status: COMPLETED | OUTPATIENT
Start: 2023-11-09 | End: 2023-11-09

## 2023-11-09 RX ORDER — FUROSEMIDE 10 MG/ML
40 INJECTION INTRAMUSCULAR; INTRAVENOUS EVERY 24 HOURS
Status: DISCONTINUED | OUTPATIENT
Start: 2023-11-09 | End: 2023-11-09

## 2023-11-09 RX ORDER — HALOPERIDOL 5 MG/ML
5 INJECTION INTRAMUSCULAR ONCE
Status: COMPLETED | OUTPATIENT
Start: 2023-11-09 | End: 2023-11-09

## 2023-11-09 RX ORDER — OLANZAPINE 10 MG/1
5 INJECTION, POWDER, LYOPHILIZED, FOR SOLUTION INTRAMUSCULAR 2 TIMES DAILY PRN
Status: DISCONTINUED | OUTPATIENT
Start: 2023-11-09 | End: 2023-11-12

## 2023-11-09 RX ORDER — HYDROXYZINE HYDROCHLORIDE 25 MG/1
50 TABLET, FILM COATED ORAL EVERY 6 HOURS PRN
Status: DISCONTINUED | OUTPATIENT
Start: 2023-11-09 | End: 2023-11-11

## 2023-11-09 RX ORDER — HYDRALAZINE HYDROCHLORIDE 20 MG/ML
10 INJECTION INTRAMUSCULAR; INTRAVENOUS EVERY 6 HOURS PRN
Status: DISCONTINUED | OUTPATIENT
Start: 2023-11-09 | End: 2023-11-11

## 2023-11-09 RX ORDER — HYDROXYZINE HYDROCHLORIDE 25 MG/1
25 TABLET, FILM COATED ORAL EVERY 6 HOURS PRN
Status: DISCONTINUED | OUTPATIENT
Start: 2023-11-09 | End: 2023-11-11

## 2023-11-09 RX ORDER — FUROSEMIDE 10 MG/ML
40 INJECTION INTRAMUSCULAR; INTRAVENOUS EVERY 24 HOURS
Status: DISCONTINUED | OUTPATIENT
Start: 2023-11-09 | End: 2023-11-10

## 2023-11-09 RX ORDER — FENTANYL CITRATE 50 UG/ML
25 INJECTION, SOLUTION INTRAMUSCULAR; INTRAVENOUS
Status: DISCONTINUED | OUTPATIENT
Start: 2023-11-09 | End: 2023-11-11

## 2023-11-09 RX ADMIN — METHYLPREDNISOLONE SODIUM SUCCINATE 62.5 MG: 125 INJECTION, POWDER, FOR SOLUTION INTRAMUSCULAR; INTRAVENOUS at 02:24

## 2023-11-09 RX ADMIN — INSULIN ASPART 1 UNITS: 100 INJECTION, SOLUTION INTRAVENOUS; SUBCUTANEOUS at 07:48

## 2023-11-09 RX ADMIN — INSULIN ASPART 1 UNITS: 100 INJECTION, SOLUTION INTRAVENOUS; SUBCUTANEOUS at 16:22

## 2023-11-09 RX ADMIN — CLONAZEPAM 1 MG: 0.5 TABLET ORAL at 14:24

## 2023-11-09 RX ADMIN — ENOXAPARIN SODIUM 40 MG: 100 INJECTION SUBCUTANEOUS at 19:58

## 2023-11-09 RX ADMIN — IPRATROPIUM BROMIDE AND ALBUTEROL SULFATE 3 ML: .5; 3 SOLUTION RESPIRATORY (INHALATION) at 00:34

## 2023-11-09 RX ADMIN — MIDAZOLAM 2 MG: 1 INJECTION INTRAMUSCULAR; INTRAVENOUS at 22:05

## 2023-11-09 RX ADMIN — METHYLPREDNISOLONE SODIUM SUCCINATE 62.5 MG: 125 INJECTION, POWDER, FOR SOLUTION INTRAMUSCULAR; INTRAVENOUS at 10:03

## 2023-11-09 RX ADMIN — DEXMEDETOMIDINE HYDROCHLORIDE 0.6 MCG/KG/HR: 4 INJECTION, SOLUTION INTRAVENOUS at 21:59

## 2023-11-09 RX ADMIN — PIPERACILLIN AND TAZOBACTAM 3.38 G: 3; .375 INJECTION, POWDER, LYOPHILIZED, FOR SOLUTION INTRAVENOUS at 19:58

## 2023-11-09 RX ADMIN — DEXMEDETOMIDINE HYDROCHLORIDE 0.4 MCG/KG/HR: 4 INJECTION, SOLUTION INTRAVENOUS at 17:18

## 2023-11-09 RX ADMIN — INSULIN ASPART 1 UNITS: 100 INJECTION, SOLUTION INTRAVENOUS; SUBCUTANEOUS at 12:31

## 2023-11-09 RX ADMIN — FUROSEMIDE 40 MG: 10 INJECTION, SOLUTION INTRAMUSCULAR; INTRAVENOUS at 10:07

## 2023-11-09 RX ADMIN — ESCITALOPRAM OXALATE 20 MG: 20 TABLET ORAL at 14:23

## 2023-11-09 RX ADMIN — METHYLPREDNISOLONE SODIUM SUCCINATE 62.5 MG: 125 INJECTION, POWDER, FOR SOLUTION INTRAMUSCULAR; INTRAVENOUS at 18:46

## 2023-11-09 RX ADMIN — POTASSIUM CHLORIDE 20 MEQ: 29.8 INJECTION, SOLUTION INTRAVENOUS at 05:56

## 2023-11-09 RX ADMIN — IPRATROPIUM BROMIDE AND ALBUTEROL SULFATE 3 ML: .5; 3 SOLUTION RESPIRATORY (INHALATION) at 14:16

## 2023-11-09 RX ADMIN — HALOPERIDOL LACTATE 5 MG: 5 INJECTION, SOLUTION INTRAMUSCULAR at 23:27

## 2023-11-09 RX ADMIN — MIDAZOLAM 2 MG: 1 INJECTION INTRAMUSCULAR; INTRAVENOUS at 23:11

## 2023-11-09 RX ADMIN — PANTOPRAZOLE SODIUM 40 MG: 40 INJECTION, POWDER, FOR SOLUTION INTRAVENOUS at 05:56

## 2023-11-09 RX ADMIN — PIPERACILLIN AND TAZOBACTAM 3.38 G: 3; .375 INJECTION, POWDER, LYOPHILIZED, FOR SOLUTION INTRAVENOUS at 04:58

## 2023-11-09 RX ADMIN — LACTULOSE 200 G: 10 SOLUTION ORAL at 12:09

## 2023-11-09 RX ADMIN — DEXMEDETOMIDINE HYDROCHLORIDE 0.9 MCG/KG/HR: 4 INJECTION, SOLUTION INTRAVENOUS at 09:09

## 2023-11-09 RX ADMIN — PIPERACILLIN AND TAZOBACTAM 3.38 G: 3; .375 INJECTION, POWDER, LYOPHILIZED, FOR SOLUTION INTRAVENOUS at 12:32

## 2023-11-09 RX ADMIN — DEXMEDETOMIDINE HYDROCHLORIDE 0.9 MCG/KG/HR: 4 INJECTION, SOLUTION INTRAVENOUS at 00:02

## 2023-11-09 RX ADMIN — HYDRALAZINE HYDROCHLORIDE 10 MG: 20 INJECTION, SOLUTION INTRAMUSCULAR; INTRAVENOUS at 14:53

## 2023-11-09 RX ADMIN — IPRATROPIUM BROMIDE AND ALBUTEROL SULFATE 3 ML: .5; 3 SOLUTION RESPIRATORY (INHALATION) at 07:30

## 2023-11-09 RX ADMIN — DEXMEDETOMIDINE HYDROCHLORIDE 1 MCG/KG/HR: 4 INJECTION, SOLUTION INTRAVENOUS at 05:56

## 2023-11-09 RX ADMIN — MIDAZOLAM 2 MG: 1 INJECTION INTRAMUSCULAR; INTRAVENOUS at 14:35

## 2023-11-09 RX ADMIN — HALOPERIDOL LACTATE 2 MG: 5 INJECTION, SOLUTION INTRAMUSCULAR at 22:49

## 2023-11-09 RX ADMIN — MICAFUNGIN SODIUM 100 MG: 50 INJECTION, POWDER, LYOPHILIZED, FOR SOLUTION INTRAVENOUS at 16:20

## 2023-11-09 RX ADMIN — MIDAZOLAM 2 MG: 1 INJECTION INTRAMUSCULAR; INTRAVENOUS at 20:42

## 2023-11-09 RX ADMIN — INSULIN ASPART 1 UNITS: 100 INJECTION, SOLUTION INTRAVENOUS; SUBCUTANEOUS at 05:02

## 2023-11-09 RX ADMIN — OLANZAPINE 5 MG: 10 INJECTION, POWDER, FOR SOLUTION INTRAMUSCULAR at 15:33

## 2023-11-09 RX ADMIN — DEXTROSE AND SODIUM CHLORIDE: 5; 450 INJECTION, SOLUTION INTRAVENOUS at 10:03

## 2023-11-09 RX ADMIN — PANTOPRAZOLE SODIUM 40 MG: 40 INJECTION, POWDER, FOR SOLUTION INTRAVENOUS at 07:54

## 2023-11-09 RX ADMIN — OLANZAPINE 5 MG: 10 INJECTION, POWDER, FOR SOLUTION INTRAMUSCULAR at 21:59

## 2023-11-09 ASSESSMENT — ACTIVITIES OF DAILY LIVING (ADL)
ADLS_ACUITY_SCORE: 53
ADLS_ACUITY_SCORE: 55
ADLS_ACUITY_SCORE: 47
ADLS_ACUITY_SCORE: 55
ADLS_ACUITY_SCORE: 57
ADLS_ACUITY_SCORE: 47
ADLS_ACUITY_SCORE: 57
ADLS_ACUITY_SCORE: 57
ADLS_ACUITY_SCORE: 47
ADLS_ACUITY_SCORE: 55

## 2023-11-09 NOTE — PROVIDER NOTIFICATION
11/09/23 0734   Tech Time   $Tech Time (10 minute increments) 6   Mode: CPAP/ BiPAP/ AVAPS/ AVAPS AE   CPAP/BiPAP/ AVAPS/ AVAPS AE Mode BiPAP S/T   Equipment   Device V60   Device Serial Number [id number]   CPAP/BiPAP/Settings   $CPAP/BiPAP Initial completed   BIPAP/CPAP On Standby On   IPAP/EPAP (cmH2O) 16/10   Rate (breaths/min) 10   Oxygen (%) 60   Timed Inspiration (sec) 1   CPAP/BiPAP Patient Parameters   RR Total (breaths/min) 20 breaths/min   Vt (mL) 1169 mL   Minute Ventilation (L/min) 24 L/min   Peak Inspiratory Pressure (cm H2O) 17 cmH2O   Pt.  Leak (L/min) 58 L/min   CPAP/BiPAP/AVAPS/AVAPS AE Alarms   High Pressure (cm H2O) 28 cmH2O   Low Pressure (cm H2O) 8   Apnea (sec) 20   Lo Min Vent 2   High Rate (breaths/min) 40 breaths/min   Low Rate (breaths/min) 10   RT Device Skin Assessment   Oxygen Delivery Device CPAP/BiPAP Mask   Interface Face Mask - Extra Large   Site Appearance neck circumference Clean and dry   Site Appearance nares (outer) Clean and dry   Site Appearance nares (inner) Clean and dry   Site Appearance lips Clean and dry   Site Appearance bridge of nose Clean and dry   Site Appearance of ears Clean and dry   Site Appearance occiput Clean and dry   Strap Tightness Finger Allowance between head and device strap   Device Skin Interventions Taken No adjustments needed   Nebulizer Assessment & Treatment   $RT Use ONLY Delivery Method Nebulizer - Initial   Daily Review of Necessity (SVN) completed   Nebulizer Device In line   Pretreatment Heart Rate (beats/min) 49   Pretreatment Resp Rate (breaths/min) 20   Pretreatment O2 sats - (TCU only) 93   Pretreat Breath Sounds - Bilat - All Lobes diminished   Patient Position HOB elevated   Respiratory Treatment Status (SVN) given     AM [id number] on HFNC 50LPM, 60%. Started Bipap at 0720- 16/10 10 60%. Pt tolerating well. Will continue to monitor on Bipap and change masks Q4 to prevent skin breakdown

## 2023-11-09 NOTE — PROGRESS NOTES
Patient currently n.p.o. due to loss of enteral access.  Would recommend holding Lantus and covering blood sugars with sliding scale while patient is n.p.o.    Sen Rousseau MD  Critical Care Medicine

## 2023-11-09 NOTE — PROGRESS NOTES
Patient intermittently agitated and restless overnight. PRN IV dose of Haldol given x1 and Precedex gtt titrated up. Educated patient on using call light and keeping [place] NC on. Bed alarm on. Main complaint overnight was not being able to eat or drink. Explained to patient that he needs to participate in the swallow screen today and be alert during the screen. Will continue to monitor and assess.

## 2023-11-09 NOTE — PROGRESS NOTES
Family at bedside at start of shift. Patient on precedex drip and repositioned. Patient remains on high flow nasal cannula and satting well. Low urine output for past 4 hours. Not enough to empty from [place] bag around 20cc in bag. Will continue to monitor. Vitally stable otherwise.

## 2023-11-09 NOTE — PROGRESS NOTES
11/09/23 1000   Appointment Info   Signing Clinician's Name / Credentials (PT) Jenn Anguiano, PT, DPT   Living Environment   People in Home parent(s)  (Mother)   Current Living Arrangements house   Living Environment Comments home info obtained from chart   Self-Care   Equipment Currently Used at Home none   General Information   Onset of Illness/Injury or Date of Surgery 11/02/23   Referring Physician Bobo Rivera MD   Patient/Family Therapy Goals Statement (PT) none stated   Pertinent History of Current Problem (include personal factors and/or comorbidities that impact the POC) s/p intubation due to ARF with hypoxia, asthma, pneumonia   Existing Precautions/Restrictions no known precautions/restrictions   Weight-Bearing Status - LLE full weight-bearing   Weight-Bearing Status - RLE full weight-bearing   Range of Motion (ROM)   ROM Comment WFL   Strength (Manual Muscle Testing)   Strength Comments generalized weakness   Bed Mobility   Bed Mobility supine-sit;sit-supine   Supine-Sit [place] (Bed Mobility) maximum assist (25% patient effort);2 person assist   Sit-Supine [place] (Bed Mobility) maximum assist (25% patient effort);2 person assist   Clinical Impression   Criteria for Skilled Therapeutic Intervention Yes, treatment indicated   PT Diagnosis (PT) impaired functional mobility   Influenced by the following impairments weakness, pain   Functional limitations due to impairments gait, transfers, stairs   Clinical Presentation (PT Evaluation Complexity) stable   Clinical Presentation Rationale pt presents as medically diagnosed   Clinical Decision Making (Complexity) low complexity   Planned Therapy Interventions (PT) balance training;bed mobility training;gait training;home exercise program;patient/family education;ROM (range of motion);stair training;strengthening;transfer training   Risk & Benefits of therapy have been explained care plan/treatment goals reviewed;patient   PT Total Evaluation  Time   PT Eval, Low Complexity Minutes (79801) 10   Physical Therapy Goals   PT Frequency Daily   PT Predicted Duration/Target Date for Goal Attainment 11/16/23   PT Goals Transfers;Gait;Stairs;Bed Mobility   PT: Bed Mobility Minimal assist;Supine to/from sit   PT: Transfers Minimal assist;Sit to/from stand;Assistive device   PT: Gait Rolling walker;50 feet;Minimal assist   Interventions   Interventions Quick Adds Gait Training;Therapeutic Activity;Therapeutic Procedure   Therapeutic Procedure/Exercise   Ther. Procedure: strength, endurance, ROM, flexibillity Minutes (26770) 11   Treatment Detail/Skilled Intervention supine LE therex x10 reps, cueing for technique, able to follow directions with SBA, SLR x4 reps due to fatigue   Therapeutic Activity   Therapeutic Activities: dynamic activities to improve functional performance Minutes (80504) 16   Treatment Detail/Skilled Intervention supine to sit, cueing for LE advancement and UE placement/positioning, pt able to minimally advance LEs to EOB, max A x2 for remainder of transition, Max A or seated scoot to EOB, sitting balance EOB x3 min, cueing for posture and PLB, O2: 93-95% on BiPAP, RN present to swab mouth and O2: 89%, returned promptly to 91% once mask was returned, max A x2 for sit to supine, Max A x2 for supine scoot and rolling   PT Discharge Planning   PT Plan LE therex, sitting tolerance/balance, bed mob   PT Discharge Recommendation (DC Rec) (S)  LTACH, pending meets medical criteria   PT Rationale for DC Rec decreased endurance, ICU stay, requiring O2 support, Max A x2 for mobility   PT Brief overview of current status Max A x2 for bed mobility and sitting balance   PT Equipment Needed at Discharge lift device   Total Session Time   Timed Code Treatment Minutes 27   Total Session Time (sum of timed and untimed services) 37

## 2023-11-09 NOTE — PROGRESS NOTES
CRITICAL CARE PROGRESS NOTE:    Assessment/Plan:  58M wih hx of asthma, Melanoma on keytruda since May 2023, depression/anxiety, recent hospitalization for CAP and respiratory failure, readmitted for worsening cough and SOB. CT showed worsening of right-sided infiltrates thought to be 2/2 worsening pneumonia. In ICU on HFNC and requiring intermittent BiPAP. Failed NIPPV and required intubation on .     Overnight Events:  Extubated yesterday  Increased confusion and delerium overnight  On precedex for agitation    RESP:  ARDS. Acute respiratory failure with hypoxemia requiring intubation/IMV on  PM. Worsening right-sided infiltrates could be c/w recurrent pneumonia (HAP vs. HCAP) vs. Organizing pneumonia vs. Malignant infiltrates vs. Pneumonitis from immunotherapy.   Abx per ID section below  Bronchoscopy on 11/3  Continue 60mg IV methylpred q8h for possible drug-related pneumonitis.   Continue scheduled duonebs q8   On bipap this morning 60% FiO2  AB.43, 38, 73    CV:  Tele monitoring  MAP >65  Furosemide 40mg daily for edema  Holding PTA statin for now    NEURO: ICU Delirium and Agitation  Tylenol prn pain  Precedex gtt for agiation  PRN Haldol and Zyprexa  Not able to take PO    GI: No BM since 10/2  NPO due to AMS  IV PPI for stress ulcer ppx  Bowel regimen prn, adding suppository    RENAL: MIKKI Cr 1.47 <---1.88 <-- 2.07 <--- 1.79 <---1.05  Avoid nephrotoxins  Increased Hypernatremia 150 <--147  Adding D5/0.45 at 75   Monitor BUN/Scr, lytes  Maintain norwood    ID:  Possible recurrent pneumonia, HAP vs. HCAP. Immunosuppressed; at risk for fungal infection. Multiple tests from BAL with no growth  ID following, appreciate input  Continue pip/tazo   Continue Micafungin  Finished course of Azithromycin 11/3-  BAL cultures.    HEMATOLOGIC:  Hx of Melanoma, on immunotherapy. Followed by [facility]. Stable mild anemia.   Hgb >7  Follow counts    ENDOCRINE:  Hyperglycemia due to critical illness, steroids.     sliding scale q4    ICU PROPHYLAXIS:  LMWH daily  IV PPI  Chlorhexidine rinses    CODE STATUS, DISPOSITION, FAMILY COMMUNICATION: full code  Updated his sister Luz Maria 11/8.    Restraints  Progress Note  Restraint Application    I recognize that restraints are physical and/or chemical interventions intended to restrict a person's movements. Restraints are currently needed to ensure the safety of this patient and/or others. My clinical rationale appears below.    Category/Type of Restraint  Non Violent:  Soft limb restraint x 2  --  Behavior  Pulling at tubes/lines  --  Root Cause of the Behavior  Sedation/intubation  --  Less-Restrictive Measures that Failed  Non Violent Measures:  Close Observation  --  Response to the Restraint  Patient unable to pull at tubes/lines  --  Criteria for Release from the Restraint  Patient calm and off sedation     Clinically Significant Risk Factors         # Hypernatremia: Highest Na = 150 mmol/L in last 2 days, will monitor as appropriate      # Hypoalbuminemia: Lowest albumin = 2.7 g/dL at 11/3/2023  5:57 AM, will monitor as appropriate            # Obesity: Estimated body mass index is 38.9 kg/m  as calculated from the following:    Height as of this encounter: 1.829 m (6').    Weight as of this encounter: 130.1 kg (286 lb 12.8 oz).        # Financial/Environmental Concerns: none  # Asthma: noted on problem list                Objective:  Physical Exam:  Vent settings for last 24 hours:  Vent Mode: CMV/AC  (Continuous Mandatory Ventilation/ Assist Control)  FiO2 (%): 60 %  Resp Rate (Set): 22 breaths/min  Tidal Volume (Set, mL): 470 mL  PEEP (cm H2O): 5 cmH2O  Pressure Support (cm H2O): 5 cmH2O  Resp: 23      /68   Pulse 58   Temp 97.2  F (36.2  C) (Axillary)   Resp 23   Ht 1.829 m (6')   Wt 130.1 kg (286 lb 12.8 oz)   SpO2 93%   BMI 38.90 kg/m      Intake/Output last 3 shifts:  I/O last 3 completed shifts:  In: 899.12 [I.V.:619.12; NG/GT:150]  Out: 1875  [Urine:1875]  Intake/Output this shift:  I/O this shift:  In: 65 [I.V.:65]  Out: 150 [Urine:150]  Gen:  laying in bed  HEENT:   bipap mask in place  CV: RRR  Resp: decreased breath sounds, no wheezing  Abd: soft, +BS  Neuro: opens eyes, spontaneously moving all extremities, not following commands  Ext: 1+ edema    LAB:  Recent Labs   Lab 11/09/23  0504   WBC 15.8*   HGB 8.9*   HCT 29.0*   *     Recent Labs   Lab 11/09/23  0504 11/07/23  0529 11/06/23  0343 11/05/23  0532 11/04/23  0421 11/03/23  0557   * 149*  147* 146*   < > 139 140   CO2 23 24 24   < > 25 25   BUN 58.8* 37.5* 17.9   < > 4.3* 5.1*   ALKPHOS  --   --   --   --  293* 226*   ALT  --   --   --   --  23 23   AST  --   --   --   --  21 14    < > = values in this interval not displayed.     Micro  PCT 0.11 on 11/2  Covid neg  Flu A/B neg  RSV neg  Sputum 1+ gram pos  MRSA neg  Blood NGTD  Legionella urine Ag neg  Strep pneumo urine Ag neg    Fungitell assay neg  Histo urine Ag negative  Fungal Ab panel negative    Bronch/BAL 11/3  Gram stain + for GPC, culture pending  KOH prep neg, fungal culture pending  Total nuc cells 4, 61% PMN, 31% lymphs  RVP neg  Legionella cx NGTD    No current outpatient medications on file.       Critical care attestation: 45 minutes spent managing the following issues: acute respiratory failure requiring intubation/IMV; severe pneumonia vs. Drug related pneumonitis with acute respiratory failure, ARDS. Immunosuppressed patient.. High risk for organ deterioration and death requiring ICU level care.

## 2023-11-09 NOTE — PROGRESS NOTES
[facility] MEDICINE  PROGRESS NOTE     Code Status: Full Code       Identification/Summary:   Jim Titus is a 58 year old male with a PMH of multiple myeloma on Keytruda since May 2023, asthma, depression anxiety.  Recent hospitalization for pneumonia and respiratory failure.  11/2 readmitted for shortness of breath.  CT showed worsening right-sided infiltrates.  Admitted to the ICU on HFNC and BiPAP.  11/3 underwent bronchoscopy.  11/4 worsening respiratory status and intubated and started pressors due to ARDS/sepsis.  Receiving broad-spectrum antibiotics, steroids and aggressive hydration.  11/8 extubated and requiring now HFNC.  Confused.  Remains ICU status.     Assessment and Plan:    Changes today:  -On bipap  -Swallow study with nursing, some clearing of throat, concern for aspiration, formal eval w/ SLP once patient is more stable.  -Repeat CXR  -Oncology consulted, appreciate recs.  -IVF stopped given worsening respiratory status  -Concern for benzo withdrawal given daily use at home. Was given versed today PRN, had klonopin PO during swallow eval.      Acute hypoxic respiratory failure  ARDS  Multifocal pneumonia  Sepsis due to lung infection  History of asthma  Status post bronchoscopy 11/3/2023  Intubation 11/4/2023  Extubation 11/8/2023  Procalcitonin is mildly elevated, CRP is significantly elevated above 200.  Beta-D-glucan, urine histo ag, negative.   Keytruda pneumonitis less likely per ID.  11/3 bronchoscopy under done  11/4 intubated and receiving steroids for possible pneumonitis.  BAL cultures usual dustin.   MRSA culture negative.   11/5 hypotension requiring Levophed   Vent management per intensivist.   Continue duoneb q8h.  Mucomyst nebs discontinued.   Continue zosyn  Completed azithromycin.  Continue micafungin for 7 days.   Continue steroid.   11/8 extubated and transition to HFNC.  CXR pending.  Lasix qday per critical care  Further management per  intensivist.  Acute metabolic encephalopathy  After extubation patient quite confused and disoriented.  Hopefully things will clear as he removes further from his sedation.  Acute kidney injury  Hypernatremia  -Baseline creatinine around 0.8,   11/7 creatinine worsened to 2.07 and sodium to 149  Increased free water hydration per intensivist.    11/8 creatinine improved to 1.88.  Follow daily BMP.  Steroid induced hyperglycemia.  Continue correction insulin q4h. ICU protocol.  Anxiety/depression  -At home on Lexapro on mirtazapine  -Holding given respiratory status and status post intubation  Anemia likely dilutional  -No signs of active bleeding  -Hemoglobin stable around 9  Multiple myeloma  Receiving Keytruda as an outpatient managed by [facility].  We will consult their service to be notified of his current status.     Diet: NPO  DVT Prophylaxis: Enoxaparin (Lovenox) SQ    COVID-19 PCR/influenza A/B/RSV negative from 11/2/2023    Fluids: Saline lock  Pain meds: N/A  Therapy: N/A  Lamas:PRESENT, indication: Strict 1-2 Hour I&O;Deep Sedation/Paralysis  Lines: PRESENT      PICC 11/04/23 Double Lumen Right Basilic medication drips-Site Assessment: WDL  Arterial Line 11/04/23 Other (Comment)-Site Assessment: WDL      Current Diet  None    Supplements  None    Barriers to Discharge: ICU status, Bipap, encephalopathy    Disposition: To be determined    Clinically Significant Risk Factors         # Hypernatremia: Highest Na = 150 mmol/L in last 2 days, will monitor as appropriate      # Hypoalbuminemia: Lowest albumin = 2.7 g/dL at 11/3/2023  5:57 AM, will monitor as appropriate              # Obesity: Estimated body mass index is 38.9 kg/m  as calculated from the following:    Height as of this encounter: 1.829 m (6').    Weight as of this encounter: 130.1 kg (286 lb 12.8 oz).        # Financial/Environmental Concerns: none  # Asthma: noted on problem list        Interval History/Subjective:  No acute events  "overnight.  Patient was on BiPAP and slightly encephalopathic this morning when I saw him.  Appeared fatigued with slow head movements gesturing that he is doing \"so so.\"  Called to bedside this afternoon patient got tachycardic and hypertensive around the time of a bedside swallow.  Became tachypneic as well nursing staff states it appeared he was able to swallow and did not cough however he had continuous clearing of his throat.  X-ray obtained and appears relatively unchanged from prior but showing some pulmonary congestion.  Stopped fluids this evening, will continue to monitor his renal status and respiratory status. Less concerned about his hypernatremia given his respiratory status. Per nursing also had urinary retention overnight with over 500ml output after cath. Will trend creatinine.    Last 24H PRN:     haloperidol lactate (HALDOL) injection 2 mg, 2 mg at 11/08/23 2357    hydrALAZINE (APRESOLINE) injection 10 mg, 10 mg at 11/09/23 1453    midazolam (VERSED) injection 2 mg, 2 mg at 11/09/23 1435    OLANZapine (zyPREXA) IV injection 5 mg, 5 mg at 11/09/23 1533    Physical Exam/Objective:  Temp:  [96.5  F (35.8  C)-99  F (37.2  C)] 99  F (37.2  C)  Pulse:  [] 99  Resp:  [17-42] 33  BP: (156-194)/(85-96) 187/85  FiO2 (%):  [50 %-60 %] 50 %  SpO2:  [87 %-97 %] 94 %  Wt Readings from Last 4 Encounters:   11/08/23 130.1 kg (286 lb 12.8 oz)   10/23/23 117.9 kg (260 lb)   10/23/23 118.1 kg (260 lb 6.4 oz)   08/16/23 128.6 kg (283 lb 9.6 oz)     Body mass index is 38.9 kg/m .    Constitutional: awakens to loud voice, BIPAP in place.  ENT: Normocephalic, without obvious abnormality, atraumatic, external ears without lesions, oral pharynx with moist mucous membranes, tonsils without erythema or exudates, gums normal and good dentition.  Respiratory: Poor air movement and substantially decreased breath sounds at the right side, expiratory wheezing.  Cardiovascular: Normal apical impulse, regular rate and " rhythm, normal S1 and S2, no S3 or S4, and no murmur noted  GI: No scars, normal bowel sounds, soft, non-distended, non-tender, no masses palpated, no hepatosplenomegally  Skin: normal skin color, texture, turgor, no redness, warmth, or swelling, and no rashes  Musculoskeletal: There is no redness, warmth, or swelling of the joints.  Motor strength is 5 out of 5 all extremities bilaterally.  Tone is normal.  Trace lower extremity edema bilaterally  Neurologic: Cranial nerves II-XII are grossly intact. Sensory:  Sensory intact  Neuropsychiatric: Altered/somnolent       Medications:   Personally Reviewed.  Medications    dexmedeTOMIDine 0.3 mcg/kg/hr (11/09/23 1508)    dextrose      dextrose 5% and 0.45% NaCl 75 mL/hr at 11/09/23 1600      clonazePAM  1 mg Oral BID    enoxaparin ANTICOAGULANT  40 mg Subcutaneous Q24H    [Held by provider] escitalopram  20 mg Oral Daily with lunch    [Held by provider] fluticasone  1 puff Inhalation BID    furosemide  40 mg Intravenous Q24H    insulin aspart  1-6 Units Subcutaneous Q4H    [Held by provider] insulin glargine  15 Units Subcutaneous BID    ipratropium - albuterol 0.5 mg/2.5 mg/3 mL  3 mL Nebulization Q8H    methylPREDNISolone  62.5 mg Intravenous Q8H    micafungin  100 mg Intravenous Q24H    [Held by provider] mirtazapine  15 mg Oral At Bedtime    pantoprazole  40 mg Per Feeding Tube QAM AC    Or    pantoprazole  40 mg Intravenous QAM AC    piperacillin-tazobactam  3.375 g Intravenous Q8H    sodium chloride (PF)  10-40 mL Intracatheter Q7 Days    sodium chloride (PF)  3 mL Intracatheter Q8H    sodium chloride (PF)  3 mL Intracatheter Q8H       Data reviewed today: I personally reviewed all new medications, labs, imaging/diagnostics reports over the past 24 hours. Pertinent findings include:    Imaging:   Recent Results (from the past 24 hour(s))   XR Chest Port 1 View    Narrative    EXAM: XR CHEST PORT 1 VIEW  LOCATION: [facility]  DATE:  11/9/2023    INDICATION: Shortness of breath.  COMPARISON: 11/08/2023      Impression    IMPRESSION: Right-sided PICC tip in good position at cavoatrial junction. Heart size magnified in AP projection. Pulmonary venous congestion accentuated by shallow inspiration. Consolidative airspace opacities within the right lung and minimal left   basilar atelectasis unchanged. No pneumothorax.       Labs:  XR Chest Port 1 View   Final Result   IMPRESSION: Right-sided PICC tip in good position at cavoatrial junction. Heart size magnified in AP projection. Pulmonary venous congestion accentuated by shallow inspiration. Consolidative airspace opacities within the right lung and minimal left    basilar atelectasis unchanged. No pneumothorax.      XR Chest Port 1 View   Final Result   IMPRESSION: Endotracheal tube removed. Right PICC tip projects over the lower SVC. Increasing right chest opacity, with tracheal shift rightward. This is most likely due to atelectasis. The right hemidiaphragm is elevated, similar to previous. There is    limited inspiratory volume on the left. No significant left infiltrate or pulmonary edema.      XR Abdomen Port 1 View   Final Result   IMPRESSION: Scattered gas and stool seen throughout nondistended large and small bowel. An enteric feeding tube courses below the diaphragm with the distal tip overlying the expected position of the gastric body.      XR Chest Port 1 View   Final Result   IMPRESSION: Tracheostomy tube 3 cm from the ingrid. Enteric tube entering the stomach. Esophageal probe in the mid esophagus. Right central line tip in the low SVC/right atrial junction similar to previous.      No significant change in the patchy infiltrates right lung laterally with elevation of the right hemidiaphragm. Left lung remains clear and expanded.      Echocardiogram Complete   Final Result      XR Chest Port 1 View   Final Result   IMPRESSION: Stable size of cardiomediastinal silhouette. Endotracheal  tube with tip 2.7 cm above the ingrid. Right upper extremity PICC with tip overlying the right atrium. Nasogastric tube courses below the diaphragm, tip beyond the field-of-view. New    esophageal temperature probe noted. No significant change in volume loss and airspace opacification in the right lung. Possible small associated pleural effusion. Left lung is grossly clear. No pneumothorax. Bones are unchanged.      XR Chest Port 1 View   Final Result   IMPRESSION: Endotracheal tube is 3.5 cm above the ingrid. Orogastric tube tip is below diaphragm. Heart size magnified in AP projection. Unchanged near complete opacification of the right hemithorax do to airspace consolidation +/- small pleural    effusion, with some sparing of the apex. Minimal left basilar subsegmental atelectasis. No pneumothorax.      XR Chest Port 1 View   Final Result   IMPRESSION: Right PICC is in place with tip overlying the low SVC. Increased right lung opacities with near complete opacification of the right hemithorax. Mild left upper lung opacities. No pneumothorax.      Abdomen US, limited (RUQ only)   Final Result   IMPRESSION:   Normal limited abdominal ultrasound.            CT Chest Pulmonary Embolism w Contrast   Final Result   IMPRESSION:   1.  No pulmonary embolism.    2.  Increased consolidation associated with the multifocal pneumonia (right greater than left lungs). Appearance is nonspecific but could be seen in the setting of atypical infection (including fungal organisms) or bacterial pneumonia. No empyema or    other complication.        Recent Results (from the past 24 hour(s))   Glucose by meter    Collection Time: 11/08/23  4:36 PM   Result Value Ref Range    GLUCOSE BY METER POCT 140 (H) 70 - 99 mg/dL   Glucose by meter    Collection Time: 11/08/23  8:02 PM   Result Value Ref Range    GLUCOSE BY METER POCT 134 (H) 70 - 99 mg/dL   Blood gas arterial    Collection Time: 11/08/23  8:18 PM   Result Value Ref Range    pH  Arterial 7.45 (H) 7.37 - 7.44    pCO2 Arterial 39 35 - 45 mm Hg    pO2 Arterial 69 (L) 80 - 90 mm Hg    Bicarbonate Arterial 27 23 - 29 mmol/L    O2 Sat, Arterial 94.6 (L) 96.0 - 97.0 %    Oxyhemoglobin 92.8 (L) 96.0 - 97.0 %    Base Excess/Deficit 3.2   mmol/L    Sample Stabilized Temperature 37.0 degrees C   Glucose by meter    Collection Time: 11/09/23 12:06 AM   Result Value Ref Range    GLUCOSE BY METER POCT 122 (H) 70 - 99 mg/dL   Glucose by meter    Collection Time: 11/09/23  5:01 AM   Result Value Ref Range    GLUCOSE BY METER POCT 156 (H) 70 - 99 mg/dL   Magnesium    Collection Time: 11/09/23  5:04 AM   Result Value Ref Range    Magnesium 2.7 (H) 1.7 - 2.3 mg/dL   Phosphorus    Collection Time: 11/09/23  5:04 AM   Result Value Ref Range    Phosphorus 3.6 2.5 - 4.5 mg/dL   CBC with platelets    Collection Time: 11/09/23  5:04 AM   Result Value Ref Range    WBC Count 15.8 (H) 4.0 - 11.0 10e3/uL    RBC Count 3.14 (L) 4.40 - 5.90 10e6/uL    Hemoglobin 8.9 (L) 13.3 - 17.7 g/dL    Hematocrit 29.0 (L) 40.0 - 53.0 %    MCV 92 78 - 100 fL    MCH 28.3 26.5 - 33.0 pg    MCHC 30.7 (L) 31.5 - 36.5 g/dL    RDW 15.2 (H) 10.0 - 15.0 %    Platelet Count 536 (H) 150 - 450 10e3/uL   Basic metabolic panel    Collection Time: 11/09/23  5:04 AM   Result Value Ref Range    Sodium 150 (H) 135 - 145 mmol/L    Potassium 3.8 3.4 - 5.3 mmol/L    Chloride 116 (H) 98 - 107 mmol/L    Carbon Dioxide (CO2) 23 22 - 29 mmol/L    Anion Gap 11 7 - 15 mmol/L    Urea Nitrogen 58.8 (H) 6.0 - 20.0 mg/dL    Creatinine 1.47 (H) 0.67 - 1.17 mg/dL    GFR Estimate 55 (L) >60 mL/min/1.73m2    Calcium 8.6 8.6 - 10.0 mg/dL    Glucose 147 (H) 70 - 99 mg/dL   TSH with free T4 reflex    Collection Time: 11/09/23  5:04 AM   Result Value Ref Range    TSH 0.13 (L) 0.30 - 4.20 uIU/mL   T4 free    Collection Time: 11/09/23  5:04 AM   Result Value Ref Range    Free T4 0.90 0.90 - 1.70 ng/dL   Blood gas arterial    Collection Time: 11/09/23  5:35 AM   Result Value  Ref Range    pH Arterial 7.45 (H) 7.37 - 7.44    pCO2 Arterial 38 35 - 45 mm Hg    pO2 Arterial 68 (L) 80 - 90 mm Hg    Bicarbonate Arterial 26 23 - 29 mmol/L    O2 Sat, Arterial 94.0 (L) 96.0 - 97.0 %    Oxyhemoglobin 92.2 (L) 96.0 - 97.0 %    Base Excess/Deficit 2.1   mmol/L    Sample Stabilized Temperature 37.0 degrees C   Glucose by meter    Collection Time: 11/09/23  7:48 AM   Result Value Ref Range    GLUCOSE BY METER POCT 147 (H) 70 - 99 mg/dL   Blood gas arterial    Collection Time: 11/09/23  9:44 AM   Result Value Ref Range    pH Arterial 7.43 7.37 - 7.44    pCO2 Arterial 38 35 - 45 mm Hg    pO2 Arterial 73 (L) 80 - 90 mm Hg    Bicarbonate Arterial 25 23 - 29 mmol/L    O2 Sat, Arterial 94.3 (L) 96.0 - 97.0 %    Oxyhemoglobin 93.1 (L) 96.0 - 97.0 %    Base Excess/Deficit 1.0   mmol/L    Sample Stabilized Temperature 37.0 degrees C   Glucose by meter    Collection Time: 11/09/23 12:15 PM   Result Value Ref Range    GLUCOSE BY METER POCT 171 (H) 70 - 99 mg/dL       Pending Labs:  Unresulted Labs Ordered in the Past 30 Days of this Admission       Date and Time Order Name Status Description    11/9/2023  2:22 PM T3 Free In process     11/6/2023  9:33 PM Fungal or Yeast Culture Routine Preliminary     11/3/2023 10:46 AM Nocardia culture - BAL Site 1 Preliminary     11/3/2023 10:46 AM Legionella culture - BAL Site 1 Preliminary     11/3/2023 10:46 AM Fungus Culture, non-blood - BAL Site 1 Preliminary     11/3/2023 10:46 AM Acid-Fast Bacilli Culture and Stain with AFB Stain In process             Bobo Rivera MD  Hospitalist     Medical Decision Making       90 MINUTES SPENT BY ME on the date of service doing chart review, history, exam, documentation & further activities per the note.

## 2023-11-09 NOTE — PROVIDER NOTIFICATION
11/09/23 1700   RCAT Assessment   Reason for Assessment   (hypoxia, pneumonitis, ARDS)   Pulmonary Status 1  (Asthma)   Surgical Status 0   Chest X-ray 2   Respiratory Pattern 2   Mental Status 1   Breath Sounds 2   Cough Effectiveness 0   Level of Activity 2  (has not been out of bed)   O2 Required for SpO2>=92% 3  (Bipap/HFNC 60%)   Acuity Level (points) 13     Started on Bipap this am due to low P02 on HFNC 60% 50LPM. V60 16/10 10 60%, tolerated well all day. He did take a break from Bipap for less than 20 minutes but got anxious and felt like he 'needed more air'. Placed back on Bipap, tolerating well. Changed Bipap masks Q4 to prevent skin breakdown. Will continue to monitor

## 2023-11-09 NOTE — PROGRESS NOTES
Patient reports feeling uncomfortable and needing to urinate. Bladder scanned patient for 556 ml of urine. Norwood catheter did not have any urine in tubing, clamped tubing, and irrigated with 30 ml of saline. Post norwood catheter irrigation 550 ml of urine emptied from catheter. Will continue to monitor urine output.

## 2023-11-09 NOTE — PROGRESS NOTES
Patient remained on HFNC overnight 50L and 50% for most of the night SAT in low 90% increased to 60% following ABG result. Nebulizer tx was done at midnight via inline with HFNC. Tolerated well. Breath sounds are clear/diminished. Will continue to follow as needed.

## 2023-11-09 NOTE — CONSULTS
Oncology Consult Note:    DOS: 11/9/2023    Primary Oncologist:  Natasha Block MD  [facility] [place] [facility] [phone]    Reason for Consult:  Hx of melanoma, receiving pembrolizumab every 3 weeks.  Has received 6 doses of pembrolizumab 200 mg IV every 3 weeks.  Last dose given 9/15/2023.      Reason for admission:  1)  Acute hypoxic respiratory failure with multifocal pneumonia.  Previously hospitalized for CAP.      Oncologic History:  Jim Titus is a 58-year-old male with a diagnosis of cutaneous melanoma.  He presented with 4 weeks history of right mid back mole.    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  No ulceration or lymphovascular invasion.    4/13/2023: PET scan shows no evidence of metastatic disease.    4/14/2023: He underwent wide excision and sentinel lymph node biopsy, right axilla.  Pathology shows residual malignant melanoma, maximum depth of invasion is 1.8 mm.  Margins are negative.  0/2 lymph nodes involved by metastatic disease.    5/12/2023 - 9/15/2023: Received 6 cycles of pembrolizumab 200 mg IV every 3 weeks.      Dose due on 10/6/2023 was held due to respiratory complaints.  CXR done that day shows probable multifocal pneumonia, with predominant involvement of the right lower lobe. Treated with 10 day course of Augmentin.      Assessment/Plan:    1)  Acute hypoxic respiratory failure:  Patient on BIPAP since early morning.  Had bronchoscopy on 11/3 which showed no evidence of malignancy.  Scant bland appearing cellular debris.  GMS stain negative for pneumocystis/fungal organisms. AFB stain negative for acid-fast bacilli.  Bronchial lavage shows <25 PMNs/low power field. & 1+ gram positive cocci.  Discussed with Dr. Block, patient likely has pneumonitis from pembrolizumab.  Currently being treated with Solu-Medrol 62.5 mg IV every 8 hours, initiated 11/4/2023, which is the appropriate treatment for  immune-related pneumonitis.  May take one week at least to see benefits.  Would recommend continued dosing of steroid and not start slow taper of steroid until respiratory status stabilizes/improves.    2)  Acute kidney injury:  Likely multifactorial but if at all immunotherapy related, treatment would be steroids which he is receiving.    3)  Anemia:  Likely related to current medical condition.  Transfuse if Hgb <7.0.    ADDENDUM:  Discussed further with Dr. Block and will check ACTH, TSH with reflex to free T4 & free T3 to assess for additional abnormalities that could be caused by immunotherapy and/or steroid therapy.  Orders entered into EPIC.      Subjective:  Patient laying in bed.  Agitated because he does not want to lay on his side.  Continues to be disoriented.  Family members not present - they come in late afternoon typically per nursing staff.     Objective:  Afebrile.  On BIPAP  General: Appears acutely ill.  Arouses easily.      Labs:  2023 - WBC = 15.8 (likely related to steroid administration).  Hgb = 8.9, platelets = 536,000.  Na = 150, K = 3.8, BUN = 58.8, Creatinine = 1.47 with GFR = 55. Ng = 2.7.      Imagin2023:  Chest CT PE protocol shows no pulmonary embolism. Increased consolidation associated with the multifocal pneumonia (right greater than left lungs). Appearance is nonspecific but could be seen in the setting of atypical infection (including fungal organisms) or bacterial pneumonia. No empyema or  other complication.    We will continue to follow.      Neela Nick, RN, MS, CNP  [facility] [place] [facility]  [phone]  Cell:  [phone] (Tuesday - Friday, 8:30 am to 5 pm)

## 2023-11-09 NOTE — PROGRESS NOTES
INFECTIOUS DISEASE FOLLOW UP NOTE      ASSESSMENT:  Multifocal pneumonia, hypoxic respiratory failure: worsening despite recent courses of antibiotics. Elevated inflammatory markers. CT with increased consolidation R>L. Bronchoscopy 11/3. Worsening respiratory status 11/3-->4 required intubated 11/4. Steroids starting 11/4. GPC on gram stain of BAL, culture with normal dustin including yeast (usually candida not requiring treatment). Beta-D-glucan negative and PJP pcr neg, makes PJP unlikely (and lung abnormality is unilateral, would be atypical for PJP); also makes invasive candidiasis unlikely. Urine histo antigen negative. Seems most likely either bacterial pneumonia or post-pneumonia inflammation - however BAL cultures soon after admission show usual dustin.  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 MRSA screen negative. At this point, looking at risk/benefit of empiric therapies, can stop vancomycin and amphotericin.   Recent hospitalization 10/23 for pneumonia-received 2 days vanc/zosyn before transitioning to levofloxacin on discharge  Hx melanoma on keytruda    PLAN:  -continue micafungin and zosyn until 11/11  -steroids per pulmonary     ID will sign-off. Call with questions.    Payam Glez MD  [facility]  Direct messaging: [employer]   On-Call ID provider: [phone], option: 9     ______________________________________________________________________    SUBJECTIVE / INTERVAL HISTORY:   Needing BiPAP today for increased work of breathing. Remains confused. No fevers. No pressors.    OBJECTIVE:  /68   Pulse 55   Temp 97.9  F (36.6  C) (Axillary)   Resp 24   Ht 1.829 m (6')   Wt 130.1 kg (286 lb 12.8 oz)   SpO2 96%   BMI 38.90 kg/m      Vent Mode: CMV/AC  (Continuous Mandatory Ventilation/ Assist Control)  FiO2 (%): 50 %  Resp Rate (Set): 22 breaths/min  Tidal Volume (Set, mL): 470 mL  PEEP (cm H2O): 5 cmH2O  Pressure Support (cm H2O): 5 cmH2O  Resp: 24        GEN: lethargic, no distress  RESPIRATORY: breath sounds bilaterally  CARDIOVASCULAR:  Regular rate and rhythm. Normal S1 and S2. No murmur  ABDOMEN:  Soft, normal bowel sounds, non-tender  EXTREMITIES: No edema.  SKIN/HAIR/NAILS:  No rashes  IV: PICC L UE        Antibiotics:  Pip/tazo 11/2-  Micafungin 11/5-    Azithromycin 11/2-11/7  Vancomycin 11/2-11/5  Ampho liposomal 11/4    Pertinent labs:    Recent Labs   Lab 11/09/23  0504 11/08/23  0515 11/07/23  0840   WBC 15.8* 13.4* 15.4*   HGB 8.9* 8.6* 8.5*   HCT 29.0* 27.8* 27.6*   * 546* 551*        Recent Labs   Lab 11/09/23  0504 11/07/23  0529 11/06/23  0343   * 149*  147* 146*   CO2 23 24 24   BUN 58.8* 37.5* 17.9         Lab Results   Component Value Date    ALT 23 11/04/2023    AST 21 11/04/2023    ALKPHOS 293 (H) 11/04/2023         MICROBIOLOGY DATA:  BAL gram stain GPC; culture with normal dustin including yeast  Beta-D-glucan negative   MRSA negative  Strep pn, Leg ag negative  Histo urine Ag negative.       RADIOLOGY:  XR Chest Port 1 View    Result Date: 11/8/2023  EXAM: XR CHEST PORT 1 VIEW LOCATION: [facility] DATE: 11/8/2023 INDICATION: extubated COMPARISON: 11/06/2023     IMPRESSION: Endotracheal tube removed. Right PICC tip projects over the lower SVC. Increasing right chest opacity, with tracheal shift rightward. This is most likely due to atelectasis. The right hemidiaphragm is elevated, similar to previous. There is limited inspiratory volume on the left. No significant left infiltrate or pulmonary edema.    XR Abdomen Port 1 View    Result Date: 11/7/2023  EXAM: XR ABDOMEN PORT 1 VIEW LOCATION: [facility] DATE: 11/7/2023 INDICATION: Confirm TF placement. COMPARISON: None.     IMPRESSION: Scattered gas and stool seen throughout nondistended large and small bowel. An enteric feeding tube courses below the diaphragm with the distal tip overlying the expected position of the gastric  body.    XR Chest Port 1 View    Result Date: 2023  EXAM: XR CHEST PORT 1 VIEW LOCATION: [facility] DATE: 2023 INDICATION: back supine, et tube placement COMPARISON: 2023     IMPRESSION: Tracheostomy tube 3 cm from the ingrid. Enteric tube entering the stomach. Esophageal probe in the mid esophagus. Right central line tip in the low SVC/right atrial junction similar to previous. No significant change in the patchy infiltrates right lung laterally with elevation of the right hemidiaphragm. Left lung remains clear and expanded.    XR Chest Port 1 View    Result Date: 2023  EXAM: XR CHEST PORT 1 VIEW LOCATION: [facility] DATE: 2023 INDICATION: ARDS, intubated. right lung opacification. eval for interval change COMPARISON: Chest radiograph 2023.     IMPRESSION: Stable size of cardiomediastinal silhouette. Endotracheal tube with tip 2.7 cm above the ingrid. Right upper extremity PICC with tip overlying the right atrium. Nasogastric tube courses below the diaphragm, tip beyond the field-of-view. New esophageal temperature probe noted. No significant change in volume loss and airspace opacification in the right lung. Possible small associated pleural effusion. Left lung is grossly clear. No pneumothorax. Bones are unchanged.    Echocardiogram Complete    Result Date: 2023  [id number]^[place] [address]  Name: MISSY ALMANZA MRN: [id number] : 1965 Study Date: 2023 11:09 AM Age: 58 yrs Gender: Male Patient Location: [facility] Reason For Study: Abn EKG Ordering Physician: FIDEL VELA Performed By: MB  BSA: 2.4 m2 Height: 72 in Weight: 266 lb HR: 65 BP: 105/56 mmHg ______________________________________________________________________________ Procedure Complete Echo Adult. Definity (NDC #05664-465) given intravenously.  ______________________________________________________________________________ Interpretation Summary  1. Technically difficult study (useful imaging essentially could only be obtained from the subcostal view). 2. The left ventricle is normal in size. Image quality does not provide for detailed assessment of LV systolic function, but is felt to be normal with a visually estimated ejection fraction of roughly 55-60%. 3. No significant valvular heart disease is identified on this study though the sensitivity, particularly of regurgitant lesions, is reduced due to poor Doppler acoustics. 4. On selected views, the right ventricle appears mildly enlarged with moderately reduced systolic performance (the right ventricle is only visualized from the subcostal view). 5. There is biatrial enlargement (difficult to quantify due to suboptimal acoustic imaging) 6. The IVC appears dilated with decreased phasic variation in caval diameter consistent with elevated right atrial pressure. ______________________________________________________________________________ Left ventricle: The left ventricle is normal in size. Image quality does not provide for detailed assessment of LV systolic function, but is felt to be normal with a visually estimated ejection fraction of roughly 55-60%. There is normal regional wall motion based on limited views available. Left ventricular wall thickness is normal.  Assessment of LV Diastolic Function: The cumulative findings are indeterminate in the evaluation of diastolic function.  Right ventricle: On selected views, the right ventricle appears mildly enlarged with moderately reduced systolic performance (the right ventricle is only visualized from the subcostal view).  Left atrium: The left atrium appears enlarged (difficult to quantify)  Right atrium: The right atrium appears enlarged (difficult to quantify).  IVC: The IVC appears dilated with decreased phasic variation in caval diameter consistent  with elevated right atrial pressure.  Aortic valve: The aortic valve is not well visualized, but suspected to be comprised of three cusps. No significant aortic stenosis or aortic insufficiency is detected on this study.  Mitral valve: The mitral valve appears morphologically normal.  Tricuspid valve: The tricuspid valve is grossly morphologically normal.  Pulmonic valve: The pulmonic valve is not well visualized.  Thoracic aorta: The aortic root and proximal ascending aorta are of normal dimension.  Pericardium: There is no significant pericardial effusion. ______________________________________________________________________________ ______________________________________________________________________________ MMode/2D Measurements & Calculations IVSd: 0.94 cm LVIDd: 5.0 cm LVIDs: 3.5 cm LVPWd: 0.84 cm FS: 30.1 % LV mass(C)d: 155.0 grams LV mass(C)dI: 64.5 grams/m2 LVOT diam: 2.3 cm LVOT area: 4.1 cm2 RWT: 0.34  Doppler Measurements & Calculations MV E max ryne: 56.1 cm/sec MV A max ryne: 53.1 cm/sec MV E/A: 1.1 MV dec slope: 164.8 cm/sec2 MV dec time: 0.34 sec Ao V2 max: 98.9 cm/sec Ao max P.0 mmHg Ao V2 mean: 64.2 cm/sec Ao mean P.9 mmHg Ao V2 VTI: 19.0 cm JACQUE(I,D): 4.2 cm2 JACQUE(V,D): 3.8 cm2 LV V1 max PG: 3.3 mmHg LV V1 max: 90.4 cm/sec LV V1 VTI: 19.3 cm SV(LVOT): 79.8 ml SI(LVOT): 33.2 ml/m2 AV Ryne Ratio (DI): 0.91  JACQUE Index (cm2/m2): 1.7 E/E' av.6 Lateral E/e': 5.2 Medial E/e': 7.9  ______________________________________________________________________________ Report approved by: Mark Lomeli 2023 12:00 PM       XR Chest Port 1 View    Result Date: 2023  EXAM: XR CHEST PORT 1 VIEW LOCATION: [facility] DATE: 2023 INDICATION: Endotracheal tube positioning COMPARISON: 2023 at 1200 hours and chest CTA 2023     IMPRESSION: Endotracheal tube is 3.5 cm above the ingrid. Orogastric tube tip is below diaphragm. Heart size magnified in AP  projection. Unchanged near complete opacification of the right hemithorax do to airspace consolidation +/- small pleural effusion, with some sparing of the apex. Minimal left basilar subsegmental atelectasis. No pneumothorax.    XR Chest Port 1 View    Result Date: 11/4/2023  EXAM: XR CHEST PORT 1 VIEW LOCATION: [facility] DATE: 11/4/2023 INDICATION: PICC placement COMPARISON: Chest radiograph 03/01/2014 and CTA chest 11/02/2023.     IMPRESSION: Right PICC is in place with tip overlying the low SVC. Increased right lung opacities with near complete opacification of the right hemithorax. Mild left upper lung opacities. No pneumothorax.    Abdomen US, limited (RUQ only)    Result Date: 11/2/2023  EXAM: US ABDOMEN LIMITED LOCATION: [facility] DATE: 11/2/2023 INDICATION: sepsis, cough, difficulty taking in big breath, poor appetite, elevating alk phos with recent hospitalization. COMPARISON: None. TECHNIQUE: Limited abdominal ultrasound. FINDINGS: GALLBLADDER: Normal. No gallstones, wall thickening, or pericholecystic fluid. Negative sonographic Aguilar's sign. BILE DUCTS: No biliary dilatation. The common duct measures 3 mm. LIVER: Normal parenchyma with smooth contour. No focal mass. RIGHT KIDNEY: No hydronephrosis. PANCREAS: The visualized portions are normal. No ascites.     IMPRESSION: Normal limited abdominal ultrasound.     CT Chest Pulmonary Embolism w Contrast    Result Date: 11/2/2023  EXAM: CT CHEST PULMONARY EMBOLISM W CONTRAST LOCATION: [facility] DATE: 11/2/2023 INDICATION: cough, hypoxia, recent hospitalization x 9 days  for CAP COMPARISON: CTA chest 10/23/2023 TECHNIQUE: CT chest pulmonary angiogram during arterial phase injection of IV contrast. Multiplanar reformats and MIP reconstructions were performed. Dose reduction techniques were used. CONTRAST: Isovue 370 100mL FINDINGS: ANGIOGRAM CHEST: Pulmonary arteries are normal  caliber and negative for pulmonary emboli. Thoracic aorta is negative for dissection. No CT evidence of right heart strain. LUNGS AND PLEURA: Increased consolidation within the right upper, right middle, right lower, left upper, and left lower lobes. Atoll sign in the left upper lobe (7/46). No effusion or empyema. No pneumothorax. MEDIASTINUM/AXILLAE: Normal heart size. No pericardial effusion. Reactive mediastinal lymphadenopathy. CORONARY ARTERY CALCIFICATION: Mild. UPPER ABDOMEN: Hepatic steatosis. MUSCULOSKELETAL: No acute abnormality.     IMPRESSION: 1.  No pulmonary embolism. 2.  Increased consolidation associated with the multifocal pneumonia (right greater than left lungs). Appearance is nonspecific but could be seen in the setting of atypical infection (including fungal organisms) or bacterial pneumonia. No empyema or other complication.    CT Chest Pulmonary Embolism w Contrast    Result Date: 10/23/2023  EXAM: CT CHEST PULMONARY EMBOLISM W CONTRAST LOCATION: [facility] DATE: 10/23/2023 INDICATION: Right back melanoma diagnosed April 2023. On immunotherapy. Two weeks of cough and morrison, finished augmentin 1 week ago for pneumonia COMPARISON: PET  CT 04/10/2023 TECHNIQUE: CT chest pulmonary angiogram during arterial phase injection of IV contrast. Multiplanar reformats and MIP reconstructions were performed. Dose reduction techniques were used. CONTRAST: Isovue 370 90mL FINDINGS: ANGIOGRAM CHEST: Adequate opacification of the pulmonary arteries and branches. No evidence of pulmonary emboli. Aorta and great vessels are normal.  LUNGS AND PLEURA: Asymmetric, right greater than left areas of consolidation with air bronchograms are noted. This involves most of the right upper, lower and middle lobes with smaller area of involvement of the left upper lobe and in the medial basilar segments of the left lower lobe. Consolidated lung enhances normally. No effusions. MEDIASTINUM/AXILLAE: Few  less than 1 cm right paratracheal and subcarinal nodes are present. CORONARY ARTERY CALCIFICATION: Minimal UPPER ABDOMEN: No liver lesions. Fatty atrophy of the spleen. MUSCULOSKELETAL: No suspicious  lesions. Marked gynecomastia.     IMPRESSION: 1.  Asymmetric, right greater than left multilobar pneumonia with significant involvement of the right lower, middle and upper lobes. No parapneumonic effusions. 2.  Minimal reactive adenopathy. 3.  No evidence of pulmonary emboli. 4.  No evidence of metastasis.

## 2023-11-09 NOTE — PROGRESS NOTES
Concern by nursing staff of increased WOB  Will restart BiPAP    Sen Rousseau MD  Critical Care Medicine

## 2023-11-10 ENCOUNTER — APPOINTMENT (OUTPATIENT)
Dept: OCCUPATIONAL THERAPY | Facility: CLINIC | Age: 58
End: 2023-11-10
Attending: INTERNAL MEDICINE
Payer: COMMERCIAL

## 2023-11-10 ENCOUNTER — APPOINTMENT (OUTPATIENT)
Dept: PHYSICAL THERAPY | Facility: CLINIC | Age: 58
End: 2023-11-10
Payer: COMMERCIAL

## 2023-11-10 PROBLEM — N17.9 ACUTE KIDNEY FAILURE, UNSPECIFIED (H): Status: ACTIVE | Noted: 2023-11-10

## 2023-11-10 LAB
ANION GAP SERPL CALCULATED.3IONS-SCNC: 12 MMOL/L (ref 7–15)
BASE EXCESS BLDA CALC-SCNC: 2.6 MMOL/L
BASE EXCESS BLDA CALC-SCNC: 3.2 MMOL/L
BUN SERPL-MCNC: 63.5 MG/DL (ref 6–20)
CALCIUM SERPL-MCNC: 9.1 MG/DL (ref 8.6–10)
CHLORIDE SERPL-SCNC: 115 MMOL/L (ref 98–107)
COHGB MFR BLD: 97.3 % (ref 96–97)
COHGB MFR BLD: 99.3 % (ref 96–97)
CREAT SERPL-MCNC: 1.71 MG/DL (ref 0.67–1.17)
CRP SERPL-MCNC: 72.3 MG/L
DEPRECATED HCO3 PLAS-SCNC: 25 MMOL/L (ref 22–29)
EGFRCR SERPLBLD CKD-EPI 2021: 46 ML/MIN/1.73M2
ERYTHROCYTE [DISTWIDTH] IN BLOOD BY AUTOMATED COUNT: 15.3 % (ref 10–15)
GLUCOSE BLDC GLUCOMTR-MCNC: 151 MG/DL (ref 70–99)
GLUCOSE BLDC GLUCOMTR-MCNC: 157 MG/DL (ref 70–99)
GLUCOSE BLDC GLUCOMTR-MCNC: 157 MG/DL (ref 70–99)
GLUCOSE BLDC GLUCOMTR-MCNC: 165 MG/DL (ref 70–99)
GLUCOSE BLDC GLUCOMTR-MCNC: 166 MG/DL (ref 70–99)
GLUCOSE SERPL-MCNC: 158 MG/DL (ref 70–99)
HCO3 BLD-SCNC: 27 MMOL/L (ref 23–29)
HCO3 BLD-SCNC: 27 MMOL/L (ref 23–29)
HCT VFR BLD AUTO: 26.1 % (ref 40–53)
HGB BLD-MCNC: 8.1 G/DL (ref 13.3–17.7)
MAGNESIUM SERPL-MCNC: 3.2 MG/DL (ref 1.7–2.3)
MCH RBC QN AUTO: 28.5 PG (ref 26.5–33)
MCHC RBC AUTO-ENTMCNC: 31 G/DL (ref 31.5–36.5)
MCV RBC AUTO: 92 FL (ref 78–100)
NT-PROBNP SERPL-MCNC: 3054 PG/ML (ref 0–900)
OXYHGB MFR BLD: 95.8 % (ref 96–97)
OXYHGB MFR BLD: 97.7 % (ref 96–97)
PCO2 BLD: 38 MM HG (ref 35–45)
PCO2 BLD: 40 MM HG (ref 35–45)
PH BLD: 7.44 [PH] (ref 7.37–7.44)
PH BLD: 7.46 [PH] (ref 7.37–7.44)
PHOSPHATE SERPL-MCNC: 5.2 MG/DL (ref 2.5–4.5)
PLATELET # BLD AUTO: 437 10E3/UL (ref 150–450)
PO2 BLD: 108 MM HG (ref 80–90)
PO2 BLD: 86 MM HG (ref 80–90)
POTASSIUM SERPL-SCNC: 4.2 MMOL/L (ref 3.4–5.3)
RBC # BLD AUTO: 2.84 10E6/UL (ref 4.4–5.9)
SODIUM SERPL-SCNC: 152 MMOL/L (ref 135–145)
T3FREE SERPL-MCNC: 0.8 PG/ML (ref 2–4.4)
TEMPERATURE: 37 DEGREES C
TEMPERATURE: 37 DEGREES C
URATE SERPL-MCNC: 6.1 MG/DL (ref 3.4–7)
WBC # BLD AUTO: 12.3 10E3/UL (ref 4–11)

## 2023-11-10 PROCEDURE — 84550 ASSAY OF BLOOD/URIC ACID: CPT | Performed by: STUDENT IN AN ORGANIZED HEALTH CARE EDUCATION/TRAINING PROGRAM

## 2023-11-10 PROCEDURE — 86140 C-REACTIVE PROTEIN: CPT | Performed by: INTERNAL MEDICINE

## 2023-11-10 PROCEDURE — 250N000011 HC RX IP 250 OP 636: Mod: JZ | Performed by: INTERNAL MEDICINE

## 2023-11-10 PROCEDURE — 999N000215 HC STATISTIC HFNC ADULT NON-CPAP

## 2023-11-10 PROCEDURE — 250N000009 HC RX 250: Performed by: INTERNAL MEDICINE

## 2023-11-10 PROCEDURE — 80048 BASIC METABOLIC PNL TOTAL CA: CPT | Performed by: INTERNAL MEDICINE

## 2023-11-10 PROCEDURE — 200N000001 HC R&B ICU

## 2023-11-10 PROCEDURE — C9113 INJ PANTOPRAZOLE SODIUM, VIA: HCPCS | Mod: JZ | Performed by: INTERNAL MEDICINE

## 2023-11-10 PROCEDURE — 94640 AIRWAY INHALATION TREATMENT: CPT | Mod: 76

## 2023-11-10 PROCEDURE — 250N000011 HC RX IP 250 OP 636: Mod: JZ | Performed by: STUDENT IN AN ORGANIZED HEALTH CARE EDUCATION/TRAINING PROGRAM

## 2023-11-10 PROCEDURE — 99233 SBSQ HOSP IP/OBS HIGH 50: CPT | Performed by: STUDENT IN AN ORGANIZED HEALTH CARE EDUCATION/TRAINING PROGRAM

## 2023-11-10 PROCEDURE — 999N000157 HC STATISTIC RCP TIME EA 10 MIN

## 2023-11-10 PROCEDURE — 258N000003 HC RX IP 258 OP 636: Performed by: INTERNAL MEDICINE

## 2023-11-10 PROCEDURE — 250N000009 HC RX 250: Performed by: STUDENT IN AN ORGANIZED HEALTH CARE EDUCATION/TRAINING PROGRAM

## 2023-11-10 PROCEDURE — 94640 AIRWAY INHALATION TREATMENT: CPT

## 2023-11-10 PROCEDURE — 94660 CPAP INITIATION&MGMT: CPT

## 2023-11-10 PROCEDURE — 250N000011 HC RX IP 250 OP 636: Performed by: INTERNAL MEDICINE

## 2023-11-10 PROCEDURE — 82024 ASSAY OF ACTH: CPT | Performed by: NURSE PRACTITIONER

## 2023-11-10 PROCEDURE — 83735 ASSAY OF MAGNESIUM: CPT | Performed by: STUDENT IN AN ORGANIZED HEALTH CARE EDUCATION/TRAINING PROGRAM

## 2023-11-10 PROCEDURE — 85027 COMPLETE CBC AUTOMATED: CPT

## 2023-11-10 PROCEDURE — 84100 ASSAY OF PHOSPHORUS: CPT | Performed by: INTERNAL MEDICINE

## 2023-11-10 PROCEDURE — 97166 OT EVAL MOD COMPLEX 45 MIN: CPT | Mod: GO

## 2023-11-10 PROCEDURE — 97535 SELF CARE MNGMENT TRAINING: CPT | Mod: GO

## 2023-11-10 PROCEDURE — 258N000003 HC RX IP 258 OP 636: Performed by: STUDENT IN AN ORGANIZED HEALTH CARE EDUCATION/TRAINING PROGRAM

## 2023-11-10 PROCEDURE — 99291 CRITICAL CARE FIRST HOUR: CPT | Performed by: INTERNAL MEDICINE

## 2023-11-10 PROCEDURE — 82805 BLOOD GASES W/O2 SATURATION: CPT | Performed by: INTERNAL MEDICINE

## 2023-11-10 PROCEDURE — 97530 THERAPEUTIC ACTIVITIES: CPT | Mod: GP

## 2023-11-10 RX ORDER — DEXTROSE MONOHYDRATE 50 MG/ML
INJECTION, SOLUTION INTRAVENOUS CONTINUOUS
Status: DISCONTINUED | OUTPATIENT
Start: 2023-11-10 | End: 2023-11-11

## 2023-11-10 RX ORDER — DEXMEDETOMIDINE HYDROCHLORIDE 4 UG/ML
.1-1.2 INJECTION, SOLUTION INTRAVENOUS CONTINUOUS
Status: DISCONTINUED | OUTPATIENT
Start: 2023-11-10 | End: 2023-11-11

## 2023-11-10 RX ORDER — TAMSULOSIN HYDROCHLORIDE 0.4 MG/1
0.4 CAPSULE ORAL DAILY
Status: DISCONTINUED | OUTPATIENT
Start: 2023-11-11 | End: 2023-11-20 | Stop reason: HOSPADM

## 2023-11-10 RX ORDER — LORAZEPAM 2 MG/ML
1 INJECTION INTRAMUSCULAR EVERY 4 HOURS PRN
Status: DISCONTINUED | OUTPATIENT
Start: 2023-11-10 | End: 2023-11-11

## 2023-11-10 RX ADMIN — INSULIN ASPART 1 UNITS: 100 INJECTION, SOLUTION INTRAVENOUS; SUBCUTANEOUS at 19:57

## 2023-11-10 RX ADMIN — METHYLPREDNISOLONE SODIUM SUCCINATE 62.5 MG: 125 INJECTION, POWDER, FOR SOLUTION INTRAMUSCULAR; INTRAVENOUS at 17:49

## 2023-11-10 RX ADMIN — INSULIN ASPART 1 UNITS: 100 INJECTION, SOLUTION INTRAVENOUS; SUBCUTANEOUS at 16:26

## 2023-11-10 RX ADMIN — IPRATROPIUM BROMIDE AND ALBUTEROL SULFATE 3 ML: .5; 3 SOLUTION RESPIRATORY (INHALATION) at 16:16

## 2023-11-10 RX ADMIN — HALOPERIDOL LACTATE 5 MG: 5 INJECTION, SOLUTION INTRAMUSCULAR at 14:20

## 2023-11-10 RX ADMIN — PIPERACILLIN AND TAZOBACTAM 3.38 G: 3; .375 INJECTION, POWDER, LYOPHILIZED, FOR SOLUTION INTRAVENOUS at 04:06

## 2023-11-10 RX ADMIN — DEXMEDETOMIDINE HYDROCHLORIDE 1 MCG/KG/HR: 4 INJECTION, SOLUTION INTRAVENOUS at 05:56

## 2023-11-10 RX ADMIN — MIDAZOLAM 2 MG: 1 INJECTION INTRAMUSCULAR; INTRAVENOUS at 20:29

## 2023-11-10 RX ADMIN — MIDAZOLAM 2 MG: 1 INJECTION INTRAMUSCULAR; INTRAVENOUS at 06:41

## 2023-11-10 RX ADMIN — ENOXAPARIN SODIUM 40 MG: 100 INJECTION SUBCUTANEOUS at 19:57

## 2023-11-10 RX ADMIN — INSULIN ASPART 1 UNITS: 100 INJECTION, SOLUTION INTRAVENOUS; SUBCUTANEOUS at 08:30

## 2023-11-10 RX ADMIN — DEXTROSE MONOHYDRATE: 50 INJECTION, SOLUTION INTRAVENOUS at 22:01

## 2023-11-10 RX ADMIN — DEXMEDETOMIDINE HYDROCHLORIDE 1.2 MCG/KG/HR: 4 INJECTION, SOLUTION INTRAVENOUS at 22:09

## 2023-11-10 RX ADMIN — PANTOPRAZOLE SODIUM 40 MG: 40 INJECTION, POWDER, FOR SOLUTION INTRAVENOUS at 08:33

## 2023-11-10 RX ADMIN — IPRATROPIUM BROMIDE AND ALBUTEROL SULFATE 3 ML: .5; 3 SOLUTION RESPIRATORY (INHALATION) at 00:11

## 2023-11-10 RX ADMIN — DEXMEDETOMIDINE HYDROCHLORIDE 1.1 MCG/KG/HR: 4 INJECTION, SOLUTION INTRAVENOUS at 08:55

## 2023-11-10 RX ADMIN — INSULIN ASPART 1 UNITS: 100 INJECTION, SOLUTION INTRAVENOUS; SUBCUTANEOUS at 12:38

## 2023-11-10 RX ADMIN — IPRATROPIUM BROMIDE AND ALBUTEROL SULFATE 3 ML: .5; 3 SOLUTION RESPIRATORY (INHALATION) at 23:18

## 2023-11-10 RX ADMIN — DEXMEDETOMIDINE HYDROCHLORIDE 1.1 MCG/KG/HR: 4 INJECTION, SOLUTION INTRAVENOUS at 11:58

## 2023-11-10 RX ADMIN — PIPERACILLIN AND TAZOBACTAM 3.38 G: 3; .375 INJECTION, POWDER, LYOPHILIZED, FOR SOLUTION INTRAVENOUS at 19:57

## 2023-11-10 RX ADMIN — LORAZEPAM 1 MG: 2 INJECTION INTRAMUSCULAR; INTRAVENOUS at 20:35

## 2023-11-10 RX ADMIN — DEXMEDETOMIDINE HYDROCHLORIDE 1.2 MCG/KG/HR: 4 INJECTION, SOLUTION INTRAVENOUS at 00:46

## 2023-11-10 RX ADMIN — DEXTROSE MONOHYDRATE: 50 INJECTION, SOLUTION INTRAVENOUS at 08:34

## 2023-11-10 RX ADMIN — MIDAZOLAM 2 MG: 1 INJECTION INTRAMUSCULAR; INTRAVENOUS at 05:05

## 2023-11-10 RX ADMIN — MICAFUNGIN SODIUM 100 MG: 50 INJECTION, POWDER, LYOPHILIZED, FOR SOLUTION INTRAVENOUS at 16:26

## 2023-11-10 RX ADMIN — METHYLPREDNISOLONE SODIUM SUCCINATE 62.5 MG: 125 INJECTION, POWDER, FOR SOLUTION INTRAMUSCULAR; INTRAVENOUS at 02:50

## 2023-11-10 RX ADMIN — METHYLPREDNISOLONE SODIUM SUCCINATE 62.5 MG: 125 INJECTION, POWDER, FOR SOLUTION INTRAMUSCULAR; INTRAVENOUS at 11:56

## 2023-11-10 RX ADMIN — INSULIN ASPART 1 UNITS: 100 INJECTION, SOLUTION INTRAVENOUS; SUBCUTANEOUS at 04:06

## 2023-11-10 RX ADMIN — PIPERACILLIN AND TAZOBACTAM 3.38 G: 3; .375 INJECTION, POWDER, LYOPHILIZED, FOR SOLUTION INTRAVENOUS at 11:56

## 2023-11-10 RX ADMIN — IPRATROPIUM BROMIDE AND ALBUTEROL SULFATE 3 ML: .5; 3 SOLUTION RESPIRATORY (INHALATION) at 07:40

## 2023-11-10 RX ADMIN — DEXMEDETOMIDINE 1.1 MCG/KG/HR: 100 INJECTION, SOLUTION, CONCENTRATE INTRAVENOUS at 14:50

## 2023-11-10 ASSESSMENT — ACTIVITIES OF DAILY LIVING (ADL)
ADLS_ACUITY_SCORE: 53
ADLS_ACUITY_SCORE: 51
ADLS_ACUITY_SCORE: 51
ADLS_ACUITY_SCORE: 53

## 2023-11-10 NOTE — PROGRESS NOTES
[facility] MEDICINE  PROGRESS NOTE     Code Status: Full Code       Identification/Summary:   Jim Titus is a 58 year old male with a PMH of melanoma on Keytruda since May 2023, asthma, depression anxiety.  Recent hospitalization for pneumonia and respiratory failure.  11/2 readmitted for shortness of breath.  CT showed worsening right-sided infiltrates.  Admitted to the ICU on HFNC and BiPAP.  11/3 underwent bronchoscopy.  11/4 worsening respiratory status and intubated and started pressors due to ARDS/sepsis.  Receiving broad-spectrum antibiotics, steroids and aggressive hydration.  11/8 extubated and requiring now HFNC.  Confused.  Remains ICU status.     Assessment and Plan:    Changes today:  -Transitioned to HFNC, gas looked good this AM  -Resumed IVF, unclear why patient dumped -4L despite lower doses of diuretics, continue to trend.  -Discussed with oncology, preference to continue steroids, can call over the weekend with questions.  -Will need to have formal swallow study with SLP once his respiratory status and mentation improves.    Acute hypoxic respiratory failure  ARDS  Multifocal pneumonia  Sepsis due to lung infection  History of asthma  Status post bronchoscopy 11/3/2023  Intubation 11/4/2023  Extubation 11/8/2023  Procalcitonin is mildly elevated, CRP is significantly elevated above 200.  Beta-D-glucan, urine histo ag, negative.   Keytruda pneumonitis less likely per ID.  11/3 bronchoscopy under done  11/4 intubated and receiving steroids for possible pneumonitis.  BAL cultures usual dustin.   MRSA culture negative.   11/5 hypotension requiring Levophed   Vent management per intensivist.   Continue duoneb q8h.  Mucomyst nebs discontinued.   Continue zosyn  Completed azithromycin.  Continue micafungin for 7 days.   Continue steroid.   11/8 extubated and transition to HFNC.  CXR pending.  Lasix qday per critical care  Further management per intensivist.  Acute  metabolic encephalopathy  After extubation patient quite confused and disoriented.  Hopefully things will clear as he removes further from his sedation.  Acute kidney injury  Hypernatremia  -Baseline creatinine around 0.8,   11/7 creatinine worsened to 2.07 and sodium to 149  11/8 creatinine improved to 1.88.  11/10 Cr slowly rising after -4L yesterday. Gentle hydration given his respiratory status  Follow daily BMP.  Steroid induced hyperglycemia.  Continue correction insulin q4h. ICU protocol.  Anxiety/depression  -At home on Lexapro on mirtazapine  -On Klonipin at home, fill history consistent since at least 11/15/22, likely further, we will need to be careful making sure we do not precipitate a benzodiazapine withdrawal..  Anemia likely dilutional  -No signs of active bleeding  -Hemoglobin stable around 9  Melanoma  Receiving Keytruda as an outpatient managed by [facility].  We will consult their service to be notified of his current status.  -Oncology consulted, appreciate recs.     Diet: NPO  DVT Prophylaxis: Enoxaparin (Lovenox) SQ    COVID-19 PCR/influenza A/B/RSV negative from 11/2/2023    Fluids: Saline lock  Pain meds: N/A  Therapy: N/A  Lamas:PRESENT, indication: Strict 1-2 Hour I&O;Deep Sedation/Paralysis  Lines: PRESENT      PICC 11/04/23 Double Lumen Right Basilic medication drips-Site Assessment: WDL  Arterial Line 11/04/23 Other (Comment)-Site Assessment: Draining      Current Diet  Orders Placed This Encounter      NPO for Medical/Clinical Reasons Except for: No Exceptions    Supplements  None    Barriers to Discharge: ICU status,  encephalopathy    Disposition: To be determined    Clinically Significant Risk Factors         # Hypernatremia: Highest Na = 152 mmol/L in last 2 days, will monitor as appropriate      # Hypoalbuminemia: Lowest albumin = 2.7 g/dL at 11/3/2023  5:57 AM, will monitor as appropriate      # Acute Kidney Injury, unspecified: based on a >150% or 0.3 mg/dL increase in  "last creatinine compared to past 90 day average, will monitor renal function         # Obesity: Estimated body mass index is 34.91 kg/m  as calculated from the following:    Height as of this encounter: 1.93 m (6' 4\").    Weight as of this encounter: 130.1 kg (286 lb 12.8 oz).        # Financial/Environmental Concerns: none  # Asthma: noted on problem list        Interval History/Subjective:  See nursing notes for overnight events. Briefly combative, required several PRN's and went up on precedex drip. This morning denies any pain, somnolent but able to arouse, patient able to follow simple commands. Was on BiPap this AM, transitioned to HFNC in the afternoon.    Last 24H PRN:     hydrALAZINE (APRESOLINE) injection 10 mg, 10 mg at 11/09/23 1453    midazolam (VERSED) injection 2 mg, 2 mg at 11/10/23 0641    OLANZapine (zyPREXA) IV injection 5 mg, 5 mg at 11/09/23 2159    Physical Exam/Objective:  Temp:  [97.1  F (36.2  C)-99.8  F (37.7  C)] 97.6  F (36.4  C)  Pulse:  [] 50  Resp:  [16-37] 23  BP: (140-194)/(83-96) 163/84  FiO2 (%):  [40 %-60 %] 53 %  SpO2:  [87 %-99 %] 98 %  Wt Readings from Last 4 Encounters:   11/08/23 130.1 kg (286 lb 12.8 oz)   10/23/23 117.9 kg (260 lb)   10/23/23 118.1 kg (260 lb 6.4 oz)   08/16/23 128.6 kg (283 lb 9.6 oz)     Body mass index is 34.91 kg/m .    Constitutional: awakens to loud voice, BIPAP in place.  ENT: Normocephalic, without obvious abnormality, atraumatic, external ears without lesions, oral pharynx with moist mucous membranes, tonsils without erythema or exudates, gums normal and good dentition.  Respiratory: Poor air movement and substantially decreased breath sounds at the right side, expiratory wheezing.  Cardiovascular: Normal apical impulse, regular rate and rhythm, normal S1 and S2, no S3 or S4, and no murmur noted  GI: No scars, normal bowel sounds, soft, non-distended, non-tender, no masses palpated, no hepatosplenomegally  Skin: normal skin color, texture, " turgor, no redness, warmth, or swelling, and no rashes  Musculoskeletal: There is no redness, warmth, or swelling of the joints.  Motor strength is 5 out of 5 all extremities bilaterally.  Tone is normal.  Trace lower extremity edema bilaterally  Neurologic: Cranial nerves II-XII are grossly intact. Sensory:  Sensory intact  Neuropsychiatric: Altered/somnolent       Medications:   Personally Reviewed.  Medications    dexmedeTOMIDine      dextrose      D5W 75 mL/hr at 11/10/23 1200      [Held by provider] clonazePAM  1 mg Oral BID    enoxaparin ANTICOAGULANT  40 mg Subcutaneous Q24H    [Held by provider] escitalopram  20 mg Oral Daily with lunch    insulin aspart  1-6 Units Subcutaneous Q4H    ipratropium - albuterol 0.5 mg/2.5 mg/3 mL  3 mL Nebulization Q8H    methylPREDNISolone  62.5 mg Intravenous Q8H    micafungin  100 mg Intravenous Q24H    [Held by provider] mirtazapine  15 mg Oral At Bedtime    pantoprazole  40 mg Per Feeding Tube QAM AC    Or    pantoprazole  40 mg Intravenous QAM AC    piperacillin-tazobactam  3.375 g Intravenous Q8H    sodium chloride (PF)  10-40 mL Intracatheter Q7 Days    sodium chloride (PF)  3 mL Intracatheter Q8H    sodium chloride (PF)  3 mL Intracatheter Q8H       Data reviewed today: I personally reviewed all new medications, labs, imaging/diagnostics reports over the past 24 hours. Pertinent findings include:    Imaging:   Recent Results (from the past 24 hour(s))   XR Chest Port 1 View    Narrative    EXAM: XR CHEST PORT 1 VIEW  LOCATION: [facility]  DATE: 11/9/2023    INDICATION: Shortness of breath.  COMPARISON: 11/08/2023      Impression    IMPRESSION: Right-sided PICC tip in good position at cavoatrial junction. Heart size magnified in AP projection. Pulmonary venous congestion accentuated by shallow inspiration. Consolidative airspace opacities within the right lung and minimal left   basilar atelectasis unchanged. No pneumothorax.       Labs:  XR  Chest Port 1 View   Final Result   IMPRESSION: Right-sided PICC tip in good position at cavoatrial junction. Heart size magnified in AP projection. Pulmonary venous congestion accentuated by shallow inspiration. Consolidative airspace opacities within the right lung and minimal left    basilar atelectasis unchanged. No pneumothorax.      XR Chest Port 1 View   Final Result   IMPRESSION: Endotracheal tube removed. Right PICC tip projects over the lower SVC. Increasing right chest opacity, with tracheal shift rightward. This is most likely due to atelectasis. The right hemidiaphragm is elevated, similar to previous. There is    limited inspiratory volume on the left. No significant left infiltrate or pulmonary edema.      XR Abdomen Port 1 View   Final Result   IMPRESSION: Scattered gas and stool seen throughout nondistended large and small bowel. An enteric feeding tube courses below the diaphragm with the distal tip overlying the expected position of the gastric body.      XR Chest Port 1 View   Final Result   IMPRESSION: Tracheostomy tube 3 cm from the ingrid. Enteric tube entering the stomach. Esophageal probe in the mid esophagus. Right central line tip in the low SVC/right atrial junction similar to previous.      No significant change in the patchy infiltrates right lung laterally with elevation of the right hemidiaphragm. Left lung remains clear and expanded.      Echocardiogram Complete   Final Result      XR Chest Port 1 View   Final Result   IMPRESSION: Stable size of cardiomediastinal silhouette. Endotracheal tube with tip 2.7 cm above the ingrid. Right upper extremity PICC with tip overlying the right atrium. Nasogastric tube courses below the diaphragm, tip beyond the field-of-view. New    esophageal temperature probe noted. No significant change in volume loss and airspace opacification in the right lung. Possible small associated pleural effusion. Left lung is grossly clear. No pneumothorax. Bones are  unchanged.      XR Chest Port 1 View   Final Result   IMPRESSION: Endotracheal tube is 3.5 cm above the ingrid. Orogastric tube tip is below diaphragm. Heart size magnified in AP projection. Unchanged near complete opacification of the right hemithorax do to airspace consolidation +/- small pleural    effusion, with some sparing of the apex. Minimal left basilar subsegmental atelectasis. No pneumothorax.      XR Chest Port 1 View   Final Result   IMPRESSION: Right PICC is in place with tip overlying the low SVC. Increased right lung opacities with near complete opacification of the right hemithorax. Mild left upper lung opacities. No pneumothorax.      Abdomen US, limited (RUQ only)   Final Result   IMPRESSION:   Normal limited abdominal ultrasound.            CT Chest Pulmonary Embolism w Contrast   Final Result   IMPRESSION:   1.  No pulmonary embolism.    2.  Increased consolidation associated with the multifocal pneumonia (right greater than left lungs). Appearance is nonspecific but could be seen in the setting of atypical infection (including fungal organisms) or bacterial pneumonia. No empyema or    other complication.        Recent Results (from the past 24 hour(s))   Glucose by meter    Collection Time: 11/09/23  4:21 PM   Result Value Ref Range    GLUCOSE BY METER POCT 146 (H) 70 - 99 mg/dL   Blood gas arterial    Collection Time: 11/09/23  4:33 PM   Result Value Ref Range    pH Arterial 7.50 (H) 7.37 - 7.44    pCO2 Arterial 33 (L) 35 - 45 mm Hg    pO2 Arterial 85 80 - 90 mm Hg    Bicarbonate Arterial 26 23 - 29 mmol/L    O2 Sat, Arterial 97.4 (H) 96.0 - 97.0 %    Oxyhemoglobin 96.3 96.0 - 97.0 %    Base Excess/Deficit 2.8   mmol/L    Sample Stabilized Temperature 37.0 degrees C   Glucose by meter    Collection Time: 11/09/23  7:56 PM   Result Value Ref Range    GLUCOSE BY METER POCT 108 (H) 70 - 99 mg/dL   Glucose by meter    Collection Time: 11/09/23 11:51 PM   Result Value Ref Range    GLUCOSE BY METER  POCT 134 (H) 70 - 99 mg/dL   Glucose by meter    Collection Time: 11/10/23  4:05 AM   Result Value Ref Range    GLUCOSE BY METER POCT 151 (H) 70 - 99 mg/dL   Magnesium    Collection Time: 11/10/23  5:12 AM   Result Value Ref Range    Magnesium 3.2 (H) 1.7 - 2.3 mg/dL   Phosphorus    Collection Time: 11/10/23  5:12 AM   Result Value Ref Range    Phosphorus 5.2 (H) 2.5 - 4.5 mg/dL   CBC with platelets    Collection Time: 11/10/23  5:12 AM   Result Value Ref Range    WBC Count 12.3 (H) 4.0 - 11.0 10e3/uL    RBC Count 2.84 (L) 4.40 - 5.90 10e6/uL    Hemoglobin 8.1 (L) 13.3 - 17.7 g/dL    Hematocrit 26.1 (L) 40.0 - 53.0 %    MCV 92 78 - 100 fL    MCH 28.5 26.5 - 33.0 pg    MCHC 31.0 (L) 31.5 - 36.5 g/dL    RDW 15.3 (H) 10.0 - 15.0 %    Platelet Count 437 150 - 450 10e3/uL   Basic metabolic panel    Collection Time: 11/10/23  5:12 AM   Result Value Ref Range    Sodium 152 (H) 135 - 145 mmol/L    Potassium 4.2 3.4 - 5.3 mmol/L    Chloride 115 (H) 98 - 107 mmol/L    Carbon Dioxide (CO2) 25 22 - 29 mmol/L    Anion Gap 12 7 - 15 mmol/L    Urea Nitrogen 63.5 (H) 6.0 - 20.0 mg/dL    Creatinine 1.71 (H) 0.67 - 1.17 mg/dL    GFR Estimate 46 (L) >60 mL/min/1.73m2    Calcium 9.1 8.6 - 10.0 mg/dL    Glucose 158 (H) 70 - 99 mg/dL   Uric acid    Collection Time: 11/10/23  5:12 AM   Result Value Ref Range    Uric Acid 6.1 3.4 - 7.0 mg/dL   CRP inflammation    Collection Time: 11/10/23  5:12 AM   Result Value Ref Range    CRP Inflammation 72.30 (H) <5.00 mg/L   Blood gas arterial    Collection Time: 11/10/23  5:13 AM   Result Value Ref Range    pH Arterial 7.44 7.37 - 7.44    pCO2 Arterial 40 35 - 45 mm Hg    pO2 Arterial 86 80 - 90 mm Hg    Bicarbonate Arterial 27 23 - 29 mmol/L    O2 Sat, Arterial 97.3 (H) 96.0 - 97.0 %    Oxyhemoglobin 95.8 (L) 96.0 - 97.0 %    Base Excess/Deficit 2.6   mmol/L    Sample Stabilized Temperature 37.0 degrees C   Glucose by meter    Collection Time: 11/10/23  7:56 AM   Result Value Ref Range    GLUCOSE  BY METER POCT 157 (H) 70 - 99 mg/dL   Glucose by meter    Collection Time: 11/10/23 12:22 PM   Result Value Ref Range    GLUCOSE BY METER POCT 165 (H) 70 - 99 mg/dL       Pending Labs:  Unresulted Labs Ordered in the Past 30 Days of this Admission       Date and Time Order Name Status Description    11/9/2023  6:00 PM Adrenal corticotropin In process     11/6/2023  9:33 PM Fungal or Yeast Culture Routine Preliminary     11/3/2023 10:46 AM Nocardia culture - BAL Site 1 Preliminary     11/3/2023 10:46 AM Legionella culture - BAL Site 1 Preliminary     11/3/2023 10:46 AM Fungus Culture, non-blood - BAL Site 1 Preliminary     11/3/2023 10:46 AM Acid-Fast Bacilli Culture and Stain with AFB Stain In process             Bobo Rivera MD  Hospitalist     Medical Decision Making       75 MINUTES SPENT BY ME on the date of service doing chart review, history, exam, documentation & further activities per the note.

## 2023-11-10 NOTE — PROGRESS NOTES
"BP (!) 140/83   Pulse (!) 42   Temp 98  F (36.7  C) (Axillary)   Resp 18   Ht 1.93 m (6' 4\")   Wt 130.1 kg (286 lb 12.8 oz)   SpO2 99%   BMI 34.91 kg/m        The PT has been maintained on the BIPAP for the entire shift, though I did decrease his pressures and FIO2. Overall, his WOB, VT and SATs didn't seem to justify the previous level of support and I suspect that he will tolerate a trial off the BIPAP (at least for a time) on day shift.  "

## 2023-11-10 NOTE — PROGRESS NOTES
Patient agitated and combative with staff. Maxed out on Precedex gtt with all PRNs used. Staff has attempted to redirect, distract, and verbalize orders with patient with no avail. Called Tele ICU hub and got one time dose for Haldol IV. Finally was able to get a bedside sitter but only until 0300. Patient has been redirectable since attendant left at 0300. Will continue to monitor and assess.

## 2023-11-10 NOTE — PROGRESS NOTES
Oncology Progress Note:    DOS:  11/10/2023    Assessment/Plan:    Assessment/Plan:    1)  Acute hypoxic respiratory failure:  Given lack of other causative factors, Dr. Block would recommend treating patient for pneumonitis from pembrolizumab.  Continue Solu-Medrol 62.5 mg IV every 8 hours, which is the appropriate treatment and dose for immune-related pneumonitis.  May take over a week at least to see benefits.  SoluMedrol initiated on 11/4/2023.  Would recommend continued dosing of steroid and not start slow taper of steroid until respiratory status stabilizes/improves.    2)  Acute kidney injury:  Likely multifactorial but if at all immunotherapy related, treatment would be steroids which he is receiving.    3)  Anemia:  Likely related to current medical condition.  Transfuse if Hgb <7.0.    4)  Thyroid labs:  Reviewed with Dr. Block and no additional intervention recommended.      Subjective:  Patient resting in bed - recent dose of Haldol given.     Objective:  Patient has been combative and requiring multiple doses of sedation.  Now on HFNC.      Labs:  TSH = 0.13, Free T4 0.90, Free T3 = 0.8.    CBC shows WBC 12.3, Hgb decreased to 8.1, Platelets = 437,000.    Imaging:  CXR done 11/9/2023 shows heart size magnified in AP projection. Pulmonary venous congestion accentuated by shallow inspiration. Consolidative airspace opacities within the right lung and minimal left basilar atelectasis unchanged. No pneumothorax.    Please call [phone] to reach the on call oncologist with questions or concerns over the weekend.    Dr. Block is available in the office on Monday, 11/13 for questions and concerns.     Neela Nick, RN, MS, CNP  [facility]  [phone]  Cell:  [phone] (Tuesday - Friday, 8:30 am to 5 pm)

## 2023-11-10 NOTE — PROGRESS NOTES
11/10/23 1312   Appointment Info   Signing Clinician's Name / Credentials (OT) AMBER Frost   Rehab Comments (OT) Co-tx w/ PT   Living Environment   People in Home parent(s)  (Mother)   Current Living Arrangements house   Living Environment Comments Per chart, pt does not use ADs or DME at baseline.   Self-Care   Usual Activity Tolerance good   Current Activity Tolerance poor   Equipment Currently Used at Home none   Fall history within last six months no   Activity/Exercise/Self-Care Comment Per chart, pt is IND w/ all ADLs and IADLs at baseline. Works full time.   General Information   Onset of Illness/Injury or Date of Surgery 11/02/23   Referring Physician Bobo Rivera MD   Patient/Family Therapy Goal Statement (OT) none stated   Additional Occupational Profile Info/Pertinent History of Current Problem Jim Titus is a 58 year old male with a PMH of multiple myeloma on Keytruda since May 2023, asthma, depression anxiety.  Recent hospitalization for pneumonia and respiratory failure.  11/2 readmitted for shortness of breath.  Admitted to the ICU on HFNC and BiPAP.  11/3 underwent bronchoscopy.  11/4 worsening respiratory status and intubated and started pressors due to ARDS/sepsis. 11/8 extubated and requiring now HFNC.   Existing Precautions/Restrictions fall;oxygen therapy device and L/min  (HFNC, Art line on LUE)   Limitations/Impairments safety/cognitive   Cognitive Status Examination   Affect/Mental Status (Cognitive) confused;low arousal/lethargic   Follows Commands follows one-step commands;50-74% accuracy   Sensory   Sensory Quick Adds sensation intact   Posture   Posture not impaired   Strength Comprehensive (MMT)   Comment, General Manual Muscle Testing (MMT) Assessment moderate weakness   Bed Mobility   Bed Mobility rolling left;rolling right;scooting/bridging;supine-sit;sit-supine   Comment (Bed Mobility) Mod Ax2   Transfers   Transfers bed-chair transfer;sit-stand transfer;toilet  transfer   Transfer Comments per clinical judgement, pt likely will need Mod-Max Ax2   Activities of Daily Living   BADL Assessment/Intervention lower body dressing;toileting   Lower Body Dressing Assessment/Training   [place] Level (Lower Body Dressing) lower body dressing skills;maximum assist (25% patient effort)   Toileting   [place] Level (Toileting) adjust/manage clothing;toileting skills;maximum assist (25% patient effort)   Clinical Impression   Criteria for Skilled Therapeutic Interventions Met (OT) Yes, treatment indicated   OT Diagnosis decreased ADLs   Influenced by the following impairments acute resp. failure   OT Problem List-Impairments impacting ADL activity tolerance impaired;cognition;mobility;strength   Assessment of Occupational Performance 3-5 Performance Deficits   Identified Performance Deficits dressing, bed mobility, all transfers, toileting, bathing   Planned Therapy Interventions (OT) ADL retraining;bed mobility training;cognition;strengthening;transfer training;home program guidelines   Clinical Decision Making Complexity (OT) detailed assessment/moderate complexity   Risk & Benefits of therapy have been explained evaluation/treatment results reviewed;patient   OT Total Evaluation Time   OT Eval, Moderate Complexity Minutes (30751) 10   OT Goals   Therapy Frequency (OT) 5 times/week   OT Predicted Duration/Target Date for Goal Attainment 11/16/23   OT Goals Lower Body Dressing;Toilet Transfer/Toileting   OT: Lower Body Dressing Minimal assist   OT: Toilet Transfer/Toileting Minimal assist;toilet transfer;cleaning and garment management   Interventions   Interventions Quick Adds Self-Care/Home Management   Self-Care/Home Management   Self-Care/Home Mgmt/ADL, Compensatory, Meal Prep Minutes (40222) 12   Symptoms Noted During/After Treatment (Meal Preparation/Planning Training) increase work of breath;behavioral stress signs   Treatment Detail/Skilled Intervention Pt on HFNC upon  arrival w/ 1:1 present. Co-tx w/ PT. Pt having low arousal levels but woul open eyes after a few attempts to awaken. Pt cued for bed mobility to transfer supine>sitting EOB. Pt walked BLEs to EOB w/ CGA and transferred to sitting EOB w/ Mod Ax2. Pt tolerated sitting EOB ~7-8 mins w/ CGA and supporting self using BUEs. Pt cued to try figure 4 position to don socks but unable to complete - Max A for donning socks. Pt confused when sitting up and stated he wants to meet up with his friends. Pt reoriented to situation. Pt getting mildly agitated and agreeable to lay back down - transferred sit>supine w/ Mod Ax2 and MAx Ax2-3 to slide to HOB via bedsheet. Pt left w/ 1:1. Extra time for line mgmt.   OT Discharge Planning   OT Plan 11/16: bed mobility, sitting balance EOB, seated g/h   OT Discharge Recommendation (DC Rec) (S)  LTACH-pending meets medical criteria   OT Rationale for DC Rec Pt not tolerating much activity currently. would benefit from LTACH if meets criteria. Pt will need continued therapy to increase activity tolerance and independence w/ all ADLs   OT Brief overview of current status Mod A bed mobility, confused   Total Session Time   Timed Code Treatment Minutes 12   Total Session Time (sum of timed and untimed services) 22

## 2023-11-10 NOTE — PROGRESS NOTES
"BP (!) 158/78 (BP Location: Left arm)   Pulse 53   Temp 98.1  F (36.7  C) (Oral)   Resp 25   Ht 1.93 m (6' 4\")   Wt 130.1 kg (286 lb 12.8 oz)   SpO2 96%   BMI 34.91 kg/m      Pt was on hfnc when last seen. Current settings are 45 lpm @ 55% fio2. Bipap is in room and on standby, 14/8 @ 40% fio2. BS were diminished with some fine crackles. RT will continue to follow.   "

## 2023-11-10 NOTE — PLAN OF CARE
BiPap @60% FiO2 for today, attempted to transition to HFNC and pt did not tolerate. Pt stated he was SOB and 'needing more oxygen', passed bedside swallow for water but failed when attempting pills. Pt experienced a panic attack, 2mg of Versed given, Dex gtt increased, placed pt back on BiPap. Pt breathing at RR of 30 and pulling VT [id number]. Per Dr. Arias continue to utilize dex gtt, zyprexa and versed to manage anxiety and breathing. Per S fluids stopped after CXR results. Lactulose enema given, large watery stool x1. Seen by oncology, see note. Family at bedside, updated with plan of care.

## 2023-11-10 NOTE — PROGRESS NOTES
PULMONARY / CRITICAL CARE PROGRESS NOTE    Date / Time of Admission:  11/2/2023  7:52 AM    Assessment:   Principal Problem:    Acute respiratory failure with hypoxia (H)  Active Problems:    Elevated alkaline phosphatase level    Sepsis, due to unspecified organism, unspecified whether acute organ dysfunction present (H)    Acute kidney failure, unspecified (H)      Code Status:  Full Code  58yoM with history of melanoma previously on pembrolizumab last given September 2023 with ongoing pneumonitis presented with acute hypoxic respiratory failure requiring intubation in the setting of elevated inflammatory markers but culture negative and minimal improvement despite antibiotics extubated 11/8 but with ongoing agitated      Plan:   Pulmonary:  1) Acute hypoxic respiratory failure from pneumonitis versus asymmetric pneumonia  -s/p intubation, proning, paralysis and extubated 11/8 with persistent hypoxia  -Now on high flow, intermittently on bipap due to work of breathing  -Solumedrol. Trend CRP, if down would start to wean solumedrol    C/V:  1) Bradycardia due to medication effect  -BP ok, will monitor    ID:  1) Pneumonia?   -BAL found no evidence of infection aside from candida.   Beta-d-glucan negative  PJP negative  Urine histo negative  -Appreciate ID input;; Zosyn and micafungin until 11/11/23.    Renal:  1) MIKKI  2) Hypernatremia with over 6L free water deficit.   3) Volume overload--resolved  -Change IVF to D5W, 75cc/hr. This will improve his Free water deficit by almost 2L per day.  -Unfortunately cannot have access to enteral free water  -Hold lasix today, 4.5L negative in 24 hours    GI:  1) Need for nutrition  -  NPO for 3 days now but would be unlikely to tolerate NG tube (pulled one out already)    Neuro:  1) Acute delirium  -Precedex drip  -PRN haldol  -Added PRN benzodiazepine. Would typically want to avoid this in delirium but patient takes Klonopin BID at baseline so do  not want to precipitate  "withdrawal either.  -Mobilization with PT/OT as tolerated.    Heme:  -Prophylaxis with lovenox    Endo:  1)Hyperglycemia in setting of steroids  -Holding lantus, NISS      ICU DAILY CHECKLIST                           Can patient transfer out of MICU? no    FAST HUG:    Feeding:  Feeding: No.  Patient is receiving NPO    Lamas:Yes  Analgesia/Sedation:Yes RASS goal 0 to -1  Thromboembolic prophylaxis: Yes; Mode:  Lovenox  HOB>30:  Yes  Stress Ulcer Protocol Active: Yes; Mode: PPI  Glycemic Control: Any glucose > 180 no; Mode of Insulin Therapy: Sliding Scale Insulin    Clinically Significant Risk Factors         # Hypernatremia: Highest Na = 152 mmol/L in last 2 days, will monitor as appropriate      # Hypoalbuminemia: Lowest albumin = 2.7 g/dL at 11/3/2023  5:57 AM, will monitor as appropriate    # Acute Kidney Injury, unspecified: based on a >150% or 0.3 mg/dL increase in last creatinine compared to past 90 day average, will monitor renal function         # Obesity: Estimated body mass index is 34.91 kg/m  as calculated from the following:    Height as of this encounter: 1.93 m (6' 4\").    Weight as of this encounter: 130.1 kg (286 lb 12.8 oz).      # Financial/Environmental Concerns: none  # Asthma: noted on problem list          PHYSICAL THERAPY AND MOBILITY:  Can patient have PT and mobility trial: yes  Activity: up as tolerated with assistance    Critical Care Time: 35 minutes, critically ill due to severe hypoxia at risk of requiring reintubation.       Subjective:   HPI:  Jim Titus is a 58 year old male with history of melanoma on pembrolizumab last given in September with persistent dyspnea and worsening hypoxia. Admitted 11/2/23 and intubated 11/4 due to worsening hypoxia. Requiring proning and paralysis.     Extubated 11/8 to high-flow but persistent hypoxia and agitation. Ongoing issues with hypernatremia and volume overload.    Overnight agitated, requiring IV haldol for safety of staff and patient. "     Principal Problem:    Acute respiratory failure with hypoxia (H)  Active Problems:    Elevated alkaline phosphatase level    Sepsis, due to unspecified organism, unspecified whether acute organ dysfunction present (H)    Acute kidney failure, unspecified (H)      Allergies: Chicken [chicken protein], Cantaloupe [cantaloupe extract allergy skin test], Wellbutrin [bupropion], Grass pollen [grass], and Turkey [poultry meal]     MEDS:  Scheduled Meds:   clonazePAM  1 mg Oral BID    enoxaparin ANTICOAGULANT  40 mg Subcutaneous Q24H    [Held by provider] escitalopram  20 mg Oral Daily with lunch    [Held by provider] fluticasone  1 puff Inhalation BID    insulin aspart  1-6 Units Subcutaneous Q4H    [Held by provider] insulin glargine  15 Units Subcutaneous BID    ipratropium - albuterol 0.5 mg/2.5 mg/3 mL  3 mL Nebulization Q8H    methylPREDNISolone  62.5 mg Intravenous Q8H    micafungin  100 mg Intravenous Q24H    [Held by provider] mirtazapine  15 mg Oral At Bedtime    pantoprazole  40 mg Per Feeding Tube QAM AC    Or    pantoprazole  40 mg Intravenous QAM AC    piperacillin-tazobactam  3.375 g Intravenous Q8H    sodium chloride (PF)  10-40 mL Intracatheter Q7 Days    sodium chloride (PF)  3 mL Intracatheter Q8H    sodium chloride (PF)  3 mL Intracatheter Q8H     Continuous Infusions:   dexmedeTOMIDine 1.1 mcg/kg/hr (11/10/23 1000)    dextrose      D5W 75 mL/hr at 11/10/23 1000     PRN Meds:.acetaminophen, albuterol, albuterol, dextrose, glucose **OR** dextrose **OR** glucagon, diphenhydrAMINE **OR** diphenhydrAMINE, fentaNYL, haloperidol lactate, haloperidol lactate, hydrALAZINE, hydrOXYzine **OR** hydrOXYzine, lactulose, lidocaine (viscous), lidocaine, loperamide, melatonin, midazolam, naloxone **OR** naloxone **OR** naloxone **OR** naloxone, OLANZapine, polyethylene glycol, senna-docusate **OR** sennosides, sodium chloride (PF), sodium chloride (PF), sodium chloride (PF), sodium chloride (PF), sodium chloride  "(PF)      Objective:   VITALS:  BP (!) 163/84 (BP Location: Left arm)   Pulse 54   Temp 97.1  F (36.2  C) (Axillary)   Resp 21   Ht 1.93 m (6' 4\")   Wt 130.1 kg (286 lb 12.8 oz)   SpO2 94%   BMI 34.91 kg/m    EXAM:  General appearance: appears stated age and lethargic but arousable  Neck: no adenopathy, no carotid bruit, no JVD, supple, symmetrical, trachea midline, and thyroid not enlarged, symmetric, no tenderness/mass/nodules  Lungs:  decreased breath sounds bilaterally  Heart: RRR< S1 and S2  Abdomen: soft, non-tender; bowel sounds normal; no masses,  no organomegaly  Extremities: minimal pitting edema  Skin: Skin color, texture, turgor normal. No rashes or lesions  Neurologic: oriented to self, difficult to get him to answer questions, moving all 4s.     I&O:     Intake/Output Summary (Last 24 hours) at 11/10/2023 1143  Last data filed at 11/10/2023 1000  Gross per 24 hour   Intake 1361.4 ml   Output 5000 ml   Net -3638.6 ml           Data Review:  Chest Xray  Personally reviewed image/s and Personally reviewed impression/s  EXAM: XR CHEST PORT 1 VIEW  LOCATION: [facility]  DATE: 11/9/2023     INDICATION: Shortness of breath.  COMPARISON: 11/08/2023                                                                      IMPRESSION: Right-sided PICC tip in good position at cavoatrial junction. Heart size magnified in AP projection. Pulmonary venous congestion accentuated by shallow inspiration. Consolidative airspace opacities within the right lung and minimal left   basilar atelectasis unchanged. No pneumothorax.    LABS      Latest Ref Rng & Units 11/3/2023     5:57 AM 11/4/2023     4:21 AM 11/5/2023     5:32 AM 11/6/2023     3:43 AM 11/7/2023     5:29 AM 11/9/2023     5:04 AM 11/10/2023     5:12 AM   Glucose Values   Glucose 70 - 99 mg/dL 99  102  252  154  141  147  158        Results for orders placed or performed during the hospital encounter of 11/02/23   Basic metabolic " panel   Result Value Ref Range    Sodium 152 (H) 135 - 145 mmol/L    Potassium 4.2 3.4 - 5.3 mmol/L    Chloride 115 (H) 98 - 107 mmol/L    Carbon Dioxide (CO2) 25 22 - 29 mmol/L    Anion Gap 12 7 - 15 mmol/L    Urea Nitrogen 63.5 (H) 6.0 - 20.0 mg/dL    Creatinine 1.71 (H) 0.67 - 1.17 mg/dL    GFR Estimate 46 (L) >60 mL/min/1.73m2    Calcium 9.1 8.6 - 10.0 mg/dL    Glucose 158 (H) 70 - 99 mg/dL     Lab Results   Component Value Date    WBC 12.3 (H) 11/10/2023    HGB 8.1 (L) 11/10/2023    HCT 26.1 (L) 11/10/2023    MCV 92 11/10/2023     11/10/2023       AB.44/40/86/27      By:  Dara Fatima MD  Pulmonary/Critical Care

## 2023-11-11 ENCOUNTER — APPOINTMENT (OUTPATIENT)
Dept: SPEECH THERAPY | Facility: CLINIC | Age: 58
End: 2023-11-11
Attending: INTERNAL MEDICINE
Payer: COMMERCIAL

## 2023-11-11 ENCOUNTER — APPOINTMENT (OUTPATIENT)
Dept: PHYSICAL THERAPY | Facility: CLINIC | Age: 58
End: 2023-11-11
Payer: COMMERCIAL

## 2023-11-11 LAB
ALLEN'S TEST: NO
ANION GAP SERPL CALCULATED.3IONS-SCNC: 10 MMOL/L (ref 7–15)
BASE EXCESS BLDA CALC-SCNC: 3.1 MMOL/L
BUN SERPL-MCNC: 57.2 MG/DL (ref 6–20)
CALCIUM SERPL-MCNC: 8.7 MG/DL (ref 8.6–10)
CHLORIDE SERPL-SCNC: 113 MMOL/L (ref 98–107)
COHGB MFR BLD: 98.7 % (ref 96–97)
CREAT SERPL-MCNC: 1.62 MG/DL (ref 0.67–1.17)
DEPRECATED HCO3 PLAS-SCNC: 26 MMOL/L (ref 22–29)
EGFRCR SERPLBLD CKD-EPI 2021: 49 ML/MIN/1.73M2
ERYTHROCYTE [DISTWIDTH] IN BLOOD BY AUTOMATED COUNT: 15.6 % (ref 10–15)
FLOW: 45 LPM
GLUCOSE BLDC GLUCOMTR-MCNC: 125 MG/DL (ref 70–99)
GLUCOSE BLDC GLUCOMTR-MCNC: 150 MG/DL (ref 70–99)
GLUCOSE BLDC GLUCOMTR-MCNC: 160 MG/DL (ref 70–99)
GLUCOSE BLDC GLUCOMTR-MCNC: 160 MG/DL (ref 70–99)
GLUCOSE BLDC GLUCOMTR-MCNC: 169 MG/DL (ref 70–99)
GLUCOSE BLDC GLUCOMTR-MCNC: 170 MG/DL (ref 70–99)
GLUCOSE SERPL-MCNC: 172 MG/DL (ref 70–99)
HCO3 BLD-SCNC: 27 MMOL/L (ref 23–29)
HCT VFR BLD AUTO: 31.4 % (ref 40–53)
HGB BLD-MCNC: 9.6 G/DL (ref 13.3–17.7)
MAGNESIUM SERPL-MCNC: 3 MG/DL (ref 1.7–2.3)
MCH RBC QN AUTO: 28.2 PG (ref 26.5–33)
MCHC RBC AUTO-ENTMCNC: 30.6 G/DL (ref 31.5–36.5)
MCV RBC AUTO: 92 FL (ref 78–100)
O2/TOTAL GAS SETTING VFR VENT: 0.43 %
OXYHGB MFR BLD: 96.5 % (ref 96–97)
PCO2 BLD: 39 MM HG (ref 35–45)
PH BLD: 7.45 [PH] (ref 7.37–7.44)
PHOSPHATE SERPL-MCNC: 4.4 MG/DL (ref 2.5–4.5)
PLATELET # BLD AUTO: 479 10E3/UL (ref 150–450)
PO2 BLD: 86 MM HG (ref 80–90)
POTASSIUM SERPL-SCNC: 4.2 MMOL/L (ref 3.4–5.3)
RBC # BLD AUTO: 3.4 10E6/UL (ref 4.4–5.9)
SODIUM SERPL-SCNC: 149 MMOL/L (ref 135–145)
TEMPERATURE: 37 DEGREES C
WBC # BLD AUTO: 12 10E3/UL (ref 4–11)

## 2023-11-11 PROCEDURE — 250N000009 HC RX 250: Performed by: INTERNAL MEDICINE

## 2023-11-11 PROCEDURE — 250N000013 HC RX MED GY IP 250 OP 250 PS 637: Performed by: STUDENT IN AN ORGANIZED HEALTH CARE EDUCATION/TRAINING PROGRAM

## 2023-11-11 PROCEDURE — 250N000011 HC RX IP 250 OP 636: Mod: JZ | Performed by: INTERNAL MEDICINE

## 2023-11-11 PROCEDURE — 84100 ASSAY OF PHOSPHORUS: CPT | Performed by: INTERNAL MEDICINE

## 2023-11-11 PROCEDURE — 94660 CPAP INITIATION&MGMT: CPT

## 2023-11-11 PROCEDURE — 92526 ORAL FUNCTION THERAPY: CPT | Mod: GN

## 2023-11-11 PROCEDURE — 250N000013 HC RX MED GY IP 250 OP 250 PS 637: Performed by: HOSPITALIST

## 2023-11-11 PROCEDURE — 250N000009 HC RX 250: Performed by: STUDENT IN AN ORGANIZED HEALTH CARE EDUCATION/TRAINING PROGRAM

## 2023-11-11 PROCEDURE — 999N000157 HC STATISTIC RCP TIME EA 10 MIN

## 2023-11-11 PROCEDURE — 82805 BLOOD GASES W/O2 SATURATION: CPT | Performed by: INTERNAL MEDICINE

## 2023-11-11 PROCEDURE — 250N000011 HC RX IP 250 OP 636: Mod: JZ | Performed by: HOSPITALIST

## 2023-11-11 PROCEDURE — 97110 THERAPEUTIC EXERCISES: CPT | Mod: GP

## 2023-11-11 PROCEDURE — 92610 EVALUATE SWALLOWING FUNCTION: CPT | Mod: GN

## 2023-11-11 PROCEDURE — 99291 CRITICAL CARE FIRST HOUR: CPT | Performed by: INTERNAL MEDICINE

## 2023-11-11 PROCEDURE — 999N000215 HC STATISTIC HFNC ADULT NON-CPAP

## 2023-11-11 PROCEDURE — 250N000009 HC RX 250: Performed by: HOSPITALIST

## 2023-11-11 PROCEDURE — 83735 ASSAY OF MAGNESIUM: CPT | Performed by: STUDENT IN AN ORGANIZED HEALTH CARE EDUCATION/TRAINING PROGRAM

## 2023-11-11 PROCEDURE — 94640 AIRWAY INHALATION TREATMENT: CPT

## 2023-11-11 PROCEDURE — 200N000001 HC R&B ICU

## 2023-11-11 PROCEDURE — 258N000003 HC RX IP 258 OP 636: Performed by: INTERNAL MEDICINE

## 2023-11-11 PROCEDURE — 250N000011 HC RX IP 250 OP 636: Mod: JZ | Performed by: STUDENT IN AN ORGANIZED HEALTH CARE EDUCATION/TRAINING PROGRAM

## 2023-11-11 PROCEDURE — 99232 SBSQ HOSP IP/OBS MODERATE 35: CPT | Performed by: HOSPITALIST

## 2023-11-11 PROCEDURE — 80048 BASIC METABOLIC PNL TOTAL CA: CPT | Performed by: INTERNAL MEDICINE

## 2023-11-11 PROCEDURE — 97530 THERAPEUTIC ACTIVITIES: CPT | Mod: GP

## 2023-11-11 PROCEDURE — 85027 COMPLETE CBC AUTOMATED: CPT

## 2023-11-11 PROCEDURE — 258N000003 HC RX IP 258 OP 636: Performed by: HOSPITALIST

## 2023-11-11 PROCEDURE — 94640 AIRWAY INHALATION TREATMENT: CPT | Mod: 76

## 2023-11-11 PROCEDURE — C9113 INJ PANTOPRAZOLE SODIUM, VIA: HCPCS | Mod: JZ | Performed by: INTERNAL MEDICINE

## 2023-11-11 RX ORDER — AMLODIPINE BESYLATE 5 MG/1
5 TABLET ORAL DAILY
Status: DISCONTINUED | OUTPATIENT
Start: 2023-11-11 | End: 2023-11-12

## 2023-11-11 RX ORDER — POLYETHYLENE GLYCOL 3350 17 G/17G
17 POWDER, FOR SOLUTION ORAL DAILY PRN
Status: DISCONTINUED | OUTPATIENT
Start: 2023-11-11 | End: 2023-11-20 | Stop reason: HOSPADM

## 2023-11-11 RX ORDER — PIPERACILLIN SODIUM, TAZOBACTAM SODIUM 3; .375 G/15ML; G/15ML
3.38 INJECTION, POWDER, LYOPHILIZED, FOR SOLUTION INTRAVENOUS ONCE
Status: COMPLETED | OUTPATIENT
Start: 2023-11-11 | End: 2023-11-11

## 2023-11-11 RX ORDER — CLONAZEPAM 0.5 MG/1
1 TABLET ORAL 2 TIMES DAILY PRN
Status: DISCONTINUED | OUTPATIENT
Start: 2023-11-11 | End: 2023-11-11

## 2023-11-11 RX ORDER — SENNOSIDES 8.6 MG
8.6 TABLET ORAL 2 TIMES DAILY
Status: DISCONTINUED | OUTPATIENT
Start: 2023-11-11 | End: 2023-11-11

## 2023-11-11 RX ORDER — HYDRALAZINE HYDROCHLORIDE 20 MG/ML
10 INJECTION INTRAMUSCULAR; INTRAVENOUS EVERY 6 HOURS PRN
Status: DISCONTINUED | OUTPATIENT
Start: 2023-11-11 | End: 2023-11-12

## 2023-11-11 RX ORDER — CLONAZEPAM 0.5 MG/1
1 TABLET ORAL 2 TIMES DAILY
Status: DISCONTINUED | OUTPATIENT
Start: 2023-11-11 | End: 2023-11-12

## 2023-11-11 RX ORDER — POLYETHYLENE GLYCOL 3350 17 G/17G
17 POWDER, FOR SOLUTION ORAL DAILY
Status: DISCONTINUED | OUTPATIENT
Start: 2023-11-12 | End: 2023-11-11

## 2023-11-11 RX ADMIN — DEXMEDETOMIDINE HYDROCHLORIDE 1.2 MCG/KG/HR: 4 INJECTION, SOLUTION INTRAVENOUS at 00:48

## 2023-11-11 RX ADMIN — AMLODIPINE BESYLATE 5 MG: 5 TABLET ORAL at 15:12

## 2023-11-11 RX ADMIN — PIPERACILLIN AND TAZOBACTAM 3.38 G: 3; .375 INJECTION, POWDER, LYOPHILIZED, FOR SOLUTION INTRAVENOUS at 19:56

## 2023-11-11 RX ADMIN — DEXMEDETOMIDINE HYDROCHLORIDE 1.2 MCG/KG/HR: 4 INJECTION, SOLUTION INTRAVENOUS at 09:09

## 2023-11-11 RX ADMIN — INSULIN ASPART 1 UNITS: 100 INJECTION, SOLUTION INTRAVENOUS; SUBCUTANEOUS at 13:21

## 2023-11-11 RX ADMIN — METHYLPREDNISOLONE SODIUM SUCCINATE 62.5 MG: 125 INJECTION, POWDER, FOR SOLUTION INTRAMUSCULAR; INTRAVENOUS at 19:07

## 2023-11-11 RX ADMIN — DEXMEDETOMIDINE HYDROCHLORIDE 1.2 MCG/KG/HR: 4 INJECTION, SOLUTION INTRAVENOUS at 05:58

## 2023-11-11 RX ADMIN — DEXMEDETOMIDINE HYDROCHLORIDE 1.2 MCG/KG/HR: 4 INJECTION, SOLUTION INTRAVENOUS at 03:17

## 2023-11-11 RX ADMIN — DEXMEDETOMIDINE HYDROCHLORIDE 0.9 MCG/KG/HR: 4 INJECTION, SOLUTION INTRAVENOUS at 11:25

## 2023-11-11 RX ADMIN — CLONAZEPAM 1 MG: 0.5 TABLET ORAL at 21:12

## 2023-11-11 RX ADMIN — INSULIN ASPART 1 UNITS: 100 INJECTION, SOLUTION INTRAVENOUS; SUBCUTANEOUS at 03:59

## 2023-11-11 RX ADMIN — PIPERACILLIN AND TAZOBACTAM 3.38 G: 3; .375 INJECTION, POWDER, LYOPHILIZED, FOR SOLUTION INTRAVENOUS at 03:38

## 2023-11-11 RX ADMIN — ENOXAPARIN SODIUM 40 MG: 100 INJECTION SUBCUTANEOUS at 19:56

## 2023-11-11 RX ADMIN — METHYLPREDNISOLONE SODIUM SUCCINATE 62.5 MG: 125 INJECTION, POWDER, FOR SOLUTION INTRAMUSCULAR; INTRAVENOUS at 11:26

## 2023-11-11 RX ADMIN — PIPERACILLIN AND TAZOBACTAM 3.38 G: 3; .375 INJECTION, POWDER, LYOPHILIZED, FOR SOLUTION INTRAVENOUS at 11:26

## 2023-11-11 RX ADMIN — IPRATROPIUM BROMIDE AND ALBUTEROL SULFATE 3 ML: .5; 3 SOLUTION RESPIRATORY (INHALATION) at 15:12

## 2023-11-11 RX ADMIN — HALOPERIDOL LACTATE 5 MG: 5 INJECTION, SOLUTION INTRAMUSCULAR at 02:50

## 2023-11-11 RX ADMIN — METHYLPREDNISOLONE SODIUM SUCCINATE 62.5 MG: 125 INJECTION, POWDER, FOR SOLUTION INTRAMUSCULAR; INTRAVENOUS at 01:38

## 2023-11-11 RX ADMIN — MIRTAZAPINE 15 MG: 15 TABLET, FILM COATED ORAL at 21:12

## 2023-11-11 RX ADMIN — DEXMEDETOMIDINE 0.4 MCG/KG/HR: 100 INJECTION, SOLUTION, CONCENTRATE INTRAVENOUS at 15:03

## 2023-11-11 RX ADMIN — PANTOPRAZOLE SODIUM 40 MG: 40 INJECTION, POWDER, FOR SOLUTION INTRAVENOUS at 06:04

## 2023-11-11 RX ADMIN — MICAFUNGIN SODIUM 100 MG: 50 INJECTION, POWDER, LYOPHILIZED, FOR SOLUTION INTRAVENOUS at 16:35

## 2023-11-11 RX ADMIN — IPRATROPIUM BROMIDE AND ALBUTEROL SULFATE 3 ML: .5; 3 SOLUTION RESPIRATORY (INHALATION) at 23:10

## 2023-11-11 RX ADMIN — INSULIN ASPART 1 UNITS: 100 INJECTION, SOLUTION INTRAVENOUS; SUBCUTANEOUS at 00:08

## 2023-11-11 RX ADMIN — IPRATROPIUM BROMIDE AND ALBUTEROL SULFATE 3 ML: .5; 3 SOLUTION RESPIRATORY (INHALATION) at 08:07

## 2023-11-11 RX ADMIN — INSULIN ASPART 1 UNITS: 100 INJECTION, SOLUTION INTRAVENOUS; SUBCUTANEOUS at 08:06

## 2023-11-11 RX ADMIN — LOPERAMIDE HYDROCHLORIDE 2 MG: 2 CAPSULE ORAL at 21:12

## 2023-11-11 ASSESSMENT — ACTIVITIES OF DAILY LIVING (ADL)
ADLS_ACUITY_SCORE: 53
ADLS_ACUITY_SCORE: 48
ADLS_ACUITY_SCORE: 48
ADLS_ACUITY_SCORE: 46
ADLS_ACUITY_SCORE: 53
ADLS_ACUITY_SCORE: 46
ADLS_ACUITY_SCORE: 46
ADLS_ACUITY_SCORE: 53
ADLS_ACUITY_SCORE: 53
ADLS_ACUITY_SCORE: 48
ADLS_ACUITY_SCORE: 53
ADLS_ACUITY_SCORE: 53

## 2023-11-11 NOTE — PROGRESS NOTES
"[facility]    Medicine Progress Note - Hospitalist Service    Date of Admission:  11/2/2023    Assessment & Plan   Jim Titus is a 58 year old male admitted with respiratory failure due to pneumonitis associated with pembrolizumab.  Remains on HFNC.  Continue with steroids in consultation with oncology.  Weaning off precedex infusion.  Able to advance diet per speech therapy recommendations today.      #  Acute hypoxic respiratory failure 2/2 pembrolizumab induced pneumonitis  - steroids per oncology    # Fungal and bacterial pneumonia  - completes abx today    # MIKKI  # Hypernatremia  - oral free water    # Steroid induced hyperglycemia  - ISS          Diet: NPO for Medical/Clinical Reasons Except for: No Exceptions      Lamas Catheter: Not present  Lines: PRESENT      PICC 11/04/23 Double Lumen Right Basilic medication drips-Site Assessment: WDL  [REMOVED] Arterial Line 11/04/23 Other (Comment)-Site Assessment: WDL      Cardiac Monitoring: None  Code Status: Full Code      Clinically Significant Risk Factors         # Hypernatremia: Highest Na = 152 mmol/L in last 2 days, will monitor as appropriate      # Hypoalbuminemia: Lowest albumin = 2.7 g/dL at 11/3/2023  5:57 AM, will monitor as appropriate            # Obesity: Estimated body mass index is 34.3 kg/m  as calculated from the following:    Height as of this encounter: 1.93 m (6' 4\").    Weight as of this encounter: 127.8 kg (281 lb 12.8 oz).      # Financial/Environmental Concerns: none  # Asthma: noted on problem list        Disposition Plan      Expected Discharge Date: 11/15/2023    Discharge Delays: 1:1 Sitter still ordered - MD to assess  Destination: home with family  Discharge Comments: ICU, IV steroids, IV ABX            Ezra Lee MD  Hospitalist Service  [facility]  Securely message with [employer] (more info)  Text page via [employer] Paging/Directory "   ______________________________________________________________________    Interval History   Reports breathing is better.  Reports he had a bowel movement in the last couple days.      Physical Exam   Vital Signs: Temp: 98.3  F (36.8  C) Temp src: Oral BP: (!) 157/81 Pulse: (!) 44   Resp: 17 SpO2: 98 % O2 Device: High Flow Nasal Cannula (HFNC) Oxygen Delivery: 45 LPM  Weight: 281 lbs 12.8 oz    Gen:  sitting in chair in no acute distress  Neuro: alert, conversant, attentive to conversation; some confusion  CV:  nl rate, regular rhythm  Pulm: no acute resp distress, ctab anteriorly  GI:  abdomen soft, NTTP    Medical Decision Making             Data   Reviewed:    Na 149  K 4.2  BUN 57  Cr 1.6    Abg 7.45/39/86    WBC 12  Hgb 9.6  Plts 479

## 2023-11-11 NOTE — SIGNIFICANT EVENT
CODE GREEN DOCUMENTATION    Date: 11/10/23   Reason for Code Green: removing oxygen mask, combative  Start Time: 8:29pm  End Time: 8:39pm    Arrived to floor during code green. Patient was previously combative and spitting at providers; trying to remove HFNC. 2x Versed and 1x Ativan given and patient placed in 4 point hard restraints. Order placed for restraints.    Patient was placed in restraints after attempting to disrupt lines, tubes or dressings.  Less restrictive measures were ineffective at preventing such disruption.  See flowsheet for details about provider(s) involved, less restrictive measures attempted, patient and family education, discontinuation criteria and restraint type.    Discussed that RN should page hospitalist about removing restraints when appropriate.    Janet Miller MD PGY2 11/10/2023  [facility]

## 2023-11-11 NOTE — PROGRESS NOTES
Pt remains on 45L, 43% FiO2 HFNC.  RT has been able to wean FiO2 from 50% FiO2 this shift.  Neb treatment given inline as ordered.  BS: diminished.

## 2023-11-11 NOTE — PROGRESS NOTES
"Speech-Language Pathology: Clinical Swallow Evaluation      11/11/23 1200   Appointment Info   Signing Clinician's Name / Credentials (SLP) Madisyn Granado MA, CCC-SLP   General Information   Onset of Illness/Injury or Date of Surgery 11/02/23   Referring Physician Dara Fatima MD   Pertinent History of Current Problem   Per ordering physician's progress note this AM: \"58yoM with history of melanoma previously on pembrolizumab last given September 2023 with ongoing pneumonitis presented with acute hypoxic respiratory failure requiring intubation in the setting of elevated inflammatory markers but culture negative and minimal improvement despite antibiotics extubated 11/8 but with ongoing agitated delirium.\"      Patient was intubated x4 days (11/4 to 11/8). Speech Therapy was consulted to evaluate swallow function and determine safety/appropriateness of PO diet.     General Observations Patient alert sitting up in chair upon arrival. He was mildly lethargic and slow to respond to questions at times. He has a 1:1 sitter for safety.   Type of Evaluation   Type of Evaluation Swallow Evaluation   Oral Motor   Oral Musculature generally intact   Mucosal Quality dry   Dentition (Oral Motor)   Dentition (Oral Motor) natural dentition   Facial Symmetry (Oral Motor)   Facial Symmetry (Oral Motor) WNL   Lip Function (Oral Motor)   Lip Range of Motion (Oral Motor) WNL   Tongue Function (Oral Motor)   Tongue ROM (Oral Motor) WNL   Jaw Function (Oral Motor)   Jaw Function (Oral Motor) WNL   Vocal Quality/Secretion Management (Oral Motor)   Vocal Quality (Oral Motor) dysphonic   Secretion Management (Oral Motor) WNL   Comment, Vocal Quality/Secretion Management (Oral Motor) Patient's vocal quality is clear. Voice is notable for dysphonia and reduced volume.   General Swallowing Observations   Past History of Dysphagia None per review of EMR   Respiratory Support high-flow  (FiO2 45%; 45 LPM)   Current Diet/Method of Nutritional " Intake (General Swallowing Observations, NIS) NPO   Swallowing Evaluation Clinical swallow evaluation   Clinical Swallow Evaluation   Feeding Assistance minimal assistance required   Clinical Swallow Evaluation Textures Trialed thin liquids;pureed   Clinical Swallow Eval: Thin Liquid Texture Trial   Mode of Presentation, Thin Liquids spoon;fed by clinician;cup;straw;self-fed   Volume of Liquid or Food Presented 3 ice chips; >8 ounces water   Oral Phase of Swallow WFL   Pharyngeal Phase of Swallow intact  (No overt s/sx of aspiration)   Diagnostic Statement Subjectively, swallow response appeared timely to mildly delayed. No overt clinical s/sx of aspiration observed.   Clinical Swallow Evaluation: Puree Solid Texture Trial   Mode of Presentation, Puree spoon;fed by clinician;self-fed   Volume of Puree Presented 4 ounces   Oral Phase, Puree WFL   Pharyngeal Phase, Puree coughing/choking  (x1)   Diagnostic Statement Patient demonstrated effective bolus prep and timely A-P bolus transit. No oral stasis noted after swallows. Cough noted x1, however, this was delayed did not appear to be directly related to swallow.   Esophageal Phase of Swallow   Patient reports or presents with symptoms of esophageal dysphagia No   Swallowing Recommendations   Diet Consistency Recommendations pureed (level 4);thin liquids (level 0)   Supervision Level for Intake 1:1 supervision needed  (To ensure that patient adheres to safe swallowing strategies)   Mode of Delivery Recommendations bolus size, small;no straws;slow rate of intake   Monitoring/Assistance Required (Eating/Swallowing) stop eating activities when fatigue is present;monitor for cough or change in vocal quality with intake   Recommended Feeding/Eating Techniques (Swallow Eval) maintain upright sitting position for eating;minimize distractions during oral intake;set-up and prepare tray;provide assist with feeding  (Assist w/ feeding as needed)   Medication Administration  Recommendations, Swallowing (SLP) Give pills 1-2 at a time, mixed in puree. Crush only as needed.   Instrumental Assessment Recommendations instrumental evaluation not recommended at this time  (But would be indicated if patient experienced a decline in pulmonary status or developed increased O2 needs.)   General Therapy Interventions   Planned Therapy Interventions Dysphagia Treatment   Dysphagia treatment Modified diet education;Instruction of safe swallow strategies   Clinical Impression   Criteria for Skilled Therapeutic Interventions Met (SLP Merary) Yes, treatment indicated   SLP Diagnosis Oropharyngeal dysphagia  (In the setting of acute hypoxic respiratory failure with recent intubation/extubation.)   Risks & Benefits of therapy have been explained evaluation/treatment results reviewed;care plan/treatment goals reviewed;risks/benefits reviewed;current/potential barriers reviewed;participants voiced agreement with care plan;participants included;patient  (Question patient's true comprehension given current cognition)   Clinical Impression Comments   Clinical swallow evaluation completed per MD order. No oral dysphagia observed with the textures trialed. A solid/semi-solid texture was not trialed due to patient's current confusion and lethargy. There were no definitive s/sx of aspiration during this evaluation. There was a single episode of delayed coughing following bolus of puree, however, given delayed onset of cough, do not suspect that this was directly related to swallow.     At this time, patient appears appropriate for diet of pureed textures (Level 4) and thin liquids with strict adherence to the safe swallowing strategies indicated above. Of note, he is at increased risk for silent aspiration given recent prolonged intubation period (x4 days) as well as dysphonic voice.     If patient should experience a decline in pulmonary function or develop increased O2 needs, recommend changing him back to NPO and  obtaining an instrumental evaluation (VFSS) on Monday (11/13). Speech Therapy will follow closely to monitor diet tolerance, reinforce strategies, and trial advanced food textures as appropriate.     SLP Total Evaluation Time   Eval: oral/pharyngeal swallow function, clinical swallow Minutes (80916) 21   SLP Goals   Therapy Frequency (SLP Eval) 6 times/week   SLP Predicted Duration/Target Date for Goal Attainment 11/21/23   SLP Goals Swallow   SLP: Safely tolerate diet without signs/symptoms of aspiration Regular diet;Thin liquids;With use of swallow precautions;Independently   Interventions   Interventions Quick Adds Swallowing Dysfunction   Swallowing Dysfunction &/or Oral Function for Feeding   Treatment of Swallowing Dysfunction &/or Oral Function for Feeding Minutes (40834) 8   Treatment Detail/Skilled Intervention Patient demonstrated mild impulsivity when self-drinking. He was inconsistently responsive to verbal cues to take small, single sips. He sometimes took large and/or two consecutive boluses. Despite this, no overt clinical s/sx of aspiration observed with trials of thin liquid. Patient was instructed on safe swallowing strategies; specifically, being alert and sitting fully upright (preferably in chair) for all intake, using a slow rate of intake, and taking single, small bites & sips. Patient verbalized understanding but will require ongoing reinforcement of this information due to current cognition.   SLP Discharge Planning   SLP Plan F/U re: diet tolerance. If concerns, request VFSS. Trial advanced textures as appropriate.   SLP Discharge Recommendation Transitional Care Facility   SLP Rationale for DC Rec Swallow function is below baseline at present.   SLP Brief overview of current status  Recommend diet of pureed textures and thin liquids. Pt to be alert & sitting fully upright (preferably in chair) for all intake, eat slowly, and take single, small bites & sips. Nursing to assist w/ tray set-up  and feeding as needed. If s/sx of aspiration are observed, or patient experiences a decline in respiratory status, change to NPO. Speech Therapy will continue to follow for dysphagia management.   Total Session Time   Total Session Time (sum of timed and untimed services) 29   Psychosocial Support   Trust Relationship/Rapport care explained;choices provided;emotional support provided;empathic listening provided;questions answered;questions encouraged;reassurance provided;thoughts/feelings acknowledged

## 2023-11-11 NOTE — PROGRESS NOTES
PULMONARY / CRITICAL CARE PROGRESS NOTE    Date / Time of Admission:  11/2/2023  7:52 AM    Assessment:   Principal Problem:    Acute respiratory failure with hypoxia (H)  Active Problems:    Elevated alkaline phosphatase level    Sepsis, due to unspecified organism, unspecified whether acute organ dysfunction present (H)    Acute kidney failure, unspecified (H)      Code Status:  Full Code  58yoM with history of melanoma previously on pembrolizumab last given September 2023 with ongoing pneumonitis presented with acute hypoxic respiratory failure requiring intubation in the setting of elevated inflammatory markers but culture negative and minimal improvement despite antibiotics extubated 11/8 but with ongoing agitated delirium.       Plan:   Pulmonary:  1) Acute hypoxic respiratory failure from pneumonitis versus asymmetric pneumonia  -s/p intubation, proning, paralysis and extubated 11/8 with persistent hypoxia  -High flow, titrating. Has not required Bipap for over 24 hours.   -Solumedrol. I fear this is worsening his delirium but given severity of pneumonitis, will continue for 7 days prior to tapering.     C/V:  1) Bradycardia due to medication effect  -BP ok, will monitor    ID:  1) Pneumonia?   -BAL found no evidence of infection aside from candida.   Beta-d-glucan negative  PJP negative  Urine histo negative  -Appreciate ID input;; Zosyn and micafungin until 11/11/23. Will stop today.     Renal:  1) MIKKI--improving  2) Hypernatremia slowly improving with now 4.9L free water deficit.   3) Volume overload--resolved  -Stop D5W given improvement in sodium  -Hold further lasix and allow for equilibration  -Unfortunately cannot have access to enteral free water      GI:  1) Need for nutrition  -  NPO for 4 days--unlikely to tolerate NG tube (pulled one out already)    Neuro:  1) Acute delirium  -Precedex drip  -PRN haldol  -Added PRN benzodiazepine. Would typically want to avoid this in delirium but patient takes  "Klonopin BID at baseline so do  not want to precipitate withdrawal either.  -Mobilization with PT/OT as tolerated. Up in chair today and tolerating.     Heme:  -Prophylaxis with lovenox    Endo:  1)Hyperglycemia in setting of steroids  -Holding lantus, NISS      ICU DAILY CHECKLIST                           Can patient transfer out of MICU? no    FAST HUG:    Feeding:  Feeding: No.  Patient is receiving NPO    Lamas:No  Analgesia/Sedation:Yes RASS goal 0 to -1  Thromboembolic prophylaxis: Yes; Mode:  Lovenox  HOB>30:  Yes  Stress Ulcer Protocol Active: Yes; Mode: PPI  Glycemic Control: Any glucose > 180 no; Mode of Insulin Therapy: Sliding Scale Insulin    Clinically Significant Risk Factors         # Hypernatremia: Highest Na = 152 mmol/L in last 2 days, will monitor as appropriate      # Hypoalbuminemia: Lowest albumin = 2.7 g/dL at 11/3/2023  5:57 AM, will monitor as appropriate              # Obesity: Estimated body mass index is 34.3 kg/m  as calculated from the following:    Height as of this encounter: 1.93 m (6' 4\").    Weight as of this encounter: 127.8 kg (281 lb 12.8 oz).        # Financial/Environmental Concerns: none  # Asthma: noted on problem list          PHYSICAL THERAPY AND MOBILITY:  Can patient have PT and mobility trial: yes  Activity: up as tolerated with assistance    Critical Care Time: 30 minutes, critically ill due to severe hypoxia at risk of requiring reintubation.       Subjective:   HPI:  Jim Titus is a 58 year old male with history of melanoma on pembrolizumab last given in September with persistent dyspnea and worsening hypoxia. Admitted 11/2/23 and intubated 11/4 due to worsening hypoxia. Requiring proning and paralysis.     Extubated 11/8 to high-flow but persistent hypoxia and agitation. Ongoing issues with hypernatremia and volume overload.    Overnight agitated, spitting on staff, Мария Morales called. O2 requirement improved.       Principal Problem:    Acute respiratory " "failure with hypoxia (H)  Active Problems:    Elevated alkaline phosphatase level    Sepsis, due to unspecified organism, unspecified whether acute organ dysfunction present (H)    Acute kidney failure, unspecified (H)      Allergies: Chicken [chicken protein], Cantaloupe [cantaloupe extract allergy skin test], Wellbutrin [bupropion], Grass pollen [grass], and Turkey [poultry meal]     MEDS:  Scheduled Meds:   [Held by provider] clonazePAM  1 mg Oral BID    enoxaparin ANTICOAGULANT  40 mg Subcutaneous Q24H    [Held by provider] escitalopram  20 mg Oral Daily with lunch    insulin aspart  1-6 Units Subcutaneous Q4H    ipratropium - albuterol 0.5 mg/2.5 mg/3 mL  3 mL Nebulization Q8H    methylPREDNISolone  62.5 mg Intravenous Q8H    micafungin  100 mg Intravenous Q24H    [Held by provider] mirtazapine  15 mg Oral At Bedtime    pantoprazole  40 mg Per Feeding Tube QAM AC    Or    pantoprazole  40 mg Intravenous QAM AC    piperacillin-tazobactam  3.375 g Intravenous Q8H    sodium chloride (PF)  10-40 mL Intracatheter Q7 Days    sodium chloride (PF)  3 mL Intracatheter Q8H    sodium chloride (PF)  3 mL Intracatheter Q8H    tamsulosin  0.4 mg Oral Daily     Continuous Infusions:   dexmedeTOMIDine 1.1 mcg/kg/hr (11/11/23 1000)    dextrose       PRN Meds:.acetaminophen, albuterol, albuterol, dextrose, glucose **OR** dextrose **OR** glucagon, diphenhydrAMINE **OR** diphenhydrAMINE, fentaNYL, haloperidol lactate, hydrALAZINE, hydrOXYzine **OR** hydrOXYzine, lactulose, lidocaine (viscous), lidocaine, loperamide, LORazepam, melatonin, naloxone **OR** naloxone **OR** naloxone **OR** naloxone, OLANZapine, polyethylene glycol, senna-docusate **OR** sennosides, sodium chloride (PF), sodium chloride (PF), sodium chloride (PF), sodium chloride (PF), sodium chloride (PF)      Objective:   VITALS:  BP (!) 173/91   Pulse 56   Temp 98.6  F (37  C) (Axillary)   Resp 26   Ht 1.93 m (6' 4\")   Wt 127.8 kg (281 lb 12.8 oz)   SpO2 95%   " BMI 34.30 kg/m    EXAM:  General appearance: appears stated age and lethargic but arousable  Neck: no adenopathy, no carotid bruit, no JVD, supple, symmetrical, trachea midline, and thyroid not enlarged, symmetric, no tenderness/mass/nodules  Lungs:  decreased breath sounds bilaterally  Heart: Bradycardic, regular S1 and S2  Abdomen: soft, non-tender; bowel sounds normal; no masses,  no organomegaly  Extremities: minimal pitting edema  Skin: Skin color, texture, turgor normal. No rashes or lesions  Neurologic: oriented to self, difficult to get him to answer questions, moving all 4s.     I&O:       Intake/Output Summary (Last 24 hours) at 11/11/2023 1027  Last data filed at 11/11/2023 0910  Gross per 24 hour   Intake 2468.56 ml   Output 2225 ml   Net 243.56 ml           Data Review:  Chest Xray  Personally reviewed image/s and Personally reviewed impression/s  EXAM: XR CHEST PORT 1 VIEW  LOCATION: [facility]  DATE: 11/9/2023     INDICATION: Shortness of breath.  COMPARISON: 11/08/2023                                                                      IMPRESSION: Right-sided PICC tip in good position at cavoatrial junction. Heart size magnified in AP projection. Pulmonary venous congestion accentuated by shallow inspiration. Consolidative airspace opacities within the right lung and minimal left   basilar atelectasis unchanged. No pneumothorax.    LABS      Latest Ref Rng & Units 11/4/2023     4:21 AM 11/5/2023     5:32 AM 11/6/2023     3:43 AM 11/7/2023     5:29 AM 11/9/2023     5:04 AM 11/10/2023     5:12 AM 11/11/2023     5:33 AM   Glucose Values   Glucose 70 - 99 mg/dL 102  252  154  141  147  158  172        Results for orders placed or performed during the hospital encounter of 11/02/23   Basic metabolic panel   Result Value Ref Range    Sodium 149 (H) 135 - 145 mmol/L    Potassium 4.2 3.4 - 5.3 mmol/L    Chloride 113 (H) 98 - 107 mmol/L    Carbon Dioxide (CO2) 26 22 - 29 mmol/L     Anion Gap 10 7 - 15 mmol/L    Urea Nitrogen 57.2 (H) 6.0 - 20.0 mg/dL    Creatinine 1.62 (H) 0.67 - 1.17 mg/dL    GFR Estimate 49 (L) >60 mL/min/1.73m2    Calcium 8.7 8.6 - 10.0 mg/dL    Glucose 172 (H) 70 - 99 mg/dL     Lab Results   Component Value Date    WBC 12.0 (H) 2023    HGB 9.6 (L) 2023    HCT 31.4 (L) 2023    MCV 92 2023     (H) 2023       AB.45/39/86/27      By:  Dara Fatima MD  Pulmonary/Critical Care

## 2023-11-11 NOTE — CARE PLAN
Shift Events:     2026 Pt. Became agitated and Attempted to get out of bed and pull out lines. Pt began to push, yell and spit at staff, Мария Morales Called. PRN medications given for agitation (See MAR). Team began to place four point restraints and they were promptly discontinued due to effectiveness of medication. Restraints remain tied to bottom of bed as a precaution. Provider notified of discontinuation of restraints after code green.    Pt. Bladder Scanned for 685, Provider notified, straight cath order obtained, Pt. Then voided 375, .     Pt. Became increasingly agitated, PRN medications given (See MAR).    1:1 RN at bedside.    Assessment:     Neuro: Confused, oriented to person and place. PERRLA, occasionally following commands.     Respiratory: High flow NC 45L 43%    Cardiovascular: Tele SB    GI/: Voiding via . Diet: Strict NPO. PRN swabs given. No BM this shift.    Lines/Drains: R) Double lumen PICC, L) wrist PIV L) Axillary ART line    Gtts: D5% @ 75ml/hr, Precedex @ 1.2mcg/kg/hr titrated to maintain RASS goal 0 to -1.    Pain: Denied pain throughout shift, CPOT <@ CPOT goal <2.

## 2023-11-11 NOTE — PLAN OF CARE
Problem: Oral Intake Inadequate  Goal: Improved Oral Intake  Outcome: Not Progressing     Problem: Adult Inpatient Plan of Care  Goal: Readiness for Transition of Care  Outcome: Progressing    Patient remains on HFNC.  Dex infusing to maintain RASS 0 to -1.  Restless at times.  1:1 remains for patient safety.  Had extra large BM this afternoon.  Lamas catheter was also leaking.  Had a substantial amount of urinary incontinence.  Lamas pulled and external cath place.  Pt then voided 725cc.  MD made aware.  Strict NPO at this time.  Mouth swabs PRN.  Family updated at bedside by writer.  Continue to monitor.

## 2023-11-11 NOTE — PLAN OF CARE
"  Problem: Adult Inpatient Plan of Care  Description: The Care Plan Review/Shift Note, Individualized Goals, Hospital-Acquired Illness or Injury, Comfort and Wellbeing, and Transition Planning are the \"Overarching Goals\" and should be updated throughout the hospitalization.  Please hover over the (i) for specific information on each goal topic.  Goal: Plan of Care Review  Description: The Plan of Care Review/Shift note should be completed every shift.  The Outcome Evaluation is a brief statement about your assessment that the patient is improving, declining, or no change.  This information will be displayed automatically on your shift  note.  Outcome: Not Progressing  Flowsheets (Taken 11/11/2023 1116)  Outcome Evaluation: Pt is NPO day 4. Unable to initiate diet d/t pt AMS, agitation, delirium and respiratory status. MD monitoring progress and days without nutrition/feeding plan. SLP consulted today. Received 2718 ml in IVF last 24 hours. BM x 1 yesterday. Weight is fluid up with MIKKI improving per MD. Will continue to monitor progress/feeding plan  Overall Patient Progress: no change     Problem: Oral Intake Inadequate  Goal: Improved Oral Intake  Outcome: Not Progressing   Goal Outcome Evaluation:           Overall Patient Progress: no changeOverall Patient Progress: no change    Outcome Evaluation: Pt is NPO day 4. Unable to initiate diet d/t pt AMS, agitation, delirium and respiratory status. MD monitoring progress and days without nutrition/feeding plan. SLP consulted today. Received 2718 ml in IVF last 24 hours. BM x 1 yesterday. Weight is fluid up with MIKKI improving per MD. Will continue to monitor progress/feeding plan      "

## 2023-11-12 ENCOUNTER — APPOINTMENT (OUTPATIENT)
Dept: SPEECH THERAPY | Facility: CLINIC | Age: 58
End: 2023-11-12
Payer: COMMERCIAL

## 2023-11-12 ENCOUNTER — APPOINTMENT (OUTPATIENT)
Dept: RADIOLOGY | Facility: CLINIC | Age: 58
End: 2023-11-12
Attending: EMERGENCY MEDICINE
Payer: COMMERCIAL

## 2023-11-12 PROBLEM — E87.0 HYPERNATREMIA: Status: ACTIVE | Noted: 2023-11-12

## 2023-11-12 LAB
ANION GAP SERPL CALCULATED.3IONS-SCNC: 10 MMOL/L (ref 7–15)
BUN SERPL-MCNC: 44.1 MG/DL (ref 6–20)
CALCIUM SERPL-MCNC: 8.7 MG/DL (ref 8.6–10)
CHLORIDE SERPL-SCNC: 108 MMOL/L (ref 98–107)
CREAT SERPL-MCNC: 1.44 MG/DL (ref 0.67–1.17)
D DIMER PPP FEU-MCNC: 2.1 UG/ML FEU (ref 0–0.5)
DEPRECATED HCO3 PLAS-SCNC: 25 MMOL/L (ref 22–29)
EGFRCR SERPLBLD CKD-EPI 2021: 56 ML/MIN/1.73M2
ERYTHROCYTE [DISTWIDTH] IN BLOOD BY AUTOMATED COUNT: 15.8 % (ref 10–15)
GLUCOSE BLDC GLUCOMTR-MCNC: 123 MG/DL (ref 70–99)
GLUCOSE BLDC GLUCOMTR-MCNC: 125 MG/DL (ref 70–99)
GLUCOSE BLDC GLUCOMTR-MCNC: 127 MG/DL (ref 70–99)
GLUCOSE BLDC GLUCOMTR-MCNC: 129 MG/DL (ref 70–99)
GLUCOSE BLDC GLUCOMTR-MCNC: 134 MG/DL (ref 70–99)
GLUCOSE BLDC GLUCOMTR-MCNC: 136 MG/DL (ref 70–99)
GLUCOSE SERPL-MCNC: 137 MG/DL (ref 70–99)
HCT VFR BLD AUTO: 33 % (ref 40–53)
HGB BLD-MCNC: 10.3 G/DL (ref 13.3–17.7)
HOLD SPECIMEN: NORMAL
MAGNESIUM SERPL-MCNC: 2.9 MG/DL (ref 1.7–2.3)
MCH RBC QN AUTO: 28.4 PG (ref 26.5–33)
MCHC RBC AUTO-ENTMCNC: 31.2 G/DL (ref 31.5–36.5)
MCV RBC AUTO: 91 FL (ref 78–100)
PHOSPHATE SERPL-MCNC: 3.1 MG/DL (ref 2.5–4.5)
PLATELET # BLD AUTO: 493 10E3/UL (ref 150–450)
POTASSIUM SERPL-SCNC: 4 MMOL/L (ref 3.4–5.3)
RBC # BLD AUTO: 3.63 10E6/UL (ref 4.4–5.9)
SODIUM SERPL-SCNC: 143 MMOL/L (ref 135–145)
TROPONIN T SERPL HS-MCNC: 26 NG/L
WBC # BLD AUTO: 14.4 10E3/UL (ref 4–11)

## 2023-11-12 PROCEDURE — 250N000013 HC RX MED GY IP 250 OP 250 PS 637: Performed by: EMERGENCY MEDICINE

## 2023-11-12 PROCEDURE — 250N000013 HC RX MED GY IP 250 OP 250 PS 637: Performed by: HOSPITALIST

## 2023-11-12 PROCEDURE — 84484 ASSAY OF TROPONIN QUANT: CPT | Performed by: EMERGENCY MEDICINE

## 2023-11-12 PROCEDURE — 85379 FIBRIN DEGRADATION QUANT: CPT | Performed by: EMERGENCY MEDICINE

## 2023-11-12 PROCEDURE — 99232 SBSQ HOSP IP/OBS MODERATE 35: CPT | Performed by: HOSPITALIST

## 2023-11-12 PROCEDURE — 94640 AIRWAY INHALATION TREATMENT: CPT

## 2023-11-12 PROCEDURE — 92526 ORAL FUNCTION THERAPY: CPT | Mod: GN

## 2023-11-12 PROCEDURE — 83735 ASSAY OF MAGNESIUM: CPT | Performed by: HOSPITALIST

## 2023-11-12 PROCEDURE — 99207 PR NO CHARGE LOS: CPT | Performed by: EMERGENCY MEDICINE

## 2023-11-12 PROCEDURE — 85014 HEMATOCRIT: CPT

## 2023-11-12 PROCEDURE — 250N000011 HC RX IP 250 OP 636: Mod: JZ | Performed by: INTERNAL MEDICINE

## 2023-11-12 PROCEDURE — 94640 AIRWAY INHALATION TREATMENT: CPT | Mod: 76

## 2023-11-12 PROCEDURE — 999N000215 HC STATISTIC HFNC ADULT NON-CPAP

## 2023-11-12 PROCEDURE — 999N000157 HC STATISTIC RCP TIME EA 10 MIN

## 2023-11-12 PROCEDURE — 99232 SBSQ HOSP IP/OBS MODERATE 35: CPT | Performed by: INTERNAL MEDICINE

## 2023-11-12 PROCEDURE — 250N000013 HC RX MED GY IP 250 OP 250 PS 637: Performed by: FAMILY MEDICINE

## 2023-11-12 PROCEDURE — 93005 ELECTROCARDIOGRAM TRACING: CPT

## 2023-11-12 PROCEDURE — 250N000009 HC RX 250: Performed by: HOSPITALIST

## 2023-11-12 PROCEDURE — 250N000009 HC RX 250: Performed by: INTERNAL MEDICINE

## 2023-11-12 PROCEDURE — 250N000009 HC RX 250

## 2023-11-12 PROCEDURE — 84100 ASSAY OF PHOSPHORUS: CPT | Performed by: STUDENT IN AN ORGANIZED HEALTH CARE EDUCATION/TRAINING PROGRAM

## 2023-11-12 PROCEDURE — 71045 X-RAY EXAM CHEST 1 VIEW: CPT

## 2023-11-12 PROCEDURE — 250N000011 HC RX IP 250 OP 636: Mod: JZ | Performed by: STUDENT IN AN ORGANIZED HEALTH CARE EDUCATION/TRAINING PROGRAM

## 2023-11-12 PROCEDURE — 250N000011 HC RX IP 250 OP 636: Mod: JZ | Performed by: EMERGENCY MEDICINE

## 2023-11-12 PROCEDURE — 250N000011 HC RX IP 250 OP 636: Performed by: HOSPITALIST

## 2023-11-12 PROCEDURE — 250N000013 HC RX MED GY IP 250 OP 250 PS 637: Performed by: INTERNAL MEDICINE

## 2023-11-12 PROCEDURE — 93005 ELECTROCARDIOGRAM TRACING: CPT | Performed by: HOSPITALIST

## 2023-11-12 PROCEDURE — 84520 ASSAY OF UREA NITROGEN: CPT | Performed by: INTERNAL MEDICINE

## 2023-11-12 PROCEDURE — 120N000004 HC R&B MS OVERFLOW

## 2023-11-12 RX ORDER — METOPROLOL TARTRATE 1 MG/ML
5 INJECTION, SOLUTION INTRAVENOUS EVERY 5 MIN PRN
Status: DISCONTINUED | OUTPATIENT
Start: 2023-11-12 | End: 2023-11-12

## 2023-11-12 RX ORDER — DILTIAZEM HCL/D5W 125 MG/125
5-15 PLASTIC BAG, INJECTION (ML) INTRAVENOUS CONTINUOUS
Status: DISCONTINUED | OUTPATIENT
Start: 2023-11-12 | End: 2023-11-13

## 2023-11-12 RX ORDER — METOPROLOL TARTRATE 1 MG/ML
INJECTION, SOLUTION INTRAVENOUS
Status: COMPLETED
Start: 2023-11-12 | End: 2023-11-12

## 2023-11-12 RX ORDER — BENZONATATE 100 MG/1
200 CAPSULE ORAL 3 TIMES DAILY PRN
Status: DISCONTINUED | OUTPATIENT
Start: 2023-11-12 | End: 2023-11-20 | Stop reason: HOSPADM

## 2023-11-12 RX ORDER — CLONAZEPAM 0.5 MG/1
1 TABLET ORAL AT BEDTIME
Status: DISCONTINUED | OUTPATIENT
Start: 2023-11-12 | End: 2023-11-12

## 2023-11-12 RX ORDER — METOPROLOL TARTRATE 1 MG/ML
5 INJECTION, SOLUTION INTRAVENOUS ONCE
Status: DISCONTINUED | OUTPATIENT
Start: 2023-11-12 | End: 2023-11-12

## 2023-11-12 RX ORDER — DILTIAZEM HCL/D5W 125 MG/125
5-15 PLASTIC BAG, INJECTION (ML) INTRAVENOUS CONTINUOUS
Status: DISCONTINUED | OUTPATIENT
Start: 2023-11-12 | End: 2023-11-12

## 2023-11-12 RX ORDER — METOPROLOL TARTRATE 1 MG/ML
5 INJECTION, SOLUTION INTRAVENOUS
Status: DISCONTINUED | OUTPATIENT
Start: 2023-11-12 | End: 2023-11-12

## 2023-11-12 RX ORDER — LOPERAMIDE HCL 2 MG
2 CAPSULE ORAL 4 TIMES DAILY PRN
Status: DISCONTINUED | OUTPATIENT
Start: 2023-11-12 | End: 2023-11-20 | Stop reason: HOSPADM

## 2023-11-12 RX ORDER — METOPROLOL TARTRATE 25 MG/1
25 TABLET, FILM COATED ORAL EVERY 6 HOURS
Status: DISCONTINUED | OUTPATIENT
Start: 2023-11-12 | End: 2023-11-12

## 2023-11-12 RX ORDER — CLONAZEPAM 0.5 MG/1
1 TABLET ORAL 2 TIMES DAILY
Status: DISCONTINUED | OUTPATIENT
Start: 2023-11-12 | End: 2023-11-14

## 2023-11-12 RX ORDER — DILTIAZEM HYDROCHLORIDE 5 MG/ML
25 INJECTION INTRAVENOUS ONCE
Status: COMPLETED | OUTPATIENT
Start: 2023-11-12 | End: 2023-11-12

## 2023-11-12 RX ORDER — LORATADINE 10 MG/1
10 TABLET ORAL DAILY
Status: DISCONTINUED | OUTPATIENT
Start: 2023-11-12 | End: 2023-11-15

## 2023-11-12 RX ADMIN — METOPROLOL TARTRATE 25 MG: 25 TABLET, FILM COATED ORAL at 15:39

## 2023-11-12 RX ADMIN — AMLODIPINE BESYLATE 5 MG: 5 TABLET ORAL at 09:03

## 2023-11-12 RX ADMIN — ESCITALOPRAM OXALATE 20 MG: 20 TABLET ORAL at 13:32

## 2023-11-12 RX ADMIN — Medication 5 MG: at 22:24

## 2023-11-12 RX ADMIN — METHYLPREDNISOLONE SODIUM SUCCINATE 62.5 MG: 125 INJECTION, POWDER, FOR SOLUTION INTRAMUSCULAR; INTRAVENOUS at 02:12

## 2023-11-12 RX ADMIN — METOPROLOL TARTRATE 5 MG: 1 INJECTION, SOLUTION INTRAVENOUS at 13:41

## 2023-11-12 RX ADMIN — CLONAZEPAM 1 MG: 0.5 TABLET ORAL at 20:04

## 2023-11-12 RX ADMIN — ENOXAPARIN SODIUM 40 MG: 100 INJECTION SUBCUTANEOUS at 19:56

## 2023-11-12 RX ADMIN — CLONAZEPAM 1 MG: 0.5 TABLET ORAL at 09:05

## 2023-11-12 RX ADMIN — FAMOTIDINE 20 MG: 10 INJECTION INTRAVENOUS at 19:56

## 2023-11-12 RX ADMIN — METOPROLOL TARTRATE 5 MG: 1 INJECTION, SOLUTION INTRAVENOUS at 13:11

## 2023-11-12 RX ADMIN — IPRATROPIUM BROMIDE AND ALBUTEROL SULFATE 3 ML: .5; 3 SOLUTION RESPIRATORY (INHALATION) at 23:17

## 2023-11-12 RX ADMIN — Medication 5 MG/HR: at 17:57

## 2023-11-12 RX ADMIN — METOPROLOL TARTRATE 12.5 MG: 25 TABLET, FILM COATED ORAL at 13:32

## 2023-11-12 RX ADMIN — MIRTAZAPINE 15 MG: 15 TABLET, FILM COATED ORAL at 22:24

## 2023-11-12 RX ADMIN — LORATADINE 10 MG: 10 TABLET ORAL at 19:56

## 2023-11-12 RX ADMIN — TAMSULOSIN HYDROCHLORIDE 0.4 MG: 0.4 CAPSULE ORAL at 09:03

## 2023-11-12 RX ADMIN — IPRATROPIUM BROMIDE AND ALBUTEROL SULFATE 3 ML: .5; 3 SOLUTION RESPIRATORY (INHALATION) at 07:58

## 2023-11-12 RX ADMIN — METOPROLOL TARTRATE 5 MG: 1 INJECTION, SOLUTION INTRAVENOUS at 13:58

## 2023-11-12 RX ADMIN — METHYLPREDNISOLONE SODIUM SUCCINATE 62.5 MG: 125 INJECTION, POWDER, FOR SOLUTION INTRAMUSCULAR; INTRAVENOUS at 17:58

## 2023-11-12 RX ADMIN — LOPERAMIDE HYDROCHLORIDE 2 MG: 2 CAPSULE ORAL at 09:05

## 2023-11-12 RX ADMIN — METHYLPREDNISOLONE SODIUM SUCCINATE 62.5 MG: 125 INJECTION, POWDER, FOR SOLUTION INTRAMUSCULAR; INTRAVENOUS at 09:03

## 2023-11-12 RX ADMIN — DILTIAZEM HYDROCHLORIDE 25 MG: 5 INJECTION, SOLUTION INTRAVENOUS at 17:57

## 2023-11-12 RX ADMIN — LOPERAMIDE HYDROCHLORIDE 2 MG: 2 CAPSULE ORAL at 15:39

## 2023-11-12 ASSESSMENT — ACTIVITIES OF DAILY LIVING (ADL)
ADLS_ACUITY_SCORE: 48

## 2023-11-12 NOTE — PROGRESS NOTES
[facility]    Medicine Progress Note - Hospitalist Service    Date of Admission:  11/2/2023    Assessment & Plan   Jim Titus is a 58 year old male admitted with respiratory failure due to pneumonitis associated with pembrolizumab.  He is clinically stable, now weaned to nasal cannula oxygen.  May be appropriate for transition to oral steroid taper.  Further consultation with pulmonology/oncology needed.  Will need PJP prophylaxis if high dose steroid duration is at least one month.      #  Acute hypoxic respiratory failure 2/2 pembrolizumab induced pneumonitis  - steroids per oncology/pulm  - may need PJP ppx (IV steroids since 11/4/23)     # Fungal and bacterial pneumonia  - completed course of treatment     # MIKKI, improved  # Hypernatremia, resolved   - check BMP on 11/15    # Steroid induced hyperglycemia  - ISS    # HTN  - amlodipine    Addendum:  Called to bedside for evaluation of tachycardia.  Patient without chest pain/pressure or dyspnea.  Ventricular rates up to 180s on bedside monitor.  Patient did not respond to blowing through straw or carotid massage.  EKG demonstrated irregular R-R interval with possible intermittent p wave.  Suspected afib w/RVR.  Ventricular rate improved with 5mg iv metoprolol.  Repeat EKG showed ventricular rate 130s.  Irregular R-R interval, no consistent p waves; rhythm consistent with rapid afib.  No clear acute ischemic changes.    Rapid afib could be due to reflex tachycardia from patient being off precedex.  Blood pressure is unremarkable.  Start metoprolol tartrate.  If he has associated hypertension, may benefit from a clonidine taper.    [id number] = 1.  No indication for anticoagulation if afib persists.    Addendum:  Per nursing, HR remains uncontrolled.  Per nursing, patient having intermittent episodes of NSVT on tele.  Transition to IV diltiazem for rate control strategy.          Diet: Combination Diet Pureed Diet (level 4); Thin  "Liquids (level 0)    Lamas Catheter: Not present  Lines: PRESENT      PICC 11/04/23 Double Lumen Right Basilic medication drips-Site Assessment: WDL      Cardiac Monitoring: None  Code Status: Full Code      Clinically Significant Risk Factors         # Hypernatremia: Highest Na = 149 mmol/L in last 2 days, will monitor as appropriate      # Hypoalbuminemia: Lowest albumin = 2.7 g/dL at 11/3/2023  5:57 AM, will monitor as appropriate            # Obesity: Estimated body mass index is 35.3 kg/m  as calculated from the following:    Height as of this encounter: 1.93 m (6' 4\").    Weight as of this encounter: 131.5 kg (290 lb).      # Financial/Environmental Concerns: none  # Asthma: noted on problem list        Disposition Plan      Expected Discharge Date: 11/15/2023      Destination: home with family  Discharge Comments: ICU, IV steroids            Ezra Lee MD  Hospitalist Service  [facility]  Securely message with [employer] (more info)  Text page via [employer] Paging/Directory   ______________________________________________________________________    Interval History   Patient denies dyspnea, chest pain/pressure, or abdominal pain.    Physical Exam   Vital Signs: Temp: 99  F (37.2  C) Temp src: Oral BP: (!) 157/87 Pulse: 91   Resp: (!) 32 SpO2: 95 % O2 Device: High Flow Nasal Cannula (HFNC) Oxygen Delivery: 45 LPM  Weight: 290 lbs 0 oz    Gen:  lying in bed in no acute distress  Neuro: alert, conversant, some confusion, more calm today  CV:  nl rate, regular rhythm  Pulm: no acute resp distress, wheezing bilaterally anteriorly  GI:  abdomen soft, NTTP  Ext:  bilateral lower extremity edema    Medical Decision Making             Data   Reviewed:    Na 143  K 4  BUN 44  Cr 1.44    WBC 14  Hgb 10  Plts 493  "

## 2023-11-12 NOTE — PROGRESS NOTES
"/75   Pulse (!) 154   Temp 98.4  F (36.9  C) (Oral)   Resp 29   Ht 1.93 m (6' 4\")   Wt 131.5 kg (290 lb)   SpO2 93%   BMI 35.30 kg/m      Pt was on 3 lpm NC when last seen. BS were diminished with some exp wheezes pre and post neb. Pt was weaned off HFNC and is on standby at this time. Neb tx was skipped at 1600 due to high HR >150, RN was notified. RT will continue to follow.   "

## 2023-11-12 NOTE — CARE PLAN
Shift Events:     1:1 sitter removed. PRN medication given for loose stools (See MAR).     Assessment:     Neuro: A/O to person place and situation, occasionally confused.     Respiratory: High flow 35L 45%    Cardiovascular: Tele NSR    GI/: Voiding via purewick. Diet: Pureed diet Level 4, Thin liquids. x1 loose BM this shift, Imodium given (See MAR).    Lines/Drains: L) double lumen PICC, dressing changed this shift (Se Flowsheets), R) wrist PIV    Gtts: Precedex gtt remained off throughout night.     Pain: Pt reported milt pain at beginning of shift in knee Once Pt. Brought back to bed pain subsided.

## 2023-11-12 NOTE — PROGRESS NOTES
PULMONARY / CRITICAL CARE PROGRESS NOTE    Date / Time of Admission:  11/2/2023  7:52 AM    Assessment:   Principal Problem:    Acute respiratory failure with hypoxia (H)  Active Problems:    Pneumonia    Elevated alkaline phosphatase level    Sepsis, due to unspecified organism, unspecified whether acute organ dysfunction present (H)    Acute kidney failure, unspecified (H)    Hypernatremia      Code Status:  Full Code  58yoM with history of melanoma previously on pembrolizumab last given September 2023 with ongoing pneumonitis presented with acute hypoxic respiratory failure requiring intubation in the setting of elevated inflammatory markers but culture negative and minimal improvement despite antibiotics extubated 11/8 and now on low-flow oxygen with improvement in agitated delirium off of precedex.      Plan:   Pulmonary:  1) Acute hypoxic respiratory failure from pneumonitis versus asymmetric pneumonia  -s/p intubation, proning, paralysis and extubated 11/8 with improving hypoxia.   -Solumedrol for empiric pneumonitis. Would lobby to transition to prednisone tomorrow 11/13 given improvement.     C/V:  1) Hypertension  -Norvasc initiated    ID:  1) Pneumonia?   -BAL found no evidence of infection aside from candida.   Beta-d-glucan negative  PJP negative  Urine histo negative  -Appreciate ID input;; Zosyn and micafungin completed  -Add Bactrim 11/13 as renal function improved.     Renal:  1) MIKKI--improving  2) Hypernatremia--resolved  3) Volume overload--resolved  -No lasix or fluid, will mobilize patient and monitor.       GI:  1) Need for nutrition  -Diet advanced.     Neuro:  1) Acute delirium--resolved  -Resumed home remeron and Klonopin.     Heme:  -Prophylaxis with lovenox    Endo:  1)Hyperglycemia in setting of steroids  -NISS      ICU DAILY CHECKLIST                           Can patient transfer out of MICU? Yes    FAST HUG:    Feeding:  Feeding: Yes.  Patient is receiving  "Oral  Lamas:No  Analgesia/Sedation:No  Thromboembolic prophylaxis: Yes; Mode:  Lovenox  HOB>30:  Yes  Stress Ulcer Protocol Active: Yes; Mode: PPI  Glycemic Control: Any glucose > 180 no; Mode of Insulin Therapy: Sliding Scale Insulin    Clinically Significant Risk Factors         # Hypernatremia: Highest Na = 149 mmol/L in last 2 days, will monitor as appropriate      # Hypoalbuminemia: Lowest albumin = 2.7 g/dL at 11/3/2023  5:57 AM, will monitor as appropriate              # Obesity: Estimated body mass index is 35.3 kg/m  as calculated from the following:    Height as of this encounter: 1.93 m (6' 4\").    Weight as of this encounter: 131.5 kg (290 lb).        # Financial/Environmental Concerns: none  # Asthma: noted on problem list          PHYSICAL THERAPY AND MOBILITY:  Can patient have PT and mobility trial: yes  Activity: up as tolerated with assistance        Subjective:   HPI:  Jim Titus is a 58 year old male with history of melanoma on pembrolizumab last given in September with persistent dyspnea and worsening hypoxia. Admitted 11/2/23 and intubated 11/4 due to worsening hypoxia. Requiring proning and paralysis.     Extubated 11/8 to high-flow but persistent hypoxia and agitation. Ongoing issues with hypernatremia and volume overload.    Overnight resolution of delirium. Calm, alert, weaned off of precedex. Oxygenation improved as well, high flow oxygen stopped.       Principal Problem:    Acute respiratory failure with hypoxia (H)  Active Problems:    Pneumonia    Elevated alkaline phosphatase level    Sepsis, due to unspecified organism, unspecified whether acute organ dysfunction present (H)    Acute kidney failure, unspecified (H)    Hypernatremia      Allergies: Chicken [chicken protein], Cantaloupe [cantaloupe extract allergy skin test], Wellbutrin [bupropion], Grass pollen [grass], and Turkey [poultry meal]     MEDS:  Scheduled Meds:   amLODIPine  5 mg Oral Daily    clonazePAM  1 mg Oral BID    " "enoxaparin ANTICOAGULANT  40 mg Subcutaneous Q24H    escitalopram  20 mg Oral Daily with lunch    insulin aspart  1-6 Units Subcutaneous 4x Daily AC & HS    ipratropium - albuterol 0.5 mg/2.5 mg/3 mL  3 mL Nebulization Q8H    methylPREDNISolone  62.5 mg Intravenous Q8H    mirtazapine  15 mg Oral At Bedtime    sodium chloride (PF)  10-40 mL Intracatheter Q7 Days    sodium chloride (PF)  3 mL Intracatheter Q8H    tamsulosin  0.4 mg Oral Daily     Continuous Infusions:   dextrose       PRN Meds:.acetaminophen, albuterol, dextrose, glucose **OR** dextrose **OR** glucagon, lidocaine (viscous), lidocaine, loperamide, melatonin, naloxone **OR** naloxone **OR** naloxone **OR** naloxone, polyethylene glycol, sodium chloride (PF), sodium chloride (PF), sodium chloride (PF), sodium chloride (PF), sodium chloride (PF)      Objective:   VITALS:  BP (!) 155/81   Pulse 69   Temp 98.4  F (36.9  C) (Oral)   Resp 21   Ht 1.93 m (6' 4\")   Wt 131.5 kg (290 lb)   SpO2 95%   BMI 35.30 kg/m    EXAM:  General appearance: awake and in no distress  Neck: no adenopathy, no carotid bruit, no JVD, supple, symmetrical, trachea midline, and thyroid not enlarged, symmetric, no tenderness/mass/nodules  Lungs:  decreased breath sounds bilaterally  Heart: Bradycardic, regular S1 and S2  Abdomen: soft, non-tender; bowel sounds normal; no masses,  no organomegaly  Extremities: minimal pitting edema  Skin: Skin color, texture, turgor normal. No rashes or lesions  Neurologic: awake, alert in no distress    I&O:       Intake/Output Summary (Last 24 hours) at 11/12/2023 1003  Last data filed at 11/12/2023 0600  Gross per 24 hour   Intake 789.24 ml   Output 2860 ml   Net -2070.76 ml               Data Review:  Chest Xray  Personally reviewed image/s and Personally reviewed impression/s  EXAM: XR CHEST PORT 1 VIEW  LOCATION: [facility] [place]  DATE: 11/9/2023     INDICATION: Shortness of breath.  COMPARISON: 11/08/2023               "                                                        IMPRESSION: Right-sided PICC tip in good position at cavoatrial junction. Heart size magnified in AP projection. Pulmonary venous congestion accentuated by shallow inspiration. Consolidative airspace opacities within the right lung and minimal left   basilar atelectasis unchanged. No pneumothorax.    LABS      Latest Ref Rng & Units 11/5/2023     5:32 AM 11/6/2023     3:43 AM 11/7/2023     5:29 AM 11/9/2023     5:04 AM 11/10/2023     5:12 AM 11/11/2023     5:33 AM 11/12/2023     3:55 AM   Glucose Values   Glucose 70 - 99 mg/dL 252  154  141  147  158  172  137        Results for orders placed or performed during the hospital encounter of 11/02/23   Basic metabolic panel   Result Value Ref Range    Sodium 143 135 - 145 mmol/L    Potassium 4.0 3.4 - 5.3 mmol/L    Chloride 108 (H) 98 - 107 mmol/L    Carbon Dioxide (CO2) 25 22 - 29 mmol/L    Anion Gap 10 7 - 15 mmol/L    Urea Nitrogen 44.1 (H) 6.0 - 20.0 mg/dL    Creatinine 1.44 (H) 0.67 - 1.17 mg/dL    GFR Estimate 56 (L) >60 mL/min/1.73m2    Calcium 8.7 8.6 - 10.0 mg/dL    Glucose 137 (H) 70 - 99 mg/dL     Lab Results   Component Value Date    WBC 14.4 (H) 11/12/2023    HGB 10.3 (L) 11/12/2023    HCT 33.0 (L) 11/12/2023    MCV 91 11/12/2023     (H) 11/12/2023           By:  Dara Fatima MD  Pulmonary/Critical Care

## 2023-11-12 NOTE — PROGRESS NOTES
Pt remained on 45L, 35% FiO2 HFNC.  Neb given as ordered.  BS: diminished.  No change post treatment.

## 2023-11-13 ENCOUNTER — APPOINTMENT (OUTPATIENT)
Dept: CARDIOLOGY | Facility: CLINIC | Age: 58
End: 2023-11-13
Attending: INTERNAL MEDICINE
Payer: COMMERCIAL

## 2023-11-13 ENCOUNTER — APPOINTMENT (OUTPATIENT)
Dept: CT IMAGING | Facility: CLINIC | Age: 58
End: 2023-11-13
Attending: EMERGENCY MEDICINE
Payer: COMMERCIAL

## 2023-11-13 ENCOUNTER — APPOINTMENT (OUTPATIENT)
Dept: SPEECH THERAPY | Facility: CLINIC | Age: 58
End: 2023-11-13
Payer: COMMERCIAL

## 2023-11-13 LAB
ACTH PLAS-MCNC: <10 PG/ML
ANION GAP SERPL CALCULATED.3IONS-SCNC: 11 MMOL/L (ref 7–15)
ATRIAL RATE - MUSE: 55 BPM
BUN SERPL-MCNC: 36.9 MG/DL (ref 6–20)
CALCIUM SERPL-MCNC: 9.4 MG/DL (ref 8.6–10)
CHLORIDE SERPL-SCNC: 107 MMOL/L (ref 98–107)
CREAT SERPL-MCNC: 1.3 MG/DL (ref 0.67–1.17)
CRP SERPL-MCNC: 19.8 MG/L
DEPRECATED HCO3 PLAS-SCNC: 25 MMOL/L (ref 22–29)
DIASTOLIC BLOOD PRESSURE - MUSE: NORMAL MMHG
EGFRCR SERPLBLD CKD-EPI 2021: 64 ML/MIN/1.73M2
ERYTHROCYTE [DISTWIDTH] IN BLOOD BY AUTOMATED COUNT: 15.6 % (ref 10–15)
GLUCOSE BLDC GLUCOMTR-MCNC: 147 MG/DL (ref 70–99)
GLUCOSE BLDC GLUCOMTR-MCNC: 157 MG/DL (ref 70–99)
GLUCOSE BLDC GLUCOMTR-MCNC: 216 MG/DL (ref 70–99)
GLUCOSE BLDC GLUCOMTR-MCNC: 265 MG/DL (ref 70–99)
GLUCOSE SERPL-MCNC: 141 MG/DL (ref 70–99)
GLUCOSE SERPL-MCNC: 157 MG/DL (ref 70–99)
HCT VFR BLD AUTO: 36.6 % (ref 40–53)
HGB BLD-MCNC: 11.6 G/DL (ref 13.3–17.7)
INTERPRETATION ECG - MUSE: NORMAL
LVEF ECHO: NORMAL
MCH RBC QN AUTO: 28.9 PG (ref 26.5–33)
MCHC RBC AUTO-ENTMCNC: 31.7 G/DL (ref 31.5–36.5)
MCV RBC AUTO: 91 FL (ref 78–100)
P AXIS - MUSE: NORMAL DEGREES
PLATELET # BLD AUTO: 422 10E3/UL (ref 150–450)
POTASSIUM SERPL-SCNC: 4.1 MMOL/L (ref 3.4–5.3)
PR INTERVAL - MUSE: NORMAL MS
QRS DURATION - MUSE: 78 MS
QT - MUSE: 286 MS
QTC - MUSE: 488 MS
R AXIS - MUSE: 39 DEGREES
RADIOLOGIST FLAGS: ABNORMAL
RBC # BLD AUTO: 4.02 10E6/UL (ref 4.4–5.9)
SODIUM SERPL-SCNC: 143 MMOL/L (ref 135–145)
SYSTOLIC BLOOD PRESSURE - MUSE: NORMAL MMHG
T AXIS - MUSE: 25 DEGREES
TROPONIN T SERPL HS-MCNC: 23 NG/L
VENTRICULAR RATE- MUSE: 175 BPM
WBC # BLD AUTO: 11 10E3/UL (ref 4–11)

## 2023-11-13 PROCEDURE — 93010 ELECTROCARDIOGRAM REPORT: CPT | Mod: RTG | Performed by: GENERAL ACUTE CARE HOSPITAL

## 2023-11-13 PROCEDURE — 120N000013 HC R&B IMCU

## 2023-11-13 PROCEDURE — 99222 1ST HOSP IP/OBS MODERATE 55: CPT | Mod: 25 | Performed by: INTERNAL MEDICINE

## 2023-11-13 PROCEDURE — 250N000013 HC RX MED GY IP 250 OP 250 PS 637: Performed by: FAMILY MEDICINE

## 2023-11-13 PROCEDURE — 250N000013 HC RX MED GY IP 250 OP 250 PS 637: Performed by: INTERNAL MEDICINE

## 2023-11-13 PROCEDURE — 250N000013 HC RX MED GY IP 250 OP 250 PS 637: Performed by: EMERGENCY MEDICINE

## 2023-11-13 PROCEDURE — 250N000009 HC RX 250: Performed by: INTERNAL MEDICINE

## 2023-11-13 PROCEDURE — 85027 COMPLETE CBC AUTOMATED: CPT | Performed by: EMERGENCY MEDICINE

## 2023-11-13 PROCEDURE — 250N000013 HC RX MED GY IP 250 OP 250 PS 637: Performed by: HOSPITALIST

## 2023-11-13 PROCEDURE — 86140 C-REACTIVE PROTEIN: CPT | Performed by: EMERGENCY MEDICINE

## 2023-11-13 PROCEDURE — 99232 SBSQ HOSP IP/OBS MODERATE 35: CPT | Performed by: INTERNAL MEDICINE

## 2023-11-13 PROCEDURE — 250N000011 HC RX IP 250 OP 636: Performed by: HOSPITALIST

## 2023-11-13 PROCEDURE — 999N000208 ECHOCARDIOGRAM COMPLETE

## 2023-11-13 PROCEDURE — 93306 TTE W/DOPPLER COMPLETE: CPT | Mod: 26 | Performed by: INTERNAL MEDICINE

## 2023-11-13 PROCEDURE — 92526 ORAL FUNCTION THERAPY: CPT | Mod: GN

## 2023-11-13 PROCEDURE — 250N000011 HC RX IP 250 OP 636: Mod: JZ | Performed by: INTERNAL MEDICINE

## 2023-11-13 PROCEDURE — 99207 PR NO CHARGE LOS: CPT | Performed by: EMERGENCY MEDICINE

## 2023-11-13 PROCEDURE — 250N000011 HC RX IP 250 OP 636: Mod: JZ | Performed by: EMERGENCY MEDICINE

## 2023-11-13 PROCEDURE — 82947 ASSAY GLUCOSE BLOOD QUANT: CPT | Performed by: INTERNAL MEDICINE

## 2023-11-13 PROCEDURE — 71275 CT ANGIOGRAPHY CHEST: CPT

## 2023-11-13 PROCEDURE — 99232 SBSQ HOSP IP/OBS MODERATE 35: CPT | Performed by: EMERGENCY MEDICINE

## 2023-11-13 PROCEDURE — 250N000011 HC RX IP 250 OP 636: Performed by: EMERGENCY MEDICINE

## 2023-11-13 PROCEDURE — 84484 ASSAY OF TROPONIN QUANT: CPT | Performed by: EMERGENCY MEDICINE

## 2023-11-13 PROCEDURE — 250N000012 HC RX MED GY IP 250 OP 636 PS 637: Performed by: INTERNAL MEDICINE

## 2023-11-13 PROCEDURE — 82947 ASSAY GLUCOSE BLOOD QUANT: CPT | Performed by: EMERGENCY MEDICINE

## 2023-11-13 PROCEDURE — 999N000157 HC STATISTIC RCP TIME EA 10 MIN

## 2023-11-13 PROCEDURE — 94640 AIRWAY INHALATION TREATMENT: CPT | Mod: 76

## 2023-11-13 RX ORDER — IOPAMIDOL 755 MG/ML
75 INJECTION, SOLUTION INTRAVASCULAR ONCE
Status: COMPLETED | OUTPATIENT
Start: 2023-11-13 | End: 2023-11-13

## 2023-11-13 RX ORDER — PREDNISONE 20 MG/1
20 TABLET ORAL DAILY
Status: DISCONTINUED | OUTPATIENT
Start: 2023-11-28 | End: 2023-11-14

## 2023-11-13 RX ORDER — SULFAMETHOXAZOLE AND TRIMETHOPRIM 400; 80 MG/1; MG/1
1 TABLET ORAL DAILY
Status: DISCONTINUED | OUTPATIENT
Start: 2023-11-13 | End: 2023-11-20 | Stop reason: HOSPADM

## 2023-11-13 RX ORDER — PREDNISONE 20 MG/1
40 TABLET ORAL 2 TIMES DAILY
Status: DISCONTINUED | OUTPATIENT
Start: 2023-11-13 | End: 2023-11-14

## 2023-11-13 RX ORDER — PREDNISONE 5 MG/1
10 TABLET ORAL DAILY
Status: DISCONTINUED | OUTPATIENT
Start: 2023-12-03 | End: 2023-11-14

## 2023-11-13 RX ORDER — PREDNISONE 20 MG/1
40 TABLET ORAL 2 TIMES DAILY WITH MEALS
Status: DISCONTINUED | OUTPATIENT
Start: 2023-11-13 | End: 2023-11-13

## 2023-11-13 RX ORDER — DILTIAZEM HCL/D5W 125 MG/125
5-15 PLASTIC BAG, INJECTION (ML) INTRAVENOUS CONTINUOUS
Status: DISCONTINUED | OUTPATIENT
Start: 2023-11-13 | End: 2023-11-14

## 2023-11-13 RX ORDER — QUETIAPINE FUMARATE 25 MG/1
50 TABLET, FILM COATED ORAL AT BEDTIME
Status: DISCONTINUED | OUTPATIENT
Start: 2023-11-13 | End: 2023-11-20 | Stop reason: HOSPADM

## 2023-11-13 RX ORDER — ENOXAPARIN SODIUM 150 MG/ML
120 INJECTION SUBCUTANEOUS EVERY 12 HOURS
Status: DISCONTINUED | OUTPATIENT
Start: 2023-11-13 | End: 2023-11-13

## 2023-11-13 RX ORDER — PREDNISONE 20 MG/1
40 TABLET ORAL DAILY
Status: DISCONTINUED | OUTPATIENT
Start: 2023-11-18 | End: 2023-11-14

## 2023-11-13 RX ADMIN — IPRATROPIUM BROMIDE AND ALBUTEROL SULFATE 3 ML: .5; 3 SOLUTION RESPIRATORY (INHALATION) at 15:51

## 2023-11-13 RX ADMIN — FAMOTIDINE 20 MG: 10 INJECTION INTRAVENOUS at 08:01

## 2023-11-13 RX ADMIN — IOPAMIDOL 75 ML: 755 INJECTION, SOLUTION INTRAVENOUS at 12:31

## 2023-11-13 RX ADMIN — QUETIAPINE FUMARATE 50 MG: 25 TABLET ORAL at 20:34

## 2023-11-13 RX ADMIN — AMIODARONE HYDROCHLORIDE 1 MG/MIN: 1.8 INJECTION, SOLUTION INTRAVENOUS at 10:00

## 2023-11-13 RX ADMIN — FAMOTIDINE 20 MG: 10 INJECTION INTRAVENOUS at 20:33

## 2023-11-13 RX ADMIN — SULFAMETHOXAZOLE AND TRIMETHOPRIM 1 TABLET: 400; 80 TABLET ORAL at 11:57

## 2023-11-13 RX ADMIN — IPRATROPIUM BROMIDE AND ALBUTEROL SULFATE 3 ML: .5; 3 SOLUTION RESPIRATORY (INHALATION) at 23:27

## 2023-11-13 RX ADMIN — ESCITALOPRAM OXALATE 20 MG: 20 TABLET ORAL at 11:57

## 2023-11-13 RX ADMIN — METHYLPREDNISOLONE SODIUM SUCCINATE 62.5 MG: 125 INJECTION, POWDER, FOR SOLUTION INTRAMUSCULAR; INTRAVENOUS at 02:08

## 2023-11-13 RX ADMIN — LORATADINE 10 MG: 10 TABLET ORAL at 08:02

## 2023-11-13 RX ADMIN — CLONAZEPAM 1 MG: 0.5 TABLET ORAL at 08:02

## 2023-11-13 RX ADMIN — APIXABAN 10 MG: 5 TABLET, FILM COATED ORAL at 20:34

## 2023-11-13 RX ADMIN — Medication 15 MG/HR: at 09:41

## 2023-11-13 RX ADMIN — APIXABAN 5 MG: 5 TABLET, FILM COATED ORAL at 10:05

## 2023-11-13 RX ADMIN — AMIODARONE HYDROCHLORIDE 0.5 MG/MIN: 1.8 INJECTION, SOLUTION INTRAVENOUS at 15:33

## 2023-11-13 RX ADMIN — METHYLPREDNISOLONE SODIUM SUCCINATE 62.5 MG: 125 INJECTION, POWDER, FOR SOLUTION INTRAMUSCULAR; INTRAVENOUS at 10:05

## 2023-11-13 RX ADMIN — Medication 15 MG/HR: at 02:11

## 2023-11-13 RX ADMIN — PREDNISONE 40 MG: 20 TABLET ORAL at 20:33

## 2023-11-13 RX ADMIN — TAMSULOSIN HYDROCHLORIDE 0.4 MG: 0.4 CAPSULE ORAL at 08:02

## 2023-11-13 RX ADMIN — PREDNISONE 40 MG: 20 TABLET ORAL at 11:57

## 2023-11-13 RX ADMIN — IPRATROPIUM BROMIDE AND ALBUTEROL SULFATE 3 ML: .5; 3 SOLUTION RESPIRATORY (INHALATION) at 07:33

## 2023-11-13 RX ADMIN — CLONAZEPAM 1 MG: 0.5 TABLET ORAL at 20:33

## 2023-11-13 RX ADMIN — AMIODARONE HYDROCHLORIDE 150 MG: 1.5 INJECTION, SOLUTION INTRAVENOUS at 09:24

## 2023-11-13 RX ADMIN — LOPERAMIDE HYDROCHLORIDE 2 MG: 2 CAPSULE ORAL at 20:34

## 2023-11-13 ASSESSMENT — ACTIVITIES OF DAILY LIVING (ADL)
ADLS_ACUITY_SCORE: 40
ADLS_ACUITY_SCORE: 48
ADLS_ACUITY_SCORE: 40
ADLS_ACUITY_SCORE: 48
ADLS_ACUITY_SCORE: 40
ADLS_ACUITY_SCORE: 40
ADLS_ACUITY_SCORE: 48
ADLS_ACUITY_SCORE: 40
ADLS_ACUITY_SCORE: 40
ADLS_ACUITY_SCORE: 48

## 2023-11-13 NOTE — PROGRESS NOTES
Patient received scheduled nebulizers as ordered. Pt is on a 1 LPM NC with saturations of 95-96%. Breath sounds are clear t/o. Will continue to follow.     Nan Bhatti, RT

## 2023-11-13 NOTE — PROGRESS NOTES
End of Shift RN Summary - Overnights 8p - 7a    Neuro: ICU CAM positive - disoriented/visual disturbances/disorganized thoughts/inattention. Pt endorsed seeing objects that were not there, continuously pulled at lines all night, confused about time/situation/place. This writer had to continuously re-orient patient to time/place/situation throughout the night.   Pain: 0/10  Resp: L lung clear. RLL course crackles. RUL fine crackles. 3L NC 02 sat 93%  Cardiac: AFib HR 90s - 100s. Dilt gtt at 15mg/hr.  GI/: Urine output - 700cc  Diet: Regular - *No straws  Skin/Mobility: Heavy assist x2   Plan: Start to taper to P.O steroids today. Wean O2 to R.A. Cards Consult this AM. ECHO today.     -EMMA Godinez RN.

## 2023-11-13 NOTE — CONSULTS
HEART CARE CONSULTATON NOTE            Reason for consult: 58-year-old male asked to see secondary to atrial fibrillation with increased ventricular response.  Patient admitted with acute respiratory failure with hypoxia.  Patient with a history of melanoma.  Patient admitted with acute respiratory failure.     Assessment:   1.  Multifocal pneumonia, hypoxic respiratory failure with chart notes reviewed.  Patient was intubated earlier in the admission extubated November 8 with persistent hypoxia.  Notes from primary care and pulmonary reviewed.  Question pneumonitis related to pembrolizumab.  Currently on 5 L of oxygen.  Given that he is still requiring a fair amount of oxygen would be hesitant at this point to plan anesthesia and cardioversion.    2.  Atrial fibrillation with rapid ventricular response.  Patient converted to atrial fibrillation yesterday.  Current heart rates ranging 99 to 110 bpm.  Currently on IV diltiazem for rate management.  Chads vascular score would appear to be 1 based on hypertension here in the hospital but patient reports no prior history of hypertension.  Echocardiogram earlier in the admission suggested preserved left ventricular function.  Rate control continues to be mildly suboptimal on IV diltiazem.  Patient does have a history of asthma and utilizes inhalers on a daily basis therefore beta-blockers might be limited.    3.  Renal insufficiency.  Laboratory studies from November 12 shows a creatinine improved to 1.44.        Current medications.  Acetaminophen  Tessalon  Clonazepam diltiazem drip  Diltiazem IV  Lovenox  Lexapro  Insulin  Ipratropium  Imodium  Claritin  Methylprednisolone  Remeron  Flomax     Plan:   1.  We will continue with IV diltiazem this morning.  We will plan to discuss with my EP colleagues with a call that has been placed.  2.  When short-term consider increasing the Lovenox pending decisions regarding cardioversion.  Discussed with primary care.       Addendum: I reviewed with my EP colleagues.  Given some renal insufficiency, some concerns regarding beta-blockers given underlying asthma and concerns for more urgent cardioversion given O2 requirements we will plan to load with amiodarone IV today and then oral loading 400 mg p.o. twice daily pending response.  We will plan to anticoagulate with Eliquis as discussed with primary care given potential need for cardioversion within the next few weeks if he remains in atrial fibrillation.  We will discontinue IV diltiazem in favor of the amiodarone.  We will hold simvastatin short-term given amiodarone load may wish to consider alternative statin.  AST and ALT were normal earlier in the hospitalization.  TSH 0.93 on the 29th    Addendum.  Echocardiogram with normal ejection fraction of 65%, normal right ventricular size and systolic function with improvement in right ventricular function compared to November 5, 2023.  No significant valve abnormality was reported.    Afib persists, will make npo after midnight   Ct results with pulmonary emboli,IMPRESSION:  1.  Several thin short segment pulmonary emboli have developed in the posterior and lateral basilar left lower lobe pulmonary artery arteries. No signs of right heart strain.   2.  Multifocal pneumonia, right greater than left, has improved minimally.  3.  Moderate elevation right hemidiaphragm, mild elevation left hemidiaphragm and the associated platelike atelectasis throughout the paradiaphragmatic right and left lower lung parenchyma unchanged. eliquis dosing per primary           History:      HPI: 58-year-old male who by report was diagnosed with pneumonia 1 month ago and received Augmentin with symptoms that worsened after antibiotics completed.  Patient was hospitalized 1023 and received vancomycin, Zosyn and azithromycin and transition to the ofloxacin at discharge.  Patient admitted with increasing dyspnea increasing cough with CT suggesting increased  multifocal pneumonia.  Past medical history significant for asthma, multiple myeloma, depression and anxiety with recurrent pneumonia.  Patient does not notice the atrial fibrillation.  He continues to report some mild shortness of breath.  He denies chest pain.  He denies dizziness or lightheadedness.  He is not aware of prior history of atrial fibrillation or other cardiovascular issues.  Past medical history    Diverticulosis 12/05/2017     IVY (generalized anxiety disorder) 12/05/2017     Chronic gout 12/05/2017     Hyperlipemia 12/05/2017     Class 1 obesity due to excess calories without serious comorbidity with body mass index (BMI) of 33.0 to 33.9 in adult 11/10/2021     Moderate persistent asthma without complication 05/16/2022     Allergic rhinitis due to pollen 05/16/2022     Healthcare maintenance 05/16/2022     Melanoma of skin (H) 08/16/2023     Pneumonia 10/23/2023           Resolved Ambulatory Problems     Diagnosis Date Noted     Colon polyps 12/05/2017   Social History  Reviewed, and he  reports that he has never smoked. He quit smokeless tobacco use about 25 years ago.  His smokeless tobacco use included chew. He reports current alcohol use. He reports current drug use. Drug: Marijuana.    Past Surgical History:   Procedure Laterality Date     HERNIA REPAIR       ORTHOPEDIC SURGERY      Both ankles, left knee, right shoulder     pyloric stenosis repair        Social History     Socioeconomic History     Marital status: Single     Spouse name: Not on file     Number of children: Not on file     Years of education: Not on file     Highest education level: Not on file   Occupational History     Not on file   Tobacco Use     Smoking status: Never     Smokeless tobacco: Former     Types: Chew     Quit date: 1998   Substance and Sexual Activity     Alcohol use: Yes     Comment: 3-4 times per week, 5-6 beers     Drug use: Yes     Types: Marijuana     Sexual activity: Not Currently     Partners: Female    Other Topics Concern     Not on file   Social History Narrative     Not on file     Social Determinants of Health     Financial Resource Strain: Low Risk  (10/23/2023)    Financial Resource Strain      Within the past 12 months, have you or your family members you live with been unable to get utilities (heat, electricity) when it was really needed?: No   Food Insecurity: Low Risk  (10/23/2023)    Food Insecurity      Within the past 12 months, did you worry that your food would run out before you got money to buy more?: No      Within the past 12 months, did the food you bought just not last and you didn t have money to get more?: No   Transportation Needs: Low Risk  (10/23/2023)    Transportation Needs      Within the past 12 months, has lack of transportation kept you from medical appointments, getting your medicines, non-medical meetings or appointments, work, or from getting things that you need?: No   Physical Activity: Insufficiently Active (11/10/2021)    Exercise Vital Sign      Days of Exercise per Week: 1 day      Minutes of Exercise per Session: 30 min   Stress: Stress Concern Present (11/10/2021)    [employer] [place] [employer] - Occupational Stress Questionnaire      Feeling of Stress : To some extent   Social Connections: Socially Isolated (11/10/2021)    Social Connection and Isolation Panel [NHANES]      Frequency of Communication with Friends and Family: Once a week      Frequency of Social Gatherings with Friends and Family: Once a week      Attends [employer] Services: Never      Active Member of Clubs or Organizations: No      Attends Club or Organization Meetings: Not on file      Marital Status: Never    Interpersonal Safety: Low Risk  (10/23/2023)    Interpersonal Safety      Do you feel physically and emotionally safe where you currently live?: Yes      Within the past 12 months, have you been hit, slapped, kicked or otherwise physically hurt by someone?: No      Within  "the past 12 months, have you been humiliated or emotionally abused in other ways by your partner or ex-partner?: No   Housing Stability: Low Risk  (10/23/2023)    Housing Stability      Do you have housing? : Yes      Are you worried about losing your housing?: No     Family History   Problem Relation Age of Onset     Breast Cancer Mother      Heart Disease Father      Mental Illness Father      Hyperlipidemia Father      Mental Illness Brother      Allergies   Allergen Reactions     Chicken [Chicken Protein] Other (See Comments)     Throat closes to turkey as well.     Cantaloupe [Cantaloupe Extract Allergy Skin Test] Unknown     Wellbutrin [Bupropion] Unknown     Grass Pollen [Grass] Rash     Turkey [Poultry Meal] Rash     No current outpatient medications on file.         Review of Systems  All pertinent positives and negatives reviewed in History.  All other 10 point review of systems reviewed and negative.      Objective:    Physical Exam  VITALS: BP (!) 150/85   Pulse 110   Temp 97.7  F (36.5  C) (Oral)   Resp 30   Ht 1.93 m (6' 4\")   Wt 125.7 kg (277 lb 1.6 oz)   SpO2 95%   BMI 33.73 kg/m    BMI: Body mass index is 33.73 kg/m .  Wt Readings from Last 3 Encounters:   11/13/23 125.7 kg (277 lb 1.6 oz)   10/23/23 117.9 kg (260 lb)   10/23/23 118.1 kg (260 lb 6.4 oz)       Intake/Output Summary (Last 24 hours) at 11/13/2023 0715  Last data filed at 11/13/2023 0400  Gross per 24 hour   Intake 436.33 ml   Output 1420 ml   Net -983.67 ml     General Appearance:  Alert, cooperative, no distress, appears stated age   HEENT:  Normocephalic, without obvious abnormality   Neck: Supple,  symmetric, no carotid bruit , mild elevation in jugular venous pressure   Back:   Symmetric   Lungs:   Diminished breath sounds bilaterally., respirations unlabored   Chest Wall:  No tenderness or deformity   Heart:   irregularly irregular rhythm, mildly tachycardic, no significant murmur.   Abdomen:   Soft, non-tender, bowel " sounds active,   Extremities: Extremities normal, atraumatic, no cyanosis or edema   Skin: Skin color, texture, turgor normal,    Neurologic: Alert and oriented X 3, Moves all 4 extremities     Cardiographics  EC/12 0800 hrs. sinus tachycardia nonspecific T wave changes.  1112, 104 atrial fibrillation with rapid ventricular response, nonspecific ST-T changes.  Atrial fibrillation new from previous.  1112 1315 hrs. atrial fibrillation with increased ventricular response, nonspecific ST-T changes  Echocardiogram:  Reading Physician Reading Date Result Priority   Bright Mejia MD  [phone] 2023      Narrative & Impression  [id number]  [id number]  [id number]  [id number]^[address]ANE^[place] [address]     Name: MISSY ALMANZA  MRN: [id number]  : 1965  Study Date: 2023 11:09 AM  Age: 58 yrs  Gender: Male  Patient Location: [facility]  Reason For Study: Abn EKG  Ordering Physician: FIDEL VELA  Performed By: MB     BSA: 2.4 m2  Height: 72 in  Weight: 266 lb  HR: 65  BP: 105/56 mmHg  ______________________________________________________________________________  Procedure  Complete Echo Adult. Definity (NDC #65847-909) given intravenously.  ______________________________________________________________________________  Interpretation Summary     1. Technically difficult study (useful imaging essentially could only be  obtained from the subcostal view).  2. The left ventricle is normal in size. Image quality does not provide for  detailed assessment of LV systolic function, but is felt to be normal with a  visually estimated ejection fraction of roughly 55-60%.  3. No significant valvular heart disease is identified on this study though  the sensitivity, particularly of regurgitant lesions, is reduced due to poor  Doppler acoustics.  4. On selected views, the right ventricle appears mildly enlarged with  moderately reduced systolic performance (the  right ventricle is only  visualized from the subcostal view).  5. There is biatrial enlargement (difficult to quantify due to suboptimal  acoustic imaging)  6. The IVC appears dilated with decreased phasic variation in caval diameter  consistent with elevated right atrial pressure.  ______________________________________________________________________________  Left ventricle:  The left ventricle is normal in size. Image quality does not provide for  detailed assessment of LV systolic function, but is felt to be normal with a  visually estimated ejection fraction of roughly 55-60%. There is normal  regional wall motion based on limited views available. Left ventricular wall  thickness is normal.     Assessment of LV Diastolic Function: The cumulative findings are indeterminate  in the evaluation of diastolic function.     Right ventricle:  On selected views, the right ventricle appears mildly enlarged with moderately  reduced systolic performance (the right ventricle is only visualized from the  subcostal view).     Left atrium:  The left atrium appears enlarged (difficult to quantify)     Right atrium:  The right atrium appears enlarged (difficult to quantify).     IVC:  The IVC appears dilated with decreased phasic variation in caval diameter  consistent with elevated right atrial pressure.     Aortic valve:  The aortic valve is not well visualized, but suspected to be comprised of  three cusps. No significant aortic stenosis or aortic insufficiency is  detected on this study.     Mitral valve:  The mitral valve appears morphologically normal.     Tricuspid valve:  The tricuspid valve is grossly morphologically normal.     Pulmonic valve:  The pulmonic valve is not well visualized.     Thoracic aorta:  The aortic root and proximal ascending aorta are of normal dimension.     Pericardium:  There is no significant pericardial effusion.  ___________________________________________  Imaging  Chest x-ray:   Narrative &  "Impression   EXAM: XR CHEST PORT 1 VIEW  LOCATION: [facility]  DATE: 11/12/2023     INDICATION: Shortness of breath  COMPARISON: 11/09/2023 and older studies. CT chest 11/02/2023                                                                      IMPRESSION: Right upper extremity PICC line catheter overlies the distal SVC. Chronic elevation of the right hemidiaphragm.      Slightly better aeration of the right lung with slight decrease in the ill-defined patchy areas of consolidation.. Left lung remains clear. Heart is of normal size.        Lab Results    Chemistry/lipid CBC Cardiac Enzymes/BNP/TSH/INR   Recent Labs   Lab Test 04/11/23  1255   CHOL 189   HDL 54   LDL 96   TRIG 193*     Recent Labs   Lab Test 04/11/23  1255 11/10/21  1427 06/15/17  1546   LDL 96 106 132*     Recent Labs   Lab Test 11/13/23  0209 11/12/23  0820 11/12/23  0355   NA  --   --  143   POTASSIUM  --   --  4.0   CHLORIDE  --   --  108*   CO2  --   --  25   *   < > 137*   BUN  --   --  44.1*   CR  --   --  1.44*   GFRESTIMATED  --   --  56*   MELISSA  --   --  8.7    < > = values in this interval not displayed.     Recent Labs   Lab Test 11/12/23  0355 11/11/23  0533 11/10/23  0512   CR 1.44* 1.62* 1.71*     Recent Labs   Lab Test 11/05/23  1244   A1C 6.3*          Recent Labs   Lab Test 11/12/23 0355   WBC 14.4*   HGB 10.3*   HCT 33.0*   MCV 91   *     Recent Labs   Lab Test 11/12/23  0355 11/11/23  0533 11/10/23  0512   HGB 10.3* 9.6* 8.1*    No results for input(s): \"TROPONINI\" in the last 36237 hours.  Recent Labs   Lab Test 11/09/23  0504 11/04/23  0421 11/02/23  0845 10/23/23  1041   NTBNPI 3,054* 1,264*  --   --    NTBNP  --   --  247 96     Recent Labs   Lab Test 11/09/23  0504   TSH 0.13*     No results for input(s): \"INR\" in the last 09977 hours.         "

## 2023-11-13 NOTE — PROGRESS NOTES
SPIRITUAL HEALTH SERVICES Progress Note       Referral Source/Reason for Visit: LOS visit               Summary and Recommendations -   Jim showed some signs of confusion and disorientation, he was pleasant and engaged in long disjointed conversation attempting to articulate his illness narrative      PLAN: [facility] will cont to assess and support through [facility]     Jonny Sandoval M.Div., [facility]  Staff    [phone]   [facility] is available 24/7 for emergent requests and consults, either by paging the on-call  or by entering an ASAP/STAT consult in [facility], which will also page the on-call .     Assessment   Saw pt Jim SOLIS Titus at his bedside, he showed signs of confusion and disorientation      Patient/Family Understanding of Illness and Goals of Care -   Jim engaged in lengthy conversation in which he shared he had skin cancer and an infection.  Distress and Loss -   Named feelings of fear during being intubated   Strengths, Coping, and Resources -    Jim used story telling and humored to cope.   Named his Mom as a source of support     Meaning, Beliefs, and Spirituality -    He shared he was Sabianism and welcomed further [facility] visits

## 2023-11-13 NOTE — PLAN OF CARE
"Patient remains in afib, -130's. On Diltiazem at 15 mg/hr. Dr. Williamson ordered Amiodarone bolus to be run over 40 minutes and to stop Diltiazem infusion after the bolus. Patient's condition remained the same following bolus and initiation of Amiodarone infusion. Dr. Williamson paged and wanting to keep the Diltiazem drip running concurrently. BP remains elevated 140/80s. -120's. Discussed with pharmacist as well.    ECHO completed. Patient down to CT to assess for pulmonary embolism, radiology called and informed Dr. Gregorio of positive PE result in LLL.    Remains confused with disorganized thinking. Alert to self and year, occasionally situation. Forgetful and still misinterpreting things in the room, the waterfall on the TV was seen as a \"bottleneck of cars.\" On 2L NC, infrequent dry cough. Not reporting being hungry, but eating when food brought. External cath in place. PT/OT deferred today due to HR.  Gina Martinez RN on 11/13/2023 at 1:43 PM    Problem: Dysrhythmia  Goal: Normalized Cardiac Rhythm  Outcome: Not Progressing     Problem: Pneumonia  Goal: Fluid Balance  Outcome: Progressing  Goal: Effective Oxygenation and Ventilation  Outcome: Progressing  Intervention: Promote Airway Secretion Clearance  Recent Flowsheet Documentation  Taken 11/13/2023 1200 by Gina Martinez RN  Cough And Deep Breathing: done with encouragement  Taken 11/13/2023 0800 by Gina Martinez RN  Cough And Deep Breathing: done with encouragement  Intervention: Optimize Oxygenation and Ventilation  Recent Flowsheet Documentation  Taken 11/13/2023 1035 by Gina Martinez RN  Head of Bed (HOB) Positioning: HOB at 20-30 degrees  Taken 11/13/2023 0800 by Gina Martinez RN  Head of Bed (HOB) Positioning: HOB at 20-30 degrees     Problem: Oral Intake Inadequate  Goal: Improved Oral Intake  11/13/2023 1336 by Gina Martinez RN  Outcome: Progressing  11/13/2023 1335 by Gina Martinez RN  Outcome: " Progressing     Problem: Delirium  Goal: Improved Attention and Thought Clarity  Outcome: Progressing     Problem: Restraint for Non-Violent/Non-Self-Destructive Behavior  Goal: Prevent/Manage Potential Problems  Description: Maintain safety of patient and others during period of restraint.  Promote psychological and physical wellbeing.  Prevent injury to skin and involved body parts.  Promote nutrition, hydration, and elimination.  Outcome: Met     Problem: Mechanical Ventilation Invasive  Goal: Optimal Device Function  Outcome: Met  Goal: Mechanical Ventilation Liberation  Outcome: Met  Intervention: Promote Extubation and Mechanical Ventilation Liberation  Recent Flowsheet Documentation  Taken 11/13/2023 1200 by Gina Martinez, RN  Medication Review/Management: medications reviewed  Environmental Support:   calm environment promoted   distractions minimized   rest periods encouraged  Taken 11/13/2023 0800 by Gina Martinez, RN  Medication Review/Management: medications reviewed  Goal: Optimal Nutrition Delivery  Outcome: Met  Goal: Absence of Device-Related Skin and Tissue Injury  Outcome: Met  Goal: Absence of Ventilator-Induced Lung Injury  Outcome: Met  Intervention: Prevent Ventilator-Associated Pneumonia  Recent Flowsheet Documentation  Taken 11/13/2023 1035 by Gina Martinez, RN  Head of Bed (HOB) Positioning: HOB at 20-30 degrees  Taken 11/13/2023 0800 by Gina Martinez, RN  Head of Bed (HOB) Positioning: HOB at 20-30 degrees     Problem: Suicide Risk  Goal: Absence of Self-Harm  Outcome: Met   Goal Outcome Evaluation:

## 2023-11-13 NOTE — PROGRESS NOTES
Patient received neb treatment per MD order. Pt educated with the explanation of the medication they received. Patient showed understanding with verbalization.  BS: diminished with expiratory wheeze. Increased aeration post treatment.  Pt on 5L NC.     Brenda Vasquez, RT on 11/12/2023 at 11:19 PM

## 2023-11-13 NOTE — PROGRESS NOTES
"PULMONARY / CRITICAL CARE PROGRESS NOTE    Date / Time of Admission:  11/2/2023  7:52 AM    Assessment:   Principal Problem:    Acute respiratory failure with hypoxia (H)  Active Problems:    Pneumonia    Elevated alkaline phosphatase level    Sepsis, due to unspecified organism, unspecified whether acute organ dysfunction present (H)    Acute kidney failure, unspecified (H)    Hypernatremia      He is feeling more clear every day. However gets confused and is aware he is confused. He denies pain. He still has a cough. He is on room air. He is eating and having bowel mvmts    Code Status:  Full Code  58yoM with history of melanoma previously on pembrolizumab last given September 2023 with ongoing pneumonitis presented with acute hypoxic respiratory failure requiring intubation in the setting of elevated inflammatory markers but culture negative and minimal improvement despite antibiotics extubated 11/8 and now on low-flow oxygen with improvement in agitated delirium off of precedex.      Plan:   Pulmonary:  1) Acute hypoxic respiratory failure from pneumonitis versus asymmetric pneumonia  -s/p intubation, proning, paralysis and extubated 11/8 with improving hypoxia.   -Solumedrol for empiric pneumonitis, will transition to prednisone today 40mg BID taper q5days - ordered  - will need follow up in pulmonary clinic or oncology for repeat non-contract CT Chest to ensure resolution of infiltrates  - adding Bactrim for PJP ppx until Prednisone dose <20mg/day    Will follow remotely - please alert our service when close to discharge so we can facilitate follow up      Clinically Significant Risk Factors              # Hypoalbuminemia: Lowest albumin = 2.7 g/dL at 11/3/2023  5:57 AM, will monitor as appropriate              # Obesity: Estimated body mass index is 33.73 kg/m  as calculated from the following:    Height as of this encounter: 1.93 m (6' 4\").    Weight as of this encounter: 125.7 kg (277 lb 1.6 oz).        # " "Financial/Environmental Concerns: none  # Asthma: noted on problem list          Principal Problem:    Acute respiratory failure with hypoxia (H)  Active Problems:    Pneumonia    Elevated alkaline phosphatase level    Sepsis, due to unspecified organism, unspecified whether acute organ dysfunction present (H)    Acute kidney failure, unspecified (H)    Hypernatremia      Allergies: Chicken [chicken protein], Cantaloupe [cantaloupe extract allergy skin test], Wellbutrin [bupropion], Grass pollen [grass], and Turkey [poultry meal]     MEDS:  Scheduled Meds:   apixaban ANTICOAGULANT  5 mg Oral BID    clonazePAM  1 mg Oral BID    escitalopram  20 mg Oral Daily with lunch    famotidine  20 mg Intravenous Q12H    insulin aspart  1-6 Units Subcutaneous 4x Daily AC & HS    ipratropium - albuterol 0.5 mg/2.5 mg/3 mL  3 mL Nebulization Q8H    loratadine  10 mg Oral Daily    methylPREDNISolone  62.5 mg Intravenous Q8H    perflutren lipid microsphere  2 mL Intravenous Once    QUEtiapine  50 mg Oral At Bedtime    sodium chloride (PF)  10-40 mL Intracatheter Q7 Days    sodium chloride (PF)  3 mL Intracatheter Q8H    tamsulosin  0.4 mg Oral Daily     Continuous Infusions:   amiodarone 1 mg/min (11/13/23 1000)    amiodarone      dextrose      dilTIAZem 10 mg/hr (11/13/23 1004)     PRN Meds:.acetaminophen, albuterol, benzonatate, dextrose, glucose **OR** dextrose **OR** glucagon, lidocaine (viscous), lidocaine, loperamide, melatonin, naloxone **OR** naloxone **OR** naloxone **OR** naloxone, polyethylene glycol, sodium chloride (PF), sodium chloride (PF), sodium chloride (PF), sodium chloride (PF), sodium chloride (PF)      Objective:   VITALS:  BP (!) 164/93   Pulse 108   Temp 97.6  F (36.4  C)   Resp (!) 32   Ht 1.93 m (6' 4\")   Wt 125.7 kg (277 lb 1.6 oz)   SpO2 97%   BMI 33.73 kg/m    EXAM:  General appearance: awake and in no distress  Neck: large collar size  Lungs: decreased BS meño, no wheezing  Heart: tachy, " irregular  Abdomen:Soft, nontender  Extremities: minimal pitting edema  Skin:no rashes on visible skin, no edema  Neurologic: awake, alert in no distress    I&O:       Intake/Output Summary (Last 24 hours) at 11/12/2023 1003  Last data filed at 11/12/2023 0600  Gross per 24 hour   Intake 789.24 ml   Output 2860 ml   Net -2070.76 ml     Data Review:  Chest Xray  Personally reviewed image/s and Personally reviewed impression/s  EXAM: XR CHEST PORT 1 VIEW  LOCATION: [facility]  DATE: 11/9/2023  INDICATION: Shortness of breath.  COMPARISON: 11/08/2023                                                               IMPRESSION: Right-sided PICC tip in good position at cavoatrial junction. Heart size magnified in AP projection. Pulmonary venous congestion accentuated by shallow inspiration. Consolidative airspace opacities within the right lung and minimal left   basilar atelectasis unchanged. No pneumothorax.    EXAM: XR CHEST PORT 1 VIEW  LOCATION: [facility]  DATE: 11/12/2023     INDICATION: Shortness of breath  COMPARISON: 11/09/2023 and older studies. CT chest 11/02/2023                                                                      IMPRESSION: Right upper extremity PICC line catheter overlies the distal SVC. Chronic elevation of the right hemidiaphragm.      Slightly better aeration of the right lung with slight decrease in the ill-defined patchy areas of consolidation.. Left lung remains clear. Heart is of normal size.    LABS      Latest Ref Rng & Units 11/7/2023     5:29 AM 11/9/2023     5:04 AM 11/10/2023     5:12 AM 11/11/2023     5:33 AM 11/12/2023     3:55 AM 11/13/2023     2:09 AM 11/13/2023     9:19 AM   Glucose Values   Glucose 70 - 99 mg/dL 141  147  158  172  137  141  157        Results for orders placed or performed during the hospital encounter of 11/02/23   Basic metabolic panel   Result Value Ref Range    Sodium 143 135 - 145 mmol/L    Potassium  4.1 3.4 - 5.3 mmol/L    Chloride 107 98 - 107 mmol/L    Carbon Dioxide (CO2) 25 22 - 29 mmol/L    Anion Gap 11 7 - 15 mmol/L    Urea Nitrogen 36.9 (H) 6.0 - 20.0 mg/dL    Creatinine 1.30 (H) 0.67 - 1.17 mg/dL    GFR Estimate 64 >60 mL/min/1.73m2    Calcium 9.4 8.6 - 10.0 mg/dL    Glucose 157 (H) 70 - 99 mg/dL     Lab Results   Component Value Date    WBC 11.0 11/13/2023    HGB 11.6 (L) 11/13/2023    HCT 36.6 (L) 11/13/2023    MCV 91 11/13/2023     11/13/2023

## 2023-11-13 NOTE — PROGRESS NOTES
CLINICAL NUTRITION SERVICES - REASSESSMENT NOTE     Nutrition Prescription    RECOMMENDATIONS FOR MDs/PROVIDERS TO ORDER:  none    Malnutrition Status:    Does not meet 2 criteria     Recommendations already ordered by Registered Dietitian (RD):  Ensure Enlive daily until po improves     Future/Additional Recommendations:  Will monitor intake      EVALUATION OF THE PROGRESS TOWARD GOALS   Diet: Regular  Intake: on 11/12 pt ordered 1379kcal, 50g protein with 0-50% intake; not yet meeting needs      NEW FINDINGS   He states he was working on his weight prior to admission. He would like to get his weight down, but also knows this is not the time for that.     ANTHROPOMETRICS  11/13/23 0359 125.7 kg (277 lb 1.6 oz) Bed scale   11/12/23 0700 131.5 kg (290 lb) --   11/11/23 0200 127.8 kg (281 lb 12.8 oz) Bed scale   11/08/23 0615 130.1 kg (286 lb 12.8 oz) Bed scale   11/04/23 0356 120.9 kg (266 lb 9.6 oz) Bed scale   11/02/23 0748 113.4 kg (250 lb) --         PHYSICAL FINDINGS  Per flowsheet:   Edema- none noted   GI- WDL  Skin- no skin breakdown noted     LABS  Reviewed    MEDICATIONS  Reviewed, pepcid, novolog, solumedrol, remeron      NUTRITION DIAGNOSIS  Inadequate protein-energy intake related to mechanical ventilation as evidenced by need for TF and difficult to meet needs due to obesity, propofol, and proning     -evaluation: no longer applicable due to pt no longer being on TF     Modify to  Inadequate intake r/t some delirium AEB not yet meeting estimated needs orally      Goals:  Glycemic control  -met  Electrolytes wnl-met  Meet > 75% of estimated needs-new     INTERVENTIONS  Implementation  Ensure Enlive daily until po improves       Monitoring/Evaluation  Progress toward goals will be monitored and evaluated per protocol.

## 2023-11-13 NOTE — PROGRESS NOTES
[facility] MEDICINE PROGRESS NOTE      Summary: 58 year old male into [facility] on 11/2/2023 for acute  Respiratory failure requiring intubation and proning.  Imaging showed  Dense right sided pneumonitis.  Pt was extubated on 11/8.  He was      Yesterday after walking about 30 feet he went into new onset atrial  Fibrillation with RVR that has been difficult to control.  He was originally  Treated with metoprolol 5 mg bolus without improvement.  He was  Then started on cardizem bolus and drip.  The troponin was stable though  A d dimer was 2.  He continues with hypoxia from 3-6 liter nasal cannula need.    He has been having issues with encephalopathy.  2 days ago a code  Green was called.  Last night he had some delirium.  (Visual hallucinations).    On my interview this morning he is still with some delirium. (Rambling,  Tangential though clear at times).  He is on 15 mg cardizem with a   HR of 106.      Pts testing for today will include an echocardiogram and CT chest.  The lovenox dose will be increased for the new atrial fibrillation.  Amiodarone  Is being considered for the atrial fibrillation.        Hospital day number 11    Problem list:     Acute respiratory failure due to pneumonitis: (pembrolizumab related)   (Extubated on 11/8):    2.  Atrial fibrillation with RVR, new: on cardizem drip; discussed with   Cardiology; echo,   3.  History of asthma: on solumedrol for the pneumonitis  4.  Acute metabolic encephalopathy: change to seroquel at night;   Hope to taper steroid down a bit to eliminate triggers for   His delirium; he is klonipin dependent  5.  History of melanoma: stage 2b; on check point inhibitors  6.  Dvt prevention:  increase lovenox  7.  Disposition:  no PT today; up to chair only if he desires        Hyacinth Gregorio MD  [facility]  [facility]  Phone: #[phone]  Securely message with the Vocera Web Console (learn  more here)  Text page via [facility] Paging/Directory     Interval History/Subjective:  denies complaints      Physical Exam/Objective:  Temp:  [97.7  F (36.5  C)-98.4  F (36.9  C)] 97.7  F (36.5  C)  Pulse:  [] 125  Resp:  [17-55] 26  BP: (112-174)/(65-98) 150/85  SpO2:  [89 %-98 %] 95 %  Body mass index is 33.73 kg/m .    GENERAL:  Alert, oriented to person; encephalopathic   HEAD:  Normocephalic, without obvious abnormality, atraumatic   EYES:  PERRL, conjunctiva/corneas clear, no scleral icterus, EOM's intact   NOSE: Nares normal, septum midline, mucosa normal, no drainage   THROAT: Lips, mucosa, and tongue normal; teeth and gums normal, mouth moist   NECK: Supple, symmetrical, trachea midline   BACK:   Symmetric, no curvature, ROM normal   LUNGS:   Clear to auscultation bilaterally, no rales, rhonchi, or wheezing, symmetric chest rise on inhalation, respirations unlabored   CHEST WALL:  No tenderness or deformity   HEART:  Tachycardic, no murmurs   ABDOMEN:   Soft, non-tender, bowel sounds active all four quadrants, no masses, no organomegaly, no rebound or guarding   EXTREMITIES: Extremities normal, atraumatic, no cyanosis or edema    SKIN: Dry to touch, no exanthems in the visualized areas   NEURO: Alert, oriented x3, moves all four extremities freely   PSYCH: encephalopathy     Data reviewed today: I personally reviewed all new medications, labs, imaging/diagnostics reports over the past 24 hours. Pertinent findings include:    Imaging:   Recent Results (from the past 24 hour(s))   XR Chest Port 1 View    Narrative    EXAM: XR CHEST PORT 1 VIEW  LOCATION: [facility]  DATE: 11/12/2023    INDICATION: Shortness of breath  COMPARISON: 11/09/2023 and older studies. CT chest 11/02/2023      Impression    IMPRESSION: Right upper extremity PICC line catheter overlies the distal SVC. Chronic elevation of the right hemidiaphragm.     Slightly better aeration of the right lung with slight  decrease in the ill-defined patchy areas of consolidation.. Left lung remains clear. Heart is of normal size.       Labs:      Medications:   Personally Reviewed.  Medications    dextrose      dilTIAZem 15 mg/hr (11/13/23 0400)      clonazePAM  1 mg Oral BID    enoxaparin ANTICOAGULANT  40 mg Subcutaneous Q24H    escitalopram  20 mg Oral Daily with lunch    famotidine  20 mg Intravenous Q12H    insulin aspart  1-6 Units Subcutaneous 4x Daily AC & HS    ipratropium - albuterol 0.5 mg/2.5 mg/3 mL  3 mL Nebulization Q8H    loratadine  10 mg Oral Daily    methylPREDNISolone  62.5 mg Intravenous Q8H    mirtazapine  15 mg Oral At Bedtime    sodium chloride (PF)  10-40 mL Intracatheter Q7 Days    sodium chloride (PF)  3 mL Intracatheter Q8H    tamsulosin  0.4 mg Oral Daily

## 2023-11-13 NOTE — PROGRESS NOTES
Hospitalist follow up:    I was called regarding pts increasing oxygen need.  This  Afternoon he began with atrial fibrillation and RVR.   He  Has a new cough.    Pt has been medicated originally with metoprolol 5 mg boluses  Along with cardizem 20 mg bolus and a 10 mg iv drip.    At 1900 on my clinical exam he is mildly dyspneic without  Appreciable wheeze.  He has no edema.  He is on 6 liters  Oxygen via nasal cannula.    Differential includes PE, CAD, ?allergy to medication.    Clinical plan:    XR chest STAT  Troponin x2 d dimer  Echo tomorrow  Cardiology consult with a phone call now  Discussed with patient and nurse    SANDRA Gregorio.  HMS

## 2023-11-13 NOTE — PROGRESS NOTES
Hospitalist follow up:    CT chest done this morning showed LLL pulmonary embolus  Without right heart strain.  Results reviewed with patient.  Eliquis started today.  Echo pending.    SANDRA Gregorio.  HMS

## 2023-11-14 LAB
ALBUMIN SERPL BCG-MCNC: 3.3 G/DL (ref 3.5–5.2)
ALP SERPL-CCNC: 133 U/L (ref 40–129)
ALT SERPL W P-5'-P-CCNC: 21 U/L (ref 0–70)
ANION GAP SERPL CALCULATED.3IONS-SCNC: 9 MMOL/L (ref 7–15)
AST SERPL W P-5'-P-CCNC: 9 U/L (ref 0–45)
ATRIAL RATE - MUSE: 144 BPM
BILIRUB DIRECT SERPL-MCNC: 0.26 MG/DL (ref 0–0.3)
BILIRUB SERPL-MCNC: 0.5 MG/DL
BUN SERPL-MCNC: 36.5 MG/DL (ref 6–20)
CALCIUM SERPL-MCNC: 9.3 MG/DL (ref 8.6–10)
CHLORIDE SERPL-SCNC: 109 MMOL/L (ref 98–107)
CREAT SERPL-MCNC: 1.39 MG/DL (ref 0.67–1.17)
CRP SERPL-MCNC: 11.1 MG/L
DEPRECATED HCO3 PLAS-SCNC: 25 MMOL/L (ref 22–29)
DIASTOLIC BLOOD PRESSURE - MUSE: NORMAL MMHG
EGFRCR SERPLBLD CKD-EPI 2021: 59 ML/MIN/1.73M2
ERYTHROCYTE [DISTWIDTH] IN BLOOD BY AUTOMATED COUNT: 15.5 % (ref 10–15)
GLUCOSE BLDC GLUCOMTR-MCNC: 105 MG/DL (ref 70–99)
GLUCOSE BLDC GLUCOMTR-MCNC: 136 MG/DL (ref 70–99)
GLUCOSE BLDC GLUCOMTR-MCNC: 145 MG/DL (ref 70–99)
GLUCOSE BLDC GLUCOMTR-MCNC: 149 MG/DL (ref 70–99)
GLUCOSE SERPL-MCNC: 169 MG/DL (ref 70–99)
HCT VFR BLD AUTO: 35.6 % (ref 40–53)
HGB BLD-MCNC: 11.3 G/DL (ref 13.3–17.7)
INTERPRETATION ECG - MUSE: NORMAL
MAGNESIUM SERPL-MCNC: 2.5 MG/DL (ref 1.7–2.3)
MCH RBC QN AUTO: 28.5 PG (ref 26.5–33)
MCHC RBC AUTO-ENTMCNC: 31.7 G/DL (ref 31.5–36.5)
MCV RBC AUTO: 90 FL (ref 78–100)
P AXIS - MUSE: NORMAL DEGREES
PLATELET # BLD AUTO: 359 10E3/UL (ref 150–450)
POTASSIUM SERPL-SCNC: 3.6 MMOL/L (ref 3.4–5.3)
PR INTERVAL - MUSE: NORMAL MS
PROT SERPL-MCNC: 5.7 G/DL (ref 6.4–8.3)
QRS DURATION - MUSE: 84 MS
QT - MUSE: 268 MS
QTC - MUSE: 406 MS
R AXIS - MUSE: 25 DEGREES
RBC # BLD AUTO: 3.97 10E6/UL (ref 4.4–5.9)
SODIUM SERPL-SCNC: 143 MMOL/L (ref 135–145)
SYSTOLIC BLOOD PRESSURE - MUSE: NORMAL MMHG
T AXIS - MUSE: 35 DEGREES
VENTRICULAR RATE- MUSE: 138 BPM
WBC # BLD AUTO: 12.3 10E3/UL (ref 4–11)

## 2023-11-14 PROCEDURE — 250N000013 HC RX MED GY IP 250 OP 250 PS 637: Performed by: EMERGENCY MEDICINE

## 2023-11-14 PROCEDURE — 250N000013 HC RX MED GY IP 250 OP 250 PS 637: Performed by: HOSPITALIST

## 2023-11-14 PROCEDURE — 250N000013 HC RX MED GY IP 250 OP 250 PS 637: Performed by: INTERNAL MEDICINE

## 2023-11-14 PROCEDURE — 93010 ELECTROCARDIOGRAM REPORT: CPT | Mod: RTG | Performed by: INTERNAL MEDICINE

## 2023-11-14 PROCEDURE — 250N000011 HC RX IP 250 OP 636: Mod: JZ | Performed by: EMERGENCY MEDICINE

## 2023-11-14 PROCEDURE — 99232 SBSQ HOSP IP/OBS MODERATE 35: CPT | Performed by: INTERNAL MEDICINE

## 2023-11-14 PROCEDURE — 999N000157 HC STATISTIC RCP TIME EA 10 MIN

## 2023-11-14 PROCEDURE — 250N000011 HC RX IP 250 OP 636: Mod: JZ | Performed by: INTERNAL MEDICINE

## 2023-11-14 PROCEDURE — 99222 1ST HOSP IP/OBS MODERATE 55: CPT | Performed by: REGISTERED NURSE

## 2023-11-14 PROCEDURE — 258N000003 HC RX IP 258 OP 636: Performed by: EMERGENCY MEDICINE

## 2023-11-14 PROCEDURE — 99233 SBSQ HOSP IP/OBS HIGH 50: CPT | Performed by: INTERNAL MEDICINE

## 2023-11-14 PROCEDURE — 250N000009 HC RX 250: Performed by: INTERNAL MEDICINE

## 2023-11-14 PROCEDURE — 83735 ASSAY OF MAGNESIUM: CPT | Performed by: INTERNAL MEDICINE

## 2023-11-14 PROCEDURE — 80053 COMPREHEN METABOLIC PANEL: CPT | Performed by: INTERNAL MEDICINE

## 2023-11-14 PROCEDURE — 99233 SBSQ HOSP IP/OBS HIGH 50: CPT | Performed by: EMERGENCY MEDICINE

## 2023-11-14 PROCEDURE — 200N000001 HC R&B ICU

## 2023-11-14 PROCEDURE — 94640 AIRWAY INHALATION TREATMENT: CPT | Mod: 76

## 2023-11-14 PROCEDURE — 250N000011 HC RX IP 250 OP 636: Performed by: GENERAL ACUTE CARE HOSPITAL

## 2023-11-14 PROCEDURE — 82248 BILIRUBIN DIRECT: CPT | Performed by: INTERNAL MEDICINE

## 2023-11-14 PROCEDURE — 85027 COMPLETE CBC AUTOMATED: CPT | Performed by: EMERGENCY MEDICINE

## 2023-11-14 PROCEDURE — 94640 AIRWAY INHALATION TREATMENT: CPT

## 2023-11-14 PROCEDURE — 99207 PR NO CHARGE LOS: CPT | Performed by: EMERGENCY MEDICINE

## 2023-11-14 PROCEDURE — 86140 C-REACTIVE PROTEIN: CPT | Performed by: EMERGENCY MEDICINE

## 2023-11-14 RX ORDER — LORAZEPAM 2 MG/ML
0.5 INJECTION INTRAMUSCULAR EVERY 6 HOURS PRN
Status: DISCONTINUED | OUTPATIENT
Start: 2023-11-14 | End: 2023-11-14

## 2023-11-14 RX ORDER — SODIUM CHLORIDE, SODIUM LACTATE, POTASSIUM CHLORIDE, CALCIUM CHLORIDE 600; 310; 30; 20 MG/100ML; MG/100ML; MG/100ML; MG/100ML
INJECTION, SOLUTION INTRAVENOUS CONTINUOUS
Status: DISCONTINUED | OUTPATIENT
Start: 2023-11-14 | End: 2023-11-15

## 2023-11-14 RX ORDER — QUETIAPINE FUMARATE 25 MG/1
25 TABLET, FILM COATED ORAL 2 TIMES DAILY
Status: DISCONTINUED | OUTPATIENT
Start: 2023-11-14 | End: 2023-11-20 | Stop reason: HOSPADM

## 2023-11-14 RX ORDER — PREDNISONE 20 MG/1
20 TABLET ORAL DAILY
Status: DISCONTINUED | OUTPATIENT
Start: 2023-11-22 | End: 2023-11-20 | Stop reason: HOSPADM

## 2023-11-14 RX ORDER — PREDNISONE 5 MG/1
10 TABLET ORAL DAILY
Status: DISCONTINUED | OUTPATIENT
Start: 2023-11-27 | End: 2023-11-20 | Stop reason: HOSPADM

## 2023-11-14 RX ORDER — LORAZEPAM 2 MG/ML
1 INJECTION INTRAMUSCULAR EVERY 6 HOURS PRN
Status: DISCONTINUED | OUTPATIENT
Start: 2023-11-14 | End: 2023-11-15

## 2023-11-14 RX ORDER — QUETIAPINE FUMARATE 25 MG/1
25 TABLET, FILM COATED ORAL EVERY 6 HOURS PRN
Status: DISCONTINUED | OUTPATIENT
Start: 2023-11-14 | End: 2023-11-14

## 2023-11-14 RX ORDER — THIAMINE HYDROCHLORIDE 100 MG/ML
100 INJECTION, SOLUTION INTRAMUSCULAR; INTRAVENOUS 3 TIMES DAILY
Status: DISCONTINUED | OUTPATIENT
Start: 2023-11-14 | End: 2023-11-20 | Stop reason: HOSPADM

## 2023-11-14 RX ORDER — PREDNISONE 20 MG/1
40 TABLET ORAL DAILY
Status: COMPLETED | OUTPATIENT
Start: 2023-11-15 | End: 2023-11-16

## 2023-11-14 RX ORDER — POTASSIUM CHLORIDE 1500 MG/1
20 TABLET, EXTENDED RELEASE ORAL ONCE
Status: DISCONTINUED | OUTPATIENT
Start: 2023-11-14 | End: 2023-11-14

## 2023-11-14 RX ORDER — DEXMEDETOMIDINE HYDROCHLORIDE 4 UG/ML
.1-1.2 INJECTION, SOLUTION INTRAVENOUS CONTINUOUS
Status: DISCONTINUED | OUTPATIENT
Start: 2023-11-14 | End: 2023-11-17

## 2023-11-14 RX ORDER — POTASSIUM CHLORIDE 1500 MG/1
20 TABLET, EXTENDED RELEASE ORAL ONCE
Status: COMPLETED | OUTPATIENT
Start: 2023-11-14 | End: 2023-11-14

## 2023-11-14 RX ORDER — DILTIAZEM HCL/D5W 125 MG/125
5-15 PLASTIC BAG, INJECTION (ML) INTRAVENOUS CONTINUOUS
Status: DISCONTINUED | OUTPATIENT
Start: 2023-11-14 | End: 2023-11-15

## 2023-11-14 RX ORDER — LORAZEPAM 2 MG/ML
1 INJECTION INTRAMUSCULAR
Status: COMPLETED | OUTPATIENT
Start: 2023-11-14 | End: 2023-11-14

## 2023-11-14 RX ORDER — OLANZAPINE 10 MG/2ML
5 INJECTION, POWDER, FOR SOLUTION INTRAMUSCULAR DAILY PRN
Status: DISCONTINUED | OUTPATIENT
Start: 2023-11-14 | End: 2023-11-14

## 2023-11-14 RX ORDER — LORAZEPAM 2 MG/ML
1 INJECTION INTRAMUSCULAR EVERY 6 HOURS PRN
Status: DISCONTINUED | OUTPATIENT
Start: 2023-11-14 | End: 2023-11-14

## 2023-11-14 RX ORDER — THIAMINE HYDROCHLORIDE 100 MG/ML
100 INJECTION, SOLUTION INTRAMUSCULAR; INTRAVENOUS DAILY
Status: DISCONTINUED | OUTPATIENT
Start: 2023-11-14 | End: 2023-11-14

## 2023-11-14 RX ORDER — OLANZAPINE 10 MG/2ML
5 INJECTION, POWDER, FOR SOLUTION INTRAMUSCULAR EVERY 6 HOURS PRN
Status: DISCONTINUED | OUTPATIENT
Start: 2023-11-14 | End: 2023-11-14

## 2023-11-14 RX ORDER — CLONAZEPAM 0.5 MG/1
0.5 TABLET ORAL 2 TIMES DAILY
Status: DISCONTINUED | OUTPATIENT
Start: 2023-11-14 | End: 2023-11-20 | Stop reason: HOSPADM

## 2023-11-14 RX ADMIN — DEXMEDETOMIDINE HYDROCHLORIDE 0.2 MCG/KG/HR: 4 INJECTION, SOLUTION INTRAVENOUS at 05:17

## 2023-11-14 RX ADMIN — QUETIAPINE FUMARATE 50 MG: 25 TABLET ORAL at 20:33

## 2023-11-14 RX ADMIN — QUETIAPINE FUMARATE 12.5 MG: 25 TABLET ORAL at 03:41

## 2023-11-14 RX ADMIN — DEXMEDETOMIDINE HYDROCHLORIDE 0.4 MCG/KG/HR: 4 INJECTION, SOLUTION INTRAVENOUS at 15:35

## 2023-11-14 RX ADMIN — FAMOTIDINE 20 MG: 10 INJECTION INTRAVENOUS at 08:16

## 2023-11-14 RX ADMIN — THIAMINE HYDROCHLORIDE 100 MG: 100 INJECTION, SOLUTION INTRAMUSCULAR; INTRAVENOUS at 13:48

## 2023-11-14 RX ADMIN — QUETIAPINE FUMARATE 25 MG: 25 TABLET ORAL at 18:33

## 2023-11-14 RX ADMIN — CLONAZEPAM 0.5 MG: 0.5 TABLET ORAL at 20:33

## 2023-11-14 RX ADMIN — ESCITALOPRAM OXALATE 20 MG: 20 TABLET ORAL at 11:04

## 2023-11-14 RX ADMIN — THIAMINE HYDROCHLORIDE 100 MG: 100 INJECTION, SOLUTION INTRAMUSCULAR; INTRAVENOUS at 10:47

## 2023-11-14 RX ADMIN — OLANZAPINE 5 MG: 10 INJECTION, POWDER, FOR SOLUTION INTRAMUSCULAR at 04:46

## 2023-11-14 RX ADMIN — IPRATROPIUM BROMIDE AND ALBUTEROL SULFATE 3 ML: .5; 3 SOLUTION RESPIRATORY (INHALATION) at 07:45

## 2023-11-14 RX ADMIN — QUETIAPINE FUMARATE 25 MG: 25 TABLET ORAL at 10:26

## 2023-11-14 RX ADMIN — SODIUM CHLORIDE, POTASSIUM CHLORIDE, SODIUM LACTATE AND CALCIUM CHLORIDE: 600; 310; 30; 20 INJECTION, SOLUTION INTRAVENOUS at 17:02

## 2023-11-14 RX ADMIN — CLONAZEPAM 1 MG: 0.5 TABLET ORAL at 10:26

## 2023-11-14 RX ADMIN — THIAMINE HYDROCHLORIDE 100 MG: 100 INJECTION, SOLUTION INTRAMUSCULAR; INTRAVENOUS at 20:33

## 2023-11-14 RX ADMIN — AMIODARONE HYDROCHLORIDE 0.5 MG/MIN: 1.8 INJECTION, SOLUTION INTRAVENOUS at 02:33

## 2023-11-14 RX ADMIN — LORAZEPAM 1 MG: 2 INJECTION INTRAMUSCULAR; INTRAVENOUS at 02:53

## 2023-11-14 RX ADMIN — Medication 5 MG/HR: at 05:57

## 2023-11-14 RX ADMIN — IPRATROPIUM BROMIDE AND ALBUTEROL SULFATE 3 ML: .5; 3 SOLUTION RESPIRATORY (INHALATION) at 15:59

## 2023-11-14 RX ADMIN — POTASSIUM CHLORIDE 20 MEQ: 1500 TABLET, EXTENDED RELEASE ORAL at 05:29

## 2023-11-14 RX ADMIN — APIXABAN 10 MG: 5 TABLET, FILM COATED ORAL at 20:33

## 2023-11-14 RX ADMIN — IPRATROPIUM BROMIDE AND ALBUTEROL SULFATE 3 ML: .5; 3 SOLUTION RESPIRATORY (INHALATION) at 23:04

## 2023-11-14 RX ADMIN — AMIODARONE HYDROCHLORIDE 0.5 MG/MIN: 1.8 INJECTION, SOLUTION INTRAVENOUS at 13:48

## 2023-11-14 RX ADMIN — LORAZEPAM 0.5 MG: 2 INJECTION INTRAMUSCULAR; INTRAVENOUS at 03:18

## 2023-11-14 ASSESSMENT — ACTIVITIES OF DAILY LIVING (ADL)
ADLS_ACUITY_SCORE: 44

## 2023-11-14 NOTE — PROGRESS NOTES
Hospitalist follow up:    Pts precedex this morning was weaned.  He  Ate a little; took some pills.  He has a very fluid  Sensorium with most of the time being delirium.    Per chart reviewed his solumedrol started on  11/4.  Therefore he received 10 days iv   Solumedrol.  He had a prednisone dose last  Evening at 2000.      Pts delirium started near the 9th.      Due to his less than optimal oral intake  He will receive a small amount of lactated  Ringers today. A norwood was placed.     SANDRA Gregorio.  HMS

## 2023-11-14 NOTE — PROGRESS NOTES
"BP (!) 84/57   Pulse 83   Temp 97.5  F (36.4  C) (Axillary)   Resp 17   Ht 1.93 m (6' 4\")   Wt 122.5 kg (270 lb)   SpO2 92%   BMI 32.87 kg/m      Pt was on RA when last seen. BS were diminished pre and post neb. Pt received two duonebs from me today. RT will continue to follow.   "

## 2023-11-14 NOTE — PROGRESS NOTES
Assessment/Plan:  1. Atrial fibrillation with RVR:  The patient's ventricular rate was elevated last night when he was agitated.  He was in amiodarone infusion.  Additional diltiazem 5 mg per hour was started.  His heart rate is well controlled now.  The patient's blood pressure is low this morning.  Hold diltiazem infusion at this time.  Continue to monitor his ventricular rate and blood pressure.  He is on anticoagulation Eliquis due to acute pulmonary embolism.    2.  Acute pulmonary embolism: Started anticoagulation Eliquis per primary team.    3.  Acute respiratory failure due to pneumonitis, history of asthma, delirium, history of melanoma: Please see primary team management for details.    Subjective:  Interval History:  The patient had a very agitated night.  He is in receiving in dexmedeTomidince.  He is in sleep now.      Current Medications:  Scheduled Meds:   apixaban ANTICOAGULANT  10 mg Oral BID    Followed by    [START ON 11/20/2023] apixaban ANTICOAGULANT  5 mg Oral BID    clonazePAM  1 mg Oral BID    escitalopram  20 mg Oral Daily with lunch    famotidine  20 mg Intravenous Q12H    insulin aspart  1-6 Units Subcutaneous 4x Daily AC & HS    ipratropium - albuterol 0.5 mg/2.5 mg/3 mL  3 mL Nebulization Q8H    [Held by provider] loratadine  10 mg Oral Daily    perflutren lipid microsphere  2 mL Intravenous Once    predniSONE  40 mg Oral BID    Followed by    [START ON 11/15/2023] predniSONE  30 mg Oral BID    Followed by    [START ON 11/18/2023] predniSONE  40 mg Oral Daily    Followed by    [START ON 11/23/2023] predniSONE  30 mg Oral Daily    Followed by    [START ON 11/28/2023] predniSONE  20 mg Oral Daily    Followed by    [START ON 12/3/2023] predniSONE  10 mg Oral Daily    QUEtiapine  25 mg Oral BID    QUEtiapine  50 mg Oral At Bedtime    sodium chloride (PF)  10-40 mL Intracatheter Q7 Days    sodium chloride (PF)  3 mL Intracatheter Q8H    sulfamethoxazole-trimethoprim  1 tablet Oral Daily  "   [Held by provider] tamsulosin  0.4 mg Oral Daily    thiamine  100 mg Intravenous TID     Continuous Infusions:   amiodarone 0.5 mg/min (11/14/23 0800)    dexmedeTOMIDine 0.2 mcg/kg/hr (11/14/23 0914)    dextrose      dilTIAZem Stopped (11/14/23 0835)     PRN Meds:.acetaminophen, albuterol, benzonatate, dextrose, glucose **OR** dextrose **OR** glucagon, lidocaine (viscous), lidocaine, loperamide, LORazepam, melatonin, naloxone **OR** naloxone **OR** naloxone **OR** naloxone, polyethylene glycol, sodium chloride (PF), sodium chloride (PF), sodium chloride (PF), sodium chloride (PF), sodium chloride (PF)    Objective:   Vital signs in last 24 hours:  Temp:  [97.6  F (36.4  C)-98.5  F (36.9  C)] 97.6  F (36.4  C)  Pulse:  [] 76  Resp:  [16-41] 16  BP: ()/() 90/57  SpO2:  [88 %-97 %] 93 %  Weight:   [unfilled]     Physical Exam:  General Appearance:   Sedated in sleep   HEENT:  No scleral icterus; the mucous membranes were moist.   Neck: No cervical bruits. No jugular venous distention   Lungs:   Lungs are clear to auscultation. No crackles. No wheezing.   Cardiovascular:   IRRR, normal first and second heart sounds with no murmurs. No rubs or gallops.     Abdomen:  Soft. No tenderness. Bowels sounds are present   Extremities: No leg edema. Equal posterior tibial pulsese.   Skin: Warm, Dry. No rashes.   Neurologic: Mood and affect are appropriate. No focal deficits.         Cardiographics:   Report: personally reviewed. .      Tele monitoring -  Atrial fib with controlled rate    Echocardiogram: Pending      Lab Results:   personally reviewed.     Lab Results   Component Value Date     11/14/2023    CO2 25 11/14/2023    CO2 21 11/10/2021    BUN 36.5 11/14/2023    BUN 14 11/10/2021     No results found for: \"CKTOTAL\", \"CKMB\", \"TROPONINI\"  Lab Results   Component Value Date    WBC 12.3 11/14/2023    HGB 11.3 11/14/2023    HCT 35.6 11/14/2023    MCV 90 11/14/2023     11/14/2023     Lab " Results   Component Value Date    CHOL 189 04/11/2023    TRIG 193 04/11/2023    HDL 54 04/11/2023    LDL 96 04/11/2023
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
## 2023-11-14 NOTE — PROGRESS NOTES
Patient received neb treatment per MD order. Pt educated with the explanation of the medication they received. Patient showed understanding with verbalization.  BS: clear and diminished.  Pt on AAYUSH.     Brenda Vasquez RT on 11/13/2023 at 11:28 PM

## 2023-11-14 NOTE — CONSULTS
Triage and Transition - Consult and Liaison     Jim Titus  November 14, 2023    Psychiatry consult acknowledged. Consult unable to be completed due to  patient is sedated and not arousable .   Medication provider will attempt to complete consult, Will attempt consult again 11/14. , and  Please re-consult if needed.    Pieter Masterson Psychotherapist Trainee  Triage and Transition - Consult and Liaison   [phone]

## 2023-11-14 NOTE — PROGRESS NOTES
PULMONARY / CRITICAL CARE PROGRESS NOTE    Date / Time of Admission:  11/2/2023  7:52 AM    Assessment:   Principal Problem:    Acute respiratory failure with hypoxia (H)  Active Problems:    Pneumonia    Elevated alkaline phosphatase level    Sepsis, due to unspecified organism, unspecified whether acute organ dysfunction present (H)    Acute kidney failure, unspecified (H)    Hypernatremia    Pt with a challenging night from a behavior standpoint  He was confused and pulling at lines  Thought he was the president  Unaware of self and location  Staff tried Zyprexa, Haldol, Ativan with minimal success  Had to go on precedex gtt for safety of self  Transferred back to ICU level of care    Code Status:  Full Code    58yoM with history of melanoma previously on pembrolizumab last given September 2023 with ongoing pneumonitis presented with acute hypoxic respiratory failure requiring intubation in the setting of elevated inflammatory markers but culture negative and minimal improvement despite antibiotics extubated 11/8 and now on low-flow oxygen with improvement in agitated delirium off of precedex.      Plan:       1) Acute hypoxic respiratory failure from pneumonitis versus asymmetric pneumonia  -s/p intubation, proning, paralysis and extubated 11/8 with improving hypoxia.   - Transitioned to 40 BID of prednisone yesterday.     2) Steroid Induced Psychosis  - will do rapid taper of steroids - change to prednisone 40 daily  - will monitor for increasing FiO2 needs  - Had  CT Chest last night showing minimal improvement of ground glass inflammation - not worsening    - will need follow up in pulmonary clinic or oncology for repeat non-contract CT Chest to ensure resolution of infiltrates  - adding Bactrim for PJP ppx until Prednisone dose <20mg/day      Clinically Significant Risk Factors              # Hypoalbuminemia: Lowest albumin = 2.7 g/dL at 11/3/2023  5:57 AM, will monitor as appropriate              # Obesity:  "Estimated body mass index is 32.87 kg/m  as calculated from the following:    Height as of this encounter: 1.93 m (6' 4\").    Weight as of this encounter: 122.5 kg (270 lb).        # Financial/Environmental Concerns: none  # Asthma: noted on problem list          Principal Problem:    Acute respiratory failure with hypoxia (H)  Active Problems:    Pneumonia    Elevated alkaline phosphatase level    Sepsis, due to unspecified organism, unspecified whether acute organ dysfunction present (H)    Acute kidney failure, unspecified (H)    Hypernatremia      Allergies: Chicken [chicken protein], Cantaloupe [cantaloupe extract allergy skin test], Wellbutrin [bupropion], Grass pollen [grass], and Turkey [poultry meal]     MEDS:  Scheduled Meds:   apixaban ANTICOAGULANT  10 mg Oral BID    Followed by    [START ON 11/20/2023] apixaban ANTICOAGULANT  5 mg Oral BID    clonazePAM  1 mg Oral BID    escitalopram  20 mg Oral Daily with lunch    famotidine  20 mg Intravenous Q12H    insulin aspart  1-6 Units Subcutaneous 4x Daily AC & HS    ipratropium - albuterol 0.5 mg/2.5 mg/3 mL  3 mL Nebulization Q8H    [Held by provider] loratadine  10 mg Oral Daily    perflutren lipid microsphere  2 mL Intravenous Once    predniSONE  40 mg Oral BID    Followed by    [START ON 11/15/2023] predniSONE  30 mg Oral BID    Followed by    [START ON 11/18/2023] predniSONE  40 mg Oral Daily    Followed by    [START ON 11/23/2023] predniSONE  30 mg Oral Daily    Followed by    [START ON 11/28/2023] predniSONE  20 mg Oral Daily    Followed by    [START ON 12/3/2023] predniSONE  10 mg Oral Daily    QUEtiapine  25 mg Oral BID    QUEtiapine  50 mg Oral At Bedtime    sodium chloride (PF)  10-40 mL Intracatheter Q7 Days    sodium chloride (PF)  3 mL Intracatheter Q8H    sulfamethoxazole-trimethoprim  1 tablet Oral Daily    [Held by provider] tamsulosin  0.4 mg Oral Daily    thiamine  100 mg Intravenous TID     Continuous Infusions:   amiodarone 0.5 mg/min " "(11/14/23 0800)    dexmedeTOMIDine 0.1 mcg/kg/hr (11/14/23 1029)    dextrose      dilTIAZem Stopped (11/14/23 0835)     PRN Meds:.acetaminophen, albuterol, benzonatate, dextrose, glucose **OR** dextrose **OR** glucagon, lidocaine (viscous), lidocaine, loperamide, LORazepam, melatonin, naloxone **OR** naloxone **OR** naloxone **OR** naloxone, polyethylene glycol, sodium chloride (PF), sodium chloride (PF), sodium chloride (PF), sodium chloride (PF), sodium chloride (PF)      Objective:   VITALS:  BP (!) 87/57   Pulse 75   Temp 97.6  F (36.4  C) (Axillary)   Resp 17   Ht 1.93 m (6' 4\")   Wt 122.5 kg (270 lb)   SpO2 91%   BMI 32.87 kg/m    EXAM:  General appearance: awake and in no distress  Neck: large collar size  Lungs: decreased BS meño, no wheezing  Heart: tachy, irregular  Abdomen:Soft, nontender  Extremities: minimal pitting edema  Skin:no rashes on visible skin, no edema  Neurologic: sedated    I&O:       Intake/Output Summary (Last 24 hours) at 11/12/2023 1003  Last data filed at 11/12/2023 0600  Gross per 24 hour   Intake 789.24 ml   Output 2860 ml   Net -2070.76 ml     Data Review:  Chest Xray  Personally reviewed image/s and Personally reviewed impression/s  EXAM: XR CHEST PORT 1 VIEW  LOCATION: [facility]  DATE: 11/9/2023  INDICATION: Shortness of breath.  COMPARISON: 11/08/2023                                                               IMPRESSION: Right-sided PICC tip in good position at cavoatrial junction. Heart size magnified in AP projection. Pulmonary venous congestion accentuated by shallow inspiration. Consolidative airspace opacities within the right lung and minimal left   basilar atelectasis unchanged. No pneumothorax.    EXAM: XR CHEST PORT 1 VIEW  LOCATION: [facility]  DATE: 11/12/2023     INDICATION: Shortness of breath  COMPARISON: 11/09/2023 and older studies. CT chest 11/02/2023                                                     "                  IMPRESSION: Right upper extremity PICC line catheter overlies the distal SVC. Chronic elevation of the right hemidiaphragm.      Slightly better aeration of the right lung with slight decrease in the ill-defined patchy areas of consolidation.. Left lung remains clear. Heart is of normal size.    LABS      Latest Ref Rng & Units 11/9/2023     5:04 AM 11/10/2023     5:12 AM 11/11/2023     5:33 AM 11/12/2023     3:55 AM 11/13/2023     2:09 AM 11/13/2023     9:19 AM 11/14/2023     3:39 AM   Glucose Values   Glucose 70 - 99 mg/dL 147  158  172  137  141  157  169        Results for orders placed or performed during the hospital encounter of 11/02/23   Basic metabolic panel   Result Value Ref Range    Sodium 143 135 - 145 mmol/L    Potassium 3.6 3.4 - 5.3 mmol/L    Chloride 109 (H) 98 - 107 mmol/L    Carbon Dioxide (CO2) 25 22 - 29 mmol/L    Anion Gap 9 7 - 15 mmol/L    Urea Nitrogen 36.5 (H) 6.0 - 20.0 mg/dL    Creatinine 1.39 (H) 0.67 - 1.17 mg/dL    GFR Estimate 59 (L) >60 mL/min/1.73m2    Calcium 9.3 8.6 - 10.0 mg/dL    Glucose 169 (H) 70 - 99 mg/dL     Lab Results   Component Value Date    WBC 12.3 (H) 11/14/2023    HGB 11.3 (L) 11/14/2023    HCT 35.6 (L) 11/14/2023    MCV 90 11/14/2023     11/14/2023

## 2023-11-14 NOTE — PROGRESS NOTES
Oncology Progress Note:    DOS:  2023    Assessment/Plan:    1)  Acute hypoxic respiratory failure:  Respiratory status improved on SoluMetrol but has now developed presumed steroid psychosis.  Per pulmonary service, steroid dropped to Prednisone 40 mg twice daily on  and to Prednisone 40 mg today for rapid steroid taper.  Discussed with Dr. Block.  If pneumonitis is not related to pembrolizumab, then rapid steroid taper should be fine.  There is no definitive way to know if he has immune related pneumonitis.  Given his current situation, taper steroid per pulmonary service and watch respiratory status closely.    2)  Acute kidney failure:  Creatinine remains slightly elevated.  Monitor per hospital service.   3)  Anemia:  Improved to 11.3 today.  Continue to monitor.   4)  New diagnosis PE:  On oral anticoagulation.  Discussed with Dr. Block who agrees with the current plan.      Subjective:  Patient was being washed up so did not visit him today.  See documentation regarding current behaviors by other staff members.     Objective:  EMR reviewed.     Labs:  Creatinine = 1.39, Hgb = 11.3, WBC = 12.3 with ANC = 9.5, Platelets = 359,000,     Imagin2023:  Chest CT PE protocol shows several thin short segment pulmonary emboli have developed in the posterior and lateral basilar left lower lobe pulmonary artery arteries. No signs of right heart strain.   Multifocal pneumonia, right greater than left, has improved minimally.  Moderate elevation right hemidiaphragm, mild elevation left hemidiaphragm and the associated platelike atelectasis throughout the paradiaphragmatic right and left lower lung parenchyma unchanged.    We will continue to follow.     Neela Nick, RN, MS, CNP  [facility] [place] [facility]  [phone]  Cell:  [phone] (Tuesday - Friday, 8:30 am to 5 pm)

## 2023-11-14 NOTE — CONSULTS
Initial Psychiatric Consult   Consult date: November 14, 2023         Reason for Consult, requesting source:    Delirium    Requesting source: Hyacinth Gregorio    Total time spent in card review, patient interview and coordination of care: 60 minutes.    Telemedicine Visit: The patient was seen for a visit utilizing the [employer] system. Permission from the patient to conduct the exam by telemedicine was obtained prior to proceeding.  Peter was also informed that insurance will be billed for this contact.   Patient Location):  [facility]   Provider Location: [employer]/remote  As the provider I attest to compliance with applicable laws and regulations related to telemedicine           HPI:   Psychiatry seeing patient today regarding delirium.    58 year old male into [facility] on 11/2/2023 for acute  Respiratory failure requiring intubation and proning.  Imaging showed  Dense right sided pneumonitis.  Pt was extubated on 11/8.      Per provider note from 11/14:   Delirium began for him on 11/9.  He does poorly at night.  Last night  He required precedex despite seroquel and IM zyprexa.      Per nursing, the goal of treatment team would be to decrease Precedex and maintain Seroquel PO for managing agitation. Per report, patient has been either sedated or agitated, with no in between.         Past Psychiatric History:    History limited by patient status.        Substance Use and History:     Tobacco Use    Smoking status: Never    Smokeless tobacco: Former     Types: Chew     Quit date: 1998   Substance Use Topics    Alcohol use: Yes     Comment: 3-4 times per week, 5-6 beers           Past Medical History:   PAST MEDICAL HISTORY: History reviewed. No pertinent past medical history.    PAST SURGICAL HISTORY:   Past Surgical History:   Procedure Laterality Date    HERNIA REPAIR      ORTHOPEDIC SURGERY      Both ankles, left knee, right shoulder    pyloric stenosis repair               Family  History:   FAMILY HISTORY:   Family History   Problem Relation Age of Onset    Breast Cancer Mother     Heart Disease Father     Mental Illness Father     Hyperlipidemia Father     Mental Illness Brother            Social History:   History limited by patient status.         Physical ROS:   The 10 point Review of Systems is negative other than noted in the HPI or here.           Medications:      apixaban ANTICOAGULANT  10 mg Oral BID    Followed by    [START ON 11/20/2023] apixaban ANTICOAGULANT  5 mg Oral BID    clonazePAM  0.5 mg Oral BID    escitalopram  20 mg Oral Daily with lunch    insulin aspart  1-6 Units Subcutaneous 4x Daily AC & HS    ipratropium - albuterol 0.5 mg/2.5 mg/3 mL  3 mL Nebulization Q8H    [Held by provider] loratadine  10 mg Oral Daily    perflutren lipid microsphere  2 mL Intravenous Once    [START ON 11/15/2023] predniSONE  40 mg Oral Daily    Followed by    [START ON 11/17/2023] predniSONE  30 mg Oral Daily    Followed by    [START ON 11/22/2023] predniSONE  20 mg Oral Daily    Followed by    [START ON 11/27/2023] predniSONE  10 mg Oral Daily    QUEtiapine  25 mg Oral BID    QUEtiapine  50 mg Oral At Bedtime    sodium chloride (PF)  10-40 mL Intracatheter Q7 Days    sodium chloride (PF)  3 mL Intracatheter Q8H    sulfamethoxazole-trimethoprim  1 tablet Oral Daily    [Held by provider] tamsulosin  0.4 mg Oral Daily    thiamine  100 mg Intravenous TID              Allergies:     Allergies   Allergen Reactions    Chicken [Chicken Protein] Other (See Comments)     Throat closes to turkey as well.    Cantaloupe [Cantaloupe Extract Allergy Skin Test] Unknown    Wellbutrin [Bupropion] Unknown    Grass Pollen [Grass] Rash    Turkey [Poultry Meal] Rash          Labs:     Recent Results (from the past 48 hour(s))   Glucose by meter    Collection Time: 11/12/23  5:00 PM   Result Value Ref Range    GLUCOSE BY METER POCT 123 (H) 70 - 99 mg/dL   Glucose by meter    Collection Time: 11/12/23  6:33 PM    Result Value Ref Range    GLUCOSE BY METER POCT 129 (H) 70 - 99 mg/dL   Extra Green Top (Lithium Heparin) Tube    Collection Time: 11/12/23  7:05 PM   Result Value Ref Range    Hold Specimen JIC    D dimer quantitative    Collection Time: 11/12/23  8:08 PM   Result Value Ref Range    D-Dimer Quantitative 2.10 (H) 0.00 - 0.50 ug/mL FEU   Troponin T, High Sensitivity    Collection Time: 11/12/23  8:08 PM   Result Value Ref Range    Troponin T, High Sensitivity 26 (H) <=22 ng/L   Extra Red Top Tube    Collection Time: 11/12/23  8:08 PM   Result Value Ref Range    Hold Specimen JIC    Extra Red Top Tube    Collection Time: 11/12/23  8:08 PM   Result Value Ref Range    Hold Specimen JIC    Glucose by meter    Collection Time: 11/12/23  8:12 PM   Result Value Ref Range    GLUCOSE BY METER POCT 134 (H) 70 - 99 mg/dL   Glucose by meter    Collection Time: 11/12/23  9:36 PM   Result Value Ref Range    GLUCOSE BY METER POCT 125 (H) 70 - 99 mg/dL   Troponin T, High Sensitivity    Collection Time: 11/13/23  2:09 AM   Result Value Ref Range    Troponin T, High Sensitivity 23 (H) <=22 ng/L   Glucose    Collection Time: 11/13/23  2:09 AM   Result Value Ref Range    Glucose 141 (H) 70 - 99 mg/dL   CRP inflammation    Collection Time: 11/13/23  2:09 AM   Result Value Ref Range    CRP Inflammation 19.80 (H) <5.00 mg/L   Glucose by meter    Collection Time: 11/13/23  7:54 AM   Result Value Ref Range    GLUCOSE BY METER POCT 157 (H) 70 - 99 mg/dL   Basic metabolic panel    Collection Time: 11/13/23  9:19 AM   Result Value Ref Range    Sodium 143 135 - 145 mmol/L    Potassium 4.1 3.4 - 5.3 mmol/L    Chloride 107 98 - 107 mmol/L    Carbon Dioxide (CO2) 25 22 - 29 mmol/L    Anion Gap 11 7 - 15 mmol/L    Urea Nitrogen 36.9 (H) 6.0 - 20.0 mg/dL    Creatinine 1.30 (H) 0.67 - 1.17 mg/dL    GFR Estimate 64 >60 mL/min/1.73m2    Calcium 9.4 8.6 - 10.0 mg/dL    Glucose 157 (H) 70 - 99 mg/dL   CBC with platelets    Collection Time: 11/13/23   9:19 AM   Result Value Ref Range    WBC Count 11.0 4.0 - 11.0 10e3/uL    RBC Count 4.02 (L) 4.40 - 5.90 10e6/uL    Hemoglobin 11.6 (L) 13.3 - 17.7 g/dL    Hematocrit 36.6 (L) 40.0 - 53.0 %    MCV 91 78 - 100 fL    MCH 28.9 26.5 - 33.0 pg    MCHC 31.7 31.5 - 36.5 g/dL    RDW 15.6 (H) 10.0 - 15.0 %    Platelet Count 422 150 - 450 10e3/uL   Echocardiogram Complete    Collection Time: 11/13/23  9:31 AM   Result Value Ref Range    LVEF  > 65%    Glucose by meter    Collection Time: 11/13/23 12:09 PM   Result Value Ref Range    GLUCOSE BY METER POCT 265 (H) 70 - 99 mg/dL   CT Chest Pulmonary Embolism w Contrast    Collection Time: 11/13/23 12:42 PM   Result Value Ref Range    Radiologist flags New diagnosis of pulmonary embolism (AA)    Glucose by meter    Collection Time: 11/13/23  5:17 PM   Result Value Ref Range    GLUCOSE BY METER POCT 147 (H) 70 - 99 mg/dL   Glucose by meter    Collection Time: 11/13/23  8:34 PM   Result Value Ref Range    GLUCOSE BY METER POCT 216 (H) 70 - 99 mg/dL   CBC with platelets    Collection Time: 11/14/23  3:32 AM   Result Value Ref Range    WBC Count 12.3 (H) 4.0 - 11.0 10e3/uL    RBC Count 3.97 (L) 4.40 - 5.90 10e6/uL    Hemoglobin 11.3 (L) 13.3 - 17.7 g/dL    Hematocrit 35.6 (L) 40.0 - 53.0 %    MCV 90 78 - 100 fL    MCH 28.5 26.5 - 33.0 pg    MCHC 31.7 31.5 - 36.5 g/dL    RDW 15.5 (H) 10.0 - 15.0 %    Platelet Count 359 150 - 450 10e3/uL   Hepatic panel    Collection Time: 11/14/23  3:39 AM   Result Value Ref Range    Protein Total 5.7 (L) 6.4 - 8.3 g/dL    Albumin 3.3 (L) 3.5 - 5.2 g/dL    Bilirubin Total 0.5 <=1.2 mg/dL    Alkaline Phosphatase 133 (H) 40 - 129 U/L    AST 9 0 - 45 U/L    ALT 21 0 - 70 U/L    Bilirubin Direct 0.26 0.00 - 0.30 mg/dL   Magnesium    Collection Time: 11/14/23  3:39 AM   Result Value Ref Range    Magnesium 2.5 (H) 1.7 - 2.3 mg/dL   Basic metabolic panel    Collection Time: 11/14/23  3:39 AM   Result Value Ref Range    Sodium 143 135 - 145 mmol/L    Potassium  "3.6 3.4 - 5.3 mmol/L    Chloride 109 (H) 98 - 107 mmol/L    Carbon Dioxide (CO2) 25 22 - 29 mmol/L    Anion Gap 9 7 - 15 mmol/L    Urea Nitrogen 36.5 (H) 6.0 - 20.0 mg/dL    Creatinine 1.39 (H) 0.67 - 1.17 mg/dL    GFR Estimate 59 (L) >60 mL/min/1.73m2    Calcium 9.3 8.6 - 10.0 mg/dL    Glucose 169 (H) 70 - 99 mg/dL   CRP inflammation    Collection Time: 11/14/23  3:39 AM   Result Value Ref Range    CRP Inflammation 11.10 (H) <5.00 mg/L   Glucose by meter    Collection Time: 11/14/23  7:49 AM   Result Value Ref Range    GLUCOSE BY METER POCT 149 (H) 70 - 99 mg/dL   Glucose by meter    Collection Time: 11/14/23 11:02 AM   Result Value Ref Range    GLUCOSE BY METER POCT 136 (H) 70 - 99 mg/dL          Physical and Psychiatric Examination:     /58   Pulse 67   Temp 97.5  F (36.4  C) (Axillary)   Resp 15   Ht 1.93 m (6' 4\")   Wt 122.5 kg (270 lb)   SpO2 93%   BMI 32.87 kg/m    Weight is 270 lbs 0 oz  Body mass index is 32.87 kg/m .    Physical Exam:  I have reviewed the physical exam as documented by by the medical team and agree with findings and assessment and have no additional findings to add at this time.         MSE:   Patient quite sedated and did not respond to prompts. He was in no acute distress.              DSM-5 Diagnosis:   Neurocognitive Disorder, delirium          Assessment:   Patient was quite sedated and unable to participate in assessment, so my recommendations are based on nursing report and chart review. Patient continues to wax and wane between sedation and agitation, with very little in between. Per nursing, the goal of treatment is to have patient weaned off Precedex and taking all medications by mouth.     Patient takes klonopin at home, so there was some concern that his delirium could have been part of a withdrawal process. However, I might consider avoiding this medication during patient's acute delirium.     Unfortunately, there are no medications that will resolve delirium, but " more so medications that help manage symptoms associated with delirium, such as agitation or hallucinations.     QTc: 406 on 11/12/2023          Summary of Recommendations:   Continue Seroquel 25 mg BID and 50 mg at HS  -Will add Seroquel 12.5 mg BID PRN    Might consider limiting prednisone, if possible, as this can contribute to delirium and confusion    Delirium precautions:  Up during the day with lights on  Lights off at night, avoid interruptions during the night as much as possible  Family visits  Encourage wearing glasses  Reorientation  Avoid opioids, benzodiazepines, anticholinergics.    Continue to ensure proper nutrition, fluid and electrolyte balance. Monitor for infections, hypoxia, metabolic derangements, or other causes of delirium.       Page me or re-consult psychiatry as needed.       Carrie Orlando, EDDIE, APRN  Consult/Liaison Psychiatry  [facility]   Contact information available via [facility] Paging/Directory.  If I am not available, please call [facility] intake ([phone])

## 2023-11-14 NOTE — SIGNIFICANT EVENT
Significant Event Note    Time of event: 2:56 AM November 14, 2023    Description of event:  Мария Andrew called for increased agitation, yelling, hallucinating.   RN reported patient started saying he was the President, believed he was on an airplane and some type of . Thought his nurse was his cousin. He was agitated that something had gone wrong and people were not listening to him.   Somewhat redirectable but ultimately continued to escalate. VSS with exception of heart rate elevation to 130's, but had been controlled prior to agitation.   Ativan 0.5 mg IV given once. Patient currently resting quietly.     Plan:  Ativan 0.5 mg IV once. May repeat x 1 if escalates again.  Will continue to monitor.     Discussed with: supervising physician, Dr. Perez.     Elba Kearns MD

## 2023-11-14 NOTE — PROGRESS NOTES
Care Management Follow Up    Length of Stay (days): 12    Expected Discharge Date: 11/16/2023     Concerns to be Addressed: no discharge needs identified, denies needs/concerns at this time     Patient plan of care discussed at interdisciplinary rounds: Yes    Anticipated Discharge Disposition: TBD     Anticipated Discharge Services: None  Anticipated Discharge DME: None    Patient/family educated on Medicare website which has current facility and service quality ratings: no  Education Provided on the Discharge Plan: No  Patient/Family in Agreement with the Plan: unable to assess    Referrals Placed by CM/SW:  (Not currently indicated)  Private pay costs discussed: Not applicable    Additional Information:  Chart reviewed. Referral sent to [facility] per therapy recs. Spoke to Eileen via teams who will review. Patient not medically ready for discharge     11:57 AM  Per Eileen at [facility] , patient does not meet criteria for LTACH     Jennifer Alfaro RN

## 2023-11-14 NOTE — PROGRESS NOTES
[facility] MEDICINE PROGRESS NOTE      Summary: 58 year old male into [facility] on 11/2/2023 for acute  Respiratory failure requiring intubation and proning.  Imaging showed  Dense right sided pneumonitis.  Pt was extubated on 11/8.  He was      Yesterday after walking about 30 feet he went into new onset atrial  Fibrillation with RVR that has been difficult to control.  He was originally  Treated with metoprolol 5 mg bolus without improvement.  He was  Then started on cardizem bolus and drip.  The troponin was stable though  A d dimer was 2.  He continues with hypoxia from 3-6 liter nasal cannula need.    Yesterday he had a new diagnosis of PE, LLL.  He was started on   Eliquis.  The atrial fibrillation continues.  Currently he is on amiodarone  Drip.    Delirium began for him on 11/9.  He does poorly at night.  Last night  He required precedex despite seroquel and IM zyprexa.      This morning he continues with the precedex.  The heart rate is in  The 80's.  Amiodarone and cardizem drips continue.      Case discussed with cardiology and RN.   Goals for today are  To titrate the precedex.  Steroid dosing changed yesterday;   Possibly this will improve his delirium.   Urgent head imaging  Not clinically indicated at this time.      Hospital day number 11    Problem list:     Acute respiratory failure due to pneumonitis: (pembrolizumab related)   (Extubated on 11/8; originally with ARDS); currently on RA   Though imaging yesterday with CT chest shows only   Incremental improvement in the multifocal pneumonia  2.  Atrial fibrillation with RVR, new: on cardizem drip; discussed with   Cardiology; echo,   3.  History of asthma: on solumedrol for the pneumonitis  4.  Delirium ICU:  place [place]; steroids tapered yesterday;    Schedule seroquel,   5.  History of melanoma: stage 2b; on check point inhibitors; had a   Negative PET in 4/2023;   6.  MIKKI:  creatinine 1.39; he is 8 liters net  negative;   6.  Dvt prevention:  on eliquis; following if he can take orals  7.  Disposition:  no PT today; up to chair only if he desires        Hyacinth Gregorio MD  [facility]  Phone: #[phone]  Securely message with the Vocera Web Console (learn more here)  Text page via TrackVia Paging/Directory     Interval History/Subjective:  denies complaints      Physical Exam/Objective:  Temp:  [97.6  F (36.4  C)-98.5  F (36.9  C)] 97.6  F (36.4  C)  Pulse:  [] 89  Resp:  [16-41] 18  BP: ()/() 102/68  SpO2:  [88 %-97 %] 91 %  Body mass index is 32.87 kg/m .    GENERAL:  Sedated on precedex; HR 80's; atrial fibrillation on tele; on RA   HEAD:  Normocephalic, without obvious abnormality, atraumatic   EYES:  PERRL, conjunctiva/corneas clear, no scleral icterus, EOM's intact   NOSE: Nares normal, septum midline, mucosa normal, no drainage   THROAT: Lips, mucosa, and tongue normal; teeth and gums normal, mouth moist   NECK: Supple, symmetrical, trachea midline   BACK:   Symmetric, no curvature, ROM normal   LUNGS:   Clear to auscultation bilaterally, no rales, rhonchi, or wheezing, symmetric chest rise on inhalation, respirations unlabored   CHEST WALL:  No tenderness or deformity   HEART:  Tachycardic, no murmurs   ABDOMEN:   Soft, non-tender, bowel sounds active all four quadrants, no masses, no organomegaly, no rebound or guarding   EXTREMITIES: Extremities normal, atraumatic, no cyanosis or edema    SKIN: Dry to touch, no exanthems in the visualized areas   NEURO: Sedated;          Data reviewed today: I personally reviewed all new medications, labs, imaging/diagnostics reports over the past 24 hours. Pertinent findings include:    Imaging:   Recent Results (from the past 24 hour(s))   Echocardiogram Complete   Result Value    LVEF  > 65%    Narrative       [address] [address]     Name: MISSY ALMANZA  MRN:  [id number]  : 1965  Study Date: 2023 08:43 AM  Age: 58 yrs  Gender: Male     Performed By: CHITO  BSA: 2.5 m2  Height: 76 in  Weight: 277 lb  HR: 98  BP: 150/85 mmHg     ______________________________________________________________________________  Procedure  Complete Echo Adult. Definity (NDC #82263-030) given intravenously. No  hemodynamically significant valvular abnormalities on 2D or color flow  imaging. The study was technically difficult. Compared to the prior study  dated 2023, there have been no changes.  ______________________________________________________________________________  Interpretation Summary     1.Left ventricular size, wall motion and function are normal. The ejection  fraction is > 65%.  2.There is mild to moderate concentric left ventricular hypertrophy.  3.Normal right ventricle size and systolic function.  4.No hemodynamically significant valvular abnormalities on 2D or color flow  imaging.  5.Ascending Aorta moderate dilatation is present.  6.The study was technically difficult.  Compared to the prior study dated 2023, the RV EF is improved.  ______________________________________________________________________________  I      WMSI = 1.00     % Normal = 100     X - Cannot   0 -                      (2) - Mildly 2 -          Segments  Size  Interpret    Hyperkinetic 1 - Normal  Hypokinetic  Hypokinetic  1-2     small                                                     7 -          3-5      moderate  3 - Akinetic 4 -          5 -         6 - Akinetic Dyskinetic   6-14    large               Dyskinetic   Aneurysmal  w/scar       w/scar       15-16   diffuse     Left Ventricle  Left ventricular size, wall motion and function are normal. The ejection  fraction is > 65%. There is mild to moderate concentric left ventricular  hypertrophy. Left ventricular diastolic function is not assessable. No  regional wall motion abnormalities noted.     Right Ventricle  Normal  right ventricle size and systolic function. TAPSE is normal, which is  consistent with normal right ventricular systolic function.     Atria  Normal left atrial size. Right atrial size is normal. There is no color  Doppler evidence of an atrial shunt.     Mitral Valve  Mitral valve leaflets appear normal. There is no evidence of mitral stenosis  or clinically significant mitral regurgitation. There is no mitral  regurgitation noted. There is no mitral valve stenosis.     Tricuspid Valve  The tricuspid valve is not well visualized, but is grossly normal. Right  ventricle systolic pressure estimate normal. There is trace tricuspid  regurgitation. There is no tricuspid stenosis.     Aortic Valve  The aortic valve is not well visualized. No aortic regurgitation is present.  No hemodynamically significant valvular aortic stenosis.     Pulmonic Valve  The pulmonic valve is not well seen, but is grossly normal. This degree of  valvular regurgitation is within normal limits. There is no pulmonic valvular  stenosis.     Vessels  The aorta root is normal. Ascending Aorta dilatation is present. IVC diameter  <2.1 cm collapsing >50% with sniff suggests a normal RA pressure of 3 mmHg.     Pericardium  There is no pericardial effusion.     Rhythm  The rhythm was atrial fibrillation.  ______________________________________________________________________________  MMode/2D Measurements & Calculations     IVSd: 1.5 cm  LVIDd: 4.2 cm  LVIDs: 3.0 cm  LVPWd: 1.5 cm  FS: 29.6 %  LV mass(C)d: 254.7 grams  LV mass(C)dI: 100.1 grams/m2  Ao root diam: 4.4 cm  asc Aorta Diam: 4.4 cm  LVOT diam: 2.5 cm  LVOT area: 4.8 cm2  Ao root diam index Ht(cm/m): 2.3  Ao root diam index BSA (cm/m2): 1.7  Asc Ao diam index BSA (cm/m2): 1.7  Asc Ao diam index Ht(cm/m): 2.3  LA Volume (BP): 56.7 ml     LA Volume Index (BP): 22.3 ml/m2  LA Volume Indexed (AL/bp): 22.9 ml/m2  RWT: 0.71  TAPSE: 2.0 cm     Doppler Measurements & Calculations  MV E max chey: 76.4  cm/sec  MV A max ryne: 56.1 cm/sec  MV E/A: 1.4     MV dec slope: 517.7 cm/sec2  MV dec time: 0.16 sec  Ao V2 max: 120.4 cm/sec  Ao max P.0 mmHg  Ao V2 mean: 99.4 cm/sec  Ao mean P.0 mmHg  Ao V2 VTI: 22.4 cm  JACQUE(I,D): 3.8 cm2  JACQUE(V,D): 4.3 cm2  LV V1 max P.8 mmHg  LV V1 max: 108.2 cm/sec  LV V1 VTI: 17.7 cm  SV(LVOT): 85.2 ml  SI(LVOT): 33.5 ml/m2  AV Ryne Ratio (DI): 0.90  JACQUE Index (cm2/m2): 1.5  E/E': 4.7  E/E' av.6  Lateral E/e': 4.6  Medial E/e': 6.6  Peak E' Ryne: 16.4 cm/sec  RV S Ryne: 19.3 cm/sec     ______________________________________________________________________________  Report approved by: Mark Charles 2023 10:25 AM         CT Chest Pulmonary Embolism w Contrast   Result Value    Radiologist flags New diagnosis of pulmonary embolism (AA)    Narrative    EXAM: CT CHEST PULMONARY EMBOLISM W CONTRAST  LOCATION: [facility]  DATE: 2023    INDICATION: high dimer, eval for PE.  COMPARISON: CTAs chest 2023 and 10/23/2023  TECHNIQUE: CT chest pulmonary angiogram during arterial phase injection of IV contrast. Multiplanar reformats and MIP reconstructions were performed. Dose reduction techniques were used.   CONTRAST: Isovue 370 75mL    FINDINGS:  ANGIOGRAM CHEST: Several thin short segment pulmonary emboli have developed in the posterior and lateral basilar left lower lobe pulmonary artery arteries (image 136 axial series 6). Pulmonary arteries are normal caliber and there are no signs of right   heart strain. Normal caliber thoracic aorta with no CTA signs of acute aortic syndrome.    LUNGS AND PLEURA: Moderate elevation right hemidiaphragm, mild elevation left hemidiaphragm and the associated platelike atelectasis throughout the paradiaphragmatic right and left lower lung parenchyma unchanged.    Peribronchovascular distribution consolidation, volume loss and interlobular septal thickening throughout the mid and upper right lung and  scattered foci of groundglass density opacity throughout the left lung have decreased slightly. No pleural effusion   or pneumothorax.    MEDIASTINUM/AXILLAE: Heart size normal. No pericardial effusion. No lymphadenopathy.    CORONARY ARTERY CALCIFICATION: Mild.    UPPER ABDOMEN: Unremarkable.    MUSCULOSKELETAL: Unremarkable.      Impression    IMPRESSION:  1.  Several thin short segment pulmonary emboli have developed in the posterior and lateral basilar left lower lobe pulmonary artery arteries. No signs of right heart strain.   2.  Multifocal pneumonia, right greater than left, has improved minimally.  3.  Moderate elevation right hemidiaphragm, mild elevation left hemidiaphragm and the associated platelike atelectasis throughout the paradiaphragmatic right and left lower lung parenchyma unchanged.      [Critical Result: New diagnosis of pulmonary embolism]    Finding was identified on 11/13/2023 12:51 PM CST.     Dr. Gregorio was contacted by me on 11/13/2023 1:01 PM CST and verbalized understanding of the critical result.        Labs:      Medications:   Personally Reviewed.  Medications    amiodarone 0.5 mg/min (11/14/23 0800)    dexmedeTOMIDine 0.3 mcg/kg/hr (11/14/23 0856)    dextrose      dilTIAZem Stopped (11/14/23 0835)      apixaban ANTICOAGULANT  10 mg Oral BID    Followed by    [START ON 11/20/2023] apixaban ANTICOAGULANT  5 mg Oral BID    clonazePAM  1 mg Oral BID    escitalopram  20 mg Oral Daily with lunch    famotidine  20 mg Intravenous Q12H    insulin aspart  1-6 Units Subcutaneous 4x Daily AC & HS    ipratropium - albuterol 0.5 mg/2.5 mg/3 mL  3 mL Nebulization Q8H    [Held by provider] loratadine  10 mg Oral Daily    perflutren lipid microsphere  2 mL Intravenous Once    predniSONE  40 mg Oral BID    Followed by    [START ON 11/15/2023] predniSONE  30 mg Oral BID    Followed by    [START ON 11/18/2023] predniSONE  40 mg Oral Daily    Followed by    [START ON 11/23/2023] predniSONE  30 mg Oral  Daily    Followed by    [START ON 11/28/2023] predniSONE  20 mg Oral Daily    Followed by    [START ON 12/3/2023] predniSONE  10 mg Oral Daily    QUEtiapine  25 mg Oral BID    QUEtiapine  50 mg Oral At Bedtime    sodium chloride (PF)  10-40 mL Intracatheter Q7 Days    sodium chloride (PF)  3 mL Intracatheter Q8H    sulfamethoxazole-trimethoprim  1 tablet Oral Daily    [Held by provider] tamsulosin  0.4 mg Oral Daily    thiamine  100 mg Intravenous TID

## 2023-11-14 NOTE — SIGNIFICANT EVENT
"Significant Event Note - Code green     Restless, agitated, confused, was yelling. Had been  here for 12 days.   HR of 150s during agitation. Becoming aggressive to RN  Pulled his IV out. Non stop talking, \"it will blow your mind\" \"I need to get out of here\" Taking his clothes off. Appears to be hallucinating,  \"Hey dean moran !    Recent notes from day care team concerns with Delirium      A - Consider Delirium, also on steroid, r/o psychosis    Plans - Seems calmed down. He takes klonopin BID. Ativan PRN ordered, seroquel PRN. Check labs. If needed get sitter  PRN       437A -- 2nd Code Green. Becoming more restless, agitated. Swinging at nurses. Has not slept for days. HR 140s. Intermittently redirectable     Plans - IM Zyprexa PRN. If this wont work, PRN precedex infusion. Had been on precedex infusion before. Wont be able to give further Zyprexa given concerns with frequent PVCs noted on tele, Risk of Qtc prolongation/hearty rhythm issues while on amiodarone drip. Psych consult     If HR remains high despite being calm, will add another rate controller    Target RASS score 0 to -1.      525A - Discussed with remote ICU doc -- OK with precedex infusion, can increase ativan to 1 mg IV q6 PRN. And replace K.     6A -- HR to 140-160s despite Amio. RN called cardio -- rec to start cardizem drip. Patient appeared calm while on precedex, not getting out of bed, seems able to interact. No pain. No vomiting. Abdomen is soft, moving all extremities.     630A - Asked if he has urge to pee, he said, he will pee when he feels the need. \"Not now\"      Will inform AM doc   "

## 2023-11-14 NOTE — PROGRESS NOTES
TELE  Contacted by in-house hospitalist about sedation needs and a fib with RVR.  Re sedation, he has agitated delirium which hasn't improved much with neuroleptics or ativan 0.5.  Agree with precedex drip and would liberalize ativan to at least 1.0 mg per dose.  Re afib with rvr, would begin by correcting his potassium which is 3.6 this morning.  He is already on an amio drip; would continue with this.  If RVR not responsive to potassium repletion could repeat amio boluses.

## 2023-11-14 NOTE — PLAN OF CARE
Problem: Risk for Delirium  Goal: Optimal Coping  11/14/2023 1411 by Avril Vázquez RN  Outcome: Not Progressing  11/14/2023 1411 by Avril Vázquez RN  Reactivated  Goal: Improved Behavioral Control  11/14/2023 1411 by Avril Vázquez RN  Outcome: Not Progressing  11/14/2023 1411 by Avril Vázquez RN  Reactivated  Intervention: Minimize Safety Risk  Recent Flowsheet Documentation  Taken 11/14/2023 1200 by Avril Vázquez RN  Communication Enhancement Strategies:   call light answered in person   repetition utilized  Enhanced Safety Measures: room near unit station  Trust Relationship/Rapport: care explained  Taken 11/14/2023 0817 by Avril Vázquez RN  Trust Relationship/Rapport: care explained  Taken 11/14/2023 0800 by Avril Vázquez RN  Enhanced Safety Measures: room near unit station  Trust Relationship/Rapport: care explained  Goal: Improved Attention and Thought Clarity  11/14/2023 1411 by Avril Vázquez RN  Outcome: Not Progressing  11/14/2023 1411 by Avril Vázquez RN  Reactivated  Intervention: Maximize Cognitive Function  Recent Flowsheet Documentation  Taken 11/14/2023 1200 by Avril Vázquez RN  Reorientation Measures:   clock in view   reorientation provided  Goal: Improved Sleep  11/14/2023 1411 by Avril Vázquez RN  Outcome: Not Progressing  11/14/2023 1411 by Avril Vázquez RN  Reactivated     Problem: Oral Intake Inadequate  Goal: Improved Oral Intake  Outcome: Progressing     Problem: Delirium  Goal: Optimal Coping  Outcome: Not Progressing  Goal: Improved Behavioral Control  Outcome: Not Progressing  Intervention: Minimize Safety Risk  Recent Flowsheet Documentation  Taken 11/14/2023 1200 by Avril Vázquez RN  Communication Enhancement Strategies:   call light answered in person   repetition utilized  Enhanced Safety Measures: room near unit station  Trust Relationship/Rapport: care explained  Taken 11/14/2023 0817 by Avril Vázquez RN  Trust  Relationship/Rapport: care explained  Taken 11/14/2023 0800 by Avril Vázquez, RN  Enhanced Safety Measures: room near unit station  Trust Relationship/Rapport: care explained  Goal: Improved Attention and Thought Clarity  Outcome: Not Progressing  Intervention: Maximize Cognitive Function  Recent Flowsheet Documentation  Taken 11/14/2023 1200 by Avril Vázquez, RN  Reorientation Measures:   clock in view   reorientation provided  Goal: Improved Sleep  Outcome: Not Progressing     Problem: Dysrhythmia  Goal: Normalized Cardiac Rhythm  Outcome: Not Progressing   Goal Outcome Evaluation:  Pt was very sedated this morning; RASS -3 ; had titrated Precedex down to 0.1 with goal RASS of 0 to -1;  around noon, pt awoke, very aggitated, pulled cath stay off and tugging on norwood catheter; small small of hematurica observed but norwood remains patent. Was able to get pt to take some of PO medications, ate a sandwich and drank dasha.  Repeated attempts to get OOB unattended this afternoon.  Pt oriented only to self; speech rambling, talking but not in context ; MD aware of continuing delirium;  scheduled Seroquel started; had to increase Precedex gtt back up, currently at 0.7; see MAR.  Also, Diltiazem gtt off at 0838 ;  Amiodorone gtt remains on at 0.5mg; seen by Dr Strange cardiologist today. Remains in atrial fib; when pt resting, ventricular rate 90-100s, when awake, increased to 130s. MD aware.

## 2023-11-15 ENCOUNTER — APPOINTMENT (OUTPATIENT)
Dept: OCCUPATIONAL THERAPY | Facility: CLINIC | Age: 58
End: 2023-11-15
Payer: COMMERCIAL

## 2023-11-15 ENCOUNTER — APPOINTMENT (OUTPATIENT)
Dept: PHYSICAL THERAPY | Facility: CLINIC | Age: 58
End: 2023-11-15
Payer: COMMERCIAL

## 2023-11-15 ENCOUNTER — APPOINTMENT (OUTPATIENT)
Dept: SPEECH THERAPY | Facility: CLINIC | Age: 58
End: 2023-11-15
Payer: COMMERCIAL

## 2023-11-15 LAB
ANION GAP SERPL CALCULATED.3IONS-SCNC: 9 MMOL/L (ref 7–15)
BUN SERPL-MCNC: 40.6 MG/DL (ref 6–20)
CALCIUM SERPL-MCNC: 8.9 MG/DL (ref 8.6–10)
CHLORIDE SERPL-SCNC: 112 MMOL/L (ref 98–107)
CREAT SERPL-MCNC: 1.47 MG/DL (ref 0.67–1.17)
CRP SERPL-MCNC: 6.69 MG/L
DEPRECATED HCO3 PLAS-SCNC: 25 MMOL/L (ref 22–29)
EGFRCR SERPLBLD CKD-EPI 2021: 55 ML/MIN/1.73M2
ERYTHROCYTE [DISTWIDTH] IN BLOOD BY AUTOMATED COUNT: 15.7 % (ref 10–15)
GLUCOSE BLDC GLUCOMTR-MCNC: 110 MG/DL (ref 70–99)
GLUCOSE BLDC GLUCOMTR-MCNC: 119 MG/DL (ref 70–99)
GLUCOSE BLDC GLUCOMTR-MCNC: 133 MG/DL (ref 70–99)
GLUCOSE BLDC GLUCOMTR-MCNC: 162 MG/DL (ref 70–99)
GLUCOSE BLDC GLUCOMTR-MCNC: 80 MG/DL (ref 70–99)
GLUCOSE SERPL-MCNC: 101 MG/DL (ref 70–99)
HCT VFR BLD AUTO: 34.5 % (ref 40–53)
HGB BLD-MCNC: 10.7 G/DL (ref 13.3–17.7)
LACTATE SERPL-SCNC: 2 MMOL/L (ref 0.7–2)
MAGNESIUM SERPL-MCNC: 2.5 MG/DL (ref 1.7–2.3)
MCH RBC QN AUTO: 28.5 PG (ref 26.5–33)
MCHC RBC AUTO-ENTMCNC: 31 G/DL (ref 31.5–36.5)
MCV RBC AUTO: 92 FL (ref 78–100)
PLATELET # BLD AUTO: 307 10E3/UL (ref 150–450)
POTASSIUM SERPL-SCNC: 3.3 MMOL/L (ref 3.4–5.3)
POTASSIUM SERPL-SCNC: 3.3 MMOL/L (ref 3.4–5.3)
POTASSIUM SERPL-SCNC: 4.3 MMOL/L (ref 3.4–5.3)
RBC # BLD AUTO: 3.75 10E6/UL (ref 4.4–5.9)
SODIUM SERPL-SCNC: 131 MMOL/L (ref 135–145)
SODIUM SERPL-SCNC: 146 MMOL/L (ref 135–145)
WBC # BLD AUTO: 12.5 10E3/UL (ref 4–11)

## 2023-11-15 PROCEDURE — 258N000003 HC RX IP 258 OP 636: Performed by: EMERGENCY MEDICINE

## 2023-11-15 PROCEDURE — 84132 ASSAY OF SERUM POTASSIUM: CPT | Performed by: INTERNAL MEDICINE

## 2023-11-15 PROCEDURE — 93005 ELECTROCARDIOGRAM TRACING: CPT

## 2023-11-15 PROCEDURE — 250N000013 HC RX MED GY IP 250 OP 250 PS 637: Performed by: INTERNAL MEDICINE

## 2023-11-15 PROCEDURE — 250N000012 HC RX MED GY IP 250 OP 636 PS 637: Performed by: INTERNAL MEDICINE

## 2023-11-15 PROCEDURE — 86140 C-REACTIVE PROTEIN: CPT | Performed by: EMERGENCY MEDICINE

## 2023-11-15 PROCEDURE — 250N000013 HC RX MED GY IP 250 OP 250 PS 637: Performed by: HOSPITALIST

## 2023-11-15 PROCEDURE — 99232 SBSQ HOSP IP/OBS MODERATE 35: CPT | Performed by: INTERNAL MEDICINE

## 2023-11-15 PROCEDURE — 97535 SELF CARE MNGMENT TRAINING: CPT | Mod: GO

## 2023-11-15 PROCEDURE — 92526 ORAL FUNCTION THERAPY: CPT | Mod: GN

## 2023-11-15 PROCEDURE — 258N000003 HC RX IP 258 OP 636: Performed by: INTERNAL MEDICINE

## 2023-11-15 PROCEDURE — 84295 ASSAY OF SERUM SODIUM: CPT | Performed by: EMERGENCY MEDICINE

## 2023-11-15 PROCEDURE — 93005 ELECTROCARDIOGRAM TRACING: CPT | Performed by: HOSPITALIST

## 2023-11-15 PROCEDURE — 94640 AIRWAY INHALATION TREATMENT: CPT | Mod: 76

## 2023-11-15 PROCEDURE — 250N000009 HC RX 250: Performed by: INTERNAL MEDICINE

## 2023-11-15 PROCEDURE — 83605 ASSAY OF LACTIC ACID: CPT | Performed by: EMERGENCY MEDICINE

## 2023-11-15 PROCEDURE — 99232 SBSQ HOSP IP/OBS MODERATE 35: CPT | Performed by: EMERGENCY MEDICINE

## 2023-11-15 PROCEDURE — 94640 AIRWAY INHALATION TREATMENT: CPT

## 2023-11-15 PROCEDURE — 999N000157 HC STATISTIC RCP TIME EA 10 MIN

## 2023-11-15 PROCEDURE — 83735 ASSAY OF MAGNESIUM: CPT | Performed by: EMERGENCY MEDICINE

## 2023-11-15 PROCEDURE — 250N000013 HC RX MED GY IP 250 OP 250 PS 637: Performed by: EMERGENCY MEDICINE

## 2023-11-15 PROCEDURE — 82310 ASSAY OF CALCIUM: CPT | Performed by: EMERGENCY MEDICINE

## 2023-11-15 PROCEDURE — 85027 COMPLETE CBC AUTOMATED: CPT | Performed by: EMERGENCY MEDICINE

## 2023-11-15 PROCEDURE — 99222 1ST HOSP IP/OBS MODERATE 55: CPT | Performed by: INTERNAL MEDICINE

## 2023-11-15 PROCEDURE — 84132 ASSAY OF SERUM POTASSIUM: CPT | Performed by: EMERGENCY MEDICINE

## 2023-11-15 PROCEDURE — 97530 THERAPEUTIC ACTIVITIES: CPT | Mod: GP

## 2023-11-15 PROCEDURE — 250N000011 HC RX IP 250 OP 636: Performed by: EMERGENCY MEDICINE

## 2023-11-15 PROCEDURE — 120N000013 HC R&B IMCU

## 2023-11-15 RX ORDER — DILTIAZEM HYDROCHLORIDE 30 MG/1
60 TABLET, FILM COATED ORAL EVERY 6 HOURS SCHEDULED
Status: DISCONTINUED | OUTPATIENT
Start: 2023-11-16 | End: 2023-11-17

## 2023-11-15 RX ORDER — DILTIAZEM HYDROCHLORIDE 30 MG/1
30 TABLET, FILM COATED ORAL ONCE
Status: COMPLETED | OUTPATIENT
Start: 2023-11-15 | End: 2023-11-15

## 2023-11-15 RX ORDER — AMIODARONE HYDROCHLORIDE 200 MG/1
200 TABLET ORAL ONCE
Status: COMPLETED | OUTPATIENT
Start: 2023-11-15 | End: 2023-11-15

## 2023-11-15 RX ORDER — DILTIAZEM HYDROCHLORIDE 30 MG/1
30 TABLET, FILM COATED ORAL EVERY 6 HOURS SCHEDULED
Status: DISCONTINUED | OUTPATIENT
Start: 2023-11-15 | End: 2023-11-15

## 2023-11-15 RX ORDER — AMIODARONE HYDROCHLORIDE 200 MG/1
200 TABLET ORAL 2 TIMES DAILY
Status: DISCONTINUED | OUTPATIENT
Start: 2023-11-15 | End: 2023-11-15

## 2023-11-15 RX ORDER — AMIODARONE HYDROCHLORIDE 200 MG/1
400 TABLET ORAL 2 TIMES DAILY
Status: DISCONTINUED | OUTPATIENT
Start: 2023-11-16 | End: 2023-11-20 | Stop reason: HOSPADM

## 2023-11-15 RX ORDER — POTASSIUM CHLORIDE 1500 MG/1
40 TABLET, EXTENDED RELEASE ORAL ONCE
Status: COMPLETED | OUTPATIENT
Start: 2023-11-15 | End: 2023-11-15

## 2023-11-15 RX ORDER — DEXTROSE MONOHYDRATE 50 MG/ML
INJECTION, SOLUTION INTRAVENOUS CONTINUOUS
Status: DISCONTINUED | OUTPATIENT
Start: 2023-11-15 | End: 2023-11-17

## 2023-11-15 RX ORDER — POTASSIUM CHLORIDE 29.8 MG/ML
20 INJECTION INTRAVENOUS
Status: COMPLETED | OUTPATIENT
Start: 2023-11-15 | End: 2023-11-15

## 2023-11-15 RX ORDER — POTASSIUM CHLORIDE 1500 MG/1
40 TABLET, EXTENDED RELEASE ORAL ONCE
Status: DISCONTINUED | OUTPATIENT
Start: 2023-11-15 | End: 2023-11-15 | Stop reason: ALTCHOICE

## 2023-11-15 RX ADMIN — AMIODARONE HYDROCHLORIDE 200 MG: 200 TABLET ORAL at 11:54

## 2023-11-15 RX ADMIN — QUETIAPINE FUMARATE 25 MG: 25 TABLET ORAL at 18:25

## 2023-11-15 RX ADMIN — SODIUM CHLORIDE, POTASSIUM CHLORIDE, SODIUM LACTATE AND CALCIUM CHLORIDE: 600; 310; 30; 20 INJECTION, SOLUTION INTRAVENOUS at 03:15

## 2023-11-15 RX ADMIN — IPRATROPIUM BROMIDE AND ALBUTEROL SULFATE 3 ML: .5; 3 SOLUTION RESPIRATORY (INHALATION) at 07:43

## 2023-11-15 RX ADMIN — QUETIAPINE FUMARATE 25 MG: 25 TABLET ORAL at 08:21

## 2023-11-15 RX ADMIN — PREDNISONE 40 MG: 20 TABLET ORAL at 08:22

## 2023-11-15 RX ADMIN — IPRATROPIUM BROMIDE AND ALBUTEROL SULFATE 3 ML: .5; 3 SOLUTION RESPIRATORY (INHALATION) at 23:12

## 2023-11-15 RX ADMIN — CLONAZEPAM 0.5 MG: 0.5 TABLET ORAL at 19:59

## 2023-11-15 RX ADMIN — QUETIAPINE FUMARATE 50 MG: 25 TABLET ORAL at 19:58

## 2023-11-15 RX ADMIN — APIXABAN 10 MG: 5 TABLET, FILM COATED ORAL at 08:21

## 2023-11-15 RX ADMIN — ESCITALOPRAM OXALATE 20 MG: 20 TABLET ORAL at 11:50

## 2023-11-15 RX ADMIN — DILTIAZEM HYDROCHLORIDE 30 MG: 30 TABLET, FILM COATED ORAL at 11:50

## 2023-11-15 RX ADMIN — CLONAZEPAM 0.5 MG: 0.5 TABLET ORAL at 08:21

## 2023-11-15 RX ADMIN — DILTIAZEM HYDROCHLORIDE 30 MG: 30 TABLET, FILM COATED ORAL at 18:26

## 2023-11-15 RX ADMIN — POTASSIUM CHLORIDE 20 MEQ: 29.8 INJECTION, SOLUTION INTRAVENOUS at 06:35

## 2023-11-15 RX ADMIN — DEXTROSE MONOHYDRATE: 50 INJECTION, SOLUTION INTRAVENOUS at 08:22

## 2023-11-15 RX ADMIN — THIAMINE HYDROCHLORIDE 100 MG: 100 INJECTION, SOLUTION INTRAMUSCULAR; INTRAVENOUS at 14:18

## 2023-11-15 RX ADMIN — POTASSIUM CHLORIDE 40 MEQ: 1500 TABLET, EXTENDED RELEASE ORAL at 11:50

## 2023-11-15 RX ADMIN — POTASSIUM CHLORIDE 20 MEQ: 29.8 INJECTION, SOLUTION INTRAVENOUS at 05:28

## 2023-11-15 RX ADMIN — AMIODARONE HYDROCHLORIDE 200 MG: 200 TABLET ORAL at 19:58

## 2023-11-15 RX ADMIN — DILTIAZEM HYDROCHLORIDE 60 MG: 30 TABLET, FILM COATED ORAL at 23:24

## 2023-11-15 RX ADMIN — DEXTROSE MONOHYDRATE: 50 INJECTION, SOLUTION INTRAVENOUS at 22:46

## 2023-11-15 RX ADMIN — AMIODARONE HYDROCHLORIDE 200 MG: 200 TABLET ORAL at 20:45

## 2023-11-15 RX ADMIN — THIAMINE HYDROCHLORIDE 100 MG: 100 INJECTION, SOLUTION INTRAMUSCULAR; INTRAVENOUS at 20:44

## 2023-11-15 RX ADMIN — THIAMINE HYDROCHLORIDE 100 MG: 100 INJECTION, SOLUTION INTRAMUSCULAR; INTRAVENOUS at 08:22

## 2023-11-15 RX ADMIN — APIXABAN 10 MG: 5 TABLET, FILM COATED ORAL at 19:59

## 2023-11-15 RX ADMIN — DILTIAZEM HYDROCHLORIDE 30 MG: 30 TABLET, FILM COATED ORAL at 20:45

## 2023-11-15 RX ADMIN — IPRATROPIUM BROMIDE AND ALBUTEROL SULFATE 3 ML: .5; 3 SOLUTION RESPIRATORY (INHALATION) at 15:17

## 2023-11-15 RX ADMIN — SODIUM CHLORIDE, POTASSIUM CHLORIDE, SODIUM LACTATE AND CALCIUM CHLORIDE 500 ML: 600; 310; 30; 20 INJECTION, SOLUTION INTRAVENOUS at 01:50

## 2023-11-15 RX ADMIN — DEXMEDETOMIDINE HYDROCHLORIDE 0.2 MCG/KG/HR: 4 INJECTION, SOLUTION INTRAVENOUS at 06:55

## 2023-11-15 RX ADMIN — SULFAMETHOXAZOLE AND TRIMETHOPRIM 1 TABLET: 400; 80 TABLET ORAL at 08:21

## 2023-11-15 ASSESSMENT — ACTIVITIES OF DAILY LIVING (ADL)
ADLS_ACUITY_SCORE: 44
ADLS_ACUITY_SCORE: 42
ADLS_ACUITY_SCORE: 42
ADLS_ACUITY_SCORE: 44
ADLS_ACUITY_SCORE: 42

## 2023-11-15 NOTE — PROGRESS NOTES
"BP (!) 122/90   Pulse 114   Temp 98  F (36.7  C) (Oral)   Resp 28   Ht 1.93 m (6' 4\")   Wt 123.5 kg (272 lb 3.2 oz)   SpO2 95%   BMI 33.13 kg/m      Pt was on RA when last seen. BS were diminished throughout and had a non productive cough. RT will continue to follow.   "

## 2023-11-15 NOTE — PROGRESS NOTES
Oncology Progress Note:    DOS:  11/15/2023    Assessment/Plan:    1) Acute hypoxic respiratory failure:  Discussed with Dr. Block on 11/14/2023.  If pneumonitis is un-related to pembrolizumab, then rapid steroid taper should be fine.  There is no definitive way to know if he has immune related pneumonitis.  Given his current situation, taper steroid per pulmonary service and watch respiratory status closely.  So far has tolerated taper without difficulty in terms of respiratory status.  Once discharged will need follow CT scan with pulmonary.  2)  Acute kidney failure:  Creatinine remains slightly elevated.  Monitor per hospital service. Direction per Nephrology.    3)  Atrial fibrillation:  Direction per Cardiology.    3)  Anemia: Slight decrease to 10.7.  Continue to monitor.   4)  New diagnosis PE:  On oral anticoagulation.  Discussed with Dr. Block who agrees with the current plan.    5)  Delirium:  Improved with steroid reduction and thiamine.  Continue to monitor.      Subjective:  Patient resting in bed, visiting with a friend.  Off all oxygen.  Once I told him my name, he knew that I was with oncology and worked with Dr. Block.  Notes that he feels much better.      Objective:  Afebrile.  HR varies with activity.    General:  Orientation much better today than prior days per staff report.     Labs:  Na= 146 this am, K = 3.3, creatinine = 1.47, CRP = 6.69.      No new imaging.    Neela Nick, RN, MS, CNP  [facility]  [phone]  Cell:  [phone] (Tuesday - Friday, 8:30 am to 5 pm)

## 2023-11-15 NOTE — CONSULTS
RENAL CONSULT NOTE    REQUESTING PHYSICIAN: Dr. Gregorio     REASON FOR CONSULT: Hypernatremia, MIKKI     ASSESSMENT/PLAN:    Hypernatremia - Waxing and waning issue this admit in the setting of free water deficit, Na+ as high as 152 which had improved with transition to hypotonic maintenance IVF, now trending up slightly again to 146 at time of consult. IV D5W restarted at 100 ml/hour by primary team. Follow Na+ daily for now and encourage oral intake as able.     MIKKI - Baseline Cr 0.8-0.9 with preserved eGFR; Cr normal on admission then sustained multifactorial MIKKI with Cr peak at 2.1 in the setting of vasoplegic shock 2/2 sedation, hemodynamic instability, attempts at diuresis, and iodated contrast exposure 11/2/23. Cr has improved since then but stalled ~1.4-1.5. Follow with IVF and manage expectantly.     Acute hypoxic respiratory failure - Multifactorial in the setting of multifocal pneumonia and pembrolizumab-related pneumonitis and new diagnosis of PE 11/13; initially required mechanical ventilation and extubated 11/8; on steroid taper and currently off antibiotics; pulmonology is actively following     Hypokalemia - replacing     Left pulmonary embolism - noted on chest CTA 11/13/23 (not noted previously on CTA 11/2/23); currently on apixaban     Atrial fibrillation - Challenging to control this admit, now converted to oral diltiazem and amiodarone and on apixaban; cardiology following     History of multiple myeloma - Previously on Keytruda, seen by heme/onc this admit     Anemia - Hgb stable in 10-11's. Transfusion per primary service.     HPI: Jim Titus is a 59 yo M with PMH significant for asthma, multiple myeloma, depression/anxiety who was admitted 11/2/2023 with cough and SOB and sepsis secondary to pneumonia; eventually required intubation and transfer to ICU and was eventually extubated 11/8/23. Course has been complicated by steroid-induced psychosis, development of left pulmonary emboli noted  on chest CTA 11/13/23, and MIKKI with very mild hypernatremia of 146. Given the metabolic concerns nephrology has been asked to consult.     Upon chart review patient's mentation has significantly improved today. He confirms that he was able to sleep through the night last night and woke up feeling the best he has felt this entire admission. He is quite clear and oriented during discussion and able to recall events of past several days. Does think he has been eating and drinking well and typically hydrates well outside the hospital, up to 1.5 gallons of water per day. He feels his breathing is very good and is on RA at time of visit. Attempted to get up and work with therapies today but became very tachycardic so therapy attempt was aborted. Did not have any dizziness or lightheadness at that time.     REVIEW OF SYSTEMS:  Comprehensive ROS negative except per HPI     ALLERGIES/SENSITIVITIES:  Allergies   Allergen Reactions    Chicken [Chicken Protein] Other (See Comments)     Throat closes to turkey as well.    Cantaloupe [Cantaloupe Extract Allergy Skin Test] Unknown    Wellbutrin [Bupropion] Unknown    Grass Pollen [Grass] Rash    Turkey [Poultry Meal] Rash     Social History     Tobacco Use    Smoking status: Never    Smokeless tobacco: Former     Types: Chew     Quit date: 1998   Substance Use Topics    Alcohol use: Yes     Comment: 3-4 times per week, 5-6 beers    Drug use: Yes     Types: Marijuana     I have reviewed this patient's family history and updated it with pertinent information if needed.  Family History   Problem Relation Age of Onset    Breast Cancer Mother     Heart Disease Father     Mental Illness Father     Hyperlipidemia Father     Mental Illness Brother          PHYSICAL EXAM:  Physical Exam   Temp: 98  F (36.7  C) Temp src: Axillary BP: 115/71 Pulse: 115   Resp: 24 SpO2: 93 % O2 Device: None (Room air) Oxygen Delivery: 2 LPM  Vitals:    11/13/23 0359 11/14/23 0524 11/15/23 0200   Weight: 125.7  kg (277 lb 1.6 oz) 122.5 kg (270 lb) 123.5 kg (272 lb 3.2 oz)     Vital Signs with Ranges  Temp:  [96.2  F (35.7  C)-98.1  F (36.7  C)] 98  F (36.7  C)  Pulse:  [] 115  Resp:  [14-35] 24  BP: ()/() 115/71  SpO2:  [88 %-96 %] 93 %  I/O last 3 completed shifts:  In: 1875.37 [P.O.:720; I.V.:1155.37]  Out: 1225 [Urine:1225]    @TMAXR(24)@    Patient Vitals for the past 72 hrs:   Weight   11/15/23 0200 123.5 kg (272 lb 3.2 oz)   11/14/23 0524 122.5 kg (270 lb)   11/13/23 0359 125.7 kg (277 lb 1.6 oz)       General - A & O x 3, NAD  HEENT - PERRLA, no scleral icterus  Neck - no carotid bruits, no JVD  Respiratory - Lungs CTA bilat without wheeze, rhonchi, rales  Cardiovascular - AP RRR with murmur  Abdomen - soft, BS present, no bruits, no fluid wave  Extremities - well perfused, no edema  Integumentary - intact, good turgor, no rash/lesions  Neurologic - grossly intact  Psych:  Judgement intact, affect WNL  :  + Lamas, clear yellow output     Laboratory:     Recent Labs   Lab 11/15/23  0405 11/14/23  0332 11/13/23  0919 11/12/23  0355 11/11/23  0533 11/10/23  0512   WBC 12.5* 12.3* 11.0 14.4* 12.0* 12.3*   RBC 3.75* 3.97* 4.02* 3.63* 3.40* 2.84*   HGB 10.7* 11.3* 11.6* 10.3* 9.6* 8.1*   HCT 34.5* 35.6* 36.6* 33.0* 31.4* 26.1*    359 422 493* 479* 437       Basic Metabolic Panel:  Recent Labs   Lab 11/15/23  0930 11/15/23  0739 11/15/23  0405 11/15/23  0203 11/14/23  2214 11/14/23  1634 11/14/23  1102 11/14/23  0749 11/14/23  0339 11/13/23  1209 11/13/23  0919 11/12/23  0820 11/12/23  0355 11/11/23  0758 11/11/23  0533 11/10/23  0756 11/10/23  0512   NA  --   --  146*  --   --   --   --   --  143  --  143  --  143  --  149*  --  152*   POTASSIUM 3.3*  --  3.3*  --   --   --   --   --  3.6  --  4.1  --  4.0  --  4.2  --  4.2   CHLORIDE  --   --  112*  --   --   --   --   --  109*  --  107  --  108*  --  113*  --  115*   CO2  --   --  25  --   --   --   --   --  25  --  25  --  25  --  26  --  25    BUN  --   --  40.6*  --   --   --   --   --  36.5*  --  36.9*  --  44.1*  --  57.2*  --  63.5*   CR  --   --  1.47*  --   --   --   --   --  1.39*  --  1.30*  --  1.44*  --  1.62*  --  1.71*   GLC  --  80 101* 110* 105* 145* 136*   < > 169*   < > 157*   < > 137*   < > 172*   < > 158*   MELISSA  --   --  8.9  --   --   --   --   --  9.3  --  9.4  --  8.7  --  8.7  --  9.1    < > = values in this interval not displayed.       INRNo lab results found in last 7 days.    Recent Labs   Lab Test 11/15/23  0930 11/15/23  0405 11/14/23  0339   POTASSIUM 3.3* 3.3* 3.6   CHLORIDE  --  112* 109*   BUN  --  40.6* 36.5*      Recent Labs   Lab Test 11/14/23  0339 11/04/23  0421 04/11/23  1255 11/10/21  1438   ALBUMIN 3.3* 2.8*   < >  --    BILITOTAL 0.5 0.9   < >  --    ALT 21 23   < >  --    AST 9 21   < >  --    PROTEIN  --   --   --  Negative    < > = values in this interval not displayed.       Personally reviewed today's laboratory studies      Thank you for involving us in the care of this patient. We will continue to follow along with you.    Janet Vasquez PA-C   [facility]  [phone]

## 2023-11-15 NOTE — PROGRESS NOTES
TELE:  Contacted about mild hypotension (map low 60s).  Low suspicion for worsening infection as wbc only 12 and crp downtrending.  Small PE's on CT but not sufficient to cause shock.  I/o's show he's net negative about 5L since admit and his ins have been low.  Will bolus 500 ml and re-assess.

## 2023-11-15 NOTE — PLAN OF CARE
Problem: Risk for Delirium  Goal: Improved Behavioral Control  Intervention: Minimize Safety Risk  Recent Flowsheet Documentation  Taken 11/15/2023 0800 by Avril Vázquez RN  Enhanced Safety Measures: room near unit station  Trust Relationship/Rapport:   care explained   questions answered     Problem: Delirium  Goal: Improved Behavioral Control  Outcome: Progressing  Intervention: Minimize Safety Risk  Recent Flowsheet Documentation  Taken 11/15/2023 0800 by Avril Vázquez RN  Enhanced Safety Measures: room near unit station  Trust Relationship/Rapport:   care explained   questions answered  Goal: Improved Attention and Thought Clarity  Outcome: Progressing     Problem: Dysrhythmia  Goal: Normalized Cardiac Rhythm  Outcome: Not Progressing   Goal Outcome Evaluation:  Pt alert, calm;  oriented to place, person; able to state correct year and month;  However, disoriented regarding reason he's hospitalized.  Able to converse without rambling speech. Pt not attempting to crawl OOB unassisted.  Precedex gtt 0.2 but will attempt to wean off if pt remains calm/ cooperative.  PT here; assist of 1 to 2 to get up into chair.  Pt in afib and ventricular up to 140-160s with transfer; pt denies SOB or liteheadedness.  When sitting in recliner, -120s;  amiodorone gtt off since this evening;  cardiologist Dr Strange to start PO heart meds today; assist pt back into bed and HR 90 to 110s; BP WNL; see MAR and flowsheet.

## 2023-11-15 NOTE — PROGRESS NOTES
PULMONARY / CRITICAL CARE PROGRESS NOTE    Date / Time of Admission:  11/2/2023  7:52 AM    Assessment:   Principal Problem:    Acute respiratory failure with hypoxia (H)  Active Problems:    Pneumonia    Elevated alkaline phosphatase level    Sepsis, due to unspecified organism, unspecified whether acute organ dysfunction present (H)    Acute kidney failure, unspecified (H)    Hypernatremia    Pt is much more clear today  He is aware of self and place, even has a sense of humor  He was up to the chair and is increasing mobility    Code Status:  Full Code    58yoM with history of melanoma previously on pembrolizumab last given September 2023 with ongoing pneumonitis presented with acute hypoxic respiratory failure requiring intubation in the setting of elevated inflammatory markers but culture negative and minimal improvement despite antibiotics extubated 11/8 and now on low-flow oxygen with improvement in agitated delirium off of precedex.      Plan:       1) Acute hypoxic respiratory failure from pneumonitis versus asymmetric pneumonia  -s/p intubation, proning, paralysis and extubated 11/8 with improving hypoxia.     2) Steroid Induced Psychosis  - will do rapid taper of steroids - changed to prednisone 40 daily 11/14  - will monitor for increasing FiO2 needs  - Had  CT Chest 11/13 with minimal improvement of ground glass inflammation - not worsening    - will need follow up in pulmonary clinic or oncology for repeat non-contract CT Chest to ensure resolution of infiltrates  - Bactrim for PJP ppx until Prednisone dose <20mg/day      Clinically Significant Risk Factors        # Hypokalemia: Lowest K = 3.3 mmol/L in last 2 days, will replace as needed  # Hypernatremia: Highest Na = 146 mmol/L in last 2 days, will monitor as appropriate      # Hypoalbuminemia: Lowest albumin = 2.7 g/dL at 11/3/2023  5:57 AM, will monitor as appropriate              # Obesity: Estimated body mass index is 33.13 kg/m  as calculated  "from the following:    Height as of this encounter: 1.93 m (6' 4\").    Weight as of this encounter: 123.5 kg (272 lb 3.2 oz).        # Financial/Environmental Concerns: none  # Asthma: noted on problem list          Principal Problem:    Acute respiratory failure with hypoxia (H)  Active Problems:    Pneumonia    Elevated alkaline phosphatase level    Sepsis, due to unspecified organism, unspecified whether acute organ dysfunction present (H)    Acute kidney failure, unspecified (H)    Hypernatremia      Allergies: Chicken [chicken protein], Cantaloupe [cantaloupe extract allergy skin test], Wellbutrin [bupropion], Grass pollen [grass], and Turkey [poultry meal]     MEDS:  Scheduled Meds:   apixaban ANTICOAGULANT  10 mg Oral BID    Followed by    [START ON 11/20/2023] apixaban ANTICOAGULANT  5 mg Oral BID    clonazePAM  0.5 mg Oral BID    escitalopram  20 mg Oral Daily with lunch    insulin aspart  1-6 Units Subcutaneous 4x Daily AC & HS    ipratropium - albuterol 0.5 mg/2.5 mg/3 mL  3 mL Nebulization Q8H    predniSONE  40 mg Oral Daily    Followed by    [START ON 11/17/2023] predniSONE  30 mg Oral Daily    Followed by    [START ON 11/22/2023] predniSONE  20 mg Oral Daily    Followed by    [START ON 11/27/2023] predniSONE  10 mg Oral Daily    QUEtiapine  25 mg Oral BID    QUEtiapine  50 mg Oral At Bedtime    sodium chloride (PF)  10-40 mL Intracatheter Q7 Days    sodium chloride (PF)  3 mL Intracatheter Q8H    sulfamethoxazole-trimethoprim  1 tablet Oral Daily    [Held by provider] tamsulosin  0.4 mg Oral Daily    thiamine  100 mg Intravenous TID     Continuous Infusions:   amiodarone Stopped (11/14/23 2000)    dexmedeTOMIDine 0.1 mcg/kg/hr (11/15/23 1004)    dextrose      D5W 100 mL/hr at 11/15/23 1000    dilTIAZem Stopped (11/14/23 0835)    lactated ringers 50 mL/hr at 11/15/23 1000     PRN Meds:.acetaminophen, albuterol, benzonatate, dextrose, glucose **OR** dextrose **OR** glucagon, lidocaine (viscous), " "lidocaine, loperamide, melatonin, naloxone **OR** naloxone **OR** naloxone **OR** naloxone, polyethylene glycol, QUEtiapine, sodium chloride (PF), sodium chloride (PF), sodium chloride (PF), sodium chloride (PF), sodium chloride (PF)      Objective:   VITALS:  /71   Pulse 115   Temp 98  F (36.7  C) (Axillary)   Resp 24   Ht 1.93 m (6' 4\")   Wt 123.5 kg (272 lb 3.2 oz)   SpO2 93%   BMI 33.13 kg/m    EXAM:  General appearance: awake and in no distress  Neck: large collar size  Lungs: decreased BS meño, no wheezing  Heart: tachy, irregular  Abdomen:Soft, nontender  Extremities: minimal pitting edema  Skin:no rashes on visible skin, no edema  Neurologic: awake and interactive, moving all extremities    I&O:       Intake/Output Summary (Last 24 hours) at 11/12/2023 1003  Last data filed at 11/12/2023 0600  Gross per 24 hour   Intake 789.24 ml   Output 2860 ml   Net -2070.76 ml     Data Review:  Chest Xray  Personally reviewed image/s and Personally reviewed impression/s  EXAM: XR CHEST PORT 1 VIEW  LOCATION: [facility]  DATE: 11/9/2023  INDICATION: Shortness of breath.  COMPARISON: 11/08/2023                                                               IMPRESSION: Right-sided PICC tip in good position at cavoatrial junction. Heart size magnified in AP projection. Pulmonary venous congestion accentuated by shallow inspiration. Consolidative airspace opacities within the right lung and minimal left   basilar atelectasis unchanged. No pneumothorax.    EXAM: XR CHEST PORT 1 VIEW  LOCATION: [facility]  DATE: 11/12/2023     INDICATION: Shortness of breath  COMPARISON: 11/09/2023 and older studies. CT chest 11/02/2023                                                                      IMPRESSION: Right upper extremity PICC line catheter overlies the distal SVC. Chronic elevation of the right hemidiaphragm.      Slightly better aeration of the right lung with " slight decrease in the ill-defined patchy areas of consolidation.. Left lung remains clear. Heart is of normal size.    LABS      Latest Ref Rng & Units 11/10/2023     5:12 AM 11/11/2023     5:33 AM 11/12/2023     3:55 AM 11/13/2023     2:09 AM 11/13/2023     9:19 AM 11/14/2023     3:39 AM 11/15/2023     4:05 AM   Glucose Values   Glucose 70 - 99 mg/dL 158  172  137  141  157  169  101        Results for orders placed or performed during the hospital encounter of 11/02/23   Basic metabolic panel   Result Value Ref Range    Sodium 146 (H) 135 - 145 mmol/L    Potassium 3.3 (L) 3.4 - 5.3 mmol/L    Chloride 112 (H) 98 - 107 mmol/L    Carbon Dioxide (CO2) 25 22 - 29 mmol/L    Anion Gap 9 7 - 15 mmol/L    Urea Nitrogen 40.6 (H) 6.0 - 20.0 mg/dL    Creatinine 1.47 (H) 0.67 - 1.17 mg/dL    GFR Estimate 55 (L) >60 mL/min/1.73m2    Calcium 8.9 8.6 - 10.0 mg/dL    Glucose 101 (H) 70 - 99 mg/dL     Lab Results   Component Value Date    WBC 12.5 (H) 11/15/2023    HGB 10.7 (L) 11/15/2023    HCT 34.5 (L) 11/15/2023    MCV 92 11/15/2023     11/15/2023

## 2023-11-15 NOTE — PROGRESS NOTES
Assessment/Plan:  1. Atrial fibrillation with RVR:  The patient's ventricular rate was mildly elevated.  Amiodarone was discontinued.  Start amiodarone 200 mg oral twice a day, started diltiazem 30 mg every 6 hours for rate control.  He is on anticoagulation Eliquis due to acute pulmonary embolism.    2.  Acute pulmonary embolism: Started anticoagulation Eliquis per primary team.    3.  Acute respiratory failure due to pneumonitis, history of asthma, delirium, history of melanoma: Please see primary team management for details.    Subjective:  Interval History:  The patient is significantly improved.  He has no chest pain, palpitations, shortness of breath.    Current Medications:  Scheduled Meds:   amiodarone  200 mg Oral BID    apixaban ANTICOAGULANT  10 mg Oral BID    Followed by    [START ON 11/20/2023] apixaban ANTICOAGULANT  5 mg Oral BID    clonazePAM  0.5 mg Oral BID    diltiazem  30 mg Oral Q6H UMM    escitalopram  20 mg Oral Daily with lunch    insulin aspart  1-6 Units Subcutaneous 4x Daily AC & HS    ipratropium - albuterol 0.5 mg/2.5 mg/3 mL  3 mL Nebulization Q8H    potassium chloride  40 mEq Oral Once    predniSONE  40 mg Oral Daily    Followed by    [START ON 11/17/2023] predniSONE  30 mg Oral Daily    Followed by    [START ON 11/22/2023] predniSONE  20 mg Oral Daily    Followed by    [START ON 11/27/2023] predniSONE  10 mg Oral Daily    QUEtiapine  25 mg Oral BID    QUEtiapine  50 mg Oral At Bedtime    sodium chloride (PF)  10-40 mL Intracatheter Q7 Days    sodium chloride (PF)  3 mL Intracatheter Q8H    sulfamethoxazole-trimethoprim  1 tablet Oral Daily    [Held by provider] tamsulosin  0.4 mg Oral Daily    thiamine  100 mg Intravenous TID     Continuous Infusions:   dexmedeTOMIDine 0.1 mcg/kg/hr (11/15/23 1004)    dextrose      D5W 100 mL/hr at 11/15/23 1000    lactated ringers 50 mL/hr at 11/15/23 1000     PRN Meds:.acetaminophen, albuterol, benzonatate, dextrose, glucose **OR** dextrose  MAYA Curran's office and asked them to have Doctor call you before moving forward with procedure...Jeannette   "**OR** glucagon, lidocaine (viscous), lidocaine, loperamide, melatonin, naloxone **OR** naloxone **OR** naloxone **OR** naloxone, polyethylene glycol, QUEtiapine, sodium chloride (PF), sodium chloride (PF), sodium chloride (PF), sodium chloride (PF), sodium chloride (PF)    Objective:   Vital signs in last 24 hours:  Temp:  [96.2  F (35.7  C)-98.1  F (36.7  C)] 98  F (36.7  C)  Pulse:  [] 115  Resp:  [15-35] 24  BP: ()/() 115/71  SpO2:  [88 %-96 %] 93 %  Weight:   [unfilled]     Physical Exam:  General Appearance:   Sedated in sleep   HEENT:  No scleral icterus; the mucous membranes were moist.   Neck: No cervical bruits. No jugular venous distention   Lungs:   Lungs are clear to auscultation. No crackles. No wheezing.   Cardiovascular:   IRRR, normal first and second heart sounds with no murmurs. No rubs or gallops.     Abdomen:  Soft. No tenderness. Bowels sounds are present   Extremities: No leg edema. Equal posterior tibial pulsese.   Skin: Warm, Dry. No rashes.   Neurologic: Mood and affect are appropriate. No focal deficits.         Cardiographics:   Report: personally reviewed. .      Tele monitoring -  Atrial fib with ventricular rate 110 bpm    Echocardiogram on November 13, 2023:  1.Left ventricular size, wall motion and function are normal. The ejection  fraction is > 65%.  2.There is mild to moderate concentric left ventricular hypertrophy.  3.Normal right ventricle size and systolic function.  4.No hemodynamically significant valvular abnormalities on 2D or color flow  imaging.  5.Ascending Aorta moderate dilatation is present.  6.The study was technically difficult.  Compared to the prior study dated 11/5/2023, the RV EF is improved.      Lab Results:   personally reviewed.     Lab Results   Component Value Date     11/14/2023    CO2 25 11/14/2023    CO2 21 11/10/2021    BUN 36.5 11/14/2023    BUN 14 11/10/2021     No results found for: \"CKTOTAL\", \"CKMB\", \"TROPONINI\"  Lab " Results   Component Value Date    WBC 12.3 11/14/2023    HGB 11.3 11/14/2023    HCT 35.6 11/14/2023    MCV 90 11/14/2023     11/14/2023     Lab Results   Component Value Date    CHOL 189 04/11/2023    TRIG 193 04/11/2023    HDL 54 04/11/2023    LDL 96 04/11/2023

## 2023-11-15 NOTE — PROGRESS NOTES
Patient received neb treatment per MD order. Pt educated with the explanation of the medication they received. Patient showed understanding with verbalization.  BS: clear and diminished.  Pt on AAYUSH.     Brenda Vasquez RT on 11/14/2023 at 11:05 PM

## 2023-11-15 NOTE — PLAN OF CARE
Goal Outcome Evaluation:      Plan of Care Reviewed With: patient    Overall Patient Progress: improvingOverall Patient Progress: improving    Outcome Evaluation: Pt afebrile, tele afib, BPs soft, on 2L oxymask for desat while asleep. Pt received 500ml LR bolus for MAP <65 this shift. Amio stopped at 2000. Precedex off x2 hours before pt required again for agitation. Pt has intermittent confusion and tangential thoughts. Pt had small green tinged mucous BM. Lamas draining clear iva urine. Scant serosanguinous drainage from meatus. Giuseppe hugger applied for temp 96.3.      Problem: Pneumonia  Goal: Effective Oxygenation and Ventilation  Outcome: Progressing  Intervention: Promote Airway Secretion Clearance  Recent Flowsheet Documentation  Taken 11/15/2023 0400 by Avril Domingo RN  Cough And Deep Breathing: done with encouragement  Intervention: Optimize Oxygenation and Ventilation  Recent Flowsheet Documentation  Taken 11/15/2023 0600 by Avril Domingo RN  Head of Bed (HOB) Positioning: HOB at 20-30 degrees  Taken 11/15/2023 0400 by Avril Domingo RN  Head of Bed (HOB) Positioning: (Simultaneous filing. User may not have seen previous data.) HOB at 20 degrees  Taken 11/14/2023 2203 by Avril Domingo RN  Head of Bed (HOB) Positioning: HOB at 20-30 degrees  Taken 11/14/2023 2000 by Avril Domingo RN  Head of Bed (HOB) Positioning: HOB at 20-30 degrees     Problem: Risk for Delirium  Goal: Improved Behavioral Control  Outcome: Progressing  Intervention: Minimize Safety Risk  Recent Flowsheet Documentation  Taken 11/15/2023 0600 by Avril Domingo RN  Enhanced Safety Measures: room near unit station  Taken 11/15/2023 0400 by Avril Domingo RN  Communication Enhancement Strategies:   extra time allowed for response   one-step directions provided  Enhanced Safety Measures: room near unit station  Taken 11/15/2023 0200 by Avril Domingo RN  Enhanced Safety Measures: room near unit  station  Taken 11/15/2023 0000 by Avril Domingo, RN  Enhanced Safety Measures: room near unit station  Taken 11/14/2023 2200 by Avril Domingo RN  Enhanced Safety Measures: room near unit station  Taken 11/14/2023 2000 by Avril Domingo, RN  Communication Enhancement Strategies:   extra time allowed for response   one-step directions provided  Enhanced Safety Measures: room near unit station  Trust Relationship/Rapport:   care explained   questions answered     Problem: Risk for Delirium  Goal: Improved Attention and Thought Clarity  Outcome: Progressing  Intervention: Maximize Cognitive Function  Recent Flowsheet Documentation  Taken 11/15/2023 0400 by Avril Domingo, RN  Reorientation Measures: reorientation provided  Taken 11/14/2023 2000 by Avril Domingo RN  Sensory Stimulation Regulation:   auditory stimulation minimized   care clustered   lighting decreased   visual stimulation minimized  Reorientation Measures: reorientation provided

## 2023-11-15 NOTE — PROGRESS NOTES
[facility] MEDICINE PROGRESS NOTE      Summary: 58 year old male into [facility] on 11/2/2023 for acute  Respiratory failure requiring intubation and proning.  Imaging showed  Dense right sided pneumonitis.  Pt was extubated on 11/8.  He was      Yesterday after walking about 30 feet he went into new onset atrial  Fibrillation with RVR that has been difficult to control.  He was originally  Treated with metoprolol 5 mg bolus without improvement.  He was  Then started on cardizem bolus and drip.  The troponin was stable though  A d dimer was 2.  He continues with hypoxia from 3-6 liter nasal cannula need.    On 11/13 he had a new diagnosis of PE, LLL.  He was started on   Eliquis.      Delirium began for him on 11/9.  He has been doing poorly at  Night.  Yesterday and into last night he continued to require  Precedex.   This morning he is awake and looks the most clear  I have seen him in 2 days.  He interviews fairly well; knows   Where he is.  He states oh I slept so well last night.  His sensorium  Is improved though not fully at baseline.    The atrial fibrillation continues.  Currently he is off the  Amiodarone and cardizem drip.  Telemetry shows variable rates  From 105-118.  The blood pressure is stable.    Clinical goals for today are to continue clinical support for  His delirium.  He is improving.  He has prednisone scheduled  For today.  Perhaps we can hold this one more day.    Hospital day number 13    Problem list:     Acute respiratory failure due to pneumonitis: (pembrolizumab related)   (Extubated on 11/8; originally with ARDS); currently on RA   Though imaging yesterday with CT chest shows only   Incremental improvement in the multifocal pneumonia  2.  Atrial fibrillation with RVR; pending cardiac input;   3.  History of asthma: on solumedrol for the pneumonitis:  4.  Delirium ICU:  significant improvement today after several   Interventions yesterday (seroquel, klonipin  cut in 1/2, thiamine,   Holding steroids, norwood); continue same today;   5.  PE, LLL, new: on eliquis  6.  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  History of melanoma: stage 2b; on check point inhibitors; had a   Negative PET in 4/2023;   8.  MIKKI:  creatinine 1.39 to 1.47; due to volume deficit due to decreased   Oral intake; nephrology consult for input  9.  Hypernatremia; due to free water deficit; start D5W. Recheck at 1400  10.  Dvt prevention:  on eliquis; following if he can take orals  11. Disposition:  up to chair only today; no PT for ambulation        Hyacinth Gregorio MD  [facility]  Phone: #[phone]  Securely message with the Vocera Web Console (learn more here)  Text page via "[employer] Paging/Directory     Interval History/Subjective:  more appropriate this morning; I actually  Slept last night he states; on room air      Physical Exam/Objective:  Temp:  [96.2  F (35.7  C)-98.1  F (36.7  C)] 98  F (36.7  C)  Pulse:  [] 101  Resp:  [14-26] 17  BP: ()/() 94/58  SpO2:  [88 %-96 %] 93 %  Body mass index is 33.13 kg/m .    GENERAL:  Sedated on precedex; HR 80's; atrial fibrillation on tele; on RA   HEAD:  Normocephalic, without obvious abnormality, atraumatic   EYES:  PERRL, conjunctiva/corneas clear, no scleral icterus, EOM's intact   NOSE: Nares normal, septum midline, mucosa normal, no drainage   THROAT: Lips, mucosa, and tongue normal; teeth and gums normal, mouth moist   NECK: Supple, symmetrical, trachea midline   BACK:   Symmetric, no curvature, ROM normal   LUNGS:   Clear to auscultation bilaterally, no rales, rhonchi, or wheezing, symmetric chest rise on inhalation, respirations unlabored   CHEST WALL:  No tenderness or deformity   HEART:  Tachycardic, no murmurs   ABDOMEN:   Soft, non-tender, bowel sounds active all four quadrants, no masses, no organomegaly, no rebound or guarding   EXTREMITIES: Extremities normal,  atraumatic, no cyanosis or edema    SKIN: Dry to touch, no exanthems in the visualized areas   NEURO: Sedated;          Data reviewed today: I personally reviewed all new medications, labs, imaging/diagnostics reports over the past 24 hours. Pertinent findings include:    Imaging:   No results found for this or any previous visit (from the past 24 hour(s)).      Labs:      Medications:   Personally Reviewed.  Medications    amiodarone Stopped (11/14/23 2000)    dexmedeTOMIDine 0.2 mcg/kg/hr (11/15/23 0655)    dextrose      D5W 100 mL/hr at 11/15/23 0822    dilTIAZem Stopped (11/14/23 0835)    lactated ringers 50 mL/hr at 11/15/23 0400      apixaban ANTICOAGULANT  10 mg Oral BID    Followed by    [START ON 11/20/2023] apixaban ANTICOAGULANT  5 mg Oral BID    clonazePAM  0.5 mg Oral BID    escitalopram  20 mg Oral Daily with lunch    insulin aspart  1-6 Units Subcutaneous 4x Daily AC & HS    ipratropium - albuterol 0.5 mg/2.5 mg/3 mL  3 mL Nebulization Q8H    [Held by provider] loratadine  10 mg Oral Daily    predniSONE  40 mg Oral Daily    Followed by    [START ON 11/17/2023] predniSONE  30 mg Oral Daily    Followed by    [START ON 11/22/2023] predniSONE  20 mg Oral Daily    Followed by    [START ON 11/27/2023] predniSONE  10 mg Oral Daily    QUEtiapine  25 mg Oral BID    QUEtiapine  50 mg Oral At Bedtime    sodium chloride (PF)  10-40 mL Intracatheter Q7 Days    sodium chloride (PF)  3 mL Intracatheter Q8H    sulfamethoxazole-trimethoprim  1 tablet Oral Daily    [Held by provider] tamsulosin  0.4 mg Oral Daily    thiamine  100 mg Intravenous TID

## 2023-11-16 ENCOUNTER — APPOINTMENT (OUTPATIENT)
Dept: PHYSICAL THERAPY | Facility: CLINIC | Age: 58
End: 2023-11-16
Payer: COMMERCIAL

## 2023-11-16 DIAGNOSIS — R93.89 ABNORMAL CHEST CT: ICD-10-CM

## 2023-11-16 DIAGNOSIS — J96.01 ACUTE RESPIRATORY FAILURE WITH HYPOXIA (H): Primary | ICD-10-CM

## 2023-11-16 LAB
ANION GAP SERPL CALCULATED.3IONS-SCNC: 17 MMOL/L (ref 7–15)
ATRIAL RATE - MUSE: 133 BPM
BUN SERPL-MCNC: 32 MG/DL (ref 6–20)
CALCIUM SERPL-MCNC: 8.6 MG/DL (ref 8.6–10)
CHLORIDE SERPL-SCNC: 105 MMOL/L (ref 98–107)
CREAT SERPL-MCNC: 1.49 MG/DL (ref 0.67–1.17)
DEPRECATED HCO3 PLAS-SCNC: 17 MMOL/L (ref 22–29)
DIASTOLIC BLOOD PRESSURE - MUSE: NORMAL MMHG
EGFRCR SERPLBLD CKD-EPI 2021: 54 ML/MIN/1.73M2
ERYTHROCYTE [DISTWIDTH] IN BLOOD BY AUTOMATED COUNT: 15.9 % (ref 10–15)
GLUCOSE BLDC GLUCOMTR-MCNC: 110 MG/DL (ref 70–99)
GLUCOSE BLDC GLUCOMTR-MCNC: 137 MG/DL (ref 70–99)
GLUCOSE BLDC GLUCOMTR-MCNC: 140 MG/DL (ref 70–99)
GLUCOSE BLDC GLUCOMTR-MCNC: 150 MG/DL (ref 70–99)
GLUCOSE SERPL-MCNC: 123 MG/DL (ref 70–99)
HCT VFR BLD AUTO: 39.5 % (ref 40–53)
HGB BLD-MCNC: 12.4 G/DL (ref 13.3–17.7)
INTERPRETATION ECG - MUSE: NORMAL
MAGNESIUM SERPL-MCNC: 2 MG/DL (ref 1.7–2.3)
MAGNESIUM SERPL-MCNC: 2.1 MG/DL (ref 1.7–2.3)
MCH RBC QN AUTO: 28.3 PG (ref 26.5–33)
MCHC RBC AUTO-ENTMCNC: 31.4 G/DL (ref 31.5–36.5)
MCV RBC AUTO: 90 FL (ref 78–100)
P AXIS - MUSE: NORMAL DEGREES
PLATELET # BLD AUTO: 428 10E3/UL (ref 150–450)
POTASSIUM SERPL-SCNC: 3.3 MMOL/L (ref 3.4–5.3)
POTASSIUM SERPL-SCNC: 4.1 MMOL/L (ref 3.4–5.3)
PR INTERVAL - MUSE: NORMAL MS
QRS DURATION - MUSE: 78 MS
QT - MUSE: 320 MS
QTC - MUSE: 504 MS
R AXIS - MUSE: 39 DEGREES
RBC # BLD AUTO: 4.38 10E6/UL (ref 4.4–5.9)
SODIUM SERPL-SCNC: 139 MMOL/L (ref 135–145)
SYSTOLIC BLOOD PRESSURE - MUSE: NORMAL MMHG
T AXIS - MUSE: 113 DEGREES
VENTRICULAR RATE- MUSE: 149 BPM
WBC # BLD AUTO: 17.3 10E3/UL (ref 4–11)

## 2023-11-16 PROCEDURE — 250N000013 HC RX MED GY IP 250 OP 250 PS 637: Performed by: HOSPITALIST

## 2023-11-16 PROCEDURE — 94640 AIRWAY INHALATION TREATMENT: CPT

## 2023-11-16 PROCEDURE — 250N000009 HC RX 250: Performed by: INTERNAL MEDICINE

## 2023-11-16 PROCEDURE — 85027 COMPLETE CBC AUTOMATED: CPT | Performed by: EMERGENCY MEDICINE

## 2023-11-16 PROCEDURE — 250N000012 HC RX MED GY IP 250 OP 636 PS 637: Performed by: INTERNAL MEDICINE

## 2023-11-16 PROCEDURE — 80048 BASIC METABOLIC PNL TOTAL CA: CPT | Performed by: EMERGENCY MEDICINE

## 2023-11-16 PROCEDURE — 999N000157 HC STATISTIC RCP TIME EA 10 MIN

## 2023-11-16 PROCEDURE — 84132 ASSAY OF SERUM POTASSIUM: CPT | Performed by: EMERGENCY MEDICINE

## 2023-11-16 PROCEDURE — 99232 SBSQ HOSP IP/OBS MODERATE 35: CPT | Performed by: INTERNAL MEDICINE

## 2023-11-16 PROCEDURE — 83735 ASSAY OF MAGNESIUM: CPT | Performed by: EMERGENCY MEDICINE

## 2023-11-16 PROCEDURE — 250N000013 HC RX MED GY IP 250 OP 250 PS 637: Performed by: INTERNAL MEDICINE

## 2023-11-16 PROCEDURE — 250N000013 HC RX MED GY IP 250 OP 250 PS 637: Performed by: EMERGENCY MEDICINE

## 2023-11-16 PROCEDURE — 250N000011 HC RX IP 250 OP 636: Mod: JZ | Performed by: EMERGENCY MEDICINE

## 2023-11-16 PROCEDURE — 120N000013 HC R&B IMCU

## 2023-11-16 PROCEDURE — 258N000003 HC RX IP 258 OP 636: Performed by: INTERNAL MEDICINE

## 2023-11-16 PROCEDURE — 94640 AIRWAY INHALATION TREATMENT: CPT | Mod: 76

## 2023-11-16 PROCEDURE — 99233 SBSQ HOSP IP/OBS HIGH 50: CPT | Performed by: FAMILY MEDICINE

## 2023-11-16 PROCEDURE — 97116 GAIT TRAINING THERAPY: CPT | Mod: GP

## 2023-11-16 RX ORDER — MAGNESIUM SULFATE HEPTAHYDRATE 40 MG/ML
2 INJECTION, SOLUTION INTRAVENOUS ONCE
Status: COMPLETED | OUTPATIENT
Start: 2023-11-16 | End: 2023-11-16

## 2023-11-16 RX ORDER — POTASSIUM CHLORIDE 1500 MG/1
40 TABLET, EXTENDED RELEASE ORAL ONCE
Status: COMPLETED | OUTPATIENT
Start: 2023-11-16 | End: 2023-11-16

## 2023-11-16 RX ADMIN — IPRATROPIUM BROMIDE AND ALBUTEROL SULFATE 3 ML: .5; 3 SOLUTION RESPIRATORY (INHALATION) at 15:16

## 2023-11-16 RX ADMIN — APIXABAN 10 MG: 5 TABLET, FILM COATED ORAL at 09:55

## 2023-11-16 RX ADMIN — CLONAZEPAM 0.5 MG: 0.5 TABLET ORAL at 21:45

## 2023-11-16 RX ADMIN — LOPERAMIDE HYDROCHLORIDE 2 MG: 2 CAPSULE ORAL at 05:24

## 2023-11-16 RX ADMIN — IPRATROPIUM BROMIDE AND ALBUTEROL SULFATE 3 ML: .5; 3 SOLUTION RESPIRATORY (INHALATION) at 23:01

## 2023-11-16 RX ADMIN — THIAMINE HYDROCHLORIDE 100 MG: 100 INJECTION, SOLUTION INTRAMUSCULAR; INTRAVENOUS at 09:55

## 2023-11-16 RX ADMIN — DILTIAZEM HYDROCHLORIDE 60 MG: 30 TABLET, FILM COATED ORAL at 12:17

## 2023-11-16 RX ADMIN — AMIODARONE HYDROCHLORIDE 400 MG: 200 TABLET ORAL at 21:45

## 2023-11-16 RX ADMIN — THIAMINE HYDROCHLORIDE 100 MG: 100 INJECTION, SOLUTION INTRAMUSCULAR; INTRAVENOUS at 21:44

## 2023-11-16 RX ADMIN — QUETIAPINE FUMARATE 25 MG: 25 TABLET ORAL at 19:23

## 2023-11-16 RX ADMIN — QUETIAPINE FUMARATE 25 MG: 25 TABLET ORAL at 09:54

## 2023-11-16 RX ADMIN — SULFAMETHOXAZOLE AND TRIMETHOPRIM 1 TABLET: 400; 80 TABLET ORAL at 10:12

## 2023-11-16 RX ADMIN — MAGNESIUM SULFATE HEPTAHYDRATE 2 G: 40 INJECTION, SOLUTION INTRAVENOUS at 09:55

## 2023-11-16 RX ADMIN — DILTIAZEM HYDROCHLORIDE 60 MG: 30 TABLET, FILM COATED ORAL at 05:21

## 2023-11-16 RX ADMIN — PREDNISONE 40 MG: 20 TABLET ORAL at 09:55

## 2023-11-16 RX ADMIN — THIAMINE HYDROCHLORIDE 100 MG: 100 INJECTION, SOLUTION INTRAMUSCULAR; INTRAVENOUS at 14:02

## 2023-11-16 RX ADMIN — DEXTROSE MONOHYDRATE: 50 INJECTION, SOLUTION INTRAVENOUS at 19:23

## 2023-11-16 RX ADMIN — ESCITALOPRAM OXALATE 20 MG: 20 TABLET ORAL at 12:18

## 2023-11-16 RX ADMIN — ACETAMINOPHEN 650 MG: 325 TABLET ORAL at 17:48

## 2023-11-16 RX ADMIN — DILTIAZEM HYDROCHLORIDE 60 MG: 30 TABLET, FILM COATED ORAL at 17:48

## 2023-11-16 RX ADMIN — APIXABAN 10 MG: 5 TABLET, FILM COATED ORAL at 21:44

## 2023-11-16 RX ADMIN — AMIODARONE HYDROCHLORIDE 400 MG: 200 TABLET ORAL at 09:54

## 2023-11-16 RX ADMIN — IPRATROPIUM BROMIDE AND ALBUTEROL SULFATE 3 ML: .5; 3 SOLUTION RESPIRATORY (INHALATION) at 07:34

## 2023-11-16 RX ADMIN — POTASSIUM CHLORIDE 40 MEQ: 1500 TABLET, EXTENDED RELEASE ORAL at 08:23

## 2023-11-16 RX ADMIN — CLONAZEPAM 0.5 MG: 0.5 TABLET ORAL at 09:54

## 2023-11-16 RX ADMIN — QUETIAPINE FUMARATE 50 MG: 25 TABLET ORAL at 21:44

## 2023-11-16 ASSESSMENT — ACTIVITIES OF DAILY LIVING (ADL)
ADLS_ACUITY_SCORE: 42
ADLS_ACUITY_SCORE: 41
ADLS_ACUITY_SCORE: 37
ADLS_ACUITY_SCORE: 37
ADLS_ACUITY_SCORE: 42
ADLS_ACUITY_SCORE: 41
ADLS_ACUITY_SCORE: 42
ADLS_ACUITY_SCORE: 41
ADLS_ACUITY_SCORE: 35
ADLS_ACUITY_SCORE: 42

## 2023-11-16 NOTE — PROGRESS NOTES
" [facility]   [address], [place] [address]   [email or web address]   OFFICE: [phone]            Impression and Plan     1.  Atrial fibrillation.  Continue apixaban.  Continue diltiazem 60 mg every 6  & consider further increase to if needed.  Continue amiodarone 400 mg twice daily.    2.  Pulmonary embolism.  Echocardiogram 13 November 2023 revealed normal right ventricular size and systolic performance.  Continue apixaban as per problem #1.    3. Acute respiratory failure due to pneumonitis, history of asthma, delirium, history of melanoma: Please see primary team management for details.     Primary Cardiologist: Dr. Olvin Strange     Subjective     Breathing relatively comfortable this morning.    Cardiac Diagnostics   Telemetry (personally reviewed): Atrial fibrillation with heart rate 100-110 bpm presently.    Echocardiogram 13 November 2023:  Left ventricular size, wall motion and function are normal. The ejection fraction is > 65%.  There is mild to moderate concentric left ventricular hypertrophy.  Normal right ventricle size and systolic function.  No hemodynamically significant valvular abnormalities on 2D or color flow imaging.  Ascending Aorta moderate dilatation is present.  he study was technically difficult.  Compared to the prior study dated November 2023, the RV EF is improved.    Physical Examination       BP (!) 146/87   Pulse 112   Temp 98.2  F (36.8  C) (Oral)   Resp 23   Ht 1.93 m (6' 4\")   Wt 124.7 kg (275 lb)   SpO2 94%   BMI 33.47 kg/m          Vitals:    11/12/23 0700 11/13/23 0359 11/14/23 0524 11/15/23 0200   Weight: 131.5 kg (290 lb) 125.7 kg (277 lb 1.6 oz) 122.5 kg (270 lb) 123.5 kg (272 lb 3.2 oz)    11/16/23 0607   Weight: 124.7 kg (275 lb)            Intake/Output Summary (Last 24 hours) at 11/16/2023 0722  Last data filed at 11/16/2023 0616  Gross per 24 hour   Intake 1049.21 ml   Output 1600 ml   Net -550.79 ml "       The patient is alert and oriented times three. Sclerae are anicteric. Mucosal membranes are moist. Jugular venous pressure is normal. No significant adenopathy/thyromegally appreciated. Lungs are fairly clear with reasonable expansion. On cardiovascular exam, the patient has irregular S1 and S2. Abdomen is soft and non-tender. Extremities reveal no clubbing, cyanosis.         Imaging     CT scan 13 November 2023:  Several thin short segment pulmonary emboli have developed in the posterior and lateral basilar left lower lobe pulmonary artery arteries. No signs of right heart strain.   Multifocal pneumonia, right greater than left, has improved minimally.  Moderate elevation right hemidiaphragm, mild elevation left hemidiaphragm and the associated platelike atelectasis throughout the paradiaphragmatic right and left lower lung parenchyma unchanged.    Lab Results     Recent Labs   Lab 11/16/23  0615 11/15/23  2121 11/15/23  1714 11/15/23  1557 11/15/23  1421 11/15/23  1157 11/15/23  0930 11/15/23  0739 11/15/23  0405 11/14/23  0749 11/14/23  0339 11/14/23  0332   WBC 17.3*  --   --   --   --   --   --   --  12.5*  --   --  12.3*   HGB 12.4*  --   --   --   --   --   --   --  10.7*  --   --  11.3*   MCV 90  --   --   --   --   --   --   --  92  --   --  90     --   --   --   --   --   --   --  307  --   --  359     --   --   --  131*  --   --   --  146*  --  143  --    POTASSIUM 3.3*  --   --  4.3  --   --  3.3*  --  3.3*  --  3.6  --    CHLORIDE 105  --   --   --   --   --   --   --  112*  --  109*  --    CO2 17*  --   --   --   --   --   --   --  25  --  25  --    BUN 32.0*  --   --   --   --   --   --   --  40.6*  --  36.5*  --    CR 1.49*  --   --   --   --   --   --   --  1.47*  --  1.39*  --    ANIONGAP 17*  --   --   --   --   --   --   --  9  --  9  --    MELISSA 8.6  --   --   --   --   --   --   --  8.9  --  9.3  --    * 162* 133*  --   --    < >  --    < > 101*   < > 169*  --    ALBUMIN  " --   --   --   --   --   --   --   --   --   --  3.3*  --    PROTTOTAL  --   --   --   --   --   --   --   --   --   --  5.7*  --    BILITOTAL  --   --   --   --   --   --   --   --   --   --  0.5  --    ALKPHOS  --   --   --   --   --   --   --   --   --   --  133*  --    ALT  --   --   --   --   --   --   --   --   --   --  21  --    AST  --   --   --   --   --   --   --   --   --   --  9  --     < > = values in this interval not displayed.     Recent Labs   Lab Test 11/09/23  0504   NTBNPI 3,054*     No lab results found in last 7 days.    Invalid input(s): \"LDLCALC\"  Lab Results   Component Value Date     11/16/2023    CO2 17 11/16/2023    CO2 21 11/10/2021    BUN 32.0 11/16/2023    BUN 14 11/10/2021     Lab Results   Component Value Date    WBC 17.3 11/16/2023    HGB 12.4 11/16/2023    HCT 39.5 11/16/2023    MCV 90 11/16/2023     11/16/2023     Lab Results   Component Value Date    CHOL 189 04/11/2023    TRIG 193 04/11/2023    HDL 54 04/11/2023     No results found for: \"INR\"  No results found for: \"CKTOTAL\", \"CKMB\", \"TROPONINI\"  Lab Results   Component Value Date    TSH 0.13 11/09/2023           Current Inpatient Scheduled Medications   Scheduled Meds:   amiodarone  400 mg Oral BID    apixaban ANTICOAGULANT  10 mg Oral BID    Followed by    [START ON 11/20/2023] apixaban ANTICOAGULANT  5 mg Oral BID    clonazePAM  0.5 mg Oral BID    diltiazem  60 mg Oral Q6H UMM    escitalopram  20 mg Oral Daily with lunch    insulin aspart  1-6 Units Subcutaneous 4x Daily AC & HS    ipratropium - albuterol 0.5 mg/2.5 mg/3 mL  3 mL Nebulization Q8H    predniSONE  40 mg Oral Daily    Followed by    [START ON 11/17/2023] predniSONE  30 mg Oral Daily    Followed by    [START ON 11/22/2023] predniSONE  20 mg Oral Daily    Followed by    [START ON 11/27/2023] predniSONE  10 mg Oral Daily    QUEtiapine  25 mg Oral BID    QUEtiapine  50 mg Oral At Bedtime    sodium chloride (PF)  10-40 mL Intracatheter Q7 Days    sodium " chloride (PF)  3 mL Intracatheter Q8H    sulfamethoxazole-trimethoprim  1 tablet Oral Daily    [Held by provider] tamsulosin  0.4 mg Oral Daily    thiamine  100 mg Intravenous TID     Continuous Infusions:   dexmedeTOMIDine Stopped (11/15/23 1231)    dextrose      D5W 50 mL/hr at 11/15/23 2246            Medications Prior to Admission   Prior to Admission medications    Medication Sig Start Date End Date Taking? Authorizing Provider   acetaminophen (TYLENOL) 325 MG tablet Take 325-650 mg by mouth every 6 hours as needed for mild pain   Yes Unknown, Entered By History   albuterol (PROAIR HFA/PROVENTIL HFA/VENTOLIN HFA) 108 (90 Base) MCG/ACT inhaler Inhale 2 puffs into the lungs every 6 hours as needed 4/11/23  Yes Luis Alberto Fatima MD   clonazePAM (KLONOPIN) 0.5 MG tablet Take 0.5-1 mg by mouth daily as needed for anxiety 4/23/22  Yes Reported, Patient   escitalopram (LEXAPRO) 20 MG tablet Take 20 mg by mouth daily (with lunch)   Yes Unknown, Entered By History   fluticasone (FLOVENT HFA) 110 MCG/ACT inhaler Inhale 1 puff into the lungs 2 times daily 4/3/23  Yes Luis Alberto Fatima MD   guaiFENesin (ROBITUSSIN) 20 mg/mL liquid Take 5 mLs (100 mg) by mouth every 4 hours as needed for cough 10/25/23  Yes Ezra Lee MD   ibuprofen (ADVIL,MOTRIN) 400 MG tablet Take 400 mg by mouth every 6 hours as needed for moderate pain 5/23/18  Yes Provider, Historical   indomethacin (INDOCIN) 50 MG capsule Take 50 mg by mouth 3 times daily as needed for other (for gout flair) 3/24/23  Yes Reported, Patient   loperamide HCl (IMODIUM A-D ORAL) Take 2 mg by mouth 4 times daily as needed (diarrhea) 5/23/18  Yes Provider, Historical   loratadine (CLARITIN) 10 mg tablet Take 10 mg by mouth daily as needed for allergies 5/23/18  Yes Provider, Historical   mirtazapine (REMERON) 15 MG tablet Take 15 mg by mouth at bedtime 8/11/23  Yes Reported, Patient   tadalafil (CIALIS) 5 MG tablet [TADALAFIL (CIALIS) 5 MG TABLET] Take 1 tablet (5 mg  total) by mouth daily as needed for erectile dysfunction. 10/26/18  Yes Kel Edmonds MD

## 2023-11-16 NOTE — PROGRESS NOTES
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  Patient is immunocompromised host.  Treated with  Zosyn and Zithromax and micafungin for yeast coverage.  BAL 11/3 revealed no growth.  Developed ARDS/sepsis on 11/4.  Required high flow oxygen, intermittent BiPAP and then intubated on 11/4. Extubated on 11/8.  Not requiring oxygen at present.  Follow-up CT scan revealed extensive bilateral pulmonary infiltrate.  Very small pulmonary emboli on 11/13.  Concerning for chemotherapy-induced pneumonitis.  On tapered dose of oral steroid.  Started on anticoagulation treatment.  Develops A-fib with RVR.  Started on diltiazem 60 mg every 6 hours, amiodarone 400 mg twice a day.  Developed acute kidney injury with creatinine peaked at 2.1-->1.49, baseline 0.8-0.9 in the setting of shock.  Clinically improving.  Anticipated discharge in 2 days    Consult pulmonary ICU, cardiology, and infectious disease, nephrology, oncology, psychiatry    Assessment and Plan:  A-fib with RVR  Diltiazem 60 mg every 6 hours  Amiodarone 400 mg twice a day  Anticoagulation started with Eliqutico Rubio cardiology consult  Echocardiogram-EF 65%, mild to moderate left ventricular hypertrophy    Acute hypoxic respiratory failure  Chemotherapy induced pneumonitis  Chest CT-extensive bilateral pulmonary infiltrate  Treated with IV Zosyn, Zithromax, micafungin  Status post bronchoscopy  Required intubation 11/4 then extubated on 11/8  O2 is able to wean off  Status post high dose of IV steroid--> taper dose of oral steroid  Evaluated by pulmonology  Outpatient chest CT, pulmonary function  "test in 6 to 8 weeks    Small pulmonary embolism  On chronic anticoagulation treatment with Eliquis    Cutaneous melanoma   Seeing [place] [facility]  On Keytruda    Acute encephalopathy//delirium, resolved    Acute kidney injury, improved creatinine 2.1--> 1.49    Hypernatremia, resolved    Anemia, hemoglobin 10.7, continue monitoring    Moderate obesity  Modification of lifestyle for weight loss    Obstructive sleep apnea  Outpatient sleep study     Code full  DVT prophylaxis  On chronic anticoagulation treatment  Barrier to discharge  Awaiting for more heart rate control.  Anticipated discharge in 2 days     Clinically Significant Risk Factors        # Hypokalemia: Lowest K = 3.3 mmol/L in last 2 days, will replace as needed  # Hypernatremia: Highest Na = 146 mmol/L in last 2 days, will monitor as appropriate      # Hypoalbuminemia: Lowest albumin = 2.7 g/dL at 11/3/2023  5:57 AM, will monitor as appropriate            # Obesity: Estimated body mass index is 33.47 kg/m  as calculated from the following:    Height as of this encounter: 1.93 m (6' 4\").    Weight as of this encounter: 124.7 kg (275 lb).      # Financial/Environmental Concerns: none  # Asthma: noted on problem list          Martha De Paz MD  [facility] Medicine  [facility]  Phone: #[phone]  Securely message with the Vocera Web Console (learn more here)  Text page via [employer] Paging/Directory     Interval History/Subjective:  Resting comfortably.  Heart rate is still in higher side especially with activity.  Not requiring oxygen.  Denies headache, chest pain, breathing difficulty, palpitation, nausea, vomiting, abdominal pain or urinary symptoms.    Physical Exam/Objective:  Temp:  [97.5  F (36.4  C)-98.5  F (36.9  C)] 97.9  F (36.6  C)  Pulse:  [104-128] 104  Resp:  [23-32] 23  BP: (116-146)/(71-88) 116/71  SpO2:  [92 %-96 %] 92 %  Body mass index is 33.47 kg/m .    GENERAL:  Alert, appears comfortable, in no " acute distress, appears stated age   HEAD:  Normocephalic, without obvious abnormality, atraumatic   EYES:  PERRL, conjunctiva  clear,  EOM's intact   NOSE: Nares normal,  mucosa normal, no drainage   THROAT: Lips, mucosa,   gums normal, mouth moist   NECK: Supple, symmetrical, trachea midline   BACK:   Symmetric, no curvature, ROM normal   LUNGS:   Clear to auscultation bilaterally, no rales, rhonchi, or wheezing, symmetric chest rise on inhalation, respirations unlabored   CHEST WALL:  No tenderness or deformity   HEART:  Irregular rhythm, tachycardia, S1 and S2 normal, no murmur     ABDOMEN:   Soft, non-tender, bowel sounds active , no masses, no organomegaly, no rebound or guarding   EXTREMITIES: No edema    SKIN: No exanthems in the visualized areas   NEURO: Alert, oriented x3, moves all four extremities freely   PSYCH: Cooperative, behavior is appropriate      Data reviewed today: I personally reviewed all new medications, labs, imaging/diagnostics reports over the past 24 hours. Pertinent findings include:    Imaging:   Chest ct  1.  Several thin short segment pulmonary emboli have developed in the posterior and lateral basilar left lower lobe pulmonary artery arteries. No signs of right heart strain.   2.  Multifocal pneumonia, right greater than left, has improved minimally.  3.  Moderate elevation right hemidiaphragm, mild elevation left hemidiaphragm and the associated platelike atelectasis throughout the paradiaphragmatic right and left lower lung parenchyma unchanged.    Echocardiogram  1.Left ventricular size, wall motion and function are normal. The ejection  fraction is > 65%.  2.There is mild to moderate concentric left ventricular hypertrophy.  3.Normal right ventricle size and systolic function.  4.No hemodynamically significant valvular abnormalities on 2D or color flow  imaging.  5.Ascending Aorta moderate dilatation is present.    Labs:  Most Recent 3 CBC's:  Recent Labs   Lab Test  11/16/23  0615 11/15/23  0405 11/14/23  0332   WBC 17.3* 12.5* 12.3*   HGB 12.4* 10.7* 11.3*   MCV 90 92 90    307 359     Most Recent 3 BMP's:  Recent Labs   Lab Test 11/16/23  1224 11/16/23  1210 11/16/23  0747 11/16/23  0615 11/15/23  1714 11/15/23  1557 11/15/23  1421 11/15/23  0739 11/15/23  0405 11/14/23  0749 11/14/23  0339   NA  --   --   --  139  --   --  131*  --  146*  --  143   POTASSIUM 4.1  --   --  3.3*  --  4.3  --    < > 3.3*  --  3.6   CHLORIDE  --   --   --  105  --   --   --   --  112*  --  109*   CO2  --   --   --  17*  --   --   --   --  25  --  25   BUN  --   --   --  32.0*  --   --   --   --  40.6*  --  36.5*   CR  --   --   --  1.49*  --   --   --   --  1.47*  --  1.39*   ANIONGAP  --   --   --  17*  --   --   --   --  9  --  9   MELISSA  --   --   --  8.6  --   --   --   --  8.9  --  9.3   GLC  --  137* 110* 123*   < >  --   --    < > 101*   < > 169*    < > = values in this interval not displayed.       Medications:   Personally Reviewed.  Medications    dexmedeTOMIDine Stopped (11/15/23 1231)    dextrose      D5W 50 mL/hr at 11/16/23 0800      amiodarone  400 mg Oral BID    apixaban ANTICOAGULANT  10 mg Oral BID    Followed by    [START ON 11/20/2023] apixaban ANTICOAGULANT  5 mg Oral BID    clonazePAM  0.5 mg Oral BID    diltiazem  60 mg Oral Q6H UMM    escitalopram  20 mg Oral Daily with lunch    insulin aspart  1-6 Units Subcutaneous 4x Daily AC & HS    ipratropium - albuterol 0.5 mg/2.5 mg/3 mL  3 mL Nebulization Q8H    [START ON 11/17/2023] predniSONE  30 mg Oral Daily    Followed by    [START ON 11/22/2023] predniSONE  20 mg Oral Daily    Followed by    [START ON 11/27/2023] predniSONE  10 mg Oral Daily    QUEtiapine  25 mg Oral BID    QUEtiapine  50 mg Oral At Bedtime    sodium chloride (PF)  10-40 mL Intracatheter Q7 Days    sodium chloride (PF)  3 mL Intracatheter Q8H    sulfamethoxazole-trimethoprim  1 tablet Oral Daily    [Held by provider] tamsulosin  0.4 mg Oral Daily     thiamine  100 mg Intravenous TID      Total time spent 50 min

## 2023-11-16 NOTE — PROGRESS NOTES
Dose on patient's heart medications doubled this morning, and patient's resting heart rate is down to 80s/90s this afternoon after sitting in the 110s/120s overnight and earlier this morning. BP is also stabilizing around 120/80 and VSS otherwise. A&O x4, was able to walk outside of room and back with PT this morning. Lamas catheter was removed this afternoon and patient was able to ambulate to toilet with walker and assist of one. Patient making steady improvement.

## 2023-11-16 NOTE — PROGRESS NOTES
"    RENAL PROGRESS NOTE    CC:  Hypernatremia     ASSESSMENT & PLAN:     Hypernatremia - Waxing and waning issue this admit in the setting of free water deficit, Na+ as high as 152 which had improved with transition to hypotonic maintenance IVF, some over-correction noted and now improved on IV D5W at 50 ml/hour. Very little oral intake documented, support with IVF for now until PO intake improves.      MIKKI - Baseline Cr 0.8-0.9 with preserved eGFR; Cr normal on admission then sustained multifactorial MIKKI with Cr peak at 2.1 in the setting of vasoplegic shock 2/2 sedation, hemodynamic instability, attempts at diuresis, and iodated contrast exposure 11/2/23. Cr has improved since then but stalled ~1.4-1.5. Follow with IVF and manage expectantly.      Acute hypoxic respiratory failure - Multifactorial in the setting of multifocal pneumonia and pembrolizumab-related pneumonitis and new diagnosis of PE 11/13; initially required mechanical ventilation and extubated 11/8; on steroid taper and currently off antibiotics; pulmonology is actively following      Hypokalemia - replacing      Left pulmonary embolism - noted on chest CTA 11/13/23 (not noted previously on CTA 11/2/23); currently on apixaban      Atrial fibrillation - Challenging to control this admit, now converted to oral diltiazem and amiodarone and on apixaban; cardiology following      History of multiple myeloma - Previously on Keytruda, seen by heme/onc this admit      Anemia - Hgb stable in 10-11's. Transfusion per primary service.     Delirium/psychosis - thought to be steroid-induced, much improved     Electrolytes stable, nephrology will sign off. Please re-consult with additional questions/concerns      SUBJECTIVE:  Seen at bedside, feels \"lousy\" after poor sleep last night and anxiety over current medical situation but able to make jokes during visit. No breathing concerns.     OBJECTIVE:  Physical Exam   Temp: 98.5  F (36.9  C) Temp src: Oral BP: " 138/88 Pulse: 108   Resp: 23 SpO2: 93 % O2 Device: None (Room air)    Vitals:    11/14/23 0524 11/15/23 0200 11/16/23 0607   Weight: 122.5 kg (270 lb) 123.5 kg (272 lb 3.2 oz) 124.7 kg (275 lb)     Vital Signs with Ranges  Temp:  [97.5  F (36.4  C)-98.5  F (36.9  C)] 98.5  F (36.9  C)  Pulse:  [108-128] 108  Resp:  [18-32] 23  BP: (115-146)/(77-91) 138/88  SpO2:  [93 %-96 %] 93 %  I/O last 3 completed shifts:  In: 1049.21 [P.O.:120; I.V.:929.21]  Out: 1600 [Urine:1600]    @TMAXR(24)@    Patient Vitals for the past 72 hrs:   Weight   11/16/23 0607 124.7 kg (275 lb)   11/15/23 0200 123.5 kg (272 lb 3.2 oz)   11/14/23 0524 122.5 kg (270 lb)     Intake/Output Summary (Last 24 hours) at 11/16/2023 1009  Last data filed at 11/16/2023 0700  Gross per 24 hour   Intake 540.68 ml   Output 1550 ml   Net -1009.32 ml       PHYSICAL EXAM:  General - Alert and oriented x3, appears comfortable, NAD  Cardiovascular - Tachycardic, 's at rest, normotensive   Respiratory - Clear to auscultation bilaterally, no crackles or wheezes  Abd: BS present, no guarding or pain with palpation, no ascites  Extremities - No lower extremity edema bilaterally  Skin: dry, intact, no rash, good turgor  Neuro:  Grossly intact, no focal deficits  MSK:  Grossly intact  Psych:  Normal affect    LABORATORY STUDIES:     Recent Labs   Lab 11/16/23  0615 11/15/23  0405 11/14/23  0332 11/13/23  0919 11/12/23  0355 11/11/23  0533   WBC 17.3* 12.5* 12.3* 11.0 14.4* 12.0*   RBC 4.38* 3.75* 3.97* 4.02* 3.63* 3.40*   HGB 12.4* 10.7* 11.3* 11.6* 10.3* 9.6*   HCT 39.5* 34.5* 35.6* 36.6* 33.0* 31.4*    307 359 422 493* 479*       Basic Metabolic Panel:  Recent Labs   Lab 11/16/23  0747 11/16/23  0615 11/15/23  2121 11/15/23  1714 11/15/23  1557 11/15/23  1421 11/15/23  1157 11/15/23  0930 11/15/23  0739 11/15/23  0405 11/14/23  0749 11/14/23  0339 11/13/23  1209 11/13/23  0919 11/12/23  0820 11/12/23  0355 11/11/23  0758 11/11/23  0533   NA  --  139  --    --   --  131*  --   --   --  146*  --  143  --  143  --  143  --  149*   POTASSIUM  --  3.3*  --   --  4.3  --   --  3.3*  --  3.3*  --  3.6  --  4.1  --  4.0  --  4.2   CHLORIDE  --  105  --   --   --   --   --   --   --  112*  --  109*  --  107  --  108*  --  113*   CO2  --  17*  --   --   --   --   --   --   --  25  --  25  --  25  --  25  --  26   BUN  --  32.0*  --   --   --   --   --   --   --  40.6*  --  36.5*  --  36.9*  --  44.1*  --  57.2*   CR  --  1.49*  --   --   --   --   --   --   --  1.47*  --  1.39*  --  1.30*  --  1.44*  --  1.62*   * 123* 162* 133*  --   --  119*  --  80 101*   < > 169*   < > 157*   < > 137*   < > 172*   MELISSA  --  8.6  --   --   --   --   --   --   --  8.9  --  9.3  --  9.4  --  8.7  --  8.7    < > = values in this interval not displayed.       INRNo lab results found in last 7 days.     Recent Labs   Lab Test 11/16/23  0615 11/15/23  0405   WBC 17.3* 12.5*   HGB 12.4* 10.7*    307       Personally reviewed current labs      Janet Vasquez PA-C   [facility]  [phone]

## 2023-11-16 NOTE — PROGRESS NOTES
Oncology Chart Check:    DOS:  11/16/2023    Chart reviewed. Continue prednisone taper and monitor respiratory status closely.  Cardiac and nephrology following.  Patient had an appointment scheduled in our offices for tomorrow, which have now been cancelled.  We will schedule a post-hospitalization follow up with Dr. Block closer to the time  of hospital discharge.     Neela Nick, RN, MS, CNP  [facility] [place] [facility]  [phone]  Cell:  [phone] (Tuesday - Friday, 8:30 am to 5 pm)

## 2023-11-16 NOTE — PROGRESS NOTES
"/71 (BP Location: Left arm)   Pulse 90   Temp 97.9  F (36.6  C) (Oral)   Resp 23   Ht 1.93 m (6' 4\")   Wt 124.7 kg (275 lb)   SpO2 92%   BMI 33.47 kg/m      Pt was on RA when last seen. Pt had diminished BS pre and post neb. Pt had a non productive cough. Pt received 2 duonebs from me today. RT will continue to follow.   "

## 2023-11-16 NOTE — CONSULTS
Psychiatry Consultation; Follow up              Reason for Consult, requesting source:    Follow up    Requesting source: Hyacinth Darline    Labs and imaging reviewed.     Total time spent in chart review, patient interview and coordination of care: 60 minutes              Interim history:    Psychiatry following up with patient.    58 year old male into [facility] on 11/2/2023 for acute  Respiratory failure requiring intubation and proning.  Imaging showed  Dense right sided pneumonitis.  Pt was extubated on 11/8.      Per provider note from 11/14:   Delirium began for him on 11/9.  He does poorly at night.  Last night  He required precedex despite seroquel and IM zyprexa.    Today, patient reports that he feels much more clear and has not been experiencing hallucinations or any confusion. However, he does report that he was hallucinating a few days ago. Patient denies feeling restless or agitated and slept well last evening. Patient denies SI/SIB or other safety concerns.           Current Medications:      amiodarone  200 mg Oral BID    apixaban ANTICOAGULANT  10 mg Oral BID    Followed by    [START ON 11/20/2023] apixaban ANTICOAGULANT  5 mg Oral BID    clonazePAM  0.5 mg Oral BID    diltiazem  30 mg Oral Q6H UMM    escitalopram  20 mg Oral Daily with lunch    insulin aspart  1-6 Units Subcutaneous 4x Daily AC & HS    ipratropium - albuterol 0.5 mg/2.5 mg/3 mL  3 mL Nebulization Q8H    predniSONE  40 mg Oral Daily    Followed by    [START ON 11/17/2023] predniSONE  30 mg Oral Daily    Followed by    [START ON 11/22/2023] predniSONE  20 mg Oral Daily    Followed by    [START ON 11/27/2023] predniSONE  10 mg Oral Daily    QUEtiapine  25 mg Oral BID    QUEtiapine  50 mg Oral At Bedtime    sodium chloride (PF)  10-40 mL Intracatheter Q7 Days    sodium chloride (PF)  3 mL Intracatheter Q8H    sulfamethoxazole-trimethoprim  1 tablet Oral Daily    [Held by provider] tamsulosin  0.4 mg Oral Daily    thiamine  100  "mg Intravenous TID              MSE:   Appearance: awake, alert  Attitude:  cooperative  Eye Contact:  fair  Mood:  better  Affect:  mood congruent  Speech:  clear, coherent  Psychomotor Behavior:  no evidence of tardive dyskinesia, dystonia, or tics  Muscle strength and tone: Appears average  Thought Process:  linear  Associations:  no loose associations  Thought Content:  no evidence of suicidal ideation or homicidal ideation and no evidence of psychotic thought  Insight:  fair  Judgement:  fair  Oriented to:  time, person, and place  Attention Span and Concentration:  intact  Recent and Remote Memory:  fair; some poor memory regarding his hospital stay    Vital signs:  Temp: 97.5  F (36.4  C) Temp src: Oral BP: 139/83 Pulse: (!) 128   Resp: 25 SpO2: 96 % O2 Device: None (Room air) Oxygen Delivery: 2 LPM Height: 193 cm (6' 4\") (per family) Weight: 123.5 kg (272 lb 3.2 oz)  Estimated body mass index is 33.13 kg/m  as calculated from the following:    Height as of this encounter: 1.93 m (6' 4\").    Weight as of this encounter: 123.5 kg (272 lb 3.2 oz).    Qtc: 406 on 11/12/2023         DSM-5 Diagnosis:   Neurocognitive Disorder, delirium, resolving but continued risk  Unspecified Depressive Disorder, by history  Generalized Anxiety Disorder, by history          Assessment:   Patient was calm and pleasant during our meeting. He was able to track conversation appropriately and answered questions appropriately. There did not appear to be any confusion or acute delirium at the time of my assessment. However, delirium can wax and wane, becoming worse at certain points of the day. Typically, we would want to avoid benzodiazepines, but Klonopin is a PTA medication for patient, so will not discontinue this medication.             Summary of Recommendations:   Continue Lexapro 20 mg daily for depression and anxiety    Can continue Seroquel 25 mg BID and 50 mg at HS    Delirium precautions:  Up during the day with lights " on  Lights off at night, avoid interruptions during the night as much as possible  Family visits  Encourage wearing glasses  Reorientation  Avoid opioids, benzodiazepines, anticholinergics.    Continue to ensure proper nutrition, fluid and electrolyte balance. Monitor for infections, hypoxia, metabolic derangements, or other causes of delirium.         Page me or re-consult psychiatry as needed (psychiatry is signing off).       Carrie Orlando, EDDIE, RABIA  Consult/Liaison Psychiatry  [facility]   Contact information available via [facility] Paging/Directory.  If I am not available, please call [facility] intake ([phone])

## 2023-11-16 NOTE — PROGRESS NOTES
PULMONARY MEDICINE PROGRESS NOTE  11/16/2023    Admit Date: 11/2/2023  CODE: Full Code    Reason for Consult: acute respiratory failure with hypoxia, abnormal chest CT    Assessment/Plan:   58 year old male with a history of melanoma previously on pembrolizumab last given September 2023 with ongoing pneumonitis presented with acute hypoxic respiratory failure requiring intubation in the setting of elevated inflammatory markers but culture negative and minimal improvement despite antibiotics extubated 11/8 with acute encephalopathy requiring intermittent dexmedetomidine.    Acute respiratory failure with hypoxia, pulmonary embolism, abnormal chest CT: Extensive bilateral pulmonary infiltrates. Very small pulmonary emboli on CT 11/13. Concern for pembolizumab-induced pneumonitis. No infectious etiology identified. Was intubated, required prone positioning, now extubated, on room air. Experiencing intermittent delirium, possibly exacerbated by steroids.    Plan:  - prednisone taper ordered  - continue indefinite anticoagulation unless absolute contraindication develops  - avoid pembrolizumab in future  - PT/OT, appears to have some degree of critical-illness related PTSD in conversation with him  - will schedule chest CT, PFT, and pulmonary clinic follow-up in 6-8 weeks  - will sign off but please call if needed    Kel Page MD  Pulmonary and Critical Care Medicine  [facility]  [place]  Office [phone]  Pager [phone]  he/him                                                                                                                                                        SUBJECTIVE/INTERVAL HISTORY   Did not sleep well, some stomach cramps. Having some depression and anxiety around his lack of memory of his critical illness. Breathing feels stable.                                                                                                                                              "         Exam/Data:     Vitals  /88 (BP Location: Left arm)   Pulse 108   Temp 98.5  F (36.9  C) (Oral)   Resp 23   Ht 1.93 m (6' 4\")   Wt 124.7 kg (275 lb)   SpO2 93%   BMI 33.47 kg/m    BP - Mean:  [] 107  I/O last 3 completed shifts:  In: 1049.21 [P.O.:120; I.V.:929.21]  Out: 1600 [Urine:1600]  Weight change: 1.27 kg (2 lb 12.8 oz)  [unfilled]  EXAM:  /88 (BP Location: Left arm)   Pulse 108   Temp 98.5  F (36.9  C) (Oral)   Resp 23   Ht 1.93 m (6' 4\")   Wt 124.7 kg (275 lb)   SpO2 93%   BMI 33.47 kg/m      Intake/Output last 3 shifts:  I/O last 3 completed shifts:  In: 1049.21 [P.O.:120; I.V.:929.21]  Out: 1600 [Urine:1600]  Intake/Output this shift:  I/O this shift:  In: -   Out: 50 [Urine:50]    Physical Exam  Gen: alert, oriented, no distress  HEENT: NT, no DAVID  CV: tachycardic, irregular, no m/g/r  Resp: CTAB  Abd: soft, nontender, BS+  Skin: no rashes or lesions  Ext: mild pitting edema of legs  Neuro: PERRL, nonfocal exam, anxiety    ROS: A complete 10-system review of systems was obtained and is negative with the exception of what is noted in the history of present illness.    Medications:      dexmedeTOMIDine Stopped (11/15/23 1231)    dextrose      D5W 50 mL/hr at 11/15/23 2246      amiodarone  400 mg Oral BID    apixaban ANTICOAGULANT  10 mg Oral BID    Followed by    [START ON 11/20/2023] apixaban ANTICOAGULANT  5 mg Oral BID    clonazePAM  0.5 mg Oral BID    diltiazem  60 mg Oral Q6H UMM    escitalopram  20 mg Oral Daily with lunch    insulin aspart  1-6 Units Subcutaneous 4x Daily AC & HS    ipratropium - albuterol 0.5 mg/2.5 mg/3 mL  3 mL Nebulization Q8H    magnesium sulfate  2 g Intravenous Once    [START ON 11/17/2023] predniSONE  30 mg Oral Daily    Followed by    [START ON 11/22/2023] predniSONE  20 mg Oral Daily    Followed by    [START ON 11/27/2023] predniSONE  10 mg Oral Daily    QUEtiapine  25 mg Oral BID    QUEtiapine  50 mg Oral At Bedtime    sodium " chloride (PF)  10-40 mL Intracatheter Q7 Days    sodium chloride (PF)  3 mL Intracatheter Q8H    sulfamethoxazole-trimethoprim  1 tablet Oral Daily    [Held by provider] tamsulosin  0.4 mg Oral Daily    thiamine  100 mg Intravenous TID         DATA  All laboratory and radiology has been personally reviewed by myself today.  Recent Labs   Lab 11/16/23  0615   WBC 17.3*   HGB 12.4*   HCT 39.5*        Recent Labs   Lab 11/16/23  0615 11/15/23  1421 11/15/23  0405 11/14/23  0339    131* 146* 143   CO2 17*  --  25 25   BUN 32.0*  --  40.6* 36.5*   ALKPHOS  --   --   --  133*   ALT  --   --   --  21   AST  --   --   --  9         Imaging:  Chest CTA (11/13/23):  - images directly reviewed, formal interpretation follows:  FINDINGS:  ANGIOGRAM CHEST: Several thin short segment pulmonary emboli have developed in the posterior and lateral basilar left lower lobe pulmonary artery arteries (image 136 axial series 6). Pulmonary arteries are normal caliber and there are no signs of right   heart strain. Normal caliber thoracic aorta with no CTA signs of acute aortic syndrome.     LUNGS AND PLEURA: Moderate elevation right hemidiaphragm, mild elevation left hemidiaphragm and the associated platelike atelectasis throughout the paradiaphragmatic right and left lower lung parenchyma unchanged.     Peribronchovascular distribution consolidation, volume loss and interlobular septal thickening throughout the mid and upper right lung and scattered foci of groundglass density opacity throughout the left lung have decreased slightly. No pleural effusion   or pneumothorax.     MEDIASTINUM/AXILLAE: Heart size normal. No pericardial effusion. No lymphadenopathy.     CORONARY ARTERY CALCIFICATION: Mild.     UPPER ABDOMEN: Unremarkable.     MUSCULOSKELETAL: Unremarkable.                                                                      IMPRESSION:  1.  Several thin short segment pulmonary emboli have developed in the posterior  and lateral basilar left lower lobe pulmonary artery arteries. No signs of right heart strain.   2.  Multifocal pneumonia, right greater than left, has improved minimally.  3.  Moderate elevation right hemidiaphragm, mild elevation left hemidiaphragm and the associated platelike atelectasis throughout the paradiaphragmatic right and left lower lung parenchyma unchanged.    Pulmonary Function Testing  none

## 2023-11-16 NOTE — PROGRESS NOTES
Patient received neb treatment per MD order. Pt educated with the explanation of the medication they received. Patient showed understanding with verbalization.  BS: clear and diminished.  Pt on AAYUSH.     Brenda Vasquez RT on 11/15/2023 at 11:13 PM

## 2023-11-16 NOTE — PROGRESS NOTES
Care Management Follow Up    Length of Stay (days): 14    Expected Discharge Date: 11/17/2023     Concerns to be Addressed: no discharge needs identified, denies needs/concerns at this time     Patient plan of care discussed at interdisciplinary rounds: Yes    Anticipated Discharge Disposition: Home     Anticipated Discharge Services: None  Anticipated Discharge DME: None    Patient/family educated on Medicare website which has current facility and service quality ratings: no  Education Provided on the Discharge Plan: No  Patient/Family in Agreement with the Plan: unable to assess    Referrals Placed by CM/SW:  (Not currently indicated)  Private pay costs discussed: Not applicable    Additional Information:  Met with pt to discuss recommendation for TCU.  Pt is in agreement with TCU, provided TCU list.  Pt wanting to discuss preferences with mother.  Will follow up with pt on 11/17 regarding TCU choices.    JENNIFER Abbott

## 2023-11-16 NOTE — PLAN OF CARE
Goal Outcome Evaluation:      Plan of Care Reviewed With: patient    Overall Patient Progress: improvingOverall Patient Progress: improving    Outcome Evaluation: Pt A&Ox4, tele afib RVR, on RA. Cardiology notified of continued high rate. Dose change to amio and diltiazem received. Rate reduced to 110s-120s through the noc. This AM up to BSC and rate increased to 160s-170s, asymptomatic. Also had 10 beat run Vtach. Pt received AM diltiazem and returned to bed.      Problem: Pneumonia  Goal: Effective Oxygenation and Ventilation  Outcome: Progressing  Intervention: Optimize Oxygenation and Ventilation  Recent Flowsheet Documentation  Taken 11/16/2023 0400 by Avril Domingo RN  Head of Bed (HOB) Positioning: HOB at 20-30 degrees  Taken 11/16/2023 0000 by Avril Domingo RN  Head of Bed (HOB) Positioning: HOB at 20-30 degrees     Problem: Risk for Delirium  Goal: Improved Behavioral Control  Outcome: Progressing  Intervention: Minimize Safety Risk  Recent Flowsheet Documentation  Taken 11/16/2023 0400 by Avril Domingo RN  Enhanced Safety Measures: room near unit station  Taken 11/16/2023 0000 by Avril Domingo RN  Enhanced Safety Measures: room near unit station  Taken 11/15/2023 2000 by Avril Domingo RN  Enhanced Safety Measures: room near unit station  Trust Relationship/Rapport:   care explained   choices provided   empathic listening provided   questions answered   questions encouraged   reassurance provided   thoughts/feelings acknowledged     Problem: Risk for Delirium  Goal: Optimal Coping  Outcome: Progressing  Intervention: Optimize Psychosocial Adjustment to Delirium  Recent Flowsheet Documentation  Taken 11/15/2023 2000 by Avril Domingo RN  Supportive Measures: active listening utilized     Problem: Risk for Delirium  Goal: Improved Attention and Thought Clarity  Outcome: Progressing

## 2023-11-16 NOTE — PLAN OF CARE
"  Problem: Oral Intake Inadequate  Goal: Improved Oral Intake  Outcome: Not Progressing   Goal Outcome Evaluation:  Pt reports having diarrhea last night after having a pretty large lunch earlier in the day - which he described feeling \" like having a rock in my stomach\" afterwards. Pt inquired about whether the Ensure Enlives he's been getting contribute to diarrhea - writer assured him they don't. Pt was agreeable to getting Ensure Enlive BID as his order hx is sparse and he sometimes isn't hungry for full meals.    "

## 2023-11-16 NOTE — SIGNIFICANT EVENT
Significant Event Note - Remote Coverage    Time of event: 7:29 PM November 15, 2023    Description of event:  Paged: Afib with RVR. Requesting PRN IV Metoprolol.   H/o Afib with RVR.  It is new that it is sustained. HR 150s- 170s    Plan:  -Recommend in-person evaluation asap. Rapid response to be called if unable to have patient evaluated in -person in timely manner  -EKG ordered.     Discussed with: bedside nurse    Chiquis Madison MD

## 2023-11-17 ENCOUNTER — APPOINTMENT (OUTPATIENT)
Dept: SPEECH THERAPY | Facility: CLINIC | Age: 58
End: 2023-11-17
Payer: COMMERCIAL

## 2023-11-17 ENCOUNTER — APPOINTMENT (OUTPATIENT)
Dept: OCCUPATIONAL THERAPY | Facility: CLINIC | Age: 58
End: 2023-11-17
Payer: COMMERCIAL

## 2023-11-17 LAB
ANION GAP SERPL CALCULATED.3IONS-SCNC: 9 MMOL/L (ref 7–15)
BACTERIA BRONCH: NORMAL
BUN SERPL-MCNC: 25.1 MG/DL (ref 6–20)
CALCIUM SERPL-MCNC: 8.6 MG/DL (ref 8.6–10)
CHLORIDE SERPL-SCNC: 107 MMOL/L (ref 98–107)
CREAT SERPL-MCNC: 1.45 MG/DL (ref 0.67–1.17)
DEPRECATED HCO3 PLAS-SCNC: 23 MMOL/L (ref 22–29)
EGFRCR SERPLBLD CKD-EPI 2021: 56 ML/MIN/1.73M2
ERYTHROCYTE [DISTWIDTH] IN BLOOD BY AUTOMATED COUNT: 15.9 % (ref 10–15)
GLUCOSE BLDC GLUCOMTR-MCNC: 109 MG/DL (ref 70–99)
GLUCOSE BLDC GLUCOMTR-MCNC: 130 MG/DL (ref 70–99)
GLUCOSE BLDC GLUCOMTR-MCNC: 134 MG/DL (ref 70–99)
GLUCOSE BLDC GLUCOMTR-MCNC: 136 MG/DL (ref 70–99)
GLUCOSE BLDC GLUCOMTR-MCNC: 141 MG/DL (ref 70–99)
GLUCOSE SERPL-MCNC: 132 MG/DL (ref 70–99)
HCT VFR BLD AUTO: 32.8 % (ref 40–53)
HGB BLD-MCNC: 10.5 G/DL (ref 13.3–17.7)
MAGNESIUM SERPL-MCNC: 2.1 MG/DL (ref 1.7–2.3)
MCH RBC QN AUTO: 28.8 PG (ref 26.5–33)
MCHC RBC AUTO-ENTMCNC: 32 G/DL (ref 31.5–36.5)
MCV RBC AUTO: 90 FL (ref 78–100)
PLATELET # BLD AUTO: 306 10E3/UL (ref 150–450)
POTASSIUM SERPL-SCNC: 3.6 MMOL/L (ref 3.4–5.3)
RBC # BLD AUTO: 3.64 10E6/UL (ref 4.4–5.9)
SODIUM SERPL-SCNC: 139 MMOL/L (ref 135–145)
WBC # BLD AUTO: 13.2 10E3/UL (ref 4–11)

## 2023-11-17 PROCEDURE — 250N000013 HC RX MED GY IP 250 OP 250 PS 637: Performed by: INTERNAL MEDICINE

## 2023-11-17 PROCEDURE — 250N000013 HC RX MED GY IP 250 OP 250 PS 637: Performed by: HOSPITALIST

## 2023-11-17 PROCEDURE — 97535 SELF CARE MNGMENT TRAINING: CPT | Mod: GO

## 2023-11-17 PROCEDURE — 250N000012 HC RX MED GY IP 250 OP 636 PS 637: Performed by: INTERNAL MEDICINE

## 2023-11-17 PROCEDURE — 99232 SBSQ HOSP IP/OBS MODERATE 35: CPT | Performed by: INTERNAL MEDICINE

## 2023-11-17 PROCEDURE — 120N000013 HC R&B IMCU

## 2023-11-17 PROCEDURE — 99233 SBSQ HOSP IP/OBS HIGH 50: CPT | Performed by: INTERNAL MEDICINE

## 2023-11-17 PROCEDURE — 94640 AIRWAY INHALATION TREATMENT: CPT

## 2023-11-17 PROCEDURE — 83735 ASSAY OF MAGNESIUM: CPT | Performed by: EMERGENCY MEDICINE

## 2023-11-17 PROCEDURE — 250N000011 HC RX IP 250 OP 636: Performed by: EMERGENCY MEDICINE

## 2023-11-17 PROCEDURE — 92526 ORAL FUNCTION THERAPY: CPT | Mod: GN | Performed by: SPEECH-LANGUAGE PATHOLOGIST

## 2023-11-17 PROCEDURE — 999N000157 HC STATISTIC RCP TIME EA 10 MIN

## 2023-11-17 PROCEDURE — 250N000011 HC RX IP 250 OP 636: Mod: JZ | Performed by: FAMILY MEDICINE

## 2023-11-17 PROCEDURE — 250N000013 HC RX MED GY IP 250 OP 250 PS 637: Performed by: EMERGENCY MEDICINE

## 2023-11-17 PROCEDURE — 85027 COMPLETE CBC AUTOMATED: CPT | Performed by: EMERGENCY MEDICINE

## 2023-11-17 PROCEDURE — 94640 AIRWAY INHALATION TREATMENT: CPT | Mod: 76

## 2023-11-17 PROCEDURE — 80048 BASIC METABOLIC PNL TOTAL CA: CPT | Performed by: EMERGENCY MEDICINE

## 2023-11-17 PROCEDURE — 250N000009 HC RX 250: Performed by: INTERNAL MEDICINE

## 2023-11-17 RX ORDER — LORATADINE 10 MG/1
10 TABLET ORAL DAILY
Status: DISCONTINUED | OUTPATIENT
Start: 2023-11-17 | End: 2023-11-20 | Stop reason: HOSPADM

## 2023-11-17 RX ORDER — POTASSIUM CHLORIDE 29.8 MG/ML
20 INJECTION INTRAVENOUS ONCE
Status: COMPLETED | OUTPATIENT
Start: 2023-11-17 | End: 2023-11-17

## 2023-11-17 RX ORDER — DILTIAZEM HYDROCHLORIDE 120 MG/1
240 CAPSULE, COATED, EXTENDED RELEASE ORAL DAILY
Status: DISCONTINUED | OUTPATIENT
Start: 2023-11-17 | End: 2023-11-18

## 2023-11-17 RX ADMIN — THIAMINE HYDROCHLORIDE 100 MG: 100 INJECTION, SOLUTION INTRAMUSCULAR; INTRAVENOUS at 09:38

## 2023-11-17 RX ADMIN — APIXABAN 10 MG: 5 TABLET, FILM COATED ORAL at 21:31

## 2023-11-17 RX ADMIN — APIXABAN 10 MG: 5 TABLET, FILM COATED ORAL at 09:35

## 2023-11-17 RX ADMIN — LOPERAMIDE HYDROCHLORIDE 2 MG: 2 CAPSULE ORAL at 09:38

## 2023-11-17 RX ADMIN — DILTIAZEM HYDROCHLORIDE 60 MG: 30 TABLET, FILM COATED ORAL at 09:35

## 2023-11-17 RX ADMIN — POTASSIUM CHLORIDE 20 MEQ: 29.8 INJECTION, SOLUTION INTRAVENOUS at 04:29

## 2023-11-17 RX ADMIN — DILTIAZEM HYDROCHLORIDE 60 MG: 30 TABLET, FILM COATED ORAL at 00:20

## 2023-11-17 RX ADMIN — SULFAMETHOXAZOLE AND TRIMETHOPRIM 1 TABLET: 400; 80 TABLET ORAL at 09:35

## 2023-11-17 RX ADMIN — CLONAZEPAM 0.5 MG: 0.5 TABLET ORAL at 09:38

## 2023-11-17 RX ADMIN — THIAMINE HYDROCHLORIDE 100 MG: 100 INJECTION, SOLUTION INTRAMUSCULAR; INTRAVENOUS at 15:01

## 2023-11-17 RX ADMIN — QUETIAPINE FUMARATE 25 MG: 25 TABLET ORAL at 09:36

## 2023-11-17 RX ADMIN — DILTIAZEM HYDROCHLORIDE 240 MG: 120 CAPSULE, EXTENDED RELEASE ORAL at 15:42

## 2023-11-17 RX ADMIN — QUETIAPINE FUMARATE 25 MG: 25 TABLET ORAL at 18:08

## 2023-11-17 RX ADMIN — AMIODARONE HYDROCHLORIDE 400 MG: 200 TABLET ORAL at 09:35

## 2023-11-17 RX ADMIN — PREDNISONE 30 MG: 5 TABLET ORAL at 09:36

## 2023-11-17 RX ADMIN — QUETIAPINE FUMARATE 50 MG: 25 TABLET ORAL at 21:30

## 2023-11-17 RX ADMIN — IPRATROPIUM BROMIDE AND ALBUTEROL SULFATE 3 ML: .5; 3 SOLUTION RESPIRATORY (INHALATION) at 08:34

## 2023-11-17 RX ADMIN — LORATADINE 10 MG: 10 TABLET ORAL at 18:54

## 2023-11-17 RX ADMIN — CLONAZEPAM 0.5 MG: 0.5 TABLET ORAL at 21:31

## 2023-11-17 RX ADMIN — IPRATROPIUM BROMIDE AND ALBUTEROL SULFATE 3 ML: .5; 3 SOLUTION RESPIRATORY (INHALATION) at 15:18

## 2023-11-17 RX ADMIN — THIAMINE HYDROCHLORIDE 100 MG: 100 INJECTION, SOLUTION INTRAMUSCULAR; INTRAVENOUS at 21:31

## 2023-11-17 RX ADMIN — AMIODARONE HYDROCHLORIDE 400 MG: 200 TABLET ORAL at 21:30

## 2023-11-17 RX ADMIN — ESCITALOPRAM OXALATE 20 MG: 20 TABLET ORAL at 12:25

## 2023-11-17 ASSESSMENT — ACTIVITIES OF DAILY LIVING (ADL)
ADLS_ACUITY_SCORE: 35

## 2023-11-17 NOTE — PROGRESS NOTES
Care Management Follow Up    Length of Stay (days): 15    Expected Discharge Date: 11/19/2023     Concerns to be Addressed: no discharge needs identified, denies needs/concerns at this time     Patient plan of care discussed at interdisciplinary rounds: Yes    Anticipated Discharge Disposition: Home     Anticipated Discharge Services: None  Anticipated Discharge DME: None    Patient/family educated on Medicare website which has current facility and service quality ratings: no  Education Provided on the Discharge Plan: No  Patient/Family in Agreement with the Plan: unable to assess    Referrals Placed by CM/SW:  (Not currently indicated)  Private pay costs discussed: Not applicable    Additional Information:  Follow up with pt regarding TCU options.  Pt waiting for him mom to visit later this afternoon to discuss TCU options.  Care Manager to follow up with pt on 11/18 regarding TCU choices.    JENNIFER Abbott

## 2023-11-17 NOTE — PLAN OF CARE
"Goal Outcome Evaluation:  Problem: Pneumonia  Goal: Effective Oxygenation and Ventilation  Outcome: Progressing  Intervention: Promote Airway Secretion Clearance  Intervention: Optimize Oxygenation and Ventilation    Problem: Dysrhythmia  Goal: Normalized Cardiac Rhythm  Outcome: Progressing  Intervention: Monitor and Manage Cardiac Rhythm Effect    Patient A&O x4, VSS. Pt had two 9-11 beat runs of v-tach this morning, asymptomatic. MD notified of 11-beat run this AM. Plan of care ongoing. When patient is up and moving, HR would be in the 130-140s. No c/o pain, SOB or H/N/V. Up with Ax1. Using urinal at bedside. Poor oral intake/appetite during the day. Pt stated \"I'm saving my stomach for a good meal tonight\". Needs ++ encouragement to eat. No new skin issues noted.   "

## 2023-11-17 NOTE — PROGRESS NOTES
Pt is currently on RA.  Sating 92-94%, -119, RR 20, BBS- Expiratory Wheeze/Diminished this morning and Clear/Diminished this afternoon.  Duoneb ordered Q8.  RT will continue to monitor.

## 2023-11-17 NOTE — PROGRESS NOTES
[facility] MEDICINE PROGRESS NOTE          Physician Attestation   I, Avery Barraza DO, was present with the medical/NIDIA student who participated in the service and in the documentation of the note.  I have verified the history and personally performed the physical exam and medical decision making.  I agree with the assessment and plan of care as documented in the note.      Key findings: 58 year old male with a past medical history of cutaneous melanoma diagnosed on 3/28/2023 via skin biopsy (Keytruda), mild intermittent asthma, depression, anxiety, recurrent hospitalization for pneumonia discharged home on 10/25. Pt was admitted 10/23 with bilateral infiltrate, treated for possible pneumonia, but did not respond to IV antibiotic therapy.  BAL 11/3 revealed no growth.  Eventually intubated on 11/4 for ARDS/sepsis. Follow-up CT scan revealed extensive bilateral pulmonary infiltrate.  Very small pulmonary emboli on 11/13.  Now treated for chemotherapy-induced pneumonitis.  On tapered dose of oral steroid.  Incidentally developed A-fib with RVR, managed on diltiazem and amiodarone.  Developed acute kidney injury secondary to hemodynamic effects, likely resolving ATN.  Anticipating TCU placement.     Avery Barraza DO  Date of Service (when I saw the patient): 11/17/2023    Identification/Summary: Jim Titus is a 58 year old male with a past medical history of cutaneous melanoma diagnosed on 3/28/2023 via skin biopsy (Keytruda), mild intermittent asthma, depression, anxiety, recurrent hospitalization for pneumonia discharged home on 10/25. Pt was admitted 10/23 with bilateral infiltrate, treated for possible pneumonia, but did not respond to IV antibiotic therapy.  BAL 11/3 revealed no growth.  Eventually intubated on 11/4 for ARDS/sepsis. Follow-up CT scan revealed extensive bilateral pulmonary infiltrate.  Very small pulmonary emboli on 11/13.  Now treated for  chemotherapy-induced pneumonitis.  On tapered dose of oral steroid.  Incidentally developed A-fib with RVR, managed on diltiazem and amiodarone.  Developed acute kidney injury secondary to hemodynamic effects, likely resolving ATN.  Anticipating TCU placement.     Consultants: Cardiology, Nephrology, Pulmonology.    Assessment and Plan:  A-fib with RVR  Echocardiogram-EF 65%, mild to moderate left ventricular hypertrophy    Diltiazem 240 mg daily.  Amiodarone 400 mg twice a day  Appreciate cardiology consult     Acute hypoxic respiratory failure  Chemotherapy induced pneumonitis  Chest CT-extensive bilateral pulmonary infiltrate  Treated with IV Zosyn, Zithromax, micafungin  Status post bronchoscopy  Required intubation 11/4 then extubated on 11/8    Tapering prednisone 30 mg daily.   Pulmonology signed off.   Outpatient chest CT, pulmonary function test in 6 to 8 weeks    Diarrhea  Likely related to medication.   Cdiff PCR was ordered, but not completed.   Continue diet and monitor response.     Pulmonary embolism  Continue Eliquis.     Melanoma  Seeing [place] [facility]  On Keytruda, on hold due to concern for Keytruda induced pneumonitis.      Acute kidney injury,   creatinine peak at 2.1  Trending down, 11/17 1.45 mg/dL.      Anemia,Normocytic.  Hemoglobin 10.5 g/dL.   Down from 12.4 on 11/16/23.   Repeat CBC in a.m.     Moderate obesity  Modification of lifestyle for weight loss     Obstructive sleep apnea  Outpatient sleep study      Acute encephalopathy//delirium, resolved  Hypernatremia, resolved    Code full  DVT prophylaxis  On chronic anticoagulation treatment  Barrier to discharge  Awaiting for more heart rate control.  Anticipated discharge in 2 days   Clinically Significant Risk Factors        # Hypokalemia: Lowest K = 3.3 mmol/L in last 2 days, will replace as needed       # Hypoalbuminemia: Lowest albumin = 2.7 g/dL at 11/3/2023  5:57 AM, will monitor as appropriate            # Obesity: Estimated  "body mass index is 33.47 kg/m  as calculated from the following:    Height as of this encounter: 1.93 m (6' 4\").    Weight as of this encounter: 124.7 kg (275 lb).      # Financial/Environmental Concerns: none  # Asthma: noted on problem list        Diet: Regular Diet Adult  Snacks/Supplements Adult: Ensure Enlive; With Meals  [place] Catheter: Not present  Lines: PRESENT      PICC 11/04/23 Double Lumen Right Basilic medication drips-Site Assessment: WDL      DVT Prophylaxis:  DOAC Eliquis   Code Status: Full Code    Anticipated possible discharge in 1 days to 2.  Disposition Plan      Expected Discharge Date: 11/19/2023      Destination: home with family  Discharge Comments: TCU, need placement still.        The patient's care was discussed with the Attending Physician, Dr. Barraza .    [facility]  Phone: #[phone]  Securely message with the Vocera Web Console (learn more here)  Text page via [employer] Paging/[employer]     Interval History/Subjective:  Patient is resting comfortably. Is afebrile. Diarrhea last night but has intermittent solid stools. Denies seeing or hearing things that others cannot hear. Expresses mild pain with urination and a small amount of blood in urine but attributes this to norwood being removed. Is able to ambulate and feels he is gaining his strength back. No chest pain or shortness of breath. Denies abdominal pain.     Physical Exam/Objective:  Temp:  [97.7  F (36.5  C)-98.6  F (37  C)] 97.7  F (36.5  C)  Pulse:  [] 109  Resp:  [19-22] 20  BP: ()/(69-86) 124/78  SpO2:  [92 %-96 %] 95 %  Body mass index is 33.47 kg/m .    GENERAL:  Alert, appears comfortable, in no acute distress, appears stated age   HEAD:  Normocephalic, without obvious abnormality, atraumatic   EYES:  PERRL, conjunctiva/corneas clear, no scleral icterus, EOM's intact   NOSE: Nares normal, septum midline, mucosa normal, no drainage "   THROAT: Lips, mucosa, normal. mouth moist   NECK: Supple, symmetrical, trachea midline   BACK:   Symmetric, no curvature, ROM normal   LUNGS:   Clear to auscultation bilaterally, no rales, rhonchi, or wheezing, symmetric chest rise on inhalation, respirations unlabored   CHEST WALL:  No tenderness or deformity   HEART:  Tachycardic with irregular rate     ABDOMEN:   Soft, no masses, no organomegaly, no rebound or guarding,  mild TTP periumbilical region   EXTREMITIES: Extremities normal, atraumatic, no edema    SKIN: no exanthems in the visualized areas   NEURO: Alert, oriented x3, moves all four extremities freely   PSYCH: Cooperative, behavior is appropriate      Data reviewed today: I personally reviewed all new medications, labs, imaging/diagnostics reports over the past 24 hours. Pertinent findings include:    Imaging:   No results found for this or any previous visit (from the past 24 hour(s)).    Labs:  Most Recent 3 CBC's:  Recent Labs   Lab Test 11/17/23  0210 11/16/23  0615 11/15/23  0405   WBC 13.2* 17.3* 12.5*   HGB 10.5* 12.4* 10.7*   MCV 90 90 92    428 307     Most Recent 3 BMP's:  Recent Labs   Lab Test 11/17/23  0806 11/17/23  0210 11/17/23  0122 11/16/23  1705 11/16/23  1224 11/16/23  0747 11/16/23  0615 11/15/23  1557 11/15/23  1421 11/15/23  0739 11/15/23  0405   NA  --  139  --   --   --   --  139  --  131*  --  146*   POTASSIUM  --  3.6  --   --  4.1  --  3.3*   < >  --    < > 3.3*   CHLORIDE  --  107  --   --   --   --  105  --   --   --  112*   CO2  --  23  --   --   --   --  17*  --   --   --  25   BUN  --  25.1*  --   --   --   --  32.0*  --   --   --  40.6*   CR  --  1.45*  --   --   --   --  1.49*  --   --   --  1.47*   ANIONGAP  --  9  --   --   --   --  17*  --   --   --  9   MELISSA  --  8.6  --   --   --   --  8.6  --   --   --  8.9   * 132* 136*   < >  --    < > 123*   < >  --    < > 101*    < > = values in this interval not displayed.     Most Recent 3 INR's:No lab  results found.  Most Recent 3 Hemoglobins:  Recent Labs   Lab Test 11/17/23  0210 11/16/23  0615 11/15/23  0405   HGB 10.5* 12.4* 10.7*       Medications:   Personally Reviewed.  Medications    dexmedeTOMIDine Stopped (11/15/23 1231)    dextrose      D5W 50 mL/hr at 11/16/23 1923      amiodarone  400 mg Oral BID    apixaban ANTICOAGULANT  10 mg Oral BID    Followed by    [START ON 11/20/2023] apixaban ANTICOAGULANT  5 mg Oral BID    clonazePAM  0.5 mg Oral BID    diltiazem  60 mg Oral Q6H UMM    escitalopram  20 mg Oral Daily with lunch    insulin aspart  1-6 Units Subcutaneous 4x Daily AC & HS    ipratropium - albuterol 0.5 mg/2.5 mg/3 mL  3 mL Nebulization Q8H    predniSONE  30 mg Oral Daily    Followed by    [START ON 11/22/2023] predniSONE  20 mg Oral Daily    Followed by    [START ON 11/27/2023] predniSONE  10 mg Oral Daily    QUEtiapine  25 mg Oral BID    QUEtiapine  50 mg Oral At Bedtime    sodium chloride (PF)  10-40 mL Intracatheter Q7 Days    sodium chloride (PF)  3 mL Intracatheter Q8H    sulfamethoxazole-trimethoprim  1 tablet Oral Daily    [Held by provider] tamsulosin  0.4 mg Oral Daily    thiamine  100 mg Intravenous TID

## 2023-11-17 NOTE — PROVIDER NOTIFICATION
2 episodes VT 6-7 beats. VSS, asymptomatic and pt repositioned. WHS updated. Order to draw AM labs early.

## 2023-11-17 NOTE — PLAN OF CARE
Problem: Adult Inpatient Plan of Care  Goal: Absence of Hospital-Acquired Illness or Injury  Outcome: Progressing  Intervention: Identify and Manage Fall Risk  Recent Flowsheet Documentation  Taken 11/16/2023 1600 by Damien Espinosa RN  Safety Promotion/Fall Prevention: safety round/check completed  Intervention: Prevent Skin Injury  Recent Flowsheet Documentation  Taken 11/16/2023 1600 by Damien Espinosa RN  Body Position: position changed independently  Device Skin Pressure Protection: adhesive use limited  Intervention: Prevent Infection  Recent Flowsheet Documentation  Taken 11/16/2023 1600 by Damien Espinosa RN  Infection Prevention:   rest/sleep promoted   hand hygiene promoted     Problem: Pneumonia  Goal: Fluid Balance  Outcome: Progressing  Goal: Resolution of Infection Signs and Symptoms  Outcome: Progressing  Goal: Effective Oxygenation and Ventilation  Outcome: Progressing  Intervention: Promote Airway Secretion Clearance  Recent Flowsheet Documentation  Taken 11/16/2023 1600 by Damien Espinosa RN  Breathing Techniques/Airway Clearance: deep/controlled cough encouraged  Intervention: Optimize Oxygenation and Ventilation  Recent Flowsheet Documentation  Taken 11/16/2023 1600 by Damien Espinosa RN  Airway/Ventilation Management:   pulmonary hygiene promoted   calming measures promoted  Head of Bed (HOB) Positioning: HOB at 20-30 degrees     Problem: Risk for Delirium  Goal: Optimal Coping  Outcome: Progressing  Intervention: Optimize Psychosocial Adjustment to Delirium  Recent Flowsheet Documentation  Taken 11/16/2023 1600 by Damien Espinosa RN  Supportive Measures: active listening utilized   Goal Outcome Evaluation:       Patient is alert and oriented. VSS. Remain Afib on telemetry; rate controlled unless up and ambulating. Patient's HR goes up between 120-140 and it goes back down as soon as he  sits down/lay down. Denies sob/chest pain. Endorses mild dizziness at times. Complained of abdominal  cramping and discomfort and stated he has had 3 total of loose stools today, one this  morning and two this evening. Dr. De Paz notified and ordered c.diff r/o. Voiding adequately.  Up with 1 assist and walker. Plan of care ongoing.

## 2023-11-17 NOTE — CARE PLAN
Speech Language Therapy Discharge Summary    Reason for therapy discharge:    All goals and outcomes met, no further needs identified.    Progress towards therapy goal(s). See goals on Care Plan in [facility] electronic health record for goal details.  Goals met    Therapy recommendation(s):    No further therapy is recommended. Continue regular textures and thin liquids diet. Small bites/sips and a slow rate of intake. Patient should be fully upright, preferably in a chair for all PO intake.

## 2023-11-17 NOTE — PROGRESS NOTES
"CLINICAL NUTRITION SERVICES - REASSESSMENT NOTE     Nutrition Prescription    RECOMMENDATIONS FOR MDs/PROVIDERS TO ORDER:  Consider MVI as pt not meeting estimated needs    Malnutrition Status:    Does not meet criteria    Recommendations already ordered by Registered Dietitian (RD):  - Nutrition education for recommended modifications - Will provide general diet guideline handouts.  - Continue current ONS order: Ensure Enlive BID  - Encouraged po intake of appetizing foods, including outside food/takeout    Future/Additional Recommendations:  Monitor po intake, ONS tolerance, outside food intake     EVALUATION OF THE PROGRESS TOWARD GOALS   Diet: Regular  Nutrition Supplements: Ensure Enlive BID  Intake: Pt was ordering 2-3 meals/day and eating 100% of them until 11/15. Pt ordered meal on 11/15 but tray was found untouched in room by SLP. No meals ordered , but pt says he drank 1 Ensure.      NEW FINDINGS   Pt reports he's experiencing menu fatigue and is \"tired of looking at it (the menu)\". He's planning to order [employer] for lunch today, which writer encouraged, as pt is not on a restricted diet. However, pt unable to name who would bring in this food for him. Writer again reassured pt that his hospital-acquired wt gain is from edema wt, and not body fat. Pt inquired about getting general diet guidelines, which writer will provide for pt prior to discharge.     Several provider notes cite that pt is frequently confused and disoriented, but he's seemed very alert and clear-minded when I've spoken to him - he's able to  on subtle humor and recall past events well.     Labs:  BUN: 25.1 (H) improving  Cr: 1.45 (H) stable  B     Meds:  Prednisone  Thiamine 100 mg  D5 @50 mL/hr  Imodium    GI:   LBM on , normoactive bowel sounds, abdominal discomfort    Anthropometrics:  23 124.7 kg (275 lb) Bed scale  11/15/23 123.5 kg (272 lb 3.2 oz) Bed scale  23 122.5 kg (270 lb) Bed scale  23 " 125.7 kg (277 lb 1.6 oz) Bed scale  11/12/23 131.5 kg (290 lb) -  11/11/23 127.8 kg (281 lb 12.8 oz) Bed scale  11/08/23 130.1 kg (286 lb 12.8 oz) Bed scale  11/04/23 120.9 kg (266 lb 9.6 oz)  11/02/23 113.4 kg (250 lb)   Pt's wt is up since admission    MALNUTRITION  Does not meet criteria    Previous Goals   Glycemic control  - met  Electrolytes wnl- met  Meet > 75% of estimated needs- not met    CURRENT NUTRITION DIAGNOSIS  Inadequate intake r/t some delirium, abdominal fullness/discomfort AEB not yet meeting estimated needs orally   Evaluation: ongoing    INTERVENTIONS  Implementation  - Nutrition education for recommended modifications - Provided general diet guideline handouts.  - Continue current ONS order: Ensure Enlive BID  - Encouraged po intake of appetizing foods, including outside food/takeout    Goals  Patient to consume % of nutritionally adequate meal trays TID, or the equivalent with supplements/snacks.    Monitoring/Evaluation  Progress toward goals will be monitored and evaluated per protocol.

## 2023-11-17 NOTE — PLAN OF CARE
Problem: Pneumonia  Goal: Effective Oxygenation and Ventilation  Outcome: Progressing    Problem: Delirium  Goal: Improved Sleep  Outcome: Progressing    Problem: Dysrhythmia  Goal: Normalized Cardiac Rhythm  Outcome: Progressing    Problem: Fall Injury Risk  Goal: Absence of Fall and Fall-Related Injury  Outcome: Progressing    VSS, afebrile, remains on RA. Essentially no PO. Disoriented at times mostly to situation and rambling nonsensical conversation, bed alarm on. Voiding. Bed change x1 due to complete soaking of bed and clothing w/sweat. Mostly Afib but episode of VT x2 6-7 beats, labs checked and replaced per protocol. No stools or abd discomfort overnight.

## 2023-11-17 NOTE — PROGRESS NOTES
Respiratory distress not noted. Patient remains on room air; sats mid 90s. Duoneb given. BS clear/decreased. Deep breath and coughing encouraged. RT following.    Anders Jefferson, RT

## 2023-11-17 NOTE — PROGRESS NOTES
Assessment/Plan:  1. Atrial fibrillation with RVR:  The patient's ventricular rate was mildly elevated.  Continue oral amiodarone 400 mg oral twice a day for total loading dose 5 g and then change it to 200 mg daily, change diltiazem 60 mg every 6 hours to diltiazem  mg daily.  He is on anticoagulation Eliquis due to acute pulmonary embolism.    2.  Acute pulmonary embolism: Started anticoagulation Eliquis per primary team.    3.  Acute respiratory failure due to pneumonitis, history of asthma, delirium, history of melanoma: Please see primary team management for details.    Subjective:  Interval History:  The patient has no chest pain, palpitations, shortness of breath.    Current Medications:  Scheduled Meds:   amiodarone  400 mg Oral BID    apixaban ANTICOAGULANT  10 mg Oral BID    Followed by    [START ON 11/20/2023] apixaban ANTICOAGULANT  5 mg Oral BID    clonazePAM  0.5 mg Oral BID    diltiazem  60 mg Oral Q6H UMM    escitalopram  20 mg Oral Daily with lunch    insulin aspart  1-6 Units Subcutaneous 4x Daily AC & HS    ipratropium - albuterol 0.5 mg/2.5 mg/3 mL  3 mL Nebulization Q8H    predniSONE  30 mg Oral Daily    Followed by    [START ON 11/22/2023] predniSONE  20 mg Oral Daily    Followed by    [START ON 11/27/2023] predniSONE  10 mg Oral Daily    QUEtiapine  25 mg Oral BID    QUEtiapine  50 mg Oral At Bedtime    sodium chloride (PF)  10-40 mL Intracatheter Q7 Days    sodium chloride (PF)  3 mL Intracatheter Q8H    sulfamethoxazole-trimethoprim  1 tablet Oral Daily    [Held by provider] tamsulosin  0.4 mg Oral Daily    thiamine  100 mg Intravenous TID     Continuous Infusions:   dexmedeTOMIDine Stopped (11/15/23 1231)    dextrose      D5W 50 mL/hr at 11/16/23 1923     PRN Meds:.acetaminophen, albuterol, benzonatate, dextrose, glucose **OR** dextrose **OR** glucagon, lidocaine (viscous), lidocaine, loperamide, melatonin, naloxone **OR** naloxone **OR** naloxone **OR** naloxone, polyethylene  "glycol, QUEtiapine, sodium chloride (PF), sodium chloride (PF), sodium chloride (PF), sodium chloride (PF), sodium chloride (PF)    Objective:   Vital signs in last 24 hours:  Temp:  [97.7  F (36.5  C)-98.6  F (37  C)] 97.7  F (36.5  C)  Pulse:  [] 97  Resp:  [19-22] 20  BP: ()/(69-86) 124/78  SpO2:  [92 %-96 %] 94 %  Weight:   [unfilled]     Physical Exam:  General Appearance:   Sedated in sleep   HEENT:  No scleral icterus; the mucous membranes were moist.   Neck: No cervical bruits. No jugular venous distention   Lungs:   Lungs are clear to auscultation. No crackles. No wheezing.   Cardiovascular:   IRRR, normal first and second heart sounds with no murmurs. No rubs or gallops.     Abdomen:  Soft. No tenderness. Bowels sounds are present   Extremities: No leg edema. Equal posterior tibial pulsese.   Skin: Warm, Dry. No rashes.   Neurologic: Mood and affect are appropriate. No focal deficits.         Cardiographics:   Report: personally reviewed. .      Tele monitoring -  Atrial fib with ventricular rate 110 bpm    Echocardiogram on November 13, 2023:  1.Left ventricular size, wall motion and function are normal. The ejection  fraction is > 65%.  2.There is mild to moderate concentric left ventricular hypertrophy.  3.Normal right ventricle size and systolic function.  4.No hemodynamically significant valvular abnormalities on 2D or color flow  imaging.  5.Ascending Aorta moderate dilatation is present.  6.The study was technically difficult.  Compared to the prior study dated 11/5/2023, the RV EF is improved.      Lab Results:   personally reviewed.     Lab Results   Component Value Date     11/14/2023    CO2 25 11/14/2023    CO2 21 11/10/2021    BUN 36.5 11/14/2023    BUN 14 11/10/2021     No results found for: \"CKTOTAL\", \"CKMB\", \"TROPONINI\"  Lab Results   Component Value Date    WBC 12.3 11/14/2023    HGB 11.3 11/14/2023    HCT 35.6 11/14/2023    MCV 90 11/14/2023     11/14/2023     Lab " Results   Component Value Date    CHOL 189 04/11/2023    TRIG 193 04/11/2023    HDL 54 04/11/2023    LDL 96 04/11/2023

## 2023-11-18 ENCOUNTER — APPOINTMENT (OUTPATIENT)
Dept: OCCUPATIONAL THERAPY | Facility: CLINIC | Age: 58
End: 2023-11-18
Payer: COMMERCIAL

## 2023-11-18 LAB
ANION GAP SERPL CALCULATED.3IONS-SCNC: 9 MMOL/L (ref 7–15)
BUN SERPL-MCNC: 24.2 MG/DL (ref 6–20)
C DIFF TOX B STL QL: NEGATIVE
CALCIUM SERPL-MCNC: 8.9 MG/DL (ref 8.6–10)
CHLORIDE SERPL-SCNC: 107 MMOL/L (ref 98–107)
CREAT SERPL-MCNC: 1.57 MG/DL (ref 0.67–1.17)
DEPRECATED HCO3 PLAS-SCNC: 25 MMOL/L (ref 22–29)
EGFRCR SERPLBLD CKD-EPI 2021: 51 ML/MIN/1.73M2
ERYTHROCYTE [DISTWIDTH] IN BLOOD BY AUTOMATED COUNT: 16 % (ref 10–15)
GLUCOSE BLDC GLUCOMTR-MCNC: 102 MG/DL (ref 70–99)
GLUCOSE BLDC GLUCOMTR-MCNC: 117 MG/DL (ref 70–99)
GLUCOSE BLDC GLUCOMTR-MCNC: 132 MG/DL (ref 70–99)
GLUCOSE BLDC GLUCOMTR-MCNC: 170 MG/DL (ref 70–99)
GLUCOSE SERPL-MCNC: 96 MG/DL (ref 70–99)
HCT VFR BLD AUTO: 31 % (ref 40–53)
HGB BLD-MCNC: 9.8 G/DL (ref 13.3–17.7)
MAGNESIUM SERPL-MCNC: 2.3 MG/DL (ref 1.7–2.3)
MCH RBC QN AUTO: 28.6 PG (ref 26.5–33)
MCHC RBC AUTO-ENTMCNC: 31.6 G/DL (ref 31.5–36.5)
MCV RBC AUTO: 90 FL (ref 78–100)
PLATELET # BLD AUTO: 285 10E3/UL (ref 150–450)
POTASSIUM SERPL-SCNC: 3.5 MMOL/L (ref 3.4–5.3)
RBC # BLD AUTO: 3.43 10E6/UL (ref 4.4–5.9)
SODIUM SERPL-SCNC: 141 MMOL/L (ref 135–145)
WBC # BLD AUTO: 12.6 10E3/UL (ref 4–11)

## 2023-11-18 PROCEDURE — 250N000013 HC RX MED GY IP 250 OP 250 PS 637: Performed by: INTERNAL MEDICINE

## 2023-11-18 PROCEDURE — 83735 ASSAY OF MAGNESIUM: CPT | Performed by: FAMILY MEDICINE

## 2023-11-18 PROCEDURE — 999N000157 HC STATISTIC RCP TIME EA 10 MIN

## 2023-11-18 PROCEDURE — 99233 SBSQ HOSP IP/OBS HIGH 50: CPT | Performed by: INTERNAL MEDICINE

## 2023-11-18 PROCEDURE — 94640 AIRWAY INHALATION TREATMENT: CPT

## 2023-11-18 PROCEDURE — 250N000009 HC RX 250: Performed by: INTERNAL MEDICINE

## 2023-11-18 PROCEDURE — 250N000012 HC RX MED GY IP 250 OP 636 PS 637: Performed by: INTERNAL MEDICINE

## 2023-11-18 PROCEDURE — 97129 THER IVNTJ 1ST 15 MIN: CPT | Mod: GO

## 2023-11-18 PROCEDURE — 250N000013 HC RX MED GY IP 250 OP 250 PS 637: Performed by: HOSPITALIST

## 2023-11-18 PROCEDURE — 82310 ASSAY OF CALCIUM: CPT | Performed by: EMERGENCY MEDICINE

## 2023-11-18 PROCEDURE — 85048 AUTOMATED LEUKOCYTE COUNT: CPT | Performed by: EMERGENCY MEDICINE

## 2023-11-18 PROCEDURE — 250N000013 HC RX MED GY IP 250 OP 250 PS 637: Performed by: EMERGENCY MEDICINE

## 2023-11-18 PROCEDURE — 99232 SBSQ HOSP IP/OBS MODERATE 35: CPT | Performed by: INTERNAL MEDICINE

## 2023-11-18 PROCEDURE — 87493 C DIFF AMPLIFIED PROBE: CPT | Performed by: FAMILY MEDICINE

## 2023-11-18 PROCEDURE — 120N000013 HC R&B IMCU

## 2023-11-18 PROCEDURE — 94640 AIRWAY INHALATION TREATMENT: CPT | Mod: 76

## 2023-11-18 PROCEDURE — 250N000011 HC RX IP 250 OP 636: Performed by: EMERGENCY MEDICINE

## 2023-11-18 RX ORDER — POTASSIUM CHLORIDE 1500 MG/1
20 TABLET, EXTENDED RELEASE ORAL ONCE
Status: COMPLETED | OUTPATIENT
Start: 2023-11-18 | End: 2023-11-18

## 2023-11-18 RX ADMIN — QUETIAPINE FUMARATE 25 MG: 25 TABLET ORAL at 08:10

## 2023-11-18 RX ADMIN — IPRATROPIUM BROMIDE AND ALBUTEROL SULFATE 3 ML: .5; 3 SOLUTION RESPIRATORY (INHALATION) at 16:44

## 2023-11-18 RX ADMIN — LORATADINE 10 MG: 10 TABLET ORAL at 08:11

## 2023-11-18 RX ADMIN — POTASSIUM CHLORIDE 20 MEQ: 1500 TABLET, EXTENDED RELEASE ORAL at 08:11

## 2023-11-18 RX ADMIN — CLONAZEPAM 0.5 MG: 0.5 TABLET ORAL at 08:10

## 2023-11-18 RX ADMIN — ESCITALOPRAM OXALATE 20 MG: 20 TABLET ORAL at 12:49

## 2023-11-18 RX ADMIN — QUETIAPINE FUMARATE 25 MG: 25 TABLET ORAL at 18:20

## 2023-11-18 RX ADMIN — DILTIAZEM HYDROCHLORIDE 240 MG: 120 CAPSULE, EXTENDED RELEASE ORAL at 08:10

## 2023-11-18 RX ADMIN — IPRATROPIUM BROMIDE AND ALBUTEROL SULFATE 3 ML: .5; 3 SOLUTION RESPIRATORY (INHALATION) at 00:44

## 2023-11-18 RX ADMIN — THIAMINE HYDROCHLORIDE 100 MG: 100 INJECTION, SOLUTION INTRAMUSCULAR; INTRAVENOUS at 20:07

## 2023-11-18 RX ADMIN — QUETIAPINE FUMARATE 50 MG: 25 TABLET ORAL at 20:07

## 2023-11-18 RX ADMIN — APIXABAN 10 MG: 5 TABLET, FILM COATED ORAL at 08:11

## 2023-11-18 RX ADMIN — AMIODARONE HYDROCHLORIDE 400 MG: 200 TABLET ORAL at 20:07

## 2023-11-18 RX ADMIN — THIAMINE HYDROCHLORIDE 100 MG: 100 INJECTION, SOLUTION INTRAMUSCULAR; INTRAVENOUS at 08:12

## 2023-11-18 RX ADMIN — CLONAZEPAM 0.5 MG: 0.5 TABLET ORAL at 20:17

## 2023-11-18 RX ADMIN — SULFAMETHOXAZOLE AND TRIMETHOPRIM 1 TABLET: 400; 80 TABLET ORAL at 08:11

## 2023-11-18 RX ADMIN — AMIODARONE HYDROCHLORIDE 400 MG: 200 TABLET ORAL at 08:11

## 2023-11-18 RX ADMIN — APIXABAN 10 MG: 5 TABLET, FILM COATED ORAL at 20:07

## 2023-11-18 RX ADMIN — THIAMINE HYDROCHLORIDE 100 MG: 100 INJECTION, SOLUTION INTRAMUSCULAR; INTRAVENOUS at 15:12

## 2023-11-18 RX ADMIN — PREDNISONE 30 MG: 5 TABLET ORAL at 08:10

## 2023-11-18 RX ADMIN — IPRATROPIUM BROMIDE AND ALBUTEROL SULFATE 3 ML: .5; 3 SOLUTION RESPIRATORY (INHALATION) at 07:37

## 2023-11-18 ASSESSMENT — ACTIVITIES OF DAILY LIVING (ADL)
ADLS_ACUITY_SCORE: 33
ADLS_ACUITY_SCORE: 35
ADLS_ACUITY_SCORE: 33
ADLS_ACUITY_SCORE: 35
ADLS_ACUITY_SCORE: 33
ADLS_ACUITY_SCORE: 35
ADLS_ACUITY_SCORE: 35
ADLS_ACUITY_SCORE: 33
ADLS_ACUITY_SCORE: 33

## 2023-11-18 NOTE — PROGRESS NOTES
Assessment/Plan:  1. Atrial fibrillation with RVR:  The patient's ventricular rate is much better controlled, but not still ideally controlled.  Continue oral amiodarone 400 mg oral twice a day for total loading dose 5 g and then change it to 200 mg daily, change increase diltiazem ED to 300 mg daily.  He is on anticoagulation Eliquis due to acute pulmonary embolism.    2.  Acute pulmonary embolism: Started anticoagulation Eliquis per primary team.    3.  Acute respiratory failure due to pneumonitis, history of asthma, delirium, history of melanoma: Please see primary team management for details.    No new cardiology recommendations.  Sign off at this time.  Please call for questions.  The patient is scheduled to follow-up with atrial fibrillation clinic in 2 to 3 weeks post discharge.    Subjective:  Interval History:  The patient has no chest pain, palpitations, shortness of breath.    Current Medications:  Scheduled Meds:   amiodarone  400 mg Oral BID    apixaban ANTICOAGULANT  10 mg Oral BID    Followed by    [START ON 11/20/2023] apixaban ANTICOAGULANT  5 mg Oral BID    clonazePAM  0.5 mg Oral BID    [START ON 11/19/2023] diltiazem ER COATED BEADS  300 mg Oral Daily    escitalopram  20 mg Oral Daily with lunch    insulin aspart  1-6 Units Subcutaneous 4x Daily AC & HS    ipratropium - albuterol 0.5 mg/2.5 mg/3 mL  3 mL Nebulization Q8H    loratadine  10 mg Oral Daily    predniSONE  30 mg Oral Daily    Followed by    [START ON 11/22/2023] predniSONE  20 mg Oral Daily    Followed by    [START ON 11/27/2023] predniSONE  10 mg Oral Daily    QUEtiapine  25 mg Oral BID    QUEtiapine  50 mg Oral At Bedtime    sodium chloride (PF)  10-40 mL Intracatheter Q7 Days    sodium chloride (PF)  3 mL Intracatheter Q8H    sulfamethoxazole-trimethoprim  1 tablet Oral Daily    [Held by provider] tamsulosin  0.4 mg Oral Daily    thiamine  100 mg Intravenous TID     Continuous Infusions:   dextrose Stopped (11/17/23 1030)      PRN Meds:.acetaminophen, albuterol, benzonatate, dextrose, glucose **OR** dextrose **OR** glucagon, lidocaine (viscous), lidocaine, loperamide, melatonin, naloxone **OR** naloxone **OR** naloxone **OR** naloxone, polyethylene glycol, QUEtiapine, sodium chloride (PF), sodium chloride (PF), sodium chloride (PF), sodium chloride (PF), sodium chloride (PF)    Objective:   Vital signs in last 24 hours:  Temp:  [97.5  F (36.4  C)-98.5  F (36.9  C)] 97.9  F (36.6  C)  Pulse:  [] 87  Resp:  [18-20] 19  BP: (111-156)/(70-88) 139/74  SpO2:  [92 %-97 %] 95 %  Weight:   [unfilled]     Physical Exam:  General Appearance:   Sedated in sleep   HEENT:  No scleral icterus; the mucous membranes were moist.   Neck: No cervical bruits. No jugular venous distention   Lungs:   Lungs are clear to auscultation. No crackles. No wheezing.   Cardiovascular:   IRRR, normal first and second heart sounds with no murmurs. No rubs or gallops.     Abdomen:  Soft. No tenderness. Bowels sounds are present   Extremities: No leg edema. Equal posterior tibial pulsese.   Skin: Warm, Dry. No rashes.   Neurologic: Mood and affect are appropriate. No focal deficits.         Cardiographics:   Report: personally reviewed. .      Tele monitoring -  Atrial fib with ventricular rate 90 to 110 bpm    Echocardiogram on November 13, 2023:  1.Left ventricular size, wall motion and function are normal. The ejection  fraction is > 65%.  2.There is mild to moderate concentric left ventricular hypertrophy.  3.Normal right ventricle size and systolic function.  4.No hemodynamically significant valvular abnormalities on 2D or color flow  imaging.  5.Ascending Aorta moderate dilatation is present.  6.The study was technically difficult.  Compared to the prior study dated 11/5/2023, the RV EF is improved.      Lab Results:   personally reviewed.     Lab Results   Component Value Date     11/14/2023    CO2 25 11/14/2023    CO2 21 11/10/2021    BUN 36.5  "11/14/2023    BUN 14 11/10/2021     No results found for: \"CKTOTAL\", \"CKMB\", \"TROPONINI\"  Lab Results   Component Value Date    WBC 12.3 11/14/2023    HGB 11.3 11/14/2023    HCT 35.6 11/14/2023    MCV 90 11/14/2023     11/14/2023     Lab Results   Component Value Date    CHOL 189 04/11/2023    TRIG 193 04/11/2023    HDL 54 04/11/2023    LDL 96 04/11/2023           "

## 2023-11-18 NOTE — PROGRESS NOTES
[facility]    Medicine Progress Note - Hospitalist Service    Date of Admission:  11/2/2023    Assessment & Plan    58 year old male with a past medical history of cutaneous melanoma diagnosed on 3/28/2023 via skin biopsy (Keytruda), mild intermittent asthma, depression, anxiety, recurrent hospitalization for pneumonia discharged home on 10/25. Pt was admitted 10/23 with bilateral infiltrate, treated for possible pneumonia, but did not respond to IV antibiotic therapy.  BAL 11/3 revealed no growth.  Eventually intubated on 11/4 for ARDS/sepsis. Follow-up CT scan revealed extensive bilateral pulmonary infiltrate.  Very small pulmonary emboli on 11/13.  Now treated for chemotherapy-induced pneumonitis.  On tapered dose of oral steroid.  Incidentally developed A-fib with RVR, managed on diltiazem and amiodarone.  Developed acute kidney injury secondary to hemodynamic effects, likely resolving ATN.  Anticipating TCU placement.      Consultants: Cardiology, Nephrology, Pulmonology.    A-fib with RVR  Echocardiogram-EF 65%, mild to moderate left ventricular hypertrophy  Diltiazem 240 mg daily.  Amiodarone 400 mg twice a day  Continue Eliquis.  Appreciate cardiology consult     Acute hypoxic respiratory failure  Chemotherapy induced pneumonitis  Chest CT-extensive bilateral pulmonary infiltrate  Treated with IV Zosyn, Zithromax, micafungin  Status post bronchoscopy  Required intubation 11/4 then extubated on 11/8     Tapering prednisone 30 mg daily.   Pulmonology signed off.   Outpatient chest CT, pulmonary function test in 6 to 8 weeks     Diarrhea  Likely related to medication.   Cdiff PCR was ordered, but not completed.   Continue diet and monitor response.      Pulmonary embolism  Continue Eliquis.     Melanoma  Seeing [place] [facility]  On Keytruda, on hold due to concern for Keytruda induced pneumonitis.      Acute kidney injury,   creatinine peak at 2.1  Trending down, 11/17 1.45 mg/dL.     "  Anemia,Normocytic.  Hemoglobin 10.5 g/dL.   Down from 12.4 on 11/16/23.   Repeat CBC in a.m.      Moderate obesity  Modification of lifestyle for weight loss     Obstructive sleep apnea  Outpatient sleep study      Acute encephalopathy/delirium, resolved  Hypernatremia, resolved        Diet: Regular Diet Adult  Snacks/Supplements Adult: Ensure Enlive; With Meals    DVT Prophylaxis: DOAC  Lamas Catheter: Not present  Lines: PRESENT      PICC 11/04/23 Double Lumen Right Basilic medication drips-Site Assessment: WDL      Cardiac Monitoring: ACTIVE order. Indication: Tachyarrhythmias, acute (48 hours)  Code Status: Full Code      Clinically Significant Risk Factors              # Hypoalbuminemia: Lowest albumin = 2.7 g/dL at 11/3/2023  5:57 AM, will monitor as appropriate            # Obesity: Estimated body mass index is 33.47 kg/m  as calculated from the following:    Height as of this encounter: 1.93 m (6' 4\").    Weight as of this encounter: 124.7 kg (275 lb).      # Financial/Environmental Concerns: none  # Asthma: noted on problem list        Disposition Plan      Expected Discharge Date: 11/19/2023      Destination: home with family  Discharge Comments: HR control, TCU, need placement still.            Avery Barraza DO  Hospitalist Service  [facility]  Securely message with [employer] (more info)  Text page via [employer] Paging/Directory   ______________________________________________________________________    Interval History   No new complaints.  No significant events overnight.  Denies chest pain or shortness of breath.    Physical Exam   Vital Signs: Temp: 97.9  F (36.6  C) Temp src: Oral BP: 139/74 Pulse: 87   Resp: 19 SpO2: 96 % O2 Device: None (Room air)    Weight: 275 lbs 0 oz    GENERAL:  Alert, appears comfortable, in no acute distress, appears stated age   HEAD:  Normocephalic, without obvious abnormality, atraumatic   NECK: Supple, symmetrical, trachea midline   BACK:   " Symmetric, no curvature, ROM normal   LUNGS:   Clear to auscultation bilaterally, no rales, rhonchi, or wheezing, symmetric chest rise on inhalation, respirations unlabored   HEART:  Tachycardic with irregular rate     ABDOMEN:   Soft, no masses, no organomegaly, no rebound or guarding,  mild TTP periumbilical region   EXTREMITIES: Extremities normal, atraumatic, trace to 1+ pedal edema.   SKIN: no exanthems in the visualized areas   NEURO: Alert, oriented x3, moves all four extremities freely   PSYCH: Cooperative, behavior is appropriate        Medical Decision Making       50 MINUTES SPENT BY ME on the date of service doing chart review, history, exam, documentation & further activities per the note.      Data     I have personally reviewed the following data over the past 24 hrs:    12.6 (H)  \   9.8 (L)   / 285     141 107 24.2 (H) /  102 (H)   3.5 25 1.57 (H) \       Imaging results reviewed over the past 24 hrs:   No results found for this or any previous visit (from the past 24 hour(s)).

## 2023-11-18 NOTE — PLAN OF CARE
Problem: Oral Intake Inadequate  Goal: Improved Oral Intake  Outcome: Progressing   Goal Outcome Evaluation:  Pt showed me a picture of the [employer] burrito he ate last night (allegedly), but is otherwise not ordering room service, and no intake has been documented in flow sheets. Pt cites menu fatigue as reason for sporadic intake. Pt reports being tired of [employer] supplements he's getting, has a few unopened at bedside - discontinued these.

## 2023-11-18 NOTE — PROGRESS NOTES
"Patient remains on RA and saturation: 91-96%, HR 90-97, RR 18-22. Nebs given as scheduled and tolerated well. BS clear/diminished bases. RT encouraged him for the \"IS\" machine.  Plan: RT will continue to monitor and support.   "

## 2023-11-18 NOTE — PLAN OF CARE
Goal Outcome Evaluation:  Problem: Pneumonia  Goal: Effective Oxygenation and Ventilation  Outcome: Progressing  Intervention: Promote Airway Secretion Clearance  Intervention: Optimize Oxygenation and Ventilation    Problem: Dysrhythmia  Goal: Normalized Cardiac Rhythm  Outcome: Progressing  Intervention: Monitor and Manage Cardiac Rhythm Effect    Patient A&Ox4, VSS. Converted into NSR at ~1800 hrs. LS clear on RA. Denies pain, SOB, or H/N/V. Up independently in room. Patient had family members bring in food today so patient had a good appetite. Pt had one BM today, still clear and mucous like consistency per patient report. MD made aware of this, will continue to monitor.

## 2023-11-18 NOTE — PROGRESS NOTES
Pt received neb treatment and tolerated well. Pt on RA, with sat in the mid 90's.   RT's will continue to follow plan of care.    Melany Domingo, RT

## 2023-11-18 NOTE — PLAN OF CARE
Problem: Pneumonia  Goal: Effective Oxygenation and Ventilation  Outcome: Progressing     Problem: Risk for Delirium  Goal: Improved Sleep  Outcome: Progressing     Problem: Fall Injury Risk  Goal: Absence of Fall and Fall-Related Injury  Outcome: Progressing     Pt A&Ox4, able to make needs known. Pt denies pain this shift. Tele A-fib rate controlled. Stool sample sent, stool is loose and clear, pt denies abdominal discomfort this shift. Pt sleeping between cares. No other changes this shift.     Alee Byrd, RN  [phone]

## 2023-11-18 NOTE — PLAN OF CARE
Problem: Oral Intake Inadequate  Goal: Improved Oral Intake  Outcome: Progressing     Problem: Dysrhythmia  Goal: Normalized Cardiac Rhythm  Outcome: Progressing    Pt resting comfortably between cares, no events. Up independently in room. Tele reading A Fib RVR, scheduled meds administered, no v-tach runs. Enteric precautions for c-diff rule out, no BM. Good appetite. Denies any pain, SOB or n/v. Awaiting TCU placement.

## 2023-11-19 ENCOUNTER — APPOINTMENT (OUTPATIENT)
Dept: PHYSICAL THERAPY | Facility: CLINIC | Age: 58
End: 2023-11-19
Payer: COMMERCIAL

## 2023-11-19 ENCOUNTER — APPOINTMENT (OUTPATIENT)
Dept: OCCUPATIONAL THERAPY | Facility: CLINIC | Age: 58
End: 2023-11-19
Payer: COMMERCIAL

## 2023-11-19 LAB
ANION GAP SERPL CALCULATED.3IONS-SCNC: 8 MMOL/L (ref 7–15)
BUN SERPL-MCNC: 25.2 MG/DL (ref 6–20)
CALCIUM SERPL-MCNC: 8.6 MG/DL (ref 8.6–10)
CHLORIDE SERPL-SCNC: 108 MMOL/L (ref 98–107)
CREAT SERPL-MCNC: 1.61 MG/DL (ref 0.67–1.17)
DEPRECATED HCO3 PLAS-SCNC: 25 MMOL/L (ref 22–29)
EGFRCR SERPLBLD CKD-EPI 2021: 49 ML/MIN/1.73M2
ERYTHROCYTE [DISTWIDTH] IN BLOOD BY AUTOMATED COUNT: 16.5 % (ref 10–15)
FERRITIN SERPL-MCNC: 1157 NG/ML (ref 31–409)
GLUCOSE BLDC GLUCOMTR-MCNC: 133 MG/DL (ref 70–99)
GLUCOSE BLDC GLUCOMTR-MCNC: 137 MG/DL (ref 70–99)
GLUCOSE BLDC GLUCOMTR-MCNC: 89 MG/DL (ref 70–99)
GLUCOSE SERPL-MCNC: 107 MG/DL (ref 70–99)
HCT VFR BLD AUTO: 27.8 % (ref 40–53)
HGB BLD-MCNC: 8.7 G/DL (ref 13.3–17.7)
IRON BINDING CAPACITY (ROCHE): 249 UG/DL (ref 240–430)
IRON SATN MFR SERPL: 43 % (ref 15–46)
IRON SERPL-MCNC: 107 UG/DL (ref 61–157)
MAGNESIUM SERPL-MCNC: 2.1 MG/DL (ref 1.7–2.3)
MCH RBC QN AUTO: 28.9 PG (ref 26.5–33)
MCHC RBC AUTO-ENTMCNC: 31.3 G/DL (ref 31.5–36.5)
MCV RBC AUTO: 92 FL (ref 78–100)
PLATELET # BLD AUTO: 246 10E3/UL (ref 150–450)
POTASSIUM SERPL-SCNC: 3.3 MMOL/L (ref 3.4–5.3)
POTASSIUM SERPL-SCNC: 3.9 MMOL/L (ref 3.4–5.3)
RBC # BLD AUTO: 3.01 10E6/UL (ref 4.4–5.9)
SODIUM SERPL-SCNC: 141 MMOL/L (ref 135–145)
TRANSFERRIN SERPL-MCNC: 199 MG/DL (ref 200–360)
VIT B12 SERPL-MCNC: 684 PG/ML (ref 232–1245)
WBC # BLD AUTO: 10.8 10E3/UL (ref 4–11)

## 2023-11-19 PROCEDURE — 84132 ASSAY OF SERUM POTASSIUM: CPT | Performed by: INTERNAL MEDICINE

## 2023-11-19 PROCEDURE — 120N000013 HC R&B IMCU

## 2023-11-19 PROCEDURE — 94640 AIRWAY INHALATION TREATMENT: CPT | Mod: 76

## 2023-11-19 PROCEDURE — 250N000013 HC RX MED GY IP 250 OP 250 PS 637: Performed by: EMERGENCY MEDICINE

## 2023-11-19 PROCEDURE — 250N000012 HC RX MED GY IP 250 OP 636 PS 637: Performed by: INTERNAL MEDICINE

## 2023-11-19 PROCEDURE — 250N000013 HC RX MED GY IP 250 OP 250 PS 637: Performed by: HOSPITALIST

## 2023-11-19 PROCEDURE — 94640 AIRWAY INHALATION TREATMENT: CPT

## 2023-11-19 PROCEDURE — 250N000013 HC RX MED GY IP 250 OP 250 PS 637: Performed by: INTERNAL MEDICINE

## 2023-11-19 PROCEDURE — 999N000157 HC STATISTIC RCP TIME EA 10 MIN

## 2023-11-19 PROCEDURE — 82728 ASSAY OF FERRITIN: CPT | Performed by: INTERNAL MEDICINE

## 2023-11-19 PROCEDURE — 83550 IRON BINDING TEST: CPT | Performed by: INTERNAL MEDICINE

## 2023-11-19 PROCEDURE — 99232 SBSQ HOSP IP/OBS MODERATE 35: CPT | Performed by: INTERNAL MEDICINE

## 2023-11-19 PROCEDURE — 97116 GAIT TRAINING THERAPY: CPT | Mod: GP

## 2023-11-19 PROCEDURE — 93005 ELECTROCARDIOGRAM TRACING: CPT | Performed by: INTERNAL MEDICINE

## 2023-11-19 PROCEDURE — 97535 SELF CARE MNGMENT TRAINING: CPT | Mod: GO

## 2023-11-19 PROCEDURE — 250N000011 HC RX IP 250 OP 636: Performed by: EMERGENCY MEDICINE

## 2023-11-19 PROCEDURE — 80048 BASIC METABOLIC PNL TOTAL CA: CPT | Performed by: EMERGENCY MEDICINE

## 2023-11-19 PROCEDURE — 82607 VITAMIN B-12: CPT | Performed by: INTERNAL MEDICINE

## 2023-11-19 PROCEDURE — 83735 ASSAY OF MAGNESIUM: CPT | Performed by: INTERNAL MEDICINE

## 2023-11-19 PROCEDURE — 250N000009 HC RX 250: Performed by: INTERNAL MEDICINE

## 2023-11-19 PROCEDURE — 85027 COMPLETE CBC AUTOMATED: CPT | Performed by: EMERGENCY MEDICINE

## 2023-11-19 PROCEDURE — 93005 ELECTROCARDIOGRAM TRACING: CPT

## 2023-11-19 PROCEDURE — 84466 ASSAY OF TRANSFERRIN: CPT | Performed by: INTERNAL MEDICINE

## 2023-11-19 RX ORDER — POTASSIUM CHLORIDE 1500 MG/1
40 TABLET, EXTENDED RELEASE ORAL ONCE
Status: COMPLETED | OUTPATIENT
Start: 2023-11-19 | End: 2023-11-19

## 2023-11-19 RX ADMIN — CLONAZEPAM 0.5 MG: 0.5 TABLET ORAL at 09:27

## 2023-11-19 RX ADMIN — THIAMINE HYDROCHLORIDE 100 MG: 100 INJECTION, SOLUTION INTRAMUSCULAR; INTRAVENOUS at 21:22

## 2023-11-19 RX ADMIN — THIAMINE HYDROCHLORIDE 100 MG: 100 INJECTION, SOLUTION INTRAMUSCULAR; INTRAVENOUS at 15:15

## 2023-11-19 RX ADMIN — APIXABAN 10 MG: 5 TABLET, FILM COATED ORAL at 09:28

## 2023-11-19 RX ADMIN — DILTIAZEM HYDROCHLORIDE 300 MG: 120 CAPSULE, EXTENDED RELEASE ORAL at 09:27

## 2023-11-19 RX ADMIN — PREDNISONE 30 MG: 5 TABLET ORAL at 09:27

## 2023-11-19 RX ADMIN — QUETIAPINE FUMARATE 50 MG: 25 TABLET ORAL at 21:23

## 2023-11-19 RX ADMIN — APIXABAN 10 MG: 5 TABLET, FILM COATED ORAL at 21:23

## 2023-11-19 RX ADMIN — AMIODARONE HYDROCHLORIDE 400 MG: 200 TABLET ORAL at 21:23

## 2023-11-19 RX ADMIN — ESCITALOPRAM OXALATE 20 MG: 20 TABLET ORAL at 12:36

## 2023-11-19 RX ADMIN — IPRATROPIUM BROMIDE AND ALBUTEROL SULFATE 3 ML: .5; 3 SOLUTION RESPIRATORY (INHALATION) at 08:35

## 2023-11-19 RX ADMIN — DICLOFENAC 4 G: 10 GEL TOPICAL at 15:26

## 2023-11-19 RX ADMIN — THIAMINE HYDROCHLORIDE 100 MG: 100 INJECTION, SOLUTION INTRAMUSCULAR; INTRAVENOUS at 09:23

## 2023-11-19 RX ADMIN — LORATADINE 10 MG: 10 TABLET ORAL at 09:27

## 2023-11-19 RX ADMIN — SULFAMETHOXAZOLE AND TRIMETHOPRIM 1 TABLET: 400; 80 TABLET ORAL at 09:33

## 2023-11-19 RX ADMIN — AMIODARONE HYDROCHLORIDE 400 MG: 200 TABLET ORAL at 09:27

## 2023-11-19 RX ADMIN — QUETIAPINE FUMARATE 25 MG: 25 TABLET ORAL at 09:27

## 2023-11-19 RX ADMIN — ACETAMINOPHEN 650 MG: 325 TABLET ORAL at 18:15

## 2023-11-19 RX ADMIN — IPRATROPIUM BROMIDE AND ALBUTEROL SULFATE 3 ML: .5; 3 SOLUTION RESPIRATORY (INHALATION) at 16:05

## 2023-11-19 RX ADMIN — POTASSIUM CHLORIDE 40 MEQ: 1500 TABLET, EXTENDED RELEASE ORAL at 06:12

## 2023-11-19 RX ADMIN — CLONAZEPAM 0.5 MG: 0.5 TABLET ORAL at 21:23

## 2023-11-19 ASSESSMENT — ACTIVITIES OF DAILY LIVING (ADL)
ADLS_ACUITY_SCORE: 33

## 2023-11-19 NOTE — PLAN OF CARE
Pt slept well through the night with minimal interruptions.  Vitals stable.  No sign of delirium.  Pt's potassium replaced this AM per protocol.  Continue to monitor.        Problem: Delirium  Goal: Optimal Coping  Outcome: Met  Goal: Improved Behavioral Control  Outcome: Met  Intervention: Minimize Safety Risk  Goal: Improved Attention and Thought Clarity  Outcome: Met    Goal: Improved Sleep  Outcome: Met    Problem: Electrolyte Imbalance  Goal: Electrolyte Balance  Outcome: Progressing

## 2023-11-19 NOTE — PROGRESS NOTES
Occupational Therapy Discharge Summary    Reason for therapy discharge:    All goals and outcomes met, no further needs identified.    Progress towards therapy goal(s). See goals on Care Plan in [facility] electronic health record for goal details.  Goals met    Therapy recommendation(s):    Pt appropriate for discharge home with assist and supports in place (assist with med management/finances/driving assessment prior to resuming driving).     Sonja Prieto OT 11/19/2023

## 2023-11-19 NOTE — CARE PLAN
A&Ox4, VSS. NSR w/ prolonged Qtc. MD notified will order EKG. Denies pain, SOB or H/N/V. Up independently in room, cleared by OT/PT services.

## 2023-11-19 NOTE — PROGRESS NOTES
Physical Therapy Discharge Summary    Reason for therapy discharge:    Discharged to home with outpatient therapy.    Progress towards therapy goal(s). See goals on Care Plan in [facility] electronic health record for goal details.  Goals met    Therapy recommendation(s):    Continued therapy is recommended.  Rationale/Recommendations:  Patient would benefit from further outpaitent PT to address bilateral knee pain and deconditioning.

## 2023-11-20 VITALS
OXYGEN SATURATION: 98 % | WEIGHT: 244.5 LBS | BODY MASS INDEX: 29.77 KG/M2 | SYSTOLIC BLOOD PRESSURE: 137 MMHG | RESPIRATION RATE: 18 BRPM | TEMPERATURE: 98 F | DIASTOLIC BLOOD PRESSURE: 69 MMHG | HEIGHT: 76 IN | HEART RATE: 62 BPM

## 2023-11-20 LAB
ANION GAP SERPL CALCULATED.3IONS-SCNC: 8 MMOL/L (ref 7–15)
ATRIAL RATE - MUSE: 68 BPM
BUN SERPL-MCNC: 19.7 MG/DL (ref 6–20)
CALCIUM SERPL-MCNC: 8.8 MG/DL (ref 8.6–10)
CHLORIDE SERPL-SCNC: 109 MMOL/L (ref 98–107)
CREAT SERPL-MCNC: 1.54 MG/DL (ref 0.67–1.17)
DEPRECATED HCO3 PLAS-SCNC: 24 MMOL/L (ref 22–29)
DIASTOLIC BLOOD PRESSURE - MUSE: NORMAL MMHG
EGFRCR SERPLBLD CKD-EPI 2021: 52 ML/MIN/1.73M2
ERYTHROCYTE [DISTWIDTH] IN BLOOD BY AUTOMATED COUNT: 16.9 % (ref 10–15)
GLUCOSE BLDC GLUCOMTR-MCNC: 112 MG/DL (ref 70–99)
GLUCOSE BLDC GLUCOMTR-MCNC: 88 MG/DL (ref 70–99)
GLUCOSE SERPL-MCNC: 91 MG/DL (ref 70–99)
HCT VFR BLD AUTO: 28.5 % (ref 40–53)
HEMOCCULT STL QL: POSITIVE
HGB BLD-MCNC: 8.9 G/DL (ref 13.3–17.7)
INTERPRETATION ECG - MUSE: NORMAL
MAGNESIUM SERPL-MCNC: 2 MG/DL (ref 1.7–2.3)
MCH RBC QN AUTO: 28.6 PG (ref 26.5–33)
MCHC RBC AUTO-ENTMCNC: 31.2 G/DL (ref 31.5–36.5)
MCV RBC AUTO: 92 FL (ref 78–100)
P AXIS - MUSE: 22 DEGREES
PLATELET # BLD AUTO: 281 10E3/UL (ref 150–450)
POTASSIUM SERPL-SCNC: 3.4 MMOL/L (ref 3.4–5.3)
PR INTERVAL - MUSE: 164 MS
QRS DURATION - MUSE: 96 MS
QT - MUSE: 414 MS
QTC - MUSE: 440 MS
R AXIS - MUSE: 39 DEGREES
RBC # BLD AUTO: 3.11 10E6/UL (ref 4.4–5.9)
SODIUM SERPL-SCNC: 141 MMOL/L (ref 135–145)
SYSTOLIC BLOOD PRESSURE - MUSE: NORMAL MMHG
T AXIS - MUSE: 40 DEGREES
VENTRICULAR RATE- MUSE: 68 BPM
WBC # BLD AUTO: 9.5 10E3/UL (ref 4–11)

## 2023-11-20 PROCEDURE — 250N000012 HC RX MED GY IP 250 OP 636 PS 637: Performed by: INTERNAL MEDICINE

## 2023-11-20 PROCEDURE — 250N000013 HC RX MED GY IP 250 OP 250 PS 637: Performed by: HOSPITALIST

## 2023-11-20 PROCEDURE — 82272 OCCULT BLD FECES 1-3 TESTS: CPT | Performed by: INTERNAL MEDICINE

## 2023-11-20 PROCEDURE — 250N000013 HC RX MED GY IP 250 OP 250 PS 637: Performed by: EMERGENCY MEDICINE

## 2023-11-20 PROCEDURE — 250N000013 HC RX MED GY IP 250 OP 250 PS 637: Performed by: INTERNAL MEDICINE

## 2023-11-20 PROCEDURE — 83735 ASSAY OF MAGNESIUM: CPT | Performed by: INTERNAL MEDICINE

## 2023-11-20 PROCEDURE — 93010 ELECTROCARDIOGRAM REPORT: CPT | Mod: RTG | Performed by: INTERNAL MEDICINE

## 2023-11-20 PROCEDURE — 250N000013 HC RX MED GY IP 250 OP 250 PS 637

## 2023-11-20 PROCEDURE — 80048 BASIC METABOLIC PNL TOTAL CA: CPT | Performed by: EMERGENCY MEDICINE

## 2023-11-20 PROCEDURE — 99239 HOSP IP/OBS DSCHRG MGMT >30: CPT | Performed by: INTERNAL MEDICINE

## 2023-11-20 PROCEDURE — 250N000011 HC RX IP 250 OP 636: Performed by: EMERGENCY MEDICINE

## 2023-11-20 PROCEDURE — 85027 COMPLETE CBC AUTOMATED: CPT | Performed by: EMERGENCY MEDICINE

## 2023-11-20 RX ORDER — QUETIAPINE FUMARATE 25 MG/1
25 TABLET, FILM COATED ORAL 2 TIMES DAILY
Qty: 60 TABLET | Refills: 1 | Status: ON HOLD | OUTPATIENT
Start: 2023-11-20 | End: 2024-01-03

## 2023-11-20 RX ORDER — POTASSIUM CHLORIDE 1500 MG/1
40 TABLET, EXTENDED RELEASE ORAL ONCE
Status: COMPLETED | OUTPATIENT
Start: 2023-11-20 | End: 2023-11-20

## 2023-11-20 RX ORDER — DILTIAZEM HYDROCHLORIDE 300 MG/1
300 CAPSULE, COATED, EXTENDED RELEASE ORAL DAILY
Qty: 30 CAPSULE | Refills: 3 | Status: ON HOLD | OUTPATIENT
Start: 2023-11-21 | End: 2024-01-03

## 2023-11-20 RX ORDER — QUETIAPINE FUMARATE 50 MG/1
50 TABLET, FILM COATED ORAL AT BEDTIME
Qty: 30 TABLET | Refills: 1 | Status: ON HOLD | OUTPATIENT
Start: 2023-11-20 | End: 2024-01-03

## 2023-11-20 RX ORDER — PREDNISONE 10 MG/1
TABLET ORAL
Qty: 18 TABLET | Refills: 0 | Status: SHIPPED | OUTPATIENT
Start: 2023-11-21 | End: 2023-12-02

## 2023-11-20 RX ORDER — SULFAMETHOXAZOLE AND TRIMETHOPRIM 400; 80 MG/1; MG/1
1 TABLET ORAL DAILY
Qty: 11 TABLET | Refills: 0 | Status: SHIPPED | OUTPATIENT
Start: 2023-11-21 | End: 2023-12-20

## 2023-11-20 RX ORDER — AMIODARONE HYDROCHLORIDE 200 MG/1
200 TABLET ORAL DAILY
Qty: 30 TABLET | Refills: 3 | Status: ON HOLD | OUTPATIENT
Start: 2023-11-20 | End: 2024-02-27

## 2023-11-20 RX ADMIN — ACETAMINOPHEN 650 MG: 325 TABLET ORAL at 11:50

## 2023-11-20 RX ADMIN — DILTIAZEM HYDROCHLORIDE 300 MG: 120 CAPSULE, EXTENDED RELEASE ORAL at 08:24

## 2023-11-20 RX ADMIN — AMIODARONE HYDROCHLORIDE 400 MG: 200 TABLET ORAL at 08:18

## 2023-11-20 RX ADMIN — SULFAMETHOXAZOLE AND TRIMETHOPRIM 1 TABLET: 400; 80 TABLET ORAL at 11:50

## 2023-11-20 RX ADMIN — THIAMINE HYDROCHLORIDE 100 MG: 100 INJECTION, SOLUTION INTRAMUSCULAR; INTRAVENOUS at 08:18

## 2023-11-20 RX ADMIN — POTASSIUM CHLORIDE 40 MEQ: 1500 TABLET, EXTENDED RELEASE ORAL at 11:50

## 2023-11-20 RX ADMIN — APIXABAN 10 MG: 5 TABLET, FILM COATED ORAL at 11:53

## 2023-11-20 RX ADMIN — PREDNISONE 30 MG: 5 TABLET ORAL at 08:18

## 2023-11-20 RX ADMIN — CLONAZEPAM 0.5 MG: 0.5 TABLET ORAL at 08:17

## 2023-11-20 RX ADMIN — LORATADINE 10 MG: 10 TABLET ORAL at 08:18

## 2023-11-20 RX ADMIN — QUETIAPINE FUMARATE 25 MG: 25 TABLET ORAL at 08:18

## 2023-11-20 RX ADMIN — ESCITALOPRAM OXALATE 20 MG: 20 TABLET ORAL at 11:50

## 2023-11-20 ASSESSMENT — ACTIVITIES OF DAILY LIVING (ADL)
ADLS_ACUITY_SCORE: 33

## 2023-11-20 NOTE — PROGRESS NOTES
[employer] WNL. Pt up at 1:30 for large formed BM + for occult. Allowed to sleep through rest of shift. SR without ectopy.

## 2023-11-20 NOTE — PLAN OF CARE
Throughout shift patient up ad radha and walked in the hallway multiple times. Complaints of knee pain with improvement with tylenol and voltaren cream. Patient had food brought in by family. Vitally stable. PICC line dressing change and caps changed.       Problem: Adult Inpatient Plan of Care  Goal: Readiness for Transition of Care  Outcome: Progressing

## 2023-11-20 NOTE — DISCHARGE SUMMARY
"[facility]  Hospitalist Discharge Summary      Date of Admission:  11/2/2023  Date of Discharge:  11/20/2023  Discharging Provider: Avery Barraza DO  Discharge Service: Hospitalist Service    Discharge Diagnoses   New onset atrial fibrillation with RVR  Acute respiratory failure with hypoxia  Drug-induced pneumonitis  Diarrhea  Pulmonary emboli  Melanoma  Acute kidney injury  Normocytic anemia  History of colonic polyps  Obstructive sleep apnea  Acute encephalopathy  Hyponatremia      Clinically Significant Risk Factors     # Overweight: Estimated body mass index is 29.76 kg/m  as calculated from the following:    Height as of this encounter: 1.93 m (6' 4\").    Weight as of this encounter: 110.9 kg (244 lb 8 oz).       Follow-ups Needed After Discharge   Follow-up Appointments     Follow-up and recommended labs and tests       Follow up with primary care provider, Luis Alberto Fatima, within 7 days for   hospital follow- up anemia, atrial fibrillation, chemotherapy pneumonitis.    The following labs/tests are recommended: CBC, base metabolic profile..    Follow up with [facility] as previously arranged.   Atrial fibrillation clinic follow-up in 10 to 14 days.            Unresulted Labs Ordered in the Past 30 Days of this Admission       Date and Time Order Name Status Description    11/6/2023  9:33 PM Fungal or Yeast Culture Routine Preliminary     11/3/2023 10:46 AM Nocardia culture - BAL Site 1 Preliminary     11/3/2023 10:46 AM Fungus Culture, non-blood - BAL Site 1 Preliminary     11/3/2023 10:46 AM Acid-Fast Bacilli Culture and Stain with AFB Stain In process         These results will be followed up by [facility]    Discharge Disposition   Discharged to home  Condition at discharge: Stable    Hospital Course   58 year old male with history of cutaneous melanoma diagnosed on 3/28/2023 via skin biopsy (chemotherapy: Keytruda), mild intermittent asthma, depression, anxiety, recurrent " hospitalization for pneumonia discharged home on 10/25. Pt was admitted 10/23 diagnosed with possible bilateral pneumonia, but did not respond to IV antibiotic therapy.  BAL 11/3 revealed no growth.  Eventually intubated on 11/4 for ARDS/sepsis. Follow-up CT scan revealed extensive bilateral pulmonary infiltrate.  Very small pulmonary emboli on 11/13.  Now treated for chemotherapy-induced pneumonitis.  On tapered dose of oral steroid.  Incidentally developed A-fib with RVR, managed on diltiazem and amiodarone.  Developed acute kidney injury secondary to hemodynamic effects, likely resolving ATN.  Patient had development of normocytic anemia.  Hemoglobin dropped from 12-8.9.  Patient's hemoglobin was monitored for 48 hours remained stable.  Blood occult testing was positive.  Patient had no melena, hematochezia or passage of clots during hospitalization.  He reports last colonoscopy was approximately 9 years ago and was recommended screening colonoscopy in 3 years from minoo procedure.  Patient to follow-up with [facility] to complete colonoscopy as outpatient.  We will have close follow-up with primary care for anemia monitoring.    Consultations This Hospital Stay   PULMONARY IP CONSULT  PHARMACY TO DOSE VANCO  INFECTIOUS DISEASES IP CONSULT  CARE MANAGEMENT / SOCIAL WORK IP CONSULT  VASCULAR ACCESS ADULT IP CONSULT  NUTRITION SERVICES ADULT IP CONSULT  PHARMACY IP CONSULT  PHYSICAL THERAPY ADULT IP CONSULT  OCCUPATIONAL THERAPY ADULT IP CONSULT  HEMATOLOGY & ONCOLOGY IP CONSULT  SPEECH LANGUAGE PATH ADULT IP CONSULT  CARDIOLOGY IP CONSULT  PHARMACY IP CONSULT  PSYCHIATRY IP CONSULT  NEPHROLOGY IP CONSULT  PSYCHIATRY IP CONSULT    Code Status   Full Code    Time Spent on this Encounter   IAvery DO, personally saw the patient today and spent greater than 30 minutes discharging this patient.       Avery Barraza DO  [facility] 3 ICU  [address]  [place]  [facility] [place] [address]  Phone: [phone]  Fax: [phone]  ______________________________________________________________________    Physical Exam   Vital Signs: Temp: 97.7  F (36.5  C) Temp src: Oral BP: 128/76 Pulse: 67   Resp: 16 SpO2: 96 % O2 Device: None (Room air)    Weight: 244 lbs 8 oz  GENERAL:  Alert, appears comfortable, in no acute distress, appears stated age   HEAD:  Normocephalic, without obvious abnormality, atraumatic   NECK: Supple, symmetrical, trachea midline   BACK:   Symmetric, no curvature, ROM normal   LUNGS:   Clear to auscultation bilaterally, no rales, rhonchi, or wheezing, symmetric chest rise on inhalation, respirations unlabored   HEART:  RRR, no murmur.   ABDOMEN:   Soft, no masses, no organomegaly, no rebound or guarding,  mild TTP periumbilical region   EXTREMITIES: Extremities normal, atraumatic, trace to 1+ pedal edema.   SKIN: no exanthems in the visualized areas   NEURO: Alert, oriented x3, moves all four extremities freely   PSYCH: Cooperative, behavior is appropriate            Primary Care Physician   Luis Alberto Fatima    Discharge Orders      Adult Sleep Eval & Management  Referral      Physical Therapy Referral      Primary Care - Care Coordination Referral      Adult Cardiology Eval  Referral      Physical Therapy Referral      Occupational Therapy Referral      Adult GI  Referral - Procedure Only      Reason for your hospital stay    Chemotherapy induced pneumonitis, atrial fibrillation with RVR, pulmonary embolism.     Follow-up and recommended labs and tests     Follow up with primary care provider, Luis Alberto Fatima, within 7 days for hospital follow- up anemia, atrial fibrillation, chemotherapy pneumonitis.  The following labs/tests are recommended: CBC, base metabolic profile..    Follow up with [facility] as previously arranged.   Atrial fibrillation clinic follow-up in 10 to 14 days.     Activity    Your activity upon  discharge: activity as tolerated     When to contact your care team    Call your primary doctor if you have any of the following: temperature greater than 100.4,  increased shortness of breath, increased swelling, or increased pain.     Diet    Follow this diet upon discharge: Orders Placed This Encounter      Regular Diet Adult       Significant Results and Procedures   Most Recent 3 CBC's:  Recent Labs   Lab Test 11/20/23  0834 11/19/23  0446 11/18/23  0511   WBC 9.5 10.8 12.6*   HGB 8.9* 8.7* 9.8*   MCV 92 92 90    246 285     Most Recent 3 BMP's:  Recent Labs   Lab Test 11/20/23  1151 11/20/23  0838 11/20/23  0834 11/19/23  0936 11/19/23  0922 11/19/23  0446 11/18/23  0800 11/18/23  0511   NA  --   --  141  --   --  141  --  141   POTASSIUM  --   --  3.4  --  3.9 3.3*  --  3.5   CHLORIDE  --   --  109*  --   --  108*  --  107   CO2  --   --  24  --   --  25  --  25   BUN  --   --  19.7  --   --  25.2*  --  24.2*   CR  --   --  1.54*  --   --  1.61*  --  1.57*   ANIONGAP  --   --  8  --   --  8  --  9   MELISSA  --   --  8.8  --   --  8.6  --  8.9   * 88 91   < >  --  107*   < > 96    < > = values in this interval not displayed.     Most Recent 2 LFT's:  Recent Labs   Lab Test 11/14/23  0339 11/04/23  0421   AST 9 21   ALT 21 23   ALKPHOS 133* 293*   BILITOTAL 0.5 0.9     Most Recent 3 INR's:No lab results found.  Most Recent D-dimer:  Recent Labs   Lab Test 11/12/23 2008   DD 2.10*     7-Day Micro Results       Collected Updated Procedure Result Status      11/18/2023 0057 11/18/2023 1052 C. difficile Toxin B PCR with reflex to C. difficile Antigen and Toxins A/B EIA [[id number]]    Stool from Per Rectum    Final result Component Value   C Difficile Toxin B by PCR Negative   A negative result does not exclude actual disease due to C. difficile and may be due to improper collection, handling and storage of the specimen or the number of organisms in the specimen is below the detection limit of the  assay.                  Most Recent Hemoglobin A1c:  Recent Labs   Lab Test 11/05/23  1244   A1C 6.3*     Most Recent Urinalysis:  Recent Labs   Lab Test 11/10/21  1438   COLOR Yellow   APPEARANCE Clear   URINEGLC Negative   URINEBILI Negative   URINEKETONE Negative   SG 1.020   UBLD Negative   URINEPH 5.5   PROTEIN Negative   UROBILINOGEN 0.2   NITRITE Negative   LEUKEST Negative     Most Recent ESR & CRP:  Recent Labs   Lab Test 11/15/23  0405   CRPI 6.69*   ,   Results for orders placed or performed during the hospital encounter of 11/02/23   CT Chest Pulmonary Embolism w Contrast    Narrative    EXAM: CT CHEST PULMONARY EMBOLISM W CONTRAST  LOCATION: [facility]  DATE: 11/2/2023    INDICATION: cough, hypoxia, recent hospitalization x 9 days  for CAP  COMPARISON: CTA chest 10/23/2023  TECHNIQUE: CT chest pulmonary angiogram during arterial phase injection of IV contrast. Multiplanar reformats and MIP reconstructions were performed. Dose reduction techniques were used.   CONTRAST: Isovue 370 100mL     FINDINGS:  ANGIOGRAM CHEST: Pulmonary arteries are normal caliber and negative for pulmonary emboli. Thoracic aorta is negative for dissection. No CT evidence of right heart strain.    LUNGS AND PLEURA: Increased consolidation within the right upper, right middle, right lower, left upper, and left lower lobes. Atoll sign in the left upper lobe (7/46). No effusion or empyema. No pneumothorax.    MEDIASTINUM/AXILLAE: Normal heart size. No pericardial effusion. Reactive mediastinal lymphadenopathy.    CORONARY ARTERY CALCIFICATION: Mild.    UPPER ABDOMEN: Hepatic steatosis.    MUSCULOSKELETAL: No acute abnormality.      Impression    IMPRESSION:  1.  No pulmonary embolism.   2.  Increased consolidation associated with the multifocal pneumonia (right greater than left lungs). Appearance is nonspecific but could be seen in the setting of atypical infection (including fungal organisms) or bacterial  pneumonia. No empyema or   other complication.   Abdomen US, limited (RUQ only)    Narrative    EXAM: US ABDOMEN LIMITED  LOCATION: [facility]  DATE: 11/2/2023    INDICATION: sepsis, cough, difficulty taking in big breath, poor appetite, elevating alk phos with recent hospitalization.  COMPARISON: None.  TECHNIQUE: Limited abdominal ultrasound.    FINDINGS:    GALLBLADDER: Normal. No gallstones, wall thickening, or pericholecystic fluid. Negative sonographic Aguilar's sign.    BILE DUCTS: No biliary dilatation. The common duct measures 3 mm.    LIVER: Normal parenchyma with smooth contour. No focal mass.    RIGHT KIDNEY: No hydronephrosis.    PANCREAS: The visualized portions are normal.    No ascites.      Impression    IMPRESSION:  Normal limited abdominal ultrasound.       XR Chest Port 1 View    Narrative    EXAM: XR CHEST PORT 1 VIEW  LOCATION: [facility]  DATE: 11/4/2023    INDICATION: PICC placement  COMPARISON: Chest radiograph 03/01/2014 and CTA chest 11/02/2023.      Impression    IMPRESSION: Right PICC is in place with tip overlying the low SVC. Increased right lung opacities with near complete opacification of the right hemithorax. Mild left upper lung opacities. No pneumothorax.   XR Chest Port 1 View    Narrative    EXAM: XR CHEST PORT 1 VIEW  LOCATION: [facility]  DATE: 11/4/2023    INDICATION: Endotracheal tube positioning  COMPARISON: 11/04/2023 at 1200 hours and chest CTA 11/02/2023      Impression    IMPRESSION: Endotracheal tube is 3.5 cm above the ingrid. Orogastric tube tip is below diaphragm. Heart size magnified in AP projection. Unchanged near complete opacification of the right hemithorax do to airspace consolidation +/- small pleural   effusion, with some sparing of the apex. Minimal left basilar subsegmental atelectasis. No pneumothorax.   XR Chest Port 1 View    Narrative    EXAM: XR CHEST PORT 1 VIEW  LOCATION:  [facility]  DATE: 11/5/2023    INDICATION: ARDS, intubated. right lung opacification. eval for interval change  COMPARISON: Chest radiograph 11/04/2023.      Impression    IMPRESSION: Stable size of cardiomediastinal silhouette. Endotracheal tube with tip 2.7 cm above the ingrid. Right upper extremity PICC with tip overlying the right atrium. Nasogastric tube courses below the diaphragm, tip beyond the field-of-view. New   esophageal temperature probe noted. No significant change in volume loss and airspace opacification in the right lung. Possible small associated pleural effusion. Left lung is grossly clear. No pneumothorax. Bones are unchanged.   XR Chest Port 1 View    Narrative    EXAM: XR CHEST PORT 1 VIEW  LOCATION: [facility]  DATE: 11/6/2023    INDICATION: back supine, et tube placement  COMPARISON: 11/05/2023      Impression    IMPRESSION: Tracheostomy tube 3 cm from the ingrid. Enteric tube entering the stomach. Esophageal probe in the mid esophagus. Right central line tip in the low SVC/right atrial junction similar to previous.    No significant change in the patchy infiltrates right lung laterally with elevation of the right hemidiaphragm. Left lung remains clear and expanded.   XR Abdomen Port 1 View    Narrative    EXAM: XR ABDOMEN PORT 1 VIEW  LOCATION: [facility]  DATE: 11/7/2023    INDICATION: Confirm TF placement.  COMPARISON: None.      Impression    IMPRESSION: Scattered gas and stool seen throughout nondistended large and small bowel. An enteric feeding tube courses below the diaphragm with the distal tip overlying the expected position of the gastric body.   XR Chest Port 1 View    Narrative    EXAM: XR CHEST PORT 1 VIEW  LOCATION: [facility]  DATE: 11/8/2023    INDICATION: extubated  COMPARISON: 11/06/2023      Impression    IMPRESSION: Endotracheal tube removed. Right PICC tip projects  over the lower SVC. Increasing right chest opacity, with tracheal shift rightward. This is most likely due to atelectasis. The right hemidiaphragm is elevated, similar to previous. There is   limited inspiratory volume on the left. No significant left infiltrate or pulmonary edema.   XR Chest Port 1 View    Narrative    EXAM: XR CHEST PORT 1 VIEW  LOCATION: [facility]  DATE: 11/9/2023    INDICATION: Shortness of breath.  COMPARISON: 11/08/2023      Impression    IMPRESSION: Right-sided PICC tip in good position at cavoatrial junction. Heart size magnified in AP projection. Pulmonary venous congestion accentuated by shallow inspiration. Consolidative airspace opacities within the right lung and minimal left   basilar atelectasis unchanged. No pneumothorax.   XR Chest Port 1 View    Narrative    EXAM: XR CHEST PORT 1 VIEW  LOCATION: [facility]  DATE: 11/12/2023    INDICATION: Shortness of breath  COMPARISON: 11/09/2023 and older studies. CT chest 11/02/2023      Impression    IMPRESSION: Right upper extremity PICC line catheter overlies the distal SVC. Chronic elevation of the right hemidiaphragm.     Slightly better aeration of the right lung with slight decrease in the ill-defined patchy areas of consolidation.. Left lung remains clear. Heart is of normal size.   CT Chest Pulmonary Embolism w Contrast     Value    Radiologist flags New diagnosis of pulmonary embolism (AA)    Narrative    EXAM: CT CHEST PULMONARY EMBOLISM W CONTRAST  LOCATION: [facility]  DATE: 11/13/2023    INDICATION: high dimer, eval for PE.  COMPARISON: CTAs chest 11/2/2023 and 10/23/2023  TECHNIQUE: CT chest pulmonary angiogram during arterial phase injection of IV contrast. Multiplanar reformats and MIP reconstructions were performed. Dose reduction techniques were used.   CONTRAST: Isovue 370 75mL    FINDINGS:  ANGIOGRAM CHEST: Several thin short segment pulmonary  emboli have developed in the posterior and lateral basilar left lower lobe pulmonary artery arteries (image 136 axial series 6). Pulmonary arteries are normal caliber and there are no signs of right   heart strain. Normal caliber thoracic aorta with no CTA signs of acute aortic syndrome.    LUNGS AND PLEURA: Moderate elevation right hemidiaphragm, mild elevation left hemidiaphragm and the associated platelike atelectasis throughout the paradiaphragmatic right and left lower lung parenchyma unchanged.    Peribronchovascular distribution consolidation, volume loss and interlobular septal thickening throughout the mid and upper right lung and scattered foci of groundglass density opacity throughout the left lung have decreased slightly. No pleural effusion   or pneumothorax.    MEDIASTINUM/AXILLAE: Heart size normal. No pericardial effusion. No lymphadenopathy.    CORONARY ARTERY CALCIFICATION: Mild.    UPPER ABDOMEN: Unremarkable.    MUSCULOSKELETAL: Unremarkable.      Impression    IMPRESSION:  1.  Several thin short segment pulmonary emboli have developed in the posterior and lateral basilar left lower lobe pulmonary artery arteries. No signs of right heart strain.   2.  Multifocal pneumonia, right greater than left, has improved minimally.  3.  Moderate elevation right hemidiaphragm, mild elevation left hemidiaphragm and the associated platelike atelectasis throughout the paradiaphragmatic right and left lower lung parenchyma unchanged.      [Critical Result: New diagnosis of pulmonary embolism]    Finding was identified on 2023 12:51 PM CST.     Dr. Gregorio was contacted by me on 2023 1:01 PM CST and verbalized understanding of the critical result.    Echocardiogram Complete    Narrative    [id number]  [id number]  [id number]  [id number]^[place] [address]     Name: MISSY ALMANZA IRMA  MRN: [id number]  : 1965  Study Date: 2023 11:09 AM  Age: 58  yrs  Gender: Male  Patient Location: [facility]  Reason For Study: Abn EKG  Ordering Physician: FIDEL VELA  Performed By: MB     BSA: 2.4 m2  Height: 72 in  Weight: 266 lb  HR: 65  BP: 105/56 mmHg  ______________________________________________________________________________  Procedure  Complete Echo Adult. Definity ([facility]C #60400-200) given intravenously.  ______________________________________________________________________________  Interpretation Summary     1. Technically difficult study (useful imaging essentially could only be  obtained from the subcostal view).  2. The left ventricle is normal in size. Image quality does not provide for  detailed assessment of LV systolic function, but is felt to be normal with a  visually estimated ejection fraction of roughly 55-60%.  3. No significant valvular heart disease is identified on this study though  the sensitivity, particularly of regurgitant lesions, is reduced due to poor  Doppler acoustics.  4. On selected views, the right ventricle appears mildly enlarged with  moderately reduced systolic performance (the right ventricle is only  visualized from the subcostal view).  5. There is biatrial enlargement (difficult to quantify due to suboptimal  acoustic imaging)  6. The IVC appears dilated with decreased phasic variation in caval diameter  consistent with elevated right atrial pressure.  ______________________________________________________________________________  Left ventricle:  The left ventricle is normal in size. Image quality does not provide for  detailed assessment of LV systolic function, but is felt to be normal with a  visually estimated ejection fraction of roughly 55-60%. There is normal  regional wall motion based on limited views available. Left ventricular wall  thickness is normal.     Assessment of LV Diastolic Function: The cumulative findings are indeterminate  in the evaluation of diastolic function.     Right ventricle:  On selected views,  the right ventricle appears mildly enlarged with moderately  reduced systolic performance (the right ventricle is only visualized from the  subcostal view).     Left atrium:  The left atrium appears enlarged (difficult to quantify)     Right atrium:  The right atrium appears enlarged (difficult to quantify).     IVC:  The IVC appears dilated with decreased phasic variation in caval diameter  consistent with elevated right atrial pressure.     Aortic valve:  The aortic valve is not well visualized, but suspected to be comprised of  three cusps. No significant aortic stenosis or aortic insufficiency is  detected on this study.     Mitral valve:  The mitral valve appears morphologically normal.     Tricuspid valve:  The tricuspid valve is grossly morphologically normal.     Pulmonic valve:  The pulmonic valve is not well visualized.     Thoracic aorta:  The aortic root and proximal ascending aorta are of normal dimension.     Pericardium:  There is no significant pericardial effusion.  ______________________________________________________________________________  ______________________________________________________________________________  MMode/2D Measurements & Calculations  IVSd: 0.94 cm  LVIDd: 5.0 cm  LVIDs: 3.5 cm  LVPWd: 0.84 cm  FS: 30.1 %  LV mass(C)d: 155.0 grams  LV mass(C)dI: 64.5 grams/m2  LVOT diam: 2.3 cm  LVOT area: 4.1 cm2  RWT: 0.34     Doppler Measurements & Calculations  MV E max ryne: 56.1 cm/sec  MV A max ryne: 53.1 cm/sec  MV E/A: 1.1  MV dec slope: 164.8 cm/sec2  MV dec time: 0.34 sec  Ao V2 max: 98.9 cm/sec  Ao max P.0 mmHg  Ao V2 mean: 64.2 cm/sec  Ao mean P.9 mmHg  Ao V2 VTI: 19.0 cm  JACQUE(I,D): 4.2 cm2  JACQUE(V,D): 3.8 cm2  LV V1 max PG: 3.3 mmHg  LV V1 max: 90.4 cm/sec  LV V1 VTI: 19.3 cm  SV(LVOT): 79.8 ml  SI(LVOT): 33.2 ml/m2  AV Ryen Ratio (DI): 0.91     JACQUE Index (cm2/m2): 1.7  E/E' av.6  Lateral E/e': 5.2  Medial E/e': 7.9      ______________________________________________________________________________  Report approved by: Mark Lomeli 2023 12:00 PM         Echocardiogram Complete     Value    LVEF  > 65%    [place] [address]     Name: MISSY ALMANZA  MRN: [id number]  : 1965  Study Date: 2023 08:43 AM  Age: 58 yrs  Gender: Male     Performed By: CHITO  BSA: 2.5 m2  Height: 76 in  Weight: 277 lb  HR: 98  BP: 150/85 mmHg     ______________________________________________________________________________  Procedure  Complete Echo Adult. Definity ([facility]C #68355-409) given intravenously. No  hemodynamically significant valvular abnormalities on 2D or color flow  imaging. The study was technically difficult. Compared to the prior study  dated 2023, there have been no changes.  ______________________________________________________________________________  Interpretation Summary     1.Left ventricular size, wall motion and function are normal. The ejection  fraction is > 65%.  2.There is mild to moderate concentric left ventricular hypertrophy.  3.Normal right ventricle size and systolic function.  4.No hemodynamically significant valvular abnormalities on 2D or color flow  imaging.  5.Ascending Aorta moderate dilatation is present.  6.The study was technically difficult.  Compared to the prior study dated 2023, the RV EF is improved.  ______________________________________________________________________________  I      WMSI = 1.00     % Normal = 100     X - Cannot   0 -                      (2) - Mildly 2 -          Segments  Size  Interpret    Hyperkinetic 1 - Normal  Hypokinetic  Hypokinetic  1-2     small                                                     7 -          3-5      moderate  3 - Akinetic 4 -          5 -         6 - Akinetic Dyskinetic   6-14    large               Dyskinetic   Aneurysmal  w/scar       w/scar       15-16   diffuse      Left Ventricle  Left ventricular size, wall motion and function are normal. The ejection  fraction is > 65%. There is mild to moderate concentric left ventricular  hypertrophy. Left ventricular diastolic function is not assessable. No  regional wall motion abnormalities noted.     Right Ventricle  Normal right ventricle size and systolic function. TAPSE is normal, which is  consistent with normal right ventricular systolic function.     Atria  Normal left atrial size. Right atrial size is normal. There is no color  Doppler evidence of an atrial shunt.     Mitral Valve  Mitral valve leaflets appear normal. There is no evidence of mitral stenosis  or clinically significant mitral regurgitation. There is no mitral  regurgitation noted. There is no mitral valve stenosis.     Tricuspid Valve  The tricuspid valve is not well visualized, but is grossly normal. Right  ventricle systolic pressure estimate normal. There is trace tricuspid  regurgitation. There is no tricuspid stenosis.     Aortic Valve  The aortic valve is not well visualized. No aortic regurgitation is present.  No hemodynamically significant valvular aortic stenosis.     Pulmonic Valve  The pulmonic valve is not well seen, but is grossly normal. This degree of  valvular regurgitation is within normal limits. There is no pulmonic valvular  stenosis.     Vessels  The aorta root is normal. Ascending Aorta dilatation is present. IVC diameter  <2.1 cm collapsing >50% with sniff suggests a normal RA pressure of 3 mmHg.     Pericardium  There is no pericardial effusion.     Rhythm  The rhythm was atrial fibrillation.  ______________________________________________________________________________  MMode/2D Measurements & Calculations     IVSd: 1.5 cm  LVIDd: 4.2 cm  LVIDs: 3.0 cm  LVPWd: 1.5 cm  FS: 29.6 %  LV mass(C)d: 254.7 grams  LV mass(C)dI: 100.1 grams/m2  Ao root diam: 4.4 cm  asc Aorta Diam: 4.4 cm  LVOT diam: 2.5 cm  LVOT area: 4.8 cm2  Ao root diam  index Ht(cm/m): 2.3  Ao root diam index BSA (cm/m2): 1.7  Asc Ao diam index BSA (cm/m2): 1.7  Asc Ao diam index Ht(cm/m): 2.3  LA Volume (BP): 56.7 ml     LA Volume Index (BP): 22.3 ml/m2  LA Volume Indexed (AL/bp): 22.9 ml/m2  RWT: 0.71  TAPSE: 2.0 cm     Doppler Measurements & Calculations  MV E max ryne: 76.4 cm/sec  MV A max ryne: 56.1 cm/sec  MV E/A: 1.4     MV dec slope: 517.7 cm/sec2  MV dec time: 0.16 sec  Ao V2 max: 120.4 cm/sec  Ao max P.0 mmHg  Ao V2 mean: 99.4 cm/sec  Ao mean P.0 mmHg  Ao V2 VTI: 22.4 cm  JACQUE(I,D): 3.8 cm2  JACQUE(V,D): 4.3 cm2  LV V1 max P.8 mmHg  LV V1 max: 108.2 cm/sec  LV V1 VTI: 17.7 cm  SV(LVOT): 85.2 ml  SI(LVOT): 33.5 ml/m2  AV Ryne Ratio (DI): 0.90  JACQUE Index (cm2/m2): 1.5  E/E': 4.7  E/E' av.6  Lateral E/e': 4.6  Medial E/e': 6.6  Peak E' Ryne: 16.4 cm/sec  RV S Ryne: 19.3 cm/sec     ______________________________________________________________________________  Report approved by: Mark Charles 2023 10:25 AM             Discharge Medications   Current Discharge Medication List        START taking these medications    Details   amiodarone (PACERONE) 200 MG tablet Take 1 tablet (200 mg) by mouth daily  Qty: 30 tablet, Refills: 3    Associated Diagnoses: Atrial fibrillation with RVR (H)      apixaban ANTICOAGULANT (ELIQUIS) 5 MG tablet Take 1 tablet (5 mg) by mouth 2 times daily  Qty: 60 tablet, Refills: 3    Associated Diagnoses: Acute pulmonary embolism, unspecified pulmonary embolism type, unspecified whether acute cor pulmonale present (H); Atrial fibrillation with RVR (H)      diclofenac (VOLTAREN) 1 % topical gel Apply 4 g topically 4 times daily  Qty: 350 g, Refills: 1    Associated Diagnoses: Osteoarthritis of both knees, unspecified osteoarthritis type      diltiazem ER COATED BEADS (CARDIZEM CD/CARTIA XT) 300 MG 24 hr capsule Take 1 capsule (300 mg) by mouth daily  Qty: 30 capsule, Refills: 3    Associated Diagnoses: Atrial fibrillation with RVR (H)       predniSONE (DELTASONE) 10 MG tablet Take 3 tablets (30 mg) by mouth daily for 1 day, THEN 2 tablets (20 mg) daily for 5 days, THEN 1 tablet (10 mg) daily for 5 days.  Qty: 18 tablet, Refills: 0    Associated Diagnoses: Drug-induced pneumonitis      !! QUEtiapine (SEROQUEL) 25 MG tablet Take 1 tablet (25 mg) by mouth 2 times daily  Qty: 60 tablet, Refills: 1    Associated Diagnoses: Acute encephalopathy      !! QUEtiapine (SEROQUEL) 50 MG tablet Take 1 tablet (50 mg) by mouth at bedtime  Qty: 30 tablet, Refills: 1    Associated Diagnoses: Acute encephalopathy      sulfamethoxazole-trimethoprim (BACTRIM) 400-80 MG tablet Take 1 tablet by mouth daily  Qty: 11 tablet, Refills: 0    Associated Diagnoses: Drug-induced pneumonitis       !! - Potential duplicate medications found. Please discuss with provider.        CONTINUE these medications which have NOT CHANGED    Details   acetaminophen (TYLENOL) 325 MG tablet Take 325-650 mg by mouth every 6 hours as needed for mild pain      albuterol (PROAIR HFA/PROVENTIL HFA/VENTOLIN HFA) 108 (90 Base) MCG/ACT inhaler Inhale 2 puffs into the lungs every 6 hours as needed  Qty: 18 g, Refills: 11    Comments: Pharmacy may dispense brand covered by insurance (Proair, or proventil or ventolin or generic albuterol inhaler)  Associated Diagnoses: Mild intermittent asthma without complication      clonazePAM (KLONOPIN) 0.5 MG tablet Take 0.5-1 mg by mouth daily as needed for anxiety      escitalopram (LEXAPRO) 20 MG tablet Take 20 mg by mouth daily (with lunch)      fluticasone (FLOVENT HFA) 110 MCG/ACT inhaler Inhale 1 puff into the lungs 2 times daily  Qty: 12 g, Refills: 11    Comments: Pharmacy may dispense brand if preferred by insurance.  Associated Diagnoses: Moderate persistent asthma without complication      loperamide HCl (IMODIUM A-D ORAL) Take 2 mg by mouth 4 times daily as needed (diarrhea)      loratadine (CLARITIN) 10 mg tablet Take 10 mg by mouth daily as needed for  allergies      mirtazapine (REMERON) 15 MG tablet Take 15 mg by mouth at bedtime      tadalafil (CIALIS) 5 MG tablet [TADALAFIL (CIALIS) 5 MG TABLET] Take 1 tablet (5 mg total) by mouth daily as needed for erectile dysfunction.  Qty: 30 tablet, Refills: 1    Associated Diagnoses: Anxiety           STOP taking these medications       guaiFENesin (ROBITUSSIN) 20 mg/mL liquid Comments:   Reason for Stopping:         ibuprofen (ADVIL,MOTRIN) 400 MG tablet Comments:   Reason for Stopping:         indomethacin (INDOCIN) 50 MG capsule Comments:   Reason for Stopping:             Allergies   Allergies   Allergen Reactions    Chicken [Chicken Protein] Other (See Comments)     Throat closes to turkey as well.    Pembrolizumab Other (See Comments)     Severe pneumonitis    Cantaloupe [Cantaloupe Extract Allergy Skin Test] Unknown    Wellbutrin [Bupropion] Unknown    Grass Pollen [Grass] Rash    Turkey [Poultry Meal] Rash

## 2023-11-20 NOTE — PROGRESS NOTES
Care Management Discharge Note    Discharge Date: 11/20/2023       Discharge Disposition: Home    Discharge Services: None    Discharge DME: None    Discharge Transportation: family or friend will provide    Education Provided on the Discharge Plan: Yes (AVS per bedside RN)    Persons Notified of Discharge Plans: patient    Patient/Family in Agreement with the Plan: yes        Additional Information:  CM reviewed. No CM needs identified. Family to provide transportation at discharge.     PT recommendations: home with outpatient physical therapy       CCC referral sent per protocol.      Carissa Houston RN

## 2023-11-21 ENCOUNTER — PATIENT OUTREACH (OUTPATIENT)
Dept: CARE COORDINATION | Facility: CLINIC | Age: 58
End: 2023-11-21
Payer: COMMERCIAL

## 2023-11-21 NOTE — PROGRESS NOTES
"Clinic Care Coordination Contact  [facility]: Post-Discharge Note  SITUATION                                                      Admission:    Admission Date: 11/02/23   Reason for Admission: Chemotherapy induced pneumonitis, atrial fibrillation with RVR, pulmonary embolism.  Discharge:   Discharge Date: 11/20/23  Discharge Diagnosis: Chemotherapy induced pneumonitis, atrial fibrillation with RVR, pulmonary embolism.    BACKGROUND                                                      Per hospital discharge summary and inpatient provider notes:     [facility]  Hospitalist Discharge Summary       Date of Admission:  11/2/2023  Date of Discharge:  11/20/2023  Discharging Provider: Avery Barraza DO  Discharge Service: Hospitalist Service     Discharge Diagnoses  New onset atrial fibrillation with RVR  Acute respiratory failure with hypoxia  Drug-induced pneumonitis  Diarrhea  Pulmonary emboli  Melanoma  Acute kidney injury  Normocytic anemia  History of colonic polyps  Obstructive sleep apnea  Acute encephalopathy  Hyponatremia     Clinically Significant Risk Factors      # Overweight: Estimated body mass index is 29.76 kg/m  as calculated from the following:    Height as of this encounter: 1.93 m (6' 4\").    Weight as of this encounter: 110.9 kg (244 lb 8 oz).       Follow-ups Needed After Discharge  Follow-up Appointments     Follow-up and recommended labs and tests       Follow up with primary care provider, Luis Alberto Fatima, within 7 days for   hospital follow- up anemia, atrial fibrillation, chemotherapy pneumonitis.    The following labs/tests are recommended: CBC, base metabolic profile..    Follow up with [facility] as previously arranged.   Atrial fibrillation clinic follow-up in 10 to 14 days.      ASSESSMENT           Discharge Assessment  How are you doing now that you are home?: ok  How are your symptoms? (Red Flag symptoms escalate to triage hotline per " guidelines): Improved  Do you feel your condition is stable enough to be safe at home until your provider visit?: Yes  Does the patient have their discharge instructions? : Yes  Does the patient have questions regarding their discharge instructions? : No  Were you started on any new medications or were there changes to any of your previous medications? : Yes  Does the patient have all of their medications?: Yes  Do you have questions regarding any of your medications? : No  Do you have all of your needed medical supplies or equipment (DME)?  (i.e. oxygen tank, CPAP, cane, etc.): Yes  Discharge follow-up appointment scheduled within 14 calendar days? : No  Is patient agreeable to assistance with scheduling? : Yes (call back on 11-27)         Post-op (Clinicians Only)  Did the patient have surgery or a procedure: No  Fever: No  Chills: No  Eating & Drinking: eating and drinking without complaints/concerns  PO Intake: regular diet  Bowel Function: normal  Urinary Status: voiding without complaint/concerns  Glad to be home. Has lots of support. Is trying to build up strength.  Knows he has to set up colonoscopy and PCP appt but wants to wait until November 27th to schedule and requests a call then.  He needs to have his Ipad to view his schedule.  His sleep is a bit off now.  He will look over the discharge summary this weekend.      PLAN                                                      Outpatient Plan:  Will call on 11-27 in the afternoon to assist with setting up appts.   11- Called patient and he is doing ok.  He is trying to regain his strength.  No concerns.  Called clinic to set up appt with Dr. Beth.   Future Appointments   Date Time Provider Department Center   12/14/2023 12:50 PM Koby Tilley MD [facility]   1/15/2024 12:20 PM WW CT 2 [facility]   1/17/2024 12:30 PM [facility] RM 2 [facility]   1/17/2024  1:30 PM Jamie Huitron MD [facility]         For any urgent concerns,  please contact our 24 hour nurse triage line: [phone] ([phone])         JENNIFER Zavala    [facility]    Clinical Data: Care Coordinator Outreach    Outreach Documentation Number of Outreach Attempt   11/21/2023  10:11 AM 1   11/22/2023  11:16 AM 2       Left message on patient's voicemail with call back information and requested return call.    Plan: Care Coordinator will send care coordination introduction letter with care coordinator contact information and explanation of care coordination services via mail. Care Coordinator will do no further outreaches at this time.    Social Maude Zavala  [facility]  [phone]        [facility]    Clinical Data: Care Coordinator Outreach    Outreach Documentation Number of Outreach Attempt   11/21/2023  10:11 AM 1       Left message on patient's voicemail with call back information and requested return call.    Plan: Care Coordinator will try to reach patient again in 1-2 business days.    Social Maude Zavala  [facility]  [phone]

## 2023-11-21 NOTE — Clinical Note
Hi Dr. Fatima, the first post hospital slot was not until 12-11, which he took.  If you want to try to get him in sooner, he would like that, if possible.  Thanks, Mena

## 2023-11-21 NOTE — LETTER
[facility] CARE COORDINATION  [facility]  November 27, 2023    Jim Titus  [address] [place] [address]      Dear Jim,    I am a clinic care coordinator who works with Luis Alberto Fatima MD with the [facility]. I wanted to thank you for spending the time to talk with me.  Below is a description of clinic care coordination and how I can further assist you.       The clinic care coordination team is made up of a registered nurse, , financial resource worker and community health worker who understand the health care system. The goal of clinic care coordination is to help you manage your health and improve access to the health care system. Our team works alongside your provider to assist you in determining your health and social needs. We can help you obtain health care and community resources, providing you with necessary information and education. We can work with you through any barriers and develop a care plan that helps coordinate and strengthen the communication between you and your care team.  Our services are voluntary and are offered without charge to you personally.    Please feel free to contact me with any questions or concerns regarding care coordination and what we can offer.      We are focused on providing you with the highest-quality healthcare experience possible.    Sincerely,     Mena Alfaro,   [facility]  [phone]

## 2023-11-21 NOTE — LETTER
[facility] CARE COORDINATION  [facility]  November 22, 2023    Jim Titus  [address] [place] [address]      Dear Jim,    I am a clinic care coordinator who works with Luis Alberto Fatima MD with the [facility]. I wanted to introduce myself and provide you with my contact information for you to be able to call me with any questions or concerns. Below is a description of clinic care coordination and how I can further assist you.       The clinic care coordination team is made up of a registered nurse, , financial resource worker and community health worker who understand the health care system. The goal of clinic care coordination is to help you manage your health and improve access to the health care system. Our team works alongside your provider to assist you in determining your health and social needs. We can help you obtain health care and community resources, providing you with necessary information and education. We can work with you through any barriers and develop a care plan that helps coordinate and strengthen the communication between you and your care team.  Our services are voluntary and are offered without charge to you personally.    Please feel free to contact me with any questions or concerns regarding care coordination and what we can offer.      We are focused on providing you with the highest-quality healthcare experience possible.    Sincerely,     Mena Alfaro,   [facility]  [phone]

## 2023-12-01 LAB
BACTERIA BRONCH: ABNORMAL
BACTERIA BRONCH: NO GROWTH

## 2023-12-05 LAB — BACTERIA BLD CULT: NO GROWTH

## 2023-12-11 ENCOUNTER — OFFICE VISIT (OUTPATIENT)
Dept: FAMILY MEDICINE | Facility: CLINIC | Age: 58
End: 2023-12-11
Payer: COMMERCIAL

## 2023-12-11 VITALS
TEMPERATURE: 97.4 F | OXYGEN SATURATION: 94 % | BODY MASS INDEX: 34.27 KG/M2 | DIASTOLIC BLOOD PRESSURE: 70 MMHG | WEIGHT: 253 LBS | HEART RATE: 104 BPM | SYSTOLIC BLOOD PRESSURE: 110 MMHG | HEIGHT: 72 IN

## 2023-12-11 DIAGNOSIS — D64.9 ANEMIA, UNSPECIFIED TYPE: Primary | ICD-10-CM

## 2023-12-11 DIAGNOSIS — J80 ARDS (ADULT RESPIRATORY DISTRESS SYNDROME) (H): ICD-10-CM

## 2023-12-11 DIAGNOSIS — I48.91 ATRIAL FIBRILLATION, UNSPECIFIED TYPE (H): ICD-10-CM

## 2023-12-11 DIAGNOSIS — J98.4 PNEUMONITIS: ICD-10-CM

## 2023-12-11 DIAGNOSIS — I26.99 ACUTE PULMONARY EMBOLISM WITHOUT ACUTE COR PULMONALE, UNSPECIFIED PULMONARY EMBOLISM TYPE (H): ICD-10-CM

## 2023-12-11 DIAGNOSIS — J18.9 PNEUMONIA OF RIGHT LUNG DUE TO INFECTIOUS ORGANISM, UNSPECIFIED PART OF LUNG: ICD-10-CM

## 2023-12-11 DIAGNOSIS — N17.9 AKI (ACUTE KIDNEY INJURY) (H): ICD-10-CM

## 2023-12-11 PROBLEM — J96.01 ACUTE RESPIRATORY FAILURE WITH HYPOXIA (H): Status: RESOLVED | Noted: 2023-11-02 | Resolved: 2023-12-11

## 2023-12-11 PROBLEM — A41.9 SEPSIS, DUE TO UNSPECIFIED ORGANISM, UNSPECIFIED WHETHER ACUTE ORGAN DYSFUNCTION PRESENT (H): Status: RESOLVED | Noted: 2023-11-02 | Resolved: 2023-12-11

## 2023-12-11 PROBLEM — E78.5 HYPERLIPEMIA: Status: RESOLVED | Noted: 2017-12-05 | Resolved: 2023-12-11

## 2023-12-11 PROBLEM — E87.0 HYPERNATREMIA: Status: RESOLVED | Noted: 2023-11-12 | Resolved: 2023-12-11

## 2023-12-11 PROCEDURE — 36415 COLL VENOUS BLD VENIPUNCTURE: CPT | Performed by: STUDENT IN AN ORGANIZED HEALTH CARE EDUCATION/TRAINING PROGRAM

## 2023-12-11 PROCEDURE — 80048 BASIC METABOLIC PNL TOTAL CA: CPT | Performed by: STUDENT IN AN ORGANIZED HEALTH CARE EDUCATION/TRAINING PROGRAM

## 2023-12-11 PROCEDURE — 99214 OFFICE O/P EST MOD 30 MIN: CPT | Performed by: STUDENT IN AN ORGANIZED HEALTH CARE EDUCATION/TRAINING PROGRAM

## 2023-12-11 RX ORDER — FERROUS SULFATE 325(65) MG
325 TABLET ORAL
Qty: 30 TABLET | Refills: 1 | Status: ON HOLD | OUTPATIENT
Start: 2023-12-11 | End: 2024-01-03

## 2023-12-11 ASSESSMENT — ASTHMA QUESTIONNAIRES: ACT_TOTALSCORE: 21

## 2023-12-11 ASSESSMENT — PAIN SCALES - GENERAL: PAINLEVEL: NO PAIN (0)

## 2023-12-11 NOTE — PROGRESS NOTES
Hospital Follow-up Visit:    Assessment and Plan   58-year-old male with past with history of asthma, melanoma, generalized anxiety disorder who presents in follow-up after hospitalization for chemotherapy induced pneumonitis associated pneumonia and ARDS.  Some complications during hospitalization including anemia with a hemoglobin down to 8.7 on discharge, episode of A-fib now on amiodarone and diltiazem, acute kidney injury thought to be secondary to ATN, PE now on Eliquis.  Patient is feeling much improved in terms of his respiratory status but is still very fatigued.  Has also noticed a tremor that has formed since his discharge.  On exam today he looks very pale as compared to prior to his admission.  Lungs are clear and heart is regular rate and rhythm.  I recommended a BMP to check for improvement in his MIKKI as well as a CBC to look at his hemoglobin.  He has several follow-up scheduled with various specialist.  Patient has a positive Cologuard with anemia and thus is having a colonoscopy in a couple of weeks.  I recommended iron in the meantime.  He also has a follow-up with cardiology regarding new onset A-fib. Would ask cardiology to make a determination whether patient to remain on Eliquis or not. Finally has a follow-up with pulmonology in late January.      1. Anemia, unspecified type  - ferrous sulfate (FEROSUL) 325 (65 Fe) MG tablet; Take 1 tablet (325 mg) by mouth daily (with breakfast)  Dispense: 30 tablet; Refill: 1    2. MIKKI (acute kidney injury) (H24)  - Basic metabolic panel  (Ca, Cl, CO2, Creat, Gluc, K, Na, BUN); Future  - Basic metabolic panel  (Ca, Cl, CO2, Creat, Gluc, K, Na, BUN)    3. ARDS (adult respiratory distress syndrome) (H)    4. Pneumonia of right lung due to infectious organism, unspecified part of lung    5. Atrial fibrillation, unspecified type (H)    6. Pneumonitis    7. 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 Pt was admitted 10/23 diagnosed with possible bilateral pneumonia, but did not respond to IV antibiotic therapy.  BAL 11/3 revealed no growth.  Eventually intubated on 11/4 for ARDS/sepsis. Follow-up CT scan revealed extensive bilateral pulmonary infiltrate.  Very small pulmonary emboli on 11/13.  Now treated for chemotherapy-induced pneumonitis.  On tapered dose of oral steroid.  Incidentally developed A-fib with RVR, managed on diltiazem and amiodarone.  Developed acute kidney injury secondary to hemodynamic effects, likely resolving ATN.  Patient had development of normocytic anemia.  Hemoglobin dropped from 12-8.9.  Patient's hemoglobin was monitored for 48 hours remained stable.  Blood occult testing was positive.  Patient had no melena, hematochezia or passage of clots during hospitalization.  He reports last colonoscopy was approximately 9 years ago and was recommended screening colonoscopy in 3 years from minoo procedure.  Patient to follow-up with [facility] to complete colonoscopy as outpatient.  We will have close follow-up with primary care for anemia monitoring.       Diagnostic Tests/Treatments reviewed:    EXAM: CT CHEST PULMONARY EMBOLISM W CONTRAST  LOCATION: [facility]  DATE: 11/13/2023     INDICATION: high dimer, eval for PE.  COMPARISON: [facility] chest  11/2/2023 and 10/23/2023  TECHNIQUE: CT chest pulmonary angiogram during arterial phase injection of IV contrast. Multiplanar reformats and MIP reconstructions were performed. Dose reduction techniques were used.   CONTRAST: Isovue 370 75mL     FINDINGS:  ANGIOGRAM CHEST: Several thin short segment pulmonary emboli have developed in the posterior and lateral basilar left lower lobe pulmonary artery arteries (image 136 axial series 6). Pulmonary arteries are normal caliber and there are no signs of right   heart strain. Normal caliber thoracic aorta with no CTA signs of acute aortic syndrome.     LUNGS AND PLEURA: Moderate elevation right hemidiaphragm, mild elevation left hemidiaphragm and the associated platelike atelectasis throughout the paradiaphragmatic right and left lower lung parenchyma unchanged.     Peribronchovascular distribution consolidation, volume loss and interlobular septal thickening throughout the mid and upper right lung and scattered foci of groundglass density opacity throughout the left lung have decreased slightly. No pleural effusion   or pneumothorax.     MEDIASTINUM/AXILLAE: Heart size normal. No pericardial effusion. No lymphadenopathy.     CORONARY ARTERY CALCIFICATION: Mild.     UPPER ABDOMEN: Unremarkable.     MUSCULOSKELETAL: Unremarkable.                                                                      IMPRESSION:  1.  Several thin short segment pulmonary emboli have developed in the posterior and lateral basilar left lower lobe pulmonary artery arteries. No signs of right heart strain.   2.  Multifocal pneumonia, right greater than left, has improved minimally.  3.  Moderate elevation right hemidiaphragm, mild elevation left hemidiaphragm and the associated platelike atelectasis throughout the paradiaphragmatic right and left lower lung parenchyma unchanged.    Other Healthcare Providers Involved in Patient's Care:   Seeing GI on January 2nd    Update since discharge:  improved.    He is feeling pretty normal in terms of breathing. He is very fatigued.          Review of Systems:   Complete ROS negative except as noted in the HPI            Physical Exam:   /70   Pulse 104   Temp 97.4  F (36.3  C)   Ht 1.829 m (6')   Wt 114.8 kg (253 lb)   SpO2 94%   BMI 34.31 kg/m      General appearance: Alert, cooperative, no distress, appears stated age, pale  Head: Normocephalic, atraumatic, without obvious abnormality  Eyes: Pupils equal round, reactive.  Conjunctiva clear.  Nose: Nares normal, no drainage.  Throat: Lips, mucosa, tongue normal mucosa pink and moist  Neck: Supple, symmetric, trachea midline, no adenopathy.  No thyroid enlargement, tenderness or nodules.    Lungs: Clear to auscultation bilaterally, no wheezing or crackles present.  Respirations unlabored  Heart: Regular rate and rhythm, normal S1 and S2, no murmur, rub or gallop.  Extremities: Extremities normal, atraumatic.  No cyanosis or edema.  Skin: Skin color, texture, turgor normal no rashes or lesions on limited skin exam  Neurologic: CN II through XII intact, normal strength.

## 2023-12-12 LAB
ANION GAP SERPL CALCULATED.3IONS-SCNC: 13 MMOL/L (ref 7–15)
BUN SERPL-MCNC: 15.3 MG/DL (ref 6–20)
CALCIUM SERPL-MCNC: 9.3 MG/DL (ref 8.6–10)
CHLORIDE SERPL-SCNC: 102 MMOL/L (ref 98–107)
CREAT SERPL-MCNC: 1.52 MG/DL (ref 0.67–1.17)
DEPRECATED HCO3 PLAS-SCNC: 24 MMOL/L (ref 22–29)
EGFRCR SERPLBLD CKD-EPI 2021: 53 ML/MIN/1.73M2
GLUCOSE SERPL-MCNC: 89 MG/DL (ref 70–99)
POTASSIUM SERPL-SCNC: 4.8 MMOL/L (ref 3.4–5.3)
SODIUM SERPL-SCNC: 139 MMOL/L (ref 135–145)

## 2023-12-13 ENCOUNTER — LAB (OUTPATIENT)
Dept: LAB | Facility: CLINIC | Age: 58
End: 2023-12-13
Payer: COMMERCIAL

## 2023-12-13 DIAGNOSIS — D64.9 ANEMIA, UNSPECIFIED TYPE: ICD-10-CM

## 2023-12-13 DIAGNOSIS — N17.9 AKI (ACUTE KIDNEY INJURY) (H): ICD-10-CM

## 2023-12-13 DIAGNOSIS — D64.9 ANEMIA, UNSPECIFIED TYPE: Primary | ICD-10-CM

## 2023-12-13 DIAGNOSIS — N17.9 AKI (ACUTE KIDNEY INJURY) (H): Primary | ICD-10-CM

## 2023-12-13 LAB
ERYTHROCYTE [DISTWIDTH] IN BLOOD BY AUTOMATED COUNT: 18.3 % (ref 10–15)
HCT VFR BLD AUTO: 29.3 % (ref 40–53)
HGB BLD-MCNC: 9.3 G/DL (ref 13.3–17.7)
MCH RBC QN AUTO: 29.2 PG (ref 26.5–33)
MCHC RBC AUTO-ENTMCNC: 31.7 G/DL (ref 31.5–36.5)
MCV RBC AUTO: 92 FL (ref 78–100)
PLATELET # BLD AUTO: 563 10E3/UL (ref 150–450)
RBC # BLD AUTO: 3.19 10E6/UL (ref 4.4–5.9)
WBC # BLD AUTO: 9.9 10E3/UL (ref 4–11)

## 2023-12-13 PROCEDURE — 85027 COMPLETE CBC AUTOMATED: CPT

## 2023-12-13 PROCEDURE — 80048 BASIC METABOLIC PNL TOTAL CA: CPT

## 2023-12-13 PROCEDURE — 36415 COLL VENOUS BLD VENIPUNCTURE: CPT

## 2023-12-13 NOTE — RESULT ENCOUNTER NOTE
Peter  Your results from your recent clinic visit show:  Your kidneys still appear damaged.  I would recommend repeating these in a week or so. I placed orders for this    If you have more questions please call the clinic at [phone] or send me a [employer] message    Dr. Luis Alberto Shoemaker

## 2023-12-13 NOTE — PROGRESS NOTES
After reviewing labs on 12/13/23, I reviewed my notes and realized I had not ordered a CBC which I intended to obtain. I ordered this and asked my nurse to call the patient to have patient return to get this drawn with two messages left. We have not heard form the patient. I just asked my nurse to call the patient again    Luis Alberto Shoemaker MD

## 2023-12-13 NOTE — RESULT ENCOUNTER NOTE
Peter  Your results from your recent clinic visit show:  Your hemoglobin is mildly improved at 9.3.  Lets also recheck this in a couple weeks. I placed an orer for this    If you have more questions please call the clinic at [phone] or send me a [employer] message    Dr. Luis Alberto Shoemaker    
1 or 2

## 2023-12-14 ENCOUNTER — OFFICE VISIT (OUTPATIENT)
Dept: CARDIOLOGY | Facility: CLINIC | Age: 58
End: 2023-12-14
Payer: COMMERCIAL

## 2023-12-14 VITALS
HEART RATE: 96 BPM | RESPIRATION RATE: 20 BRPM | SYSTOLIC BLOOD PRESSURE: 100 MMHG | BODY MASS INDEX: 33.89 KG/M2 | DIASTOLIC BLOOD PRESSURE: 58 MMHG | OXYGEN SATURATION: 92 % | WEIGHT: 249.9 LBS

## 2023-12-14 DIAGNOSIS — I48.91 ATRIAL FIBRILLATION WITH RVR (H): ICD-10-CM

## 2023-12-14 DIAGNOSIS — I48.19 PERSISTENT ATRIAL FIBRILLATION (H): Primary | ICD-10-CM

## 2023-12-14 LAB
ANION GAP SERPL CALCULATED.3IONS-SCNC: 12 MMOL/L (ref 7–15)
BUN SERPL-MCNC: 14.8 MG/DL (ref 6–20)
CALCIUM SERPL-MCNC: 9.3 MG/DL (ref 8.6–10)
CHLORIDE SERPL-SCNC: 99 MMOL/L (ref 98–107)
CREAT SERPL-MCNC: 1.51 MG/DL (ref 0.67–1.17)
DEPRECATED HCO3 PLAS-SCNC: 25 MMOL/L (ref 22–29)
EGFRCR SERPLBLD CKD-EPI 2021: 53 ML/MIN/1.73M2
GLUCOSE SERPL-MCNC: 101 MG/DL (ref 70–99)
POTASSIUM SERPL-SCNC: 4.5 MMOL/L (ref 3.4–5.3)
SODIUM SERPL-SCNC: 136 MMOL/L (ref 135–145)

## 2023-12-14 PROCEDURE — 99244 OFF/OP CNSLTJ NEW/EST MOD 40: CPT | Performed by: INTERNAL MEDICINE

## 2023-12-14 NOTE — PATIENT INSTRUCTIONS
[facility]  Cardiac Electrophysiology  [address]  [place] [address]   Office: [phone]  Fax: [phone]     Thank you for seeing us in clinic today - it is a pleasure to be a part of your care team.  Below is a summary of our plan from today's visit.       You developed atrial fibrillation while admitted 11/2023 and have been treated with amiodarone and diltiazem.    We will plan for the following:  - stop amiodarone after current supply is finished  - return clinic visit in 2.5-3 months, consider 30 day cardiac rhythm monitoring at that time  - continue diltiazem ER 300mg daily  - continue apixaban 5mg twice daily (PE)     Please do not hesitate to be in touch with our office at [phone] with any questions that may arise.       Thank you for trusting us with your care,    Koby Tilley MD  Clinical Cardiac Electrophysiology  [facility]  [address]  [place] [address]   Office: [phone]  Fax: [phone]        ATRIAL FIBRILLATION: Patient Information    What is atrial fibrillation?  Atrial fibrillation (AF, A-fib) is a common heart rhythm problem (arrhythmia) occurring within the upper chambers of the heart (the atria).  In normal rhythm, the upper and lower chambers of the heart are electrically driven to contract in a coordinated sequence.  In atrial fibrillation, the atria lose their ability to contract because of rapid and chaotic electrical activity.  The lower chambers of the heart (the ventricles) continue to pump blood throughout the body, though with irregular and often faster rate due to the chaotic activity within the atria.        How do I know if I have atrial fibrillation?   Some people may feel their heart beating faster, harder, or irregularly while in atrial fibrillation.  Others may be lightheaded, fatigued, feel weak or tired or become more short of breath especially with activities.   Arianna called to schedule follow up for Chiara. Left voicemail requesting a call back to schedule.   Some patients have no symptoms at all.  Atrial fibrillation may be found due to an irregular pulse or on an electrocardiogram (ECG). Atrial fibrillation can start and stop on its own, and episodes can last from seconds to several months.      How common is atrial fibrillation?   An estimated 3-6 million people in the [place] have atrial fibrillation.  Atrial fibrillation is a common heart rhythm problem for older persons, affecting as estimated 12-15% of people over the age of 65 years of age.    What causes atrial fibrillation?   Age is the most important risk factor for atrial fibrillation.  Atrial fibrillation is more common in people with other heart disease, high blood pressure, diabetes, obesity, sleep apnea and in people who regularly consume alcohol.  Surgery, lung disease, or thyroid problems can lead to atrial fibrillation.  Atrial fibrillation has multiple possible causes, and in most cases a single cause cannot be found.  Atrial fibrillation is a progressive condition, usually starting with at an early stage with short and infrequent episodes.  In later stages of disease, more frequent and longer lasting episodes of atrial fibrillation occur, ultimately culminating in episodes which do not spontaneously terminate.  Generally, more enlargement and scarring within the upper chambers of the heart is observed as atrial fibrillation progresses from early to late-stage disease.    How is atrial fibrillation diagnosed and evaluated?    Because of its start-stop nature, atrial fibrillation can be challenging to diagnose.  Atrial fibrillation is most commonly diagnosed via cardiac rhythm recordings - either an ECG or wearable cardiac rhythm monitor.  For patients with pacemakers, defibrillators or implantable loop recorders, atrial fibrillation may be recorded via these devices.  Recently, commercially available devices (eg. Apple Watch, Peecho device, certain FitBit devices, others) can allow patients to  take 30 second cardiac rhythm recordings which may document atrial fibrillation.  Once atrial fibrillation is diagnosed, additional tests include blood tests and an echocardiogram.  The echocardiogram uses ultrasound to look at your heart to assess your cardiac function and evaluate for other heart disease.  Additional evaluation may include CT or MRI studies.    Is atrial fibrillation dangerous?   Atrial fibrillation is not usually a life-threatening arrhythmia.  The most serious consequences of atrial fibrillation including stroke and worsening of overall cardiac function.  While in atrial fibrillation, the upper cardiac chambers do not contract normally, resulting in slower blood flow and increased risk of clot formation.  If this blood clot becomes detached from the heart a stroke can occur.  Unfortunately, stroke can be the first sign of atrial fibrillation for some people.  With a stroke, you may notice abnormal sensation, weakness on one side of the body or face, changes in your vision or speech.  If you have any of these signs, you should contact EMS and be evaluated in an emergency room as soon as possible.      How is atrial fibrillation treated?     Several treatment options exist for suppressing atrial fibrillation - however, it is not an easily curable arrhythmia.  The first goal in managing atrial fibrillation is to minimize stroke risk.  The second goal is to improve symptoms associated with atrial fibrillation.  Finally, in patients with reduced cardiac function, maintaining normal rhythm can help improve cardiac function.      Blood thinners are used to reduce the risk of stroke in patients with high estimated stroke risk related to atrial fibrillation.  For patients at higher risk of bleeding related to blood thinner use, implantable devices may be an option to reduce stroke risk without the need for long term blood thinner use.      Atrial fibrillation can be managed via two strategies: rate  control and rhythm control.  In a rate control strategy, continued atrial fibrillation is accepted and medications (eg. beta-blockers or calcium channel blockers) are used to control the lower chamber rate.  In a rhythm control strategy, anti-arrhythmic medications or catheter ablation are used to maintain normal cardiac rhythm and slow disease progression by suppressing atrial fibrillation.  A procedure called a cardioversion, in which an electric shock is delivered through patches placed on the chest wall while under deep sedation, can be performed to temporarily restore normal cardiac rhythm, though does not address the chance of atrial fibrillation recurrence.  Treatments are more effective for earlier rather than later stage atrial fibrillation.  Lifestyle modifications (maintaining a healthy weight, aerobic exercise, diagnosing and treating sleep apnea, and minimizing alcohol intake) are important elements of atrial fibrillation rhythm control.     What is catheter ablation for atrial fibrillation?  Cardiac catheter ablation is a commonly performed, minimally invasive procedure performed by a cardiac electrophysiologist to treat many different cardiac rhythm abnormalities.  During catheter ablation, long, thin, flexible tubes are advanced into the heart via small sheaths inserted into the femoral veins and thermal energy (either heating or cooling) is applied within the heart to disrupt abnormal electrical activity.  Atrial fibrillation ablation is performed under general anesthesia, with procedures generally taking approximately 2-3 hours.  Patients are typically observed for 3-5 hours after the ablation, and in most cases can be discharged home the same day.  Atrial fibrillation ablation is associated with better outcomes (mortality, cardiovascular hospitalizations, atrial arrhythmia recurrences) compared to antiarrhythmic drug therapy.  However, atrial fibrillation recurrences are not uncommon, and repeat  catheter ablation may be offered.  Your electrophysiology team can review atrial fibrillation ablation, anticipated success rates, risks, and recovery expectations with you.    What are anti-arrhythmic medications?  Anti-arrhythmic medications are specialized drugs which alter cardiac electrical functioning to suppress arrhythmias.  There are several anti-arrhythmic medications available, each with its own success rate and side effects.  Some anti-arrhythmic medications are less effective though safer to use, others are more effective though have serious potential toxicities.  Atrial fibrillation recurrences are common and may require dose adjustment or change in antiarrhythmic therapy.  Your electrophysiology team will carefully consider which medication would be the best and safest for your particular case.      Can I live a normal life?    The goal of atrial fibrillation management is for patients to live normal lives without being limited by symptoms related to atrial fibrillation.    Are any additional educational resources available?  There are a number of excellent atrial fibrillation education resources available to you online.  A few options you may wish to review include:  hrsonline.org/guide-atrial-fibrillation  afibmatters.org  getsmartaboutafib.com  stopaf.com    What comes next?    Consider your management options and let us know how we can help in your decision process.  Please take medications as they have been prescribed.  You should also get any tests that may have been ordered for you.      When to Call Your Doctor or seek emergency care:  Call your doctor or seek emergency care if you have any significant changes with the following:  Weakness  Dizziness  Fainting  Fatigue  Shortness of breath  Chest pain with increased activity  If you are concerned that your heart rate is too fast or too slow  Bleeding that does not stop in 10 minutes  Coughing or throwing up blood  Bloody diarrhea or bleeding  hemorrhoids  Dark-colored urine or black stool  Allergic reactions:  Rash  Itching  Swelling  Trouble breathing or swallowing      Please call the [facility] at [phone] if you have concerns about your symptoms, your medicines, or your follow-up appointments.

## 2023-12-14 NOTE — LETTER
2023    Luis Alberto Fatima MD  [address]  [facility] [address]    RE: Jim Titus       Dear Colleague,     I had the pleasure of seeing Jim Titus in the [facility].     [facility]  Cardiac Electrophysiology  [address]  [place] [address]   Office: [phone]  Fax: [phone]     Cardiac Electrophysiology Consultation    Patient: Jim Titus   : 1965     Referring Provider: Avery Barraza MD  Primary Care Provider: Luis Alberto Fatima MD    CHIEF COMPLAINT/REASON FOR VISIT  Persistent atrial fibrillation    Assessment/Recommendations   Jim Titus is a 58 year old male with persistent atrial fibrillation, recurrent pneumonias, PE, melanoma, mild asthma depression, anxiety referred by Dr. Strange for consultation regarding atrial fibrillation.    Persistent atrial fibrillation - noted during 2023 admission with ARDS, MIKKI, anemia  [id number] [id number]  We reviewed atrial fibrillation physiology and considerations including managing stroke risk, rate control, cardioversion, antiarrhythmic drug therapy, and catheter ablation.    We will plan for the following:  - stop amiodarone after current supply is finished  - return clinic visit in 2.5-3 months with ECG, consider 30 day cardiac rhythm monitoring at that time  - continue diltiazem ER 300mg daily  - continue apixaban 5mg twice daily (PE)  - we discussed the ongoing importance of lifestyle modification (maintaining a healthy weight, sleep apnea diagnosis and management, alcohol avoidance) as part of a long term strategy for atrial fibrillation management    Follow up: as above         History of Present Illness   Jim Titus is a 58 year old male with persistent  atrial fibrillation, recurrent pneumonias, PE, melanoma, mild asthma depression, anxiety referred by Dr. Strange for consultation regarding atrial fibrillation.    Mr. Titus was admitted 2023 with pneumonia/ARDS, PE, anemia with  positive stool blood assay, MIKKI requiring intubation and was noted to develop AF/RVR treated with amiodarone and diltiazem.  He was noted to convert to SR prior to discharge.    He notes fatigue and exertional dyspnea - substantially worse since prior to admission.  He denies chest pain, syncope.       Physical Examination  Review of Systems   VITALS: /58 (BP Location: Left arm, Patient Position: Sitting, Cuff Size: Adult Large)   Pulse 96   Resp 20   Wt 113.4 kg (249 lb 14.4 oz)   SpO2 92%   BMI 33.89 kg/m    Wt Readings from Last 3 Encounters:   12/11/23 114.8 kg (253 lb)   11/19/23 110.9 kg (244 lb 8 oz)   10/23/23 117.9 kg (260 lb)     CONSTITUTIONAL: well nourished, comfortable, no distress  EYES:  Conjunctivae pink, sclerae clear.    E/N/T:  Oral mucosa pink  RESPIRATORY:  Respiratory effort is normal  CARDIOVASCULAR:  regular, normal S1 and S2  GASTROINTESTINAL:  Abdomen without masses or tenderness  EXTREMITIES:  No clubbing or cyanosis.    MUSCULOSKELETAL:  Overall grossly normal muscle strength  SKIN:  Overall, skin warm and dry, no lesions.  NEURO/PSYCH:  Oriented x 3 with normal affect.   Constitutional:  No weight loss or loss of appetite    Eyes:  No difficulty with vision, no double vision, no dry eyes  ENT:  No sore throat, difficulty swallowing; changes in hearing or tinnitus  Cardiovascular: As detailed above  Respiratory:  No cough  Musculoskeletal  No joint pain, muscle aches  Neurologic:  No syncope, lightheadedness, fainting spells   Hematologic: No easy bruising, excessive bleeding tendency   Gastrointestinal:  No jaundice, abdominal pain or abdominal bloating  Genitourinary: No changes in urinary habits, no trouble urinating    Psychiatric: No anxiety or depression      Medical History  Surgical History   No past medical history on file. Past Surgical History:   Procedure Laterality Date    HERNIA REPAIR      ORTHOPEDIC SURGERY      Both ankles, left knee, right shoulder    pyloric  stenosis repair           Family History Social History   Family History   Problem Relation Age of Onset    Breast Cancer Mother     Heart Disease Father     Mental Illness Father     Hyperlipidemia Father     Mental Illness Brother         Social History     Tobacco Use    Smoking status: Never    Smokeless tobacco: Former     Types: Chew     Quit date: 1998   Substance Use Topics    Alcohol use: Yes     Comment: 3-4 times per week, 5-6 beers    Drug use: Yes     Types: Marijuana         Medications  Allergies     Current Outpatient Medications:     acetaminophen (TYLENOL) 325 MG tablet, Take 325-650 mg by mouth every 6 hours as needed for mild pain, Disp: , Rfl:     albuterol (PROAIR HFA/PROVENTIL HFA/VENTOLIN HFA) 108 (90 Base) MCG/ACT inhaler, Inhale 2 puffs into the lungs every 6 hours as needed, Disp: 18 g, Rfl: 11    amiodarone (PACERONE) 200 MG tablet, Take 1 tablet (200 mg) by mouth daily, Disp: 30 tablet, Rfl: 3    apixaban ANTICOAGULANT (ELIQUIS) 5 MG tablet, Take 1 tablet (5 mg) by mouth 2 times daily, Disp: 60 tablet, Rfl: 3    clonazePAM (KLONOPIN) 0.5 MG tablet, Take 0.5-1 mg by mouth daily as needed for anxiety, Disp: , Rfl:     diclofenac (VOLTAREN) 1 % topical gel, Apply 4 g topically 4 times daily, Disp: 350 g, Rfl: 1    diltiazem ER COATED BEADS (CARDIZEM CD/CARTIA XT) 300 MG 24 hr capsule, Take 1 capsule (300 mg) by mouth daily, Disp: 30 capsule, Rfl: 3    escitalopram (LEXAPRO) 20 MG tablet, Take 20 mg by mouth daily (with lunch), Disp: , Rfl:     ferrous sulfate (FEROSUL) 325 (65 Fe) MG tablet, Take 1 tablet (325 mg) by mouth daily (with breakfast), Disp: 30 tablet, Rfl: 1    fluticasone (FLOVENT HFA) 110 MCG/ACT inhaler, Inhale 1 puff into the lungs 2 times daily, Disp: 12 g, Rfl: 11    loperamide HCl (IMODIUM A-D ORAL), Take 2 mg by mouth 4 times daily as needed (diarrhea), Disp: , Rfl:     loratadine (CLARITIN) 10 mg tablet, Take 10 mg by mouth daily as needed for allergies, Disp: , Rfl:     " mirtazapine (REMERON) 15 MG tablet, Take 15 mg by mouth at bedtime, Disp: , Rfl:     QUEtiapine (SEROQUEL) 25 MG tablet, Take 1 tablet (25 mg) by mouth 2 times daily, Disp: 60 tablet, Rfl: 1    QUEtiapine (SEROQUEL) 50 MG tablet, Take 1 tablet (50 mg) by mouth at bedtime, Disp: 30 tablet, Rfl: 1    sulfamethoxazole-trimethoprim (BACTRIM) 400-80 MG tablet, Take 1 tablet by mouth daily, Disp: 11 tablet, Rfl: 0    tadalafil (CIALIS) 5 MG tablet, [TADALAFIL (CIALIS) 5 MG TABLET] Take 1 tablet (5 mg total) by mouth daily as needed for erectile dysfunction., Disp: 30 tablet, Rfl: 1     Allergies   Allergen Reactions    Chicken [Chicken Protein] Other (See Comments)     Throat closes to turkey as well.    Pembrolizumab Other (See Comments)     Severe pneumonitis    Cantaloupe [Cantaloupe Extract Allergy Skin Test] Unknown    Wellbutrin [Bupropion] Unknown    Grass Pollen [Grass] Rash    Turkey [Poultry Meal] Rash          Lab Results    Chemistry CBC Cardiac Enzymes/BNP/TSH/INR   Recent Labs   Lab Test 12/11/23  1319      POTASSIUM 4.8   CHLORIDE 102   CO2 24   GLC 89   BUN 15.3   CR 1.52*   GFRESTIMATED 53*   MELISSA 9.3     Recent Labs   Lab Test 12/11/23  1319 11/20/23  0834 11/19/23  0446   CR 1.52* 1.54* 1.61*          Recent Labs   Lab Test 12/13/23  1410   WBC 9.9   HGB 9.3*   HCT 29.3*   MCV 92   *     Recent Labs   Lab Test 12/13/23  1410 11/20/23  0834 11/19/23  0446   HGB 9.3* 8.9* 8.7*    No results for input(s): \"TROPONINI\" in the last 20535 hours.  Recent Labs   Lab Test 11/09/23  0504 11/04/23  0421 11/02/23  0845 10/23/23  1041   NTBNPI 3,054* 1,264*  --   --    NTBNP  --   --  247 96     Recent Labs   Lab Test 11/09/23  0504   TSH 0.13*     No results for input(s): \"INR\" in the last 75205 hours.      Data Review    ECGs (tracings independently reviewed)  11/19/2023 - SR 68bpm  11/12/2023 - AF, ventricular rate 175bpm    11/13/2023 TTE  1.Left ventricular size, wall motion and function are normal. " The ejection  fraction is > 65%.  2.There is mild to moderate concentric left ventricular hypertrophy.  3.Normal right ventricle size and systolic function.  4.No hemodynamically significant valvular abnormalities on 2D or color flow  imaging.  5.Ascending Aorta moderate dilatation is present.  6.The study was technically difficult.  Compared to the prior study dated 11/5/2023, the RV EF is improved.       Cc:  Avery Barraza MD, Luis Alberto Fatima MD Amila Dilusha William, MD  12/14/2023  1:03 PM        Thank you for allowing me to participate in the care of your patient.      Sincerely,     Koby Tilley MD     [facility]  cc:   Avery Barraza DO  [address] [place] [address]

## 2023-12-14 NOTE — PROGRESS NOTES
[facility]  Cardiac Electrophysiology  [address]  [place] [address]   Office: [phone]  Fax: [phone]     Cardiac Electrophysiology Consultation    Patient: Jim Titus   : 1965     Referring Provider: Avery Barraza MD  Primary Care Provider: Luis Alberto Fatima MD    CHIEF COMPLAINT/REASON FOR VISIT  Persistent atrial fibrillation    Assessment/Recommendations   Jim Titus is a 58 year old male with persistent atrial fibrillation, recurrent pneumonias, PE, melanoma, mild asthma depression, anxiety referred by Dr. Strange for consultation regarding atrial fibrillation.    Persistent atrial fibrillation - noted during 2023 admission with ARDS, MIKKI, anemia  [id number] [id number]  We reviewed atrial fibrillation physiology and considerations including managing stroke risk, rate control, cardioversion, antiarrhythmic drug therapy, and catheter ablation.    We will plan for the following:  - stop amiodarone after current supply is finished  - return clinic visit in 2.5-3 months with ECG, consider 30 day cardiac rhythm monitoring at that time  - continue diltiazem ER 300mg daily  - continue apixaban 5mg twice daily (PE)  - we discussed the ongoing importance of lifestyle modification (maintaining a healthy weight, sleep apnea diagnosis and management, alcohol avoidance) as part of a long term strategy for atrial fibrillation management    Follow up: as above         History of Present Illness   Jim Titus is a 58 year old male with persistent  atrial fibrillation, recurrent pneumonias, PE, melanoma, mild asthma depression, anxiety referred by Dr. Strange for consultation regarding atrial fibrillation.    Mr. Titus was admitted 2023 with pneumonia/ARDS, PE, anemia with positive stool blood assay, MIKKI requiring intubation and was noted to develop AF/RVR treated with amiodarone and diltiazem.  He was noted to convert to SR prior to discharge.    He notes fatigue and  exertional dyspnea - substantially worse since prior to admission.  He denies chest pain, syncope.       Physical Examination  Review of Systems   VITALS: /58 (BP Location: Left arm, Patient Position: Sitting, Cuff Size: Adult Large)   Pulse 96   Resp 20   Wt 113.4 kg (249 lb 14.4 oz)   SpO2 92%   BMI 33.89 kg/m    Wt Readings from Last 3 Encounters:   12/11/23 114.8 kg (253 lb)   11/19/23 110.9 kg (244 lb 8 oz)   10/23/23 117.9 kg (260 lb)     CONSTITUTIONAL: well nourished, comfortable, no distress  EYES:  Conjunctivae pink, sclerae clear.    E/N/T:  Oral mucosa pink  RESPIRATORY:  Respiratory effort is normal  CARDIOVASCULAR:  regular, normal S1 and S2  GASTROINTESTINAL:  Abdomen without masses or tenderness  EXTREMITIES:  No clubbing or cyanosis.    MUSCULOSKELETAL:  Overall grossly normal muscle strength  SKIN:  Overall, skin warm and dry, no lesions.  NEURO/PSYCH:  Oriented x 3 with normal affect.   Constitutional:  No weight loss or loss of appetite    Eyes:  No difficulty with vision, no double vision, no dry eyes  ENT:  No sore throat, difficulty swallowing; changes in hearing or tinnitus  Cardiovascular: As detailed above  Respiratory:  No cough  Musculoskeletal  No joint pain, muscle aches  Neurologic:  No syncope, lightheadedness, fainting spells   Hematologic: No easy bruising, excessive bleeding tendency   Gastrointestinal:  No jaundice, abdominal pain or abdominal bloating  Genitourinary: No changes in urinary habits, no trouble urinating    Psychiatric: No anxiety or depression      Medical History  Surgical History   No past medical history on file. Past Surgical History:   Procedure Laterality Date    HERNIA REPAIR      ORTHOPEDIC SURGERY      Both ankles, left knee, right shoulder    pyloric stenosis repair           Family History Social History   Family History   Problem Relation Age of Onset    Breast Cancer Mother     Heart Disease Father     Mental Illness Father     Hyperlipidemia  Father     Mental Illness Brother         Social History     Tobacco Use    Smoking status: Never    Smokeless tobacco: Former     Types: Chew     Quit date: 1998   Substance Use Topics    Alcohol use: Yes     Comment: 3-4 times per week, 5-6 beers    Drug use: Yes     Types: Marijuana         Medications  Allergies     Current Outpatient Medications:     acetaminophen (TYLENOL) 325 MG tablet, Take 325-650 mg by mouth every 6 hours as needed for mild pain, Disp: , Rfl:     albuterol (PROAIR HFA/PROVENTIL HFA/VENTOLIN HFA) 108 (90 Base) MCG/ACT inhaler, Inhale 2 puffs into the lungs every 6 hours as needed, Disp: 18 g, Rfl: 11    amiodarone (PACERONE) 200 MG tablet, Take 1 tablet (200 mg) by mouth daily, Disp: 30 tablet, Rfl: 3    apixaban ANTICOAGULANT (ELIQUIS) 5 MG tablet, Take 1 tablet (5 mg) by mouth 2 times daily, Disp: 60 tablet, Rfl: 3    clonazePAM (KLONOPIN) 0.5 MG tablet, Take 0.5-1 mg by mouth daily as needed for anxiety, Disp: , Rfl:     diclofenac (VOLTAREN) 1 % topical gel, Apply 4 g topically 4 times daily, Disp: 350 g, Rfl: 1    diltiazem ER COATED BEADS (CARDIZEM CD/CARTIA XT) 300 MG 24 hr capsule, Take 1 capsule (300 mg) by mouth daily, Disp: 30 capsule, Rfl: 3    escitalopram (LEXAPRO) 20 MG tablet, Take 20 mg by mouth daily (with lunch), Disp: , Rfl:     ferrous sulfate (FEROSUL) 325 (65 Fe) MG tablet, Take 1 tablet (325 mg) by mouth daily (with breakfast), Disp: 30 tablet, Rfl: 1    fluticasone (FLOVENT HFA) 110 MCG/ACT inhaler, Inhale 1 puff into the lungs 2 times daily, Disp: 12 g, Rfl: 11    loperamide HCl (IMODIUM A-D ORAL), Take 2 mg by mouth 4 times daily as needed (diarrhea), Disp: , Rfl:     loratadine (CLARITIN) 10 mg tablet, Take 10 mg by mouth daily as needed for allergies, Disp: , Rfl:     mirtazapine (REMERON) 15 MG tablet, Take 15 mg by mouth at bedtime, Disp: , Rfl:     QUEtiapine (SEROQUEL) 25 MG tablet, Take 1 tablet (25 mg) by mouth 2 times daily, Disp: 60 tablet, Rfl: 1     "QUEtiapine (SEROQUEL) 50 MG tablet, Take 1 tablet (50 mg) by mouth at bedtime, Disp: 30 tablet, Rfl: 1    sulfamethoxazole-trimethoprim (BACTRIM) 400-80 MG tablet, Take 1 tablet by mouth daily, Disp: 11 tablet, Rfl: 0    tadalafil (CIALIS) 5 MG tablet, [TADALAFIL (CIALIS) 5 MG TABLET] Take 1 tablet (5 mg total) by mouth daily as needed for erectile dysfunction., Disp: 30 tablet, Rfl: 1     Allergies   Allergen Reactions    Chicken [Chicken Protein] Other (See Comments)     Throat closes to turkey as well.    Pembrolizumab Other (See Comments)     Severe pneumonitis    Cantaloupe [Cantaloupe Extract Allergy Skin Test] Unknown    Wellbutrin [Bupropion] Unknown    Grass Pollen [Grass] Rash    Turkey [Poultry Meal] Rash          Lab Results    Chemistry CBC Cardiac Enzymes/BNP/TSH/INR   Recent Labs   Lab Test 12/11/23  1319      POTASSIUM 4.8   CHLORIDE 102   CO2 24   GLC 89   BUN 15.3   CR 1.52*   GFRESTIMATED 53*   MELISSA 9.3     Recent Labs   Lab Test 12/11/23  1319 11/20/23  0834 11/19/23  0446   CR 1.52* 1.54* 1.61*          Recent Labs   Lab Test 12/13/23  1410   WBC 9.9   HGB 9.3*   HCT 29.3*   MCV 92   *     Recent Labs   Lab Test 12/13/23  1410 11/20/23  0834 11/19/23  0446   HGB 9.3* 8.9* 8.7*    No results for input(s): \"TROPONINI\" in the last 84274 hours.  Recent Labs   Lab Test 11/09/23  0504 11/04/23  0421 11/02/23  0845 10/23/23  1041   NTBNPI 3,054* 1,264*  --   --    NTBNP  --   --  247 96     Recent Labs   Lab Test 11/09/23  0504   TSH 0.13*     No results for input(s): \"INR\" in the last 36788 hours.      Data Review    ECGs (tracings independently reviewed)  11/19/2023 - SR 68bpm  11/12/2023 - AF, ventricular rate 175bpm    11/13/2023 TTE  1.Left ventricular size, wall motion and function are normal. The ejection  fraction is > 65%.  2.There is mild to moderate concentric left ventricular hypertrophy.  3.Normal right ventricle size and systolic function.  4.No hemodynamically significant " valvular abnormalities on 2D or color flow  imaging.  5.Ascending Aorta moderate dilatation is present.  6.The study was technically difficult.  Compared to the prior study dated 11/5/2023, the RV EF is improved.       Cc:  Avery Barraza MD, Luis Alberto Fatima MD Amila Dilusha William, MD  12/14/2023  1:03 PM

## 2023-12-20 ENCOUNTER — HOSPITAL ENCOUNTER (INPATIENT)
Facility: CLINIC | Age: 58
LOS: 14 days | Discharge: HOME | End: 2024-01-03
Attending: EMERGENCY MEDICINE | Admitting: FAMILY MEDICINE
Payer: COMMERCIAL

## 2023-12-20 ENCOUNTER — APPOINTMENT (OUTPATIENT)
Dept: CT IMAGING | Facility: CLINIC | Age: 58
End: 2023-12-20
Attending: EMERGENCY MEDICINE
Payer: COMMERCIAL

## 2023-12-20 DIAGNOSIS — J96.01 ACUTE RESPIRATORY FAILURE WITH HYPOXIA (H): ICD-10-CM

## 2023-12-20 DIAGNOSIS — A41.9 SEPSIS, DUE TO UNSPECIFIED ORGANISM, UNSPECIFIED WHETHER ACUTE ORGAN DYSFUNCTION PRESENT (H): ICD-10-CM

## 2023-12-20 DIAGNOSIS — J18.9 PNEUMONIA DUE TO INFECTIOUS ORGANISM, UNSPECIFIED LATERALITY, UNSPECIFIED PART OF LUNG: ICD-10-CM

## 2023-12-20 DIAGNOSIS — J45.40 MODERATE PERSISTENT ASTHMA WITHOUT COMPLICATION: ICD-10-CM

## 2023-12-20 LAB
ABO/RH(D): NORMAL
ALBUMIN SERPL BCG-MCNC: 3.1 G/DL (ref 3.5–5.2)
ALP SERPL-CCNC: 374 U/L (ref 40–150)
ALT SERPL W P-5'-P-CCNC: 31 U/L (ref 0–70)
ANION GAP SERPL CALCULATED.3IONS-SCNC: 14 MMOL/L (ref 7–15)
ANTIBODY SCREEN: NEGATIVE
APTT PPP: 58 SECONDS (ref 22–38)
AST SERPL W P-5'-P-CCNC: 27 U/L (ref 0–45)
ATRIAL RATE - MUSE: 101 BPM
BASE EXCESS BLDV CALC-SCNC: 0.8 MMOL/L
BASOPHILS # BLD AUTO: 0.1 10E3/UL (ref 0–0.2)
BASOPHILS NFR BLD AUTO: 0 %
BILIRUB SERPL-MCNC: 0.7 MG/DL
BUN SERPL-MCNC: 13.4 MG/DL (ref 6–20)
CALCIUM SERPL-MCNC: 8.9 MG/DL (ref 8.6–10)
CHLORIDE SERPL-SCNC: 98 MMOL/L (ref 98–107)
CREAT SERPL-MCNC: 1.38 MG/DL (ref 0.67–1.17)
CRP SERPL-MCNC: 280 MG/L
DEPRECATED HCO3 PLAS-SCNC: 22 MMOL/L (ref 22–29)
DIASTOLIC BLOOD PRESSURE - MUSE: 68 MMHG
EGFRCR SERPLBLD CKD-EPI 2021: 59 ML/MIN/1.73M2
EOSINOPHIL # BLD AUTO: 0.1 10E3/UL (ref 0–0.7)
EOSINOPHIL NFR BLD AUTO: 1 %
ERYTHROCYTE [DISTWIDTH] IN BLOOD BY AUTOMATED COUNT: 19.5 % (ref 10–15)
FLUAV RNA SPEC QL NAA+PROBE: NEGATIVE
FLUBV RNA RESP QL NAA+PROBE: NEGATIVE
GLUCOSE SERPL-MCNC: 130 MG/DL (ref 70–99)
HCO3 BLDV-SCNC: 24 MMOL/L (ref 24–30)
HCT VFR BLD AUTO: 23.5 % (ref 40–53)
HGB BLD-MCNC: 7.5 G/DL (ref 13.3–17.7)
HOLD SPECIMEN: NORMAL
IMM GRANULOCYTES # BLD: 0.4 10E3/UL
IMM GRANULOCYTES NFR BLD: 2 %
INR PPP: 2.06 (ref 0.85–1.15)
INTERPRETATION ECG - MUSE: NORMAL
IRON BINDING CAPACITY (ROCHE): 170 UG/DL (ref 240–430)
IRON SATN MFR SERPL: 9 % (ref 15–46)
IRON SERPL-MCNC: 15 UG/DL (ref 61–157)
LACTATE SERPL-SCNC: 0.9 MMOL/L (ref 0.7–2)
LYMPHOCYTES # BLD AUTO: 0.7 10E3/UL (ref 0.8–5.3)
LYMPHOCYTES NFR BLD AUTO: 4 %
MAGNESIUM SERPL-MCNC: 2 MG/DL (ref 1.7–2.3)
MCH RBC QN AUTO: 28.1 PG (ref 26.5–33)
MCHC RBC AUTO-ENTMCNC: 31.9 G/DL (ref 31.5–36.5)
MCV RBC AUTO: 88 FL (ref 78–100)
MONOCYTES # BLD AUTO: 1.2 10E3/UL (ref 0–1.3)
MONOCYTES NFR BLD AUTO: 7 %
MRSA DNA SPEC QL NAA+PROBE: NEGATIVE
NEUTROPHILS # BLD AUTO: 15 10E3/UL (ref 1.6–8.3)
NEUTROPHILS NFR BLD AUTO: 86 %
NRBC # BLD AUTO: 0 10E3/UL
NRBC BLD AUTO-RTO: 0 /100
NT-PROBNP SERPL-MCNC: 415 PG/ML (ref 0–900)
OXYHGB MFR BLDV: 91.4 % (ref 70–75)
P AXIS - MUSE: 11 DEGREES
PCO2 BLDV: 32 MM HG (ref 35–50)
PH BLDV: 7.49 [PH] (ref 7.35–7.45)
PLATELET # BLD AUTO: 645 10E3/UL (ref 150–450)
PO2 BLDV: 61 MM HG (ref 25–47)
POTASSIUM SERPL-SCNC: 3.5 MMOL/L (ref 3.4–5.3)
PR INTERVAL - MUSE: 152 MS
PROT SERPL-MCNC: 6.6 G/DL (ref 6.4–8.3)
QRS DURATION - MUSE: 92 MS
QT - MUSE: 358 MS
QTC - MUSE: 464 MS
R AXIS - MUSE: 7 DEGREES
RBC # BLD AUTO: 2.67 10E6/UL (ref 4.4–5.9)
RSV RNA SPEC NAA+PROBE: NEGATIVE
SA TARGET DNA: NEGATIVE
SAO2 % BLDV: 93.2 % (ref 70–75)
SARS-COV-2 RNA RESP QL NAA+PROBE: NEGATIVE
SODIUM SERPL-SCNC: 134 MMOL/L (ref 135–145)
SPECIMEN EXPIRATION DATE: NORMAL
SYSTOLIC BLOOD PRESSURE - MUSE: 117 MMHG
T AXIS - MUSE: 29 DEGREES
TROPONIN T SERPL HS-MCNC: 18 NG/L
TROPONIN T SERPL HS-MCNC: 19 NG/L
VENTRICULAR RATE- MUSE: 101 BPM
WBC # BLD AUTO: 17.4 10E3/UL (ref 4–11)

## 2023-12-20 PROCEDURE — 250N000013 HC RX MED GY IP 250 OP 250 PS 637: Performed by: FAMILY MEDICINE

## 2023-12-20 PROCEDURE — 94640 AIRWAY INHALATION TREATMENT: CPT

## 2023-12-20 PROCEDURE — 99254 IP/OBS CNSLTJ NEW/EST MOD 60: CPT | Performed by: INTERNAL MEDICINE

## 2023-12-20 PROCEDURE — 85730 THROMBOPLASTIN TIME PARTIAL: CPT | Performed by: EMERGENCY MEDICINE

## 2023-12-20 PROCEDURE — 83605 ASSAY OF LACTIC ACID: CPT | Performed by: EMERGENCY MEDICINE

## 2023-12-20 PROCEDURE — 87449 NOS EACH ORGANISM AG IA: CPT | Performed by: INTERNAL MEDICINE

## 2023-12-20 PROCEDURE — 99291 CRITICAL CARE FIRST HOUR: CPT | Mod: 25

## 2023-12-20 PROCEDURE — 250N000009 HC RX 250: Performed by: FAMILY MEDICINE

## 2023-12-20 PROCEDURE — 87637 SARSCOV2&INF A&B&RSV AMP PRB: CPT | Performed by: EMERGENCY MEDICINE

## 2023-12-20 PROCEDURE — 99223 1ST HOSP IP/OBS HIGH 75: CPT | Performed by: FAMILY MEDICINE

## 2023-12-20 PROCEDURE — 85610 PROTHROMBIN TIME: CPT | Performed by: EMERGENCY MEDICINE

## 2023-12-20 PROCEDURE — 36415 COLL VENOUS BLD VENIPUNCTURE: CPT | Performed by: INTERNAL MEDICINE

## 2023-12-20 PROCEDURE — 83735 ASSAY OF MAGNESIUM: CPT | Performed by: FAMILY MEDICINE

## 2023-12-20 PROCEDURE — 87581 M.PNEUMON DNA AMP PROBE: CPT | Performed by: INTERNAL MEDICINE

## 2023-12-20 PROCEDURE — 258N000003 HC RX IP 258 OP 636: Performed by: FAMILY MEDICINE

## 2023-12-20 PROCEDURE — 86900 BLOOD TYPING SEROLOGIC ABO: CPT | Performed by: EMERGENCY MEDICINE

## 2023-12-20 PROCEDURE — 120N000013 HC R&B IMCU

## 2023-12-20 PROCEDURE — 250N000009 HC RX 250: Performed by: EMERGENCY MEDICINE

## 2023-12-20 PROCEDURE — 87633 RESP VIRUS 12-25 TARGETS: CPT | Performed by: INTERNAL MEDICINE

## 2023-12-20 PROCEDURE — 71275 CT ANGIOGRAPHY CHEST: CPT

## 2023-12-20 PROCEDURE — 96367 TX/PROPH/DG ADDL SEQ IV INF: CPT

## 2023-12-20 PROCEDURE — 93005 ELECTROCARDIOGRAM TRACING: CPT | Performed by: EMERGENCY MEDICINE

## 2023-12-20 PROCEDURE — 84484 ASSAY OF TROPONIN QUANT: CPT | Performed by: EMERGENCY MEDICINE

## 2023-12-20 PROCEDURE — 999N000157 HC STATISTIC RCP TIME EA 10 MIN

## 2023-12-20 PROCEDURE — 250N000013 HC RX MED GY IP 250 OP 250 PS 637: Performed by: EMERGENCY MEDICINE

## 2023-12-20 PROCEDURE — 86140 C-REACTIVE PROTEIN: CPT | Performed by: EMERGENCY MEDICINE

## 2023-12-20 PROCEDURE — 272N000054 HC CANNULA HIGH FLOW, ADULT

## 2023-12-20 PROCEDURE — 80053 COMPREHEN METABOLIC PANEL: CPT | Performed by: EMERGENCY MEDICINE

## 2023-12-20 PROCEDURE — 94640 AIRWAY INHALATION TREATMENT: CPT | Mod: 76

## 2023-12-20 PROCEDURE — 250N000011 HC RX IP 250 OP 636: Performed by: FAMILY MEDICINE

## 2023-12-20 PROCEDURE — 83880 ASSAY OF NATRIURETIC PEPTIDE: CPT | Performed by: EMERGENCY MEDICINE

## 2023-12-20 PROCEDURE — 250N000011 HC RX IP 250 OP 636: Performed by: EMERGENCY MEDICINE

## 2023-12-20 PROCEDURE — 200N000001 HC R&B ICU

## 2023-12-20 PROCEDURE — 87640 STAPH A DNA AMP PROBE: CPT | Performed by: INTERNAL MEDICINE

## 2023-12-20 PROCEDURE — 84484 ASSAY OF TROPONIN QUANT: CPT | Performed by: FAMILY MEDICINE

## 2023-12-20 PROCEDURE — 82805 BLOOD GASES W/O2 SATURATION: CPT | Performed by: EMERGENCY MEDICINE

## 2023-12-20 PROCEDURE — 250N000011 HC RX IP 250 OP 636: Performed by: INTERNAL MEDICINE

## 2023-12-20 PROCEDURE — 87040 BLOOD CULTURE FOR BACTERIA: CPT | Performed by: EMERGENCY MEDICINE

## 2023-12-20 PROCEDURE — 83550 IRON BINDING TEST: CPT | Performed by: FAMILY MEDICINE

## 2023-12-20 PROCEDURE — 85025 COMPLETE CBC W/AUTO DIFF WBC: CPT | Performed by: EMERGENCY MEDICINE

## 2023-12-20 PROCEDURE — 999N000215 HC STATISTIC HFNC ADULT NON-CPAP

## 2023-12-20 PROCEDURE — 258N000003 HC RX IP 258 OP 636: Performed by: EMERGENCY MEDICINE

## 2023-12-20 PROCEDURE — 36415 COLL VENOUS BLD VENIPUNCTURE: CPT | Performed by: EMERGENCY MEDICINE

## 2023-12-20 PROCEDURE — 96365 THER/PROPH/DIAG IV INF INIT: CPT | Mod: 59

## 2023-12-20 PROCEDURE — 86923 COMPATIBILITY TEST ELECTRIC: CPT | Performed by: FAMILY MEDICINE

## 2023-12-20 RX ORDER — AZITHROMYCIN 500 MG/5ML
500 INJECTION, POWDER, LYOPHILIZED, FOR SOLUTION INTRAVENOUS EVERY 24 HOURS
Qty: 250 ML | Refills: 0 | Status: COMPLETED | OUTPATIENT
Start: 2023-12-20 | End: 2023-12-20

## 2023-12-20 RX ORDER — SIMVASTATIN 40 MG
40 TABLET ORAL AT BEDTIME
Status: ON HOLD | COMMUNITY
End: 2024-02-27

## 2023-12-20 RX ORDER — LORATADINE 10 MG/1
10 TABLET ORAL DAILY PRN
Status: DISCONTINUED | OUTPATIENT
Start: 2023-12-20 | End: 2024-01-03 | Stop reason: HOSPADM

## 2023-12-20 RX ORDER — AMIODARONE HYDROCHLORIDE 200 MG/1
200 TABLET ORAL DAILY
Status: DISCONTINUED | OUTPATIENT
Start: 2023-12-20 | End: 2023-12-20

## 2023-12-20 RX ORDER — ESCITALOPRAM OXALATE 20 MG/1
20 TABLET ORAL
Status: DISCONTINUED | OUTPATIENT
Start: 2023-12-20 | End: 2024-01-03 | Stop reason: HOSPADM

## 2023-12-20 RX ORDER — QUETIAPINE FUMARATE 25 MG/1
25 TABLET, FILM COATED ORAL 2 TIMES DAILY
Status: DISCONTINUED | OUTPATIENT
Start: 2023-12-20 | End: 2024-01-02

## 2023-12-20 RX ORDER — DILTIAZEM HYDROCHLORIDE 300 MG/1
300 CAPSULE, COATED, EXTENDED RELEASE ORAL DAILY
Status: DISCONTINUED | OUTPATIENT
Start: 2023-12-20 | End: 2023-12-20

## 2023-12-20 RX ORDER — FLUTICASONE PROPIONATE 110 UG/1
1 AEROSOL, METERED RESPIRATORY (INHALATION) 2 TIMES DAILY
Status: DISCONTINUED | OUTPATIENT
Start: 2023-12-20 | End: 2024-01-03 | Stop reason: HOSPADM

## 2023-12-20 RX ORDER — LIDOCAINE 40 MG/G
CREAM TOPICAL
Status: DISCONTINUED | OUTPATIENT
Start: 2023-12-20 | End: 2024-01-03 | Stop reason: HOSPADM

## 2023-12-20 RX ORDER — SODIUM CHLORIDE 9 MG/ML
INJECTION, SOLUTION INTRAVENOUS CONTINUOUS
Status: DISCONTINUED | OUTPATIENT
Start: 2023-12-20 | End: 2023-12-20

## 2023-12-20 RX ORDER — AMIODARONE HYDROCHLORIDE 200 MG/1
200 TABLET ORAL DAILY
Status: DISCONTINUED | OUTPATIENT
Start: 2023-12-21 | End: 2024-01-03 | Stop reason: HOSPADM

## 2023-12-20 RX ORDER — QUETIAPINE FUMARATE 25 MG/1
50 TABLET, FILM COATED ORAL AT BEDTIME
Status: DISCONTINUED | OUTPATIENT
Start: 2023-12-20 | End: 2024-01-02

## 2023-12-20 RX ORDER — CALCIUM CARBONATE 500 MG/1
1000 TABLET, CHEWABLE ORAL 4 TIMES DAILY PRN
Status: DISCONTINUED | OUTPATIENT
Start: 2023-12-20 | End: 2024-01-03 | Stop reason: HOSPADM

## 2023-12-20 RX ORDER — ACETAMINOPHEN 325 MG/1
650 TABLET ORAL EVERY 4 HOURS PRN
Status: DISCONTINUED | OUTPATIENT
Start: 2023-12-20 | End: 2024-01-03 | Stop reason: HOSPADM

## 2023-12-20 RX ORDER — AMOXICILLIN 250 MG
2 CAPSULE ORAL 2 TIMES DAILY PRN
Status: DISCONTINUED | OUTPATIENT
Start: 2023-12-20 | End: 2024-01-03 | Stop reason: HOSPADM

## 2023-12-20 RX ORDER — SIMVASTATIN 40 MG
40 TABLET ORAL AT BEDTIME
Status: DISCONTINUED | OUTPATIENT
Start: 2023-12-20 | End: 2024-01-03 | Stop reason: HOSPADM

## 2023-12-20 RX ORDER — CLONAZEPAM 0.5 MG/1
.5-1 TABLET ORAL DAILY PRN
Status: DISCONTINUED | OUTPATIENT
Start: 2023-12-20 | End: 2023-12-22

## 2023-12-20 RX ORDER — ACETAMINOPHEN 325 MG/1
975 TABLET ORAL ONCE
Status: COMPLETED | OUTPATIENT
Start: 2023-12-20 | End: 2023-12-20

## 2023-12-20 RX ORDER — ACETAMINOPHEN 650 MG/1
650 SUPPOSITORY RECTAL EVERY 4 HOURS PRN
Status: DISCONTINUED | OUTPATIENT
Start: 2023-12-20 | End: 2024-01-03 | Stop reason: HOSPADM

## 2023-12-20 RX ORDER — ALBUTEROL SULFATE 5 MG/ML
2.5 SOLUTION RESPIRATORY (INHALATION) EVERY 6 HOURS PRN
Status: DISCONTINUED | OUTPATIENT
Start: 2023-12-20 | End: 2024-01-03 | Stop reason: HOSPADM

## 2023-12-20 RX ORDER — FERROUS SULFATE 325(65) MG
325 TABLET ORAL
Status: DISCONTINUED | OUTPATIENT
Start: 2023-12-20 | End: 2023-12-29

## 2023-12-20 RX ORDER — IPRATROPIUM BROMIDE AND ALBUTEROL SULFATE 2.5; .5 MG/3ML; MG/3ML
3 SOLUTION RESPIRATORY (INHALATION)
Status: DISCONTINUED | OUTPATIENT
Start: 2023-12-20 | End: 2023-12-21

## 2023-12-20 RX ORDER — ONDANSETRON 2 MG/ML
4 INJECTION INTRAMUSCULAR; INTRAVENOUS EVERY 6 HOURS PRN
Status: DISCONTINUED | OUTPATIENT
Start: 2023-12-20 | End: 2024-01-03 | Stop reason: HOSPADM

## 2023-12-20 RX ORDER — AMOXICILLIN 250 MG
1 CAPSULE ORAL 2 TIMES DAILY PRN
Status: DISCONTINUED | OUTPATIENT
Start: 2023-12-20 | End: 2024-01-03 | Stop reason: HOSPADM

## 2023-12-20 RX ORDER — ACETAMINOPHEN 325 MG/1
325-650 TABLET ORAL EVERY 6 HOURS PRN
Status: DISCONTINUED | OUTPATIENT
Start: 2023-12-20 | End: 2024-01-03 | Stop reason: HOSPADM

## 2023-12-20 RX ORDER — ONDANSETRON 4 MG/1
4 TABLET, ORALLY DISINTEGRATING ORAL EVERY 6 HOURS PRN
Status: DISCONTINUED | OUTPATIENT
Start: 2023-12-20 | End: 2024-01-03 | Stop reason: HOSPADM

## 2023-12-20 RX ORDER — IOPAMIDOL 755 MG/ML
75 INJECTION, SOLUTION INTRAVASCULAR ONCE
Status: COMPLETED | OUTPATIENT
Start: 2023-12-20 | End: 2023-12-20

## 2023-12-20 RX ORDER — PIPERACILLIN SODIUM, TAZOBACTAM SODIUM 3; .375 G/15ML; G/15ML
3.38 INJECTION, POWDER, LYOPHILIZED, FOR SOLUTION INTRAVENOUS EVERY 8 HOURS
Status: COMPLETED | OUTPATIENT
Start: 2023-12-20 | End: 2023-12-27

## 2023-12-20 RX ORDER — MIRTAZAPINE 15 MG/1
15 TABLET, FILM COATED ORAL AT BEDTIME
Status: DISCONTINUED | OUTPATIENT
Start: 2023-12-20 | End: 2024-01-03 | Stop reason: HOSPADM

## 2023-12-20 RX ORDER — POLYETHYLENE GLYCOL 3350 17 G/17G
17 POWDER, FOR SOLUTION ORAL DAILY
Status: DISCONTINUED | OUTPATIENT
Start: 2023-12-20 | End: 2024-01-03 | Stop reason: HOSPADM

## 2023-12-20 RX ORDER — PIPERACILLIN SODIUM, TAZOBACTAM SODIUM 3; .375 G/15ML; G/15ML
3.38 INJECTION, POWDER, LYOPHILIZED, FOR SOLUTION INTRAVENOUS ONCE
Status: COMPLETED | OUTPATIENT
Start: 2023-12-20 | End: 2023-12-20

## 2023-12-20 RX ADMIN — IPRATROPIUM BROMIDE AND ALBUTEROL SULFATE 3 ML: .5; 3 SOLUTION RESPIRATORY (INHALATION) at 15:32

## 2023-12-20 RX ADMIN — QUETIAPINE FUMARATE 25 MG: 25 TABLET ORAL at 13:23

## 2023-12-20 RX ADMIN — ALBUTEROL SULFATE 2.5 MG: 2.5 SOLUTION RESPIRATORY (INHALATION) at 05:06

## 2023-12-20 RX ADMIN — APIXABAN 5 MG: 5 TABLET, FILM COATED ORAL at 09:50

## 2023-12-20 RX ADMIN — VANCOMYCIN HYDROCHLORIDE 2500 MG: 10 INJECTION, POWDER, LYOPHILIZED, FOR SOLUTION INTRAVENOUS at 06:28

## 2023-12-20 RX ADMIN — SODIUM CHLORIDE: 9 INJECTION, SOLUTION INTRAVENOUS at 09:51

## 2023-12-20 RX ADMIN — ACETAMINOPHEN 975 MG: 325 TABLET ORAL at 05:27

## 2023-12-20 RX ADMIN — CLONAZEPAM 0.5 MG: 0.5 TABLET ORAL at 20:58

## 2023-12-20 RX ADMIN — IPRATROPIUM BROMIDE AND ALBUTEROL SULFATE 3 ML: .5; 3 SOLUTION RESPIRATORY (INHALATION) at 19:08

## 2023-12-20 RX ADMIN — QUETIAPINE FUMARATE 50 MG: 50 TABLET ORAL at 21:01

## 2023-12-20 RX ADMIN — PIPERACILLIN AND TAZOBACTAM 3.38 G: 3; .375 INJECTION, POWDER, LYOPHILIZED, FOR SOLUTION INTRAVENOUS at 05:29

## 2023-12-20 RX ADMIN — APIXABAN 5 MG: 5 TABLET, FILM COATED ORAL at 20:57

## 2023-12-20 RX ADMIN — QUETIAPINE FUMARATE 25 MG: 25 TABLET ORAL at 09:50

## 2023-12-20 RX ADMIN — IPRATROPIUM BROMIDE AND ALBUTEROL SULFATE 3 ML: .5; 3 SOLUTION RESPIRATORY (INHALATION) at 11:50

## 2023-12-20 RX ADMIN — ACETAMINOPHEN 650 MG: 325 TABLET ORAL at 18:42

## 2023-12-20 RX ADMIN — SODIUM CHLORIDE, POTASSIUM CHLORIDE, SODIUM LACTATE AND CALCIUM CHLORIDE 1000 ML: 600; 310; 30; 20 INJECTION, SOLUTION INTRAVENOUS at 07:40

## 2023-12-20 RX ADMIN — PIPERACILLIN AND TAZOBACTAM 3.38 G: 3; .375 INJECTION, POWDER, LYOPHILIZED, FOR SOLUTION INTRAVENOUS at 14:17

## 2023-12-20 RX ADMIN — ESCITALOPRAM OXALATE 20 MG: 20 TABLET ORAL at 11:24

## 2023-12-20 RX ADMIN — IOPAMIDOL 75 ML: 755 INJECTION, SOLUTION INTRAVENOUS at 06:22

## 2023-12-20 RX ADMIN — SIMVASTATIN 40 MG: 20 TABLET, FILM COATED ORAL at 21:01

## 2023-12-20 RX ADMIN — AZITHROMYCIN MONOHYDRATE 500 MG: 500 INJECTION, POWDER, LYOPHILIZED, FOR SOLUTION INTRAVENOUS at 09:51

## 2023-12-20 RX ADMIN — FERROUS SULFATE TAB 325 MG (65 MG ELEMENTAL FE) 325 MG: 325 (65 FE) TAB at 11:24

## 2023-12-20 ASSESSMENT — ENCOUNTER SYMPTOMS
FEVER: 1
SHORTNESS OF BREATH: 1
BLOOD IN STOOL: 0
VOMITING: 0
COUGH: 1
ABDOMINAL PAIN: 0

## 2023-12-20 ASSESSMENT — ACTIVITIES OF DAILY LIVING (ADL)
ADLS_ACUITY_SCORE: 40
ADLS_ACUITY_SCORE: 35
ADLS_ACUITY_SCORE: 40
ADLS_ACUITY_SCORE: 35
ADLS_ACUITY_SCORE: 35
ADLS_ACUITY_SCORE: 40

## 2023-12-20 NOTE — PHARMACY-VANCOMYCIN DOSING SERVICE
Pharmacy Vancomycin Initial Note  Date of Service 2023  Patient's  1965  58 year old, male    Indication: [facility] Pneumonia, sepsis    Current estimated CrCl = Estimated Creatinine Clearance: 79.6 mL/min (A) (based on SCr of 1.38 mg/dL (H)).    Creatinine for last 3 days  2023:  5:00 AM Creatinine 1.38 mg/dL    Recent Vancomycin Level(s) for last 3 days  No results found for requested labs within last 3 days.      Vancomycin IV Administrations (past 72 hours)        No vancomycin orders with administrations in past 72 hours.                    Nephrotoxins and other renal medications (From now, onward)      Start     Dose/Rate Route Frequency Ordered Stop    23 0600  vancomycin (VANCOCIN) 2,500 mg in sodium chloride 0.9 % 500 mL intermittent infusion         2,500 mg  over 120 Minutes Intravenous ONCE 23 0536              Contrast Orders - past 72 hours (72h ago, onward)      Start     Dose/Rate Route Frequency Stop    23 0600  iopamidol (ISOVUE-370) solution 75 mL         75 mL Intravenous ONCE              InsightRX Prediction of Planned Initial Vancomycin Regimen          Plan:  Start vancomycin  2500  mg IV x1.   Contact pharmacy if vancomycin is to continue inpatient.      Nayely Chavez [facility]

## 2023-12-20 NOTE — CONSULTS
Consultation - Infectious Disease  [facility]  Jim Titus,  1965, MRN [id number]    Admitting Dx: Sepsis, due to unspecified organism, unspecified whether acute organ dysfunction present (H) [A41.9]    PCP: Luis Alberto Fatima, [phone]       ASSESSMENT   58-year-old man with a history of cutaneous melanoma.  Recently admitted for respiratory failure thought to be due to Keytruda pneumonitis.  Presenting now with worsening shortness of breath over the past week.    Respiratory failure.  Increasing shortness of breath.  Hypoxia on admission.  Fevers and leukocytosis.  CT scan showing improvement of right-sided infiltrates, but now with a new patchy left-sided infiltrates.  COVID and flu PCR negative.  Blood cultures pending  Keytruda pneumonitis.  Hospitalized last month for respiratory failure.  Treated with broad-spectrum antibiotics, but ultimately felt to be related to Keytruda pneumonitis.  Discharged with steroids with resolution of symptoms    Active Problems:    Acute respiratory failure with hypoxia (H)    Pneumonia due to infectious organism, unspecified laterality, unspecified part of lung    Sepsis, due to unspecified organism, unspecified whether acute organ dysfunction present (H)       PLAN   -Agree with broad coverage with azithromycin, Zosyn, vancomycin  -Sputum culture  -Check procalcitonin  -Respiratory viral panel  -MRSA nasal swab  -urine legionella/strep antigen  -Recommend pulmonary consult.  Consider recurrence of pneumonitis.  Does this patient need a longer course of steroids?  -If not improving with antibiotics, may need bronchoscopy versus open lung biopsy    Thank you for this consult. Will follow.    Payam Glez MD  [facility]  Direct messaging: [employer] Paging  On-Call ID provider: [phone], option: [phone]      ===========================================      Chief Complaint   <principal problem not specified>       HPI     We have been  requested by Martha De Paz MD to evaluate Jim Titus for the above.    History obtained by patient    Jim Titus is a 58 year old presenting with increasing shortness of breath over the past week.  The patient is known to the ID service for hospitalization last month with respiratory failure.  He required intubation.  He was treated with broad-spectrum antibiotics.  He had a bronchoscopy that only grew normal dustin and yeast.  Ultimately, it was felt that this was related to recent Keytruda.  This was discontinued and he was discharged with a steroid taper.  He reports that his respiratory symptoms essentially resolved and he felt almost back to normal about 2 weeks ago, with exception of leg weakness.  However, over the past week he has noted increasing shortness of breath with activity.  He denies any fevers or chills, but he was febrile to 102 on admission.  He has been having dry cough.  No sick contacts.  No recent travel.          Review of Systems   Ten systems reviewed and negative except for what is noted in the HPI         Medical History  See HPI above  Surgical History  He  has a past surgical history that includes hernia repair; orthopedic surgery; and pyloric stenosis repair.     Social History  Reviewed, and he  reports that he has never smoked. He quit smokeless tobacco use about 25 years ago.  His smokeless tobacco use included chew. He reports current alcohol use. He reports current drug use. Drug: Marijuana.  Social History     Social History Narrative    Not on file     Family History  family history includes Breast Cancer in his mother; Heart Disease in his father; Hyperlipidemia in his father; Mental Illness in his brother and father.  family history reviewed and is not pertinent to the presenting problem.            Allergies     Allergies   Allergen Reactions    Chicken [Chicken Protein] Other (See Comments)     Throat closes to turkey as well.    Pembrolizumab Other (See Comments)     " Severe pneumonitis    Wellbutrin [Bupropion] Unknown    Grass Pollen [Grass] Rash    Turkey [Poultry Meal] Rash         Antibiotics   Azithromycin 12/20-  Zosyn 12/20-  Vancomycin 12/20-    Previous:  None      Physical Exam     Temp:  [97.5  F (36.4  C)-102.2  F (39  C)] 97.5  F (36.4  C)  Pulse:  [] 69  Resp:  [6-28] 16  BP: ()/(56-68) 102/62  FiO2 (%):  [35 %] 35 %  SpO2:  [92 %-99 %] 94 %    /62 (BP Location: Left arm, Patient Position: Semi-Chappell's, Cuff Size: Adult Regular)   Pulse 69   Temp 97.5  F (36.4  C) (Oral)   Resp 16   Ht 1.93 m (6' 4\")   Wt 111.1 kg (245 lb)   SpO2 94%   BMI 29.82 kg/m      GENERAL:  well-developed, well-nourished, lying in bed in no acute distress.   HENT:  Head is normocephalic, atraumatic. Oropharynx is moist without exudates or ulcers.  EYES:  Eyes have anicteric sclerae without conjunctival injection or stigmata of endocarditis.   NECK:  Supple.  LUNGS:  Clear to auscultation.  CARDIOVASCULAR:  Regular rate and rhythm with no murmurs, gallops or rubs.  ABDOMEN:  Normal bowel sounds, soft, nontender. No appreciable hepatosplenomegaly  EXT: Extremities warm and without edema.  SKIN:  No acute rashes. No stigmata of endocarditis.  NEUROLOGIC:  Grossly nonfocal.      Cultures   12/20 blood cultures x 2: Pending    Susceptibility data from last 90 days.  Collected Specimen Info Organism   11/03/23 Bronchial Alveolar Lavage from Lung, Right Candida albicans   11/03/23 Bronchial Alveolar Lavage from Lung, Right Normal dustin          Laboratory results     Recent Labs   Lab 12/20/23  0500 12/13/23  1410   WBC 17.4* 9.9   HGB 7.5* 9.3*   * 563*       Recent Labs   Lab 12/20/23  0500 12/13/23  1410   * 136   CO2 22 25   BUN 13.4 14.8   ALBUMIN 3.1*  --    ALKPHOS 374*  --    ALT 31  --    AST 27  --        Recent Labs   Lab 12/20/23  0739   CRPI 280.00*           Imaging   Radiology results reviewed    CT Chest Pulmonary Embolism w " Contrast    Result Date: 12/20/2023  EXAM: CT CHEST PULMONARY EMBOLISM W CONTRAST LOCATION: [facility] DATE: 12/20/2023 INDICATION: Shortness of breath COMPARISON: 11/13/2023 TECHNIQUE: CT chest pulmonary angiogram during arterial phase injection of IV contrast. Multiplanar reformats and MIP reconstructions were performed. Dose reduction techniques were used. CONTRAST: [id number] 75ml FINDINGS: ANGIOGRAM CHEST: Previously noted left basilar pulmonary emboli not seen today. No pulmonary emboli noted today. No evidence for right ventricular heart strain. Thoracic aorta is negative for dissection. No CT evidence of right heart strain. LUNGS AND PLEURA: Extensive patchy left lung infiltrate and atelectasis has developed since prior examination. Moderate clearing of extensive patchy right lung infiltrate. Mild residual right upper lobe infiltrate/atelectasis remains. Moderate to marked elevation right hemidiaphragm unchanged. MEDIASTINUM/AXILLAE: No lymphadenopathy. Normal esophagus. No significant pericardial effusion. Ectasia of the ascending thoracic aorta. CORONARY ARTERY CALCIFICATION: Mild. UPPER ABDOMEN: Unremarkable MUSCULOSKELETAL: No concerning osseous abnormalities.     IMPRESSION: 1.  Previously noted left basilar pulmonary emboli not seen today. No pulmonary emboli noted today. No evidence for right ventricular heart strain. 2.  Extensive patchy left lung infiltrate and atelectasis has developed since prior examination. 3.  Moderate clearing of extensive patchy right lung infiltrate. Mild residual right upper lobe infiltrate/atelectasis remains. Moderate to marked elevation right hemidiaphragm unchanged. 4.  Ectasia of the ascending thoracic aorta stable.      Data reviewed today: I reviewed all medications, new labs and imaging results over the last 24 hours. I personally reviewed the chest CT image(s) showing bilateral patchy infiltrates greater on the left .  The patient's care  was discussed with the Bedside Nurse and Patient.

## 2023-12-20 NOTE — PHARMACY-VANCOMYCIN DOSING SERVICE
Pharmacy Vancomycin Initial Note  Date of Service 2023  Patient's  1965  58 year old, male    Indication: Community Acquired Pneumonia    Current estimated CrCl = Estimated Creatinine Clearance: 79.6 mL/min (A) (based on SCr of 1.38 mg/dL (H)).    Creatinine for last 3 days  2023:  5:00 AM Creatinine 1.38 mg/dL    Recent Vancomycin Level(s) for last 3 days  No results found for requested labs within last 3 days.      Vancomycin IV Administrations (past 72 hours)                     vancomycin (VANCOCIN) 2,500 mg in sodium chloride 0.9 % 500 mL intermittent infusion (mg) 2,500 mg New Bag 23 06                    Nephrotoxins and other renal medications (From now, onward)      None            Contrast Orders - past 72 hours (72h ago, onward)      Start     Dose/Rate Route Frequency Stop    23 0600  iopamidol (ISOVUE-370) solution 75 mL         75 mL Intravenous ONCE 23 0622            InsightRX Prediction of Planned Initial Vancomycin Regimen  Regimen: 1750 mg IV every 24 hours.  Start time: 18:28 on 2023  Exposure target: AUC24 (range)400-600 mg/L.hr   AUC24,ss: 483 mg/L.hr  Probability of AUC24 > 400: 71 %  Ctrough,ss: 13.9 mg/L  Probability of Ctrough,ss > 20: 21 %  Probability of nephrotoxicity (Lodise BELIA ): 9 %            Plan:  2500mg given earlier . Continue  vancomycin  1750 mg IV q24h.   Vancomycin monitoring method: AUC  Vancomycin therapeutic monitoring goal: 400-600 mg*h/L  Pharmacy will check vancomycin levels as appropriate in 1-3 Days.    Serum creatinine levels will be ordered daily for the first week of therapy and at least twice weekly for subsequent weeks.      Demi Ceballos, [facility]

## 2023-12-20 NOTE — ED PROVIDER NOTES
EMERGENCY DEPARTMENT ENCOUNTER      NAME: Jim Titus  AGE: 58 year old male  YOB: 1965  MRN: [id number]  EVALUATION DATE & TIME: 12/20/2023  4:29 AM    PCP: Luis Alberto Fatima    ED PROVIDER: Alyce James DO      Chief Complaint   Patient presents with    Shortness of Breath         FINAL IMPRESSION:  1. Pneumonia due to infectious organism, unspecified laterality, unspecified part of lung    2. Acute respiratory failure with hypoxia (H)    3. Sepsis, due to unspecified organism, unspecified whether acute organ dysfunction present (H)          ED COURSE & MEDICAL DECISION MAKING:    Pertinent Labs & Imaging studies reviewed. (See chart for details)  4:35 AM I met the patient and performed my initial interview and exam.  5:17 AM I rechecked patient and asked if he has any blood in stool or hematuria.   6:20 AM reassessed after CT imaging.  Back on facemask.  He reports he feels better on the facemask than the high flow.  I will keep him on that for now.  Down to treated him to eat liters with saturations of 96%.  No longer tachypneic.  Lung sounds remain clear.  7:00 AM Patient updated on results.  Respiratory status continues to improve.  Down titrated to 6L face mask.  7:19 AM Discussed admission with Hospitalist, Dr. De Paz    58 year old male presents to the Emergency Department for evaluation of shortness of breath.  He reports progressive symptoms over the past week.  He has had a cough.  Minimal movement makes his symptoms worse.  He called EMS.  He was saturating 78% on room air which improved with nonrebreather.  Patient with a history of melanoma, prior Keytruda infusions.  Last infusion in October.  Patient was admitted twice in October, initially thought to be pneumonia.  Readmitted and had ARDS.  He was intubated.  Imaging showed a small pulmonary emboli.  He was treated for chemotherapy induced pneumonitis as well as pneumonia.  He was anemic while in the hospital.  No signs of any  melena.  He has scheduled follow-up with [facility] next week.  He continues to deny any melena.  He does have a history of asthma.  He reports he felt that he had returned to his baseline prior to this new illness one week ago.  He has been using his inhaler at home.  No known sick contacts.  Patient is ill-appearing on my arrival.  He is pale.  He is mildly tachypneic.  His lung sounds are clear.  Perhaps improved air movement after albuterol nebulizer.  Placed on high flow nasal cannula per RT, however patient reports he feels better on the facemask.  Tachypnea improved.  He was given Tylenol for fever.  He has a leukocytosis of 17,000.  He was given broad-spectrum antibiotics given recent hospitalization and antibiotic use.  He is on apixaban and reports he is compliant, however was unaware that he was on a blood thinner.  RSV, COVID, influenza are negative.  Not consistent with ACS.  BNP and troponin not elevated.  EKG shows sinus tachycardia without evidence of arrhythmia or ischemia.  CT imaging of the chest obtained which shows extensive patchy left lung infiltrate and atelectasis, moderate clearing of extensive patchy right lung infiltrate.  No pulmonary emboli on today's exam.  Able to down titrate oxygen.  Patient to be admitted to the hospitalist    At the conclusion of the encounter I discussed the results of all of the tests and the disposition. The questions were answered. The patient or family acknowledged understanding and was agreeable with the care plan.     Medical Decision Making    History:  Supplemental history from: Documented in chart, if applicable  External Record(s) reviewed: Documented in chart, if applicable.    Work Up:  Chart documentation includes differential considered and any EKGs or imaging independently interpreted by provider, where specified.  In additional to work up documented, I considered the following work up: Documented in chart, if applicable.    External  consultation:  Discussion of management with another provider: Documented in chart, if applicable    Complicating factors:  Care impacted by chronic illness: Cancer/Chemotherapy and Hyperlipidemia  Care affected by social determinants of health: N/A    Disposition considerations: Admit.      Critical Care     Performed by: Dr Johanna James  Authorized by: Dr Johanna James  Total critical care time: 31 minutes  Critical care was necessary to treat or prevent imminent or life-threatening deterioration of the following conditions: sepsis, respiratory failure  Critical care was time spent personally by me on the following activities: development of treatment plan with patient or surrogate, discussions with consultants, examination of patient, evaluation of patient's response to treatment, obtaining history from patient or surrogate, ordering and performing treatments and interventions, ordering and review of laboratory studies, ordering and review of radiographic studies, re-evaluation of patient's condition and monitoring for potential decompensation.  Critical care time was exclusive of separately billable procedures and treating other patients.     MEDICATIONS GIVEN IN THE EMERGENCY:  Medications   albuterol (PROVENTIL) neb solution 2.5 mg (2.5 mg Nebulization $Given 12/20/23 0506)   vancomycin (VANCOCIN) 2,500 mg in sodium chloride 0.9 % 500 mL intermittent infusion (2,500 mg Intravenous $New Bag 12/20/23 0628)   acetaminophen (TYLENOL) tablet 975 mg (975 mg Oral $Given 12/20/23 0527)   piperacillin-tazobactam (ZOSYN) 3.375 g vial to attach to  mL bag (0 g Intravenous Stopped 12/20/23 0628)   iopamidol (ISOVUE-370) solution 75 mL (75 mLs Intravenous $Given 12/20/23 0622)       NEW PRESCRIPTIONS STARTED AT TODAY'S ER VISIT  New Prescriptions    No medications on file          =================================================================    HPI    Patient information was obtained from: Patient    Use of  : N/A         Jim Titus is a 58 year old male with a pertinent history of persistent atrial fibrillation, recurrent pneumonias, PE, melanoma, mild asthma, depression, anxiety who presents to this ED via EMS for evaluation of shortness of breath.     The patient reports that he is feeling short of breath over the last week. He said that his breathing has worsened and that his cough makes it difficult for him to breath. He said he has been coughing to the point where he feels nauseas, but no vomiting. He felt okay and normal two weeks ago. But says that he has not felt well since March. He is on the blood thinner apixaban.     Patient has a fever, but denies abdominal pain, dark or bloody stool or having any known sick contacts. He takes Keytruda for his skin cancer. Reports having a rash on his right ankle, but it is gone now. Patient has an appointment next week with [facility] for a colonoscopy.     Per chart review, patient presented to [facility] on 11/2/23 for shortness of breath. Labs remarkable for pH 7.45 and spco2 38. Covid/flu/rsv negative. Procalcitonin elevated at 11, CBC showed mild leukocytosis at 11.3. Hgb 10.1. Workup revealed infiltrates in the lung specifically left. Treated with tylenol, IV fluids and a duoneb. Patient admitted for acute respiratory failure with hypoxia. Hospital course was notable for intubation on 11/4 for ARDS/sepsis. Follow-up CT scan revealed extensive bilateral pulmonary infiltrate, very small pulmonary emboli on 11/13. Patient treated for chemotherapy-induced pneumonitis. Patient discharged on 11/20/23 with instruction to follow up with [facility] and PCP for anemia monitoring.        REVIEW OF SYSTEMS   Review of Systems   Constitutional:  Positive for fever.   Respiratory:  Positive for cough and shortness of breath.    Gastrointestinal:  Negative for abdominal pain, blood in stool and vomiting.        PAST MEDICAL HISTORY:  History reviewed. No pertinent past  medical history.    PAST SURGICAL HISTORY:  Past Surgical History:   Procedure Laterality Date    HERNIA REPAIR      ORTHOPEDIC SURGERY      Both ankles, left knee, right shoulder    pyloric stenosis repair             CURRENT MEDICATIONS:    acetaminophen (TYLENOL) 325 MG tablet  albuterol (PROAIR HFA/PROVENTIL HFA/VENTOLIN HFA) 108 (90 Base) MCG/ACT inhaler  albuterol (PROVENTIL) (2.5 MG/3ML) 0.083% neb solution  amiodarone (PACERONE) 200 MG tablet  apixaban ANTICOAGULANT (ELIQUIS) 5 MG tablet  clonazePAM (KLONOPIN) 0.5 MG tablet  diclofenac (VOLTAREN) 1 % topical gel  diltiazem ER COATED BEADS (CARDIZEM CD/CARTIA XT) 300 MG 24 hr capsule  escitalopram (LEXAPRO) 20 MG tablet  ferrous sulfate (FEROSUL) 325 (65 Fe) MG tablet  fluticasone (FLOVENT HFA) 110 MCG/ACT inhaler  loperamide HCl (IMODIUM A-D ORAL)  loratadine (CLARITIN) 10 mg tablet  mirtazapine (REMERON) 15 MG tablet  QUEtiapine (SEROQUEL) 25 MG tablet  QUEtiapine (SEROQUEL) 50 MG tablet  sulfamethoxazole-trimethoprim (BACTRIM) 400-80 MG tablet  tadalafil (CIALIS) 5 MG tablet         ALLERGIES:  Allergies   Allergen Reactions    Chicken [Chicken Protein] Other (See Comments)     Throat closes to turkey as well.    Pembrolizumab Other (See Comments)     Severe pneumonitis    Wellbutrin [Bupropion] Unknown    Grass Pollen [Grass] Rash    Turkey [Poultry Meal] Rash       FAMILY HISTORY:  Family History   Problem Relation Age of Onset    Breast Cancer Mother     Heart Disease Father     Mental Illness Father     Hyperlipidemia Father     Mental Illness Brother        SOCIAL HISTORY:   Social History     Socioeconomic History    Marital status: Single   Tobacco Use    Smoking status: Never    Smokeless tobacco: Former     Types: Chew     Quit date: 1998   Substance and Sexual Activity    Alcohol use: Yes     Comment: 3-4 times per week, 5-6 beers    Drug use: Yes     Types: Marijuana    Sexual activity: Not Currently     Partners: Female     Social Determinants  of Health     Financial Resource Strain: Low Risk  (12/11/2023)    Financial Resource Strain     Within the past 12 months, have you or your family members you live with been unable to get utilities (heat, electricity) when it was really needed?: No   Food Insecurity: Low Risk  (12/11/2023)    Food Insecurity     Within the past 12 months, did you worry that your food would run out before you got money to buy more?: No     Within the past 12 months, did the food you bought just not last and you didn t have money to get more?: No   Transportation Needs: Low Risk  (12/11/2023)    Transportation Needs     Within the past 12 months, has lack of transportation kept you from medical appointments, getting your medicines, non-medical meetings or appointments, work, or from getting things that you need?: No   Physical Activity: Insufficiently Active (11/10/2021)    Exercise Vital Sign     Days of Exercise per Week: 1 day     Minutes of Exercise per Session: 30 min   Stress: Stress Concern Present (11/10/2021)    [employer] - Occupational Stress Questionnaire     Feeling of Stress : To some extent   Social Connections: Socially Isolated (11/10/2021)    Social Connection and Isolation Panel [NHANES]     Frequency of Communication with Friends and Family: Once a week     Frequency of Social Gatherings with Friends and Family: Once a week     Attends Caodaism Services: Never     Active Member of Clubs or Organizations: No     Marital Status: Never    Interpersonal Safety: Low Risk  (10/23/2023)    Interpersonal Safety     Do you feel physically and emotionally safe where you currently live?: Yes     Within the past 12 months, have you been hit, slapped, kicked or otherwise physically hurt by someone?: No     Within the past 12 months, have you been humiliated or emotionally abused in other ways by your partner or ex-partner?: No   Housing Stability: Low Risk  (12/11/2023)    Housing  "Stability     Do you have housing? : Yes     Are you worried about losing your housing?: No       VITALS:  BP 99/57   Pulse 83   Temp 99.3  F (37.4  C) (Oral)   Resp 20   Ht 1.93 m (6' 4\")   Wt 111.1 kg (245 lb)   SpO2 96%   BMI 29.82 kg/m      PHYSICAL EXAM    Physical Exam  Constitutional:       General: He is not in acute distress.  HENT:      Head: Normocephalic and atraumatic.      Mouth/Throat:      Pharynx: Oropharynx is clear.   Eyes:      Pupils: Pupils are equal, round, and reactive to light.   Cardiovascular:      Rate and Rhythm: Regular rhythm. Tachycardia present.      Pulses: Normal pulses.      Heart sounds: Normal heart sounds.   Pulmonary:      Effort: Pulmonary effort is normal. Tachypnea present.      Breath sounds: Normal breath sounds.   Abdominal:      General: Abdomen is flat. Bowel sounds are normal.      Palpations: Abdomen is soft.      Tenderness: There is no abdominal tenderness.   Musculoskeletal:         General: Normal range of motion.   Skin:     General: Skin is warm and dry.      Capillary Refill: Capillary refill takes less than 2 seconds.   Neurological:      General: No focal deficit present.      Mental Status: He is alert and oriented to person, place, and time.             LAB:  All pertinent labs reviewed and interpreted.  Labs Ordered and Resulted from Time of ED Arrival to Time of ED Departure   INR - Abnormal       Result Value    INR 2.06 (*)    PARTIAL THROMBOPLASTIN TIME - Abnormal    aPTT 58 (*)    COMPREHENSIVE METABOLIC PANEL - Abnormal    Sodium 134 (*)     Potassium 3.5      Carbon Dioxide (CO2) 22      Anion Gap 14      Urea Nitrogen 13.4      Creatinine 1.38 (*)     GFR Estimate 59 (*)     Calcium 8.9      Chloride 98      Glucose 130 (*)     Alkaline Phosphatase 374 (*)     AST 27      ALT 31      Protein Total 6.6      Albumin 3.1 (*)     Bilirubin Total 0.7     BLOOD GAS VENOUS - Abnormal    pH Venous 7.49 (*)     pCO2 Venous 32 (*)     pO2 Venous 61 " (*)     Bicarbonate Venous 24      Base Excess/Deficit 0.8      Oxyhemoglobin Venous 91.4 (*)     O2 Sat, Venous 93.2 (*)    CBC WITH PLATELETS AND DIFFERENTIAL - Abnormal    WBC Count 17.4 (*)     RBC Count 2.67 (*)     Hemoglobin 7.5 (*)     Hematocrit 23.5 (*)     MCV 88      MCH 28.1      MCHC 31.9      RDW 19.5 (*)     Platelet Count 645 (*)     % Neutrophils 86      % Lymphocytes 4      % Monocytes 7      % Eosinophils 1      % Basophils 0      % Immature Granulocytes 2      NRBCs per 100 WBC 0      Absolute Neutrophils 15.0 (*)     Absolute Lymphocytes 0.7 (*)     Absolute Monocytes 1.2      Absolute Eosinophils 0.1      Absolute Basophils 0.1      Absolute Immature Granulocytes 0.4      Absolute NRBCs 0.0     LACTIC ACID WHOLE BLOOD - Normal    Lactic Acid 0.9     INFLUENZA A/B, RSV, & SARS-COV2 PCR - Normal    Influenza A PCR Negative      Influenza B PCR Negative      RSV PCR Negative      SARS CoV2 PCR Negative     TROPONIN T, HIGH SENSITIVITY - Normal    Troponin T, High Sensitivity 18     N TERMINAL PRO BNP OUTPATIENT - Normal    N Terminal Pro BNP Outpatient 415     TYPE AND SCREEN, ADULT    ABO/RH(D) A POS      Antibody Screen Negative      SPECIMEN EXPIRATION DATE [id number]     BLOOD CULTURE   BLOOD CULTURE   ABO/RH TYPE AND SCREEN       RADIOLOGY:  Reviewed all pertinent imaging. Please see official radiology report.  CT Chest Pulmonary Embolism w Contrast   Final Result   IMPRESSION:   1.  Previously noted left basilar pulmonary emboli not seen today. No pulmonary emboli noted today. No evidence for right ventricular heart strain.      2.  Extensive patchy left lung infiltrate and atelectasis has developed since prior examination.       3.  Moderate clearing of extensive patchy right lung infiltrate. Mild residual right upper lobe infiltrate/atelectasis remains. Moderate to marked elevation right hemidiaphragm unchanged.      4.  Ectasia of the ascending thoracic aorta stable.          EKG:     Performed at: 04:35:06 20-DEC-2023    Impression: Sinus tachycardia    Rate: 101  Rhythm: Sinus tachycardia  Axis: 7  TX Interval: 152  QRS Interval: 92  QTc Interval: 464  ST Changes: Vent rate has increased by 33 BPM  Comparison: ECG of 19-NOV-2023    I have independently reviewed and interpreted the EKG(s) documented above.    PROCEDURES:   None      I, Medina Hall, am serving as a scribe to document services personally performed by Dr. Alyce James based on my observation and the provider's statements to me. IAlyce, DO attest that Medinanuvia Hall is acting in a scribe capacity, has observed my performance of the services and has documented them in accordance with my direction.    Alyce James DO  Emergency Medicine  [facility] EMERGENCY ROOM  [address] [facility] [id number] [phone]  Dept: [phone]       Alyce James DO  12/20/23 0720

## 2023-12-20 NOTE — PHARMACY-ADMISSION MEDICATION HISTORY
Pharmacist Admission Medication History    Admission medication history is complete. The information provided in this note is only as accurate as the sources available at the time of the update.    Information Source(s): Patient via in-person    Pertinent Information:  Patient has Rx for quetiapine 50mg at bedtime and 25mg BID . He was not sure when he takes that second dose of 25mg , if it is in the early evening or at bedtime with the 50mg dose    Changes made to PTA medication list:  Added: None  Deleted: diclofenac gel, sulfamethoxazole-tmp  Changed: None       Allergies reviewed with patient and updates made in EHR: yes    Medication History Completed By: Demi Ceballos [facility] 12/20/2023 7:56 AM    PTA Med List   Medication Sig Last Dose    acetaminophen (TYLENOL) 325 MG tablet Take 325-650 mg by mouth every 6 hours as needed for mild pain 12/19/2023    albuterol (PROAIR HFA/PROVENTIL HFA/VENTOLIN HFA) 108 (90 Base) MCG/ACT inhaler Inhale 2 puffs into the lungs every 6 hours as needed 12/19/2023 at pt has with    albuterol (PROVENTIL) (2.5 MG/3ML) 0.083% neb solution Take 1 vial (2.5 mg) by nebulization every 6 hours as needed for shortness of breath, wheezing or cough     amiodarone (PACERONE) 200 MG tablet Take 1 tablet (200 mg) by mouth daily 12/19/2023    apixaban ANTICOAGULANT (ELIQUIS) 5 MG tablet Take 1 tablet (5 mg) by mouth 2 times daily 12/19/2023    clonazePAM (KLONOPIN) 0.5 MG tablet Take 0.5-1 mg by mouth daily as needed for anxiety 12/20/2023 at x1    diltiazem ER COATED BEADS (CARDIZEM CD/CARTIA XT) 300 MG 24 hr capsule Take 1 capsule (300 mg) by mouth daily 12/19/2023    escitalopram (LEXAPRO) 20 MG tablet Take 20 mg by mouth daily (with lunch) 12/19/2023    ferrous sulfate (FEROSUL) 325 (65 Fe) MG tablet Take 1 tablet (325 mg) by mouth daily (with breakfast) 12/19/2023    fluticasone (FLOVENT HFA) 110 MCG/ACT inhaler Inhale 1 puff into the lungs 2 times daily 12/19/2023 at family can bring in     loperamide HCl (IMODIUM A-D ORAL) Take 2 mg by mouth 4 times daily as needed (diarrhea) Unknown    loratadine (CLARITIN) 10 mg tablet Take 10 mg by mouth daily as needed for allergies 12/19/2023    mirtazapine (REMERON) 15 MG tablet Take 15 mg by mouth at bedtime 12/19/2023    QUEtiapine (SEROQUEL) 25 MG tablet Take 1 tablet (25 mg) by mouth 2 times daily 12/19/2023    QUEtiapine (SEROQUEL) 50 MG tablet Take 1 tablet (50 mg) by mouth at bedtime 12/19/2023    simvastatin (ZOCOR) 40 MG tablet Take 40 mg by mouth at bedtime 12/19/2023    tadalafil (CIALIS) 5 MG tablet [TADALAFIL (CIALIS) 5 MG TABLET] Take 1 tablet (5 mg total) by mouth daily as needed for erectile dysfunction. Unknown

## 2023-12-20 NOTE — PROGRESS NOTES
Patient received on NRB from EMS. Was placed on oxymask and titrated to 10L SAT 97%. Still had increased WOB, transitioned to HFNC 50L/35%. Albuterol PRN was given, patient stated that he had increased aeration following. Breath sounds clear diminished. SAT 95%. Will continue to follow as needed.

## 2023-12-20 NOTE — PROGRESS NOTES
Pt on O2 2.5 LPM Oxymask, sating mid 90s, BS crackle on the left fields, clear on the right fields. Scheduled neb given. Pt has a dry frequent cough. Writer removed HFNC this morning since pt didn't need it. RT following.     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 RT completed 12/5/17. MRI face/orbits shows worsening disease from previous MRI in Feb 2018.     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 C1D4 of induction DCF. Pt tolerating chemotherapy ok however hypotensive now and having nausea for the past 24 hours. She also notes decreased appetite due to nausea  -She will be completing 5-FU tonight at 1AM; Neulasta will be given on 5/28 at 1AM  -Encourage ambulation and oral hydration  -CTM BMP, Mg and Phos daily; replete potassium daily  -Patient is hypotensive at this time, likely orthostatic. Medicine to d/c Losartan; increase IVF to 100 cc/hr continuously    #Nausea/Vomiting  Secondary to chemotherapy; she had 1 episode of non-bloody, non-bilious vomiting this AM and notes persistent nausea with good effect after PRN Zofran  -pt on PRN Zofran, will make IV Zofran 4 mg q6H scheduled for today and re-assess tomorrow

## 2023-12-20 NOTE — ED TRIAGE NOTES
Pt was short of breath and went outside called 911. Pt was found to be 78% on RA and RR was 44. Pt was placed on non-rebreather by EMS.  Pt history of pneumonia and recently on chemo meds     Triage Assessment (Adult)       Row Name 12/20/23 0436          Triage Assessment    Airway WDL WDL        Respiratory WDL    Respiratory WDL rhythm/pattern;cough     Rhythm/Pattern, Respiratory tachypneic;shortness of breath        Skin Circulation/Temperature WDL    Skin Circulation/Temperature WDL temperature     Skin Temperature warm        Cardiac WDL    Cardiac WDL X     Cardiac Rhythm ST        Peripheral/Neurovascular WDL    Peripheral Neurovascular WDL WDL        Cognitive/Neuro/Behavioral WDL    Cognitive/Neuro/Behavioral WDL WDL

## 2023-12-20 NOTE — CONSULTS
"  Pulmonary/Critical Care Consult Team Note    Jim Titus,  1965, MRN [id number]  Admitting Dx: Sepsis, due to unspecified organism, unspecified whether acute organ dysfunction present (H) [A41.9]  Date / Time of Admission:  2023  4:29 AM    Pt is well known to me from previous admission. He was discharged on  and felt complete resolution of symptoms. He was able to go out with friends. Take a couple jobs at work etc. Then he started having fevers and chills. Had a cough and now non-productive. No chest pain. No hemoptysis    Assessment/Plan: Jim Titus is a 58 year old male with PMHx of asthma, anxiety, afib, PE on elliquis, melanoma previously on pembrolizumab last given 2023 with ongoing pneumonitis presented with acute hypoxic respiratory failure requiring intubation, extubated and discharge on steroid taper who returns with PNA and acute resp failure.     Pneumonia -  - on Abx - Azithro, van, zosyn  - send PNA serologies pending  - viral PCR - pending    Acute resp failure with hypoxia due to above  - titrate FiO2 to keep sats >90    PE on Elliquis    Medical Care Time excluding procedures and family discussions greater than: 1 Hour    Risk Factors Present on Admission:  Clinically Significant Risk Factors Present on Admission              # Hypoalbuminemia: Lowest albumin = 3.1 g/dL at 2023  5:00 AM, will monitor as appropriate  # Drug Induced Coagulation Defect: home medication list includes an anticoagulant medication         # Overweight: Estimated body mass index is 29.82 kg/m  as calculated from the following:    Height as of this encounter: 1.93 m (6' 4\").    Weight as of this encounter: 111.1 kg (245 lb).       # Financial/Environmental Concerns:    # Asthma: noted on problem list     # Anemia: based on hgb <11           Reny Garcia DO  Pulmonary and Critical Care Attending  pgr [phone]    Allergies   Allergen Reactions    Chicken [Chicken Protein] Other " "(See Comments)     Throat closes to turkey as well.    Pembrolizumab Other (See Comments)     Severe pneumonitis    Wellbutrin [Bupropion] Unknown    Grass Pollen [Grass] Rash    Turkey [Poultry Meal] Rash       Meds: See MAR    Physical Exam:  /62 (BP Location: Left arm, Patient Position: Semi-Chappell's, Cuff Size: Adult Regular)   Pulse 69   Temp 97.5  F (36.4  C) (Oral)   Resp 16   Ht 1.93 m (6' 4\")   Wt 111.1 kg (245 lb)   SpO2 94%   BMI 29.82 kg/m    Intake/Output this shift:  I/O this shift:  In: 500 [IV Piggyback:500]  Out: 1425 [Urine:1425]  GEN: sitting up in bed, NAD  HEENT: MMM, Ventimask in place  CVS: regular rhythm, no murmurs  RESP: CTA BL rhonchi on left  ABD: Soft, No abdominal pain with palpation, no guarding, no rigidity  EXT: Warm, well perfused, no edema  NEURO: moving all extremities, nonfocal  PSYCH: pleasant    Pertinent Labs: Latest lab results in EHR personally reviewed.   CMP  Recent Labs   Lab 12/20/23  0500   *   POTASSIUM 3.5   CHLORIDE 98   CO2 22   ANIONGAP 14   *   BUN 13.4   CR 1.38*   GFRESTIMATED 59*   MELISSA 8.9   MAG 2.0   PROTTOTAL 6.6   ALBUMIN 3.1*   BILITOTAL 0.7   ALKPHOS 374*   AST 27   ALT 31     CBC  Recent Labs   Lab 12/20/23  0500   WBC 17.4*   RBC 2.67*   HGB 7.5*   HCT 23.5*   MCV 88   MCH 28.1   MCHC 31.9   RDW 19.5*   *     INR  Recent Labs   Lab 12/20/23  0500   INR 2.06*     Arterial Blood GasNo lab results found in last 7 days.    Cultures: not yet available.    Imaging: personally reviewed.   Results for orders placed or performed during the hospital encounter of 12/20/23   CT Chest Pulmonary Embolism w Contrast    Narrative    EXAM: CT CHEST PULMONARY EMBOLISM W CONTRAST  LOCATION: [facility]  DATE: 12/20/2023    INDICATION: Shortness of breath  COMPARISON: 11/13/2023  TECHNIQUE: CT chest pulmonary angiogram during arterial phase injection of IV contrast. Multiplanar reformats and MIP reconstructions were " performed. Dose reduction techniques were used.   CONTRAST: [id number] 75ml    FINDINGS:  ANGIOGRAM CHEST: Previously noted left basilar pulmonary emboli not seen today. No pulmonary emboli noted today. No evidence for right ventricular heart strain. Thoracic aorta is negative for dissection. No CT evidence of right heart strain.    LUNGS AND PLEURA: Extensive patchy left lung infiltrate and atelectasis has developed since prior examination. Moderate clearing of extensive patchy right lung infiltrate. Mild residual right upper lobe infiltrate/atelectasis remains. Moderate to marked   elevation right hemidiaphragm unchanged.    MEDIASTINUM/AXILLAE: No lymphadenopathy. Normal esophagus. No significant pericardial effusion. Ectasia of the ascending thoracic aorta.    CORONARY ARTERY CALCIFICATION: Mild.    UPPER ABDOMEN: Unremarkable    MUSCULOSKELETAL: No concerning osseous abnormalities.      Impression    IMPRESSION:  1.  Previously noted left basilar pulmonary emboli not seen today. No pulmonary emboli noted today. No evidence for right ventricular heart strain.    2.  Extensive patchy left lung infiltrate and atelectasis has developed since prior examination.     3.  Moderate clearing of extensive patchy right lung infiltrate. Mild residual right upper lobe infiltrate/atelectasis remains. Moderate to marked elevation right hemidiaphragm unchanged.    4.  Ectasia of the ascending thoracic aorta stable.       Patient Active Problem List   Diagnosis    Diverticulosis    IVY (generalized anxiety disorder)    Chronic gout    Class 1 obesity due to excess calories without serious comorbidity with body mass index (BMI) of 33.0 to 33.9 in adult    Moderate persistent asthma without complication    Allergic rhinitis due to pollen    Healthcare maintenance    Melanoma of skin (H)    Pneumonia    Elevated alkaline phosphatase level    Persistent atrial fibrillation (H)    Acute respiratory failure with hypoxia (H)     Pneumonia due to infectious organism, unspecified laterality, unspecified part of lung    Sepsis, due to unspecified organism, unspecified whether acute organ dysfunction present (H)         Surgical History  He  has a past surgical history that includes hernia repair; orthopedic surgery; and pyloric stenosis repair.    Family History  Reviewed, and family history includes Breast Cancer in his mother; Heart Disease in his father; Hyperlipidemia in his father; Mental Illness in his brother and father.    Social History  Reviewed, and he  reports that he has never smoked. He quit smokeless tobacco use about 25 years ago.  His smokeless tobacco use included chew. He reports current alcohol use. He reports current drug use. Drug: Marijuana.    Allergies  Allergies   Allergen Reactions    Chicken [Chicken Protein] Other (See Comments)     Throat closes to turkey as well.    Pembrolizumab Other (See Comments)     Severe pneumonitis    Wellbutrin [Bupropion] Unknown    Grass Pollen [Grass] Rash    Turkey [Poultry Meal] Rash              Reny Garcia,   Pulmonary and Critical Care Attending  pgr [phone]    Securely message with the Vocera Web Console (learn more here)

## 2023-12-20 NOTE — H&P
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 Requiring high flow oxygen initially now on 3 L via oxime mask.  He had complicated course in last hospitalization.  He is a immunocompromised host.  Treated with IV Zosyn, Zithromax and micafungin for yeast coverage. BAL 11/3 without growth, required intubation on 11/4-->11/8 for ARDS/sepsis. Oxygen was able to wean off prior to discharge. Very small pulmonary emboli on 11/13, started on anticoagulation treatment with Eliquis. 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  Also found out A-fib with RVR, started on diltiazem and amiodarone.    Pneumonia  Recurrent pneumonia  Sepsis  Leucoytosis at 17.4  CT--Extensive patchy left lung infiltrate and atelectasis has developed. Moderate clearing of extensive patchy right lung infiltrate.   IV Zosyn, vancomycin, Zithromax  BAL 11/3 without growth  COVID, influenza, RSV are negative  Blood and sputum cultures are pending  Bronchodilator 4 times a day  Pulmonology and ID consultation    Acute hypoxic respiratory failure  Requiring 3 L of oxygen via OxyMask  Requiring high flow oxygen initially  Recent intubation 11/4 to 11/8 with ARDS    Possible chemotherapy induced pneumonitis  Treated with systemic steroid in last hospitalization    Recent acute pulmonary embolism  Resume anticoagulation treatment with eliquis    A-fib with RVR  Heart rate is stable  Diltiazem 300 mg daily  Amiodarone 200 mg daily    Anemia with  "hemoglobin 7.5 from 9.3  Iron study    Acute kidney injury, creatinine 1.38  Continue IV hydration    Melanoma  Seeing [place] [facility]   S/P 6 doses of pembrolizumab 200 mg IV every 3 weeks.  Last dose given 9/15/2023.    Moderate obesity Body mass index is 29.82 kg/m .  Modification of lifestyle for weight loss    Code full  DVT prophylaxis  On chronic anticoagulation treatment  Inpatient admission  Needs to stay in the hospital at least for 2 days for further evaluation and treatment           Clinically Significant Risk Factors Present on Admission              # Hypoalbuminemia: Lowest albumin = 3.1 g/dL at 12/20/2023  5:00 AM, will monitor as appropriate  # Drug Induced Coagulation Defect: home medication list includes an anticoagulant medication         # Overweight: Estimated body mass index is 29.82 kg/m  as calculated from the following:    Height as of this encounter: 1.93 m (6' 4\").    Weight as of this encounter: 111.1 kg (245 lb).       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 Requiring high flow oxygen initially now on 3 L via oxy mask.  He had complicated course in last hospitalization.  He is a immunocompromised host.  Treated with IV Zosyn, Zithromax and micafungin for yeast coverage. BAL 11/3 without growth, required intubation on 11/4-->11/8 for ARDS/sepsis. Oxygen was able to wean off prior to discharge. Very small pulmonary emboli on 11/13, started on anticoagulation treatment " with Eliquis. 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  Also found out A-fib with RVR, started on diltiazem and amiodarone.  He was doing well after discharge from the hospital.  Developed short of breath for last 2 days.  Oxygen saturation was 78% in room air initially now improved with 3 L of oxygen mask.  Denies headache, chest pain, palpitation, nausea, vomiting or abdominal pain.  He is profusely sweating.  Had fever with Tmax 102  F.  History is provided by the patient.  Spoke with ED physician.    Past Medical History     No past medical history on file.     Surgical History     Past Surgical History:   Procedure Laterality Date    HERNIA REPAIR      ORTHOPEDIC SURGERY      Both ankles, left knee, right shoulder    pyloric stenosis repair       Family History    Reviewed, and   Family History   Problem Relation Age of Onset    Breast Cancer Mother     Heart Disease Father     Mental Illness Father     Hyperlipidemia Father     Mental Illness Brother         Social History      Social History     Tobacco Use    Smoking status: Never    Smokeless tobacco: Former     Types: Chew     Quit date: 1998   Substance Use Topics    Alcohol use: Yes     Comment: 3-4 times per week, 5-6 beers    Drug use: Yes     Types: Marijuana        Allergies     Allergies   Allergen Reactions    Chicken [Chicken Protein] Other (See Comments)     Throat closes to turkey as well.    Pembrolizumab Other (See Comments)     Severe pneumonitis    Wellbutrin [Bupropion] Unknown    Grass Pollen [Grass] Rash    Turkey [Poultry Meal] Rash     Prior to Admission Medications      Prior to Admission Medications   Prescriptions Last Dose Informant Patient Reported? Taking?   QUEtiapine (SEROQUEL) 25 MG tablet 12/19/2023  No Yes   Sig: Take 1 tablet (25 mg) by mouth 2 times daily   QUEtiapine (SEROQUEL) 50 MG tablet 12/19/2023  No Yes   Sig: Take 1 tablet (50 mg) by mouth at bedtime   acetaminophen (TYLENOL) 325  MG tablet 12/19/2023  Yes Yes   Sig: Take 325-650 mg by mouth every 6 hours as needed for mild pain   albuterol (PROAIR HFA/PROVENTIL HFA/VENTOLIN HFA) 108 (90 Base) MCG/ACT inhaler 12/19/2023 at pt has with  No Yes   Sig: Inhale 2 puffs into the lungs every 6 hours as needed   albuterol (PROVENTIL) (2.5 MG/3ML) 0.083% neb solution   No Yes   Sig: Take 1 vial (2.5 mg) by nebulization every 6 hours as needed for shortness of breath, wheezing or cough   amiodarone (PACERONE) 200 MG tablet 12/19/2023  No Yes   Sig: Take 1 tablet (200 mg) by mouth daily   apixaban ANTICOAGULANT (ELIQUIS) 5 MG tablet 12/19/2023  No Yes   Sig: Take 1 tablet (5 mg) by mouth 2 times daily   clonazePAM (KLONOPIN) 0.5 MG tablet 12/20/2023 at x1  Yes Yes   Sig: Take 0.5-1 mg by mouth daily as needed for anxiety   diltiazem ER COATED BEADS (CARDIZEM CD/CARTIA XT) 300 MG 24 hr capsule 12/19/2023  No Yes   Sig: Take 1 capsule (300 mg) by mouth daily   escitalopram (LEXAPRO) 20 MG tablet 12/19/2023  Yes Yes   Sig: Take 20 mg by mouth daily (with lunch)   ferrous sulfate (FEROSUL) 325 (65 Fe) MG tablet 12/19/2023  No Yes   Sig: Take 1 tablet (325 mg) by mouth daily (with breakfast)   fluticasone (FLOVENT HFA) 110 MCG/ACT inhaler 12/19/2023 at family can bring in  No Yes   Sig: Inhale 1 puff into the lungs 2 times daily   loperamide HCl (IMODIUM A-D ORAL) Unknown  Yes Yes   Sig: Take 2 mg by mouth 4 times daily as needed (diarrhea)   loratadine (CLARITIN) 10 mg tablet 12/19/2023  Yes Yes   Sig: Take 10 mg by mouth daily as needed for allergies   mirtazapine (REMERON) 15 MG tablet 12/19/2023  Yes Yes   Sig: Take 15 mg by mouth at bedtime   simvastatin (ZOCOR) 40 MG tablet 12/19/2023  Yes Yes   Sig: Take 40 mg by mouth at bedtime   tadalafil (CIALIS) 5 MG tablet Unknown  No Yes   Sig: [TADALAFIL (CIALIS) 5 MG TABLET] Take 1 tablet (5 mg total) by mouth daily as needed for erectile dysfunction.      Facility-Administered Medications: None      Review  of Systems     A 12 point comprehensive review of systems was negative except as noted above in HPI.    PHYSICAL EXAMINATION       Vitals      Temp:  [99.3  F (37.4  C)-102.2  F (39  C)] 99.3  F (37.4  C)  Pulse:  [] 69  Resp:  [6-28] 13  BP: ()/(56-68) 107/63  FiO2 (%):  [35 %] 35 %  SpO2:  [92 %-99 %] 93 %    Examination     GENERAL:  Alert, appears comfortable, in no acute distress, appears stated age   HEAD:  Normocephalic, without obvious abnormality, atraumatic   EYES:  PERRL, conjunctiva  clear,  EOM's intact   NOSE: Nares normal, mucosa normal, no drainage   THROAT: Lips, mucosa, gums normal, mouth moist   NECK: Supple, symmetrical, trachea midline   BACK:   Symmetric, no curvature, ROM normal   LUNGS:   Clear to auscultation bilaterally, no rales, rhonchi, or wheezing, symmetric chest rise on inhalation, respirations unlabored   CHEST WALL:  No tenderness or deformity   HEART:  Regular rate and rhythm, S1 and S2 normal, no murmur   ABDOMEN:   Soft, non-tender, bowel sounds active, no masses, no organomegaly, no rebound or guarding   EXTREMITIES: No edema    SKIN: No rashes in the visualized areas   NEURO: Alert, oriented x 3 moves all four extremities freely, non-focal   PSYCH: Cooperative, behavior is appropriate      Pertinent Lab   Most Recent 3 CBC's:  Recent Labs   Lab Test 12/20/23  0500 12/13/23  1410 11/20/23  0834   WBC 17.4* 9.9 9.5   HGB 7.5* 9.3* 8.9*   MCV 88 92 92   * 563* 281     Most Recent 3 BMP's:  Recent Labs   Lab Test 12/20/23  0500 12/13/23  1410 12/11/23  1319   * 136 139   POTASSIUM 3.5 4.5 4.8   CHLORIDE 98 99 102   CO2 22 25 24   BUN 13.4 14.8 15.3   CR 1.38* 1.51* 1.52*   ANIONGAP 14 12 13   MELISSA 8.9 9.3 9.3   * 101* 89     Most Recent 2 LFT's:  Recent Labs   Lab Test 12/20/23  0500 11/14/23  0339   AST 27 9   ALT 31 21   ALKPHOS 374* 133*   BILITOTAL 0.7 0.5         Pertinent Radiology     Radiology Results:   Chest ct  1.  Previously noted left  basilar pulmonary emboli not seen today. No pulmonary emboli noted today. No evidence for right ventricular heart strain.  2.  Extensive patchy left lung infiltrate and atelectasis has developed since prior examination.   3.  Moderate clearing of extensive patchy right lung infiltrate. Mild residual right upper lobe infiltrate/atelectasis remains. Moderate to marked elevation right hemidiaphragm unchanged.  4.  Ectasia of the ascending thoracic aorta stable.    EKG Results: Sinus tachycardia, heart rate 101/min    Total time spent >70 min    Martha De Paz MD  [facility]  [facility]   Phone: #[phone]

## 2023-12-20 NOTE — PROVIDER NOTIFICATION
Patient having increased crackles to left lung base. MD updated. New orders to stop fluids. ...Malathi Alvarez RN

## 2023-12-21 LAB
ANION GAP SERPL CALCULATED.3IONS-SCNC: 11 MMOL/L (ref 7–15)
BACTERIA SPT CULT: NORMAL
BUN SERPL-MCNC: 10.6 MG/DL (ref 6–20)
C PNEUM DNA SPEC QL NAA+PROBE: NOT DETECTED
CALCIUM SERPL-MCNC: 9 MG/DL (ref 8.6–10)
CHLORIDE SERPL-SCNC: 102 MMOL/L (ref 98–107)
CREAT SERPL-MCNC: 1.29 MG/DL (ref 0.67–1.17)
DEPRECATED HCO3 PLAS-SCNC: 26 MMOL/L (ref 22–29)
EGFRCR SERPLBLD CKD-EPI 2021: 64 ML/MIN/1.73M2
ERYTHROCYTE [DISTWIDTH] IN BLOOD BY AUTOMATED COUNT: 20.2 % (ref 10–15)
FLUAV H1 2009 PAND RNA SPEC QL NAA+PROBE: NOT DETECTED
FLUAV H1 RNA SPEC QL NAA+PROBE: NOT DETECTED
FLUAV H3 RNA SPEC QL NAA+PROBE: NOT DETECTED
FLUAV RNA SPEC QL NAA+PROBE: NOT DETECTED
FLUBV RNA SPEC QL NAA+PROBE: NOT DETECTED
GLUCOSE SERPL-MCNC: 107 MG/DL (ref 70–99)
GRAM STAIN RESULT: NORMAL
HADV DNA SPEC QL NAA+PROBE: NOT DETECTED
HCOV PNL SPEC NAA+PROBE: NOT DETECTED
HCT VFR BLD AUTO: 23.6 % (ref 40–53)
HGB BLD-MCNC: 7.3 G/DL (ref 13.3–17.7)
HMPV RNA SPEC QL NAA+PROBE: NOT DETECTED
HPIV1 RNA SPEC QL NAA+PROBE: NOT DETECTED
HPIV2 RNA SPEC QL NAA+PROBE: NOT DETECTED
HPIV3 RNA SPEC QL NAA+PROBE: NOT DETECTED
HPIV4 RNA SPEC QL NAA+PROBE: NOT DETECTED
L PNEUMO1 AG UR QL IA: NEGATIVE
LACTATE SERPL-SCNC: 1.2 MMOL/L (ref 0.7–2)
M PNEUMO DNA SPEC QL NAA+PROBE: NOT DETECTED
MAGNESIUM SERPL-MCNC: 2.3 MG/DL (ref 1.7–2.3)
MCH RBC QN AUTO: 28.4 PG (ref 26.5–33)
MCHC RBC AUTO-ENTMCNC: 30.9 G/DL (ref 31.5–36.5)
MCV RBC AUTO: 92 FL (ref 78–100)
PLATELET # BLD AUTO: 600 10E3/UL (ref 150–450)
POTASSIUM SERPL-SCNC: 4.1 MMOL/L (ref 3.4–5.3)
PROCALCITONIN SERPL IA-MCNC: 0.38 NG/ML
RBC # BLD AUTO: 2.57 10E6/UL (ref 4.4–5.9)
RSV RNA SPEC QL NAA+PROBE: NOT DETECTED
RSV RNA SPEC QL NAA+PROBE: NOT DETECTED
RV+EV RNA SPEC QL NAA+PROBE: NOT DETECTED
SODIUM SERPL-SCNC: 139 MMOL/L (ref 135–145)
WBC # BLD AUTO: 15.8 10E3/UL (ref 4–11)

## 2023-12-21 PROCEDURE — 250N000013 HC RX MED GY IP 250 OP 250 PS 637: Performed by: FAMILY MEDICINE

## 2023-12-21 PROCEDURE — 250N000011 HC RX IP 250 OP 636: Performed by: FAMILY MEDICINE

## 2023-12-21 PROCEDURE — 99232 SBSQ HOSP IP/OBS MODERATE 35: CPT | Performed by: INTERNAL MEDICINE

## 2023-12-21 PROCEDURE — 999N000215 HC STATISTIC HFNC ADULT NON-CPAP

## 2023-12-21 PROCEDURE — 85027 COMPLETE CBC AUTOMATED: CPT | Performed by: FAMILY MEDICINE

## 2023-12-21 PROCEDURE — 250N000013 HC RX MED GY IP 250 OP 250 PS 637: Performed by: INTERNAL MEDICINE

## 2023-12-21 PROCEDURE — 999N000185 HC STATISTIC TRANSPORT TIME EA 15 MIN

## 2023-12-21 PROCEDURE — 83735 ASSAY OF MAGNESIUM: CPT | Performed by: FAMILY MEDICINE

## 2023-12-21 PROCEDURE — 200N000001 HC R&B ICU

## 2023-12-21 PROCEDURE — 250N000009 HC RX 250: Performed by: FAMILY MEDICINE

## 2023-12-21 PROCEDURE — 87899 AGENT NOS ASSAY W/OPTIC: CPT | Performed by: INTERNAL MEDICINE

## 2023-12-21 PROCEDURE — 87205 SMEAR GRAM STAIN: CPT | Performed by: FAMILY MEDICINE

## 2023-12-21 PROCEDURE — 36415 COLL VENOUS BLD VENIPUNCTURE: CPT | Performed by: FAMILY MEDICINE

## 2023-12-21 PROCEDURE — 99233 SBSQ HOSP IP/OBS HIGH 50: CPT | Performed by: FAMILY MEDICINE

## 2023-12-21 PROCEDURE — 999N000157 HC STATISTIC RCP TIME EA 10 MIN

## 2023-12-21 PROCEDURE — 80048 BASIC METABOLIC PNL TOTAL CA: CPT | Performed by: FAMILY MEDICINE

## 2023-12-21 PROCEDURE — 250N000011 HC RX IP 250 OP 636: Performed by: INTERNAL MEDICINE

## 2023-12-21 PROCEDURE — 272N000054 HC CANNULA HIGH FLOW, ADULT

## 2023-12-21 PROCEDURE — 250N000011 HC RX IP 250 OP 636: Mod: JZ | Performed by: INTERNAL MEDICINE

## 2023-12-21 PROCEDURE — 94640 AIRWAY INHALATION TREATMENT: CPT

## 2023-12-21 PROCEDURE — 258N000003 HC RX IP 258 OP 636: Performed by: FAMILY MEDICINE

## 2023-12-21 PROCEDURE — 84145 PROCALCITONIN (PCT): CPT | Performed by: INTERNAL MEDICINE

## 2023-12-21 PROCEDURE — 120N000013 HC R&B IMCU

## 2023-12-21 PROCEDURE — 83605 ASSAY OF LACTIC ACID: CPT | Performed by: FAMILY MEDICINE

## 2023-12-21 PROCEDURE — 94799 UNLISTED PULMONARY SVC/PX: CPT

## 2023-12-21 PROCEDURE — 94640 AIRWAY INHALATION TREATMENT: CPT | Mod: 76

## 2023-12-21 PROCEDURE — 250N000009 HC RX 250: Performed by: EMERGENCY MEDICINE

## 2023-12-21 RX ORDER — FUROSEMIDE 10 MG/ML
40 INJECTION INTRAMUSCULAR; INTRAVENOUS ONCE
Qty: 4 ML | Refills: 0 | Status: COMPLETED | OUTPATIENT
Start: 2023-12-21 | End: 2023-12-21

## 2023-12-21 RX ORDER — LOPERAMIDE HCL 2 MG
2 CAPSULE ORAL 4 TIMES DAILY PRN
Status: DISCONTINUED | OUTPATIENT
Start: 2023-12-21 | End: 2024-01-03 | Stop reason: HOSPADM

## 2023-12-21 RX ORDER — POTASSIUM CHLORIDE 20MEQ/15ML
20 LIQUID (ML) ORAL EVERY 4 HOURS
Qty: 45 ML | Refills: 0 | Status: COMPLETED | OUTPATIENT
Start: 2023-12-21 | End: 2023-12-22

## 2023-12-21 RX ORDER — IPRATROPIUM BROMIDE AND ALBUTEROL SULFATE 2.5; .5 MG/3ML; MG/3ML
3 SOLUTION RESPIRATORY (INHALATION)
Status: DISCONTINUED | OUTPATIENT
Start: 2023-12-21 | End: 2023-12-26

## 2023-12-21 RX ADMIN — CLONAZEPAM 1 MG: 0.5 TABLET ORAL at 07:44

## 2023-12-21 RX ADMIN — IRON SUCROSE 300 MG: 20 INJECTION, SOLUTION INTRAVENOUS at 11:32

## 2023-12-21 RX ADMIN — IPRATROPIUM BROMIDE AND ALBUTEROL SULFATE 3 ML: .5; 3 SOLUTION RESPIRATORY (INHALATION) at 07:17

## 2023-12-21 RX ADMIN — PIPERACILLIN AND TAZOBACTAM 3.38 G: 3; .375 INJECTION, POWDER, LYOPHILIZED, FOR SOLUTION INTRAVENOUS at 23:36

## 2023-12-21 RX ADMIN — SIMVASTATIN 40 MG: 20 TABLET, FILM COATED ORAL at 21:06

## 2023-12-21 RX ADMIN — IPRATROPIUM BROMIDE AND ALBUTEROL SULFATE 3 ML: .5; 3 SOLUTION RESPIRATORY (INHALATION) at 19:14

## 2023-12-21 RX ADMIN — MIRTAZAPINE 15 MG: 15 TABLET, FILM COATED ORAL at 21:06

## 2023-12-21 RX ADMIN — ACETAMINOPHEN 650 MG: 325 TABLET ORAL at 01:11

## 2023-12-21 RX ADMIN — POLYETHYLENE GLYCOL 3350 17 G: 17 POWDER, FOR SOLUTION ORAL at 07:47

## 2023-12-21 RX ADMIN — IPRATROPIUM BROMIDE AND ALBUTEROL SULFATE 3 ML: .5; 3 SOLUTION RESPIRATORY (INHALATION) at 11:47

## 2023-12-21 RX ADMIN — APIXABAN 5 MG: 5 TABLET, FILM COATED ORAL at 07:43

## 2023-12-21 RX ADMIN — ACETAMINOPHEN 650 MG: 325 TABLET ORAL at 06:42

## 2023-12-21 RX ADMIN — DILTIAZEM HYDROCHLORIDE 300 MG: 120 CAPSULE, EXTENDED RELEASE ORAL at 12:29

## 2023-12-21 RX ADMIN — POTASSIUM CHLORIDE 20 MEQ: 20 SOLUTION ORAL at 21:06

## 2023-12-21 RX ADMIN — IPRATROPIUM BROMIDE AND ALBUTEROL SULFATE 3 ML: .5; 3 SOLUTION RESPIRATORY (INHALATION) at 04:01

## 2023-12-21 RX ADMIN — LOPERAMIDE HYDROCHLORIDE 2 MG: 2 CAPSULE ORAL at 21:21

## 2023-12-21 RX ADMIN — ONDANSETRON 4 MG: 2 INJECTION INTRAMUSCULAR; INTRAVENOUS at 15:23

## 2023-12-21 RX ADMIN — AMIODARONE HYDROCHLORIDE 200 MG: 200 TABLET ORAL at 07:43

## 2023-12-21 RX ADMIN — QUETIAPINE FUMARATE 25 MG: 25 TABLET ORAL at 13:37

## 2023-12-21 RX ADMIN — ALBUTEROL SULFATE 2.5 MG: 2.5 SOLUTION RESPIRATORY (INHALATION) at 00:19

## 2023-12-21 RX ADMIN — PIPERACILLIN AND TAZOBACTAM 3.38 G: 3; .375 INJECTION, POWDER, LYOPHILIZED, FOR SOLUTION INTRAVENOUS at 00:13

## 2023-12-21 RX ADMIN — ACETAMINOPHEN 650 MG: 325 TABLET ORAL at 23:35

## 2023-12-21 RX ADMIN — AZITHROMYCIN MONOHYDRATE 250 MG: 500 INJECTION, POWDER, LYOPHILIZED, FOR SOLUTION INTRAVENOUS at 12:29

## 2023-12-21 RX ADMIN — FERROUS SULFATE TAB 325 MG (65 MG ELEMENTAL FE) 325 MG: 325 (65 FE) TAB at 11:34

## 2023-12-21 RX ADMIN — APIXABAN 5 MG: 5 TABLET, FILM COATED ORAL at 21:06

## 2023-12-21 RX ADMIN — PIPERACILLIN AND TAZOBACTAM 3.38 G: 3; .375 INJECTION, POWDER, LYOPHILIZED, FOR SOLUTION INTRAVENOUS at 15:23

## 2023-12-21 RX ADMIN — FUROSEMIDE 40 MG: 10 INJECTION, SOLUTION INTRAMUSCULAR; INTRAVENOUS at 21:06

## 2023-12-21 RX ADMIN — QUETIAPINE FUMARATE 50 MG: 50 TABLET ORAL at 21:06

## 2023-12-21 RX ADMIN — VANCOMYCIN HYDROCHLORIDE 1750 MG: 5 INJECTION, POWDER, LYOPHILIZED, FOR SOLUTION INTRAVENOUS at 06:37

## 2023-12-21 RX ADMIN — QUETIAPINE FUMARATE 25 MG: 25 TABLET ORAL at 07:43

## 2023-12-21 RX ADMIN — ESCITALOPRAM OXALATE 20 MG: 20 TABLET ORAL at 11:34

## 2023-12-21 RX ADMIN — MIRTAZAPINE 15 MG: 15 TABLET, FILM COATED ORAL at 00:13

## 2023-12-21 RX ADMIN — PIPERACILLIN AND TAZOBACTAM 3.38 G: 3; .375 INJECTION, POWDER, LYOPHILIZED, FOR SOLUTION INTRAVENOUS at 07:42

## 2023-12-21 RX ADMIN — IPRATROPIUM BROMIDE AND ALBUTEROL SULFATE 3 ML: .5; 3 SOLUTION RESPIRATORY (INHALATION) at 15:47

## 2023-12-21 ASSESSMENT — ACTIVITIES OF DAILY LIVING (ADL)
ADLS_ACUITY_SCORE: 39
ADLS_ACUITY_SCORE: 40
ADLS_ACUITY_SCORE: 40
ADLS_ACUITY_SCORE: 39
ADLS_ACUITY_SCORE: 40
DEPENDENT_IADLS:: INDEPENDENT
ADLS_ACUITY_SCORE: 39
ADLS_ACUITY_SCORE: 40
ADLS_ACUITY_SCORE: 39

## 2023-12-21 NOTE — PLAN OF CARE
Problem: Adult Inpatient Plan of Care  Goal: Optimal Comfort and Wellbeing  Outcome: Progressing   Goal Outcome Evaluation:  Pt alert/oriented, anxiety reduced with PRN PO Klonopin. 4L oxy mask to maintain 02 sats, denies SOB at rest, unlabored respirations, no chest pain; patient states he feels like he is improving since he's been admitted. RT and this RN explained options available to patient if status changes to allow him to rest tonight as his home supplied albuterol inhaler was empty, wants to look into home nebulizer with solutions at discharge. Calls appropriately to make needs known, appears to be resting comfortably . FLORY RUSH.

## 2023-12-21 NOTE — PROVIDER NOTIFICATION
Pt was SOB, nasal flaring, desat to mid 80s on 4 LPM Oxymask, increased flow to 10 LPM, sating low 90s. Writer talk to pt about HFNC, pt replied he didn't like it. RN notified.    Kike Bland, RT

## 2023-12-21 NOTE — PLAN OF CARE
Goal Outcome Evaluation:  Pt AO x4. Oxy mask 4L O2. Dyspneic on exertion. Tele: Afib. Denied pain. K and Mg protocol. Recheck this am. Low grade fever overnight (100.3), Tylenol administered. Voiding. Assist of one to use urinal at bed side. VSS.   Sputum and urine sample to be collected.       Problem: Gas Exchange Impaired  Goal: Optimal Gas Exchange  Outcome: Progressing     Problem: Adult Inpatient Plan of Care  Goal: Optimal Comfort and Wellbeing  Outcome: Progressing

## 2023-12-21 NOTE — PLAN OF CARE
Problem: Gas Exchange Impaired  Goal: Optimal Gas Exchange  Outcome: Progressing  Intervention: Optimize Oxygenation and Ventilation  Recent Flowsheet Documentation  Taken 12/21/2023 1200 by Jose Raul Read RN  Head of Bed ([facility]) Positioning: HOB at 15 degrees  Taken 12/21/2023 0800 by Jose Raul Read RN  Head of Bed ([facility]) Positioning: HOB at 15 degrees   Goal Outcome Evaluation:       Still needing oxygen at 4 liters via oxy mask to keep sats above 90%. Has severe dyspnea with any activity. Lungs sound with crackles. Currently on zosyn and azithromycin. Max temp 102.5. better after Tylenol 99.4. Sinus tach on telemtery  Jose Raul Read RN  12/21/2023  4:00 PM

## 2023-12-21 NOTE — CONSULTS
"Care Management Initial Consult    General Information  Assessment completed with: Patient,    Type of CM/SW Visit: Initial Assessment    Primary Care Provider verified and updated as needed: Yes   Readmission within the last 30 days: unable to assess (- at [facility], 10/23-10/25 at [facility])      Reason for Consult: decision-making (RISK= 33%)  Advance Care Planning: Advance Care Planning Reviewed:  (no HCD on file, declines blank HCD, verbally states \"my mom Nishi\" would make medical decisions for him IF he was not able to make them for himself)        Communication Assessment  Patient's communication style: spoken language (English or Bilingual)        Cognitive  Cognitive/Neuro/Behavioral: alert and appropriately oriented, anxious, currently requiring 02 and not on at home/baseline     Living Environment:   People in home: parent(s)  mother Nishi  Current living Arrangements: house (2 level- stairs up to kitchen)      Able to return to prior arrangements: yes     Family/Social Support:  Care provided by: self  Provides care for:  (helps his Mom with laundry)  Marital Status: Single   (never , no children, lives with his Mom Nishi, father , 2 older brothers and 1 younger sister)          Description of Support System: Supportive, Involved       Current Resources:   Patient receiving home care services: No  Community Resources:  (OP \"heart and lung doctor\", OP MH- Psychiatrist and Therapist, [facility])  Equipment/supplies currently used at home: None    Employment/Financial:  Employment Status: employed full-time (bathroom design)        Financial Concerns: none   Referral to Financial Worker: No (declines at this time)     Does the patient's insurance plan have a 3 day qualifying hospital stay waiver?  No    Lifestyle & Psychosocial Needs:  Social Determinants of Health     Food Insecurity: Low Risk  (2023)    Food Insecurity     Within the past 12 months, did you worry that your " food would run out before you got money to buy more?: No     Within the past 12 months, did the food you bought just not last and you didn t have money to get more?: No   Depression: Not at risk (4/11/2023)    PHQ-2     PHQ-2 Score: 1   Housing Stability: Low Risk  (12/11/2023)    Housing Stability     Do you have housing? : Yes     Are you worried about losing your housing?: No   Tobacco Use: Medium Risk (12/20/2023)    Patient History     Smoking Tobacco Use: Never     Smokeless Tobacco Use: Former     Passive Exposure: Not on file   Financial Resource Strain: Low Risk  (12/11/2023)    Financial Resource Strain     Within the past 12 months, have you or your family members you live with been unable to get utilities (heat, electricity) when it was really needed?: No   Alcohol Use: Alcohol Misuse (11/10/2021)    AUDIT-C     Frequency of Alcohol Consumption: 4 or more times a week     Average Number of Drinks: 5 or 6     Frequency of Binge Drinking: Weekly   Transportation Needs: Low Risk  (12/11/2023)    Transportation Needs     Within the past 12 months, has lack of transportation kept you from medical appointments, getting your medicines, non-medical meetings or appointments, work, or from getting things that you need?: No   Physical Activity: Insufficiently Active (11/10/2021)    Exercise Vital Sign     Days of Exercise per Week: 1 day     Minutes of Exercise per Session: 30 min   Interpersonal Safety: Low Risk  (10/23/2023)    Interpersonal Safety     Do you feel physically and emotionally safe where you currently live?: Yes     Within the past 12 months, have you been hit, slapped, kicked or otherwise physically hurt by someone?: No     Within the past 12 months, have you been humiliated or emotionally abused in other ways by your partner or ex-partner?: No   Stress: Stress Concern Present (11/10/2021)    [employer] - Occupational Stress Questionnaire     Feeling of Stress : To  "some extent   Social Connections: Socially Isolated (11/10/2021)    Social Connection and Isolation Panel [NHANES]     Frequency of Communication with Friends and Family: Once a week     Frequency of Social Gatherings with Friends and Family: Once a week     Attends Oriental orthodox Services: Never     Active Member of Clubs or Organizations: No     Attends Club or Organization Meetings: Not on file     Marital Status: Never        Functional Status:  Prior to admission patient needed assistance:   Dependent ADLs:: Independent, Ambulation-no assistive device  Dependent IADLs:: Independent       Mental Health Status:  Mental Health Status:  (hx of anxiety, denies current concerns.)  Mental Health Management: Medication, Psychiatrist    Chemical Dependency Status:  Chemical Dependency Status:  (denies)             Values/Beliefs:  Spiritual, Cultural Beliefs, Oriental orthodox Practices, Values that affect care:  (\"I am Oriental orthodox\")               Additional Information:  Chart reviewed. Hospitalized at [facility] 11/2-11/20 and 10/23-10/25.    CM met with patient; assessment completed. He lives in private home with his mother Nishi (stairs up to kitchen). He has been able to be independent with all cares, including driving and still working as able. No assistive devices. No HC services. PCP confirmed. OP MH. [facility]. Recent OP cardiology.     Patient is hopeful to return home with his mom without additional services. He declines offer for referral be sent to Financial Counselor at this time but he is aware he can request if he changes his mind.     ID and pulmonary following. Oncology consult pending. IV antibiotics.     Currently requiring 02 and not on at home/baseline.   Sister or friend will provide transportation at hospital discharge.     He is aware CM/SW will continue to follow on the floor and are available if any discharge needs arise. Final dispo TBD.     Jessica Lal RN      "

## 2023-12-21 NOTE — PROGRESS NOTES
"  Pulmonary/Critical Care Consult Team Note    Jim Titus,  1965, MRN [id number]  Admitting Dx: Sepsis, due to unspecified organism, unspecified whether acute organ dysfunction present (H) [A41.9]  Date / Time of Admission:  2023  4:29 AM    He is feeling better this am  He is down to 4L oxymask from 6L    Assessment/Plan: Jim Titus is a 58 year old male with PMHx of asthma, anxiety, afib, PE on elliquis, melanoma previously on pembrolizumab last given 2023 with ongoing pneumonitis presented with acute hypoxic respiratory failure requiring intubation, extubated and discharge on steroid taper who returns with PNA and acute resp failure.     Pneumonia  - on Abx - Azithro, van, zosyn  - send PNA serologies negative so far  - viral PCR - negative    Acute resp failure with hypoxia due to above  - titrate FiO2 to keep sats >90    PE on Elliquis    Medical Care Time excluding procedures and family discussions greater than: 45min    Risk Factors Present on Admission:  Clinically Significant Risk Factors              # Hypoalbuminemia: Lowest albumin = 3.1 g/dL at 2023  5:00 AM, will monitor as appropriate    # Coagulation Defect: INR = 2.06 (Ref range: 0.85 - 1.15) and/or PTT = 58 Seconds (Ref range: 22 - 38 Seconds), will monitor for bleeding           # Obesity: Estimated body mass index is 30.92 kg/m  as calculated from the following:    Height as of this encounter: 1.93 m (6' 4\").    Weight as of this encounter: 115.2 kg (254 lb)., PRESENT ON ADMISSION       # Financial/Environmental Concerns:    # Asthma: noted on problem list               Reny Garcia DO  Pulmonary and Critical Care Attending  pgr [phone]    Allergies   Allergen Reactions    Chicken [Chicken Protein] Other (See Comments)     Throat closes to turkey as well.    Pembrolizumab Other (See Comments)     Severe pneumonitis    Wellbutrin [Bupropion] Unknown    Grass Pollen [Grass] Rash    Turkey [Poultry Meal] " "Rash       Meds: See MAR    Physical Exam:  /73   Pulse 106   Temp 99.4  F (37.4  C) (Oral)   Resp 22   Ht 1.93 m (6' 4\")   Wt 115.2 kg (254 lb)   SpO2 95%   BMI 30.92 kg/m    Intake/Output this shift:  No intake/output data recorded.  GEN: sitting up in bed, NAD  HEENT: MMM, Ventimask in place  CVS: regular rhythm, no murmurs  RESP: CTA BL rhonchi on left  ABD: Soft, No abdominal pain with palpation, no guarding, no rigidity  EXT: Warm, well perfused, no edema  NEURO: moving all extremities, nonfocal  PSYCH: pleasant    Pertinent Labs: Latest lab results in EHR personally reviewed.   CMP  Recent Labs   Lab 12/21/23  0611 12/20/23  0500    134*   POTASSIUM 4.1 3.5   CHLORIDE 102 98   CO2 26 22   ANIONGAP 11 14   * 130*   BUN 10.6 13.4   CR 1.29* 1.38*   GFRESTIMATED 64 59*   MELISSA 9.0 8.9   MAG 2.3 2.0   PROTTOTAL  --  6.6   ALBUMIN  --  3.1*   BILITOTAL  --  0.7   ALKPHOS  --  374*   AST  --  27   ALT  --  31     CBC  Recent Labs   Lab 12/21/23  0611 12/20/23  0500   WBC 15.8* 17.4*   RBC 2.57* 2.67*   HGB 7.3* 7.5*   HCT 23.6* 23.5*   MCV 92 88   MCH 28.4 28.1   MCHC 30.9* 31.9   RDW 20.2* 19.5*   * 645*     INR  Recent Labs   Lab 12/20/23  0500   INR 2.06*     Arterial Blood GasNo lab results found in last 7 days.    Cultures: not yet available.    Imaging: personally reviewed.   Results for orders placed or performed during the hospital encounter of 12/20/23   CT Chest Pulmonary Embolism w Contrast    Narrative    EXAM: CT CHEST PULMONARY EMBOLISM W CONTRAST  LOCATION: [facility]  DATE: 12/20/2023    INDICATION: Shortness of breath  COMPARISON: 11/13/2023  TECHNIQUE: CT chest pulmonary angiogram during arterial phase injection of IV contrast. Multiplanar reformats and MIP reconstructions were performed. Dose reduction techniques were used.   CONTRAST: [id number] 75ml    FINDINGS:  ANGIOGRAM CHEST: Previously noted left basilar pulmonary emboli not seen " today. No pulmonary emboli noted today. No evidence for right ventricular heart strain. Thoracic aorta is negative for dissection. No CT evidence of right heart strain.    LUNGS AND PLEURA: Extensive patchy left lung infiltrate and atelectasis has developed since prior examination. Moderate clearing of extensive patchy right lung infiltrate. Mild residual right upper lobe infiltrate/atelectasis remains. Moderate to marked   elevation right hemidiaphragm unchanged.    MEDIASTINUM/AXILLAE: No lymphadenopathy. Normal esophagus. No significant pericardial effusion. Ectasia of the ascending thoracic aorta.    CORONARY ARTERY CALCIFICATION: Mild.    UPPER ABDOMEN: Unremarkable    MUSCULOSKELETAL: No concerning osseous abnormalities.      Impression    IMPRESSION:  1.  Previously noted left basilar pulmonary emboli not seen today. No pulmonary emboli noted today. No evidence for right ventricular heart strain.    2.  Extensive patchy left lung infiltrate and atelectasis has developed since prior examination.     3.  Moderate clearing of extensive patchy right lung infiltrate. Mild residual right upper lobe infiltrate/atelectasis remains. Moderate to marked elevation right hemidiaphragm unchanged.    4.  Ectasia of the ascending thoracic aorta stable.       Patient Active Problem List   Diagnosis    Diverticulosis    IVY (generalized anxiety disorder)    Chronic gout    Class 1 obesity due to excess calories without serious comorbidity with body mass index (BMI) of 33.0 to 33.9 in adult    Moderate persistent asthma without complication    Allergic rhinitis due to pollen    Healthcare maintenance    Melanoma of skin (H)    Pneumonia    Elevated alkaline phosphatase level    Persistent atrial fibrillation (H)    Acute respiratory failure with hypoxia (H)    Pneumonia due to infectious organism, unspecified laterality, unspecified part of lung    Sepsis, due to unspecified organism, unspecified whether acute organ dysfunction  present (H)         Surgical History  He  has a past surgical history that includes hernia repair; orthopedic surgery; and pyloric stenosis repair.    Family History  Reviewed, and family history includes Breast Cancer in his mother; Heart Disease in his father; Hyperlipidemia in his father; Mental Illness in his brother and father.    Social History  Reviewed, and he  reports that he has never smoked. He quit smokeless tobacco use about 25 years ago.  His smokeless tobacco use included chew. He reports current alcohol use. He reports current drug use. Drug: Marijuana.    Allergies  Allergies   Allergen Reactions    Chicken [Chicken Protein] Other (See Comments)     Throat closes to turkey as well.    Pembrolizumab Other (See Comments)     Severe pneumonitis    Wellbutrin [Bupropion] Unknown    Grass Pollen [Grass] Rash    Turkey [Poultry Meal] Rash              Reny Garcia DO  Pulmonary and Critical Care Attending  pgr [phone]    Securely message with the Vocera Web Console (learn more here)

## 2023-12-21 NOTE — PROGRESS NOTES
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 Required high flow oxygen initially now on 4 L via oxime mask.  He had complicated course in last hospitalization.  He is a immunocompromised host.  Treated with IV Zosyn, Zithromax and micafungin for yeast coverage. BAL 11/3 without growth, required intubation on 11/4-->11/8 for ARDS/sepsis. Oxygen was able to wean off prior to discharge. Very small pulmonary emboli on 11/13, started on anticoagulation treatment with Eliquis. 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  Also found out A-fib with RVR, started on diltiazem and amiodarone.  Has extensive patchy left lung infiltrate and atelectasis and moderate clearing of extensive patchy right lung infiltrate on CT scan.  Started on IV Zosyn, vancomycin and Zithromax.  Sputum culture is pending.  Evaluated by pulmonology and infectious disease.  Has minimal improvement.  Afebrile.       Assessment and Plan:  Acute on recurrent pneumonia  Sepsis  Leukocytosis 17.4-->15.8  Recent hospitalization twice for pneumonia, Keytruda pneumonitis, small pulmonary embolism  CT--Extensive patchy left lung infiltrate and atelectasis has developed. Moderate clearing of extensive patchy right lung infiltrate.   IV Zosyn, vancomycin, Zithromax  BAL 11/3 without growth  COVID, influenza, RSV are negative  Viral and bacterial serologies are pending  Blood and sputum cultures are  "pending  Bronchodilator 4 times a day  Appreciate Pulmonology and ID consultation     Acute hypoxic respiratory failure  Requiring 4 L of oxygen via oxime mask at present  Required intubation 11/4 to 11/8 with ARDS    Possible chemotherapy induced pneumonitis  Treated with systemic steroid in last hospitalization     Recent acute pulmonary embolism  Resume anticoagulation treatment with eliquis    Iron deficiency anemia  Microcytic anemia  Hemoglobin 7.2  IV iron 300 mg daily for 3 doses    A-fib with RVR  Heart rate is stable  Diltiazem 300 mg daily  Amiodarone 200 mg daily  On chronic anticoagulation treatment with Eliquis  Coagulation defect    Acute kidney injury, creatinine 1.38-->1.29  S/P IV hydration     Melanoma  Seeing [facility]   S/P 6 doses of pembrolizumab 200 mg IV every 3 weeks.  Last dose given 9/15/2023.     Moderate obesity Body mass index is 29.82 kg/m .  Modification of lifestyle for weight loss     Code full  DVT prophylaxis  On chronic anticoagulation treatment    Barrier to discharge  Awaiting for more clinical improvement.  Anticipated discharge in few days    Clinically Significant Risk Factors              # Hypoalbuminemia: Lowest albumin = 3.1 g/dL at 12/20/2023  5:00 AM, will monitor as appropriate  # Coagulation Defect: INR = 2.06 (Ref range: 0.85 - 1.15) and/or PTT = 58 Seconds (Ref range: 22 - 38 Seconds), will monitor for bleeding           # Obesity: Estimated body mass index is 30.92 kg/m  as calculated from the following:    Height as of this encounter: 1.93 m (6' 4\").    Weight as of this encounter: 115.2 kg (254 lb)., PRESENT ON ADMISSION     # Financial/Environmental Concerns:    # Asthma: noted on problem list          Martha De Paz MD  [facility]  [facility] Medicine  [facility]  Phone: #[phone]  Securely message with the Vocera Web Console (learn more here)  Text page via [employer] Paging/Directory     Interval History/Subjective:  Resting " comfortably.  Febrile with Tmax 102.5  F.  Requiring 4 L of oxygen.  Shortness of breath is minimally improved.  Nonproductive cough.  Appetite is down.    Physical Exam/Objective:  Temp:  [98.3  F (36.8  C)-102.5  F (39.2  C)] 102.5  F (39.2  C)  Pulse:  [] 106  Resp:  [5-45] 22  BP: (103-138)/(59-73) 138/73  SpO2:  [81 %-98 %] 95 %  Body mass index is 30.92 kg/m .    GENERAL:  Alert, appears comfortable, in no acute distress, appears stated age, obese   HEAD:  Normocephalic, without obvious abnormality, atraumatic   EYES:  PERRL, conjunctiva clear,  EOM's intact   NOSE: Nares normal,  mucosa normal, no drainage   THROAT: Lips, mucosa,  gums normal, mouth moist   NECK: Supple, symmetrical, trachea midline   BACK:   Symmetric, no curvature, ROM normal   LUNGS:   Coarse breath sounds bilaterally, no rales, rhonchi, or wheezing, symmetric chest rise on inhalation, respirations unlabored   CHEST WALL:  No tenderness or deformity   HEART:  Regular rate and rhythm, S1 and S2 normal, no murmur    ABDOMEN:   Soft, non-tender, bowel sounds active , no masses, no organomegaly, no rebound or guarding   EXTREMITIES: Trace BLE edema    SKIN: No rashes in the visualized areas   NEURO: Alert, oriented x3, moves all four extremities freely   PSYCH: Cooperative, behavior is appropriate      Data reviewed today: I personally reviewed all new medications, labs, imaging/diagnostics reports over the past 24 hours. Pertinent findings include:    Imaging:   Chest ct  1.  Previously noted left basilar pulmonary emboli not seen today. No pulmonary emboli noted today. No evidence for right ventricular heart strain.  2.  Extensive patchy left lung infiltrate and atelectasis has developed since prior examination.   3.  Moderate clearing of extensive patchy right lung infiltrate. Mild residual right upper lobe infiltrate/atelectasis remains. Moderate to marked elevation right hemidiaphragm unchanged.  4.  Ectasia of the ascending  thoracic aorta stable.    Labs:  Most Recent 3 CBC's:  Recent Labs   Lab Test 12/21/23  0611 12/20/23  0500 12/13/23  1410   WBC 15.8* 17.4* 9.9   HGB 7.3* 7.5* 9.3*   MCV 92 88 92   * 645* 563*     Most Recent 3 BMP's:  Recent Labs   Lab Test 12/21/23  0611 12/20/23  0500 12/13/23  1410    134* 136   POTASSIUM 4.1 3.5 4.5   CHLORIDE 102 98 99   CO2 26 22 25   BUN 10.6 13.4 14.8   CR 1.29* 1.38* 1.51*   ANIONGAP 11 14 12   MELISSA 9.0 8.9 9.3   * 130* 101*     Most Recent 2 LFT's:  Recent Labs   Lab Test 12/20/23  0500 11/14/23  0339   AST 27 9   ALT 31 21   ALKPHOS 374* 133*   BILITOTAL 0.7 0.5       Medications:   Personally Reviewed.  Medications    - MEDICATION INSTRUCTIONS -        amiodarone  200 mg Oral Daily    apixaban ANTICOAGULANT  5 mg Oral BID    azithromycin  250 mg Intravenous Q24H    diltiazem ER COATED BEADS  300 mg Oral Daily    escitalopram  20 mg Oral Daily with lunch    ferrous sulfate  325 mg Oral Daily with lunch    fluticasone  1 puff Inhalation BID    ipratropium - albuterol 0.5 mg/2.5 mg/3 mL  3 mL Nebulization Q4H    iron sucrose  300 mg Intravenous Daily    mirtazapine  15 mg Oral At Bedtime    piperacillin-tazobactam  3.375 g Intravenous Q8H    polyethylene glycol  17 g Oral Daily    QUEtiapine  25 mg Oral BID    QUEtiapine  50 mg Oral At Bedtime    simvastatin  40 mg Oral At Bedtime    sodium chloride (PF)  3 mL Intracatheter Q8H    vancomycin  1,750 mg Intravenous Q24H      Total time spent 50 minutes

## 2023-12-21 NOTE — PLAN OF CARE
Problem: Gas Exchange Impaired  Goal: Optimal Gas Exchange  Outcome: Not Progressing   Patient currently on Oxymask at 4L. Increased oxygen needs throughout the shift. Shortness of breath and dyspnea noted. With any movement, patient de-sats to 84-86%. Crackles noted to left lung base and IV fluids stopped.     Problem: Adult Inpatient Plan of Care  Goal: Absence of Hospital-Acquired Illness or Injury  Intervention: Prevent Skin Injury  Recent Flowsheet Documentation  Taken 12/20/2023 1600 by Malathi Alvarez RN  Body Position: position changed independently   Patient able to shift weight in bed to prevent skin integrity issues.

## 2023-12-21 NOTE — PROVIDER NOTIFICATION
Patient has been receiving scheduled Duoneb tx QID but has been calling for PRN tx during the night as well. Breath sounds have been crackles on the JENNA with increased aeration post tx. Pt states that he feels better following tx. I provided him education on using a spacer with his inhaler for better deposition of the medication and a spacer was left with him to take home. He is currently on 4L oxymask, SAT 94%, frequently found pursed lip breathing. For this reason I have changed him to Q4 Duoneb and an Aerobika will also be order QID to help with his atelectasis.   His CT indicated:  LUNGS AND PLEURA: Extensive patchy left lung infiltrate and atelectasis has developed since prior examination. Moderate clearing of extensive patchy right lung infiltrate. Mild residual right upper lobe infiltrate/atelectasis remains. Moderate to marked elevation right hemidiaphragm unchanged.  I will continue to follow him as needed.       12/21/23 0236   RCAT Assessment   Reason for Assessment Asthma   Pulmonary Status 3   Surgical Status 0   Chest X-ray 2   Respiratory Pattern 4   Mental Status 0   Breath Sounds 4   Cough Effectiveness 0   Level of Activity 0   O2 Required for SpO2>=92% 2   Acuity Level (points) 15   Acuity Level  3   Re-eval Interval Guideline Every 3 days   Re-evaluation Date 12/24/23   Clinical Indications/Symptoms   Aerosol Therapy RCAT protocol   Volume Expansion Atelectasis   Volume Expansion Plan   Volume Expansion Treatment CPAP/OPEP/Flutter Valve   Volume Expansion Frequency Acuity Level 2 : QID-High risk for/with persistent atelectasis   Breath Sounds   Breath Sounds All Fields   All Lung Fields Breath Sounds Anterior:   JENNA Breath Sounds crackles, fine   LLL Breath Sounds crackles, fine   RUL Breath Sounds diminished;clear   RML Breath Sounds clear;diminished   RLL Breath Sounds diminished

## 2023-12-21 NOTE — PLAN OF CARE
Problem: Adult Inpatient Plan of Care  Goal: Optimal Comfort and Wellbeing  Outcome: Progressing     Problem: Gas Exchange Impaired  Goal: Optimal Gas Exchange  Outcome: Progressing     Problem: Infection  Goal: Absence of Infection Signs and Symptoms  Outcome: Progressing     Goal Outcome Evaluation:    Pt is alert and oriented, calls appropriately for needs. Assist of 1 with ambulation. Vitals signs are stable, febrile - 101.5- tylenol given for this, oxygen 4 liters oxy mask. Pt is tolerating regular diet. IV antibiotics given. Pt denies pain.     Floridalma Kee RN  December 21, 2023, 3:00 AM

## 2023-12-21 NOTE — PROVIDER NOTIFICATION
Updated Dr. Cui regarding temp, chills and increased 02 demand.  MD to review chart and then contact RN.  Reporting off to border charge.  Primary RN updated.

## 2023-12-21 NOTE — PROGRESS NOTES
Infectious Diseases Progress Note  [facility]    Date of visit: 12/21/2023     ASSESSMENT   58-year-old man with a history of cutaneous melanoma.  Recently admitted for respiratory failure thought to be due to Keytruda pneumonitis.  Presenting now with worsening shortness of breath over the past week.    Respiratory failure.  Increasing shortness of breath.  Hypoxia on admission.  Fevers and leukocytosis.  CT scan showing improvement of right-sided infiltrates, but now with a new patchy left-sided infiltrates.  COVID and flu PCR negative.  Blood cultures negative to date. Unable to produce sputum sample  Keytruda pneumonitis.  Hospitalized last month for respiratory failure.  Treated with broad-spectrum antibiotics, but ultimately felt to be related to Keytruda pneumonitis.  Discharged with steroids with resolution of symptoms    Active Problems:    Acute respiratory failure with hypoxia (H)    Pneumonia due to infectious organism, unspecified laterality, unspecified part of lung    Sepsis, due to unspecified organism, unspecified whether acute organ dysfunction present (H)       PLAN   -discontinue vancomycin   -continue azithromycin and Zosyn  -Sputum culture, if able  -if not improving, may need BAL (if unable to make sputum) or lung biopsy (if not improving with antibiotics). Consider recurrence of pneumonitis.  Does this patient need a longer course of steroids?    Patient requests klonopin be increased to bid, which is what was ordered as an outpatient.    Payam Glez MD  [facility]  Direct messaging: [employer] [place] Paging  On-Call ID provider: [phone], option: 9      ===========================================          SUBJECTIVE / INTERVAL HISTORY:     No events. Feels better today. Oxygen needs down to 4L oxymask. Able to move around in room. Dry cough     Continues to have fevers      Antibiotics   Azithromycin 12/20-  Zosyn 12/20-  Vancomycin  "12/20-    Previous:  None      Physical Exam     Temp:  [98.3  F (36.8  C)-102.5  F (39.2  C)] 99.4  F (37.4  C)  Pulse:  [] 106  Resp:  [5-45] 22  BP: (103-138)/(59-73) 138/73  SpO2:  [81 %-98 %] 95 %    /73   Pulse 106   Temp 99.4  F (37.4  C) (Oral)   Resp 22   Ht 1.93 m (6' 4\")   Wt 115.2 kg (254 lb)   SpO2 95%   BMI 30.92 kg/m      GENERAL:  well-developed, well-nourished, lying in bed in no acute distress.   HENT:  Head is normocephalic, atraumatic.   EYES:  Eyes have anicteric sclerae without conjunctival injection   LUNGS:  Clear to auscultation.  CARDIOVASCULAR:  tachy, no murmurs   ABDOMEN:  Normal bowel sounds, soft, nontender.   EXT: Extremities warm and without edema.  SKIN:  No acute rashes.   NEUROLOGIC:  Grossly nonfocal.      Cultures   12/20 blood cultures x 2: no growth to date     Susceptibility data from last 90 days.  Collected Specimen Info Organism   11/03/23 Bronchial Alveolar Lavage from Lung, Right Candida albicans   11/03/23 Bronchial Alveolar Lavage from Lung, Right Normal dustin            Pertinent Labs:     Recent Labs   Lab 12/21/23  0611 12/20/23  0500   WBC 15.8* 17.4*   HGB 7.3* 7.5*   * 645*       Recent Labs   Lab 12/21/23  0611 12/20/23  0500    134*   CO2 26 22   BUN 10.6 13.4   ALBUMIN  --  3.1*   ALKPHOS  --  374*   ALT  --  31   AST  --  27       Recent Labs   Lab 12/20/23  0739   CRPI [id number]*           Imaging:     CT Chest Pulmonary Embolism w Contrast    Result Date: 12/20/2023  EXAM: CT CHEST PULMONARY EMBOLISM W CONTRAST LOCATION: [facility] DATE: 12/20/2023 INDICATION: Shortness of breath COMPARISON: 11/13/2023 TECHNIQUE: CT chest pulmonary angiogram during arterial phase injection of IV contrast. Multiplanar reformats and MIP reconstructions were performed. Dose reduction techniques were used. CONTRAST: [id number] 75ml FINDINGS: ANGIOGRAM CHEST: Previously noted left basilar pulmonary emboli not seen today. No " pulmonary emboli noted today. No evidence for right ventricular heart strain. Thoracic aorta is negative for dissection. No CT evidence of right heart strain. LUNGS AND PLEURA: Extensive patchy left lung infiltrate and atelectasis has developed since prior examination. Moderate clearing of extensive patchy right lung infiltrate. Mild residual right upper lobe infiltrate/atelectasis remains. Moderate to marked elevation right hemidiaphragm unchanged. MEDIASTINUM/AXILLAE: No lymphadenopathy. Normal esophagus. No significant pericardial effusion. Ectasia of the ascending thoracic aorta. CORONARY ARTERY CALCIFICATION: Mild. UPPER ABDOMEN: Unremarkable MUSCULOSKELETAL: No concerning osseous abnormalities.     IMPRESSION: 1.  Previously noted left basilar pulmonary emboli not seen today. No pulmonary emboli noted today. No evidence for right ventricular heart strain. 2.  Extensive patchy left lung infiltrate and atelectasis has developed since prior examination. 3.  Moderate clearing of extensive patchy right lung infiltrate. Mild residual right upper lobe infiltrate/atelectasis remains. Moderate to marked elevation right hemidiaphragm unchanged. 4.  Ectasia of the ascending thoracic aorta stable.        Data reviewed today: I reviewed all medications, new labs and imaging results over the last 24 hours. I personally reviewed no images or EKG's today.  The patient's care was discussed with the Patient.

## 2023-12-22 LAB
1,3 BETA GLUCAN SER-MCNC: >500 PG/ML
BACTERIA SPT CULT: NORMAL
BLD PROD TYP BPU: NORMAL
BLD PROD TYP BPU: NORMAL
BLOOD COMPONENT TYPE: NORMAL
BLOOD COMPONENT TYPE: NORMAL
CODING SYSTEM: NORMAL
CODING SYSTEM: NORMAL
CROSSMATCH: NORMAL
CROSSMATCH: NORMAL
GRAM STAIN RESULT: NORMAL
HGB BLD-MCNC: 6.7 G/DL (ref 13.3–17.7)
ISSUE DATE AND TIME: NORMAL
ISSUE DATE AND TIME: NORMAL
MAGNESIUM SERPL-MCNC: 2.1 MG/DL (ref 1.7–2.3)
OBSERVATION IMP: POSITIVE
POTASSIUM SERPL-SCNC: 3.9 MMOL/L (ref 3.4–5.3)
UNIT ABO/RH: NORMAL
UNIT ABO/RH: NORMAL
UNIT NUMBER: NORMAL
UNIT NUMBER: NORMAL
UNIT STATUS: NORMAL
UNIT STATUS: NORMAL
UNIT TYPE ISBT: 6200
UNIT TYPE ISBT: 6200

## 2023-12-22 PROCEDURE — 200N000001 HC R&B ICU

## 2023-12-22 PROCEDURE — 94640 AIRWAY INHALATION TREATMENT: CPT

## 2023-12-22 PROCEDURE — 36415 COLL VENOUS BLD VENIPUNCTURE: CPT | Performed by: FAMILY MEDICINE

## 2023-12-22 PROCEDURE — 94640 AIRWAY INHALATION TREATMENT: CPT | Mod: 76

## 2023-12-22 PROCEDURE — 99233 SBSQ HOSP IP/OBS HIGH 50: CPT | Performed by: FAMILY MEDICINE

## 2023-12-22 PROCEDURE — 99232 SBSQ HOSP IP/OBS MODERATE 35: CPT | Performed by: INTERNAL MEDICINE

## 2023-12-22 PROCEDURE — 120N000013 HC R&B IMCU

## 2023-12-22 PROCEDURE — P9016 RBC LEUKOCYTES REDUCED: HCPCS | Performed by: FAMILY MEDICINE

## 2023-12-22 PROCEDURE — 250N000013 HC RX MED GY IP 250 OP 250 PS 637: Performed by: INTERNAL MEDICINE

## 2023-12-22 PROCEDURE — 999N000215 HC STATISTIC HFNC ADULT NON-CPAP

## 2023-12-22 PROCEDURE — 83735 ASSAY OF MAGNESIUM: CPT | Performed by: FAMILY MEDICINE

## 2023-12-22 PROCEDURE — 250N000011 HC RX IP 250 OP 636: Performed by: INTERNAL MEDICINE

## 2023-12-22 PROCEDURE — 87070 CULTURE OTHR SPECIMN AEROBIC: CPT | Performed by: HOSPITALIST

## 2023-12-22 PROCEDURE — 258N000003 HC RX IP 258 OP 636: Performed by: FAMILY MEDICINE

## 2023-12-22 PROCEDURE — 250N000013 HC RX MED GY IP 250 OP 250 PS 637: Performed by: HOSPITALIST

## 2023-12-22 PROCEDURE — 999N000157 HC STATISTIC RCP TIME EA 10 MIN

## 2023-12-22 PROCEDURE — 250N000013 HC RX MED GY IP 250 OP 250 PS 637: Performed by: FAMILY MEDICINE

## 2023-12-22 PROCEDURE — 84132 ASSAY OF SERUM POTASSIUM: CPT | Performed by: FAMILY MEDICINE

## 2023-12-22 PROCEDURE — 250N000009 HC RX 250: Performed by: FAMILY MEDICINE

## 2023-12-22 PROCEDURE — 250N000011 HC RX IP 250 OP 636: Performed by: FAMILY MEDICINE

## 2023-12-22 PROCEDURE — 85018 HEMOGLOBIN: CPT | Performed by: FAMILY MEDICINE

## 2023-12-22 RX ORDER — CODEINE PHOSPHATE AND GUAIFENESIN 10; 100 MG/5ML; MG/5ML
5 SOLUTION ORAL EVERY 4 HOURS PRN
Status: DISCONTINUED | OUTPATIENT
Start: 2023-12-22 | End: 2024-01-03 | Stop reason: HOSPADM

## 2023-12-22 RX ORDER — CLONAZEPAM 0.5 MG/1
0.5 TABLET ORAL 2 TIMES DAILY
Status: DISCONTINUED | OUTPATIENT
Start: 2023-12-22 | End: 2024-01-02

## 2023-12-22 RX ORDER — FUROSEMIDE 10 MG/ML
40 INJECTION INTRAMUSCULAR; INTRAVENOUS ONCE
Status: COMPLETED | OUTPATIENT
Start: 2023-12-22 | End: 2023-12-22

## 2023-12-22 RX ORDER — FUROSEMIDE 10 MG/ML
40 INJECTION INTRAMUSCULAR; INTRAVENOUS ONCE
Status: DISCONTINUED | OUTPATIENT
Start: 2023-12-22 | End: 2023-12-22 | Stop reason: DRUGHIGH

## 2023-12-22 RX ORDER — METHYLPREDNISOLONE SODIUM SUCCINATE 125 MG/2ML
60 INJECTION, POWDER, LYOPHILIZED, FOR SOLUTION INTRAMUSCULAR; INTRAVENOUS EVERY 8 HOURS
Status: DISCONTINUED | OUTPATIENT
Start: 2023-12-22 | End: 2023-12-30

## 2023-12-22 RX ADMIN — APIXABAN 5 MG: 5 TABLET, FILM COATED ORAL at 20:07

## 2023-12-22 RX ADMIN — IPRATROPIUM BROMIDE AND ALBUTEROL SULFATE 3 ML: .5; 3 SOLUTION RESPIRATORY (INHALATION) at 19:19

## 2023-12-22 RX ADMIN — DILTIAZEM HYDROCHLORIDE 300 MG: 120 CAPSULE, EXTENDED RELEASE ORAL at 09:27

## 2023-12-22 RX ADMIN — IPRATROPIUM BROMIDE AND ALBUTEROL SULFATE 3 ML: .5; 3 SOLUTION RESPIRATORY (INHALATION) at 00:13

## 2023-12-22 RX ADMIN — Medication 5 MG: at 00:11

## 2023-12-22 RX ADMIN — QUETIAPINE FUMARATE 25 MG: 25 TABLET ORAL at 09:27

## 2023-12-22 RX ADMIN — METHYLPREDNISOLONE SODIUM SUCCINATE 62.5 MG: 125 INJECTION INTRAMUSCULAR; INTRAVENOUS at 20:07

## 2023-12-22 RX ADMIN — IRON SUCROSE 300 MG: 20 INJECTION, SOLUTION INTRAVENOUS at 09:33

## 2023-12-22 RX ADMIN — GUAIFENESIN AND CODEINE PHOSPHATE 5 ML: 100; 10 SOLUTION ORAL at 14:58

## 2023-12-22 RX ADMIN — CLONAZEPAM 0.5 MG: 0.5 TABLET ORAL at 09:27

## 2023-12-22 RX ADMIN — QUETIAPINE FUMARATE 50 MG: 50 TABLET ORAL at 22:01

## 2023-12-22 RX ADMIN — IPRATROPIUM BROMIDE AND ALBUTEROL SULFATE 3 ML: .5; 3 SOLUTION RESPIRATORY (INHALATION) at 11:16

## 2023-12-22 RX ADMIN — GUAIFENESIN AND CODEINE PHOSPHATE 5 ML: 100; 10 SOLUTION ORAL at 09:27

## 2023-12-22 RX ADMIN — GUAIFENESIN AND CODEINE PHOSPHATE 5 ML: 100; 10 SOLUTION ORAL at 00:11

## 2023-12-22 RX ADMIN — Medication 5 MG: at 22:02

## 2023-12-22 RX ADMIN — QUETIAPINE FUMARATE 25 MG: 25 TABLET ORAL at 14:58

## 2023-12-22 RX ADMIN — METHYLPREDNISOLONE SODIUM SUCCINATE 62.5 MG: 125 INJECTION INTRAMUSCULAR; INTRAVENOUS at 11:45

## 2023-12-22 RX ADMIN — FLUTICASONE PROPIONATE 1 PUFF: 110 AEROSOL, METERED RESPIRATORY (INHALATION) at 19:28

## 2023-12-22 RX ADMIN — IPRATROPIUM BROMIDE AND ALBUTEROL SULFATE 3 ML: .5; 3 SOLUTION RESPIRATORY (INHALATION) at 15:24

## 2023-12-22 RX ADMIN — FERROUS SULFATE TAB 325 MG (65 MG ELEMENTAL FE) 325 MG: 325 (65 FE) TAB at 11:14

## 2023-12-22 RX ADMIN — FUROSEMIDE 40 MG: 10 INJECTION, SOLUTION INTRAMUSCULAR; INTRAVENOUS at 20:07

## 2023-12-22 RX ADMIN — IPRATROPIUM BROMIDE AND ALBUTEROL SULFATE 3 ML: .5; 3 SOLUTION RESPIRATORY (INHALATION) at 07:20

## 2023-12-22 RX ADMIN — GUAIFENESIN AND CODEINE PHOSPHATE 5 ML: 100; 10 SOLUTION ORAL at 22:02

## 2023-12-22 RX ADMIN — PIPERACILLIN AND TAZOBACTAM 3.38 G: 3; .375 INJECTION, POWDER, LYOPHILIZED, FOR SOLUTION INTRAVENOUS at 15:00

## 2023-12-22 RX ADMIN — AZITHROMYCIN MONOHYDRATE 250 MG: 500 INJECTION, POWDER, LYOPHILIZED, FOR SOLUTION INTRAVENOUS at 11:45

## 2023-12-22 RX ADMIN — ESCITALOPRAM OXALATE 20 MG: 20 TABLET ORAL at 11:14

## 2023-12-22 RX ADMIN — PIPERACILLIN AND TAZOBACTAM 3.38 G: 3; .375 INJECTION, POWDER, LYOPHILIZED, FOR SOLUTION INTRAVENOUS at 23:13

## 2023-12-22 RX ADMIN — LOPERAMIDE HYDROCHLORIDE 2 MG: 2 CAPSULE ORAL at 09:27

## 2023-12-22 RX ADMIN — AMIODARONE HYDROCHLORIDE 200 MG: 200 TABLET ORAL at 09:27

## 2023-12-22 RX ADMIN — MIRTAZAPINE 15 MG: 15 TABLET, FILM COATED ORAL at 22:01

## 2023-12-22 RX ADMIN — SIMVASTATIN 40 MG: 20 TABLET, FILM COATED ORAL at 22:03

## 2023-12-22 RX ADMIN — IPRATROPIUM BROMIDE AND ALBUTEROL SULFATE 3 ML: .5; 3 SOLUTION RESPIRATORY (INHALATION) at 23:55

## 2023-12-22 RX ADMIN — POTASSIUM CHLORIDE 20 MEQ: 20 SOLUTION ORAL at 06:07

## 2023-12-22 RX ADMIN — PIPERACILLIN AND TAZOBACTAM 3.38 G: 3; .375 INJECTION, POWDER, LYOPHILIZED, FOR SOLUTION INTRAVENOUS at 09:32

## 2023-12-22 RX ADMIN — POTASSIUM CHLORIDE 20 MEQ: 20 SOLUTION ORAL at 00:11

## 2023-12-22 RX ADMIN — IPRATROPIUM BROMIDE AND ALBUTEROL SULFATE 3 ML: .5; 3 SOLUTION RESPIRATORY (INHALATION) at 04:19

## 2023-12-22 RX ADMIN — APIXABAN 5 MG: 5 TABLET, FILM COATED ORAL at 09:27

## 2023-12-22 RX ADMIN — CLONAZEPAM 0.5 MG: 0.5 TABLET ORAL at 20:07

## 2023-12-22 ASSESSMENT — ACTIVITIES OF DAILY LIVING (ADL)
ADLS_ACUITY_SCORE: 23
ADLS_ACUITY_SCORE: 40
ADLS_ACUITY_SCORE: 40
ADLS_ACUITY_SCORE: 23
ADLS_ACUITY_SCORE: 22
ADLS_ACUITY_SCORE: 23
ADLS_ACUITY_SCORE: 40
ADLS_ACUITY_SCORE: 40
ADLS_ACUITY_SCORE: 23
ADLS_ACUITY_SCORE: 40

## 2023-12-22 NOTE — ED NOTES
Increased oxygen needs notified Dr. De Paz via [employer] page. Currently needing oxygen at 10 liters via oxy mask. Refusing HNFC at this time. Will continue to encourage.  Jose Raul Read RN  12/21/2023  7:02 PM

## 2023-12-22 NOTE — PROGRESS NOTES
Patient was transported on 10L oxymask to ICU. Once in ICU room he was placed back in HFNC at 50L/53%. SAT 93%. Appears to be resting comfortable HR 82. Handoff given to ICU RT.

## 2023-12-22 NOTE — PROGRESS NOTES
[facility] MEDICINE PROGRESS NOTE      Identification/Summary: Jim Titus is a 58 year old male with a past medical history of  cutaneous melanoma diagnosed on 3/28 via skin biopsy, was on chemotherapy with Keytruda, discontinued since last hospitalization due to Keytruda pneumonitis, mild intermittent asthma, depression, anxiety, recurrent hospitalization for pneumonia, 10/23--> 10/25 with bilateral pneumonia, 11/2 --> 11/20 with extensive bilateral pulmonary infiltrate, came with worsening short of breath, fever with Tmax 102 .5 F. Requiring high flow oxygen. He had complicated course in last hospitalization.  He is a immunocompromised host.  Treated with IV Zosyn, Zithromax and micafungin for yeast coverage. BAL 11/3 without growth, required intubation on 11/4-->11/8 for ARDS/sepsis. Oxygen was able to wean off prior to discharge. Very small pulmonary emboli on 11/13, started on anticoagulation treatment with Eliquis. 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  Also found out A-fib with RVR, started on diltiazem and amiodarone.  Has extensive patchy left lung infiltrate and atelectasis and moderate clearing of extensive patchy right lung infiltrate on CT scan.  Started on IV Zosyn, vancomycin and Zithromax then vancomycin discontinued.  Sputum culture is pending.  Worsening hypoxia and new infiltrate after taper dose of steroid.  Started on methylprednisone on 12/22. Hs minimal improvement.  Afebrile. Evaluated by pulmonology and infectious disease.        Assessment and Plan:  Acute hypoxic respiratory failure, multifactorial etiology  Keytruda induced pneumonitis, responded with systemic steroid in last hospitalization  Recent hospitalization twice for pneumonia, Keytruda pneumonitis, small pulmonary embolism  Worsening symptoms after taper dose of oral steroid  Requiring high flow oxygen  Startedon IV methylprednisone on 12/22 for  "pneumonitis    Acute on recurrent pneumonia  Sepsis  Leukocytosis 17.4-->15.8  Acute hypoxic respiratory failure, multifactorial etiology  Recent hospitalization twice for pneumonia, Keytruda pneumonitis, small pulmonary embolism  CT--Extensive patchy left lung infiltrate and atelectasis has developed. Moderate clearing of extensive patchy right lung infiltrate.   Resume IV Zosyn, Zithromax.  Vancomycin discontinued  BAL 11/3 without growth  COVID, influenza, RSV are negative  Viral and bacterial serologies are pending  Blood and sputum cultures are pending  Bronchodilator 4 times a day  Appreciate Pulmonology and ID consultation     Recent acute pulmonary embolism  Resume anticoagulation treatment with eliquis    Iron deficiency anemia  Microcytic anemia  Hemoglobin 7.2  IV iron 300 mg daily for 3 doses  Monitor hemoglobin closely    A-fib with RVR  Heart rate is stable  Diltiazem 300 mg daily  Amiodarone 200 mg daily  On chronic anticoagulation treatment with Eliquis  Coagulation defect    Acute kidney injury, creatinine 1.38-->1.29  S/P IV hydration     Melanoma  Seeing [place] [facility]   S/P 6 doses of pembrolizumab 200 mg IV every 3 weeks.  Last dose given 9/15/2023.     Moderate obesity Body mass index is 29.82 kg/m .  Modification of lifestyle for weight loss     Code full  DVT prophylaxis  On chronic anticoagulation treatment    Barrier to discharge  Awaiting for more clinical improvement.  Anticipated discharge in few days    Clinically Significant Risk Factors              # Hypoalbuminemia: Lowest albumin = 3.1 g/dL at 12/20/2023  5:00 AM, will monitor as appropriate    # Coagulation Defect: INR = 2.06 (Ref range: 0.85 - 1.15) and/or PTT = 58 Seconds (Ref range: 22 - 38 Seconds), will monitor for bleeding           # Overweight: Estimated body mass index is 29.97 kg/m  as calculated from the following:    Height as of this encounter: 1.93 m (6' 4\").    Weight as of this encounter: 111.7 kg (246 lb 4.1 " oz)., PRESENT ON ADMISSION       # Financial/Environmental Concerns: none  # Asthma: noted on problem list          Martha De Paz MD  [facility]  Phone: #[phone]  Securely message with the Vocera Web Console (learn more here)  Text page via [facility] Paging/Directory     Interval History/Subjective:  Resting comfortably.  Dyspnea with minimal exertion.  Afebrile today.  Came with fever with Tmax of 102.5  F.  Requiring high flow oxygen.  Nonproductive cough.  Appetite is down.    Physical Exam/Objective:  Temp:  [98.5  F (36.9  C)-100.8  F (38.2  C)] 98.7  F (37.1  C)  Pulse:  [75-93] 80  Resp:  [20-33] 33  BP: ()/(52-73) 102/60  FiO2 (%):  [45 %-70 %] 50 %  SpO2:  [81 %-99 %] 93 %  Body mass index is 29.97 kg/m .    GENERAL:  Alert, appears comfortable, in no acute distress, appears stated age, obese   HEAD:  Normocephalic, without obvious abnormality, atraumatic   EYES:  PERRL, conjunctiva clear,  EOM's intact   NOSE: Nares normal,  mucosa normal, no drainage   THROAT: Lips, mucosa,  gums normal, mouth moist   NECK: Supple, symmetrical, trachea midline   BACK:   Symmetric, no curvature, ROM normal   LUNGS:   Coarse breath sounds bilaterally, no rales, rhonchi, or wheezing, symmetric chest rise on inhalation, respirations unlabored   CHEST WALL:  No tenderness or deformity   HEART:  Regular rate and rhythm, S1 and S2 normal, no murmur    ABDOMEN:   Soft, non-tender, bowel sounds active , no masses, no organomegaly, no rebound or guarding   EXTREMITIES: Trace BLE edema    SKIN: No rashes in the visualized areas   NEURO: Alert, oriented x3, moves all four extremities freely   PSYCH: Cooperative, behavior is appropriate      Data reviewed today: I personally reviewed all new medications, labs, imaging/diagnostics reports over the past 24 hours. Pertinent findings include:    Imaging:   Chest ct  1.  Previously noted left basilar pulmonary emboli not  seen today. No pulmonary emboli noted today. No evidence for right ventricular heart strain.  2.  Extensive patchy left lung infiltrate and atelectasis has developed since prior examination.   3.  Moderate clearing of extensive patchy right lung infiltrate. Mild residual right upper lobe infiltrate/atelectasis remains. Moderate to marked elevation right hemidiaphragm unchanged.  4.  Ectasia of the ascending thoracic aorta stable.    Labs:  Most Recent 3 CBC's:  Recent Labs   Lab Test 12/21/23  0611 12/20/23  0500 12/13/23  1410   WBC 15.8* 17.4* 9.9   HGB 7.3* 7.5* 9.3*   MCV 92 88 92   * 645* 563*     Most Recent 3 BMP's:  Recent Labs   Lab Test 12/22/23  0503 12/21/23  0611 12/20/23  0500 12/13/23  1410   NA  --  139 134* 136   POTASSIUM 3.9 4.1 3.5 4.5   CHLORIDE  --  102 98 99   CO2  --  26 22 25   BUN  --  10.6 13.4 14.8   CR  --  1.29* 1.38* 1.51*   ANIONGAP  --  11 14 12   MELISSA  --  9.0 8.9 9.3   GLC  --  107* 130* 101*     Most Recent 2 LFT's:  Recent Labs   Lab Test 12/20/23  0500 11/14/23  0339   AST 27 9   ALT 31 21   ALKPHOS 374* 133*   BILITOTAL 0.7 0.5       Medications:   Personally Reviewed.  Medications    - MEDICATION INSTRUCTIONS -        amiodarone  200 mg Oral Daily    apixaban ANTICOAGULANT  5 mg Oral BID    azithromycin  250 mg Intravenous Q24H    clonazePAM  0.5 mg Oral BID    diltiazem ER COATED BEADS  300 mg Oral Daily    escitalopram  20 mg Oral Daily with lunch    ferrous sulfate  325 mg Oral Daily with lunch    fluticasone  1 puff Inhalation BID    ipratropium - albuterol 0.5 mg/2.5 mg/3 mL  3 mL Nebulization Q4H    iron sucrose  300 mg Intravenous Daily    methylPREDNISolone  62.5 mg Intravenous Q8H    mirtazapine  15 mg Oral At Bedtime    piperacillin-tazobactam  3.375 g Intravenous Q8H    polyethylene glycol  17 g Oral Daily    QUEtiapine  25 mg Oral BID    QUEtiapine  50 mg Oral At Bedtime    simvastatin  40 mg Oral At Bedtime    sodium chloride (PF)  3 mL Intracatheter Q8H       Total time spent 50 minutes

## 2023-12-22 NOTE — PROGRESS NOTES
Infectious Diseases Progress Note  [facility]    Date of visit: 12/22/2023     ASSESSMENT   58-year-old man with a history of cutaneous melanoma.  Recently admitted for respiratory failure thought to be due to Keytruda pneumonitis.  Presenting now with worsening shortness of breath over the past week.    Respiratory failure.  Increasing shortness of breath.  Hypoxia on admission.  Fevers and leukocytosis.  CT scan showing improvement of right-sided infiltrates, but now with a new patchy left-sided infiltrates.  COVID and flu PCR negative. Respiratory viral panel negative. Blood cultures negative to date. Unable to produce sputum sample  Keytruda pneumonitis.  Hospitalized last month for respiratory failure.  Treated with broad-spectrum antibiotics, but ultimately felt to be related to Keytruda pneumonitis.  Discharged with steroids with resolution of symptoms    Active Problems:    Acute respiratory failure with hypoxia (H)    Pneumonia due to infectious organism, unspecified laterality, unspecified part of lung    Sepsis, due to unspecified organism, unspecified whether acute organ dysfunction present (H)       PLAN   -continue azithromycin and Zosyn  -Sputum culture, if able - room sample looks like it is from upper airway  -if not improving, may need BAL (if unable to make sputum) or lung biopsy (if not improving with antibiotics). Consider recurrence of pneumonitis.  Does this patient need a longer course of steroids?      Payam Glez MD  [facility]  Direct messaging: [employer] Paging  On-Call ID provider: [phone], option: 9      ===========================================      SUBJECTIVE / INTERVAL HISTORY:     On HFNC. More comfortable now that he is out of ER [facility] bed. Fever curve improving.     Antibiotics   Azithromycin 12/20-  Zosyn 12/20-    Previous:  Vancomycin 12/20-12/21      Physical Exam     Temp:  [98.5  F (36.9  C)-100.8  F (38.2  C)] 99.3  F (37.4  " C)  Pulse:  [75-96] 81  Resp:  [8-36] 28  BP: ()/(52-73) 94/52  FiO2 (%):  [45 %-70 %] 50 %  SpO2:  [81 %-99 %] 99 %    BP 94/52 (BP Location: Right arm)   Pulse 81   Temp 99.3  F (37.4  C) (Oral)   Resp 28   Ht 1.93 m (6' 4\")   Wt 111.7 kg (246 lb 4.1 oz)   SpO2 99%   BMI 29.97 kg/m      GENERAL:  well-developed, well-nourished, lying in bed in no acute distress.   HENT:  Head is normocephalic, atraumatic.   EYES:  Eyes have anicteric sclerae without conjunctival injection   LUNGS:  increased breath sounds on the right  CARDIOVASCULAR:  regular   ABDOMEN:  Normal bowel sounds, soft, nontender.   EXT: Extremities warm and without edema.  SKIN:  No acute rashes.   NEUROLOGIC:  Grossly nonfocal.      Cultures   12/20 blood cultures x 2: no growth to date     Susceptibility data from last 90 days.  Collected Specimen Info Organism   11/03/23 Bronchial Alveolar Lavage from Lung, Right Candida albicans   11/03/23 Bronchial Alveolar Lavage from Lung, Right Normal dustin            Pertinent Labs:     Recent Labs   Lab 12/21/23  0611 12/20/23  0500   WBC 15.8* 17.4*   HGB 7.3* 7.5*   * 645*       Recent Labs   Lab 12/21/23  0611 12/20/23  0500    134*   CO2 26 22   BUN 10.6 13.4   ALBUMIN  --  3.1*   ALKPHOS  --  374*   ALT  --  31   AST  --  27       Recent Labs   Lab 12/20/23  0739   CRPI [id number]*           Imaging:     CT Chest Pulmonary Embolism w Contrast    Result Date: 12/20/2023  EXAM: CT CHEST PULMONARY EMBOLISM W CONTRAST LOCATION: [facility] DATE: 12/20/2023 INDICATION: Shortness of breath COMPARISON: 11/13/2023 TECHNIQUE: CT chest pulmonary angiogram during arterial phase injection of IV contrast. Multiplanar reformats and MIP reconstructions were performed. Dose reduction techniques were used. CONTRAST: [id number] 75ml FINDINGS: ANGIOGRAM CHEST: Previously noted left basilar pulmonary emboli not seen today. No pulmonary emboli noted today. No evidence for right " ventricular heart strain. Thoracic aorta is negative for dissection. No CT evidence of right heart strain. LUNGS AND PLEURA: Extensive patchy left lung infiltrate and atelectasis has developed since prior examination. Moderate clearing of extensive patchy right lung infiltrate. Mild residual right upper lobe infiltrate/atelectasis remains. Moderate to marked elevation right hemidiaphragm unchanged. MEDIASTINUM/AXILLAE: No lymphadenopathy. Normal esophagus. No significant pericardial effusion. Ectasia of the ascending thoracic aorta. CORONARY ARTERY CALCIFICATION: Mild. UPPER ABDOMEN: Unremarkable MUSCULOSKELETAL: No concerning osseous abnormalities.     IMPRESSION: 1.  Previously noted left basilar pulmonary emboli not seen today. No pulmonary emboli noted today. No evidence for right ventricular heart strain. 2.  Extensive patchy left lung infiltrate and atelectasis has developed since prior examination. 3.  Moderate clearing of extensive patchy right lung infiltrate. Mild residual right upper lobe infiltrate/atelectasis remains. Moderate to marked elevation right hemidiaphragm unchanged. 4.  Ectasia of the ascending thoracic aorta stable.        Data reviewed today: I reviewed all medications, new labs and imaging results over the last 24 hours. I personally reviewed no images or EKG's today.  The patient's care was discussed with the Bedside Nurse, Patient, and Pulmonary Team.

## 2023-12-22 NOTE — PROVIDER NOTIFICATION
"/60 (BP Location: Left arm)   Pulse 75   Temp 98.7  F (37.1  C) (Oral)   Resp 30   Ht 1.93 m (6' 4\")   Wt 111.7 kg (246 lb 4.1 oz)   SpO2 94%   BMI 29.97 kg/m      Pt remains on HFNC, see below for settings. BS were diminished when last seen.        12/22/23 1524   Tech Time   $Tech Time (10 minute increments) 4   Vital Signs   Resp 30   Pulse 75   Oximeter Heart Rate 75 bpm   Oxygen Therapy   SpO2 94 %   O2 Device High Flow Nasal Cannula (HFNC)   FiO2 (%) 50 %   Oxygen Delivery 45 LPM   Nebulizer Assessment & Treatment   $RT Use ONLY Delivery Method Nebulizer - Additional   Nebulizer Device In line   Pretreatment Heart Rate (beats/min) 74   Pretreatment Resp Rate (breaths/min) 20   Pretreat Breath Sounds - Bilat - All Lobes diminished   Breath Sounds Post-Respiratory Treatment   Posttreatment Heart Rate (beats/min) 75   Posttreatment Resp Rate (breaths/min) 24   Post treatment O2 Sats - (TCU only) 95   Breath Sounds Posttreatment All Fields All Fields   Breath Sounds Posttreatment All Fields no change       "

## 2023-12-22 NOTE — PLAN OF CARE
[id number]: A/ox4. VSS on HFNC, at 55L/50%. Denied pain overnight. Frequent nonproductive cough- prn robitussin given x1. Tele: NSR. LS crackles/dim throughout. Using bedside urinal- pt stands at edge of bed. Voiding adequately. No acute events overnight.       Problem: Gas Exchange Impaired  Goal: Optimal Gas Exchange  Outcome: Progressing     Problem: Adult Inpatient Plan of Care  Goal: Optimal Comfort and Wellbeing  Outcome: Progressing

## 2023-12-22 NOTE — ED NOTES
Notified MD at 1932   PM regarding: Increasing oxygen needs we just put him on HIFLO NC 50/50 call to discuss.      Spoke with: Mary herbert to DR. Fernando    Orders  Waiting for call back .  Jose Raul Read RN  12/21/2023  7:38 PM

## 2023-12-22 NOTE — PROGRESS NOTES
"/71   Pulse 91   Temp (!) 100.8  F (38.2  C) (Oral)   Resp 24   Ht 1.93 m (6' 4\")   Wt 111.7 kg (246 lb 4.1 oz)   SpO2 97%   BMI 29.97 kg/m          The PT arrived from the ER already established on a HFNC (50 liters/~50%). Further, he was provided nebs. BS were c/w scattered fin crackles bilaterally both pre and post neb. RT will follow as directed.  "

## 2023-12-22 NOTE — PROGRESS NOTES
PULMONARY PROGRESS NOTE    Date / Time of Admission:  12/20/2023  4:29 AM  Assessment:   58yoM with history of melanoma and pembrolizumab-induced pneumonitis recently admitted for hypoxia now readmitted after steroid taper with worsening hypoxia and new infiltrate. I am concerned about organizing pneumonia as diagnosis since this occurred in setting of prednisone taper and CRP is very elevated again.     Active Problems:    Acute respiratory failure with hypoxia (H)    Pneumonia due to infectious organism, unspecified laterality, unspecified part of lung    Sepsis, due to unspecified organism, unspecified whether acute organ dysfunction present (H)        Plan:   Methylprednisolone as this appears to be an autoimmune process that worsens as prednisone is tapered. May require steroid sparing agent in future.  Discussed with ID. Antibiotics for now. Sputum sample pending.   Weaning High-flow as able, currently 40L, 50%    Subjective:   HPI:  Jim Titus is a 58 year old male with  afib, PE on elliquis, melanoma previously on pembrolizumab last given September 2023 with ongoing pneumonitis presented with acute hypoxic respiratory failure requiring intubation, extubated and discharge on steroid taper who returns with PNA and acute resp failure. CTA found resolution of PE but new area of infiltrate and resolved area of previous infiltrate.         Allergies:   Allergies   Allergen Reactions    Chicken [Chicken Protein] Other (See Comments)     Throat closes to turkey as well.    Pembrolizumab Other (See Comments)     Severe pneumonitis    Wellbutrin [Bupropion] Unknown    Grass Pollen [Grass] Rash    Turkey [Poultry Meal] Rash        MEDS:  Scheduled Meds:   amiodarone  200 mg Oral Daily    apixaban ANTICOAGULANT  5 mg Oral BID    azithromycin  250 mg Intravenous Q24H    clonazePAM  0.5 mg Oral BID    diltiazem ER COATED BEADS  300 mg Oral Daily    escitalopram  20 mg Oral Daily with lunch    ferrous sulfate  325 mg  "Oral Daily with lunch    fluticasone  1 puff Inhalation BID    ipratropium - albuterol 0.5 mg/2.5 mg/3 mL  3 mL Nebulization Q4H    iron sucrose  300 mg Intravenous Daily    mirtazapine  15 mg Oral At Bedtime    piperacillin-tazobactam  3.375 g Intravenous Q8H    polyethylene glycol  17 g Oral Daily    QUEtiapine  25 mg Oral BID    QUEtiapine  50 mg Oral At Bedtime    simvastatin  40 mg Oral At Bedtime    sodium chloride (PF)  3 mL Intracatheter Q8H     Continuous Infusions:   - MEDICATION INSTRUCTIONS -       PRN Meds:.acetaminophen **OR** acetaminophen, acetaminophen, albuterol, calcium carbonate, guaiFENesin-codeine, lidocaine 4%, lidocaine (buffered or not buffered), loperamide, loratadine, melatonin, ondansetron **OR** ondansetron, - MEDICATION INSTRUCTIONS -, senna-docusate **OR** senna-docusate, sodium chloride (PF)    Objective:   VITALS:  BP 94/52 (BP Location: Right arm)   Pulse 81   Temp 99.3  F (37.4  C) (Oral)   Resp 28   Ht 1.93 m (6' 4\")   Wt 111.7 kg (246 lb 4.1 oz)   SpO2 93%   BMI 29.97 kg/m    EXAM:    GENERAL APPEARANCE: Alert, no acute distress  HENT: Oral mucosa and posterior oropharynx normal, moist mucous membranes  NECK: No adenopathy,masses or thyromegaly  RESP: rales bilaterally  CV: irregularly irregular, S1 and S2  ABDOMEN: normal bowel sounds, soft, nontender, no hepatosplenomegaly or other masses  LYMPHATICS: No cervical, or supraclavicular adenopathy  SKIN: no suspicious lesions or rashes  NEURO: Alert, oriented x 3, speech and mentation normal        I&O:    Date 12/22/23 0700 - 12/23/23 0659   Shift 7803-0411 1405-2699 9368-5010 24 Hour Total   INTAKE   P.O. 480   480   Shift Total(mL/kg) 480(4.3)   480(4.3)   OUTPUT   Shift Total(mL/kg)       Weight (kg) 111.7 111.7 111.7 111.7       Data Review:    Imaging: personally reviewed  Chest CT  EXAM: CT CHEST PULMONARY EMBOLISM W CONTRAST  LOCATION: [facility]  DATE: 12/20/2023     INDICATION: Shortness " of breath  COMPARISON: 11/13/2023  TECHNIQUE: CT chest pulmonary angiogram during arterial phase injection of IV contrast. Multiplanar reformats and MIP reconstructions were performed. Dose reduction techniques were used.   CONTRAST: [id number] 75ml     FINDINGS:  ANGIOGRAM CHEST: Previously noted left basilar pulmonary emboli not seen today. No pulmonary emboli noted today. No evidence for right ventricular heart strain. Thoracic aorta is negative for dissection. No CT evidence of right heart strain.     LUNGS AND PLEURA: Extensive patchy left lung infiltrate and atelectasis has developed since prior examination. Moderate clearing of extensive patchy right lung infiltrate. Mild residual right upper lobe infiltrate/atelectasis remains. Moderate to marked   elevation right hemidiaphragm unchanged.     MEDIASTINUM/AXILLAE: No lymphadenopathy. Normal esophagus. No significant pericardial effusion. Ectasia of the ascending thoracic aorta.     CORONARY ARTERY CALCIFICATION: Mild.     UPPER ABDOMEN: Unremarkable     MUSCULOSKELETAL: No concerning osseous abnormalities.                                                                      IMPRESSION:  1.  Previously noted left basilar pulmonary emboli not seen today. No pulmonary emboli noted today. No evidence for right ventricular heart strain.     2.  Extensive patchy left lung infiltrate and atelectasis has developed since prior examination.      3.  Moderate clearing of extensive patchy right lung infiltrate. Mild residual right upper lobe infiltrate/atelectasis remains. Moderate to marked elevation right hemidiaphragm unchanged.     4.  Ectasia of the ascending thoracic aorta stable.    Results for orders placed or performed during the hospital encounter of 12/20/23   Basic metabolic panel   Result Value Ref Range    Sodium 139 135 - 145 mmol/L    Potassium 4.1 3.4 - 5.3 mmol/L    Chloride 102 98 - 107 mmol/L    Carbon Dioxide (CO2) 26 22 - 29 mmol/L    Anion Gap 11 7  - 15 mmol/L    Urea Nitrogen 10.6 6.0 - 20.0 mg/dL    Creatinine 1.29 (H) 0.67 - 1.17 mg/dL    GFR Estimate 64 >60 mL/min/1.73m2    Calcium 9.0 8.6 - 10.0 mg/dL    Glucose 107 (H) 70 - 99 mg/dL     Lab Results   Component Value Date    WBC 15.8 (H) 12/21/2023    HGB 7.3 (L) 12/21/2023    HCT 23.6 (L) 12/21/2023    MCV 92 12/21/2023     (H) 12/21/2023

## 2023-12-22 NOTE — PROVIDER NOTIFICATION
Notified MD at 4:09 PM regarding critical results read back.  Hgb 6.7    Spoke with: Dr. De Paz    Orders were obtained.    Comments: Blood consent signed.  Need type and screen, then will give 2 units PRBCs.

## 2023-12-23 LAB
ANION GAP SERPL CALCULATED.3IONS-SCNC: 12 MMOL/L (ref 7–15)
BUN SERPL-MCNC: 17.8 MG/DL (ref 6–20)
CALCIUM SERPL-MCNC: 8.9 MG/DL (ref 8.6–10)
CHLORIDE SERPL-SCNC: 102 MMOL/L (ref 98–107)
CREAT SERPL-MCNC: 1.37 MG/DL (ref 0.67–1.17)
DEPRECATED HCO3 PLAS-SCNC: 25 MMOL/L (ref 22–29)
EGFRCR SERPLBLD CKD-EPI 2021: 60 ML/MIN/1.73M2
ERYTHROCYTE [DISTWIDTH] IN BLOOD BY AUTOMATED COUNT: 19.2 % (ref 10–15)
ERYTHROCYTE [DISTWIDTH] IN BLOOD BY AUTOMATED COUNT: 19.4 % (ref 10–15)
GLUCOSE SERPL-MCNC: 152 MG/DL (ref 70–99)
HCT VFR BLD AUTO: 25 % (ref 40–53)
HCT VFR BLD AUTO: 27.5 % (ref 40–53)
HGB BLD-MCNC: 7.8 G/DL (ref 13.3–17.7)
HGB BLD-MCNC: 8.7 G/DL (ref 13.3–17.7)
MAGNESIUM SERPL-MCNC: 2.2 MG/DL (ref 1.7–2.3)
MCH RBC QN AUTO: 28.3 PG (ref 26.5–33)
MCH RBC QN AUTO: 28.4 PG (ref 26.5–33)
MCHC RBC AUTO-ENTMCNC: 31.2 G/DL (ref 31.5–36.5)
MCHC RBC AUTO-ENTMCNC: 31.6 G/DL (ref 31.5–36.5)
MCV RBC AUTO: 90 FL (ref 78–100)
MCV RBC AUTO: 91 FL (ref 78–100)
PLATELET # BLD AUTO: 610 10E3/UL (ref 150–450)
PLATELET # BLD AUTO: 618 10E3/UL (ref 150–450)
POTASSIUM SERPL-SCNC: 4.1 MMOL/L (ref 3.4–5.3)
POTASSIUM SERPL-SCNC: 4.2 MMOL/L (ref 3.4–5.3)
RBC # BLD AUTO: 2.75 10E6/UL (ref 4.4–5.9)
RBC # BLD AUTO: 3.07 10E6/UL (ref 4.4–5.9)
SODIUM SERPL-SCNC: 139 MMOL/L (ref 135–145)
WBC # BLD AUTO: 14.6 10E3/UL (ref 4–11)
WBC # BLD AUTO: 17.1 10E3/UL (ref 4–11)

## 2023-12-23 PROCEDURE — 85027 COMPLETE CBC AUTOMATED: CPT | Performed by: INTERNAL MEDICINE

## 2023-12-23 PROCEDURE — 250N000011 HC RX IP 250 OP 636: Performed by: INTERNAL MEDICINE

## 2023-12-23 PROCEDURE — 83735 ASSAY OF MAGNESIUM: CPT | Performed by: FAMILY MEDICINE

## 2023-12-23 PROCEDURE — 120N000013 HC R&B IMCU

## 2023-12-23 PROCEDURE — 80048 BASIC METABOLIC PNL TOTAL CA: CPT | Performed by: FAMILY MEDICINE

## 2023-12-23 PROCEDURE — 85014 HEMATOCRIT: CPT | Performed by: FAMILY MEDICINE

## 2023-12-23 PROCEDURE — 250N000011 HC RX IP 250 OP 636: Performed by: FAMILY MEDICINE

## 2023-12-23 PROCEDURE — 99232 SBSQ HOSP IP/OBS MODERATE 35: CPT | Performed by: INTERNAL MEDICINE

## 2023-12-23 PROCEDURE — 999N000157 HC STATISTIC RCP TIME EA 10 MIN

## 2023-12-23 PROCEDURE — 999N000215 HC STATISTIC HFNC ADULT NON-CPAP

## 2023-12-23 PROCEDURE — 36415 COLL VENOUS BLD VENIPUNCTURE: CPT | Performed by: INTERNAL MEDICINE

## 2023-12-23 PROCEDURE — 94640 AIRWAY INHALATION TREATMENT: CPT | Mod: 76

## 2023-12-23 PROCEDURE — 250N000013 HC RX MED GY IP 250 OP 250 PS 637: Performed by: INTERNAL MEDICINE

## 2023-12-23 PROCEDURE — 80048 BASIC METABOLIC PNL TOTAL CA: CPT | Performed by: INTERNAL MEDICINE

## 2023-12-23 PROCEDURE — 250N000013 HC RX MED GY IP 250 OP 250 PS 637: Performed by: HOSPITALIST

## 2023-12-23 PROCEDURE — 94640 AIRWAY INHALATION TREATMENT: CPT

## 2023-12-23 PROCEDURE — 36415 COLL VENOUS BLD VENIPUNCTURE: CPT | Performed by: FAMILY MEDICINE

## 2023-12-23 PROCEDURE — 250N000009 HC RX 250: Performed by: FAMILY MEDICINE

## 2023-12-23 PROCEDURE — 99233 SBSQ HOSP IP/OBS HIGH 50: CPT | Performed by: INTERNAL MEDICINE

## 2023-12-23 PROCEDURE — 250N000013 HC RX MED GY IP 250 OP 250 PS 637: Performed by: FAMILY MEDICINE

## 2023-12-23 PROCEDURE — 258N000003 HC RX IP 258 OP 636: Performed by: FAMILY MEDICINE

## 2023-12-23 RX ORDER — NOREPINEPHRINE BITARTRATE 0.02 MG/ML
INJECTION, SOLUTION INTRAVENOUS
Status: DISCONTINUED
Start: 2023-12-23 | End: 2023-12-23 | Stop reason: HOSPADM

## 2023-12-23 RX ADMIN — IPRATROPIUM BROMIDE AND ALBUTEROL SULFATE 3 ML: .5; 3 SOLUTION RESPIRATORY (INHALATION) at 15:31

## 2023-12-23 RX ADMIN — PIPERACILLIN AND TAZOBACTAM 3.38 G: 3; .375 INJECTION, POWDER, LYOPHILIZED, FOR SOLUTION INTRAVENOUS at 09:06

## 2023-12-23 RX ADMIN — QUETIAPINE FUMARATE 50 MG: 50 TABLET ORAL at 21:55

## 2023-12-23 RX ADMIN — METHYLPREDNISOLONE SODIUM SUCCINATE 62.5 MG: 125 INJECTION INTRAMUSCULAR; INTRAVENOUS at 12:05

## 2023-12-23 RX ADMIN — METHYLPREDNISOLONE SODIUM SUCCINATE 62.5 MG: 125 INJECTION INTRAMUSCULAR; INTRAVENOUS at 18:44

## 2023-12-23 RX ADMIN — IPRATROPIUM BROMIDE AND ALBUTEROL SULFATE 3 ML: .5; 3 SOLUTION RESPIRATORY (INHALATION) at 19:42

## 2023-12-23 RX ADMIN — FERROUS SULFATE TAB 325 MG (65 MG ELEMENTAL FE) 325 MG: 325 (65 FE) TAB at 12:05

## 2023-12-23 RX ADMIN — AZITHROMYCIN MONOHYDRATE 250 MG: 500 INJECTION, POWDER, LYOPHILIZED, FOR SOLUTION INTRAVENOUS at 11:00

## 2023-12-23 RX ADMIN — APIXABAN 5 MG: 5 TABLET, FILM COATED ORAL at 09:05

## 2023-12-23 RX ADMIN — MIRTAZAPINE 15 MG: 15 TABLET, FILM COATED ORAL at 21:55

## 2023-12-23 RX ADMIN — AMIODARONE HYDROCHLORIDE 200 MG: 200 TABLET ORAL at 09:05

## 2023-12-23 RX ADMIN — METHYLPREDNISOLONE SODIUM SUCCINATE 62.5 MG: 125 INJECTION INTRAMUSCULAR; INTRAVENOUS at 04:09

## 2023-12-23 RX ADMIN — IPRATROPIUM BROMIDE AND ALBUTEROL SULFATE 3 ML: .5; 3 SOLUTION RESPIRATORY (INHALATION) at 11:14

## 2023-12-23 RX ADMIN — SIMVASTATIN 40 MG: 20 TABLET, FILM COATED ORAL at 21:55

## 2023-12-23 RX ADMIN — Medication 5 MG: at 21:55

## 2023-12-23 RX ADMIN — IPRATROPIUM BROMIDE AND ALBUTEROL SULFATE 3 ML: .5; 3 SOLUTION RESPIRATORY (INHALATION) at 03:09

## 2023-12-23 RX ADMIN — APIXABAN 5 MG: 5 TABLET, FILM COATED ORAL at 20:20

## 2023-12-23 RX ADMIN — IRON SUCROSE 300 MG: 20 INJECTION, SOLUTION INTRAVENOUS at 09:06

## 2023-12-23 RX ADMIN — ESCITALOPRAM OXALATE 20 MG: 20 TABLET ORAL at 12:05

## 2023-12-23 RX ADMIN — GUAIFENESIN AND CODEINE PHOSPHATE 5 ML: 100; 10 SOLUTION ORAL at 20:21

## 2023-12-23 RX ADMIN — QUETIAPINE FUMARATE 25 MG: 25 TABLET ORAL at 13:36

## 2023-12-23 RX ADMIN — CLONAZEPAM 0.5 MG: 0.5 TABLET ORAL at 09:05

## 2023-12-23 RX ADMIN — GUAIFENESIN AND CODEINE PHOSPHATE 5 ML: 100; 10 SOLUTION ORAL at 12:14

## 2023-12-23 RX ADMIN — FLUTICASONE PROPIONATE 1 PUFF: 110 AEROSOL, METERED RESPIRATORY (INHALATION) at 20:25

## 2023-12-23 RX ADMIN — FLUTICASONE PROPIONATE 1 PUFF: 110 AEROSOL, METERED RESPIRATORY (INHALATION) at 09:23

## 2023-12-23 RX ADMIN — QUETIAPINE FUMARATE 25 MG: 25 TABLET ORAL at 09:05

## 2023-12-23 RX ADMIN — IPRATROPIUM BROMIDE AND ALBUTEROL SULFATE 3 ML: .5; 3 SOLUTION RESPIRATORY (INHALATION) at 07:37

## 2023-12-23 RX ADMIN — LOPERAMIDE HYDROCHLORIDE 2 MG: 2 CAPSULE ORAL at 09:09

## 2023-12-23 RX ADMIN — PIPERACILLIN AND TAZOBACTAM 3.38 G: 3; .375 INJECTION, POWDER, LYOPHILIZED, FOR SOLUTION INTRAVENOUS at 17:05

## 2023-12-23 RX ADMIN — CLONAZEPAM 0.5 MG: 0.5 TABLET ORAL at 20:20

## 2023-12-23 RX ADMIN — GUAIFENESIN AND CODEINE PHOSPHATE 5 ML: 100; 10 SOLUTION ORAL at 06:29

## 2023-12-23 ASSESSMENT — ACTIVITIES OF DAILY LIVING (ADL)
ADLS_ACUITY_SCORE: 20
ADLS_ACUITY_SCORE: 20
ADLS_ACUITY_SCORE: 21
ADLS_ACUITY_SCORE: 21
ADLS_ACUITY_SCORE: 20
ADLS_ACUITY_SCORE: 20
ADLS_ACUITY_SCORE: 21
ADLS_ACUITY_SCORE: 22
ADLS_ACUITY_SCORE: 20
ADLS_ACUITY_SCORE: 21

## 2023-12-23 NOTE — PROGRESS NOTES
PULMONARY PROGRESS NOTE    Date / Time of Admission:  12/20/2023  4:29 AM  Assessment:   58yoM with history of melanoma and pembrolizumab-induced pneumonitis recently admitted for hypoxia now readmitted after steroid taper with worsening hypoxia and new infiltrate. I am concerned about organizing pneumonia as diagnosis since this occurred in setting of prednisone taper and CRP is very elevated again.     Acute respiratory failure with hypoxia  Pembrolizumab induced pneumonitis  Possible cryptogenic organizing pneumonia  Sepsis  Elevated Fungitell  History of PE - on apixaban        Plan:   Methylprednisolone as this appears to be an autoimmune process that worsens as prednisone is tapered.  We will continue for now and will need a prolonged taper (over 6 to 12 weeks)  May require steroid sparing agent in future.  Antibiotics (zosyn and azithromycin) for now per ID.  Sputum sample --> mixed dustin.   Weaning High-flow as able, currently 40L, 50% --> 40 L/50%.    Subjective:   HPI:  Jim Titus is a 58 year old male with  afib, PE on elliquis, melanoma previously on pembrolizumab last given September 2023 with ongoing pneumonitis presented with acute hypoxic respiratory failure requiring intubation, extubated and discharge on steroid taper who returns with PNA and acute resp failure. CTA found resolution of PE but new area of infiltrate and resolved area of previous infiltrate.         Allergies:   Allergies   Allergen Reactions    Chicken [Chicken Protein] Other (See Comments)     Throat closes to turkey as well.    Pembrolizumab Other (See Comments)     Severe pneumonitis    Wellbutrin [Bupropion] Unknown    Grass Pollen [Grass] Rash    Turkey [Poultry Meal] Rash        MEDS:  Scheduled Meds:   amiodarone  200 mg Oral Daily    apixaban ANTICOAGULANT  5 mg Oral BID    azithromycin  250 mg Intravenous Q24H    clonazePAM  0.5 mg Oral BID    diltiazem ER COATED BEADS  300 mg Oral Daily    escitalopram  20 mg Oral Daily  "with lunch    ferrous sulfate  325 mg Oral Daily with lunch    fluticasone  1 puff Inhalation BID    ipratropium - albuterol 0.5 mg/2.5 mg/3 mL  3 mL Nebulization Q4H    methylPREDNISolone  62.5 mg Intravenous Q8H    mirtazapine  15 mg Oral At Bedtime    norepinephrine        piperacillin-tazobactam  3.375 g Intravenous Q8H    polyethylene glycol  17 g Oral Daily    QUEtiapine  25 mg Oral BID    QUEtiapine  50 mg Oral At Bedtime    simvastatin  40 mg Oral At Bedtime    sodium chloride (PF)  3 mL Intracatheter Q8H     Continuous Infusions:   - MEDICATION INSTRUCTIONS -       PRN Meds:.acetaminophen **OR** acetaminophen, acetaminophen, albuterol, calcium carbonate, guaiFENesin-codeine, lidocaine 4%, lidocaine (buffered or not buffered), loperamide, loratadine, melatonin, norepinephrine, ondansetron **OR** ondansetron, - MEDICATION INSTRUCTIONS -, senna-docusate **OR** senna-docusate, sodium chloride (PF)    Objective:   VITALS:  BP 99/62 (BP Location: Left arm)   Pulse 68   Temp (!) 96.6  F (35.9  C) (Axillary)   Resp 25   Ht 1.93 m (6' 4\")   Wt 111 kg (244 lb 12.8 oz)   SpO2 92%   BMI 29.80 kg/m    EXAM:    GENERAL APPEARANCE: Alert, no acute distress  HENT: Oral mucosa and posterior oropharynx normal, moist mucous membranes  NECK: No adenopathy,masses or thyromegaly  RESP: rales bilaterally  CV: irregularly irregular, S1 and S2  ABDOMEN: normal bowel sounds, soft, nontender, no hepatosplenomegaly or other masses  LYMPHATICS: No cervical, or supraclavicular adenopathy  SKIN: no suspicious lesions or rashes  NEURO: Alert, oriented x 3, speech and mentation normal        I&O:    Date 12/22/23 0700 - 12/23/23 0659   Shift [id number] 2514-0779 9920-3656 24 Hour Total   INTAKE   P.O. 480   480   Shift Total(mL/kg) 480(4.3)   480(4.3)   OUTPUT   Shift Total(mL/kg)       Weight (kg) 111.7 111.7 111.7 111.7       Data Review:    Imaging: personally reviewed  Chest CT  EXAM: CT CHEST PULMONARY EMBOLISM W " CONTRAST  LOCATION: [facility]  DATE: 12/20/2023     INDICATION: Shortness of breath  COMPARISON: 11/13/2023  TECHNIQUE: CT chest pulmonary angiogram during arterial phase injection of IV contrast. Multiplanar reformats and MIP reconstructions were performed. Dose reduction techniques were used.   CONTRAST: [id number] 75ml     FINDINGS:  ANGIOGRAM CHEST: Previously noted left basilar pulmonary emboli not seen today. No pulmonary emboli noted today. No evidence for right ventricular heart strain. Thoracic aorta is negative for dissection. No CT evidence of right heart strain.     LUNGS AND PLEURA: Extensive patchy left lung infiltrate and atelectasis has developed since prior examination. Moderate clearing of extensive patchy right lung infiltrate. Mild residual right upper lobe infiltrate/atelectasis remains. Moderate to marked   elevation right hemidiaphragm unchanged.     MEDIASTINUM/AXILLAE: No lymphadenopathy. Normal esophagus. No significant pericardial effusion. Ectasia of the ascending thoracic aorta.     CORONARY ARTERY CALCIFICATION: Mild.     UPPER ABDOMEN: Unremarkable     MUSCULOSKELETAL: No concerning osseous abnormalities.                                                                      IMPRESSION:  1.  Previously noted left basilar pulmonary emboli not seen today. No pulmonary emboli noted today. No evidence for right ventricular heart strain.     2.  Extensive patchy left lung infiltrate and atelectasis has developed since prior examination.      3.  Moderate clearing of extensive patchy right lung infiltrate. Mild residual right upper lobe infiltrate/atelectasis remains. Moderate to marked elevation right hemidiaphragm unchanged.     4.  Ectasia of the ascending thoracic aorta stable.    Results for orders placed or performed during the hospital encounter of 12/20/23   Basic metabolic panel   Result Value Ref Range    Sodium 139 135 - 145 mmol/L    Potassium 4.2 3.4 -  5.3 mmol/L    Chloride 102 98 - 107 mmol/L    Carbon Dioxide (CO2) 25 22 - 29 mmol/L    Anion Gap 12 7 - 15 mmol/L    Urea Nitrogen 17.8 6.0 - 20.0 mg/dL    Creatinine 1.37 (H) 0.67 - 1.17 mg/dL    GFR Estimate 60 (L) >60 mL/min/1.73m2    Calcium 8.9 8.6 - 10.0 mg/dL    Glucose 152 (H) 70 - 99 mg/dL     Lab Results   Component Value Date    WBC 17.1 (H) 12/23/2023    HGB 8.7 (L) 12/23/2023    HCT 27.5 (L) 12/23/2023    MCV 90 12/23/2023     (H) 12/23/2023

## 2023-12-23 NOTE — PLAN OF CARE
Problem: Adult Inpatient Plan of Care  Goal: Readiness for Transition of Care  Outcome: Progressing     Problem: Gas Exchange Impaired  Goal: Optimal Gas Exchange  Outcome: Progressing     Problem: Anemia  Goal: Anemia Symptom Improvement  Outcome: Progressing      Neuro/vasc:   A&Ox4  CV:  NSR  Bps soft (90s/50s - asymptomatic) - MD aware  Resp:   HFNC - titrating FiO2 as tolerated  FERRARI - improving - does require increase in FiO2 if too much exertion.  Recovery period improving  LS clear, diminished  sputum still needed  Cough - improving: robitussin x1  Repeat CXR in AM  GI:  BM 12/23  diarrhea - imodium x1  Regular diet  No BG  :  urinal  Pain:  denies  Activity:  SBA - assistance with lines  OOB to chair today  Skin:  WDL, diaphoretic at times - feels r/t HFNC humidity (afebrile)  IV:  PIV x1  Azithromycin  zosyn  iron  steroids  Labs:  hgb 7.8 s/p 2U PRBCs on 12/22  recheck @ 1130: 8.7  Recheck CBC, CMP in AM  Social:  lives with motherNishi  Misc:  discussed downgrading - per MD, remain IMC until tolerating low flow n/c

## 2023-12-23 NOTE — PROVIDER NOTIFICATION
12/23/23 0310   Tech Time   $Tech Time (10 minute increments) 2   Vital Signs   Resp 18   Pulse 57   Pulse Rate Source Monitor   Oximeter Heart Rate 57 bpm   Patient Position Semi-Chappell's   Oxygen Therapy   Daily Review of Necessity (O2 Therapy) completed   Flow (L/min) (Oxygen Therapy) 45   Oxygen Concentration (%) 49   Device (Oxygen Therapy) high-flow nasal cannula   Oxygen Therapy   SpO2 96 %   O2 Device High Flow Nasal Cannula (HFNC)   FiO2 (%) 49 %   Oxygen Delivery 45 LPM   Assessment   Respiratory WDL unchanged from my previous assessment   Rhythm/Pattern, Respiratory dyspnea upon exertion   Expansion/Accessory Muscles/Retractions no use of accessory muscles;no retractions   Breath Sounds   Breath Sounds All Fields   All Lung Fields Breath Sounds Anterior:;clear   JENNA Breath Sounds crackles   LLL Breath Sounds crackles   RUL Breath Sounds clear   RML Breath Sounds clear   RLL Breath Sounds diminished   Nebulizer Assessment & Treatment   $RT Use ONLY Delivery Method Nebulizer - Additional   Daily Review of Necessity (SVN) completed   Nebulizer Device In line   Pretreatment Heart Rate (beats/min) 57   Pretreatment Resp Rate (breaths/min) 18   Pretreatment O2 sats - (TCU only) 96   Pretreat Breath Sounds - Bilat - All Lobes crackles   Pretreat Breath Sounds - JENNA crackles   Pretreat Breath Sounds - LLL crackles   Pretreat Breath Sounds - RUL clear   Pretreat Breath Sounds - RML clear   Pretreat Breath Sounds - RLL diminished   Patient Position semi-Chappell's   Respiratory Treatment Status (SVN) given   Breath Sounds Post-Respiratory Treatment   Posttreatment Heart Rate (beats/min) 58   Posttreatment Resp Rate (breaths/min) 18   Post treatment O2 Sats - (TCU only) 95   Posttreatment Assessment (SVN) breath sounds unchanged   Signs of Intolerance (SVN) none   Breath Sounds Posttreatment All Fields All Fields   Breath Sounds Posttreatment All Fields no change   Breath Sounds Posttreatment JENNA  Anterior:;crackles;diminished   Breath Sounds Posttreatment LLL diminished;crackles   Breath Sounds Posttreatment RUL crackles   Breath Sounds Posttreatment RML diminished;crackles   Breath Sounds Posttreatment RLL diminished   RT Device Skin Assessment   Oxygen Delivery Device High Flow Nasal Cannula   Interface Nasal Mask - Medium   Ventilator Arm In Place No   Site Appearance neck circumference Clean and dry   Site Appearance nares (outer) Clean and dry   Site Appearance nares (inner) Clean and dry   Site Appearance lips Clean and dry   Site Appearance bridge of nose Clean and dry   Site Appearance of ears Clean and dry   Site Appearance occiput Clean and dry   Strap Tightness Finger Allowance between head and device strap   Device Skin Interventions Taken No adjustments needed     RT to follow as needed

## 2023-12-23 NOTE — PLAN OF CARE
[id number]: A/ox4. BPs in the mid/low 90s on shift, OVSS on HFNC. Was on 45L/49%, increased FiO2 to 55% this AM due to activity out of bed- pt taking longer to recover. Afebrile overnight, however intermittently diaphoretic, requiring gown/linen changes. Received two units of RBC on evening-tolerated well. Hgb this AM 7.8.     Problem: Adult Inpatient Plan of Care  Goal: Optimal Comfort and Wellbeing  Outcome: Progressing     Problem: Gas Exchange Impaired  Goal: Optimal Gas Exchange  Outcome: Progressing     Problem: Anemia  Goal: Anemia Symptom Improvement  Outcome: Progressing

## 2023-12-23 NOTE — PROGRESS NOTES
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  Requiring oxygen via high flow nasal canula.  BAL 11/3 without growth.  Pulmonolgy concerned for organizing pneumonia.   A-fib with RVR, started on diltiazem and amiodarone.  Has extensive patchy left lung infiltrate and atelectasis and moderate clearing of extensive patchy right lung infiltrate on CT scan.  Started on IV Zosyn, vancomycin and Zithromax.  Sputum culture is pending.  Evaluated by pulmonology and infectious disease.      Acute respiratory failure with hypoxia  Keytruda pneumonitis  ? Organizing pneumonia.   Recent hospitalization twice for pneumonia, Keytruda pneumonitis, small pulmonary embolism  CT--Extensive patchy left lung infiltrate and atelectasis has developed. Moderate clearing of extensive patchy right lung infiltrate.   BAL 11/3 without growth  COVID, influenza, RSV are negative  Viral and bacterial serologies are pending  Blood and sputum cultures are pending  Continue azithromycin and Zosyn per ID>   Methylprednisolone 62.5 mg q8h.  Albuterol q6h prn.  Appreciate Pulmonology and ID consultation      Bilateral pulmonary emboli  Continue Eliqus 5 mg BID     Iron deficiency anemia  Microcytic anemia  Hemoglobin 6.7-> 7.8.   Status post 2 U PRBC on 12/22.   Ferrous sulfate 325 mg daily   Repeat CBC     A-fib with RVR  Heart rate is stable  Diltiazem 300 mg daily  Amiodarone 200 mg daily  On chronic anticoagulation treatment with Eliquis     Acute kidney injury, creatinine 1.38-->1.29  S/P IV hydration     Melanoma  Seeing [place]  "oncology   S/P 6 doses of pembrolizumab 200 mg IV every 3 weeks.  Last dose given 9/15/2023.     Moderate obesity Body mass index is 29.82 kg/m .  Modification of lifestyle for weight loss          Diet: Regular Diet Adult    DVT Prophylaxis: DOAC  Lamas Catheter: Not present  Lines: None     Cardiac Monitoring: ACTIVE order. Indication: Afib  Code Status: Full Code      Clinically Significant Risk Factors              # Hypoalbuminemia: Lowest albumin = 3.1 g/dL at 12/20/2023  5:00 AM, will monitor as appropriate            # Overweight: Estimated body mass index is 29.8 kg/m  as calculated from the following:    Height as of this encounter: 1.93 m (6' 4\").    Weight as of this encounter: 111 kg (244 lb 12.8 oz)., PRESENT ON ADMISSION     # Financial/Environmental Concerns: none  # Asthma: noted on problem list        Disposition Plan      Expected Discharge Date: 12/25/2023        Discharge Comments: IV ABX, Fevers, HFNC,  pending clinical progression.            Avery Barraza DO  Hospitalist Service  [facility]  Securely message with [employer] (more info)  Text page via [facility] Paging/Directory   ______________________________________________________________________    Interval History   Currently on FiO2 40%, at a rate of 40 L/min via HFNC.  Saturating 92-94%. No new complaints.     Physical Exam   Vital Signs: Temp: 97.6  F (36.4  C) Temp src: Oral BP: 96/63 Pulse: 68   Resp: 21 SpO2: 93 % O2 Device: High Flow Nasal Cannula (HFNC) Oxygen Delivery: 45 LPM  Weight: 244 lbs 12.8 oz    GENERAL:  Alert, appears comfortable, in no acute distress, appears stated age, obese   HEAD:  Normocephalic, without obvious abnormality, atraumatic   NECK: Supple, symmetrical, trachea midline   BACK:   Symmetric, no curvature, ROM normal   LUNGS:   Coarse breath sounds bilaterally, no rales, rhonchi, or wheezing, symmetric chest rise on inhalation, respirations unlabored   CHEST WALL:  No tenderness or " deformity   HEART:  Regular rate and rhythm, S1 and S2 normal, no murmur    ABDOMEN:   Soft, non-tender, bowel sounds active , no masses, no organomegaly, no rebound or guarding   EXTREMITIES: Trace BLE edema    SKIN: No rashes in the visualized areas   NEURO: Alert, oriented x3, moves all four extremities freely   PSYCH: Cooperative, behavior is appropriate         Medical Decision Making       40 MINUTES SPENT BY ME on the date of service doing chart review, history, exam, documentation & further activities per the note.      Data     I have personally reviewed the following data over the past 24 hrs:    14.6 (H)  \   7.8 (L)   / 618 (H)     N/A N/A N/A /  N/A   4.1 N/A N/A \       Imaging results reviewed over the past 24 hrs:   No results found for this or any previous visit (from the past 24 hour(s)).

## 2023-12-23 NOTE — PROGRESS NOTES
"BP 93/58 (BP Location: Left arm)   Pulse 70   Temp 97.6  F (36.4  C) (Oral)   Resp 24   Ht 1.93 m (6' 4\")   Wt 111 kg (244 lb 12.8 oz)   SpO2 92%   BMI 29.80 kg/m      Pt remains on HFNC at this time. BS were diminished when last seen. Pt had a non productive cough. RT will continue to follow.   " Please advise

## 2023-12-23 NOTE — PROGRESS NOTES
Infectious Diseases Progress Note  [facility]    Date of visit: 12/23/2023     ASSESSMENT   58-year-old man with a history of cutaneous melanoma.  Recently admitted for respiratory failure thought to be due to Keytruda pneumonitis.  Presenting now with worsening shortness of breath over the past week.    Respiratory failure.  Increasing shortness of breath.  Hypoxia on admission.  Fevers and leukocytosis.  CT scan showing improvement of right-sided infiltrates, but now with a new patchy left-sided infiltrates.  COVID and flu PCR negative. Respiratory viral panel negative. Blood cultures negative to date. Unable to produce sputum sample. High fungitell noted. Started on steroids on 12/22  Keytruda pneumonitis.  Hospitalized last month for respiratory failure.  Treated with broad-spectrum antibiotics, but ultimately felt to be related to Keytruda pneumonitis.  Discharged with steroids with resolution of symptoms    Active Problems:    Acute respiratory failure with hypoxia (H)    Pneumonia due to infectious organism, unspecified laterality, unspecified part of lung    Sepsis, due to unspecified organism, unspecified whether acute organ dysfunction present (H)       PLAN   -continue azithromycin and Zosyn  -agree with trial of steroids  -unclear significance of high fungitell. Will hold on starting anything new as long as he is improving    Payam Glez MD  [facility]  Direct messaging: [employer] Paging  On-Call ID provider: [phone], option: 9      ===========================================      SUBJECTIVE / INTERVAL HISTORY:     Feels better. Still on HFNC, but fio2 decreasing.    Antibiotics   Azithromycin 12/20-  Zosyn 12/20-    Previous:  Vancomycin 12/20-12/21      Physical Exam     Temp:  [97.5  F (36.4  C)-98.7  F (37.1  C)] 97.6  F (36.4  C)  Pulse:  [56-84] 62  Resp:  [18-33] 21  BP: ()/(48-68) 96/63  FiO2 (%):  [40 %-55 %] 40 %  SpO2:  [89 %-99 %] 94 %    BP  "96/63 (BP Location: Left arm)   Pulse 62   Temp 97.6  F (36.4  C) (Oral)   Resp 21   Ht 1.93 m (6' 4\")   Wt 111 kg (244 lb 12.8 oz)   SpO2 94%   BMI 29.80 kg/m      GENERAL:  well-developed, well-nourished, lying in bed in no acute distress.   HENT:  Head is normocephalic, atraumatic.   EYES:  Eyes have anicteric sclerae without conjunctival injection   LUNGS:  crackles bilaterally, greater on the right base   CARDIOVASCULAR:  regular   ABDOMEN:  Normal bowel sounds, soft, nontender.   EXT: Extremities warm and without edema.  SKIN:  No acute rashes.   NEUROLOGIC:  Grossly nonfocal.      Cultures   12/20 blood cultures x 2: no growth to date     Susceptibility data from last 90 days.  Collected Specimen Info Organism   11/03/23 Bronchial Alveolar Lavage from Lung, Right Candida albicans   11/03/23 Bronchial Alveolar Lavage from Lung, Right Normal dustin            Pertinent Labs:     Recent Labs   Lab 12/23/23  0424 12/22/23  1521 12/21/23  0611 12/20/23  0500   WBC 14.6*  --  15.8* 17.4*   HGB 7.8* 6.7* 7.3* 7.5*   *  --  600* 645*       Recent Labs   Lab 12/23/23  0424 12/21/23  0611 12/20/23  0500    139 134*   CO2 25 26 22   BUN 17.8 10.6 13.4   ALBUMIN  --   --  3.1*   ALKPHOS  --   --  374*   ALT  --   --  31   AST  --   --  27       Recent Labs   Lab 12/20/23  0739   CRPI [id number]*           Imaging:     CT Chest Pulmonary Embolism w Contrast    Result Date: 12/20/2023  EXAM: CT CHEST PULMONARY EMBOLISM W CONTRAST LOCATION: [facility] DATE: 12/20/2023 INDICATION: Shortness of breath COMPARISON: 11/13/2023 TECHNIQUE: CT chest pulmonary angiogram during arterial phase injection of IV contrast. Multiplanar reformats and MIP reconstructions were performed. Dose reduction techniques were used. CONTRAST: [id number] 75ml FINDINGS: ANGIOGRAM CHEST: Previously noted left basilar pulmonary emboli not seen today. No pulmonary emboli noted today. No evidence for right ventricular " heart strain. Thoracic aorta is negative for dissection. No CT evidence of right heart strain. LUNGS AND PLEURA: Extensive patchy left lung infiltrate and atelectasis has developed since prior examination. Moderate clearing of extensive patchy right lung infiltrate. Mild residual right upper lobe infiltrate/atelectasis remains. Moderate to marked elevation right hemidiaphragm unchanged. MEDIASTINUM/AXILLAE: No lymphadenopathy. Normal esophagus. No significant pericardial effusion. Ectasia of the ascending thoracic aorta. CORONARY ARTERY CALCIFICATION: Mild. UPPER ABDOMEN: Unremarkable MUSCULOSKELETAL: No concerning osseous abnormalities.     IMPRESSION: 1.  Previously noted left basilar pulmonary emboli not seen today. No pulmonary emboli noted today. No evidence for right ventricular heart strain. 2.  Extensive patchy left lung infiltrate and atelectasis has developed since prior examination. 3.  Moderate clearing of extensive patchy right lung infiltrate. Mild residual right upper lobe infiltrate/atelectasis remains. Moderate to marked elevation right hemidiaphragm unchanged. 4.  Ectasia of the ascending thoracic aorta stable.        Data reviewed today: I reviewed all medications, new labs and imaging results over the last 24 hours. I personally reviewed no images or EKG's today.  The patient's care was discussed with the Bedside Nurse and Patient.

## 2023-12-23 NOTE — PLAN OF CARE
Problem: Adult Inpatient Plan of Care  Goal: Readiness for Transition of Care  Outcome: Progressing     Problem: Gas Exchange Impaired  Goal: Optimal Gas Exchange  Outcome: Progressing     Problem: Anemia  Goal: Anemia Symptom Improvement  Outcome: Progressing    A&O.  VSS.  On HFNC 45L 50%.  FERRARI, SOB, tachypnea with exertion.  Recovers ~ 5min.  Robitussin for cough x2.  Sputum sent and rejected.  Repeat sputum needed.  Sterile container at bedside.  Steroids started today.  Diarrhea x1 - PRN imodium given.  SBA to bathroom - just needing assistance with lines.  Family visited and supportive.  Recheck hgb this afternoon was 6.7.  2 units PRBCs ordered.  1st unit infusing.  1x lasix to be given after 1st unit.  Educated on s/s of reaction - currently denies.

## 2023-12-24 ENCOUNTER — APPOINTMENT (OUTPATIENT)
Dept: RADIOLOGY | Facility: CLINIC | Age: 58
End: 2023-12-24
Attending: INTERNAL MEDICINE
Payer: COMMERCIAL

## 2023-12-24 ENCOUNTER — APPOINTMENT (OUTPATIENT)
Dept: SPEECH THERAPY | Facility: CLINIC | Age: 58
End: 2023-12-24
Attending: INTERNAL MEDICINE
Payer: COMMERCIAL

## 2023-12-24 LAB
ANION GAP SERPL CALCULATED.3IONS-SCNC: 9 MMOL/L (ref 7–15)
BUN SERPL-MCNC: 20.4 MG/DL (ref 6–20)
CALCIUM SERPL-MCNC: 8.9 MG/DL (ref 8.6–10)
CHLORIDE SERPL-SCNC: 104 MMOL/L (ref 98–107)
CREAT SERPL-MCNC: 1.07 MG/DL (ref 0.67–1.17)
DEPRECATED HCO3 PLAS-SCNC: 27 MMOL/L (ref 22–29)
EGFRCR SERPLBLD CKD-EPI 2021: 80 ML/MIN/1.73M2
ERYTHROCYTE [DISTWIDTH] IN BLOOD BY AUTOMATED COUNT: 19 % (ref 10–15)
GLUCOSE SERPL-MCNC: 167 MG/DL (ref 70–99)
HCT VFR BLD AUTO: 25.2 % (ref 40–53)
HGB BLD-MCNC: 8 G/DL (ref 13.3–17.7)
MAGNESIUM SERPL-MCNC: 2.4 MG/DL (ref 1.7–2.3)
MCH RBC QN AUTO: 28.3 PG (ref 26.5–33)
MCHC RBC AUTO-ENTMCNC: 31.7 G/DL (ref 31.5–36.5)
MCV RBC AUTO: 89 FL (ref 78–100)
PLATELET # BLD AUTO: 590 10E3/UL (ref 150–450)
POTASSIUM SERPL-SCNC: 3.8 MMOL/L (ref 3.4–5.3)
RBC # BLD AUTO: 2.83 10E6/UL (ref 4.4–5.9)
SODIUM SERPL-SCNC: 140 MMOL/L (ref 135–145)
WBC # BLD AUTO: 21.7 10E3/UL (ref 4–11)

## 2023-12-24 PROCEDURE — 80048 BASIC METABOLIC PNL TOTAL CA: CPT | Performed by: INTERNAL MEDICINE

## 2023-12-24 PROCEDURE — 36415 COLL VENOUS BLD VENIPUNCTURE: CPT | Performed by: INTERNAL MEDICINE

## 2023-12-24 PROCEDURE — 250N000011 HC RX IP 250 OP 636: Performed by: INTERNAL MEDICINE

## 2023-12-24 PROCEDURE — 99232 SBSQ HOSP IP/OBS MODERATE 35: CPT | Performed by: INTERNAL MEDICINE

## 2023-12-24 PROCEDURE — 250N000013 HC RX MED GY IP 250 OP 250 PS 637: Performed by: FAMILY MEDICINE

## 2023-12-24 PROCEDURE — 87205 SMEAR GRAM STAIN: CPT | Performed by: INTERNAL MEDICINE

## 2023-12-24 PROCEDURE — 258N000003 HC RX IP 258 OP 636: Performed by: INTERNAL MEDICINE

## 2023-12-24 PROCEDURE — 83735 ASSAY OF MAGNESIUM: CPT | Performed by: FAMILY MEDICINE

## 2023-12-24 PROCEDURE — 94640 AIRWAY INHALATION TREATMENT: CPT | Mod: 76

## 2023-12-24 PROCEDURE — 999N000157 HC STATISTIC RCP TIME EA 10 MIN

## 2023-12-24 PROCEDURE — 999N000215 HC STATISTIC HFNC ADULT NON-CPAP

## 2023-12-24 PROCEDURE — 71045 X-RAY EXAM CHEST 1 VIEW: CPT

## 2023-12-24 PROCEDURE — 94799 UNLISTED PULMONARY SVC/PX: CPT

## 2023-12-24 PROCEDURE — 92610 EVALUATE SWALLOWING FUNCTION: CPT | Mod: GN

## 2023-12-24 PROCEDURE — 250N000009 HC RX 250: Performed by: FAMILY MEDICINE

## 2023-12-24 PROCEDURE — 85014 HEMATOCRIT: CPT | Performed by: INTERNAL MEDICINE

## 2023-12-24 PROCEDURE — 99233 SBSQ HOSP IP/OBS HIGH 50: CPT | Performed by: INTERNAL MEDICINE

## 2023-12-24 PROCEDURE — 120N000013 HC R&B IMCU

## 2023-12-24 PROCEDURE — 250N000011 HC RX IP 250 OP 636: Mod: JZ | Performed by: INTERNAL MEDICINE

## 2023-12-24 RX ORDER — FUROSEMIDE 10 MG/ML
40 INJECTION INTRAMUSCULAR; INTRAVENOUS ONCE
Status: COMPLETED | OUTPATIENT
Start: 2023-12-24 | End: 2023-12-24

## 2023-12-24 RX ADMIN — APIXABAN 5 MG: 5 TABLET, FILM COATED ORAL at 19:19

## 2023-12-24 RX ADMIN — FLUTICASONE PROPIONATE 1 PUFF: 110 AEROSOL, METERED RESPIRATORY (INHALATION) at 19:18

## 2023-12-24 RX ADMIN — METHYLPREDNISOLONE SODIUM SUCCINATE 62.5 MG: 125 INJECTION INTRAMUSCULAR; INTRAVENOUS at 19:20

## 2023-12-24 RX ADMIN — POLYETHYLENE GLYCOL 3350 17 G: 17 POWDER, FOR SOLUTION ORAL at 10:32

## 2023-12-24 RX ADMIN — FLUTICASONE PROPIONATE 1 PUFF: 110 AEROSOL, METERED RESPIRATORY (INHALATION) at 10:31

## 2023-12-24 RX ADMIN — FERROUS SULFATE TAB 325 MG (65 MG ELEMENTAL FE) 325 MG: 325 (65 FE) TAB at 12:33

## 2023-12-24 RX ADMIN — QUETIAPINE FUMARATE 25 MG: 25 TABLET ORAL at 10:28

## 2023-12-24 RX ADMIN — IPRATROPIUM BROMIDE AND ALBUTEROL SULFATE 3 ML: .5; 3 SOLUTION RESPIRATORY (INHALATION) at 08:28

## 2023-12-24 RX ADMIN — IPRATROPIUM BROMIDE AND ALBUTEROL SULFATE 3 ML: .5; 3 SOLUTION RESPIRATORY (INHALATION) at 00:11

## 2023-12-24 RX ADMIN — PIPERACILLIN AND TAZOBACTAM 3.38 G: 3; .375 INJECTION, POWDER, LYOPHILIZED, FOR SOLUTION INTRAVENOUS at 16:54

## 2023-12-24 RX ADMIN — METHYLPREDNISOLONE SODIUM SUCCINATE 62.5 MG: 125 INJECTION INTRAMUSCULAR; INTRAVENOUS at 12:42

## 2023-12-24 RX ADMIN — AMIODARONE HYDROCHLORIDE 200 MG: 200 TABLET ORAL at 10:29

## 2023-12-24 RX ADMIN — QUETIAPINE FUMARATE 50 MG: 50 TABLET ORAL at 21:02

## 2023-12-24 RX ADMIN — METHYLPREDNISOLONE SODIUM SUCCINATE 62.5 MG: 125 INJECTION INTRAMUSCULAR; INTRAVENOUS at 04:26

## 2023-12-24 RX ADMIN — QUETIAPINE FUMARATE 25 MG: 25 TABLET ORAL at 14:52

## 2023-12-24 RX ADMIN — CLONAZEPAM 0.5 MG: 0.5 TABLET ORAL at 10:27

## 2023-12-24 RX ADMIN — IPRATROPIUM BROMIDE AND ALBUTEROL SULFATE 3 ML: .5; 3 SOLUTION RESPIRATORY (INHALATION) at 12:44

## 2023-12-24 RX ADMIN — SIMVASTATIN 40 MG: 20 TABLET, FILM COATED ORAL at 21:02

## 2023-12-24 RX ADMIN — IPRATROPIUM BROMIDE AND ALBUTEROL SULFATE 3 ML: .5; 3 SOLUTION RESPIRATORY (INHALATION) at 19:24

## 2023-12-24 RX ADMIN — IPRATROPIUM BROMIDE AND ALBUTEROL SULFATE 3 ML: .5; 3 SOLUTION RESPIRATORY (INHALATION) at 16:03

## 2023-12-24 RX ADMIN — ESCITALOPRAM OXALATE 20 MG: 20 TABLET ORAL at 12:34

## 2023-12-24 RX ADMIN — IPRATROPIUM BROMIDE AND ALBUTEROL SULFATE 3 ML: .5; 3 SOLUTION RESPIRATORY (INHALATION) at 04:07

## 2023-12-24 RX ADMIN — CLONAZEPAM 0.5 MG: 0.5 TABLET ORAL at 21:02

## 2023-12-24 RX ADMIN — PIPERACILLIN AND TAZOBACTAM 3.38 G: 3; .375 INJECTION, POWDER, LYOPHILIZED, FOR SOLUTION INTRAVENOUS at 00:01

## 2023-12-24 RX ADMIN — MIRTAZAPINE 15 MG: 15 TABLET, FILM COATED ORAL at 21:02

## 2023-12-24 RX ADMIN — PIPERACILLIN AND TAZOBACTAM 3.38 G: 3; .375 INJECTION, POWDER, LYOPHILIZED, FOR SOLUTION INTRAVENOUS at 10:32

## 2023-12-24 RX ADMIN — DILTIAZEM HYDROCHLORIDE 300 MG: 120 CAPSULE, EXTENDED RELEASE ORAL at 10:28

## 2023-12-24 RX ADMIN — AZITHROMYCIN MONOHYDRATE 250 MG: 500 INJECTION, POWDER, LYOPHILIZED, FOR SOLUTION INTRAVENOUS at 15:35

## 2023-12-24 RX ADMIN — FUROSEMIDE 40 MG: 10 INJECTION, SOLUTION INTRAMUSCULAR; INTRAVENOUS at 12:35

## 2023-12-24 RX ADMIN — APIXABAN 5 MG: 5 TABLET, FILM COATED ORAL at 10:29

## 2023-12-24 ASSESSMENT — ACTIVITIES OF DAILY LIVING (ADL)
ADLS_ACUITY_SCORE: 20

## 2023-12-24 NOTE — PROGRESS NOTES
Respiratory Therapy Progress note    Jim continued on the HFNC with 50% FIO2 and 40 LPM throughout this shift. Jim tolerated the HFNC well with oxygen saturation 95-98%. Duonebs given QID per orders. BS initially clear and diminished, but became coarse by 1603 neb treatment. Jim is using the Aerobika flutter valve on his own after each neb treatment. VSS remain stable.    Chiquita Spangler, RRT/BS/CTTS  12/24/2023

## 2023-12-24 NOTE — PLAN OF CARE
Goal Outcome Evaluation:      Plan of Care Reviewed With: patient  Patient is alert and oriented and able to communicate his ne. Patient had a bowel movement this morning. He denies any c/o pain. Telemetry shows NSR. Will continue to monitor..

## 2023-12-24 NOTE — PROGRESS NOTES
Infectious Diseases Progress Note  [facility]    Date of visit: 12/24/2023     ASSESSMENT   58-year-old man with a history of cutaneous melanoma.  Recently admitted for respiratory failure thought to be due to Keytruda pneumonitis.  Presenting now with worsening shortness of breath over the past week.    Respiratory failure.  Increasing shortness of breath.  Hypoxia on admission.  Fevers and leukocytosis.  CT scan showing improvement of right-sided infiltrates, but now with a new patchy left-sided infiltrates.  COVID and flu PCR negative. Respiratory viral panel negative. Blood cultures negative to date. Unable to produce sputum sample. High fungitell noted. Started on steroids on 12/22  Keytruda pneumonitis.  Hospitalized last month for respiratory failure.  Treated with broad-spectrum antibiotics, but ultimately felt to be related to Keytruda pneumonitis.  Discharged with steroids with resolution of symptoms    Active Problems:    Acute respiratory failure with hypoxia (H)    Pneumonia due to infectious organism, unspecified laterality, unspecified part of lung    Sepsis, due to unspecified organism, unspecified whether acute organ dysfunction present (H)       PLAN   -continue Pipercillin/tazobactam  -finish azithromycin today  -agree with trial of steroids  -unclear significance of high fungitell. Will hold on starting anything new as long as he is improving    Payam Glez MD  [facility]  Direct messaging: [facility] Paging  On-Call ID provider: [phone], option: 9      ===========================================      SUBJECTIVE / INTERVAL HISTORY:     Still on HFNC, but fio2 decreasing, down to 40LPM. Sweats last night.     Antibiotics   Azithromycin 12/20-  Zosyn 12/20-    Previous:  Vancomycin 12/20-12/21      Physical Exam     Temp:  [96.6  F (35.9  C)-97.8  F (36.6  C)] 97.7  F (36.5  C)  Pulse:  [56-96] 60  Resp:  [18-25] 18  BP: ()/(55-70) 104/66  FiO2 (%):   "[35 %-80 %] 51 %  SpO2:  [73 %-97 %] 97 %    /66 (BP Location: Left arm)   Pulse 60   Temp 97.7  F (36.5  C) (Oral)   Resp 18   Ht 1.93 m (6' 4\")   Wt 112.2 kg (247 lb 4.8 oz)   SpO2 97%   BMI 30.10 kg/m      GENERAL:  well-developed, well-nourished, lying in bed in no acute distress.   HENT:  Head is normocephalic, atraumatic.   EYES:  Eyes have anicteric sclerae without conjunctival injection   LUNGS:  crackles bilaterally, faint wheeze on the left  CARDIOVASCULAR:  regular   ABDOMEN:  Normal bowel sounds, soft, nontender.   EXT: Extremities warm and without edema.  SKIN:  No acute rashes.   NEUROLOGIC:  Grossly nonfocal.      Cultures   12/20 blood cultures x 2: no growth to date     Susceptibility data from last 90 days.  Collected Specimen Info Organism   11/03/23 Bronchial Alveolar Lavage from Lung, Right Candida albicans   11/03/23 Bronchial Alveolar Lavage from Lung, Right Normal dustin            Pertinent Labs:     Recent Labs   Lab 12/24/23  0628 12/23/23  1129 12/23/23  0424   WBC 21.7* 17.1* 14.6*   HGB 8.0* 8.7* 7.8*   * 610* 618*       Recent Labs   Lab 12/24/23  0628 12/23/23  0424 12/21/23  0611 12/20/23  0500    139 139 134*   CO2 27 25 26 22   BUN 20.4* 17.8 10.6 13.4   ALBUMIN  --   --   --  3.1*   ALKPHOS  --   --   --  374*   ALT  --   --   --  31   AST  --   --   --  27       Recent Labs   Lab 12/20/23  0739   CRPI [id number]*           Imaging:     CT Chest Pulmonary Embolism w Contrast    Result Date: 12/20/2023  EXAM: CT CHEST PULMONARY EMBOLISM W CONTRAST LOCATION: [facility] DATE: 12/20/2023 INDICATION: Shortness of breath COMPARISON: 11/13/2023 TECHNIQUE: CT chest pulmonary angiogram during arterial phase injection of IV contrast. Multiplanar reformats and MIP reconstructions were performed. Dose reduction techniques were used. CONTRAST: [id number] 75ml FINDINGS: ANGIOGRAM CHEST: Previously noted left basilar pulmonary emboli not seen today. " No pulmonary emboli noted today. No evidence for right ventricular heart strain. Thoracic aorta is negative for dissection. No CT evidence of right heart strain. LUNGS AND PLEURA: Extensive patchy left lung infiltrate and atelectasis has developed since prior examination. Moderate clearing of extensive patchy right lung infiltrate. Mild residual right upper lobe infiltrate/atelectasis remains. Moderate to marked elevation right hemidiaphragm unchanged. MEDIASTINUM/AXILLAE: No lymphadenopathy. Normal esophagus. No significant pericardial effusion. Ectasia of the ascending thoracic aorta. CORONARY ARTERY CALCIFICATION: Mild. UPPER ABDOMEN: Unremarkable MUSCULOSKELETAL: No concerning osseous abnormalities.     IMPRESSION: 1.  Previously noted left basilar pulmonary emboli not seen today. No pulmonary emboli noted today. No evidence for right ventricular heart strain. 2.  Extensive patchy left lung infiltrate and atelectasis has developed since prior examination. 3.  Moderate clearing of extensive patchy right lung infiltrate. Mild residual right upper lobe infiltrate/atelectasis remains. Moderate to marked elevation right hemidiaphragm unchanged. 4.  Ectasia of the ascending thoracic aorta stable.        Data reviewed today: I reviewed all medications, new labs and imaging results over the last 24 hours. I personally reviewed the chest x-ray image(s) showing left sided pneumonia .  The patient's care was discussed with the Patient.

## 2023-12-24 NOTE — PROGRESS NOTES
"Speech-Language Pathology: Clinical Swallow Evaluation     12/24/23 6463   Appointment Info   Signing Clinician's Name / Credentials (SLP) Madisyn Granado MA, CCC-SLP   General Information   Onset of Illness/Injury or Date of Surgery 12/20/23   Referring Physician Monisha Caldwell MD   Pertinent History of Current Problem   Per ordering MD progress note this AM, \"Jim Titus is a 58 year old male with afib, PE on elliquis, melanoma previously on pembrolizumab last given September 2023 with ongoing pneumonitis presented with acute hypoxic respiratory failure requiring intubation, extubated and discharge on steroid taper who returns with PNA and acute resp failure. CTA found resolution of PE but new area of infiltrate and resolved area of previous infiltrate.\"  CXR earlier today revealed the following: \"Extensive left-sided infiltrates which are new. Right-sided infiltrates have nearly resolved since comparison.\"     Speech Therapy was consulted due to concerns of dysphagia. Patient is familiar to SLP from previous admission in 11/2023.     General Observations Patient alert and cooperative.   Type of Evaluation   Type of Evaluation Swallow Evaluation   Oral Motor   Oral Musculature generally intact   Structural Abnormalities none present   Mucosal Quality adequate   Dentition (Oral Motor)   Dentition (Oral Motor) natural dentition;adequate dentition   Facial Symmetry (Oral Motor)   Facial Symmetry (Oral Motor) WNL   Lip Function (Oral Motor)   Lip Range of Motion (Oral Motor) WNL   Lip Strength (Oral Motor) WNL   Comment, Lip Function (Oral Motor) Lip function was WNL   Tongue Function (Oral Motor)   Tongue ROM (Oral Motor) WNL   Tongue Strength (Oral Motor) WNL   Tongue Coordination/Speed (Oral Motor) WNL   Comment, Tongue Function (Oral Motor) Lingual function was WNL   Jaw Function (Oral Motor)   Jaw Function (Oral Motor) WNL   Cough/Swallow/Gag Reflex (Oral Motor)   Volitional Throat Clear/Cough (Oral Motor) " WNL   Volitional Swallow (Oral Motor) WNL   Vocal Quality/Secretion Management (Oral Motor)   Vocal Quality (Oral Motor) WNL   Secretion Management (Oral Motor) WNL   Comment, Vocal Quality/Secretion Management (Oral Motor) Vocal quality is clear.   General Swallowing Observations   Current Diet/Method of Nutritional Intake (General Swallowing Observations, NIS) regular diet;thin liquids (level 0)   Respiratory Support high-flow  (50% FiO2; 40 LPM)   Past History of Dysphagia   Patient was seen by Speech Therapy during admission in November. At that time, he initially presented with mild dysphagia but swallow function improved and patient discharged home with recommendation for Regular diet with thin liquids.     Swallowing Evaluation Clinical swallow evaluation   Comment, General Swallowing Observations Patient denies any new swallowing issues since previous admission.   Clinical Swallow Evaluation   Feeding Assistance no assistance needed   Clinical Swallow Evaluation Textures Trialed thin liquids;pureed;solid foods   Clinical Swallow Eval: Thin Liquid Texture Trial   Mode of Presentation, Thin Liquids cup;straw;self-fed   Volume of Liquid or Food Presented >4 ounces   Oral Phase of Swallow WFL   Pharyngeal Phase of Swallow intact   Diagnostic Statement Subjectively, swallow response appeared timely. Laryngeal elevation seemed adequate on palpation. No overt clinical s/sx of aspiration observed.   Clinical Swallow Evaluation: Solid Food Texture Trial   Mode of Presentation self-fed   Volume Presented 6 bites  (Socrates cracker)   Oral Phase WFL   Pharyngeal Phase intact   Diagnostic Statement Patient demonstrated effective and timely mastication and good oral clearance. No overt clinical s/sx of aspiration observed.   Esophageal Phase of Swallow   Patient reports or presents with symptoms of esophageal dysphagia No   Swallowing Recommendations   Diet Consistency Recommendations regular diet;thin liquids (level 0)    Supervision Level for Intake patient independent   Mode of Delivery Recommendations bolus size, small;slow rate of intake   Monitoring/Assistance Required (Eating/Swallowing) stop eating activities when fatigue is present   Recommended Feeding/Eating Techniques (Swallow Eval) maintain upright sitting position for eating;minimize distractions during oral intake   Medication Administration Recommendations, Swallowing (SLP) As tolerated   Instrumental Assessment Recommendations instrumental evaluation not recommended at this time  (But would be appropriate if silent aspiration is a concern)   Clinical Impression   Criteria for Skilled Therapeutic Interventions Met (SLP Eval) Evaluation only   Clinical Impression Comments   Clinical swallow evaluation completed per provider order. No oral dysphagia observed with the consistencies given. There was no direct evidence of pharyngeal dysphagia during this evaluation. Additionally, chest imaging is not particularly suggestive of aspiration pneumonia. Patient appears appropriate to continue current diet of Regular textures and thin liquids with use of the safe swallowing precautions listed above. There is no indication for ongoing skilled Speech Therapy services at this time. Will sign off, but remain available if new concerns arise. If silent aspiration is a concern, consider instrumental evaluation (VFSS).     SLP Total Evaluation Time   Eval: oral/pharyngeal swallow function, clinical swallow Minutes ([id number]) 15   SLP Discharge Planning   SLP Plan Further Speech Therapy is not indicated at this time. Will sign off and complete current order. Please re-consult if new concerns arise.   SLP Discharge Recommendation home   SLP Rationale for DC Rec Patient's swallow function appears at or near baseline.   SLP Brief overview of current status  Patient appears appropriate for diet of Regular textures and thin liquids. To maximize safety of oral intake, patient should sit fully  upright (preferably in chair) for all intake, eat slowly, take small bites/sips, and chew foods thoroughly.   Total Session Time   Total Session Time (sum of timed and untimed services) 15

## 2023-12-24 NOTE — PLAN OF CARE
[id number]: A/ox4. Continues on HFNC, was 40L/50% overnight. OVSS. Denies pain. Afebrile. However diaphoretic overnight- requiring full linen change. Intermittent dry cough-gave prn robitussin x1. Pt getting up indep to bathroom. No acute events overight.       Problem: Adult Inpatient Plan of Care  Goal: Optimal Comfort and Wellbeing  Outcome: Progressing     Problem: Gas Exchange Impaired  Goal: Optimal Gas Exchange  Outcome: Progressing        Statement Selected

## 2023-12-24 NOTE — PROVIDER NOTIFICATION
12/23/23 1942   Tech Time   $Tech Time (10 minute increments) 2   Vital Signs   Resp 18   Pulse Rate Source Monitor   Oximeter Heart Rate 66 bpm   Patient Position Semi-Chappell's   Oxygen Therapy   Daily Review of Necessity (O2 Therapy) completed   Flow (L/min) (Oxygen Therapy) 40   Oxygen Concentration (%) 42   Device (Oxygen Therapy) high-flow nasal cannula   Heater Temperature ( C) 31   Oxygen Therapy   SpO2 90 %   O2 Device High Flow Nasal Cannula (HFNC)   FiO2 (%) 42 %   Oxygen Delivery 40 LPM   Assessment   Respiratory WDL unchanged from my previous assessment   Rhythm/Pattern, Respiratory dyspnea upon exertion   Expansion/Accessory Muscles/Retractions no use of accessory muscles;no retractions   Cough Frequency infrequent   Cough Type dry   Airway Safety Measures all equipment/monitors on and audible   Breath Sounds   Breath Sounds All Fields   All Lung Fields Breath Sounds Anterior:;clear;diminished   JENNA Breath Sounds clear;diminished   LLL Breath Sounds clear;diminished   RUL Breath Sounds clear;diminished   RML Breath Sounds clear;diminished   RLL Breath Sounds clear;diminished   Nebulizer Assessment & Treatment   $RT Use ONLY Delivery Method Nebulizer - Additional   Daily Review of Necessity (SVN) completed   Nebulizer Device In line   Pretreatment Heart Rate (beats/min) 66   Pretreatment Resp Rate (breaths/min) 18   Pretreatment O2 sats - (TCU only) 90   Pretreat Breath Sounds - Bilat - All Lobes diminished   Pretreat Breath Sounds - JENNA diminished   Pretreat Breath Sounds - LLL diminished   Pretreat Breath Sounds - RUL diminished   Pretreat Breath Sounds - RML diminished   Pretreat Breath Sounds - RLL diminished   Patient Position semi-Chappell's   Respiratory Treatment Status (SVN) given   Breath Sounds Post-Respiratory Treatment   Posttreatment Heart Rate (beats/min) 66   Posttreatment Resp Rate (breaths/min) 18   Post treatment O2 Sats - (TCU only) 90   Posttreatment Assessment (SVN) breath sounds  Spoke with Catie and answered all questions.  She is agreeable.  Called Sudha and Gissell and they will call patient to setup appointment.  Gissell also said they will discuss PA of test with patient too.   unchanged   Signs of Intolerance (SVN) none   Breath Sounds Posttreatment All Fields All Fields   Breath Sounds Posttreatment All Fields no change   Breath Sounds Posttreatment JENNA Anterior:;clear;diminished   Breath Sounds Posttreatment LLL Anterior:;clear;diminished   Breath Sounds Posttreatment RUL Anterior:;clear;diminished   Breath Sounds Posttreatment RML Anterior:;clear;diminished   Breath Sounds Posttreatment RLL Anterior:;clear;diminished   RT Device Skin Assessment   Oxygen Delivery Device High Flow Nasal Cannula   Interface Nasal Mask - Medium   Ventilator Arm In Place No   Site Appearance neck circumference Clean and dry   Site Appearance nares (outer) Clean and dry   Site Appearance nares (inner) Clean and dry   Site Appearance lips Clean and dry   Site Appearance bridge of nose Clean and dry   Site Appearance of ears Clean and dry   Site Appearance occiput Clean and dry   Strap Tightness Finger Allowance between head and device strap   Device Skin Interventions Taken No adjustments needed   Treatment/Therapy   Cough And Deep Breathing done independently per patient     RT to follow as needed

## 2023-12-24 NOTE — PROGRESS NOTES
PULMONARY PROGRESS NOTE    Date / Time of Admission:  12/20/2023  4:29 AM  Assessment:   58yoM with history of melanoma and pembrolizumab-induced pneumonitis recently admitted for hypoxia now readmitted after steroid taper with worsening hypoxia and new infiltrate. I am concerned about organizing pneumonia as diagnosis since this occurred in setting of prednisone taper and CRP is very elevated again.     Acute respiratory failure with hypoxia  Pembrolizumab induced pneumonitis  Possible cryptogenic organizing pneumonia  Sepsis  Elevated Fungitell  History of PE - on apixaban        Plan:   Methylprednisolone as this appears to be an autoimmune process that worsens as prednisone is tapered.  We will continue for now and will need a prolonged taper (over 6 to 12 weeks)  May require steroid sparing agent in future.  Antibiotics (zosyn and azithromycin) for now per ID.  Completing azithromycin today  Sputum sample --> mixed dustin.   Lasix 40 mg iv x 1.  Weaning High-flow as able, currently 40L, 50% --> 40 L/50%.  Follow up Swallow evaluation     Subjective:   HPI:  Jim Titus is a 58 year old male with  afib, PE on elliquis, melanoma previously on pembrolizumab last given September 2023 with ongoing pneumonitis presented with acute hypoxic respiratory failure requiring intubation, extubated and discharge on steroid taper who returns with PNA and acute resp failure. CTA found resolution of PE but new area of infiltrate and resolved area of previous infiltrate.         Allergies:   Allergies   Allergen Reactions    Chicken [Chicken Protein] Other (See Comments)     Throat closes to turkey as well.    Pembrolizumab Other (See Comments)     Severe pneumonitis    Wellbutrin [Bupropion] Unknown    Grass Pollen [Grass] Rash    Turkey [Poultry Meal] Rash        MEDS:  Scheduled Meds:   amiodarone  200 mg Oral Daily    apixaban ANTICOAGULANT  5 mg Oral BID    azithromycin  250 mg Intravenous Once    azithromycin  250 mg  "Intravenous Q24H    clonazePAM  0.5 mg Oral BID    diltiazem ER COATED BEADS  300 mg Oral Daily    escitalopram  20 mg Oral Daily with lunch    ferrous sulfate  325 mg Oral Daily with lunch    fluticasone  1 puff Inhalation BID    ipratropium - albuterol 0.5 mg/2.5 mg/3 mL  3 mL Nebulization Q4H    methylPREDNISolone  62.5 mg Intravenous Q8H    mirtazapine  15 mg Oral At Bedtime    piperacillin-tazobactam  3.375 g Intravenous Q8H    polyethylene glycol  17 g Oral Daily    QUEtiapine  25 mg Oral BID    QUEtiapine  50 mg Oral At Bedtime    simvastatin  40 mg Oral At Bedtime    sodium chloride (PF)  3 mL Intracatheter Q8H     Continuous Infusions:   - MEDICATION INSTRUCTIONS -       PRN Meds:.acetaminophen **OR** acetaminophen, acetaminophen, albuterol, calcium carbonate, guaiFENesin-codeine, lidocaine 4%, lidocaine (buffered or not buffered), loperamide, loratadine, melatonin, ondansetron **OR** ondansetron, - MEDICATION INSTRUCTIONS -, senna-docusate **OR** senna-docusate, sodium chloride (PF)    Objective:   VITALS:  /66 (BP Location: Left arm)   Pulse 60   Temp 97.7  F (36.5  C) (Oral)   Resp 18   Ht 1.93 m (6' 4\")   Wt 112.2 kg (247 lb 4.8 oz)   SpO2 97%   BMI 30.10 kg/m    EXAM:    GENERAL APPEARANCE: Alert, no acute distress  HENT: Oral mucosa and posterior oropharynx normal, moist mucous membranes  NECK: No adenopathy,masses or thyromegaly  RESP: rales bilaterally  CV: irregularly irregular, S1 and S2  ABDOMEN: normal bowel sounds, soft, nontender, no hepatosplenomegaly or other masses  LYMPHATICS: No cervical, or supraclavicular adenopathy  SKIN: no suspicious lesions or rashes  NEURO: Alert, oriented x 3, speech and mentation normal        I&O:    Date 12/22/23 0700 - 12/23/23 0659   Shift [id number] 0777-6050 24 Hour Total   INTAKE   P.O. 480   480   Shift Total(mL/kg) 480(4.3)   480(4.3)   OUTPUT   Shift Total(mL/kg)       Weight (kg) 111.7 111.7 111.7 111.7       Data " Review:    Imaging: personally reviewed  Chest CT  EXAM: CT CHEST PULMONARY EMBOLISM W CONTRAST  LOCATION: [facility]  DATE: 12/20/2023     INDICATION: Shortness of breath  COMPARISON: 11/13/2023  TECHNIQUE: CT chest pulmonary angiogram during arterial phase injection of IV contrast. Multiplanar reformats and MIP reconstructions were performed. Dose reduction techniques were used.   CONTRAST: [id number] 75ml     FINDINGS:  ANGIOGRAM CHEST: Previously noted left basilar pulmonary emboli not seen today. No pulmonary emboli noted today. No evidence for right ventricular heart strain. Thoracic aorta is negative for dissection. No CT evidence of right heart strain.     LUNGS AND PLEURA: Extensive patchy left lung infiltrate and atelectasis has developed since prior examination. Moderate clearing of extensive patchy right lung infiltrate. Mild residual right upper lobe infiltrate/atelectasis remains. Moderate to marked   elevation right hemidiaphragm unchanged.     MEDIASTINUM/AXILLAE: No lymphadenopathy. Normal esophagus. No significant pericardial effusion. Ectasia of the ascending thoracic aorta.     CORONARY ARTERY CALCIFICATION: Mild.     UPPER ABDOMEN: Unremarkable     MUSCULOSKELETAL: No concerning osseous abnormalities.                                                                      IMPRESSION:  1.  Previously noted left basilar pulmonary emboli not seen today. No pulmonary emboli noted today. No evidence for right ventricular heart strain.     2.  Extensive patchy left lung infiltrate and atelectasis has developed since prior examination.      3.  Moderate clearing of extensive patchy right lung infiltrate. Mild residual right upper lobe infiltrate/atelectasis remains. Moderate to marked elevation right hemidiaphragm unchanged.     4.  Ectasia of the ascending thoracic aorta stable.    Results for orders placed or performed during the hospital encounter of 12/20/23   Basic metabolic  panel   Result Value Ref Range    Sodium 140 135 - 145 mmol/L    Potassium 3.8 3.4 - 5.3 mmol/L    Chloride 104 98 - 107 mmol/L    Carbon Dioxide (CO2) 27 22 - 29 mmol/L    Anion Gap 9 7 - 15 mmol/L    Urea Nitrogen 20.4 (H) 6.0 - 20.0 mg/dL    Creatinine 1.07 0.67 - 1.17 mg/dL    GFR Estimate 80 >60 mL/min/1.73m2    Calcium 8.9 8.6 - 10.0 mg/dL    Glucose 167 (H) 70 - 99 mg/dL     Lab Results   Component Value Date    WBC 21.7 (H) 12/24/2023    HGB 8.0 (L) 12/24/2023    HCT 25.2 (L) 12/24/2023    MCV 89 12/24/2023     (H) 12/24/2023

## 2023-12-24 NOTE — PROGRESS NOTES
"Care Management Follow Up    Length of Stay (days): 4    Expected Discharge Date: 12/26/2023     Concerns to be Addressed:     Discharge planning  Patient plan of care discussed at interdisciplinary rounds: Yes    Anticipated Discharge Disposition:  Home     Anticipated Discharge Services:    Anticipated Discharge DME:      Patient/family educated on Medicare website which has current facility and service quality ratings:    Education Provided on the Discharge Plan:    Patient/Family in Agreement with the Plan:      Referrals Placed by CM/SW:    Private pay costs discussed: Not applicable    Additional Information:    Chart reviewed.    Social hx per CM consult 12/21: \" He lives in private home with his mother Nishi (stairs up to kitchen). He has been able to be independent with all cares, including driving and still working as able. No assistive devices. No HC services. PCP confirmed. OP MH. OP Oncology. Recent OP cardiology.      Patient is hopeful to return home with his mom without additional services. He declines offer for referral be sent to [facility] at this time but he is aware he can request if he changes his mind. \"    Pt is not on O2 at baseline.    Family can transport at time of discharge.    RN CM to follow ID plans.    Carmen Garcia RN    "

## 2023-12-24 NOTE — PROGRESS NOTES
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  Requiring oxygen via high flow nasal canula.  BAL 11/3 without growth.  Pulmonolgy concerned for organizing pneumonia.   A-fib with RVR, started on diltiazem and amiodarone.  Has extensive patchy left lung infiltrate and atelectasis and moderate clearing of extensive patchy right lung infiltrate on CT scan.  Started on IV Zosyn, vancomycin and Zithromax.  Sputum culture is pending.  Evaluated by pulmonology and infectious disease.      Acute respiratory failure with hypoxia  Keytruda pneumonitis  ? Organizing pneumonia.   Recent hospitalization twice for pneumonia, Keytruda pneumonitis, small pulmonary embolism  CT--Extensive patchy left lung infiltrate and atelectasis has developed. Moderate clearing of extensive patchy right lung infiltrate.   BAL 11/3 without growth  COVID, influenza, RSV are negative  Respiratory panel negative.   Blood culture NGTD  Sputum culture mixed dustin.   Fungitell positive.   WBC 14.6->21.7 likely degranulation from steroid administration.  Continue azithromycin and Zosyn per ID  Methylprednisolone 62.5 mg q8h.  Albuterol q6h prn.  Appreciate Pulmonology and ID consultation      Bilateral pulmonary emboli  Continue Eliqus 5 mg BID     Iron deficiency anemia  Microcytic anemia  Hemoglobin 6.7-> 7.8->8.0.   Status post 2 U PRBC on 12/22.   Ferrous sulfate 325 mg daily   Stable.      A-fib with RVR  Heart rate is stable  Diltiazem 300 mg daily  Amiodarone 200 mg daily  On chronic anticoagulation treatment with Eliquis     Acute  "kidney injury,   creatinine 1.38-->1.29->1.07  S/P IV hydration     Melanoma  Seeing [place] [facility]   S/P 6 doses of pembrolizumab 200 mg IV every 3 weeks.  Last dose given 9/15/2023.     Moderate obesity Body mass index is 29.82 kg/m .  Modification of lifestyle for weight loss          Diet: Regular Diet Adult    DVT Prophylaxis: DOAC  Lamas Catheter: Not present  Lines: None     Cardiac Monitoring: ACTIVE order. Indication: Afib  Code Status: Full Code      Clinically Significant Risk Factors              # Hypoalbuminemia: Lowest albumin = 3.1 g/dL at 12/20/2023  5:00 AM, will monitor as appropriate            # Obesity: Estimated body mass index is 30.1 kg/m  as calculated from the following:    Height as of this encounter: 1.93 m (6' 4\").    Weight as of this encounter: 112.2 kg (247 lb 4.8 oz).      # Financial/Environmental Concerns: none  # Asthma: noted on problem list        Disposition Plan      Expected Discharge Date: 12/26/2023        Discharge Comments: IV ABX, Fevers, HFNC,  pending clinical progression.            Avery Barraza DO  Hospitalist Service  [facility]  Securely message with [employer] (more info)  Text page via [employer] Paging/Directory   ______________________________________________________________________    Interval History   Some night sweats.  Denies fever, chills, nausea, chest pain.     Physical Exam   Vital Signs: Temp: 97.5  F (36.4  C) Temp src: Oral BP: 105/63 Pulse: 56   Resp: 18 SpO2: 95 % O2 Device: High Flow Nasal Cannula (HFNC) Oxygen Delivery: 40 LPM  Weight: 247 lbs 4.8 oz    GENERAL:  Alert, appears comfortable, in no acute distress, appears stated age, obese   HEAD:  Normocephalic, without obvious abnormality, atraumatic   NECK: Supple, symmetrical, trachea midline   BACK:   Symmetric, no curvature, ROM normal   LUNGS:   Coarse breath sounds bilaterally, no rales, rhonchi, or wheezing, symmetric chest rise on inhalation, respirations " unlabored   CHEST WALL:  No tenderness or deformity   HEART:  Regular rate and rhythm, S1 and S2 normal, no murmur    ABDOMEN:   Soft, non-tender, bowel sounds active , no masses, no organomegaly, no rebound or guarding   EXTREMITIES: Trace BLE edema    SKIN: No rashes in the visualized areas   NEURO: Alert, oriented x3, moves all four extremities freely   PSYCH: Cooperative, behavior is appropriate        Medical Decision Making       40 MINUTES SPENT BY ME on the date of service doing chart review, history, exam, documentation & further activities per the note.      Data     I have personally reviewed the following data over the past 24 hrs:    21.7 (H)  \   8.0 (L)   / 590 (H)     140 104 20.4 (H) /  167 (H)   3.8 27 1.07 \       Imaging results reviewed over the past 24 hrs:   Recent Results (from the past 24 hour(s))   XR Chest 1 View    Narrative    EXAM: XR CHEST 1 VIEW  LOCATION: [facility]  DATE: 12/24/2023    INDICATION: Pulmonary infiltrates.  COMPARISON: 11/12/2023.      Impression    IMPRESSION: Extensive left-sided infiltrates which are new. Right-sided infiltrates have nearly resolved since comparison.

## 2023-12-25 LAB
ANION GAP SERPL CALCULATED.3IONS-SCNC: 13 MMOL/L (ref 7–15)
BACTERIA BLD CULT: NO GROWTH
BACTERIA BLD CULT: NO GROWTH
BASOPHILS # BLD AUTO: 0 10E3/UL (ref 0–0.2)
BASOPHILS NFR BLD AUTO: 0 %
BUN SERPL-MCNC: 23.3 MG/DL (ref 6–20)
CALCIUM SERPL-MCNC: 9.1 MG/DL (ref 8.6–10)
CHLORIDE SERPL-SCNC: 103 MMOL/L (ref 98–107)
CREAT SERPL-MCNC: 1.14 MG/DL (ref 0.67–1.17)
DEPRECATED HCO3 PLAS-SCNC: 26 MMOL/L (ref 22–29)
EGFRCR SERPLBLD CKD-EPI 2021: 75 ML/MIN/1.73M2
EOSINOPHIL # BLD AUTO: 0 10E3/UL (ref 0–0.7)
EOSINOPHIL NFR BLD AUTO: 0 %
ERYTHROCYTE [DISTWIDTH] IN BLOOD BY AUTOMATED COUNT: 19.2 % (ref 10–15)
GLUCOSE BLDC GLUCOMTR-MCNC: 185 MG/DL (ref 70–99)
GLUCOSE SERPL-MCNC: 189 MG/DL (ref 70–99)
HCT VFR BLD AUTO: 28.7 % (ref 40–53)
HGB BLD-MCNC: 8.9 G/DL (ref 13.3–17.7)
IMM GRANULOCYTES # BLD: 1 10E3/UL
IMM GRANULOCYTES NFR BLD: 4 %
LYMPHOCYTES # BLD AUTO: 0.8 10E3/UL (ref 0.8–5.3)
LYMPHOCYTES NFR BLD AUTO: 4 %
MAGNESIUM SERPL-MCNC: 2.2 MG/DL (ref 1.7–2.3)
MCH RBC QN AUTO: 28.2 PG (ref 26.5–33)
MCHC RBC AUTO-ENTMCNC: 31 G/DL (ref 31.5–36.5)
MCV RBC AUTO: 91 FL (ref 78–100)
MONOCYTES # BLD AUTO: 0.5 10E3/UL (ref 0–1.3)
MONOCYTES NFR BLD AUTO: 2 %
NEUTROPHILS # BLD AUTO: 20.6 10E3/UL (ref 1.6–8.3)
NEUTROPHILS NFR BLD AUTO: 90 %
NRBC # BLD AUTO: 0 10E3/UL
NRBC BLD AUTO-RTO: 0 /100
PLATELET # BLD AUTO: 710 10E3/UL (ref 150–450)
POTASSIUM SERPL-SCNC: 3.6 MMOL/L (ref 3.4–5.3)
POTASSIUM SERPL-SCNC: 3.8 MMOL/L (ref 3.4–5.3)
RBC # BLD AUTO: 3.16 10E6/UL (ref 4.4–5.9)
S PNEUM AG SPEC QL: NEGATIVE
SODIUM SERPL-SCNC: 142 MMOL/L (ref 135–145)
WBC # BLD AUTO: 22.9 10E3/UL (ref 4–11)

## 2023-12-25 PROCEDURE — 120N000013 HC R&B IMCU

## 2023-12-25 PROCEDURE — 250N000011 HC RX IP 250 OP 636: Performed by: INTERNAL MEDICINE

## 2023-12-25 PROCEDURE — 99233 SBSQ HOSP IP/OBS HIGH 50: CPT | Performed by: INTERNAL MEDICINE

## 2023-12-25 PROCEDURE — 36415 COLL VENOUS BLD VENIPUNCTURE: CPT | Performed by: INTERNAL MEDICINE

## 2023-12-25 PROCEDURE — 87899 AGENT NOS ASSAY W/OPTIC: CPT | Performed by: INTERNAL MEDICINE

## 2023-12-25 PROCEDURE — 80048 BASIC METABOLIC PNL TOTAL CA: CPT | Performed by: INTERNAL MEDICINE

## 2023-12-25 PROCEDURE — 999N000157 HC STATISTIC RCP TIME EA 10 MIN

## 2023-12-25 PROCEDURE — 94640 AIRWAY INHALATION TREATMENT: CPT | Mod: 76

## 2023-12-25 PROCEDURE — 85025 COMPLETE CBC W/AUTO DIFF WBC: CPT | Performed by: INTERNAL MEDICINE

## 2023-12-25 PROCEDURE — 84132 ASSAY OF SERUM POTASSIUM: CPT | Performed by: INTERNAL MEDICINE

## 2023-12-25 PROCEDURE — 250N000013 HC RX MED GY IP 250 OP 250 PS 637: Performed by: FAMILY MEDICINE

## 2023-12-25 PROCEDURE — 250N000009 HC RX 250: Performed by: FAMILY MEDICINE

## 2023-12-25 PROCEDURE — 94640 AIRWAY INHALATION TREATMENT: CPT

## 2023-12-25 PROCEDURE — 99232 SBSQ HOSP IP/OBS MODERATE 35: CPT | Performed by: INTERNAL MEDICINE

## 2023-12-25 PROCEDURE — 83735 ASSAY OF MAGNESIUM: CPT | Performed by: INTERNAL MEDICINE

## 2023-12-25 RX ADMIN — ESCITALOPRAM OXALATE 20 MG: 20 TABLET ORAL at 12:36

## 2023-12-25 RX ADMIN — FERROUS SULFATE TAB 325 MG (65 MG ELEMENTAL FE) 325 MG: 325 (65 FE) TAB at 12:36

## 2023-12-25 RX ADMIN — FLUTICASONE PROPIONATE 1 PUFF: 110 AEROSOL, METERED RESPIRATORY (INHALATION) at 19:57

## 2023-12-25 RX ADMIN — IPRATROPIUM BROMIDE AND ALBUTEROL SULFATE 3 ML: .5; 3 SOLUTION RESPIRATORY (INHALATION) at 20:10

## 2023-12-25 RX ADMIN — CLONAZEPAM 0.5 MG: 0.5 TABLET ORAL at 19:54

## 2023-12-25 RX ADMIN — METHYLPREDNISOLONE SODIUM SUCCINATE 62.5 MG: 125 INJECTION INTRAMUSCULAR; INTRAVENOUS at 12:47

## 2023-12-25 RX ADMIN — SIMVASTATIN 40 MG: 20 TABLET, FILM COATED ORAL at 22:02

## 2023-12-25 RX ADMIN — MIRTAZAPINE 15 MG: 15 TABLET, FILM COATED ORAL at 22:02

## 2023-12-25 RX ADMIN — QUETIAPINE FUMARATE 25 MG: 25 TABLET ORAL at 08:50

## 2023-12-25 RX ADMIN — PIPERACILLIN AND TAZOBACTAM 3.38 G: 3; .375 INJECTION, POWDER, LYOPHILIZED, FOR SOLUTION INTRAVENOUS at 23:59

## 2023-12-25 RX ADMIN — APIXABAN 5 MG: 5 TABLET, FILM COATED ORAL at 08:50

## 2023-12-25 RX ADMIN — IPRATROPIUM BROMIDE AND ALBUTEROL SULFATE 3 ML: .5; 3 SOLUTION RESPIRATORY (INHALATION) at 12:08

## 2023-12-25 RX ADMIN — QUETIAPINE FUMARATE 50 MG: 50 TABLET ORAL at 22:02

## 2023-12-25 RX ADMIN — IPRATROPIUM BROMIDE AND ALBUTEROL SULFATE 3 ML: .5; 3 SOLUTION RESPIRATORY (INHALATION) at 08:36

## 2023-12-25 RX ADMIN — IPRATROPIUM BROMIDE AND ALBUTEROL SULFATE 3 ML: .5; 3 SOLUTION RESPIRATORY (INHALATION) at 16:09

## 2023-12-25 RX ADMIN — FLUTICASONE PROPIONATE 1 PUFF: 110 AEROSOL, METERED RESPIRATORY (INHALATION) at 08:36

## 2023-12-25 RX ADMIN — IPRATROPIUM BROMIDE AND ALBUTEROL SULFATE 3 ML: .5; 3 SOLUTION RESPIRATORY (INHALATION) at 00:17

## 2023-12-25 RX ADMIN — CLONAZEPAM 0.5 MG: 0.5 TABLET ORAL at 08:50

## 2023-12-25 RX ADMIN — PIPERACILLIN AND TAZOBACTAM 3.38 G: 3; .375 INJECTION, POWDER, LYOPHILIZED, FOR SOLUTION INTRAVENOUS at 16:03

## 2023-12-25 RX ADMIN — PIPERACILLIN AND TAZOBACTAM 3.38 G: 3; .375 INJECTION, POWDER, LYOPHILIZED, FOR SOLUTION INTRAVENOUS at 01:00

## 2023-12-25 RX ADMIN — METHYLPREDNISOLONE SODIUM SUCCINATE 62.5 MG: 125 INJECTION INTRAMUSCULAR; INTRAVENOUS at 19:54

## 2023-12-25 RX ADMIN — IPRATROPIUM BROMIDE AND ALBUTEROL SULFATE 3 ML: .5; 3 SOLUTION RESPIRATORY (INHALATION) at 04:16

## 2023-12-25 RX ADMIN — APIXABAN 5 MG: 5 TABLET, FILM COATED ORAL at 19:54

## 2023-12-25 RX ADMIN — QUETIAPINE FUMARATE 25 MG: 25 TABLET ORAL at 15:13

## 2023-12-25 RX ADMIN — AMIODARONE HYDROCHLORIDE 200 MG: 200 TABLET ORAL at 08:49

## 2023-12-25 RX ADMIN — DILTIAZEM HYDROCHLORIDE 300 MG: 120 CAPSULE, EXTENDED RELEASE ORAL at 08:49

## 2023-12-25 RX ADMIN — METHYLPREDNISOLONE SODIUM SUCCINATE 62.5 MG: 125 INJECTION INTRAMUSCULAR; INTRAVENOUS at 03:38

## 2023-12-25 RX ADMIN — PIPERACILLIN AND TAZOBACTAM 3.38 G: 3; .375 INJECTION, POWDER, LYOPHILIZED, FOR SOLUTION INTRAVENOUS at 08:50

## 2023-12-25 ASSESSMENT — ACTIVITIES OF DAILY LIVING (ADL)
ADLS_ACUITY_SCORE: 20

## 2023-12-25 NOTE — PROGRESS NOTES
PULMONARY PROGRESS NOTE    Date / Time of Admission:  12/20/2023  4:29 AM  Assessment:   58yoM with history of melanoma and pembrolizumab-induced pneumonitis recently admitted for hypoxia now readmitted after steroid taper with worsening hypoxia and new infiltrate.presentation is concerning for organizing pneumonia since this occurred in setting of prednisone taper.     Acute respiratory failure with hypoxia - worsening after rapid taper of prednisone.  Pembrolizumab induced pneumonitis. Of note, pt is also on amiodarone, but this appears to have started after he developed his CT chest findings from Pembrolizumab.   Possible cryptogenic organizing pneumonia  Sepsis  Elevated Fungitell - ? Significance.   History of PE - on apixaban      Plan:     -Methylprednisolone as this appears to be an autoimmune process that worsens as prednisone is tapered.  We will continue for now and would plan to have a prolonged taper of 3 to 6 months.  -May require steroid sparing agent in future  -Antibiotics- completed azithromycin. Now on Zosyn.  -leukocytosis likely due to steroids. Procalcitonin negative a few days ago. Will send a repeat procalcitonin.  -Hold off antifungals in the presence of positive fungitel as pt does is clinically improving, although slowly.  -Lasix 40 mg iv x 1.  -Weaning High-flow as able, currently 40L, 50%   -Follow up Swallow evaluation   -Continue apixaban for afib/PE.  -if no improvement over next few days, would reach out to cardiology to see if can stop amiodarone.       Subjective:   HPI:  Jim Titus is a 58 year old male with  afib, PE on elliquis, melanoma previously on pembrolizumab last given September 2023 with ongoing pneumonitis presented with acute hypoxic respiratory failure requiring intubation, extubated and discharge on steroid taper who returns with PNA and acute resp failure. CTA found resolution of PE but new area of infiltrate and resolved area of previous infiltrate.  "        Allergies:   Allergies   Allergen Reactions    Chicken [Chicken Protein] Other (See Comments)     Throat closes to turkey as well.    Pembrolizumab Other (See Comments)     Severe pneumonitis    Wellbutrin [Bupropion] Unknown    Grass Pollen [Grass] Rash    Turkey [Poultry Meal] Rash        MEDS:  Scheduled Meds:   amiodarone  200 mg Oral Daily    apixaban ANTICOAGULANT  5 mg Oral BID    clonazePAM  0.5 mg Oral BID    diltiazem ER COATED BEADS  300 mg Oral Daily    escitalopram  20 mg Oral Daily with lunch    ferrous sulfate  325 mg Oral Daily with lunch    fluticasone  1 puff Inhalation BID    ipratropium - albuterol 0.5 mg/2.5 mg/3 mL  3 mL Nebulization Q4H    methylPREDNISolone  62.5 mg Intravenous Q8H    mirtazapine  15 mg Oral At Bedtime    piperacillin-tazobactam  3.375 g Intravenous Q8H    polyethylene glycol  17 g Oral Daily    QUEtiapine  25 mg Oral BID    QUEtiapine  50 mg Oral At Bedtime    simvastatin  40 mg Oral At Bedtime    sodium chloride (PF)  3 mL Intracatheter Q8H     Continuous Infusions:   - MEDICATION INSTRUCTIONS -       PRN Meds:.acetaminophen **OR** acetaminophen, acetaminophen, albuterol, calcium carbonate, guaiFENesin-codeine, lidocaine 4%, lidocaine (buffered or not buffered), loperamide, loratadine, melatonin, ondansetron **OR** ondansetron, - MEDICATION INSTRUCTIONS -, senna-docusate **OR** senna-docusate, sodium chloride (PF)    Objective:   VITALS:  /65 (BP Location: Left arm)   Pulse 64   Temp 97.6  F (36.4  C) (Oral)   Resp 18   Ht 1.93 m (6' 4\")   Wt 105.4 kg (232 lb 6.4 oz)   SpO2 95%   BMI 28.29 kg/m    EXAM:    GENERAL APPEARANCE: Alert, no acute distress  HENT: Oral mucosa and posterior oropharynx normal, moist mucous membranes  NECK: No adenopathy,masses or thyromegaly  RESP: rales bilaterally  CV: irregularly irregular, S1 and S2  ABDOMEN: normal bowel sounds, soft, nontender, no hepatosplenomegaly or other masses  LYMPHATICS: No cervical, or " supraclavicular adenopathy  SKIN: no suspicious lesions or rashes  NEURO: Alert, oriented x 3, speech and mentation normal        I&O:    Date 12/22/23 0700 - 12/23/23 0659   Shift [id number] 0885-7471 24 Hour Total   INTAKE   P.O. 480   480   Shift Total(mL/kg) 480(4.3)   480(4.3)   OUTPUT   Shift Total(mL/kg)       Weight (kg) 111.7 111.7 111.7 111.7       Data Review:    Imaging: personally reviewed  Chest CT  EXAM: CT CHEST PULMONARY EMBOLISM W CONTRAST  LOCATION: [facility]  DATE: 12/20/2023     INDICATION: Shortness of breath  COMPARISON: 11/13/2023  TECHNIQUE: CT chest pulmonary angiogram during arterial phase injection of IV contrast. Multiplanar reformats and MIP reconstructions were performed. Dose reduction techniques were used.   CONTRAST: [id number] 75ml     FINDINGS:  ANGIOGRAM CHEST: Previously noted left basilar pulmonary emboli not seen today. No pulmonary emboli noted today. No evidence for right ventricular heart strain. Thoracic aorta is negative for dissection. No CT evidence of right heart strain.     LUNGS AND PLEURA: Extensive patchy left lung infiltrate and atelectasis has developed since prior examination. Moderate clearing of extensive patchy right lung infiltrate. Mild residual right upper lobe infiltrate/atelectasis remains. Moderate to marked   elevation right hemidiaphragm unchanged.     MEDIASTINUM/AXILLAE: No lymphadenopathy. Normal esophagus. No significant pericardial effusion. Ectasia of the ascending thoracic aorta.     CORONARY ARTERY CALCIFICATION: Mild.     UPPER ABDOMEN: Unremarkable     MUSCULOSKELETAL: No concerning osseous abnormalities.                                                                      IMPRESSION:  1.  Previously noted left basilar pulmonary emboli not seen today. No pulmonary emboli noted today. No evidence for right ventricular heart strain.     2.  Extensive patchy left lung infiltrate and atelectasis has developed  since prior examination.      3.  Moderate clearing of extensive patchy right lung infiltrate. Mild residual right upper lobe infiltrate/atelectasis remains. Moderate to marked elevation right hemidiaphragm unchanged.     4.  Ectasia of the ascending thoracic aorta stable.    Results for orders placed or performed during the hospital encounter of 12/20/23   Basic metabolic panel   Result Value Ref Range    Sodium 142 135 - 145 mmol/L    Potassium 3.8 3.4 - 5.3 mmol/L    Chloride 103 98 - 107 mmol/L    Carbon Dioxide (CO2) 26 22 - 29 mmol/L    Anion Gap 13 7 - 15 mmol/L    Urea Nitrogen 23.3 (H) 6.0 - 20.0 mg/dL    Creatinine 1.14 0.67 - 1.17 mg/dL    GFR Estimate 75 >60 mL/min/1.73m2    Calcium 9.1 8.6 - 10.0 mg/dL    Glucose 189 (H) 70 - 99 mg/dL     Lab Results   Component Value Date    WBC 22.9 (H) 12/25/2023    HGB 8.9 (L) 12/25/2023    HCT 28.7 (L) 12/25/2023    MCV 91 12/25/2023     (H) 12/25/2023

## 2023-12-25 NOTE — PLAN OF CARE
Problem: Gas Exchange Impaired  Goal: Optimal Gas Exchange  Outcome: Progressing    Problem: Infection  Goal: Absence of Infection Signs and Symptoms  Outcome: Progressing     Pt resting between cares, no events. HFNC set at 40 LPM/50%, saturation remains stable. Tele reading NSR. Sputum sample sent to lab, reports somewhat productive cough, denies SOB. SBA to bathroom. Denies pain, n/v, n/t. Plan to discharge home in upcoming days, continue to attempt to wean down oxygen as able.

## 2023-12-25 NOTE — PROGRESS NOTES
Brief ID Follow-up Note    Chart reviewed. Case d/w Dr. Caldwell.    No change in recommendations     Will follow    Payam Glez MD  [facility]  Direct messaging: [facility] Paging   On-Call ID provider: [phone], option: 9

## 2023-12-25 NOTE — PROGRESS NOTES
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  Requiring oxygen via high flow nasal canula.  BAL 11/3 without growth.  Pulmonolgy concerned for organizing pneumonia.   A-fib with RVR, started on diltiazem and amiodarone.  Has extensive patchy left lung infiltrate and atelectasis and moderate clearing of extensive patchy right lung infiltrate on CT scan.  Started on IV Zosyn, vancomycin and Zithromax.  Sputum culture is pending.  Evaluated by pulmonology and infectious disease.      Acute respiratory failure with hypoxia  Keytruda pneumonitis  ? Organizing pneumonia.   Recent hospitalization twice for pneumonia, Keytruda pneumonitis, small pulmonary embolism  CT--Extensive patchy left lung infiltrate and atelectasis has developed. Moderate clearing of extensive patchy right lung infiltrate.   BAL 11/3 without growth  COVID, influenza, RSV are negative  Respiratory panel negative.   Blood culture NGTD  Sputum culture mixed dustin.   Fungitell positive.   WBC 14.6->22 likely degranulation from steroid administration.  Continue azithromycin and Zosyn per ID  Methylprednisolone 62.5 mg q8h.  Albuterol q6h prn.  Appreciate Pulmonology and ID consultation      Bilateral pulmonary emboli  Continue Eliqus 5 mg BID     Iron deficiency anemia  Microcytic anemia  Hemoglobin 6.7, Status post 2 U PRBC on 12/22.   now stable at 8.9.   Ferrous sulfate 325 mg daily      A-fib with RVR  Heart rate is stable  Diltiazem 300 mg daily  Amiodarone 200 mg daily  On chronic anticoagulation treatment with Eliquis     Acute kidney  "injury,   creatinine 1.38-->1.29->1.1  Improved.     Melanoma  Seeing [place] [facility]   S/P 6 doses of pembrolizumab 200 mg IV every 3 weeks.  Last dose given 9/15/2023.     Moderate obesity Body mass index is 29.82 kg/m .  Modification of lifestyle for weight loss          Diet: Regular Diet Adult    DVT Prophylaxis: DOAC  Lamas Catheter: Not present  Lines: None     Cardiac Monitoring: ACTIVE order. Indication: Afib  Code Status: Full Code      Clinically Significant Risk Factors              # Hypoalbuminemia: Lowest albumin = 3.1 g/dL at 12/20/2023  5:00 AM, will monitor as appropriate            # Overweight: Estimated body mass index is 28.29 kg/m  as calculated from the following:    Height as of this encounter: 1.93 m (6' 4\").    Weight as of this encounter: 105.4 kg (232 lb 6.4 oz).      # Financial/Environmental Concerns: none  # Asthma: noted on problem list        Disposition Plan      Expected Discharge Date: 12/27/2023        Discharge Comments: IV ABX, Night Sweats, HFNC,  pending clinical progression.            Avery Barraza DO  Hospitalist Service  [facility]  Securely message with [employer] (more info)  Text page via [facility] Paging/Directory   ______________________________________________________________________    Interval History   Maximiliano reports no new complaints.  Continues on HFNC.  Denies chest pain, shortness of breath or fever.     Physical Exam   Vital Signs: Temp: 97.7  F (36.5  C) Temp src: Oral BP: 112/65 Pulse: 63   Resp: 18 SpO2: 91 % O2 Device: High Flow Nasal Cannula (HFNC) Oxygen Delivery: 40 LPM  Weight: 232 lbs 6.4 oz    GENERAL:  Alert, appears comfortable, in no acute distress, appears stated age, obese   HEAD:  Normocephalic, without obvious abnormality, atraumatic   NECK: Supple, symmetrical, trachea midline   BACK:   Symmetric, no curvature, ROM normal   LUNGS:   Coarse breath sounds bilaterally, no rales, rhonchi, or wheezing, symmetric chest " rise on inhalation, respirations unlabored   CHEST WALL:  No tenderness or deformity   HEART:  Regular rate and rhythm, S1 and S2 normal, no murmur    ABDOMEN:   Soft, non-tender, bowel sounds active , no masses, no organomegaly, no rebound or guarding   EXTREMITIES: Trace BLE edema    SKIN: No rashes in the visualized areas   NEURO: Alert, oriented x3, moves all four extremities freely   PSYCH: Cooperative, behavior is appropriate        Medical Decision Making       40 MINUTES SPENT BY ME on the date of service doing chart review, history, exam, documentation & further activities per the note.      Data     I have personally reviewed the following data over the past 24 hrs:    22.9 (H)  \   8.9 (L)   / 710 (H)     142 103 23.3 (H) /  189 (H)   3.6; 3.8 26 1.14 \       Imaging results reviewed over the past 24 hrs:   No results found for this or any previous visit (from the past 24 hour(s)).

## 2023-12-25 NOTE — PLAN OF CARE
Goal Outcome Evaluation:  Pt remains on HFNC 40L, 43%, titrating as able. Encouraging pulmonary toilet/IS/ flutter valve. SB/SR on monitor. Pt denies FERRARI but does desat to low 83% if FiO2 not increased. Remains on IV zosyn. Hgb stable.    Caryn Brewer RN    Plan of Care Reviewed With: patient           Problem: Gas Exchange Impaired  Goal: Optimal Gas Exchange  Outcome: Progressing  Intervention: Optimize Oxygenation and Ventilation  Recent Flowsheet Documentation  Taken 12/25/2023 1601 by Caryn Brewer, REY  Head of Bed (HOB) Positioning: HOB at 20-30 degrees  Taken 12/25/2023 1200 by Caryn Brewer RN  Head of Bed (HOB) Positioning: HOB at 20-30 degrees  Taken 12/25/2023 0845 by Caryn Brewer RN  Head of Bed (HOB) Positioning: HOB at 20-30 degrees     Problem: Infection  Goal: Absence of Infection Signs and Symptoms  Outcome: Progressing

## 2023-12-25 NOTE — PROGRESS NOTES
Received pt on HFNC 40LPM/50%, pt now 45%.  Duoneb given inline as ordered.  O2 sats low to mid 90's, BS diminihsed.  Will continue to monitor pt.

## 2023-12-25 NOTE — PROVIDER NOTIFICATION
12/25/23 0416   Tech Time   $Tech Time (10 minute increments) 2   Vital Signs   Resp 18   Pulse Rate Source Monitor   Oximeter Heart Rate 58 bpm   Patient Position Semi-Chappell's   Oxygen Therapy   Daily Review of Necessity (O2 Therapy) completed   Flow (L/min) (Oxygen Therapy) 40   Oxygen Concentration (%) 50   Device (Oxygen Therapy) high-flow nasal cannula   Heater Temperature ( C) 31   Oxygen Therapy   SpO2 91 %   O2 Device High Flow Nasal Cannula (HFNC)   FiO2 (%) 50 %   Oxygen Delivery 40 LPM   Assessment   Respiratory WDL WDL   Cough Frequency infrequent   Cough Type dry   Airway Safety Measures all equipment/monitors on and audible   Breath Sounds   Breath Sounds All Fields   All Lung Fields Breath Sounds Anterior:;Lateral:;clear;diminished   LLL Breath Sounds clear;diminished   RUL Breath Sounds clear;diminished   RML Breath Sounds clear;diminished   RLL Breath Sounds clear;diminished   Nebulizer Assessment & Treatment   $RT Use ONLY Delivery Method Nebulizer - Additional   Daily Review of Necessity (SVN) completed   Nebulizer Device In line   Pretreatment Heart Rate (beats/min) 58   Pretreatment Resp Rate (breaths/min) 18   Pretreatment O2 sats - (TCU only) 91   Pretreat Breath Sounds - Bilat - All Lobes diminished;clear   Pretreat Breath Sounds - JENNA diminished;clear   Pretreat Breath Sounds - LLL diminished;clear   Pretreat Breath Sounds - RUL diminished;clear   Pretreat Breath Sounds - RML diminished;clear   Pretreat Breath Sounds - RLL diminished;clear   Patient Position HOB elevated   Respiratory Treatment Status (SVN) given   Breath Sounds Post-Respiratory Treatment   Posttreatment Heart Rate (beats/min) 57   Posttreatment Resp Rate (breaths/min) 18   Post treatment O2 Sats - (TCU only) 91   Posttreatment Assessment (SVN) breath sounds unchanged   Signs of Intolerance (SVN) none   Breath Sounds Posttreatment All Fields All Fields   Breath Sounds Posttreatment All Fields Anterior:;no change   Breath  Sounds Posttreatment JENNA Anterior:;clear;diminished   Breath Sounds Posttreatment LLL Anterior:;clear;diminished   Breath Sounds Posttreatment RUL Anterior:;clear;diminished   Breath Sounds Posttreatment RML Anterior:;clear;diminished   Breath Sounds Posttreatment RLL Anterior:;clear;diminished   RT Device Skin Assessment   Oxygen Delivery Device High Flow Nasal Cannula   Interface Nasal Mask - Medium   Ventilator Arm In Place No   Site Appearance neck circumference Clean and dry   Site Appearance nares (outer) Clean and dry   Site Appearance nares (inner) Clean and dry   Site Appearance lips Clean and dry   Site Appearance bridge of nose Clean and dry   Site Appearance of ears Clean and dry   Site Appearance occiput Clean and dry   Strap Tightness Finger Allowance between head and device strap   Device Skin Interventions Taken No adjustments needed     RT to follow as needed

## 2023-12-25 NOTE — PLAN OF CARE
Goal Outcome Evaluation: ongoing.       Plan of Care Reviewed With: patient    Overall Patient Progress: improvingOverall Patient Progress: improving     Pt alert and oriented. Denies chest pain. SOB with activity. Remains on HFNC wotj settings at 40L/50% with O2 sats around 90-98%. Denies pain. Voiding using urinal. Tele reads NSR. Urine sample sent.        Problem: Anemia  Goal: Anemia Symptom Improvement  Outcome: Progressing  Intervention: Monitor and Manage Anemia  Recent Flowsheet Documentation  Taken 12/25/2023 0351 by Gina Reynaga RN  Safety Promotion/Fall Prevention:   activity supervised   assistive device/personal items within reach   clutter free environment maintained   increased rounding and observation   increase visualization of patient   lighting adjusted   nonskid shoes/slippers when out of bed   mobility aid in reach   patient and family education   room door open   room near nurse's station   room organization consistent   safety round/check completed   supervised activity   toileting scheduled   treat underlying cause   treat reversible contributory factors  Taken 12/25/2023 0105 by Gina Reynaga RN  Safety Promotion/Fall Prevention:   activity supervised   assistive device/personal items within reach   clutter free environment maintained   increased rounding and observation   increase visualization of patient   lighting adjusted   nonskid shoes/slippers when out of bed   mobility aid in reach   patient and family education   room door open   room near nurse's station   room organization consistent   safety round/check completed   supervised activity   toileting scheduled   treat underlying cause   treat reversible contributory factors     Problem: Pain Acute  Goal: Optimal Pain Control and Function  Intervention: Prevent or Manage Pain  Recent Flowsheet Documentation  Taken 12/25/2023 0351 by Gina Reynaga RN  Medication Review/Management:   medications reviewed   high-risk  medications identified  Taken 12/25/2023 0105 by Gina Reynaga, RN  Medication Review/Management:   medications reviewed   high-risk medications identified     Problem: Gas Exchange Impaired  Goal: Optimal Gas Exchange  Outcome: Progressing  Intervention: Optimize Oxygenation and Ventilation  Recent Flowsheet Documentation  Taken 12/25/2023 0351 by Gina Reynaga, RN  Head of Bed (HOB) Positioning: HOB at 20-30 degrees  Taken 12/25/2023 0105 by Gina Reynaga, RN  Head of Bed (HOB) Positioning: HOB at 20-30 degrees

## 2023-12-26 LAB
BACTERIA SPT CULT: ABNORMAL
GRAM STAIN RESULT: ABNORMAL
MAGNESIUM SERPL-MCNC: 2.4 MG/DL (ref 1.7–2.3)
POTASSIUM SERPL-SCNC: 3.6 MMOL/L (ref 3.4–5.3)
PROCALCITONIN SERPL IA-MCNC: 0.06 NG/ML

## 2023-12-26 PROCEDURE — 250N000011 HC RX IP 250 OP 636: Performed by: INTERNAL MEDICINE

## 2023-12-26 PROCEDURE — 83735 ASSAY OF MAGNESIUM: CPT | Performed by: INTERNAL MEDICINE

## 2023-12-26 PROCEDURE — 99232 SBSQ HOSP IP/OBS MODERATE 35: CPT | Performed by: INTERNAL MEDICINE

## 2023-12-26 PROCEDURE — 120N000013 HC R&B IMCU

## 2023-12-26 PROCEDURE — 250N000013 HC RX MED GY IP 250 OP 250 PS 637: Performed by: FAMILY MEDICINE

## 2023-12-26 PROCEDURE — 999N000287 HC ICU ADULT ROUNDING, EACH 10 MINS

## 2023-12-26 PROCEDURE — 94640 AIRWAY INHALATION TREATMENT: CPT | Mod: 76

## 2023-12-26 PROCEDURE — 250N000009 HC RX 250

## 2023-12-26 PROCEDURE — 94640 AIRWAY INHALATION TREATMENT: CPT

## 2023-12-26 PROCEDURE — 36415 COLL VENOUS BLD VENIPUNCTURE: CPT | Performed by: INTERNAL MEDICINE

## 2023-12-26 PROCEDURE — 84145 PROCALCITONIN (PCT): CPT | Performed by: INTERNAL MEDICINE

## 2023-12-26 PROCEDURE — 999N000157 HC STATISTIC RCP TIME EA 10 MIN

## 2023-12-26 PROCEDURE — 84132 ASSAY OF SERUM POTASSIUM: CPT | Performed by: INTERNAL MEDICINE

## 2023-12-26 PROCEDURE — 250N000009 HC RX 250: Performed by: FAMILY MEDICINE

## 2023-12-26 RX ORDER — IPRATROPIUM BROMIDE AND ALBUTEROL SULFATE 2.5; .5 MG/3ML; MG/3ML
3 SOLUTION RESPIRATORY (INHALATION)
Status: DISCONTINUED | OUTPATIENT
Start: 2023-12-27 | End: 2024-01-01

## 2023-12-26 RX ORDER — ALBUTEROL SULFATE 0.83 MG/ML
SOLUTION RESPIRATORY (INHALATION)
Status: COMPLETED
Start: 2023-12-26 | End: 2023-12-26

## 2023-12-26 RX ADMIN — METHYLPREDNISOLONE SODIUM SUCCINATE 62.5 MG: 125 INJECTION INTRAMUSCULAR; INTRAVENOUS at 11:03

## 2023-12-26 RX ADMIN — FLUTICASONE PROPIONATE 1 PUFF: 110 AEROSOL, METERED RESPIRATORY (INHALATION) at 22:00

## 2023-12-26 RX ADMIN — IPRATROPIUM BROMIDE AND ALBUTEROL SULFATE 3 ML: .5; 3 SOLUTION RESPIRATORY (INHALATION) at 11:12

## 2023-12-26 RX ADMIN — DILTIAZEM HYDROCHLORIDE 300 MG: 120 CAPSULE, EXTENDED RELEASE ORAL at 09:22

## 2023-12-26 RX ADMIN — IPRATROPIUM BROMIDE AND ALBUTEROL SULFATE 3 ML: .5; 3 SOLUTION RESPIRATORY (INHALATION) at 15:31

## 2023-12-26 RX ADMIN — METHYLPREDNISOLONE SODIUM SUCCINATE 62.5 MG: 125 INJECTION INTRAMUSCULAR; INTRAVENOUS at 20:05

## 2023-12-26 RX ADMIN — FERROUS SULFATE TAB 325 MG (65 MG ELEMENTAL FE) 325 MG: 325 (65 FE) TAB at 12:40

## 2023-12-26 RX ADMIN — IPRATROPIUM BROMIDE AND ALBUTEROL SULFATE 3 ML: .5; 3 SOLUTION RESPIRATORY (INHALATION) at 19:38

## 2023-12-26 RX ADMIN — ESCITALOPRAM OXALATE 20 MG: 20 TABLET ORAL at 12:40

## 2023-12-26 RX ADMIN — IPRATROPIUM BROMIDE AND ALBUTEROL SULFATE 3 ML: .5; 3 SOLUTION RESPIRATORY (INHALATION) at 07:19

## 2023-12-26 RX ADMIN — METHYLPREDNISOLONE SODIUM SUCCINATE 62.5 MG: 125 INJECTION INTRAMUSCULAR; INTRAVENOUS at 03:58

## 2023-12-26 RX ADMIN — CLONAZEPAM 0.5 MG: 0.5 TABLET ORAL at 20:14

## 2023-12-26 RX ADMIN — IPRATROPIUM BROMIDE AND ALBUTEROL SULFATE 3 ML: .5; 3 SOLUTION RESPIRATORY (INHALATION) at 04:39

## 2023-12-26 RX ADMIN — PIPERACILLIN AND TAZOBACTAM 3.38 G: 3; .375 INJECTION, POWDER, LYOPHILIZED, FOR SOLUTION INTRAVENOUS at 15:52

## 2023-12-26 RX ADMIN — QUETIAPINE FUMARATE 25 MG: 25 TABLET ORAL at 09:23

## 2023-12-26 RX ADMIN — APIXABAN 5 MG: 5 TABLET, FILM COATED ORAL at 09:22

## 2023-12-26 RX ADMIN — QUETIAPINE FUMARATE 50 MG: 50 TABLET ORAL at 21:58

## 2023-12-26 RX ADMIN — IPRATROPIUM BROMIDE AND ALBUTEROL SULFATE 3 ML: .5; 3 SOLUTION RESPIRATORY (INHALATION) at 00:11

## 2023-12-26 RX ADMIN — QUETIAPINE FUMARATE 25 MG: 25 TABLET ORAL at 15:52

## 2023-12-26 RX ADMIN — CLONAZEPAM 0.5 MG: 0.5 TABLET ORAL at 09:22

## 2023-12-26 RX ADMIN — PIPERACILLIN AND TAZOBACTAM 3.38 G: 3; .375 INJECTION, POWDER, LYOPHILIZED, FOR SOLUTION INTRAVENOUS at 09:22

## 2023-12-26 RX ADMIN — AMIODARONE HYDROCHLORIDE 200 MG: 200 TABLET ORAL at 09:22

## 2023-12-26 RX ADMIN — MIRTAZAPINE 15 MG: 15 TABLET, FILM COATED ORAL at 21:58

## 2023-12-26 RX ADMIN — APIXABAN 5 MG: 5 TABLET, FILM COATED ORAL at 20:05

## 2023-12-26 RX ADMIN — ALBUTEROL SULFATE 2.5 MG: 2.5 SOLUTION RESPIRATORY (INHALATION) at 23:47

## 2023-12-26 RX ADMIN — FLUTICASONE PROPIONATE 1 PUFF: 110 AEROSOL, METERED RESPIRATORY (INHALATION) at 09:27

## 2023-12-26 RX ADMIN — PIPERACILLIN AND TAZOBACTAM 3.38 G: 3; .375 INJECTION, POWDER, LYOPHILIZED, FOR SOLUTION INTRAVENOUS at 23:43

## 2023-12-26 RX ADMIN — SIMVASTATIN 40 MG: 20 TABLET, FILM COATED ORAL at 21:58

## 2023-12-26 ASSESSMENT — ACTIVITIES OF DAILY LIVING (ADL)
ADLS_ACUITY_SCORE: 20

## 2023-12-26 NOTE — PROGRESS NOTES
"Patient remains on HF/NC 40-45%/40L. O2 titrated from 45% down to 40% and saturation is low 90's. Nebs given as scheduled. BS clear upper lobes and diminished bases. Blood pressure 118/77, pulse 85, temperature 98.4  F (36.9  C), temperature source Oral, resp. rate 18, height 1.93 m (6' 4\"), weight 105.4 kg (232 lb 6.4 oz), SpO2 94%.   Plan: will titrate the O2 as able.   "

## 2023-12-26 NOTE — CARE PLAN
Shift Events:     No acute events this shift.      Assessment:     Neuro: A/Ox4, Follows commands, PERRLA, Afebrile.     Respiratory: High flow NC 48L 40%    Cardiovascular: Tele NSR-SBs    GI/: Voiding via tilet. Diet: Regular. No BM this shift.    Lines/Drains: R) AC PIV    Pain: denied pain throughout shift.      Problem: Adult Inpatient Plan of Care  Goal: Optimal Comfort and Wellbeing  Outcome: Progressing     Problem: Gas Exchange Impaired  Goal: Optimal Gas Exchange  Outcome: Progressing  Intervention: Optimize Oxygenation and Ventilation  Recent Flowsheet Documentation  Taken 12/26/2023 0400 by Jennifer Castrejon RN  Head of Bed (HOB) Positioning: HOB at 20-30 degrees  Taken 12/26/2023 0000 by Jennifer Castrejon RN  Head of Bed (HOB) Positioning: HOB at 20-30 degrees  Taken 12/25/2023 2000 by Jennifer Castrejon RN  Head of Bed (HOB) Positioning: HOB at 20-30 degrees     Problem: Infection  Goal: Absence of Infection Signs and Symptoms  Outcome: Progressing     Problem: Fall Injury Risk  Goal: Absence of Fall and Fall-Related Injury  Outcome: Progressing  Intervention: Identify and Manage Contributors  Recent Flowsheet Documentation  Taken 12/26/2023 0400 by Jennifer Castrejon RN  Medication Review/Management:   medications reviewed   high-risk medications identified  Taken 12/26/2023 0000 by Jennifer Castrejon RN  Medication Review/Management:   medications reviewed   high-risk medications identified  Taken 12/25/2023 2000 by Jennifer Castrejon RN  Medication Review/Management:   medications reviewed   high-risk medications identified  Intervention: Promote Injury-Free Environment  Recent Flowsheet Documentation  Taken 12/26/2023 0400 by Jennifer Castrejon RN  Safety Promotion/Fall Prevention:   activity supervised   assistive device/personal items within reach   clutter free environment maintained   increased rounding and observation   increase visualization of patient   lighting adjusted   nonskid shoes/slippers when out of  bed   mobility aid in reach   patient and family education   room door open   room near nurse's station   room organization consistent   safety round/check completed   supervised activity   toileting scheduled   treat underlying cause   treat reversible contributory factors  Taken 12/26/2023 0000 by Jennifer Castrejon RN  Safety Promotion/Fall Prevention:   activity supervised   assistive device/personal items within reach   clutter free environment maintained   increased rounding and observation   increase visualization of patient   lighting adjusted   nonskid shoes/slippers when out of bed   mobility aid in reach   patient and family education   room door open   room near nurse's station   room organization consistent   safety round/check completed   supervised activity   toileting scheduled   treat underlying cause   treat reversible contributory factors  Taken 12/25/2023 2000 by Jennifer Castrejon RN  Safety Promotion/Fall Prevention:   activity supervised   assistive device/personal items within reach   clutter free environment maintained   increased rounding and observation   increase visualization of patient   lighting adjusted   nonskid shoes/slippers when out of bed   mobility aid in reach   patient and family education   room door open   room near nurse's station   room organization consistent   safety round/check completed   supervised activity   toileting scheduled   treat underlying cause   treat reversible contributory factors     Problem: Anemia  Goal: Anemia Symptom Improvement  Outcome: Progressing  Intervention: Monitor and Manage Anemia  Recent Flowsheet Documentation  Taken 12/26/2023 0400 by Jennifer Castrejon, RN  Safety Promotion/Fall Prevention:   activity supervised   assistive device/personal items within reach   clutter free environment maintained   increased rounding and observation   increase visualization of patient   lighting adjusted   nonskid shoes/slippers when out of bed   mobility aid in  reach   patient and family education   room door open   room near nurse's station   room organization consistent   safety round/check completed   supervised activity   toileting scheduled   treat underlying cause   treat reversible contributory factors  Taken 12/26/2023 0000 by Jennifer Castrejon RN  Safety Promotion/Fall Prevention:   activity supervised   assistive device/personal items within reach   clutter free environment maintained   increased rounding and observation   increase visualization of patient   lighting adjusted   nonskid shoes/slippers when out of bed   mobility aid in reach   patient and family education   room door open   room near nurse's station   room organization consistent   safety round/check completed   supervised activity   toileting scheduled   treat underlying cause   treat reversible contributory factors  Taken 12/25/2023 2000 by Jennifer Castrejon RN  Safety Promotion/Fall Prevention:   activity supervised   assistive device/personal items within reach   clutter free environment maintained   increased rounding and observation   increase visualization of patient   lighting adjusted   nonskid shoes/slippers when out of bed   mobility aid in reach   patient and family education   room door open   room near nurse's station   room organization consistent   safety round/check completed   supervised activity   toileting scheduled   treat underlying cause   treat reversible contributory factors   Goal Outcome Evaluation:

## 2023-12-26 NOTE — PROGRESS NOTES
Pulmonary Progress Note  12/26/2023      Admit Date: 12/20/2023  CODE: Full Code    Reason for Consult: acute respiratory failure with hypoxemia. Recurrent OP    Assessment/Plan:   58M w/ hx of melanoma s/p pembrolizumab with presumed pneumonitis (severe ARDS requiring intubation/proning/paralysis Nov 2023), finished steroids last month, now with recurrent organizing pneumonia. Migratory infiltrates on the CT chest support OP. Slowly improving with steroids.     Recommendations:  - continue HFNC, titrate for goal SpO2 94-96%, avoid hyperoxia  - encourage OOB, PT/OT, push IS  - continue 60mg IV methylpred q8h. Wiill need prolonged, SLOW steroid taper (3-6 months)  - continue duonebs, Flovent  - abx per ID  - will need TMP/SMX for PJP ppx while on steroid doses >20mg daily. This can be started once closer to discharge  - will need follow up arranged in pulmonary clinic with CT chest. This will be arranged once closer to discharge.    We'll continue to follow.     Jamie (Demian) MD El[place]  [facility]monary [facility] Critical [facility]  Pager [phone]  Clinic [phone]  Fax [phone]     Subjective/Interim Events:   He is feeling better. Able to recover quicker in terms of SpO2 when he goes to the bathroom.  HFNC 0.45, 45Lpm.   No hemoptysis.       Medications:      - MEDICATION INSTRUCTIONS -        amiodarone  200 mg Oral Daily    apixaban ANTICOAGULANT  5 mg Oral BID    clonazePAM  0.5 mg Oral BID    diltiazem ER COATED BEADS  300 mg Oral Daily    escitalopram  20 mg Oral Daily with lunch    ferrous sulfate  325 mg Oral Daily with lunch    fluticasone  1 puff Inhalation BID    ipratropium - albuterol 0.5 mg/2.5 mg/3 mL  3 mL Nebulization Q4H    methylPREDNISolone  62.5 mg Intravenous Q8H    mirtazapine  15 mg Oral At Bedtime    piperacillin-tazobactam  3.375 g Intravenous Q8H    polyethylene glycol  17 g Oral Daily    QUEtiapine  25 mg Oral BID    QUEtiapine  50 mg Oral At Bedtime     "simvastatin  40 mg Oral At Bedtime    sodium chloride (PF)  3 mL Intracatheter Q8H         Exam/Data:   Vitals  /75 (BP Location: Left arm)   Pulse 63   Temp 98.4  F (36.9  C) (Oral)   Resp 20   Ht 1.93 m (6' 4\")   Wt 105.4 kg (232 lb 6.4 oz)   SpO2 93%   BMI 28.29 kg/m    BP - Mean:  [65-94] 94  I/O last 3 completed shifts:  In: 2060 [P.O.:1860; I.V.:200]  Out: -   Weight change:   [unfilled]  FiO2 (%): 40 %  Resp: 20      EXAM:  Physical Exam  Gen: awake, alert, oriented, no distress  HEENT: NT, no DAVID  CV: RRR, no m/g/r  Resp: crackles on left. Fair air movement. Right lung mostly clear.   Abd: soft, nontender, BS+  Skin: no rashes or lesions  Ext: no edema  Neuro: PERRL, nonfocal exam    ROS:  A 10-system review was obtained and is negative with the exception of the symptoms noted above.    DATA:    PFT DATA none available    Micro  PCT wnl 12/26  Covid neg  Flu A/B neg  RVP neg  RSV neg  Sputum NF  Strep pneumo urine Ag neg  Legionella urine Ag neg  Blod neg  Fungitell assay +      IMAGING:   CT Chest Pulmonary Embolism w Contrast    Result Date: 12/20/2023  EXAM: CT CHEST PULMONARY EMBOLISM W CONTRAST LOCATION: [facility] DATE: 12/20/2023 INDICATION: Shortness of breath COMPARISON: 11/13/2023 TECHNIQUE: CT chest pulmonary angiogram during arterial phase injection of IV contrast. Multiplanar reformats and MIP reconstructions were performed. Dose reduction techniques were used. CONTRAST: [id number] 75ml FINDINGS: ANGIOGRAM CHEST: Previously noted left basilar pulmonary emboli not seen today. No pulmonary emboli noted today. No evidence for right ventricular heart strain. Thoracic aorta is negative for dissection. No CT evidence of right heart strain. LUNGS AND PLEURA: Extensive patchy left lung infiltrate and atelectasis has developed since prior examination. Moderate clearing of extensive patchy right lung infiltrate. Mild residual right upper lobe infiltrate/atelectasis " remains. Moderate to marked elevation right hemidiaphragm unchanged. MEDIASTINUM/AXILLAE: No lymphadenopathy. Normal esophagus. No significant pericardial effusion. Ectasia of the ascending thoracic aorta. CORONARY ARTERY CALCIFICATION: Mild. UPPER ABDOMEN: Unremarkable MUSCULOSKELETAL: No concerning osseous abnormalities.     IMPRESSION: 1.  Previously noted left basilar pulmonary emboli not seen today. No pulmonary emboli noted today. No evidence for right ventricular heart strain. 2.  Extensive patchy left lung infiltrate and atelectasis has developed since prior examination. 3.  Moderate clearing of extensive patchy right lung infiltrate. Mild residual right upper lobe infiltrate/atelectasis remains. Moderate to marked elevation right hemidiaphragm unchanged. 4.  Ectasia of the ascending thoracic aorta stable.

## 2023-12-26 NOTE — PROGRESS NOTES
"/69 (BP Location: Left arm)   Pulse 57   Temp 97.8  F (36.6  C) (Oral)   Resp 16   Ht 1.93 m (6' 4\")   Wt 105.4 kg (232 lb 6.4 oz)   SpO2 94%   BMI 28.29 kg/m        The PT has been maintained on on the HFNC this shift. Also, he was provided nebs. BS were generally clear, if not slightly diminished both pre and post. RT will follow as directed.   "

## 2023-12-26 NOTE — PROGRESS NOTES
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  Requiring oxygen via high flow nasal canula.  BAL 11/3 without growth.  Pulmonolgy concerned for organizing pneumonia.   Has extensive patchy left lung infiltrate and atelectasis and moderate clearing of extensive patchy right lung infiltrate on CT scan.     A-fib with RVR, started on diltiazem and amiodarone.       Pulmonology and infectious disease following.      Acute respiratory failure with hypoxia  Keytruda pneumonitis  Possible cryptogenic organizing pneumonia.   Recent hospitalization twice for pneumonia, Keytruda pneumonitis, small pulmonary embolism  CT--Extensive patchy left lung infiltrate and atelectasis has developed. Moderate clearing of extensive patchy right lung infiltrate.   BAL 11/3 without growth  COVID, influenza, RSV are negative  Respiratory panel negative.   Blood culture NGTD  Sputum culture from 12/24 with 1+ GPC.   Fungitell positive.   WBC 14.6->22 likely degranulation from steroid administration.    Continue azithromycin and Zosyn per ID  Methylprednisolone 62.5 mg q8h.  Albuterol q6h prn.  Appreciate Pulmonology and ID consultation      Bilateral pulmonary emboli  Continue Eliqus 5 mg BID     Iron deficiency anemia  Microcytic anemia  Hemoglobin 6.7, Status post 2 U PRBC on 12/22.   now stable at 8.9.   Ferrous sulfate 325 mg daily      A-fib with RVR  Heart rate is stable  Diltiazem 300 mg daily  Amiodarone 200 mg daily  On chronic anticoagulation treatment with Eliquis     Acute kidney injury, resolved.  creatinine  "1.38-->1.29->1.1  Improved.     Melanoma  Seeing [place] [facility]   S/P 6 doses of pembrolizumab 200 mg IV every 3 weeks.  Last dose given 9/15/2023.         Diet: Regular Diet Adult    DVT Prophylaxis: DOAC  Lamas Catheter: Not present  Lines: None     Cardiac Monitoring: ACTIVE order. Indication: Afib  Code Status: Full Code      Clinically Significant Risk Factors              # Hypoalbuminemia: Lowest albumin = 3.1 g/dL at 12/20/2023  5:00 AM, will monitor as appropriate            # Overweight: Estimated body mass index is 28.29 kg/m  as calculated from the following:    Height as of this encounter: 1.93 m (6' 4\").    Weight as of this encounter: 105.4 kg (232 lb 6.4 oz).      # Financial/Environmental Concerns: none  # Asthma: noted on problem list        Disposition Plan      Expected Discharge Date: 12/27/2023        Discharge Comments: IV ABX, Night Sweats, HFNC,  pending clinical progression.            Avery Barraza DO  Hospitalist Service  [facility]  Securely message with [employer] (more info)  Text page via [facility] Paging/Directory   ______________________________________________________________________    Interval History   Continues on HFNC at 40L/min, 40% FiO2. Saturating 91%.  No new complaints.     Physical Exam   Vital Signs: Temp: 97.8  F (36.6  C) Temp src: Oral BP: 109/69 Pulse: 54   Resp: 20 SpO2: 95 % O2 Device: High Flow Nasal Cannula (HFNC) Oxygen Delivery: 40 LPM  Weight: 232 lbs 6.4 oz    GENERAL:  Alert, appears comfortable, in no acute distress, appears stated age, obese   HEAD:  Normocephalic, without obvious abnormality, atraumatic   NECK: Supple, symmetrical, trachea midline   BACK:   Symmetric, no curvature, ROM normal   LUNGS:   Diminished breath sounds at bases, no rales, rhonchi, or wheezing, symmetric chest rise on inhalation, respirations unlabored   CHEST WALL:  No tenderness or deformity   HEART:  Regular rate and rhythm, S1 and S2 normal, no " murmur    ABDOMEN:   Soft, non-tender, bowel sounds active , no masses, no organomegaly, no rebound or guarding   EXTREMITIES: Trace BLE edema    SKIN: No rashes in the visualized areas   NEURO: Alert, oriented x3, moves all four extremities freely   PSYCH: Cooperative, behavior is appropriate        Medical Decision Making       40 MINUTES SPENT BY ME on the date of service doing chart review, history, exam, documentation & further activities per the note.      Data     I have personally reviewed the following data over the past 24 hrs:    N/A  \   N/A   / N/A     N/A N/A N/A /  N/A   3.6 N/A N/A \     Procal: 0.06 CRP: N/A Lactic Acid: N/A         Imaging results reviewed over the past 24 hrs:   No results found for this or any previous visit (from the past 24 hour(s)).

## 2023-12-26 NOTE — PROGRESS NOTES
Care Management Follow Up    Length of Stay (days): 6    Expected Discharge Date: 12/29/2023     Concerns to be Addressed:       Patient plan of care discussed at interdisciplinary rounds: Yes      Referrals Placed by CM/SW:    Private pay costs discussed: Not applicable    Additional Information:  Pt is not on O2 at baseline.     Family can transport at time of discharge.     CM will continue to follow.     Jose Piper RN

## 2023-12-26 NOTE — PLAN OF CARE
Problem: Gas Exchange Impaired  Goal: Optimal Gas Exchange  Outcome: Not Progressing  O2 sat monitoring. Patient on 40L /45% High flow and unable to wean any lower today.   Intervention: Optimize Oxygenation and Ventilation  Up in chair q shift and Coughing and deep breathing. IV Steroids. Nebulizer treatment.    Problem: Fall Injury Risk  Goal: Absence of Fall and Fall-Related Injury  Outcome: Progressing  No falls and steady on her feet. Calls for help when needs help.  Intervention: Promote Injury-Free Environment  Recent Flowsheet Documentation  Taken 12/26/2023 1230 by Astrid Dnoaldson, RN  Safety Promotion/Fall Prevention:   clutter free environment maintained   lighting adjusted   safety round/check completed     Problem: Anemia  Goal: Anemia Symptom Improvement  Outcome: Progressing  Hemoglobin stable and Patient skin color better. No signs of bleeding.  Intervention: Monitor and Manage Anemia  Recent Flowsheet Documentation  Taken 12/26/2023 1230 by Astrid Donaldson, RN  Safety Promotion/Fall Prevention:   clutter free environment maintained   lighting adjusted   safety round/check completed

## 2023-12-26 NOTE — PROGRESS NOTES
Infectious Diseases Progress Note  [facility]    Date of visit: 12/26/2023     ASSESSMENT   58-year-old man with a history of cutaneous melanoma.  Recently admitted for respiratory failure thought to be due to Keytruda pneumonitis.  Presenting now with worsening shortness of breath over the past week.    Respiratory failure.  Increasing shortness of breath.  Hypoxia on admission.  Fevers and leukocytosis.  CT scan showing improvement of right-sided infiltrates, but now with a new patchy left-sided infiltrates.  COVID and flu PCR negative. Respiratory viral panel negative. Blood cultures negative to date. Sputum with normal dustin. High fungitell noted. Started on steroids on 12/22  Keytruda pneumonitis.  Hospitalized last month for respiratory failure.  Treated with broad-spectrum antibiotics, but ultimately felt to be related to Keytruda pneumonitis.  Discharged with steroids with resolution of symptoms    Active Problems:    Acute respiratory failure with hypoxia (H)    Pneumonia due to infectious organism, unspecified laterality, unspecified part of lung    Sepsis, due to unspecified organism, unspecified whether acute organ dysfunction present (H)       PLAN   -continue Pipercillin/tazobactam, plan 7-days (until tomorrow)  -agree with trial of steroids, planned for prolonged course  -PJP prophylaxis with TMP/SMX SS daily since patient going to be on steroids for >1 month  -unclear significance of high fungitell. Will hold on starting anything new as long as he is improving    ID will sign-off. Call with questions    Payam Glez MD  [facility]  Direct messaging: [email or web address] Paging  On-Call ID provider: [phone], option: 9      ===========================================      SUBJECTIVE / INTERVAL HISTORY:     No events. Feels well. Still on HFNC, but titrating down.     Antibiotics     Zosyn 12/20-    Previous:  Azithromycin 12/20-12/24  Vancomycin 12/20-12/21      Physical  "Exam     Temp:  [97.5  F (36.4  C)-98.4  F (36.9  C)] 98.4  F (36.9  C)  Pulse:  [54-71] 71  Resp:  [16-20] 18  BP: (102-122)/(58-75) 122/75  FiO2 (%):  [45 %-50 %] 45 %  SpO2:  [91 %-95 %] 91 %    /75 (BP Location: Left arm)   Pulse 71   Temp 98.4  F (36.9  C) (Oral)   Resp 18   Ht 1.93 m (6' 4\")   Wt 105.4 kg (232 lb 6.4 oz)   SpO2 91%   BMI 28.29 kg/m      GENERAL:  well-developed, well-nourished, lying in bed in no acute distress.   HENT:  Head is normocephalic, atraumatic.   EYES:  Eyes have anicteric sclerae without conjunctival injection   LUNGS:  crackles bilaterally, decreased  CARDIOVASCULAR:  regular   SKIN:  No acute rashes.   NEUROLOGIC:  Grossly nonfocal.      Cultures   12/20 blood cultures x 2: no growth to date     Susceptibility data from last 90 days.  Collected Specimen Info Organism   12/24/23 Sputum from Expectorate Normal dustin   11/03/23 Bronchial Alveolar Lavage from Lung, Right Candida albicans   11/03/23 Bronchial Alveolar Lavage from Lung, Right Normal dustin            Pertinent Labs:     Recent Labs   Lab 12/25/23  0824 12/24/23  0628 12/23/23  1129   WBC 22.9* 21.7* 17.1*   HGB 8.9* 8.0* 8.7*   * 590* 610*       Recent Labs   Lab 12/25/23  0402 12/24/23  0628 12/23/23  0424 12/21/23  0611 12/20/23  0500    140 139   < > 134*   CO2 26 27 25   < > 22   BUN 23.3* 20.4* 17.8   < > 13.4   ALBUMIN  --   --   --   --  3.1*   ALKPHOS  --   --   --   --  374*   ALT  --   --   --   --  31   AST  --   --   --   --  27    < > = values in this interval not displayed.       Recent Labs   Lab 12/20/23  0739   CRPI [id number]*           Imaging:     CT Chest Pulmonary Embolism w Contrast    Result Date: 12/20/2023  EXAM: CT CHEST PULMONARY EMBOLISM W CONTRAST LOCATION: [facility] DATE: 12/20/2023 INDICATION: Shortness of breath COMPARISON: 11/13/2023 TECHNIQUE: CT chest pulmonary angiogram during arterial phase injection of IV contrast. Multiplanar " reformats and MIP reconstructions were performed. Dose reduction techniques were used. CONTRAST: [id number] 75ml FINDINGS: ANGIOGRAM CHEST: Previously noted left basilar pulmonary emboli not seen today. No pulmonary emboli noted today. No evidence for right ventricular heart strain. Thoracic aorta is negative for dissection. No CT evidence of right heart strain. LUNGS AND PLEURA: Extensive patchy left lung infiltrate and atelectasis has developed since prior examination. Moderate clearing of extensive patchy right lung infiltrate. Mild residual right upper lobe infiltrate/atelectasis remains. Moderate to marked elevation right hemidiaphragm unchanged. MEDIASTINUM/AXILLAE: No lymphadenopathy. Normal esophagus. No significant pericardial effusion. Ectasia of the ascending thoracic aorta. CORONARY ARTERY CALCIFICATION: Mild. UPPER ABDOMEN: Unremarkable MUSCULOSKELETAL: No concerning osseous abnormalities.     IMPRESSION: 1.  Previously noted left basilar pulmonary emboli not seen today. No pulmonary emboli noted today. No evidence for right ventricular heart strain. 2.  Extensive patchy left lung infiltrate and atelectasis has developed since prior examination. 3.  Moderate clearing of extensive patchy right lung infiltrate. Mild residual right upper lobe infiltrate/atelectasis remains. Moderate to marked elevation right hemidiaphragm unchanged. 4.  Ectasia of the ascending thoracic aorta stable.        Data reviewed today: I reviewed all medications, new labs and imaging results over the last 24 hours. I personally reviewed no images or EKG's today.  The patient's care was discussed with the Patient.

## 2023-12-27 ENCOUNTER — APPOINTMENT (OUTPATIENT)
Dept: MRI IMAGING | Facility: CLINIC | Age: 58
End: 2023-12-27
Attending: INTERNAL MEDICINE
Payer: COMMERCIAL

## 2023-12-27 LAB
MAGNESIUM SERPL-MCNC: 2.4 MG/DL (ref 1.7–2.3)
POTASSIUM SERPL-SCNC: 3.5 MMOL/L (ref 3.4–5.3)
T4 FREE SERPL-MCNC: 1.45 NG/DL (ref 0.9–1.7)
TSH SERPL DL<=0.005 MIU/L-ACNC: 0.08 UIU/ML (ref 0.3–4.2)

## 2023-12-27 PROCEDURE — 70553 MRI BRAIN STEM W/O & W/DYE: CPT

## 2023-12-27 PROCEDURE — 94640 AIRWAY INHALATION TREATMENT: CPT | Mod: 76

## 2023-12-27 PROCEDURE — 255N000002 HC RX 255 OP 636: Performed by: INTERNAL MEDICINE

## 2023-12-27 PROCEDURE — 83735 ASSAY OF MAGNESIUM: CPT | Performed by: INTERNAL MEDICINE

## 2023-12-27 PROCEDURE — 99232 SBSQ HOSP IP/OBS MODERATE 35: CPT | Performed by: INTERNAL MEDICINE

## 2023-12-27 PROCEDURE — 84132 ASSAY OF SERUM POTASSIUM: CPT | Performed by: INTERNAL MEDICINE

## 2023-12-27 PROCEDURE — 250N000013 HC RX MED GY IP 250 OP 250 PS 637: Performed by: HOSPITALIST

## 2023-12-27 PROCEDURE — 84439 ASSAY OF FREE THYROXINE: CPT | Performed by: INTERNAL MEDICINE

## 2023-12-27 PROCEDURE — 250N000011 HC RX IP 250 OP 636: Performed by: INTERNAL MEDICINE

## 2023-12-27 PROCEDURE — A9585 GADOBUTROL INJECTION: HCPCS | Performed by: INTERNAL MEDICINE

## 2023-12-27 PROCEDURE — 250N000009 HC RX 250: Performed by: INTERNAL MEDICINE

## 2023-12-27 PROCEDURE — 999N000157 HC STATISTIC RCP TIME EA 10 MIN

## 2023-12-27 PROCEDURE — 84443 ASSAY THYROID STIM HORMONE: CPT | Performed by: INTERNAL MEDICINE

## 2023-12-27 PROCEDURE — 94640 AIRWAY INHALATION TREATMENT: CPT

## 2023-12-27 PROCEDURE — 120N000013 HC R&B IMCU

## 2023-12-27 PROCEDURE — 36415 COLL VENOUS BLD VENIPUNCTURE: CPT | Performed by: INTERNAL MEDICINE

## 2023-12-27 PROCEDURE — 250N000013 HC RX MED GY IP 250 OP 250 PS 637: Performed by: FAMILY MEDICINE

## 2023-12-27 RX ORDER — LORAZEPAM 2 MG/ML
1 INJECTION INTRAMUSCULAR ONCE
Status: COMPLETED | OUTPATIENT
Start: 2023-12-27 | End: 2023-12-27

## 2023-12-27 RX ORDER — GADOBUTROL 604.72 MG/ML
10 INJECTION INTRAVENOUS ONCE
Status: COMPLETED | OUTPATIENT
Start: 2023-12-27 | End: 2023-12-27

## 2023-12-27 RX ORDER — IPRATROPIUM BROMIDE AND ALBUTEROL SULFATE 2.5; .5 MG/3ML; MG/3ML
3 SOLUTION RESPIRATORY (INHALATION) EVERY 4 HOURS PRN
Status: DISCONTINUED | OUTPATIENT
Start: 2023-12-27 | End: 2024-01-03 | Stop reason: HOSPADM

## 2023-12-27 RX ADMIN — IPRATROPIUM BROMIDE AND ALBUTEROL SULFATE 3 ML: .5; 3 SOLUTION RESPIRATORY (INHALATION) at 11:20

## 2023-12-27 RX ADMIN — APIXABAN 5 MG: 5 TABLET, FILM COATED ORAL at 19:54

## 2023-12-27 RX ADMIN — SIMVASTATIN 40 MG: 20 TABLET, FILM COATED ORAL at 21:17

## 2023-12-27 RX ADMIN — FLUTICASONE PROPIONATE 1 PUFF: 110 AEROSOL, METERED RESPIRATORY (INHALATION) at 10:24

## 2023-12-27 RX ADMIN — DILTIAZEM HYDROCHLORIDE 300 MG: 120 CAPSULE, EXTENDED RELEASE ORAL at 10:22

## 2023-12-27 RX ADMIN — QUETIAPINE FUMARATE 50 MG: 50 TABLET ORAL at 21:17

## 2023-12-27 RX ADMIN — IPRATROPIUM BROMIDE AND ALBUTEROL SULFATE 3 ML: .5; 3 SOLUTION RESPIRATORY (INHALATION) at 20:03

## 2023-12-27 RX ADMIN — QUETIAPINE FUMARATE 25 MG: 25 TABLET ORAL at 10:22

## 2023-12-27 RX ADMIN — AMIODARONE HYDROCHLORIDE 200 MG: 200 TABLET ORAL at 10:23

## 2023-12-27 RX ADMIN — METHYLPREDNISOLONE SODIUM SUCCINATE 62.5 MG: 125 INJECTION INTRAMUSCULAR; INTRAVENOUS at 12:03

## 2023-12-27 RX ADMIN — IPRATROPIUM BROMIDE AND ALBUTEROL SULFATE 3 ML: .5; 3 SOLUTION RESPIRATORY (INHALATION) at 15:41

## 2023-12-27 RX ADMIN — ESCITALOPRAM OXALATE 20 MG: 20 TABLET ORAL at 12:03

## 2023-12-27 RX ADMIN — GADOBUTROL 10 ML: 604.72 INJECTION INTRAVENOUS at 18:13

## 2023-12-27 RX ADMIN — METHYLPREDNISOLONE SODIUM SUCCINATE 62.5 MG: 125 INJECTION INTRAMUSCULAR; INTRAVENOUS at 03:32

## 2023-12-27 RX ADMIN — FERROUS SULFATE TAB 325 MG (65 MG ELEMENTAL FE) 325 MG: 325 (65 FE) TAB at 12:03

## 2023-12-27 RX ADMIN — MIRTAZAPINE 15 MG: 15 TABLET, FILM COATED ORAL at 21:17

## 2023-12-27 RX ADMIN — APIXABAN 5 MG: 5 TABLET, FILM COATED ORAL at 10:23

## 2023-12-27 RX ADMIN — CLONAZEPAM 0.5 MG: 0.5 TABLET ORAL at 20:17

## 2023-12-27 RX ADMIN — METHYLPREDNISOLONE SODIUM SUCCINATE 62.5 MG: 125 INJECTION INTRAMUSCULAR; INTRAVENOUS at 19:54

## 2023-12-27 RX ADMIN — Medication 5 MG: at 21:17

## 2023-12-27 RX ADMIN — IPRATROPIUM BROMIDE AND ALBUTEROL SULFATE 3 ML: .5; 3 SOLUTION RESPIRATORY (INHALATION) at 03:58

## 2023-12-27 RX ADMIN — LORAZEPAM 1 MG: 2 INJECTION INTRAMUSCULAR; INTRAVENOUS at 17:44

## 2023-12-27 RX ADMIN — IPRATROPIUM BROMIDE AND ALBUTEROL SULFATE 3 ML: .5; 3 SOLUTION RESPIRATORY (INHALATION) at 07:57

## 2023-12-27 RX ADMIN — PIPERACILLIN AND TAZOBACTAM 3.38 G: 3; .375 INJECTION, POWDER, LYOPHILIZED, FOR SOLUTION INTRAVENOUS at 10:24

## 2023-12-27 RX ADMIN — FLUTICASONE PROPIONATE 1 PUFF: 110 AEROSOL, METERED RESPIRATORY (INHALATION) at 20:16

## 2023-12-27 RX ADMIN — CLONAZEPAM 0.5 MG: 0.5 TABLET ORAL at 10:22

## 2023-12-27 RX ADMIN — QUETIAPINE FUMARATE 25 MG: 25 TABLET ORAL at 14:24

## 2023-12-27 ASSESSMENT — ACTIVITIES OF DAILY LIVING (ADL)
ADLS_ACUITY_SCORE: 20

## 2023-12-27 NOTE — PROGRESS NOTES
"Pulmonary Progress Note    Admit Date: 12/20/2023  CODE: Full Code    Reason for Consult: acute respiratory failure with hypoxemia. Recurrent OP    Assessment/Plan:   58M w/ hx of melanoma s/p pembrolizumab with presumed pneumonitis (severe ARDS requiring intubation/proning/paralysis Nov 2023), finished steroids last month, now with recurrent organizing pneumonia. Migratory infiltrates on the CT chest support OP. Slowly improving with steroids.     Recommendations:  - continue HFNC, titrate for goal SpO2 94-96%, avoid hyperoxia  - encourage OOB, PT/OT, push IS  - continue 60mg IV methylpred q8h. Wiill need prolonged, SLOW steroid taper (3-6 months)  - continue duonebs, Flovent  - abx per ID  - TMP/SMX for PJP ppx while on steroid doses >20mg daily. This can be started once closer to discharge    - will need follow up arranged in pulmonary clinic with CT chest. This will be arranged once closer to discharge.    We'll continue to follow.       Subjective/Interim Events:   He is down to 40L 50% FiO2      Medications:      - MEDICATION INSTRUCTIONS -        amiodarone  200 mg Oral Daily    apixaban ANTICOAGULANT  5 mg Oral BID    clonazePAM  0.5 mg Oral BID    diltiazem ER COATED BEADS  300 mg Oral Daily    escitalopram  20 mg Oral Daily with lunch    ferrous sulfate  325 mg Oral Daily with lunch    fluticasone  1 puff Inhalation BID    ipratropium - albuterol 0.5 mg/2.5 mg/3 mL  3 mL Nebulization 4x daily    methylPREDNISolone  62.5 mg Intravenous Q8H    mirtazapine  15 mg Oral At Bedtime    piperacillin-tazobactam  3.375 g Intravenous Q8H    polyethylene glycol  17 g Oral Daily    QUEtiapine  25 mg Oral BID    QUEtiapine  50 mg Oral At Bedtime    simvastatin  40 mg Oral At Bedtime    sodium chloride (PF)  3 mL Intracatheter Q8H         Exam/Data:   Vitals  /83 (BP Location: Left arm)   Pulse 61   Temp 97.7  F (36.5  C) (Oral)   Resp 20   Ht 1.93 m (6' 4\")   Wt 113.1 kg (249 lb 4.8 oz)   SpO2 90%   BMI " 30.35 kg/m    BP - Mean:  [94-96] 96  I/O last 3 completed shifts:  In: 2460 [P.O.:2160; I.V.:300]  Out: 1300 [Urine:1300]  Weight change:   [unfilled]  FiO2 (%): 50 %  Resp: 20      EXAM:    Gen: awake, alert, oriented, no distress  HEENT: NC in place  CV: RRR, no m/g/r  Resp: crackles on left. Fair air movement. Right lung mostly clear.   Abd: soft, nontender  Skin: no rashes or lesions on visible skin  Ext: no edema  Neuro: PERRL, nonfocal exam    DATA:    PFT DATA none available    Micro  PCT wnl 12/26  Covid neg  Flu A/B neg  RVP neg  RSV neg  Sputum NF  Strep pneumo urine Ag neg  Legionella urine Ag neg  Blod neg  Fungitell assay +      IMAGING:   CT Chest Pulmonary Embolism w Contrast    Result Date: 12/20/2023  EXAM: CT CHEST PULMONARY EMBOLISM W CONTRAST LOCATION: [facility] DATE: 12/20/2023 INDICATION: Shortness of breath COMPARISON: 11/13/2023 TECHNIQUE: CT chest pulmonary angiogram during arterial phase injection of IV contrast. Multiplanar reformats and MIP reconstructions were performed. Dose reduction techniques were used. CONTRAST: [id number] 75ml FINDINGS: ANGIOGRAM CHEST: Previously noted left basilar pulmonary emboli not seen today. No pulmonary emboli noted today. No evidence for right ventricular heart strain. Thoracic aorta is negative for dissection. No CT evidence of right heart strain. LUNGS AND PLEURA: Extensive patchy left lung infiltrate and atelectasis has developed since prior examination. Moderate clearing of extensive patchy right lung infiltrate. Mild residual right upper lobe infiltrate/atelectasis remains. Moderate to marked elevation right hemidiaphragm unchanged. MEDIASTINUM/AXILLAE: No lymphadenopathy. Normal esophagus. No significant pericardial effusion. Ectasia of the ascending thoracic aorta. CORONARY ARTERY CALCIFICATION: Mild. UPPER ABDOMEN: Unremarkable MUSCULOSKELETAL: No concerning osseous abnormalities.     IMPRESSION: 1.  Previously noted  left basilar pulmonary emboli not seen today. No pulmonary emboli noted today. No evidence for right ventricular heart strain. 2.  Extensive patchy left lung infiltrate and atelectasis has developed since prior examination. 3.  Moderate clearing of extensive patchy right lung infiltrate. Mild residual right upper lobe infiltrate/atelectasis remains. Moderate to marked elevation right hemidiaphragm unchanged. 4.  Ectasia of the ascending thoracic aorta stable.

## 2023-12-27 NOTE — PLAN OF CARE
Problem: Adult Inpatient Plan of Care  Goal: Optimal Comfort and Wellbeing  Outcome: Progressing     Problem: Gas Exchange Impaired  Goal: Optimal Gas Exchange  Outcome: Progressing     Pt Aox4. Denies pain. Remains on HFNC. See respiratory therapy note. Other VSS. IV abx administered per orders. Up independently. Ambulating to bathroom. Voiding spontaneously. NSR on tele, rate controlled. Pt able to make needs known. Call light within reach and purposeful rounding performed. Terra Birmingham RN

## 2023-12-27 NOTE — PLAN OF CARE
"  Problem: Adult Inpatient Plan of Care  Goal: Readiness for Transition of Care  Outcome: Progressing     Problem: Gas Exchange Impaired  Goal: Optimal Gas Exchange  Outcome: Progressing     Problem: Anemia  Goal: Anemia Symptom Improvement  Outcome: Progressing    Neuro:  A&Ox4  BUE tremors - MRI ordered - Lorazepam x1 given.  10L oxymask, sats mid 90s throughout MRI  CV:  NSR to SB when sleeping  BP stable  Resp:  Remains on HFNC - titrating as tolerated  Does desat into 70s on RA (took O2 off to eat lunch.  Said \"I can't eat with it on\").  Recovery time improving  LS dim on L, clear on R  Scheduled nebs  Wanted to stay on oxymask after MRI - 8L, sats mid 90s, no sob noted  GI:  Tolerating PO intake  :  Up independent to bathroom  Voiding spontaneously  Pain:  Denies  Activity:  independent  IV:  PIV x1  Last dose of abx given today  Receiving IV steroids  Labs:  Inquiring on what his hgb was today - recheck in for tomorrow, he understands this  "

## 2023-12-27 NOTE — PROGRESS NOTES
"/79   Pulse 54   Temp 97.8  F (36.6  C) (Oral)   Resp 18   Ht 1.93 m (6' 4\")   Wt 105.4 kg (232 lb 6.4 oz)   SpO2 94%   BMI 28.29 kg/m      The PT has been maintained on the HFNC this shift though, at one point, I had it weaned down to 35 liters and 40%. For more than a couple hours, his SAT's maintained at 93%. As the shift progressed, however, his SAT's declined into the high 80's. In response, his FIO2 was increased to 55% (for SAT's in the low 90's). If he requires further increases, I will return the flow to 40.    Further, he was provided nebs Q4 (X2 PRN's overnight). His lungs are generally clear, but his left lung (israel LLL) is relatively diminished. While his BS do no change after the nebs, he reports that they help. RT will follow as directed.        "

## 2023-12-27 NOTE — PROGRESS NOTES
[facility]    Medicine Progress Note - Hospitalist Service    Date of Admission:  12/20/2023    Assessment & Plan   Jim Titus is a 58 year old male with a past medical history of  cutaneous melanoma diagnosed on 3/28 via skin biopsy, with history of Keytruda induced pneumonnitis, currently on anticoagulation for small bilateral pulmonary emboli, admitted for acute respiratory failure with hypoxia for recurrent pneumonitis/pneumonia.  Requiring oxygen via high flow nasal canula.  BAL 11/3 without growth.  Pulmonolgy concerned for organizing pneumonia.   Has extensive patchy left lung infiltrate and atelectasis and moderate clearing of extensive patchy right lung infiltrate on CT scan.     A-fib with RVR, started on diltiazem and amiodarone.       Pulmonology and infectious disease following.      Acute respiratory failure with hypoxia  Keytruda pneumonitis  Possible cryptogenic organizing pneumonia.   Recent hospitalization twice for pneumonia, Keytruda pneumonitis, small pulmonary embolism  CT--Extensive patchy left lung infiltrate and atelectasis has developed. Moderate clearing of extensive patchy right lung infiltrate.   BAL 11/3 without growth  COVID, influenza, RSV are negative  Respiratory panel negative.   Blood culture NGTD  Sputum culture from 12/24 with 1+ GPC.   Fungitell positive.   WBC 14.6->22     Continue azithromycin and Zosyn per ID, complete course on 12/27.  Methylprednisolone 62.5 mg q8h.  PJP prophylaxis, TMP/SMX daily.   Albuterol q6h prn.  Supplemental oxygen per pulm/RT.  Appreciate Pulmonology     Bilateral pulmonary emboli  Continue Eliqus 5 mg BID     Patient with chronic tremor.   Patient with increased bilateral upper extremity tremor.   ? Essential tremor.   TSH suppressed, but free T4 normal.   Order MRI brain with/without contrast.      Iron deficiency anemia  Microcytic anemia  Hemoglobin 6.7, Status post 2 U PRBC on 12/22.   stable at 8.9.   Ferrous  "sulfate 325 mg daily      A-fib with RVR  Heart rate is stable  Diltiazem 300 mg daily  Amiodarone 200 mg daily  On chronic anticoagulation treatment with Eliquis     Acute kidney injury, resolved.  creatinine 1.38-->1.29->1.1  Improved.     Melanoma  Seeing [place] [facility]   S/P 6 doses of pembrolizumab 200 mg IV every 3 weeks.  Last dose given 9/15/2023.             Diet: Regular Diet Adult    DVT Prophylaxis: DOAC  Lamas Catheter: Not present  Lines: None     Cardiac Monitoring: ACTIVE order. Indication: Afib  Code Status: Full Code      Clinically Significant Risk Factors              # Hypoalbuminemia: Lowest albumin = 3.1 g/dL at 12/20/2023  5:00 AM, will monitor as appropriate            # Overweight: Estimated body mass index is 28.29 kg/m  as calculated from the following:    Height as of this encounter: 1.93 m (6' 4\").    Weight as of this encounter: 105.4 kg (232 lb 6.4 oz).      # Financial/Environmental Concerns: none  # Asthma: noted on problem list        Disposition Plan      Expected Discharge Date: 12/29/2023        Discharge Comments: IV ABX, Night Sweats, HFNC, IV steroids  pending clinical progression.            Avery Barraza DO  Hospitalist Service  [facility]  Securely message with [employer] (more info)  Text page via [email or web address] Paging/Directory   ______________________________________________________________________    Interval History   Complains of worsened bilateral upper extremity tremor.  Occurs mostly with holding objects.  This is improved with resting the arms.     Physical Exam   Vital Signs: Temp: 97.8  F (36.6  C) Temp src: Oral BP: 123/79 Pulse: 54   Resp: 18 SpO2: 94 % O2 Device: High Flow Nasal Cannula (HFNC) Oxygen Delivery: 35 LPM  Weight: 232 lbs 6.4 oz    GENERAL:  Alert, appears comfortable, in no acute distress, appears stated age, obese   HEAD:  Normocephalic, without obvious abnormality, atraumatic   NECK: Supple, symmetrical, trachea midline "   BACK:   Symmetric, no curvature, ROM normal   LUNGS:   Diminished breath sounds at bases, no rales, rhonchi, or wheezing, symmetric chest rise on inhalation, respirations unlabored   CHEST WALL:  No tenderness or deformity   HEART:  Regular rate and rhythm, S1 and S2 normal, no murmur    ABDOMEN:   Soft, non-tender, bowel sounds active , no masses, no organomegaly, no rebound or guarding   EXTREMITIES: Trace BLE edema    SKIN: No rashes in the visualized areas   NEURO: Alert, oriented x3, moves all four extremities freely   PSYCH: Cooperative, behavior is appropriate        Medical Decision Making       40 MINUTES SPENT BY ME on the date of service doing chart review, history, exam, documentation & further activities per the note.      Data     I have personally reviewed the following data over the past 24 hrs:    N/A  \   N/A   / N/A     N/A N/A N/A /  N/A   3.5 N/A N/A \       Imaging results reviewed over the past 24 hrs:   No results found for this or any previous visit (from the past 24 hour(s)).

## 2023-12-27 NOTE — PROGRESS NOTES
"Patient remains on HF/NC 45- 50%/40L and saturation is mid 90's. BS clear upper lobes and diminished bases. Blood pressure 134/83, pulse 73, temperature 97.8  F (36.6  C), temperature source Oral, resp. rate 20, height 1.93 m (6' 4\"), weight 113.1 kg (249 lb 4.8 oz), SpO2 90%.   Plan: will titrate the O2 as able and closely monitor.   "

## 2023-12-28 LAB
ANION GAP SERPL CALCULATED.3IONS-SCNC: 10 MMOL/L (ref 7–15)
BUN SERPL-MCNC: 24.2 MG/DL (ref 6–20)
CALCIUM SERPL-MCNC: 8.9 MG/DL (ref 8.6–10)
CHLORIDE SERPL-SCNC: 105 MMOL/L (ref 98–107)
CREAT SERPL-MCNC: 0.98 MG/DL (ref 0.67–1.17)
DEPRECATED HCO3 PLAS-SCNC: 29 MMOL/L (ref 22–29)
EGFRCR SERPLBLD CKD-EPI 2021: 89 ML/MIN/1.73M2
ERYTHROCYTE [DISTWIDTH] IN BLOOD BY AUTOMATED COUNT: 18.7 % (ref 10–15)
GLUCOSE SERPL-MCNC: 147 MG/DL (ref 70–99)
HCT VFR BLD AUTO: 26.8 % (ref 40–53)
HGB BLD-MCNC: 8.4 G/DL (ref 13.3–17.7)
MAGNESIUM SERPL-MCNC: 2.5 MG/DL (ref 1.7–2.3)
MCH RBC QN AUTO: 28.3 PG (ref 26.5–33)
MCHC RBC AUTO-ENTMCNC: 31.3 G/DL (ref 31.5–36.5)
MCV RBC AUTO: 90 FL (ref 78–100)
PLATELET # BLD AUTO: 616 10E3/UL (ref 150–450)
POTASSIUM SERPL-SCNC: 3.6 MMOL/L (ref 3.4–5.3)
RBC # BLD AUTO: 2.97 10E6/UL (ref 4.4–5.9)
SODIUM SERPL-SCNC: 144 MMOL/L (ref 135–145)
WBC # BLD AUTO: 22 10E3/UL (ref 4–11)

## 2023-12-28 PROCEDURE — 36415 COLL VENOUS BLD VENIPUNCTURE: CPT | Performed by: INTERNAL MEDICINE

## 2023-12-28 PROCEDURE — 99232 SBSQ HOSP IP/OBS MODERATE 35: CPT | Performed by: INTERNAL MEDICINE

## 2023-12-28 PROCEDURE — 80048 BASIC METABOLIC PNL TOTAL CA: CPT | Performed by: INTERNAL MEDICINE

## 2023-12-28 PROCEDURE — 250N000009 HC RX 250: Performed by: INTERNAL MEDICINE

## 2023-12-28 PROCEDURE — 94640 AIRWAY INHALATION TREATMENT: CPT | Mod: 76

## 2023-12-28 PROCEDURE — 120N000013 HC R&B IMCU

## 2023-12-28 PROCEDURE — 250N000013 HC RX MED GY IP 250 OP 250 PS 637: Performed by: HOSPITALIST

## 2023-12-28 PROCEDURE — 94640 AIRWAY INHALATION TREATMENT: CPT

## 2023-12-28 PROCEDURE — 85014 HEMATOCRIT: CPT | Performed by: INTERNAL MEDICINE

## 2023-12-28 PROCEDURE — 999N000157 HC STATISTIC RCP TIME EA 10 MIN

## 2023-12-28 PROCEDURE — 250N000011 HC RX IP 250 OP 636: Performed by: INTERNAL MEDICINE

## 2023-12-28 PROCEDURE — 83735 ASSAY OF MAGNESIUM: CPT | Performed by: INTERNAL MEDICINE

## 2023-12-28 PROCEDURE — 250N000013 HC RX MED GY IP 250 OP 250 PS 637: Performed by: FAMILY MEDICINE

## 2023-12-28 RX ADMIN — METHYLPREDNISOLONE SODIUM SUCCINATE 62.5 MG: 125 INJECTION INTRAMUSCULAR; INTRAVENOUS at 04:09

## 2023-12-28 RX ADMIN — MIRTAZAPINE 15 MG: 15 TABLET, FILM COATED ORAL at 22:21

## 2023-12-28 RX ADMIN — QUETIAPINE FUMARATE 50 MG: 50 TABLET ORAL at 21:00

## 2023-12-28 RX ADMIN — IPRATROPIUM BROMIDE AND ALBUTEROL SULFATE 3 ML: .5; 3 SOLUTION RESPIRATORY (INHALATION) at 11:51

## 2023-12-28 RX ADMIN — APIXABAN 5 MG: 5 TABLET, FILM COATED ORAL at 20:18

## 2023-12-28 RX ADMIN — CLONAZEPAM 0.5 MG: 0.5 TABLET ORAL at 20:58

## 2023-12-28 RX ADMIN — FLUTICASONE PROPIONATE 1 PUFF: 110 AEROSOL, METERED RESPIRATORY (INHALATION) at 09:31

## 2023-12-28 RX ADMIN — QUETIAPINE FUMARATE 25 MG: 25 TABLET ORAL at 13:04

## 2023-12-28 RX ADMIN — IPRATROPIUM BROMIDE AND ALBUTEROL SULFATE 3 ML: .5; 3 SOLUTION RESPIRATORY (INHALATION) at 08:21

## 2023-12-28 RX ADMIN — ESCITALOPRAM OXALATE 20 MG: 20 TABLET ORAL at 13:04

## 2023-12-28 RX ADMIN — METHYLPREDNISOLONE SODIUM SUCCINATE 62.5 MG: 125 INJECTION INTRAMUSCULAR; INTRAVENOUS at 20:18

## 2023-12-28 RX ADMIN — IPRATROPIUM BROMIDE AND ALBUTEROL SULFATE 3 ML: .5; 3 SOLUTION RESPIRATORY (INHALATION) at 20:06

## 2023-12-28 RX ADMIN — APIXABAN 5 MG: 5 TABLET, FILM COATED ORAL at 09:29

## 2023-12-28 RX ADMIN — FLUTICASONE PROPIONATE 1 PUFF: 110 AEROSOL, METERED RESPIRATORY (INHALATION) at 20:18

## 2023-12-28 RX ADMIN — AMIODARONE HYDROCHLORIDE 200 MG: 200 TABLET ORAL at 09:29

## 2023-12-28 RX ADMIN — CLONAZEPAM 0.5 MG: 0.5 TABLET ORAL at 09:30

## 2023-12-28 RX ADMIN — DILTIAZEM HYDROCHLORIDE 300 MG: 120 CAPSULE, EXTENDED RELEASE ORAL at 09:29

## 2023-12-28 RX ADMIN — Medication 5 MG: at 20:57

## 2023-12-28 RX ADMIN — METHYLPREDNISOLONE SODIUM SUCCINATE 62.5 MG: 125 INJECTION INTRAMUSCULAR; INTRAVENOUS at 12:59

## 2023-12-28 RX ADMIN — FERROUS SULFATE TAB 325 MG (65 MG ELEMENTAL FE) 325 MG: 325 (65 FE) TAB at 13:04

## 2023-12-28 RX ADMIN — SIMVASTATIN 40 MG: 20 TABLET, FILM COATED ORAL at 21:00

## 2023-12-28 RX ADMIN — IPRATROPIUM BROMIDE AND ALBUTEROL SULFATE 3 ML: .5; 3 SOLUTION RESPIRATORY (INHALATION) at 16:30

## 2023-12-28 RX ADMIN — QUETIAPINE FUMARATE 25 MG: 25 TABLET ORAL at 09:29

## 2023-12-28 ASSESSMENT — ACTIVITIES OF DAILY LIVING (ADL)
ADLS_ACUITY_SCORE: 20

## 2023-12-28 NOTE — PROGRESS NOTES
"/62   Pulse 57   Temp 98.1  F (36.7  C) (Oral)   Resp 20   Ht 1.93 m (6' 4\")   Wt 113.1 kg (249 lb 4.8 oz)   SpO2 96%   BMI 30.35 kg/m        The PT was provided a neb per MD order. BS were generally clear, though he was somewhat diminished over the left. Also, the PT went to MRI on the OXYMASK. On 8 liters, he was SAT'ing in the mid 90's (sometimes higher). Given his satisfactory SAT's, he requested that he not be returned to the [facility] (it remain at bedside for now).      Of note: Over the past 3 night's, Jim's SAT's are notably (and consistently) better when he's sleeping RIGHT SIDE DOWN (typically >5 percentage points higher).  "

## 2023-12-28 NOTE — PROGRESS NOTES
VSS on 8L O2 Oxymask. Attempted to wean to 6L O2 per RT but sats decreased to 90%. SB on tele. Denies pain. Appears to be resting comfortably.

## 2023-12-28 NOTE — PROGRESS NOTES
Pulmonary Progress Note    Admit Date: 12/20/2023  CODE: Full Code    Reason for Consult: acute respiratory failure with hypoxemia. Recurrent OP    Assessment/Plan:   58M w/ hx of melanoma s/p pembrolizumab with presumed pneumonitis (severe ARDS requiring intubation/proning/paralysis Nov 2023), finished steroids last month, now with recurrent organizing pneumonia. Migratory infiltrates on the CT chest support OP. Slowly improving with steroids.     Recommendations:  - continue HFNC, titrate for goal SpO2 94-96%, avoid hyperoxia  - encourage OOB, PT/OT, push IS  - continue 60mg IV methylpred q8h. Wiill need prolonged, SLOW steroid taper (3-6 months)  - continue duonebs, Flovent  - abx per ID  - TMP/SMX for PJP ppx while on steroid doses >20mg daily. This can be started once closer to discharge    Intention tremor L>R hand  - MRI yesterday without acute findings  - will need workup by neuro in the future (no neuro at this hospital) - outpt referral    - will need follow up arranged in pulmonary clinic with CT chest. This will be arranged once closer to discharge.    We'll continue to follow.       Subjective/Interim Events:     He reports feeling good this morning. He is down from 40L/40% high flow O2 to 5L ventimask      Medications:      - MEDICATION INSTRUCTIONS -        amiodarone  200 mg Oral Daily    apixaban ANTICOAGULANT  5 mg Oral BID    clonazePAM  0.5 mg Oral BID    diltiazem ER COATED BEADS  300 mg Oral Daily    escitalopram  20 mg Oral Daily with lunch    ferrous sulfate  325 mg Oral Daily with lunch    fluticasone  1 puff Inhalation BID    ipratropium - albuterol 0.5 mg/2.5 mg/3 mL  3 mL Nebulization 4x daily    methylPREDNISolone  62.5 mg Intravenous Q8H    mirtazapine  15 mg Oral At Bedtime    polyethylene glycol  17 g Oral Daily    QUEtiapine  25 mg Oral BID    QUEtiapine  50 mg Oral At Bedtime    simvastatin  40 mg Oral At Bedtime    sodium chloride (PF)  3 mL Intracatheter Q8H         Exam/Data:  "  Vitals  /71 (BP Location: Left arm, Cuff Size: Adult Regular)   Pulse 75   Temp 97.5  F (36.4  C) (Oral)   Resp 20   Ht 1.93 m (6' 4\")   Wt 112.9 kg (249 lb)   SpO2 90%   BMI 30.31 kg/m    BP - Mean:  [] 94  I/O last 3 completed shifts:  In: 2160 [P.O.:2160]  Out: -   Weight change:   [unfilled]  FiO2 (%): (S) 45 %  Resp: 20  EXAM  Gen: awake, alert, oriented, no distress  HEENT: Ventimask in place, MMM  CV: RRR, no m/g/r  Resp: crackles on left. Fair air movement. Right lung mostly clear.   Abd: soft, nontender  Skin: no rashes or lesions on visible skin  Ext: no edema  Neuro: PERRL, nonfocal exam    DATA:    PFT DATA none available    Micro  PCT wnl 12/26  Covid neg  Flu A/B neg  RVP neg  RSV neg  Sputum NF  Strep pneumo urine Ag neg  Legionella urine Ag neg  Blod neg  Fungitell assay +      IMAGING:   CT Chest Pulmonary Embolism w Contrast    Result Date: 12/20/2023  EXAM: CT CHEST PULMONARY EMBOLISM W CONTRAST LOCATION: [facility] DATE: 12/20/2023 INDICATION: Shortness of breath COMPARISON: 11/13/2023 TECHNIQUE: CT chest pulmonary angiogram during arterial phase injection of IV contrast. Multiplanar reformats and MIP reconstructions were performed. Dose reduction techniques were used. CONTRAST: [id number] 75ml FINDINGS: ANGIOGRAM CHEST: Previously noted left basilar pulmonary emboli not seen today. No pulmonary emboli noted today. No evidence for right ventricular heart strain. Thoracic aorta is negative for dissection. No CT evidence of right heart strain. LUNGS AND PLEURA: Extensive patchy left lung infiltrate and atelectasis has developed since prior examination. Moderate clearing of extensive patchy right lung infiltrate. Mild residual right upper lobe infiltrate/atelectasis remains. Moderate to marked elevation right hemidiaphragm unchanged. MEDIASTINUM/AXILLAE: No lymphadenopathy. Normal esophagus. No significant pericardial effusion. Ectasia of the ascending " thoracic aorta. CORONARY ARTERY CALCIFICATION: Mild. UPPER ABDOMEN: Unremarkable MUSCULOSKELETAL: No concerning osseous abnormalities.     IMPRESSION: 1.  Previously noted left basilar pulmonary emboli not seen today. No pulmonary emboli noted today. No evidence for right ventricular heart strain. 2.  Extensive patchy left lung infiltrate and atelectasis has developed since prior examination. 3.  Moderate clearing of extensive patchy right lung infiltrate. Mild residual right upper lobe infiltrate/atelectasis remains. Moderate to marked elevation right hemidiaphragm unchanged. 4.  Ectasia of the ascending thoracic aorta stable.

## 2023-12-28 NOTE — PLAN OF CARE
Goal Outcome Evaluation:    Problem: Gas Exchange Impaired  Goal: Optimal Gas Exchange  Outcome: Progressing     Resting comfortably with no c/o pain. AO x4. NSR on telemetry, bradycardic at times with rate in the 50s. On 6L oxymask with SpO2 = 98%. Lung sounds diminished on Left side, Right side clear. Stand by assist while ambulating length of unit, gait steady.

## 2023-12-28 NOTE — PROGRESS NOTES
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  Requiring oxygen via high flow nasal canula.  BAL 11/3 without growth.  Pulmonolgy concerned for organizing pneumonia.   Has extensive patchy left lung infiltrate and atelectasis and moderate clearing of extensive patchy right lung infiltrate on CT scan.     A-fib with RVR, started on diltiazem and amiodarone.       Pulmonology and infectious disease following.      Acute respiratory failure with hypoxia  Keytruda pneumonitis  Possible cryptogenic organizing pneumonia.   Recent hospitalization twice for pneumonia, Keytruda pneumonitis, small pulmonary embolism  CT--Extensive patchy left lung infiltrate and atelectasis has developed. Moderate clearing of extensive patchy right lung infiltrate.   BAL 11/3 without growth  COVID, influenza, RSV are negative  Respiratory panel negative.   Blood culture NGTD  Sputum culture from 12/24 with 1+ GPC.   Fungitell positive.   WBC 14.6->22     Continue azithromycin and Zosyn per ID, complete course on 12/27.  Methylprednisolone 62.5 mg q8h.  PJP prophylaxis, TMP/SMX daily.   Albuterol q6h prn.  Supplemental oxygen per pulm/RT.  Appreciate Pulmonology     Bilateral pulmonary emboli  Continue Eliqus 5 mg BID     Patient with chronic tremor.  Intention tremor.   TSH suppressed, but free T4 normal.   MRI brain with/without contrast negative.  Outpatient follow up with neurology.    Iron deficiency anemia  Microcytic anemia  Hemoglobin 6.7, Status post 2 U PRBC on 12/22.   Hemoglobin stable at 8.4.   Ferrous sulfate 325 mg  "daily      A-fib with RVR  Heart rate is stable  Diltiazem 300 mg daily  Amiodarone 200 mg daily  On chronic anticoagulation treatment with Eliquis     Acute kidney injury, resolved.  Creatinine 0.98, at baseline.     Melanoma  Seeing [place] [facility]   S/P 6 doses of pembrolizumab 200 mg IV every 3 weeks.  Last dose given 9/15/2023.             Diet: Regular Diet Adult    DVT Prophylaxis: DOAC  Lamas Catheter: Not present  Lines: None     Cardiac Monitoring: ACTIVE order. Indication: Afib  Code Status: Full Code      Clinically Significant Risk Factors              # Hypoalbuminemia: Lowest albumin = 3.1 g/dL at 12/20/2023  5:00 AM, will monitor as appropriate            # Obesity: Estimated body mass index is 30.35 kg/m  as calculated from the following:    Height as of this encounter: 1.93 m (6' 4\").    Weight as of this encounter: 113.1 kg (249 lb 4.8 oz).      # Financial/Environmental Concerns: none  # Asthma: noted on problem list        Disposition Plan      Expected Discharge Date: 12/29/2023        Discharge Comments: IV ABX, Night Sweats, HFNC, IV steroids  pending clinical progression.            Avery Barraza DO  Hospitalist Service  [facility]  Securely message with [employer] (more info)  Text page via [employer] Paging/Directory   ______________________________________________________________________    Interval History   Peter has no new complaints. Denies headache, shortness of breath.    Physical Exam   Vital Signs: Temp: 98  F (36.7  C) Temp src: Oral BP: 139/77 Pulse: 61   Resp: 16 SpO2: 97 % O2 Device: Oxymask Oxygen Delivery: 6 LPM  Weight: 249 lbs 4.8 oz    GENERAL:  Alert, appears comfortable, in no acute distress, appears stated age, obese   HEAD:  Normocephalic, without obvious abnormality, atraumatic   NECK: Supple, symmetrical, trachea midline   BACK:   Symmetric, no curvature, ROM normal   LUNGS:   Diminished breath sounds at bases, no rales, rhonchi, or " wheezing, symmetric chest rise on inhalation, respirations unlabored   CHEST WALL:  No tenderness or deformity   HEART:  Regular rate and rhythm, S1 and S2 normal, no murmur    ABDOMEN:   Soft, non-tender, bowel sounds active , no masses, no organomegaly, no rebound or guarding   EXTREMITIES: Trace BLE edema    SKIN: No rashes in the visualized areas   NEURO: Alert, oriented x3, moves all four extremities freely   PSYCH: Cooperative, behavior is appropriate        Medical Decision Making       40 MINUTES SPENT BY ME on the date of service doing chart review, history, exam, documentation & further activities per the note.      Data     I have personally reviewed the following data over the past 24 hrs:    22.0 (H)  \   8.4 (L)   / 616 (H)     144 105 24.2 (H) /  147 (H)   3.6 29 0.98 \     TSH: N/A T4: N/A A1C: N/A       Imaging results reviewed over the past 24 hrs:   Recent Results (from the past 24 hour(s))   MR Brain w/o & w Contrast    Narrative    EXAM: MR BRAIN W/O and W CONTRAST  LOCATION: [facility]  DATE: 12/27/2023    INDICATION: intetion tremor  COMPARISON: None.  CONTRAST: 10ml gadavist  TECHNIQUE: Routine multiplanar multisequence head MRI without and with intravenous contrast.    FINDINGS:  INTRACRANIAL CONTENTS: No acute or subacute infarct. No mass, acute hemorrhage, or extra-axial fluid collections. Normal brain parenchymal signal. Mild generalized cerebral atrophy. No hydrocephalus. Normal position of the cerebellar tonsils. No   pathologic contrast enhancement.    SELLA: No abnormality accounting for technique.    OSSEOUS STRUCTURES/SOFT TISSUES: Normal marrow signal. The major intracranial vascular flow voids are maintained.     ORBITS: No abnormality accounting for technique.     SINUSES/MASTOIDS: No paranasal sinus mucosal disease. No middle ear or mastoid effusion.       Impression    IMPRESSION:  1.  No acute infarct, mass, mass effect, or hemorrhage.  2.  Mild  atrophy.

## 2023-12-28 NOTE — PROGRESS NOTES
Patient received scheduled nebulizer treatments as ordered. Patient remains on 6 LPM Oxymask with saturations of 90-95%. Breath sounds clear/ diminished with some scattered crackles on left. Will continue to follow.     Nan Bhatti, RT

## 2023-12-28 NOTE — PROGRESS NOTES
"CLINICAL NUTRITION SERVICES - ASSESSMENT NOTE     Nutrition Prescription    RECOMMENDATIONS FOR MDs/PROVIDERS TO ORDER:  None    Malnutrition Status:    Patient does not meet two of the established criteria necessary for diagnosing malnutrition    Recommendations already ordered by Registered Dietitian (RD):  - Medical food supplement therapy - Ensure Max Protein daily    Future/Additional Recommendations:  Monitor po intake, ONS preference, wts     REASON FOR ASSESSMENT  Jim Titus is a/an 58 year old male assessed by the dietitian for LOS    NUTRITION HISTORY  Dx: acute respiratory failure with hypoxia for recurrent pneumonitis/pneumonia.  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 Pt reports eating \"normally\" at home, usually having 2 meals/day. He is aware of recent wt loss history, and sees it positively as he is overweight and feels that reaching a healthier wt will improvement his health and medical prognosis. Pt is losing about 1.3 lbs/week over the last 2 months, which is a reasonable and safe amount of wt loss assuming he's prioritizing protein intake per increased needs w/ cancer    CURRENT NUTRITION ORDERS  Diet: Regular  Intake/Tolerance: Pt ordering 2 meals/day and consuming 100% of them per flow sheets and [employer] records.    LABS  Labs reviewed  Ma.5 (H) stable  B (H)    MEDICATIONS  Medications reviewed  Remeron    ANTHROPOMETRICS  Height: 193 cm (6' 4\")  Most Recent Weight: 112.9 kg (249 lb)    IBW: 91.8 kg  BMI: Obesity Grade I BMI 30-34.9  Weight History:   23 : 112.9 kg (249 lb)  23 : 113.1 kg (249 lb 4.8 oz) Standing scale  23 : 105.4 kg (232 lb 6.4 oz) Standing scale - questions accuracy of this wt  23 : 112.2 kg (247 lb 4.8 oz) Standing " scale  12/23/23 : 111 kg (244 lb 12.8 oz)   12/14/23 : 113.4 kg (249 lb 14.4 oz)  12/11/23 : 114.8 kg (253 lb)  11/19/23 : 110.9 kg (244 lb 8 oz)  10/23/23 : 117.9 kg (260 lb)  10/23/23 : 118.1 kg (260 lb 6.4 oz)  08/16/23 : 128.6 kg (283 lb 9.6 oz)  04/11/23 : 121.6 kg (268 lb 3 oz)  05/16/22 : 120.9 kg (266 lb 9.6 oz)  11/10/21 : 128.6 kg (283 lb 8 oz)  4.6% wt loss in 2 months    Dosing Weight: 97 kg adjusted body wt based on driest admit wt    ASSESSED NUTRITION NEEDS  Estimated Energy Needs: 9925-6354 kcals/day (25 - 30 kcals/kg)  Justification: Maintenance  Estimated Protein Needs: 106-135 grams protein/day (1.1 - 1.4 grams of pro/kg)  Justification: Increased needs with cancer  Estimated Fluid Needs: 1445-9893 mL/day (25 - 30 mL/kg)   Justification: Maintenance    PHYSICAL FINDINGS  See malnutrition section below.    MALNUTRITION  % Intake: Decreased intake does not meet criteria  % Weight Loss: Weight loss does not meet criteria  Subcutaneous Fat Loss: None observed  Muscle Loss: None observed  Fluid Accumulation/Edema: None noted  Malnutrition Diagnosis: Patient does not meet two of the established criteria necessary for diagnosing malnutrition    NUTRITION DIAGNOSIS  Increased protein needs related to cancer as evidenced by need for ONS to meet protein needs      INTERVENTIONS  Implementation  - Nutrition education for nutrition relationship to health/disease - importance of prioritizing protein  - Medical food supplement therapy - Ensure Max Protein daily    Goals  Total avg nutritional intake to meet a minimum of 25 kcal/kg and 1.1 g PRO/kg daily (per dosing wt 97 kg).     Monitoring/Evaluation  Progress toward goals will be monitored and evaluated per protocol.

## 2023-12-29 LAB
ACID FAST STAIN (ARUP): NORMAL
BILIRUB DIRECT SERPL-MCNC: <0.2 MG/DL (ref 0–0.3)
BILIRUB SERPL-MCNC: 0.2 MG/DL
HAPTOGLOB SERPL-MCNC: 354 MG/DL (ref 30–200)
LDH SERPL L TO P-CCNC: 180 U/L (ref 0–250)
MAGNESIUM SERPL-MCNC: 2.4 MG/DL (ref 1.7–2.3)
POTASSIUM SERPL-SCNC: 3.7 MMOL/L (ref 3.4–5.3)

## 2023-12-29 PROCEDURE — 250N000013 HC RX MED GY IP 250 OP 250 PS 637: Performed by: FAMILY MEDICINE

## 2023-12-29 PROCEDURE — 84132 ASSAY OF SERUM POTASSIUM: CPT | Performed by: INTERNAL MEDICINE

## 2023-12-29 PROCEDURE — 250N000013 HC RX MED GY IP 250 OP 250 PS 637: Performed by: INTERNAL MEDICINE

## 2023-12-29 PROCEDURE — 83010 ASSAY OF HAPTOGLOBIN QUANT: CPT | Performed by: INTERNAL MEDICINE

## 2023-12-29 PROCEDURE — 82247 BILIRUBIN TOTAL: CPT | Performed by: INTERNAL MEDICINE

## 2023-12-29 PROCEDURE — 250N000009 HC RX 250: Performed by: INTERNAL MEDICINE

## 2023-12-29 PROCEDURE — 250N000011 HC RX IP 250 OP 636: Performed by: INTERNAL MEDICINE

## 2023-12-29 PROCEDURE — 94640 AIRWAY INHALATION TREATMENT: CPT

## 2023-12-29 PROCEDURE — 99232 SBSQ HOSP IP/OBS MODERATE 35: CPT | Performed by: INTERNAL MEDICINE

## 2023-12-29 PROCEDURE — 999N000157 HC STATISTIC RCP TIME EA 10 MIN

## 2023-12-29 PROCEDURE — 36415 COLL VENOUS BLD VENIPUNCTURE: CPT | Performed by: INTERNAL MEDICINE

## 2023-12-29 PROCEDURE — 120N000013 HC R&B IMCU

## 2023-12-29 PROCEDURE — 83735 ASSAY OF MAGNESIUM: CPT | Performed by: INTERNAL MEDICINE

## 2023-12-29 PROCEDURE — 83615 LACTATE (LD) (LDH) ENZYME: CPT | Performed by: INTERNAL MEDICINE

## 2023-12-29 PROCEDURE — 94640 AIRWAY INHALATION TREATMENT: CPT | Mod: 76

## 2023-12-29 RX ORDER — DILTIAZEM HYDROCHLORIDE 120 MG/1
240 CAPSULE, COATED, EXTENDED RELEASE ORAL DAILY
Status: DISCONTINUED | OUTPATIENT
Start: 2023-12-29 | End: 2024-01-03 | Stop reason: HOSPADM

## 2023-12-29 RX ORDER — FERROUS SULFATE 325(65) MG
325 TABLET ORAL EVERY OTHER DAY
Status: DISCONTINUED | OUTPATIENT
Start: 2023-12-29 | End: 2024-01-03 | Stop reason: HOSPADM

## 2023-12-29 RX ADMIN — IPRATROPIUM BROMIDE AND ALBUTEROL SULFATE 3 ML: .5; 3 SOLUTION RESPIRATORY (INHALATION) at 16:35

## 2023-12-29 RX ADMIN — FLUTICASONE PROPIONATE 1 PUFF: 110 AEROSOL, METERED RESPIRATORY (INHALATION) at 22:24

## 2023-12-29 RX ADMIN — QUETIAPINE FUMARATE 25 MG: 25 TABLET ORAL at 08:10

## 2023-12-29 RX ADMIN — MIRTAZAPINE 15 MG: 15 TABLET, FILM COATED ORAL at 23:41

## 2023-12-29 RX ADMIN — IPRATROPIUM BROMIDE AND ALBUTEROL SULFATE 3 ML: .5; 3 SOLUTION RESPIRATORY (INHALATION) at 12:28

## 2023-12-29 RX ADMIN — CLONAZEPAM 0.5 MG: 0.5 TABLET ORAL at 08:10

## 2023-12-29 RX ADMIN — METHYLPREDNISOLONE SODIUM SUCCINATE 62.5 MG: 125 INJECTION INTRAMUSCULAR; INTRAVENOUS at 20:01

## 2023-12-29 RX ADMIN — AMIODARONE HYDROCHLORIDE 200 MG: 200 TABLET ORAL at 08:10

## 2023-12-29 RX ADMIN — SALINE NASAL SPRAY 1 SPRAY: 1.5 SOLUTION NASAL at 22:23

## 2023-12-29 RX ADMIN — QUETIAPINE FUMARATE 50 MG: 50 TABLET ORAL at 23:41

## 2023-12-29 RX ADMIN — CLONAZEPAM 0.5 MG: 0.5 TABLET ORAL at 22:23

## 2023-12-29 RX ADMIN — LOPERAMIDE HYDROCHLORIDE 2 MG: 2 CAPSULE ORAL at 14:55

## 2023-12-29 RX ADMIN — FERROUS SULFATE TAB 325 MG (65 MG ELEMENTAL FE) 325 MG: 325 (65 FE) TAB at 08:12

## 2023-12-29 RX ADMIN — QUETIAPINE FUMARATE 25 MG: 25 TABLET ORAL at 14:13

## 2023-12-29 RX ADMIN — IPRATROPIUM BROMIDE AND ALBUTEROL SULFATE 3 ML: .5; 3 SOLUTION RESPIRATORY (INHALATION) at 08:41

## 2023-12-29 RX ADMIN — SALINE NASAL SPRAY 1 SPRAY: 1.5 SOLUTION NASAL at 12:02

## 2023-12-29 RX ADMIN — APIXABAN 5 MG: 5 TABLET, FILM COATED ORAL at 20:01

## 2023-12-29 RX ADMIN — APIXABAN 5 MG: 5 TABLET, FILM COATED ORAL at 08:10

## 2023-12-29 RX ADMIN — SIMVASTATIN 40 MG: 20 TABLET, FILM COATED ORAL at 22:22

## 2023-12-29 RX ADMIN — ESCITALOPRAM OXALATE 20 MG: 20 TABLET ORAL at 12:00

## 2023-12-29 RX ADMIN — IPRATROPIUM BROMIDE AND ALBUTEROL SULFATE 3 ML: .5; 3 SOLUTION RESPIRATORY (INHALATION) at 20:20

## 2023-12-29 RX ADMIN — METHYLPREDNISOLONE SODIUM SUCCINATE 62.5 MG: 125 INJECTION INTRAMUSCULAR; INTRAVENOUS at 03:47

## 2023-12-29 RX ADMIN — METHYLPREDNISOLONE SODIUM SUCCINATE 62.5 MG: 125 INJECTION INTRAMUSCULAR; INTRAVENOUS at 12:00

## 2023-12-29 RX ADMIN — FLUTICASONE PROPIONATE 1 PUFF: 110 AEROSOL, METERED RESPIRATORY (INHALATION) at 08:11

## 2023-12-29 ASSESSMENT — ACTIVITIES OF DAILY LIVING (ADL)
ADLS_ACUITY_SCORE: 20

## 2023-12-29 NOTE — PROGRESS NOTES
Pulmonary Progress Note    Admit Date: 12/20/2023  CODE: Full Code    Reason for Consult: acute respiratory failure with hypoxemia. Recurrent OP    Assessment/Plan:   58M w/ hx of melanoma s/p pembrolizumab with presumed pneumonitis (severe ARDS requiring intubation/proning/paralysis Nov 2023), finished steroids last month, now with recurrent organizing pneumonia. Migratory infiltrates on the CT chest support OP. Slowly improving with steroids.     Recommendations:  - continue HFNC, titrate for goal SpO2 94-96%, avoid hyperoxia  - encourage OOB, PT/OT, push IS  - continue 60mg IV methylpred q8h (12/20). Wiill need prolonged, SLOW steroid taper (3-6 months)  - continue duonebs, Flovent  - abx per ID  - TMP/SMX for PJP ppx while on steroid doses >20mg daily. This can be started once closer to discharge    Intention tremor L>R hand  - MRI yesterday without acute findings  - will need workup by neuro in the future (no neuro at this hospital) - outpt referral    - will need follow up arranged in pulmonary clinic with CT chest. This will be arranged once closer to discharge.    We'll continue to follow.       Subjective/Interim Events:     He reports feeling good this morning. He is down from 5L ventimask to nasal pillow 5L      Medications:      - MEDICATION INSTRUCTIONS -        amiodarone  200 mg Oral Daily    apixaban ANTICOAGULANT  5 mg Oral BID    clonazePAM  0.5 mg Oral BID    diltiazem ER COATED BEADS  240 mg Oral Daily    escitalopram  20 mg Oral Daily with lunch    ferrous sulfate  325 mg Oral Every Other Day    fluticasone  1 puff Inhalation BID    ipratropium - albuterol 0.5 mg/2.5 mg/3 mL  3 mL Nebulization 4x daily    methylPREDNISolone  62.5 mg Intravenous Q8H    mirtazapine  15 mg Oral At Bedtime    polyethylene glycol  17 g Oral Daily    QUEtiapine  25 mg Oral BID    QUEtiapine  50 mg Oral At Bedtime    simvastatin  40 mg Oral At Bedtime    sodium chloride  1 spray Both Nostrils 4x Daily    sodium  "chloride (PF)  3 mL Intracatheter Q8H         Exam/Data:   Vitals  /80 (BP Location: Left arm)   Pulse 51   Temp 97.5  F (36.4  C) (Oral)   Resp 16   Ht 1.93 m (6' 4\")   Wt 112.9 kg (249 lb)   SpO2 97%   BMI 30.31 kg/m    BP - Mean:  [83-95] 95  I/O last 3 completed shifts:  In: 1560 [P.O.:1560]  Out: 200 [Urine:200]  Weight change: -0.136 kg (-4.8 oz)  [unfilled]  Resp: 16  EXAM  Gen: awake, alert, oriented, no distress  HEENT: Ventimask in place, MMM  CV: RRR, no m/g/r  Resp: crackles on left. Fair air movement. Right lung mostly clear.   Abd: soft, nontender  Skin: no rashes or lesions on visible skin  Ext: no edema  Neuro: PERRL, nonfocal exam    DATA:    PFT DATA none available    Micro  PCT wnl 12/26  Covid neg  Flu A/B neg  RVP neg  RSV neg  Sputum NF  Strep pneumo urine Ag neg  Legionella urine Ag neg  Blod neg  Fungitell assay +      IMAGING:   CT Chest Pulmonary Embolism w Contrast    Result Date: 12/20/2023  EXAM: CT CHEST PULMONARY EMBOLISM W CONTRAST LOCATION: [facility] [place] DATE: 12/20/2023 INDICATION: Shortness of breath COMPARISON: 11/13/2023 TECHNIQUE: CT chest pulmonary angiogram during arterial phase injection of IV contrast. Multiplanar reformats and MIP reconstructions were performed. Dose reduction techniques were used. CONTRAST: [id number] 75ml FINDINGS: ANGIOGRAM CHEST: Previously noted left basilar pulmonary emboli not seen today. No pulmonary emboli noted today. No evidence for right ventricular heart strain. Thoracic aorta is negative for dissection. No CT evidence of right heart strain. LUNGS AND PLEURA: Extensive patchy left lung infiltrate and atelectasis has developed since prior examination. Moderate clearing of extensive patchy right lung infiltrate. Mild residual right upper lobe infiltrate/atelectasis remains. Moderate to marked elevation right hemidiaphragm unchanged. MEDIASTINUM/AXILLAE: No lymphadenopathy. Normal esophagus. No significant " pericardial effusion. Ectasia of the ascending thoracic aorta. CORONARY ARTERY CALCIFICATION: Mild. UPPER ABDOMEN: Unremarkable MUSCULOSKELETAL: No concerning osseous abnormalities.     IMPRESSION: 1.  Previously noted left basilar pulmonary emboli not seen today. No pulmonary emboli noted today. No evidence for right ventricular heart strain. 2.  Extensive patchy left lung infiltrate and atelectasis has developed since prior examination. 3.  Moderate clearing of extensive patchy right lung infiltrate. Mild residual right upper lobe infiltrate/atelectasis remains. Moderate to marked elevation right hemidiaphragm unchanged. 4.  Ectasia of the ascending thoracic aorta stable.

## 2023-12-29 NOTE — PLAN OF CARE
Goal Outcome Evaluation:  Problem: Gas Exchange Impaired  Goal: Optimal Gas Exchange  Outcome: Progressing  Intervention: Optimize Oxygenation and Ventilation    Problem: Infection  Goal: Absence of Infection Signs and Symptoms  Outcome: Progressing     Patient A&Ox4, VSS. No c/o pain, SOB or H/N/V. LS clear, diminished, O2sats in low-mid 90s on 6L NC. When up and walking pt's O2sats would drop into the mid-high 80s but would recover fairly quickly. Up independently in room, able to make needs known and call light within reach.

## 2023-12-29 NOTE — PROGRESS NOTES
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  BAL 11/3 without growth.  Pulmonolgy concerned for organizing pneumonia.   Plan for prolonged steroid taper over 3 to 6 months.     A-fib with RVR, started on diltiazem and amiodarone.       Pulmonology and infectious disease following.      Acute respiratory failure with hypoxia  Keytruda pneumonitis  Possible cryptogenic organizing pneumonia.   Recent hospitalization twice for pneumonia, Keytruda pneumonitis, small pulmonary embolism  CT--Extensive patchy left lung infiltrate and atelectasis has developed. Moderate clearing of extensive patchy right lung infiltrate.   BAL 11/3 without growth  COVID, influenza, RSV are negative  Respiratory panel negative.   Blood culture NGTD  Sputum culture from 12/24 with 1+ GPC.   Fungitell positive.   WBC 14.6->22     Continue azithromycin and Zosyn per ID, complete course on 12/27.  Methylprednisolone 62.5 mg q8h.  PJP prophylaxis, TMP/SMX daily at discharge.   Continue duonebs, Flovent.   Supplemental oxygen per pulm/RT.  Appreciate Pulmonology assistance.     Bilateral pulmonary emboli  Continue Eliqus 5 mg BID     Patient with chronic tremor.  Intention tremor.   TSH suppressed, but free T4 normal.   MRI brain with/without contrast negative.  Outpatient follow up with neurology.  Repeat TSH in 6 to 8 weeks.     Iron deficiency anemia  Microcytic anemia  Hemoglobin 6.7, Status post 2 U PRBC on 12/22.   Hemoglobin stable at 8.4.   Ferrous sulfate 325 mg every other day   Order haptoglobin, bilirubin and LDH    "  A-fib with RVR  Heart rate is stable  Diltiazem 300 mg daily  Amiodarone 200 mg daily  On chronic anticoagulation treatment with Eliquis     Acute kidney injury, resolved.  Creatinine 0.98, at baseline.     Melanoma  Seeing [place] [facility]   S/P 6 doses of pembrolizumab 200 mg IV every 3 weeks.  Last dose given 9/15/2023.             Diet: Regular Diet Adult    DVT Prophylaxis: DOAC  Lamas Catheter: Not present  Lines: None     Cardiac Monitoring: ACTIVE order. Indication: Afib  Code Status: Full Code      Clinically Significant Risk Factors              # Hypoalbuminemia: Lowest albumin = 3.1 g/dL at 12/20/2023  5:00 AM, will monitor as appropriate            # Obesity: Estimated body mass index is 30.31 kg/m  as calculated from the following:    Height as of this encounter: 1.93 m (6' 4\").    Weight as of this encounter: 112.9 kg (249 lb).      # Financial/Environmental Concerns: none  # Asthma: noted on problem list        Disposition Plan      Expected Discharge Date: 12/31/2023        Discharge Comments: IV ABX, Night Sweats, O2 6 L NC from HFNC pending clinical progression.            Avery Barraza DO  Hospitalist Service  [facility]  Securely message with [employer] (more info)  Text page via [employer] Paging/Directory   ______________________________________________________________________    Interval History   Complains of hoarseness.  Frustrated that he is still needing so much oxygen.  He reports dyspnea with exertion.  He desaturates to 70-80s with activity, and quickly recovers with supplemental oxygen and rest.     Physical Exam   Vital Signs: Temp: 97.8  F (36.6  C) Temp src: Axillary BP: 110/61 Pulse: 52   Resp: 18 SpO2: 98 % O2 Device: Oxymask Oxygen Delivery: 6 LPM  Weight: 249 lbs 0 oz    GENERAL:  Alert, appears comfortable, in no acute distress, appears stated age, obese   HEAD:  Normocephalic, without obvious abnormality, atraumatic   NECK: Supple, symmetrical, " trachea midline   BACK:   Symmetric, no curvature, ROM normal   LUNGS:   Diminished breath sounds at bases, no rales, rhonchi, or wheezing, symmetric chest rise on inhalation, respirations unlabored   CHEST WALL:  No tenderness or deformity   HEART:  Regular rate and rhythm, S1 and S2 normal, no murmur    ABDOMEN:   Soft, non-tender, bowel sounds active , no masses, no organomegaly, no rebound or guarding   EXTREMITIES: Trace BLE edema    SKIN: No rashes in the visualized areas   NEURO: Alert, oriented x3, moves all four extremities freely   PSYCH: Cooperative, behavior is appropriate        Medical Decision Making       40 MINUTES SPENT BY ME on the date of service doing chart review, history, exam, documentation & further activities per the note.      Data     I have personally reviewed the following data over the past 24 hrs:    N/A  \   N/A   / N/A     N/A N/A N/A /  N/A   3.7 N/A N/A \       Imaging results reviewed over the past 24 hrs:   No results found for this or any previous visit (from the past 24 hour(s)).

## 2023-12-29 NOTE — PROGRESS NOTES
Patient given scheduled nebulizers as ordered. Pt remains on 6 LPM oxymizer with saturations 96-98%. Breath sounds crackles/diminished t/o. Will continue to monitor.     Nan Bhatti, RT

## 2023-12-29 NOTE — PROGRESS NOTES
Pt took his nebs as scheduled. He is now on 6L Oxymask with Saturation  in mid 90s. BS: diminished. RT will continue to monitor.

## 2023-12-30 LAB
ERYTHROCYTE [DISTWIDTH] IN BLOOD BY AUTOMATED COUNT: 18.8 % (ref 10–15)
HCT VFR BLD AUTO: 27.2 % (ref 40–53)
HGB BLD-MCNC: 8.6 G/DL (ref 13.3–17.7)
MAGNESIUM SERPL-MCNC: 2.4 MG/DL (ref 1.7–2.3)
MCH RBC QN AUTO: 28.3 PG (ref 26.5–33)
MCHC RBC AUTO-ENTMCNC: 31.6 G/DL (ref 31.5–36.5)
MCV RBC AUTO: 90 FL (ref 78–100)
PLATELET # BLD AUTO: 571 10E3/UL (ref 150–450)
POTASSIUM SERPL-SCNC: 3.7 MMOL/L (ref 3.4–5.3)
RBC # BLD AUTO: 3.04 10E6/UL (ref 4.4–5.9)
WBC # BLD AUTO: 17.5 10E3/UL (ref 4–11)

## 2023-12-30 PROCEDURE — 36415 COLL VENOUS BLD VENIPUNCTURE: CPT | Performed by: INTERNAL MEDICINE

## 2023-12-30 PROCEDURE — 250N000013 HC RX MED GY IP 250 OP 250 PS 637: Performed by: INTERNAL MEDICINE

## 2023-12-30 PROCEDURE — 99232 SBSQ HOSP IP/OBS MODERATE 35: CPT | Performed by: INTERNAL MEDICINE

## 2023-12-30 PROCEDURE — 250N000013 HC RX MED GY IP 250 OP 250 PS 637: Performed by: FAMILY MEDICINE

## 2023-12-30 PROCEDURE — 250N000011 HC RX IP 250 OP 636: Performed by: INTERNAL MEDICINE

## 2023-12-30 PROCEDURE — 999N000157 HC STATISTIC RCP TIME EA 10 MIN

## 2023-12-30 PROCEDURE — 94640 AIRWAY INHALATION TREATMENT: CPT | Mod: 76

## 2023-12-30 PROCEDURE — 120N000013 HC R&B IMCU

## 2023-12-30 PROCEDURE — 84132 ASSAY OF SERUM POTASSIUM: CPT | Performed by: INTERNAL MEDICINE

## 2023-12-30 PROCEDURE — 83735 ASSAY OF MAGNESIUM: CPT | Performed by: INTERNAL MEDICINE

## 2023-12-30 PROCEDURE — 250N000009 HC RX 250: Performed by: INTERNAL MEDICINE

## 2023-12-30 PROCEDURE — 85027 COMPLETE CBC AUTOMATED: CPT | Performed by: INTERNAL MEDICINE

## 2023-12-30 PROCEDURE — 94640 AIRWAY INHALATION TREATMENT: CPT

## 2023-12-30 RX ORDER — METHYLPREDNISOLONE SODIUM SUCCINATE 125 MG/2ML
60 INJECTION, POWDER, LYOPHILIZED, FOR SOLUTION INTRAMUSCULAR; INTRAVENOUS EVERY 12 HOURS
Status: COMPLETED | OUTPATIENT
Start: 2023-12-30 | End: 2024-01-01

## 2023-12-30 RX ORDER — SULFAMETHOXAZOLE/TRIMETHOPRIM 800-160 MG
1 TABLET ORAL DAILY
Status: DISCONTINUED | OUTPATIENT
Start: 2023-12-30 | End: 2024-01-03 | Stop reason: HOSPADM

## 2023-12-30 RX ADMIN — FLUTICASONE PROPIONATE 1 PUFF: 110 AEROSOL, METERED RESPIRATORY (INHALATION) at 21:15

## 2023-12-30 RX ADMIN — QUETIAPINE FUMARATE 50 MG: 50 TABLET ORAL at 21:15

## 2023-12-30 RX ADMIN — SALINE NASAL SPRAY 1 SPRAY: 1.5 SOLUTION NASAL at 18:29

## 2023-12-30 RX ADMIN — QUETIAPINE FUMARATE 25 MG: 25 TABLET ORAL at 15:09

## 2023-12-30 RX ADMIN — SALINE NASAL SPRAY 1 SPRAY: 1.5 SOLUTION NASAL at 12:02

## 2023-12-30 RX ADMIN — FLUTICASONE PROPIONATE 1 PUFF: 110 AEROSOL, METERED RESPIRATORY (INHALATION) at 10:02

## 2023-12-30 RX ADMIN — IPRATROPIUM BROMIDE AND ALBUTEROL SULFATE 3 ML: .5; 3 SOLUTION RESPIRATORY (INHALATION) at 07:36

## 2023-12-30 RX ADMIN — IPRATROPIUM BROMIDE AND ALBUTEROL SULFATE 3 ML: .5; 3 SOLUTION RESPIRATORY (INHALATION) at 20:57

## 2023-12-30 RX ADMIN — IPRATROPIUM BROMIDE AND ALBUTEROL SULFATE 3 ML: .5; 3 SOLUTION RESPIRATORY (INHALATION) at 15:18

## 2023-12-30 RX ADMIN — APIXABAN 5 MG: 5 TABLET, FILM COATED ORAL at 09:56

## 2023-12-30 RX ADMIN — CLONAZEPAM 0.5 MG: 0.5 TABLET ORAL at 09:56

## 2023-12-30 RX ADMIN — SALINE NASAL SPRAY 1 SPRAY: 1.5 SOLUTION NASAL at 21:15

## 2023-12-30 RX ADMIN — IPRATROPIUM BROMIDE AND ALBUTEROL SULFATE 3 ML: .5; 3 SOLUTION RESPIRATORY (INHALATION) at 11:12

## 2023-12-30 RX ADMIN — SALINE NASAL SPRAY 1 SPRAY: 1.5 SOLUTION NASAL at 10:03

## 2023-12-30 RX ADMIN — AMIODARONE HYDROCHLORIDE 200 MG: 200 TABLET ORAL at 09:57

## 2023-12-30 RX ADMIN — LOPERAMIDE HYDROCHLORIDE 2 MG: 2 CAPSULE ORAL at 10:05

## 2023-12-30 RX ADMIN — MIRTAZAPINE 15 MG: 15 TABLET, FILM COATED ORAL at 23:48

## 2023-12-30 RX ADMIN — SULFAMETHOXAZOLE AND TRIMETHOPRIM 1 TABLET: 800; 160 TABLET ORAL at 12:00

## 2023-12-30 RX ADMIN — METHYLPREDNISOLONE SODIUM SUCCINATE 62.5 MG: 125 INJECTION, POWDER, FOR SOLUTION INTRAMUSCULAR; INTRAVENOUS at 18:29

## 2023-12-30 RX ADMIN — SIMVASTATIN 40 MG: 20 TABLET, FILM COATED ORAL at 21:15

## 2023-12-30 RX ADMIN — CLONAZEPAM 0.5 MG: 0.5 TABLET ORAL at 22:19

## 2023-12-30 RX ADMIN — QUETIAPINE FUMARATE 25 MG: 25 TABLET ORAL at 09:57

## 2023-12-30 RX ADMIN — APIXABAN 5 MG: 5 TABLET, FILM COATED ORAL at 21:15

## 2023-12-30 RX ADMIN — METHYLPREDNISOLONE SODIUM SUCCINATE 62.5 MG: 125 INJECTION INTRAMUSCULAR; INTRAVENOUS at 05:05

## 2023-12-30 RX ADMIN — ESCITALOPRAM OXALATE 20 MG: 20 TABLET ORAL at 12:00

## 2023-12-30 ASSESSMENT — ACTIVITIES OF DAILY LIVING (ADL)
ADLS_ACUITY_SCORE: 20

## 2023-12-30 NOTE — PROGRESS NOTES
Pt received neb treatment and tolerated well. Assessment below;     12/29/23 2020   Tech Time   $Tech Time (10 minute increments) 3   Oxygen Therapy   O2 Device Oxymizer cannula   Oxygen Delivery 6 LPM   Nebulizer Assessment & Treatment   $RT Use ONLY Delivery Method Nebulizer - Additional   Nebulizer Device Mouthpiece   Pretreatment Heart Rate (beats/min) 67   Pretreatment Resp Rate (breaths/min) 18   Pretreatment O2 sats - (TCU only) 94   Pretreat Breath Sounds - Bilat - All Lobes diminished   Breath Sounds Post-Respiratory Treatment   Posttreatment Heart Rate (beats/min) 72   Posttreatment Resp Rate (breaths/min) 18   Post treatment O2 Sats - (TCU only) 96   Breath Sounds Posttreatment All Fields All Fields   Breath Sounds Posttreatment All Fields aeration increased     Melany Domingo, RT

## 2023-12-30 NOTE — PROGRESS NOTES
[facility]    Medicine Progress Note - Hospitalist Service    Date of Admission:  12/20/2023    Assessment & Plan   Jim Titus is a 58 year old male who was admitted on 12/20 for acute hypoxic respiratory failure possibly due to organizing pneumonia.  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  He has been treated with high-dose steroids with improved oxygen requirement.  Plan for prolonged steroid taper over 3 to 6 months for pulmonology. A-fib with RVR, started on diltiazem and amiodarone.         Acute respiratory failure with hypoxia  Keytruda pneumonitis  Possible cryptogenic organizing pneumonia.   Recent hospitalization twice for pneumonia, Keytruda pneumonitis, small pulmonary embolism  CT--Extensive patchy left lung infiltrate and atelectasis has developed. Moderate clearing of extensive patchy right lung infiltrate.   BAL 11/3 without growth  COVID, influenza, RSV are negative  Respiratory panel negative.   Blood culture NGTD  Sputum culture from 12/24 with 1+ GPC.   Fungitell positive.   WBC 14.6->22     Completed a course of azithromycin and Zosyn per ID on 12/27.  Pulmonology manages steroid therapy.  Started methylprednisolone 62.5 mg q8h, decreased to BID on 12/30  PJP prophylaxis with Bactrim 12/30  Continue duonespeedy Flovent.   Supplemental oxygen per pulm/RT.  Appreciate Pulmonology assistance.       Bilateral pulmonary emboli  Continue Eliqus 5 mg BID      A-fib with RVR  Heart rate is stable  Diltiazem 300 mg PTA, has being held given bradycardia  Amiodarone 200 mg daily  On Eliquis      Iron deficiency anemia  Microcytic anemia  Hemoglobin 6.7, Status post 2 U PRBC on 12/22.   Hemoglobin stable at 8.4.   Ferrous sulfate 325 mg every other day   Order haptoglobin, bilirubin and LDH       Melanoma  Seeing [place] [facility]   S/P 6 doses  "of pembrolizumab 200 mg IV every 3 weeks.  Last dose given 9/15/2023.       Patient with chronic tremor.  Intention tremor.   TSH suppressed, but free T4 normal.   MRI brain with/without contrast negative.  Outpatient follow up with neurology.  Repeat TSH in 6 to 8 weeks.           Diet: Regular Diet Adult  Snacks/Supplements Adult: Ensure Max Protein (bariatric); Between Meals    DVT Prophylaxis: DOAC  Lamas Catheter: Not present  Lines: None     Cardiac Monitoring: ACTIVE order. Indication: Afib  Code Status: Full Code      Clinically Significant Risk Factors              # Hypoalbuminemia: Lowest albumin = 3.1 g/dL at 12/20/2023  5:00 AM, will monitor as appropriate            # Obesity: Estimated body mass index is 30.31 kg/m  as calculated from the following:    Height as of this encounter: 1.93 m (6' 4\").    Weight as of this encounter: 112.9 kg (249 lb).        # Financial/Environmental Concerns: none  # Asthma: noted on problem list        Disposition Plan      Expected Discharge Date: 12/31/2023    Discharge Delays: IV Medication - consider oral or Home Infusion  Oxygen Needs - Arrange Home O2    Discharge Comments: IV steroids, O2 6 L NC from HFNC pending clinical progression.            Ching Gerardo MD  Hospitalist Service  [facility]  Securely message with [employer] (more info)  Text page via [employer] Paging/Directory   ______________________________________________________________________    Interval History   Noticed oxygen saturation went down to mid 80s after exertion.  He feels that his breathing has reached a plateau in the last few days.  No chest pain.    Physical Exam   Vital Signs: Temp: 97.8  F (36.6  C) Temp src: Oral BP: (!) 142/70 Pulse: 59   Resp: 18 SpO2: 93 % O2 Device: Oxymizer cannula Oxygen Delivery: 6 LPM  Weight: 249 lbs 0 oz    GENERAL:  Alert, appears comfortable, in no acute distress, appears stated age, obese   HEAD:  Normocephalic, without obvious " abnormality, atraumatic   NECK: Supple, symmetrical, trachea midline   BACK:   Symmetric, no curvature, ROM normal   LUNGS:   Air movement throughout, minor crackles, on 7 L nasal pendant   CHEST WALL:  No tenderness or deformity   HEART:  Regular rate and rhythm, S1 and S2 normal, no murmur    ABDOMEN:   Soft, non-tender, bowel sounds active , no masses, no organomegaly, no rebound or guarding   EXTREMITIES: Trace BLE edema    SKIN: No rashes in the visualized areas   NEURO: Alert, oriented x3, moves all four extremities freely   PSYCH: Cooperative, behavior is appropriate        Medical Decision Making       40 MINUTES SPENT BY ME on the date of service doing chart review, history, exam, documentation & further activities per the note.      Data     I have personally reviewed the following data over the past 24 hrs:    17.5 (H)  \   8.6 (L)   / 571 (H)     N/A N/A N/A /  N/A   3.7 N/A N/A \       Imaging results reviewed over the past 24 hrs:   No results found for this or any previous visit (from the past 24 hour(s)).

## 2023-12-30 NOTE — PROGRESS NOTES
PULMONARY PROGRESS NOTE    Date / Time of Admission:  12/20/2023  4:29 AM  Assessment:   58yoM with history of metastatic melanoma complicated by keytruda-induced pneumonitis in August, respiratory failure with what appears to be organizing pneumonia  requiring intubation in November, extubated but now flared again with steroid taper, improved after steroids resumed.     Active Problems:    Acute respiratory failure with hypoxia (H)    Pneumonia due to infectious organism, unspecified laterality, unspecified part of lung    Sepsis, due to unspecified organism, unspecified whether acute organ dysfunction present (H)      Plan:   Prolonged steroid taper need proven given flare and migrating infiltrate consistent with organizing pneumonia. Currently on Methylprednisolone 60mg q8 hours and has been for days. Will decrease to 60mg q12 hours in preparation for eventual transition to prednisone potentially next week based on O2 requirement.  -Appreciate ID involvement.    -negative Viral PCR   -Sputum culture negative   -high fungitell seen but no evidence of fungal infection so no antifungals added currently   -Empiric Zosyn, completed 7 days.   -PJP prophylaxis daily. I will initiate this now as he has been on prolonged steroid already.   -Weaning O2 currently. Consider discharge planning when down to 4-5L O2. Anticipate he will need O2 at discharge. I am happy to complete FMLA paperwork from him if needed.      Subjective:   HPI:  Jim Titus is a 58 year old male with afib, PE on elliquis, melanoma previously on pembrolizumab last given September 2023 with ongoing pneumonitis presented with acute hypoxic respiratory failure requiring intubation, extubated and discharge on steroid taper who returns with PNA and acute resp failure. CTA found resolution of PE but new area of infiltrate and resolved area of previous infiltrate.      He was initially too hypoxic to tolerate bronchoscopy. Currently on Low-flow oxygen but  "still requiring 6L/min.         Allergies:   Allergies   Allergen Reactions    Chicken [Chicken Protein] Other (See Comments)     Throat closes to turkey as well.    Pembrolizumab Other (See Comments)     Severe pneumonitis    Wellbutrin [Bupropion] Unknown    Grass Pollen [Grass] Rash    Turkey [Poultry Meal] Rash        MEDS:  Scheduled Meds:   amiodarone  200 mg Oral Daily    apixaban ANTICOAGULANT  5 mg Oral BID    clonazePAM  0.5 mg Oral BID    diltiazem ER COATED BEADS  240 mg Oral Daily    escitalopram  20 mg Oral Daily with lunch    ferrous sulfate  325 mg Oral Every Other Day    fluticasone  1 puff Inhalation BID    ipratropium - albuterol 0.5 mg/2.5 mg/3 mL  3 mL Nebulization 4x daily    methylPREDNISolone  62.5 mg Intravenous Q8H    mirtazapine  15 mg Oral At Bedtime    polyethylene glycol  17 g Oral Daily    QUEtiapine  25 mg Oral BID    QUEtiapine  50 mg Oral At Bedtime    simvastatin  40 mg Oral At Bedtime    sodium chloride  1 spray Both Nostrils 4x Daily    sodium chloride (PF)  3 mL Intracatheter Q8H     Continuous Infusions:   - MEDICATION INSTRUCTIONS -       PRN Meds:.acetaminophen **OR** acetaminophen, acetaminophen, albuterol, calcium carbonate, guaiFENesin-codeine, ipratropium - albuterol 0.5 mg/2.5 mg/3 mL, lidocaine 4%, lidocaine (buffered or not buffered), loperamide, loratadine, melatonin, ondansetron **OR** ondansetron, - MEDICATION INSTRUCTIONS -, senna-docusate **OR** senna-docusate, sodium chloride (PF)    Objective:   VITALS:  BP (!) 140/84 (BP Location: Left arm)   Pulse 60   Temp 98  F (36.7  C) (Oral)   Resp 16   Ht 1.93 m (6' 4\")   Wt 112.9 kg (249 lb)   SpO2 91%   BMI 30.31 kg/m    EXAM:    GENERAL APPEARANCE: Alert, no acute distress  HENT: Oral mucosa and posterior oropharynx normal, moist mucous membranes  NECK: No adenopathy,masses or thyromegaly  RESP: lungs clear to auscultation bilaterally  CV: regular rate and rhythm, no murmur, rub or gallop  ABDOMEN: normal bowel " sounds, soft, nontender, no hepatosplenomegaly or other masses  LYMPHATICS: No cervical, or supraclavicular adenopathy  SKIN: no suspicious lesions or rashes  NEURO: Alert, oriented x 3, speech and mentation normal        I&O:      Intake/Output Summary (Last 24 hours) at 12/30/2023 0959  Last data filed at 12/29/2023 2200  Gross per 24 hour   Intake 2280 ml   Output --   Net 2280 ml         Data Review:    Imaging: personally reviewed  Chest CT  EXAM: CT CHEST PULMONARY EMBOLISM W CONTRAST  LOCATION: [facility]  DATE: 12/20/2023     INDICATION: Shortness of breath  COMPARISON: 11/13/2023  TECHNIQUE: CT chest pulmonary angiogram during arterial phase injection of IV contrast. Multiplanar reformats and MIP reconstructions were performed. Dose reduction techniques were used.   CONTRAST: [id number] 75ml     FINDINGS:  ANGIOGRAM CHEST: Previously noted left basilar pulmonary emboli not seen today. No pulmonary emboli noted today. No evidence for right ventricular heart strain. Thoracic aorta is negative for dissection. No CT evidence of right heart strain.     LUNGS AND PLEURA: Extensive patchy left lung infiltrate and atelectasis has developed since prior examination. Moderate clearing of extensive patchy right lung infiltrate. Mild residual right upper lobe infiltrate/atelectasis remains. Moderate to marked   elevation right hemidiaphragm unchanged.     MEDIASTINUM/AXILLAE: No lymphadenopathy. Normal esophagus. No significant pericardial effusion. Ectasia of the ascending thoracic aorta.     CORONARY ARTERY CALCIFICATION: Mild.     UPPER ABDOMEN: Unremarkable     MUSCULOSKELETAL: No concerning osseous abnormalities.                                                                      IMPRESSION:  1.  Previously noted left basilar pulmonary emboli not seen today. No pulmonary emboli noted today. No evidence for right ventricular heart strain.     2.  Extensive patchy left lung infiltrate and  atelectasis has developed since prior examination.      3.  Moderate clearing of extensive patchy right lung infiltrate. Mild residual right upper lobe infiltrate/atelectasis remains. Moderate to marked elevation right hemidiaphragm unchanged.     4.  Ectasia of the ascending thoracic aorta stable.      Results for orders placed or performed during the hospital encounter of 12/20/23   Basic metabolic panel   Result Value Ref Range    Sodium 144 135 - 145 mmol/L    Potassium 3.6 3.4 - 5.3 mmol/L    Chloride 105 98 - 107 mmol/L    Carbon Dioxide (CO2) 29 22 - 29 mmol/L    Anion Gap 10 7 - 15 mmol/L    Urea Nitrogen 24.2 (H) 6.0 - 20.0 mg/dL    Creatinine 0.98 0.67 - 1.17 mg/dL    GFR Estimate 89 >60 mL/min/1.73m2    Calcium 8.9 8.6 - 10.0 mg/dL    Glucose 147 (H) 70 - 99 mg/dL     Lab Results   Component Value Date    WBC 17.5 (H) 12/30/2023    HGB 8.6 (L) 12/30/2023    HCT 27.2 (L) 12/30/2023    MCV 90 12/30/2023     (H) 12/30/2023

## 2023-12-30 NOTE — PROGRESS NOTES
"BP (!) 142/70 (BP Location: Left arm)   Pulse 59   Temp 97.8  F (36.6  C) (Oral)   Resp 18   Ht 1.93 m (6' 4\")   Wt 112.9 kg (249 lb)   SpO2 93%   BMI 30.31 kg/m      Pt was on 6 lpm oxymizer. BS were clear/diminished when last seen. This morning, pt was up moving around some and spo2 level was in the 70s on 6 lpm oxymizer. Took pt about 5 minutes to get above 90% spo2. I did recommend to to turn liter flow up to 10 when he gets up from bed. RT will continue to follow.   "

## 2023-12-30 NOTE — PLAN OF CARE
Pt Aox4. Denies pain. Pt on 6L oxymask. Ambulated in quiroz SBA. O2 needs increased to 10L temporarily when pt became dyspneic, but recovered quickly with rest. Denies SOB at rest. Other VSS. NSR to SB in the 50s on tele. No significant events this shift. Pt rested w/ eyes closed between cares. Call light within reach and purposeful rounding performed. Terra Birmingham RN      Problem: Adult Inpatient Plan of Care  Goal: Optimal Comfort and Wellbeing  Outcome: Progressing     Problem: Gas Exchange Impaired  Goal: Optimal Gas Exchange  Outcome: Progressing

## 2023-12-31 LAB
MAGNESIUM SERPL-MCNC: 2.3 MG/DL (ref 1.7–2.3)
POTASSIUM SERPL-SCNC: 3.8 MMOL/L (ref 3.4–5.3)

## 2023-12-31 PROCEDURE — 120N000013 HC R&B IMCU

## 2023-12-31 PROCEDURE — 250N000013 HC RX MED GY IP 250 OP 250 PS 637: Performed by: INTERNAL MEDICINE

## 2023-12-31 PROCEDURE — 250N000011 HC RX IP 250 OP 636: Performed by: INTERNAL MEDICINE

## 2023-12-31 PROCEDURE — 94640 AIRWAY INHALATION TREATMENT: CPT

## 2023-12-31 PROCEDURE — 94640 AIRWAY INHALATION TREATMENT: CPT | Mod: 76

## 2023-12-31 PROCEDURE — 83735 ASSAY OF MAGNESIUM: CPT | Performed by: INTERNAL MEDICINE

## 2023-12-31 PROCEDURE — 250N000013 HC RX MED GY IP 250 OP 250 PS 637: Performed by: FAMILY MEDICINE

## 2023-12-31 PROCEDURE — 999N000157 HC STATISTIC RCP TIME EA 10 MIN

## 2023-12-31 PROCEDURE — 36415 COLL VENOUS BLD VENIPUNCTURE: CPT | Performed by: INTERNAL MEDICINE

## 2023-12-31 PROCEDURE — 250N000009 HC RX 250: Performed by: INTERNAL MEDICINE

## 2023-12-31 PROCEDURE — 84132 ASSAY OF SERUM POTASSIUM: CPT | Performed by: INTERNAL MEDICINE

## 2023-12-31 PROCEDURE — 99232 SBSQ HOSP IP/OBS MODERATE 35: CPT | Performed by: INTERNAL MEDICINE

## 2023-12-31 RX ADMIN — SALINE NASAL SPRAY 1 SPRAY: 1.5 SOLUTION NASAL at 18:10

## 2023-12-31 RX ADMIN — SALINE NASAL SPRAY 1 SPRAY: 1.5 SOLUTION NASAL at 08:11

## 2023-12-31 RX ADMIN — SALINE NASAL SPRAY 1 SPRAY: 1.5 SOLUTION NASAL at 22:22

## 2023-12-31 RX ADMIN — IPRATROPIUM BROMIDE AND ALBUTEROL SULFATE 3 ML: .5; 3 SOLUTION RESPIRATORY (INHALATION) at 15:17

## 2023-12-31 RX ADMIN — METHYLPREDNISOLONE SODIUM SUCCINATE 62.5 MG: 125 INJECTION, POWDER, FOR SOLUTION INTRAMUSCULAR; INTRAVENOUS at 18:08

## 2023-12-31 RX ADMIN — CLONAZEPAM 0.5 MG: 0.5 TABLET ORAL at 22:18

## 2023-12-31 RX ADMIN — QUETIAPINE FUMARATE 25 MG: 25 TABLET ORAL at 13:44

## 2023-12-31 RX ADMIN — CLONAZEPAM 0.5 MG: 0.5 TABLET ORAL at 08:11

## 2023-12-31 RX ADMIN — METHYLPREDNISOLONE SODIUM SUCCINATE 62.5 MG: 125 INJECTION, POWDER, FOR SOLUTION INTRAMUSCULAR; INTRAVENOUS at 05:18

## 2023-12-31 RX ADMIN — APIXABAN 5 MG: 5 TABLET, FILM COATED ORAL at 08:11

## 2023-12-31 RX ADMIN — SALINE NASAL SPRAY 1 SPRAY: 1.5 SOLUTION NASAL at 13:45

## 2023-12-31 RX ADMIN — FLUTICASONE PROPIONATE 1 PUFF: 110 AEROSOL, METERED RESPIRATORY (INHALATION) at 20:56

## 2023-12-31 RX ADMIN — AMIODARONE HYDROCHLORIDE 200 MG: 200 TABLET ORAL at 08:11

## 2023-12-31 RX ADMIN — ESCITALOPRAM OXALATE 20 MG: 20 TABLET ORAL at 13:44

## 2023-12-31 RX ADMIN — FLUTICASONE PROPIONATE 1 PUFF: 110 AEROSOL, METERED RESPIRATORY (INHALATION) at 08:11

## 2023-12-31 RX ADMIN — IPRATROPIUM BROMIDE AND ALBUTEROL SULFATE 3 ML: .5; 3 SOLUTION RESPIRATORY (INHALATION) at 07:44

## 2023-12-31 RX ADMIN — FERROUS SULFATE TAB 325 MG (65 MG ELEMENTAL FE) 325 MG: 325 (65 FE) TAB at 08:10

## 2023-12-31 RX ADMIN — SULFAMETHOXAZOLE AND TRIMETHOPRIM 1 TABLET: 800; 160 TABLET ORAL at 08:11

## 2023-12-31 RX ADMIN — QUETIAPINE FUMARATE 50 MG: 50 TABLET ORAL at 22:22

## 2023-12-31 RX ADMIN — MIRTAZAPINE 15 MG: 15 TABLET, FILM COATED ORAL at 23:53

## 2023-12-31 RX ADMIN — APIXABAN 5 MG: 5 TABLET, FILM COATED ORAL at 20:55

## 2023-12-31 RX ADMIN — QUETIAPINE FUMARATE 25 MG: 25 TABLET ORAL at 08:11

## 2023-12-31 RX ADMIN — IPRATROPIUM BROMIDE AND ALBUTEROL SULFATE 3 ML: .5; 3 SOLUTION RESPIRATORY (INHALATION) at 11:29

## 2023-12-31 RX ADMIN — LOPERAMIDE HYDROCHLORIDE 2 MG: 2 CAPSULE ORAL at 08:14

## 2023-12-31 RX ADMIN — SIMVASTATIN 40 MG: 20 TABLET, FILM COATED ORAL at 22:18

## 2023-12-31 ASSESSMENT — ACTIVITIES OF DAILY LIVING (ADL)
ADLS_ACUITY_SCORE: 20

## 2023-12-31 NOTE — PLAN OF CARE
Goal Outcome Evaluation:    Pt denies pain.  O2 sats in the mid 90's on 6L via oximyzer canula.   Pt ambulating in hallway and requires 15L via oxymask with ambulation. Tele:  Sinus Devan to SR.  VSS.        Problem: Adult Inpatient Plan of Care  Goal: Plan of Care Review  Description: The Plan of Care Review/Shift note should be completed every shift.  The Outcome Evaluation is a brief statement about your assessment that the patient is improving, declining, or no change.  This information will be displayed automatically on your shift  note.  Outcome: Progressing     Problem: Gas Exchange Impaired  Goal: Optimal Gas Exchange  Outcome: Progressing

## 2023-12-31 NOTE — PROGRESS NOTES
Pt received neb treatment and tolerated well. Pt remain on 6L oxymizer with sat in the low to mid 90's.  RTs will continue to  follow.    Melany Domingo, RT

## 2023-12-31 NOTE — PLAN OF CARE
Patient remains on 6L oxymizer at rest, increasing with activity.  Patient had 2 walks around the 3rd floor today on 15L with breaks to catch breath. Denies other complaints.  Patient concerned about how to know when to come in to the ED in the future, and when to wait.  Writer suggested obtaining an oximeter.    Problem: Gas Exchange Impaired  Goal: Optimal Gas Exchange  Outcome: Progressing  Intervention: Optimize Oxygenation and Ventilation  Recent Flowsheet Documentation  Taken 12/30/2023 1600 by Lalita Verdugo, RN  Head of Bed (HOB) Positioning: HOB at 45 degrees  Taken 12/30/2023 1200 by Lalita Verdugo RN  Head of Bed (HOB) Positioning: HOB at 45 degrees     Problem: Infection  Goal: Absence of Infection Signs and Symptoms  Outcome: Progressing     Problem: Anemia  Goal: Anemia Symptom Improvement  Outcome: Progressing  Intervention: Monitor and Manage Anemia  Recent Flowsheet Documentation  Taken 12/30/2023 1600 by Lalita Verdugo, RN  Safety Promotion/Fall Prevention:   clutter free environment maintained   room door open   room near nurse's station   safety round/check completed  Taken 12/30/2023 1200 by Lalita Verdugo RN  Safety Promotion/Fall Prevention:   clutter free environment maintained   room door open   room near nurse's station   safety round/check completed  Taken 12/30/2023 0812 by Lalita Verdugo RN  Safety Promotion/Fall Prevention:   clutter free environment maintained   room door open   room near nurse's station   safety round/check completed   Goal Outcome Evaluation:

## 2023-12-31 NOTE — PROGRESS NOTES
PULMONARY PROGRESS NOTE    Date / Time of Admission:  12/20/2023  4:29 AM  Assessment:   58yoM with history of metastatic melanoma complicated by keytruda-induced pneumonitis in August, respiratory failure with what appears to be organizing pneumonia  requiring intubation in November, extubated but now flared again with steroid taper, improved after steroids resumed.     Active Problems:    Acute respiratory failure with hypoxia (H)    Pneumonia due to infectious organism, unspecified laterality, unspecified part of lung    Sepsis, due to unspecified organism, unspecified whether acute organ dysfunction present (H)      Plan:   Prolonged steroid taper need proven given flare and migrating infiltrate consistent with organizing pneumonia. Methylprednisolone 60mg every 12 starting 12/30 with ongoing improvement in oxygenation. Consider prednisone 60mg daily starting 1/1 but will see how O2 requirements do.  -Appreciate ID involvement.    -negative Viral PCR   -Sputum culture negative   -high fungitell seen but no evidence of fungal infection so no antifungals added currently   -Empiric Zosyn, completed 7 days.   -PJP prophylaxis daily.   -Weaning O2 currently. Consider discharge planning when down to 4-5L O2. Today turned to 5L at rest so getting closer. Anticipate he will need O2 at discharge. I am happy to complete FMLA paperwork from him if needed.  -CXR in am      Subjective:   HPI:  Jim Titus is a 58 year old male with afib, PE on elliquis, melanoma previously on pembrolizumab last given September 2023 with ongoing pneumonitis presented with acute hypoxic respiratory failure requiring intubation, extubated and discharge on steroid taper who returns with PNA and acute resp failure. CTA found resolution of PE but new area of infiltrate and resolved area of previous infiltrate.      He was initially too hypoxic to tolerate bronchoscopy. Currently on Low-flow oxygen but still requiring 5L/min.         Allergies:  "  Allergies   Allergen Reactions    Chicken [Chicken Protein] Other (See Comments)     Throat closes to turkey as well.    Pembrolizumab Other (See Comments)     Severe pneumonitis    Wellbutrin [Bupropion] Unknown    Grass Pollen [Grass] Rash    Turkey [Poultry Meal] Rash        MEDS:  Scheduled Meds:   amiodarone  200 mg Oral Daily    apixaban ANTICOAGULANT  5 mg Oral BID    clonazePAM  0.5 mg Oral BID    [Held by provider] diltiazem ER COATED BEADS  240 mg Oral Daily    escitalopram  20 mg Oral Daily with lunch    ferrous sulfate  325 mg Oral Every Other Day    fluticasone  1 puff Inhalation BID    ipratropium - albuterol 0.5 mg/2.5 mg/3 mL  3 mL Nebulization 4x daily    methylPREDNISolone  62.5 mg Intravenous Q12H    mirtazapine  15 mg Oral At Bedtime    polyethylene glycol  17 g Oral Daily    QUEtiapine  25 mg Oral BID    QUEtiapine  50 mg Oral At Bedtime    simvastatin  40 mg Oral At Bedtime    sodium chloride  1 spray Both Nostrils 4x Daily    sodium chloride (PF)  3 mL Intracatheter Q8H    sulfamethoxazole-trimethoprim  1 tablet Oral Daily     Continuous Infusions:   - MEDICATION INSTRUCTIONS -       PRN Meds:.acetaminophen **OR** acetaminophen, acetaminophen, albuterol, calcium carbonate, guaiFENesin-codeine, ipratropium - albuterol 0.5 mg/2.5 mg/3 mL, lidocaine 4%, lidocaine (buffered or not buffered), loperamide, loratadine, melatonin, ondansetron **OR** ondansetron, - MEDICATION INSTRUCTIONS -, senna-docusate **OR** senna-docusate, sodium chloride (PF)    Objective:   VITALS:  BP (!) 145/86   Pulse 64   Temp 97.9  F (36.6  C) (Oral)   Resp 20   Ht 1.93 m (6' 4\")   Wt 112.9 kg (249 lb)   SpO2 92%   BMI 30.31 kg/m    EXAM:    GENERAL APPEARANCE: Alert, no acute distress  HENT: Oral mucosa and posterior oropharynx normal, moist mucous membranes  NECK: No adenopathy,masses or thyromegaly  RESP: lungs clear to auscultation bilaterally  CV: regular rate and rhythm, no murmur, rub or gallop  ABDOMEN: " normal bowel sounds, soft, nontender, no hepatosplenomegaly or other masses  LYMPHATICS: No cervical, or supraclavicular adenopathy  SKIN: no suspicious lesions or rashes  NEURO: Alert, oriented x 3, speech and mentation normal        I&O:        Intake/Output Summary (Last 24 hours) at 12/31/2023 1213  Last data filed at 12/31/2023 0600  Gross per 24 hour   Intake 900 ml   Output --   Net 900 ml           Data Review:    Imaging: personally reviewed  Chest CT  EXAM: CT CHEST PULMONARY EMBOLISM W CONTRAST  LOCATION: [facility]  DATE: 12/20/2023     INDICATION: Shortness of breath  COMPARISON: 11/13/2023  TECHNIQUE: CT chest pulmonary angiogram during arterial phase injection of IV contrast. Multiplanar reformats and MIP reconstructions were performed. Dose reduction techniques were used.   CONTRAST: [id number] 75ml     FINDINGS:  ANGIOGRAM CHEST: Previously noted left basilar pulmonary emboli not seen today. No pulmonary emboli noted today. No evidence for right ventricular heart strain. Thoracic aorta is negative for dissection. No CT evidence of right heart strain.     LUNGS AND PLEURA: Extensive patchy left lung infiltrate and atelectasis has developed since prior examination. Moderate clearing of extensive patchy right lung infiltrate. Mild residual right upper lobe infiltrate/atelectasis remains. Moderate to marked   elevation right hemidiaphragm unchanged.     MEDIASTINUM/AXILLAE: No lymphadenopathy. Normal esophagus. No significant pericardial effusion. Ectasia of the ascending thoracic aorta.     CORONARY ARTERY CALCIFICATION: Mild.     UPPER ABDOMEN: Unremarkable     MUSCULOSKELETAL: No concerning osseous abnormalities.                                                                      IMPRESSION:  1.  Previously noted left basilar pulmonary emboli not seen today. No pulmonary emboli noted today. No evidence for right ventricular heart strain.     2.  Extensive patchy left lung  infiltrate and atelectasis has developed since prior examination.      3.  Moderate clearing of extensive patchy right lung infiltrate. Mild residual right upper lobe infiltrate/atelectasis remains. Moderate to marked elevation right hemidiaphragm unchanged.     4.  Ectasia of the ascending thoracic aorta stable.      Results for orders placed or performed during the hospital encounter of 12/20/23   Basic metabolic panel   Result Value Ref Range    Sodium 144 135 - 145 mmol/L    Potassium 3.6 3.4 - 5.3 mmol/L    Chloride 105 98 - 107 mmol/L    Carbon Dioxide (CO2) 29 22 - 29 mmol/L    Anion Gap 10 7 - 15 mmol/L    Urea Nitrogen 24.2 (H) 6.0 - 20.0 mg/dL    Creatinine 0.98 0.67 - 1.17 mg/dL    GFR Estimate 89 >60 mL/min/1.73m2    Calcium 8.9 8.6 - 10.0 mg/dL    Glucose 147 (H) 70 - 99 mg/dL     Lab Results   Component Value Date    WBC 17.5 (H) 12/30/2023    HGB 8.6 (L) 12/30/2023    HCT 27.2 (L) 12/30/2023    MCV 90 12/30/2023     (H) 12/30/2023

## 2023-12-31 NOTE — PROGRESS NOTES
[facility]    Medicine Progress Note - Hospitalist Service    Date of Admission:  12/20/2023    Assessment & Plan   Jim Titus is a 58 year old male who was admitted on 12/20 for acute hypoxic respiratory failure possibly due to organizing pneumonia.  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  He has been treated with high-dose steroids with improved oxygen requirement.  Plan for prolonged steroid taper over 3 to 6 months for pulmonology. A-fib with RVR, started on diltiazem and amiodarone.         Acute respiratory failure with hypoxia  Keytruda pneumonitis  Possible cryptogenic organizing pneumonia.   Recent hospitalization in Oct and Nov for Keytruda pneumonitis requiring intubation.  This admission with respiratory failure could be due to ongoing pulmonary inflammation concern for organizing pneumonia.  Patient with high-dose steroid and completed a course of azithromycin and Zosyn per ID on 12/27.  -  Started methylprednisolone 62.5 mg q8h, decreased to BID on 12/30. Plan for a long taper   - PJP prophylaxis with Bactrim 12/30  - Continue duonebs, Flovent.   - Per pulmonology, may discharge when oxygen needs at 4-5L at rest     Bilateral pulmonary emboli  Continue Eliqus 5 mg BID    A-fib with RVR  Likely due to acute respiratory failure.  Was on diltiazem and amiodarone.    - Diltiazem was discontinued given tachycardia.  - Amiodarone 200 mg daily  - On Eliquis for CVA ppx    Iron deficiency anemia  Microcytic anemia  Hemoglobin 6.7, Status post 2 U PRBC on 12/22. Hemoglobin stable at 8.4.   - Ferrous sulfate 325 mg every other day     Melanoma  Seeing [place] [facility]   S/P 6 doses of pembrolizumab 200 mg IV every 3 weeks.  Last dose given 9/15/2023.     Patient with chronic tremor.  Intention tremor.   TSH suppressed, but free T4 normal.   MRI  "brain with/without contrast negative.  Outpatient follow up with neurology.  Repeat TSH in 6 to 8 weeks.           Diet: Regular Diet Adult  Snacks/Supplements Adult: Ensure Max Protein (bariatric); Between Meals    DVT Prophylaxis: DOAC  Lamas Catheter: Not present  Lines: None     Cardiac Monitoring: ACTIVE order. Indication: Afib  Code Status: Full Code      Clinically Significant Risk Factors              # Hypoalbuminemia: Lowest albumin = 3.1 g/dL at 12/20/2023  5:00 AM, will monitor as appropriate            # Obesity: Estimated body mass index is 30.31 kg/m  as calculated from the following:    Height as of this encounter: 1.93 m (6' 4\").    Weight as of this encounter: 112.9 kg (249 lb).        # Financial/Environmental Concerns: none  # Asthma: noted on problem list        Disposition Plan      Expected Discharge Date: 01/02/2024    Discharge Delays: IV Medication - consider oral or Home Infusion  Oxygen Needs - Arrange Home O2    Discharge Comments: IV steroids, O2 6 L NC from HFNC pending clinical progression.            Ching Gerardo MD  Hospitalist Service  [facility]  Securely message with [facility] (more info)  Text page via [employer] Paging/Directory   ______________________________________________________________________    Interval History   Feeling much better.  Was ambulating on the unit about significant shortness of breath.  Currently on 5 L nasal pendant rest.    Physical Exam   Vital Signs: Temp: 98.1  F (36.7  C) Temp src: Oral BP: 138/83 Pulse: 64   Resp: 20 SpO2: 92 % O2 Device: Oxymizer cannula Oxygen Delivery: 6 LPM  Weight: 249 lbs 0 oz    GENERAL:  Alert, appears comfortable, in no acute distress, appears stated age, obese   HEAD:  Normocephalic, without obvious abnormality, atraumatic   NECK: Supple, symmetrical, trachea midline   BACK:   Symmetric, no curvature, ROM normal   LUNGS:   Air movement throughout, minor crackles, on 7 L nasal pendant   CHEST WALL:  No " tenderness or deformity   HEART:  Regular rate and rhythm, S1 and S2 normal, no murmur    ABDOMEN:   Soft, non-tender, bowel sounds active , no masses, no organomegaly, no rebound or guarding   EXTREMITIES: Trace BLE edema    SKIN: No rashes in the visualized areas   NEURO: Alert, oriented x3, moves all four extremities freely   PSYCH: Cooperative, behavior is appropriate        Medical Decision Making       35 MINUTES SPENT BY ME on the date of service doing chart review, history, exam, documentation & further activities per the note.      Data     I have personally reviewed the following data over the past 24 hrs:    N/A  \   N/A   / N/A     N/A N/A N/A /  N/A   3.8 N/A N/A \       Imaging results reviewed over the past 24 hrs:   No results found for this or any previous visit (from the past 24 hour(s)).

## 2023-12-31 NOTE — PROVIDER NOTIFICATION
"/83 (BP Location: Left arm, Cuff Size: Adult Regular)   Pulse 64   Temp 98.1  F (36.7  C) (Oral)   Resp 20   Ht 1.93 m (6' 4\")   Wt 112.9 kg (249 lb)   SpO2 93%   BMI 30.31 kg/m      Pt remains on 5 lpm oxymizer. BS were clear/diminished throughout. Rt will continue to follow.       12/31/23 1518   Tech Time   $Tech Time (10 minute increments) 2   Vital Signs   Pulse 64   Oximeter Heart Rate 63 bpm   Oxygen Therapy   SpO2 93 %   O2 Device Oxymizer cannula   Oxygen Delivery 5 LPM   Nebulizer Assessment & Treatment   $RT Use ONLY Delivery Method Nebulizer - Additional   Nebulizer Device Mouthpiece   Pretreatment Heart Rate (beats/min) 58   Pretreatment Resp Rate (breaths/min) 14   Pretreatment O2 sats - (TCU only) 93   Pretreat Breath Sounds - Bilat - All Lobes clear;diminished   Breath Sounds Post-Respiratory Treatment   Posttreatment Heart Rate (beats/min) 59   Posttreatment Resp Rate (breaths/min) 14   Post treatment O2 Sats - (TCU only) 97   Breath Sounds Posttreatment All Fields All Fields   Breath Sounds Posttreatment All Fields no change       "

## 2024-01-01 ENCOUNTER — APPOINTMENT (OUTPATIENT)
Dept: RADIOLOGY | Facility: CLINIC | Age: 59
End: 2024-01-01
Attending: INTERNAL MEDICINE
Payer: COMMERCIAL

## 2024-01-01 LAB
BASOPHILS # BLD AUTO: ABNORMAL 10*3/UL
BASOPHILS # BLD MANUAL: 0 10E3/UL (ref 0–0.2)
BASOPHILS NFR BLD AUTO: ABNORMAL %
BASOPHILS NFR BLD MANUAL: 0 %
CREAT SERPL-MCNC: 0.84 MG/DL (ref 0.67–1.17)
CRP SERPL-MCNC: <3 MG/L
EGFRCR SERPLBLD CKD-EPI 2021: >90 ML/MIN/1.73M2
EOSINOPHIL # BLD AUTO: ABNORMAL 10*3/UL
EOSINOPHIL # BLD MANUAL: 0 10E3/UL (ref 0–0.7)
EOSINOPHIL NFR BLD AUTO: ABNORMAL %
EOSINOPHIL NFR BLD MANUAL: 0 %
ERYTHROCYTE [DISTWIDTH] IN BLOOD BY AUTOMATED COUNT: 19.7 % (ref 10–15)
GLUCOSE BLDC GLUCOMTR-MCNC: 121 MG/DL (ref 70–99)
GLUCOSE BLDC GLUCOMTR-MCNC: 122 MG/DL (ref 70–99)
GLUCOSE BLDC GLUCOMTR-MCNC: 152 MG/DL (ref 70–99)
GLUCOSE SERPL-MCNC: 125 MG/DL (ref 70–99)
HCT VFR BLD AUTO: 28.4 % (ref 40–53)
HGB BLD-MCNC: 8.9 G/DL (ref 13.3–17.7)
IMM GRANULOCYTES # BLD: ABNORMAL 10*3/UL
IMM GRANULOCYTES NFR BLD: ABNORMAL %
LYMPHOCYTES # BLD AUTO: ABNORMAL 10*3/UL
LYMPHOCYTES # BLD MANUAL: 1.1 10E3/UL (ref 0.8–5.3)
LYMPHOCYTES NFR BLD AUTO: ABNORMAL %
LYMPHOCYTES NFR BLD MANUAL: 7 %
MAGNESIUM SERPL-MCNC: 2.3 MG/DL (ref 1.7–2.3)
MCH RBC QN AUTO: 28.5 PG (ref 26.5–33)
MCHC RBC AUTO-ENTMCNC: 31.3 G/DL (ref 31.5–36.5)
MCV RBC AUTO: 91 FL (ref 78–100)
MONOCYTES # BLD AUTO: ABNORMAL 10*3/UL
MONOCYTES # BLD MANUAL: 0.6 10E3/UL (ref 0–1.3)
MONOCYTES NFR BLD AUTO: ABNORMAL %
MONOCYTES NFR BLD MANUAL: 4 %
NEUTROPHILS # BLD AUTO: ABNORMAL 10*3/UL
NEUTROPHILS # BLD MANUAL: 13.5 10E3/UL (ref 1.6–8.3)
NEUTROPHILS NFR BLD AUTO: ABNORMAL %
NEUTROPHILS NFR BLD MANUAL: 89 %
NRBC # BLD AUTO: 0 10E3/UL
NRBC BLD AUTO-RTO: 0 /100
PLAT MORPH BLD: ABNORMAL
PLATELET # BLD AUTO: 460 10E3/UL (ref 150–450)
POTASSIUM SERPL-SCNC: 4.3 MMOL/L (ref 3.4–5.3)
RBC # BLD AUTO: 3.12 10E6/UL (ref 4.4–5.9)
RBC MORPH BLD: ABNORMAL
WBC # BLD AUTO: 15.2 10E3/UL (ref 4–11)

## 2024-01-01 PROCEDURE — 99232 SBSQ HOSP IP/OBS MODERATE 35: CPT | Performed by: INTERNAL MEDICINE

## 2024-01-01 PROCEDURE — 85007 BL SMEAR W/DIFF WBC COUNT: CPT | Performed by: INTERNAL MEDICINE

## 2024-01-01 PROCEDURE — 250N000013 HC RX MED GY IP 250 OP 250 PS 637: Performed by: FAMILY MEDICINE

## 2024-01-01 PROCEDURE — 82947 ASSAY GLUCOSE BLOOD QUANT: CPT | Performed by: INTERNAL MEDICINE

## 2024-01-01 PROCEDURE — 94640 AIRWAY INHALATION TREATMENT: CPT

## 2024-01-01 PROCEDURE — 250N000009 HC RX 250: Performed by: INTERNAL MEDICINE

## 2024-01-01 PROCEDURE — 999N000157 HC STATISTIC RCP TIME EA 10 MIN

## 2024-01-01 PROCEDURE — 82565 ASSAY OF CREATININE: CPT | Performed by: INTERNAL MEDICINE

## 2024-01-01 PROCEDURE — 83735 ASSAY OF MAGNESIUM: CPT | Performed by: INTERNAL MEDICINE

## 2024-01-01 PROCEDURE — 84132 ASSAY OF SERUM POTASSIUM: CPT | Performed by: INTERNAL MEDICINE

## 2024-01-01 PROCEDURE — 71045 X-RAY EXAM CHEST 1 VIEW: CPT

## 2024-01-01 PROCEDURE — 36415 COLL VENOUS BLD VENIPUNCTURE: CPT | Performed by: INTERNAL MEDICINE

## 2024-01-01 PROCEDURE — 250N000013 HC RX MED GY IP 250 OP 250 PS 637: Performed by: INTERNAL MEDICINE

## 2024-01-01 PROCEDURE — 85027 COMPLETE CBC AUTOMATED: CPT | Performed by: INTERNAL MEDICINE

## 2024-01-01 PROCEDURE — 120N000013 HC R&B IMCU

## 2024-01-01 PROCEDURE — 86140 C-REACTIVE PROTEIN: CPT | Performed by: INTERNAL MEDICINE

## 2024-01-01 PROCEDURE — 94640 AIRWAY INHALATION TREATMENT: CPT | Mod: 76

## 2024-01-01 PROCEDURE — 250N000011 HC RX IP 250 OP 636: Performed by: INTERNAL MEDICINE

## 2024-01-01 RX ORDER — IPRATROPIUM BROMIDE AND ALBUTEROL SULFATE 2.5; .5 MG/3ML; MG/3ML
3 SOLUTION RESPIRATORY (INHALATION) 2 TIMES DAILY
Status: DISCONTINUED | OUTPATIENT
Start: 2024-01-02 | End: 2024-01-03 | Stop reason: HOSPADM

## 2024-01-01 RX ORDER — FUROSEMIDE 10 MG/ML
40 INJECTION INTRAMUSCULAR; INTRAVENOUS ONCE
Status: COMPLETED | OUTPATIENT
Start: 2024-01-01 | End: 2024-01-01

## 2024-01-01 RX ORDER — PREDNISONE 20 MG/1
60 TABLET ORAL DAILY
Status: DISCONTINUED | OUTPATIENT
Start: 2024-01-02 | End: 2024-01-03 | Stop reason: HOSPADM

## 2024-01-01 RX ADMIN — APIXABAN 5 MG: 5 TABLET, FILM COATED ORAL at 21:17

## 2024-01-01 RX ADMIN — APIXABAN 5 MG: 5 TABLET, FILM COATED ORAL at 08:19

## 2024-01-01 RX ADMIN — CLONAZEPAM 0.5 MG: 0.5 TABLET ORAL at 08:19

## 2024-01-01 RX ADMIN — LORATADINE 10 MG: 10 TABLET ORAL at 09:59

## 2024-01-01 RX ADMIN — SALINE NASAL SPRAY 1 SPRAY: 1.5 SOLUTION NASAL at 22:34

## 2024-01-01 RX ADMIN — FUROSEMIDE 40 MG: 10 INJECTION, SOLUTION INTRAMUSCULAR; INTRAVENOUS at 13:50

## 2024-01-01 RX ADMIN — SALINE NASAL SPRAY 1 SPRAY: 1.5 SOLUTION NASAL at 08:20

## 2024-01-01 RX ADMIN — QUETIAPINE FUMARATE 25 MG: 25 TABLET ORAL at 13:18

## 2024-01-01 RX ADMIN — IPRATROPIUM BROMIDE AND ALBUTEROL SULFATE 3 ML: .5; 3 SOLUTION RESPIRATORY (INHALATION) at 20:12

## 2024-01-01 RX ADMIN — SIMVASTATIN 40 MG: 20 TABLET, FILM COATED ORAL at 22:34

## 2024-01-01 RX ADMIN — ESCITALOPRAM OXALATE 20 MG: 20 TABLET ORAL at 13:18

## 2024-01-01 RX ADMIN — METHYLPREDNISOLONE SODIUM SUCCINATE 62.5 MG: 125 INJECTION, POWDER, FOR SOLUTION INTRAMUSCULAR; INTRAVENOUS at 05:11

## 2024-01-01 RX ADMIN — METHYLPREDNISOLONE SODIUM SUCCINATE 62.5 MG: 125 INJECTION, POWDER, FOR SOLUTION INTRAMUSCULAR; INTRAVENOUS at 17:53

## 2024-01-01 RX ADMIN — SULFAMETHOXAZOLE AND TRIMETHOPRIM 1 TABLET: 800; 160 TABLET ORAL at 08:19

## 2024-01-01 RX ADMIN — AMIODARONE HYDROCHLORIDE 200 MG: 200 TABLET ORAL at 08:19

## 2024-01-01 RX ADMIN — IPRATROPIUM BROMIDE AND ALBUTEROL SULFATE 3 ML: .5; 3 SOLUTION RESPIRATORY (INHALATION) at 08:10

## 2024-01-01 RX ADMIN — SALINE NASAL SPRAY 1 SPRAY: 1.5 SOLUTION NASAL at 17:53

## 2024-01-01 RX ADMIN — MIRTAZAPINE 15 MG: 15 TABLET, FILM COATED ORAL at 23:28

## 2024-01-01 RX ADMIN — IPRATROPIUM BROMIDE AND ALBUTEROL SULFATE 3 ML: .5; 3 SOLUTION RESPIRATORY (INHALATION) at 11:13

## 2024-01-01 RX ADMIN — CLONAZEPAM 0.5 MG: 0.5 TABLET ORAL at 22:34

## 2024-01-01 RX ADMIN — FLUTICASONE PROPIONATE 1 PUFF: 110 AEROSOL, METERED RESPIRATORY (INHALATION) at 21:17

## 2024-01-01 RX ADMIN — QUETIAPINE FUMARATE 25 MG: 25 TABLET ORAL at 08:19

## 2024-01-01 RX ADMIN — FLUTICASONE PROPIONATE 1 PUFF: 110 AEROSOL, METERED RESPIRATORY (INHALATION) at 05:20

## 2024-01-01 RX ADMIN — SALINE NASAL SPRAY 1 SPRAY: 1.5 SOLUTION NASAL at 13:18

## 2024-01-01 RX ADMIN — IPRATROPIUM BROMIDE AND ALBUTEROL SULFATE 3 ML: .5; 3 SOLUTION RESPIRATORY (INHALATION) at 15:29

## 2024-01-01 RX ADMIN — QUETIAPINE FUMARATE 50 MG: 50 TABLET ORAL at 22:34

## 2024-01-01 ASSESSMENT — ACTIVITIES OF DAILY LIVING (ADL)
ADLS_ACUITY_SCORE: 20

## 2024-01-01 NOTE — PROGRESS NOTES
PULMONARY PROGRESS NOTE    Date / Time of Admission:  12/20/2023  4:29 AM  Assessment:   58yoM with history of metastatic melanoma complicated by keytruda-induced pneumonitis in August, respiratory failure with what appears to be organizing pneumonia  requiring intubation in November, extubated but now flared again with steroid taper, improved after steroids resumed.     Active Problems:    Acute respiratory failure with hypoxia (H)    Pneumonia due to infectious organism, unspecified laterality, unspecified part of lung    Sepsis, due to unspecified organism, unspecified whether acute organ dysfunction present (H)      Plan:   Prolonged steroid taper need proven given flare and migrating infiltrate consistent with organizing pneumonia. Methylprednisolone 60mg every 12 today and will change to prednisone 60mg daily starting 1/2.  -Trending CRP, initially 280, repeat undetectable which follows his clinical course.   -Appreciate ID involvement.    -negative Viral PCR   -Sputum culture negative   -high fungitell seen but no evidence of fungal infection so no antifungals added currently   -Empiric Zosyn, completed 7 days.   -PJP prophylaxis daily.   -Weaning O2 currently. Consider discharge planning when down to 4-5L O2. Continues at 5L and doing well, getting closer. Anticipate he will need O2 at discharge. I am happy to complete FMLA paperwork from him if needed.  -CXR looks similar maybe slightly improved  -Creatinine pending, if ok will give 1-time dose of lasix.       Subjective:   HPI:  Jim Titus is a 58 year old male with afib, PE on elliquis, melanoma previously on pembrolizumab last given September 2023 with ongoing pneumonitis presented with acute hypoxic respiratory failure requiring intubation, extubated and discharge on steroid taper who returns with PNA and acute resp failure. CTA found resolution of PE but new area of infiltrate and resolved area of previous infiltrate.      He was initially too  "hypoxic to tolerate bronchoscopy. Currently on Low-flow oxygen but still requiring 5L/min.         Allergies:   Allergies   Allergen Reactions    Chicken [Chicken Protein] Other (See Comments)     Throat closes to turkey as well.    Pembrolizumab Other (See Comments)     Severe pneumonitis    Wellbutrin [Bupropion] Unknown    Grass Pollen [Grass] Rash    Turkey [Poultry Meal] Rash        MEDS:  Scheduled Meds:   amiodarone  200 mg Oral Daily    apixaban ANTICOAGULANT  5 mg Oral BID    clonazePAM  0.5 mg Oral BID    [Held by provider] diltiazem ER COATED BEADS  240 mg Oral Daily    escitalopram  20 mg Oral Daily with lunch    ferrous sulfate  325 mg Oral Every Other Day    fluticasone  1 puff Inhalation BID    ipratropium - albuterol 0.5 mg/2.5 mg/3 mL  3 mL Nebulization 4x daily    methylPREDNISolone  62.5 mg Intravenous Q12H    mirtazapine  15 mg Oral At Bedtime    polyethylene glycol  17 g Oral Daily    QUEtiapine  25 mg Oral BID    QUEtiapine  50 mg Oral At Bedtime    simvastatin  40 mg Oral At Bedtime    sodium chloride  1 spray Both Nostrils 4x Daily    sodium chloride (PF)  3 mL Intracatheter Q8H    sulfamethoxazole-trimethoprim  1 tablet Oral Daily     Continuous Infusions:   - MEDICATION INSTRUCTIONS -       PRN Meds:.acetaminophen **OR** acetaminophen, acetaminophen, albuterol, calcium carbonate, guaiFENesin-codeine, ipratropium - albuterol 0.5 mg/2.5 mg/3 mL, lidocaine 4%, lidocaine (buffered or not buffered), loperamide, loratadine, melatonin, ondansetron **OR** ondansetron, - MEDICATION INSTRUCTIONS -, senna-docusate **OR** senna-docusate, sodium chloride (PF)    Objective:   VITALS:  BP (!) 145/82 (BP Location: Left arm, Cuff Size: Adult Regular)   Pulse 79   Temp 97.6  F (36.4  C)   Resp 18   Ht 1.93 m (6' 4\")   Wt 114.8 kg (253 lb)   SpO2 91%   BMI 30.80 kg/m    EXAM:    GENERAL APPEARANCE: Alert, no acute distress  HENT: Oral mucosa and posterior oropharynx normal, moist mucous " membranes  NECK: No adenopathy,masses or thyromegaly  RESP: lungs clear to auscultation bilaterally  CV: regular rate and rhythm, no murmur, rub or gallop  ABDOMEN: normal bowel sounds, soft, nontender, no hepatosplenomegaly or other masses  LYMPHATICS: No cervical, or supraclavicular adenopathy  SKIN: no suspicious lesions or rashes  NEURO: Alert, oriented x 3, speech and mentation normal        I&O:        Intake/Output Summary (Last 24 hours) at 1/1/2024 1323  Last data filed at 1/1/2024 0600  Gross per 24 hour   Intake 570 ml   Output --   Net 570 ml             Data Review:    Imaging: personally reviewed  Chest CT  EXAM: CT CHEST PULMONARY EMBOLISM W CONTRAST  LOCATION: [facility]  DATE: 12/20/2023     INDICATION: Shortness of breath  COMPARISON: 11/13/2023  TECHNIQUE: CT chest pulmonary angiogram during arterial phase injection of IV contrast. Multiplanar reformats and MIP reconstructions were performed. Dose reduction techniques were used.   CONTRAST: [id number] 75ml     FINDINGS:  ANGIOGRAM CHEST: Previously noted left basilar pulmonary emboli not seen today. No pulmonary emboli noted today. No evidence for right ventricular heart strain. Thoracic aorta is negative for dissection. No CT evidence of right heart strain.     LUNGS AND PLEURA: Extensive patchy left lung infiltrate and atelectasis has developed since prior examination. Moderate clearing of extensive patchy right lung infiltrate. Mild residual right upper lobe infiltrate/atelectasis remains. Moderate to marked   elevation right hemidiaphragm unchanged.     MEDIASTINUM/AXILLAE: No lymphadenopathy. Normal esophagus. No significant pericardial effusion. Ectasia of the ascending thoracic aorta.     CORONARY ARTERY CALCIFICATION: Mild.     UPPER ABDOMEN: Unremarkable     MUSCULOSKELETAL: No concerning osseous abnormalities.                                                                      IMPRESSION:  1.  Previously noted  left basilar pulmonary emboli not seen today. No pulmonary emboli noted today. No evidence for right ventricular heart strain.     2.  Extensive patchy left lung infiltrate and atelectasis has developed since prior examination.      3.  Moderate clearing of extensive patchy right lung infiltrate. Mild residual right upper lobe infiltrate/atelectasis remains. Moderate to marked elevation right hemidiaphragm unchanged.     4.  Ectasia of the ascending thoracic aorta stable.    EXAM: XR CHEST PORT 1 VIEW  LOCATION: [facility]  DATE: 1/1/2024     INDICATION: Organizing pneumonia.  COMPARISON: Chest radiograph 12/24/2023. CT chest 12/20/2023.                                                                      IMPRESSION: Similar left lung multifocal infection and probable trace left pleural effusion. Similar right suprahilar opacity. No new focal consolidation, or pneumothorax. Similar cardiomediastinal silhouette.      Results for orders placed or performed during the hospital encounter of 12/20/23   Basic metabolic panel   Result Value Ref Range    Sodium 144 135 - 145 mmol/L    Potassium 3.6 3.4 - 5.3 mmol/L    Chloride 105 98 - 107 mmol/L    Carbon Dioxide (CO2) 29 22 - 29 mmol/L    Anion Gap 10 7 - 15 mmol/L    Urea Nitrogen 24.2 (H) 6.0 - 20.0 mg/dL    Creatinine 0.98 0.67 - 1.17 mg/dL    GFR Estimate 89 >60 mL/min/1.73m2    Calcium 8.9 8.6 - 10.0 mg/dL    Glucose 147 (H) 70 - 99 mg/dL     Lab Results   Component Value Date    WBC 15.2 (H) 01/01/2024    HGB 8.9 (L) 01/01/2024    HCT 28.4 (L) 01/01/2024    MCV 91 01/01/2024     (H) 01/01/2024

## 2024-01-01 NOTE — PLAN OF CARE
Problem: Adult Inpatient Plan of Care  Goal: Optimal Comfort and Wellbeing  Outcome: Progressing    Problem: Gas Exchange Impaired  Goal: Optimal Gas Exchange  Outcome: Progressing     Patient Aox4, pleasant, cooperative. Denies pain. Vitally stable on 4-6L oxygen at rest and 8-10L oxygen with activity. Patient has ambulated x4 laps (3N around 3S) during day shift. Desats to mid 80s but has been recovering well with rest.

## 2024-01-01 NOTE — PLAN OF CARE
Goal Outcome Evaluation:    Problem: Gas Exchange Impaired  Goal: Optimal Gas Exchange  Outcome: Progressing  Intervention: Optimize Oxygenation and Ventilation  Recent Flowsheet Documentation  Taken 1/1/2024 0515 by Elke Aguilar RN  Head of Bed (HOB) Positioning: HOB at 30-45 degrees  Taken 1/1/2024 0000 by Elke Aguilar RN  Head of Bed (HOB) Positioning: HOB at 30-45 degrees     Problem: Infection  Goal: Absence of Infection Signs and Symptoms  Outcome: Progressing     Problem: Anemia  Goal: Anemia Symptom Improvement  Outcome: Progressing  Intervention: Monitor and Manage Anemia  Recent Flowsheet Documentation  Taken 1/1/2024 0515 by Elke Aguilar RN  Safety Promotion/Fall Prevention:   clutter free environment maintained   room door open   room near nurse's station   safety round/check completed  Taken 1/1/2024 0000 by Elke Aguilar RN  Safety Promotion/Fall Prevention:   clutter free environment maintained   room door open   room near nurse's station   safety round/check completed   Pt slept well on his abdomen. VSS afebrile. Up to bathroom with SBA and tolerates activity. On 5l/oxymizer, sats mid 90's. Denies pain. Call light in reach to make needs known.

## 2024-01-01 NOTE — PLAN OF CARE
Patient currently on 5L oxymizer.  Walks in [place] with staff on 10L oxymask and does desat into the 80s, but remains asymptomatic. Denies other complaints.  Problem: Gas Exchange Impaired  Goal: Optimal Gas Exchange  Outcome: Progressing  Intervention: Optimize Oxygenation and Ventilation  Recent Flowsheet Documentation  Taken 12/31/2023 1600 by Lalita Verdugo, RN  Head of Bed (HOB) Positioning: HOB at 45 degrees  Taken 12/31/2023 1200 by Lalita Verdugo, RN  Head of Bed (HOB) Positioning: HOB at 45 degrees  Taken 12/31/2023 0800 by Lalita Verdugo, RN  Head of Bed (HOB) Positioning: HOB at 45 degrees   Goal Outcome Evaluation:

## 2024-01-01 NOTE — PROGRESS NOTES
[facility]    Medicine Progress Note - Hospitalist Service    Date of Admission:  12/20/2023    Assessment & Plan   Jim Titus is a 58 year old male who was admitted on 12/20 for acute hypoxic respiratory failure possibly due to organizing pneumonia.  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  He has been treated with high-dose steroids with improved oxygen requirement.  Plan for prolonged steroid taper over 3 to 6 months for pulmonology. A-fib with RVR, started on diltiazem and amiodarone.         Acute respiratory failure with hypoxia  Keytruda pneumonitis  Possible cryptogenic organizing pneumonia.   Recent hospitalization in Oct and Nov for Keytruda pneumonitis requiring intubation.  This admission with respiratory failure could be due to ongoing pulmonary inflammation concern for organizing pneumonia.  Patient with high-dose steroid and completed a course of azithromycin and Zosyn per ID on 12/27.  - Started methylprednisolone and de-escalated to 60mg prednisone (1/1/24-)  - PJP prophylaxis with Bactrim 12/30  - Continue duonebs, Flovent.   - Per pulmonology, may discharge when oxygen needs at 4-5L at rest  - Will check glucose level to evaluate for steroid induced hyperglycemia      Bilateral pulmonary emboli  Continue Eliqus 5 mg BID    A-fib with RVR  Likely due to acute respiratory failure.  Was on diltiazem and amiodarone.    - Diltiazem was held given bradycardia  - Amiodarone 200 mg daily  - On Eliquis for CVA ppx    Iron deficiency anemia  Microcytic anemia  Hemoglobin 6.7, Status post 2 U PRBC on 12/22. Hemoglobin stable at 8.4.   - Ferrous sulfate 325 mg every other day     Melanoma  Seeing [place] [facility]   S/P 6 doses of pembrolizumab 200 mg IV every 3 weeks.  Last dose given 9/15/2023.     Patient with chronic tremor.  Intention  "tremor.   TSH suppressed, but free T4 normal.   MRI brain with/without contrast negative.  Outpatient follow up with neurology.  Repeat TSH in 6 to 8 weeks.           Diet: Regular Diet Adult  Snacks/Supplements Adult: Ensure Max Protein (bariatric); Between Meals    DVT Prophylaxis: DOAC  Lamas Catheter: Not present  Lines: None     Cardiac Monitoring: ACTIVE order. Indication: Afib  Code Status: Full Code      Clinically Significant Risk Factors              # Hypoalbuminemia: Lowest albumin = 3.1 g/dL at 12/20/2023  5:00 AM, will monitor as appropriate            # Obesity: Estimated body mass index is 30.8 kg/m  as calculated from the following:    Height as of this encounter: 1.93 m (6' 4\").    Weight as of this encounter: 114.8 kg (253 lb).        # Financial/Environmental Concerns: none  # Asthma: noted on problem list        Disposition Plan      Expected Discharge Date: 01/03/2024    Discharge Delays: IV Medication - consider oral or Home Infusion  Oxygen Needs - Arrange Home O2    Discharge Comments: IV steroids, O2 6 L NC from HFNC pending clinical progression.            Ching Gerardo MD  Hospitalist Service  [facility]  Securely message with [employer] (more info)  Text page via [employer] Paging/Directory   ______________________________________________________________________    Interval History   Continues to feel better.  Currently on 5 L nasal pendant rest.    Physical Exam   Vital Signs: Temp: 97.6  F (36.4  C) Temp src: Oral BP: (!) 145/82 Pulse: 69   Resp: 20 SpO2: 92 % O2 Device: Nasal cannula Oxygen Delivery: 4 LPM  Weight: 253 lbs 0 oz    GENERAL:  Alert, appears comfortable, in no acute distress, appears stated age, obese   HEAD:  Normocephalic, without obvious abnormality, atraumatic   NECK: Supple, symmetrical, trachea midline   BACK:   Symmetric, no curvature, ROM normal   LUNGS:   Air movement throughout, minor crackles, on 7 L nasal pendant   CHEST WALL:  No tenderness " or deformity   HEART:  Regular rate and rhythm, S1 and S2 normal, no murmur    ABDOMEN:   Soft, non-tender, bowel sounds active , no masses, no organomegaly, no rebound or guarding   EXTREMITIES: Trace BLE edema    SKIN: No rashes in the visualized areas   NEURO: Alert, oriented x3, moves all four extremities freely   PSYCH: Cooperative, behavior is appropriate        Medical Decision Making       35 MINUTES SPENT BY ME on the date of service doing chart review, history, exam, documentation & further activities per the note.      Data     I have personally reviewed the following data over the past 24 hrs:    15.2 (H)  \   8.9 (L)   / 460 (H)     N/A N/A N/A /  125 (H)   4.3 N/A 0.84 \     Procal: N/A CRP: <3.00 Lactic Acid: N/A         Imaging results reviewed over the past 24 hrs:   Recent Results (from the past 24 hour(s))   XR Chest Port 1 View    Narrative    EXAM: XR CHEST PORT 1 VIEW  LOCATION: [facility]  DATE: 1/1/2024    INDICATION: Organizing pneumonia.  COMPARISON: Chest radiograph 12/24/2023. CT chest 12/20/2023.      Impression    IMPRESSION: Similar left lung multifocal infection and probable trace left pleural effusion. Similar right suprahilar opacity. No new focal consolidation, or pneumothorax. Similar cardiomediastinal silhouette.

## 2024-01-01 NOTE — PROGRESS NOTES
Pt is currently on a 4L NC.  Sating 91-94%, HR 69-89, RR 18-20, BBS- Clear/Diminished.  Duoneb ordered QID.  Continuous oximeter on.  RT will continue to monitor.

## 2024-01-02 DIAGNOSIS — J84.89 ORGANIZING PNEUMONIA (H): Primary | ICD-10-CM

## 2024-01-02 LAB
ATRIAL RATE - MUSE: 60 BPM
DIASTOLIC BLOOD PRESSURE - MUSE: NORMAL MMHG
GLUCOSE BLDC GLUCOMTR-MCNC: 102 MG/DL (ref 70–99)
INTERPRETATION ECG - MUSE: NORMAL
MAGNESIUM SERPL-MCNC: 2.2 MG/DL (ref 1.7–2.3)
P AXIS - MUSE: 31 DEGREES
POTASSIUM SERPL-SCNC: 3.9 MMOL/L (ref 3.4–5.3)
PR INTERVAL - MUSE: 152 MS
QRS DURATION - MUSE: 94 MS
QT - MUSE: 440 MS
QTC - MUSE: 440 MS
R AXIS - MUSE: 60 DEGREES
SYSTOLIC BLOOD PRESSURE - MUSE: NORMAL MMHG
T AXIS - MUSE: 20 DEGREES
VENTRICULAR RATE- MUSE: 60 BPM

## 2024-01-02 PROCEDURE — 93005 ELECTROCARDIOGRAM TRACING: CPT | Performed by: REGISTERED NURSE

## 2024-01-02 PROCEDURE — 94640 AIRWAY INHALATION TREATMENT: CPT

## 2024-01-02 PROCEDURE — 250N000013 HC RX MED GY IP 250 OP 250 PS 637: Performed by: FAMILY MEDICINE

## 2024-01-02 PROCEDURE — 93010 ELECTROCARDIOGRAM REPORT: CPT | Performed by: INTERNAL MEDICINE

## 2024-01-02 PROCEDURE — 250N000013 HC RX MED GY IP 250 OP 250 PS 637: Performed by: INTERNAL MEDICINE

## 2024-01-02 PROCEDURE — 93005 ELECTROCARDIOGRAM TRACING: CPT

## 2024-01-02 PROCEDURE — 250N000012 HC RX MED GY IP 250 OP 636 PS 637: Performed by: INTERNAL MEDICINE

## 2024-01-02 PROCEDURE — 84132 ASSAY OF SERUM POTASSIUM: CPT | Performed by: INTERNAL MEDICINE

## 2024-01-02 PROCEDURE — 83735 ASSAY OF MAGNESIUM: CPT | Performed by: INTERNAL MEDICINE

## 2024-01-02 PROCEDURE — 99232 SBSQ HOSP IP/OBS MODERATE 35: CPT | Performed by: INTERNAL MEDICINE

## 2024-01-02 PROCEDURE — 99207 PR NO CHARGE LOS: CPT | Performed by: REGISTERED NURSE

## 2024-01-02 PROCEDURE — 94640 AIRWAY INHALATION TREATMENT: CPT | Mod: 76

## 2024-01-02 PROCEDURE — 120N000004 HC R&B MS OVERFLOW

## 2024-01-02 PROCEDURE — 999N000157 HC STATISTIC RCP TIME EA 10 MIN

## 2024-01-02 PROCEDURE — 36415 COLL VENOUS BLD VENIPUNCTURE: CPT | Performed by: INTERNAL MEDICINE

## 2024-01-02 PROCEDURE — 250N000009 HC RX 250: Performed by: INTERNAL MEDICINE

## 2024-01-02 RX ORDER — CLONAZEPAM 0.5 MG/1
0.5 TABLET ORAL 2 TIMES DAILY PRN
Status: DISCONTINUED | OUTPATIENT
Start: 2024-01-02 | End: 2024-01-03 | Stop reason: HOSPADM

## 2024-01-02 RX ADMIN — PREDNISONE 60 MG: 20 TABLET ORAL at 08:29

## 2024-01-02 RX ADMIN — SALINE NASAL SPRAY 1 SPRAY: 1.5 SOLUTION NASAL at 17:59

## 2024-01-02 RX ADMIN — SALINE NASAL SPRAY 1 SPRAY: 1.5 SOLUTION NASAL at 13:06

## 2024-01-02 RX ADMIN — QUETIAPINE FUMARATE 25 MG: 25 TABLET ORAL at 13:06

## 2024-01-02 RX ADMIN — FLUTICASONE PROPIONATE 1 PUFF: 110 AEROSOL, METERED RESPIRATORY (INHALATION) at 22:03

## 2024-01-02 RX ADMIN — SALINE NASAL SPRAY 1 SPRAY: 1.5 SOLUTION NASAL at 22:03

## 2024-01-02 RX ADMIN — MIRTAZAPINE 15 MG: 15 TABLET, FILM COATED ORAL at 23:03

## 2024-01-02 RX ADMIN — IPRATROPIUM BROMIDE AND ALBUTEROL SULFATE 3 ML: .5; 3 SOLUTION RESPIRATORY (INHALATION) at 08:06

## 2024-01-02 RX ADMIN — APIXABAN 5 MG: 5 TABLET, FILM COATED ORAL at 08:29

## 2024-01-02 RX ADMIN — APIXABAN 5 MG: 5 TABLET, FILM COATED ORAL at 20:58

## 2024-01-02 RX ADMIN — CLONAZEPAM 0.5 MG: 0.5 TABLET ORAL at 22:03

## 2024-01-02 RX ADMIN — LORATADINE 10 MG: 10 TABLET ORAL at 09:27

## 2024-01-02 RX ADMIN — IPRATROPIUM BROMIDE AND ALBUTEROL SULFATE 3 ML: .5; 3 SOLUTION RESPIRATORY (INHALATION) at 20:36

## 2024-01-02 RX ADMIN — FLUTICASONE PROPIONATE 1 PUFF: 110 AEROSOL, METERED RESPIRATORY (INHALATION) at 08:32

## 2024-01-02 RX ADMIN — ESCITALOPRAM OXALATE 20 MG: 20 TABLET ORAL at 13:06

## 2024-01-02 RX ADMIN — SULFAMETHOXAZOLE AND TRIMETHOPRIM 1 TABLET: 800; 160 TABLET ORAL at 08:29

## 2024-01-02 RX ADMIN — CLONAZEPAM 0.5 MG: 0.5 TABLET ORAL at 08:29

## 2024-01-02 RX ADMIN — AMIODARONE HYDROCHLORIDE 200 MG: 200 TABLET ORAL at 08:29

## 2024-01-02 RX ADMIN — QUETIAPINE FUMARATE 25 MG: 25 TABLET ORAL at 08:29

## 2024-01-02 RX ADMIN — SIMVASTATIN 40 MG: 20 TABLET, FILM COATED ORAL at 22:03

## 2024-01-02 RX ADMIN — FERROUS SULFATE TAB 325 MG (65 MG ELEMENTAL FE) 325 MG: 325 (65 FE) TAB at 08:29

## 2024-01-02 RX ADMIN — SALINE NASAL SPRAY 1 SPRAY: 1.5 SOLUTION NASAL at 08:32

## 2024-01-02 ASSESSMENT — ACTIVITIES OF DAILY LIVING (ADL)
ADLS_ACUITY_SCORE: 20

## 2024-01-02 NOTE — PROVIDER NOTIFICATION
01/01/24 2000   Tech Time   $Tech Time (10 minute increments) 3   Vital Signs   Resp 18   Pulse Rate Source Monitor   Oximeter Heart Rate 72 bpm   Patient Position Semi-Chappell's   Oxygen Therapy   Daily Review of Necessity (O2 Therapy) completed   Flow (L/min) (Oxygen Therapy) 3   Oxygen Concentration (%) 32   Device (Oxygen Therapy) nasal cannula   Oxygen Therapy   SpO2 93 %   O2 Device Nasal cannula   FiO2 (%) 32 %   Oxygen Delivery 3 LPM   Assessment   Respiratory WDL WDL   Rhythm/Pattern, Respiratory depth regular;pattern regular;unlabored   Expansion/Accessory Muscles/Retractions expansion symmetric   Cough Frequency infrequent   Cough Type dry   Airway Safety Measures all equipment/monitors on and audible   Breath Sounds   Breath Sounds All Fields   All Lung Fields Breath Sounds Posterior:;clear   JENNA Breath Sounds clear   LLL Breath Sounds clear   RUL Breath Sounds clear   RML Breath Sounds clear   RLL Breath Sounds clear   Nebulizer Assessment & Treatment   $RT Use ONLY Delivery Method Nebulizer - Additional   Daily Review of Necessity (SVN) completed   Nebulizer Device Mouthpiece   Pretreat Breath Sounds - Bilat - All Lobes clear   Pretreat Breath Sounds - JENNA clear   Pretreat Breath Sounds - LLL clear   Pretreat Breath Sounds - RUL clear   Pretreat Breath Sounds - RML clear   Pretreat Breath Sounds - RLL clear   Patient Position HOB elevated   Respiratory Treatment Status (SVN) given   Breath Sounds Post-Respiratory Treatment   Posttreatment Assessment (SVN) breath sounds unchanged   Signs of Intolerance (SVN) none   Breath Sounds Posttreatment All Fields All Fields   Breath Sounds Posttreatment All Fields no change   Breath Sounds Posttreatment JENNA Posterior:;clear   Breath Sounds Posttreatment LLL clear   Breath Sounds Posttreatment RUL clear   Breath Sounds Posttreatment RML clear   Breath Sounds Posttreatment RLL clear     Pt tolerated neb well. Posterior LS pre/post: clear. Remains on humidified  3LNC. RT to follow as needed.

## 2024-01-02 NOTE — CONSULTS
Psychiatry Consultation; Follow up              Reason for Consult, requesting source:    Tremor; concern related to Seroquel or Remeron    Requesting source: Denia Baca    Labs and imaging reviewed. Provider contacted with recommendations.               Interim history:    Psychiatry consulted today regarding presence of tremor, with concern there could be a component related to Seroquel or Remeron.     Jim Titus is a 58 year old male who was admitted on 12/20 for acute hypoxic respiratory failure possibly due to cryptogenic organizing pneumonia.  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  He has been treated with high-dose steroids with improved oxygen requirement.  Plan for prolonged steroid taper per pulmonology.    Per provider note:   Patient with chronic tremor.  Intention tremor.   TSH suppressed, but free T4 normal.   MRI brain with/without contrast negative.  Outpatient follow up with neurology.  Repeat TSH in 6 to 8 weeks.   Possibly related to steroids?    Today, I spoke with patient's provider and patient states that his tremor started a few weeks ago. Patient believes that his tremor began around the same time as his steroid initiation. Provider also reports that tremor appears quite pronounced.        Current Medications:      amiodarone  200 mg Oral Daily    apixaban ANTICOAGULANT  5 mg Oral BID    [Held by provider] diltiazem ER COATED BEADS  240 mg Oral Daily    escitalopram  20 mg Oral Daily with lunch    ferrous sulfate  325 mg Oral Every Other Day    fluticasone  1 puff Inhalation BID    ipratropium - albuterol 0.5 mg/2.5 mg/3 mL  3 mL Nebulization BID    mirtazapine  15 mg Oral At Bedtime    polyethylene glycol  17 g Oral Daily    predniSONE  60 mg Oral Daily    QUEtiapine  25 mg Oral BID    QUEtiapine  50 mg Oral At Bedtime    simvastatin   "40 mg Oral At Bedtime    sodium chloride  1 spray Both Nostrils 4x Daily    sodium chloride (PF)  3 mL Intracatheter Q8H    sulfamethoxazole-trimethoprim  1 tablet Oral Daily     Vital signs:  Temp: 98  F (36.7  C) Temp src: Oral BP: 132/78 Pulse: 73   Resp: 20 SpO2: 90 % O2 Device: Nasal cannula Oxygen Delivery: 3 LPM Height: 193 cm (6' 4\") Weight: 114.8 kg (253 lb)  Estimated body mass index is 30.8 kg/m  as calculated from the following:    Height as of this encounter: 1.93 m (6' 4\").    Weight as of this encounter: 114.8 kg (253 lb).    Qtc: 440 on 1/2/2024         DSM-5 Diagnosis:   Unspecified Depressive Disorder, by history  Generalized Anxiety Disorder, by history          Assessment:   It appears that patient termor began a few weeks ago when he began taking prescription oral steroids. There is some moderate risk for increased serotonergic activity when giving Seroquel concurrently with Lexapro and Remeron. These side effects could potentially manifest with serotonin toxicity (mental status changes, autonomic instability, and neuromuscular activity) or neuroleptic malignant syndrome (hyperthermia, muscle rigidity, autonomic dysfunction). These symptoms can also be seen when administering Lexapro and Remeron concurrently. It is reasonable to adjust psychiatric medications to lower potential risk for negative side effects, given new onset tremor.           Summary of Recommendations:   Discontinued Seroquel, due to side effect profile and patient no longer experiencing acute delirium  -Lexapro and Remeron will address depression, sleep and anxiety  -Could also consider decreasing Lexapro and Remeron dose, but would not do this unless tremor does not show improvements with discontinuation of Seroquel    Should follow-up with outpatient psychiatrist for additional medication adjustments.    Will follow-up in person 1/3/24 if he is still inpatient      Page me or re-consult psychiatry as needed.       Carrie " EDDIE Orlando, RABIA  Consult/Liaison Psychiatry  [facility]   Contact information available via [facility] Paging/Directory.  If I am not available, please call [facility] intake ([phone])

## 2024-01-02 NOTE — PROVIDER NOTIFICATION
01/01/24 1900   RCAT Assessment   Reason for Assessment Asthma   Pulmonary Status 3   Surgical Status 0   Chest X-ray 2   Respiratory Pattern 0   Mental Status 0   Breath Sounds 4   Cough Effectiveness 0   Level of Activity 0   O2 Required for SpO2>=92% 1   Acuity Level (points) 10   Acuity Level  4   Re-eval Interval Guideline Every 3 days   Re-evaluation Date 01/04/24     PT scored a 4 on Acuity level. Changed tx frequency to Duo BID and continue with q4hprn duo as well as q6h prn albuterol.

## 2024-01-02 NOTE — PROGRESS NOTES
Pulmonary Progress Note  1/2/2024      Admit Date: 12/20/2023  CODE: Full Code    Reason for Consult: acute respiratory failure with hypoxemia. Recurrent organizing pneumonia    Assessment/Plan:   Impression: 58M w/ hx of metastatic melanoma, keytruda-induced pneumonitis c/b orgnazing pneumonia, admitted ~2 weeks ago with recurrent OP after rapid prednisone taper. Marked improvement with high dose steroids. Infectious workup negative. Approaching discharge. Will likely need to go home on O2.     Recommendations:  - titrate FiO2 for goal SPO2 94-96%, avoid hyperoxia  - home O2 evaluation prior to discharge  - encourage OOB, PT/OT, push IS when able  - slow prednisone taper; 60mg daily for 2 weeks, then decrease by 10mg every 2 weeks. Remain on 40mg daily until seen by pulmonary MD in clinic later this month.   - continue Bactrim 1 DS tab daily for PJP ppx while on prednisone doses > or = to 20mg daily  - has follow up with me scheduled on 1/17, and repeat CT chest 1/15. Will arrange for PFT's as well.    No further recommendations; we'll sign off. Please call with additional questions.  No contraindications to discharge from pulmonary standpoint, as long as he can get set up with O2.     Jamie (Demian) MD Elana  [facility] Pulmonary & Critical Care  Pager [phone]  Clinic [phone]  Fax [phone]     Subjective/Interim Events:   Feels better.  On 3LPm O2, with SpO2 los to mid 90s. He does desaturate easily with movement.       Medications:      - MEDICATION INSTRUCTIONS -        amiodarone  200 mg Oral Daily    apixaban ANTICOAGULANT  5 mg Oral BID    clonazePAM  0.5 mg Oral BID    [Held by provider] diltiazem ER COATED BEADS  240 mg Oral Daily    escitalopram  20 mg Oral Daily with lunch    ferrous sulfate  325 mg Oral Every Other Day    fluticasone  1 puff Inhalation BID    ipratropium - albuterol 0.5 mg/2.5 mg/3 mL  3 mL Nebulization BID    mirtazapine  15 mg Oral At Bedtime     "polyethylene glycol  17 g Oral Daily    predniSONE  60 mg Oral Daily    QUEtiapine  25 mg Oral BID    QUEtiapine  50 mg Oral At Bedtime    simvastatin  40 mg Oral At Bedtime    sodium chloride  1 spray Both Nostrils 4x Daily    sodium chloride (PF)  3 mL Intracatheter Q8H    sulfamethoxazole-trimethoprim  1 tablet Oral Daily         Exam/Data:   Vitals  /59 (BP Location: Left arm)   Pulse 63   Temp 98  F (36.7  C) (Oral)   Resp 18   Ht 1.93 m (6' 4\")   Wt 114.8 kg (253 lb)   SpO2 96%   BMI 30.80 kg/m    BP - Mean:  [107-108] 107  I/O last 3 completed shifts:  In: 320 [P.O.:320]  Out: -   Weight change:   [unfilled]  FiO2 (%): 32 %  Resp: 18      EXAM:  Physical Exam  Gen: awake, alert, oriented, no distress  HEENT: NT, no DAVID  CV: RRR, no m/g/r  Resp: CTAB  Abd: soft, nontender, BS+  Skin: no rashes or lesions  Ext: no edema  Neuro: PERRL, nonfocal exam    ROS:  A 10-system review was obtained and is negative with the exception of the symptoms noted above.    DATA:    PFT DATA   None available    IMAGING:   CT Chest Pulmonary Embolism w Contrast    Result Date: 12/20/2023  EXAM: CT CHEST PULMONARY EMBOLISM W CONTRAST LOCATION: [facility] DATE: 12/20/2023 INDICATION: Shortness of breath COMPARISON: 11/13/2023 TECHNIQUE: CT chest pulmonary angiogram during arterial phase injection of IV contrast. Multiplanar reformats and MIP reconstructions were performed. Dose reduction techniques were used. CONTRAST: [id number] 75ml FINDINGS: ANGIOGRAM CHEST: Previously noted left basilar pulmonary emboli not seen today. No pulmonary emboli noted today. No evidence for right ventricular heart strain. Thoracic aorta is negative for dissection. No CT evidence of right heart strain. LUNGS AND PLEURA: Extensive patchy left lung infiltrate and atelectasis has developed since prior examination. Moderate clearing of extensive patchy right lung infiltrate. Mild residual right upper lobe " infiltrate/atelectasis remains. Moderate to marked elevation right hemidiaphragm unchanged. MEDIASTINUM/AXILLAE: No lymphadenopathy. Normal esophagus. No significant pericardial effusion. Ectasia of the ascending thoracic aorta. CORONARY ARTERY CALCIFICATION: Mild. UPPER ABDOMEN: Unremarkable MUSCULOSKELETAL: No concerning osseous abnormalities.     IMPRESSION: 1.  Previously noted left basilar pulmonary emboli not seen today. No pulmonary emboli noted today. No evidence for right ventricular heart strain. 2.  Extensive patchy left lung infiltrate and atelectasis has developed since prior examination. 3.  Moderate clearing of extensive patchy right lung infiltrate. Mild residual right upper lobe infiltrate/atelectasis remains. Moderate to marked elevation right hemidiaphragm unchanged. 4.  Ectasia of the ascending thoracic aorta stable.

## 2024-01-02 NOTE — PROGRESS NOTES
[facility]    PROGRESS NOTE - Hospitalist Service    ASSESSMENT AND PLAN     Active Problems:    Acute respiratory failure with hypoxia (H)    Pneumonia due to infectious organism, unspecified laterality, unspecified part of lung    Sepsis, due to unspecified organism, unspecified whether acute organ dysfunction present (H)    Jim Titus is a 58 year old male who was admitted on 12/20 for acute hypoxic respiratory failure possibly due to cryptogenic organizing pneumonia.  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  He has been treated with high-dose steroids with improved oxygen requirement.  Plan for prolonged steroid taper per pulmonology.       Acute respiratory failure with hypoxia  Keytruda pneumonitis  Possible cryptogenic organizing pneumonia.   Recent hospitalization in Oct and Nov for Keytruda pneumonitis requiring intubation.  This admission with respiratory failure could be due to ongoing pulmonary inflammation concern for organizing pneumonia.  Patient with high-dose steroid and completed a course of azithromycin and Zosyn per ID on 12/27.  - Started methylprednisolone and de-escalated to 60mg prednisone (1/1/24)  - pulm recommending slow prednisone taper; 60mg daily for 2 weeks, then decrease by 10mg every 2 weeks. Remain on 40mg daily until seen by pulmonary MD in clinic later this month. Follow up [facility] pulm 1/17. Repeat CT chest 1/15  - PJP prophylaxis with Bactrim 12/30  - Continue duonebs, Flovent.   - pulm- ok for discharge   - Needs home O2 eval prior to discharge      Bilateral pulmonary emboli  Continue Eliqus 5 mg BID     A-fib with RVR  Likely due to acute respiratory failure.  Was on diltiazem and amiodarone.    - Diltiazem was held given bradycardia  - Amiodarone 200 mg daily  - On Eliquis for CVA ppx     Iron deficiency  anemia  Microcytic anemia  Hemoglobin 6.7, Status post 2 U PRBC on 12/22. Hemoglobin stable  - Ferrous sulfate 325 mg every other day      Melanoma  Seeing [place] [facility]   S/P 6 doses of pembrolizumab 200 mg IV every 3 weeks.  Last dose given 9/15/2023.     Patient with chronic tremor.  Intention tremor.   TSH suppressed, but free T4 normal.   MRI brain with/without contrast negative.  Outpatient follow up with neurology.  Repeat TSH in 6 to 8 weeks.   Possibly related to steroids?    Barriers to discharge: Home oxygen eval     Anticipated length of stay: 1 more day       Present on Admission:   Acute respiratory failure with hypoxia (H)   Pneumonia due to infectious organism, unspecified laterality, unspecified part of lung   Sepsis, due to unspecified organism, unspecified whether acute organ dysfunction present (H)      Subjective:  Patient sitting in bed.  States he is feeling much better but would like to discharge home tomorrow.  He is concerned about tremors he has been experiencing since initiation of steroids.  No other complaints.    PHYSICAL EXAM  Temp:  [97.9  F (36.6  C)-98.2  F (36.8  C)] 98  F (36.7  C)  Pulse:  [57-97] 73  Resp:  [18-20] 20  BP: (107-148)/(59-89) 132/78  FiO2 (%):  [32 %] 32 %  SpO2:  [86 %-97 %] 90 %  Wt Readings from Last 1 Encounters:   01/01/24 114.8 kg (253 lb)       Intake/Output Summary (Last 24 hours) at 1/2/2024 1142  Last data filed at 1/2/2024 1029  Gross per 24 hour   Intake 780 ml   Output --   Net 780 ml      Body mass index is 30.8 kg/m .    GENRL: Alert and answering questions appropriately. Oriented X 3. Not in acute distress. Sitting in bed   CHEST: Diminished at bases. Breathing easily   HEART: Regular rate    EXTRM: No pedal edema  NEURO: tremors noted. Following commands   PSYCH: Normal affect and mood.   INTGM: No skin rash, no cyanosis or clubbing    Medical Decision Making       45 MINUTES SPENT BY ME on the date of service doing chart review, history,  exam, documentation & further activities per the note.      PERTINENT LABS/IMAGING:  Results for orders placed or performed during the hospital encounter of 12/20/23   CT Chest Pulmonary Embolism w Contrast    Impression    IMPRESSION:  1.  Previously noted left basilar pulmonary emboli not seen today. No pulmonary emboli noted today. No evidence for right ventricular heart strain.    2.  Extensive patchy left lung infiltrate and atelectasis has developed since prior examination.     3.  Moderate clearing of extensive patchy right lung infiltrate. Mild residual right upper lobe infiltrate/atelectasis remains. Moderate to marked elevation right hemidiaphragm unchanged.    4.  Ectasia of the ascending thoracic aorta stable.   XR Chest 1 View    Impression    IMPRESSION: Extensive left-sided infiltrates which are new. Right-sided infiltrates have nearly resolved since comparison.     MR Brain w/o & w Contrast    Impression    IMPRESSION:  1.  No acute infarct, mass, mass effect, or hemorrhage.  2.  Mild atrophy.     XR Chest Port 1 View    Impression    IMPRESSION: Similar left lung multifocal infection and probable trace left pleural effusion. Similar right suprahilar opacity. No new focal consolidation, or pneumothorax. Similar cardiomediastinal silhouette.                   Most Recent 3 CBC's:  Recent Labs   Lab Test 01/01/24  0507 12/30/23  0444 12/28/23  0447   WBC 15.2* 17.5* 22.0*   HGB 8.9* 8.6* 8.4*   MCV 91 90 90   * 571* 616*     Most Recent 3 BMP's:  Recent Labs   Lab Test 01/02/24  0812 01/02/24  0512 01/01/24  2111 01/01/24  1639 01/01/24  1321 01/01/24  0507 12/31/23  0505 12/29/23  0447 12/28/23  0447 12/26/23  0445 12/25/23  0402 12/25/23  0335 12/24/23  0628   NA  --   --   --   --   --   --   --   --  144  --  142  --  140   POTASSIUM  --  3.9  --   --   --  4.3 3.8   < > 3.6   < > 3.8  3.6  --  3.8   CHLORIDE  --   --   --   --   --   --   --   --  105  --  103  --  104   CO2  --   --   --   " --   --   --   --   --  29  --  26  --  27   BUN  --   --   --   --   --   --   --   --  24.2*  --  23.3*  --  20.4*   CR  --   --   --   --   --  0.84  --   --  0.98  --  1.14  --  1.07   ANIONGAP  --   --   --   --   --   --   --   --  10  --  13  --  9   MELISSA  --   --   --   --   --   --   --   --  8.9  --  9.1  --  8.9   *  --  152* 122*   < > 125*  --   --  147*  --  189*   < > 167*    < > = values in this interval not displayed.     Most Recent 2 LFT's:  Recent Labs   Lab Test 12/29/23 0447 12/20/23  0500 11/14/23  0339   AST  --  27 9   ALT  --  31 21   ALKPHOS  --  374* 133*   BILITOTAL 0.2 0.7 0.5       Recent Labs   Lab Test 04/11/23  1255   CHOL 189   HDL 54   LDL 96   TRIG 193*     Recent Labs   Lab Test 04/11/23  1255 11/10/21  1427 06/15/17  1546   LDL 96 106 132*     Recent Labs   Lab Test 01/02/24  0812 01/02/24 0512 01/01/24  1321 01/01/24 0507 12/29/23 0447 12/28/23 0447   NA  --   --   --   --   --  144   POTASSIUM  --  3.9  --  4.3   < > 3.6   CHLORIDE  --   --   --   --   --  105   CO2  --   --   --   --   --  29   *  --    < > 125*  --  147*   BUN  --   --   --   --   --  24.2*   CR  --   --   --  0.84  --  0.98   GFRESTIMATED  --   --   --  >90  --  89   MELISSA  --   --   --   --   --  8.9    < > = values in this interval not displayed.     Recent Labs   Lab Test 11/05/23  1244   A1C 6.3*     Recent Labs   Lab Test 01/01/24  0507 12/30/23  0444 12/28/23  0447   HGB 8.9* 8.6* 8.4*     No results for input(s): \"TROPONINI\" in the last 76334 hours.  Recent Labs   Lab Test 12/20/23  0500 11/09/23  0504 11/04/23  0421 11/02/23  0845 10/23/23  1041   NTBNPI  --  3,054* 1,264*  --   --    NTBNP 415  --   --  247 96     Recent Labs   Lab Test 12/27/23  0454   TSH 0.08*     Recent Labs   Lab Test 12/20/23  0500   INR 2.06*       Denia Baca, DO  Hospitalist Service  [facility]      "

## 2024-01-02 NOTE — PROGRESS NOTES
Care Management Follow Up    Length of Stay (days): 13    Expected Discharge Date: 01/02/2024     Concerns to be Addressed:       Patient plan of care discussed at interdisciplinary rounds: Yes    Anticipated Discharge Disposition: Home     Anticipated Discharge Services: None  Anticipated Discharge DME: None    Patient/family educated on Medicare website which has current facility and service quality ratings:    Education Provided on the Discharge Plan:    Patient/Family in Agreement with the Plan: yes    Additional Information:  Chart reviewed. No discharge needs at this time. Will continue to monitor.      Jose Piper RN

## 2024-01-02 NOTE — PROGRESS NOTES
Home Oxygen Assessment Tools  Patient is on 3 LPM NC, SpO2 91% at rest. Patient's Sp02 85% on 8LPM oxymask after 5 minutes of walking. Recovered quickly with rest to 93%.

## 2024-01-02 NOTE — PLAN OF CARE
Goal Outcome Evaluation: ongoing.       Plan of Care Reviewed With: patient    Overall Patient Progress: improvingOverall Patient Progress: improving     PT alert and oriented. Denies chest pain. Ind to bathroom. On 3.5L oxymizer. Went on walk with 8L oxymask. Able to make needs known. Tele reads SR to SB.       Problem: Anemia  Goal: Anemia Symptom Improvement  Outcome: Progressing     Problem: Infection  Goal: Absence of Infection Signs and Symptoms  Outcome: Progressing     Problem: Gas Exchange Impaired  Goal: Optimal Gas Exchange  Outcome: Progressing

## 2024-01-03 VITALS
BODY MASS INDEX: 30.81 KG/M2 | WEIGHT: 253 LBS | HEIGHT: 76 IN | HEART RATE: 66 BPM | SYSTOLIC BLOOD PRESSURE: 123 MMHG | DIASTOLIC BLOOD PRESSURE: 67 MMHG | TEMPERATURE: 98.2 F | OXYGEN SATURATION: 95 % | RESPIRATION RATE: 18 BRPM

## 2024-01-03 LAB
MAGNESIUM SERPL-MCNC: 2.2 MG/DL (ref 1.7–2.3)
POTASSIUM SERPL-SCNC: 3.6 MMOL/L (ref 3.4–5.3)

## 2024-01-03 PROCEDURE — 250N000013 HC RX MED GY IP 250 OP 250 PS 637: Performed by: INTERNAL MEDICINE

## 2024-01-03 PROCEDURE — 250N000013 HC RX MED GY IP 250 OP 250 PS 637: Performed by: FAMILY MEDICINE

## 2024-01-03 PROCEDURE — 94640 AIRWAY INHALATION TREATMENT: CPT

## 2024-01-03 PROCEDURE — 99239 HOSP IP/OBS DSCHRG MGMT >30: CPT | Performed by: INTERNAL MEDICINE

## 2024-01-03 PROCEDURE — 999N000157 HC STATISTIC RCP TIME EA 10 MIN

## 2024-01-03 PROCEDURE — 84132 ASSAY OF SERUM POTASSIUM: CPT | Performed by: INTERNAL MEDICINE

## 2024-01-03 PROCEDURE — 36415 COLL VENOUS BLD VENIPUNCTURE: CPT | Performed by: INTERNAL MEDICINE

## 2024-01-03 PROCEDURE — 250N000012 HC RX MED GY IP 250 OP 636 PS 637: Performed by: INTERNAL MEDICINE

## 2024-01-03 PROCEDURE — 250N000009 HC RX 250: Performed by: INTERNAL MEDICINE

## 2024-01-03 PROCEDURE — 83735 ASSAY OF MAGNESIUM: CPT | Performed by: INTERNAL MEDICINE

## 2024-01-03 RX ORDER — IPRATROPIUM BROMIDE AND ALBUTEROL SULFATE 2.5; .5 MG/3ML; MG/3ML
3 SOLUTION RESPIRATORY (INHALATION) EVERY 4 HOURS PRN
Qty: 90 ML | Refills: 0 | Status: SHIPPED | OUTPATIENT
Start: 2024-01-03 | End: 2024-01-03

## 2024-01-03 RX ORDER — FERROUS SULFATE 325(65) MG
325 TABLET ORAL EVERY OTHER DAY
Qty: 15 TABLET | Refills: 0 | Status: SHIPPED | OUTPATIENT
Start: 2024-01-04 | End: 2024-02-03

## 2024-01-03 RX ORDER — SULFAMETHOXAZOLE/TRIMETHOPRIM 800-160 MG
1 TABLET ORAL DAILY
Qty: 60 TABLET | Refills: 0 | Status: SHIPPED | OUTPATIENT
Start: 2024-01-03 | End: 2024-01-10

## 2024-01-03 RX ORDER — PREDNISONE 20 MG/1
TABLET ORAL
Qty: 134 TABLET | Refills: 0 | Status: ON HOLD | OUTPATIENT
Start: 2024-01-03 | End: 2024-02-27

## 2024-01-03 RX ORDER — ALBUTEROL SULFATE 0.83 MG/ML
2.5 SOLUTION RESPIRATORY (INHALATION) EVERY 6 HOURS PRN
Qty: 90 ML | Refills: 0 | Status: SHIPPED | OUTPATIENT
Start: 2024-01-03 | End: 2024-08-08

## 2024-01-03 RX ADMIN — APIXABAN 5 MG: 5 TABLET, FILM COATED ORAL at 08:52

## 2024-01-03 RX ADMIN — AMIODARONE HYDROCHLORIDE 200 MG: 200 TABLET ORAL at 08:52

## 2024-01-03 RX ADMIN — SULFAMETHOXAZOLE AND TRIMETHOPRIM 1 TABLET: 800; 160 TABLET ORAL at 08:52

## 2024-01-03 RX ADMIN — PREDNISONE 60 MG: 20 TABLET ORAL at 08:52

## 2024-01-03 RX ADMIN — SALINE NASAL SPRAY 1 SPRAY: 1.5 SOLUTION NASAL at 08:53

## 2024-01-03 RX ADMIN — LORATADINE 10 MG: 10 TABLET ORAL at 08:52

## 2024-01-03 RX ADMIN — IPRATROPIUM BROMIDE AND ALBUTEROL SULFATE 3 ML: .5; 3 SOLUTION RESPIRATORY (INHALATION) at 07:48

## 2024-01-03 RX ADMIN — FLUTICASONE PROPIONATE 1 PUFF: 110 AEROSOL, METERED RESPIRATORY (INHALATION) at 08:53

## 2024-01-03 ASSESSMENT — ACTIVITIES OF DAILY LIVING (ADL)
ADLS_ACUITY_SCORE: 20

## 2024-01-03 NOTE — PROGRESS NOTES
Care Management Discharge Note    Discharge Date: 01/03/2024       Discharge Disposition: Home    Discharge Services: None    Discharge DME: None    Discharge Transportation:  Family/friend      Does the patient's insurance plan have a 3 day qualifying hospital stay waiver?  No        Education Provided on the Discharge Plan: Yes  Persons Notified of Discharge Plans: pt AVS per bedside RN  Patient/Family in Agreement with the Plan: yes    Handoff Referral Completed: Yes    Additional Information:  Pt is discharging to home. Home O2 was arranged for pt. Family/friend to provide transport.     No CM needs.     Jose Piper RN

## 2024-01-03 NOTE — PROGRESS NOTES
01/02/24 2036   Tech Time   $Tech Time (10 minute increments) 3   Oxygen Therapy   O2 Device Nasal cannula with humidification   Oxygen Delivery 3 LPM   Nebulizer Assessment & Treatment   $RT Use ONLY Delivery Method Nebulizer - Additional   Nebulizer Device Mouthpiece   Pretreatment Heart Rate (beats/min) 62   Pretreatment Resp Rate (breaths/min) 18   Pretreatment O2 sats - (TCU only) 93   Pretreat Breath Sounds - Bilat - All Lobes clear;diminished   Breath Sounds Post-Respiratory Treatment   Posttreatment Heart Rate (beats/min) 69   Posttreatment Resp Rate (breaths/min) 18   Post treatment O2 Sats - (TCU only) 95   Breath Sounds Posttreatment All Fields All Fields   Breath Sounds Posttreatment All Fields clear;diminished

## 2024-01-03 NOTE — PLAN OF CARE
Patient hopeful to return home tomorrow.  Home O2 eval completed.  Denies complaints.  Downgraded from [facility] to  today.    Problem: Gas Exchange Impaired  Goal: Optimal Gas Exchange  Outcome: Progressing     Problem: Infection  Goal: Absence of Infection Signs and Symptoms  Outcome: Progressing     Problem: Anemia  Goal: Anemia Symptom Improvement  Outcome: Progressing  Intervention: Monitor and Manage Anemia  Recent Flowsheet Documentation  Taken 1/2/2024 0800 by Lalita Verdugo RN  Safety Promotion/Fall Prevention:   clutter free environment maintained   nonskid shoes/slippers when out of bed   room near nurse's station   Goal Outcome Evaluation:

## 2024-01-03 NOTE — PROGRESS NOTES
Discussed with bedside RN Jesus regarding discharge and home O2 needs. Orders placed for home O2 and this writer called [facility] at 8:48 AM 01/03/24 to deliver home oxygen.    ETA TBSHWETHA and bedside RN Jesus will be contacted by [facility] if any questions/changes arise.     REY Whitney (discharge lounge)

## 2024-01-03 NOTE — PLAN OF CARE
Goal Outcome Evaluation: ongoing.       Plan of Care Reviewed With: patient    Overall Patient Progress: improvingOverall Patient Progress: improving      PT alert and oriented. Denies chest pain. Ind to bathroom. On 3L nasal cannula. Went on walk with 8L oxymask. Able to make needs known. Tele reads SR to SB.        Problem: Anemia  Goal: Anemia Symptom Improvement  Outcome: Progressing     Problem: Pain Acute  Goal: Optimal Pain Control and Function  Intervention: Prevent or Manage Pain  Recent Flowsheet Documentation  Taken 1/3/2024 0010 by Gina Reynaga, RN  Medication Review/Management: medications reviewed     Problem: Fall Injury Risk  Goal: Absence of Fall and Fall-Related Injury  Intervention: Identify and Manage Contributors  Recent Flowsheet Documentation  Taken 1/3/2024 0010 by Gina Reynaga, RN  Medication Review/Management: medications reviewed

## 2024-01-03 NOTE — PROGRESS NOTES
Patient has been assessed for Home Oxygen needs.     Pulse oximetry (SpO2) and Oxygen flow readings:    SpO2 = 88% on room air at rest while awake.    SpO2 improved to 95% on 3 liters/minute oxymask at rest.    SpO2 = 87% on room air during activity/with exercise.    *SpO2 improved to 91% on 3 liters/minute oxymask during activity/with exercise.    Jesus OLMSTEAD RN

## 2024-01-03 NOTE — PROGRESS NOTES
Oxygen Documentation  I certify that this patient, Jim Titus has been under my care (or a nurse practitioner or physican's assistant working with me). This is the face-to-face encounter for oxygen medical necessity.      At the time of this encounter, I have reviewed the qualifying testing and have determined that supplemental oxygen is reasonable and necessary and is expected to improve the patient's condition in a home setting.       Patient has continued oxygen desaturation due to Cryptogenic organizing PNA and Keytruda pneumonitis and severe persistent asthma    If portability is ordered, is the patient mobile within the home? yes

## 2024-01-03 NOTE — PROGRESS NOTES
Discharge AVS reviewed with patient.  Questions answered and teachings acknowledged.  Belongings and paperwork + Home Oxygen sent with via friend transport. [employer] acknowledged (N/A)

## 2024-01-04 ENCOUNTER — PATIENT OUTREACH (OUTPATIENT)
Dept: CARE COORDINATION | Facility: CLINIC | Age: 59
End: 2024-01-04
Payer: COMMERCIAL

## 2024-01-04 NOTE — TELEPHONE ENCOUNTER
Patient should be seen in person to recheck labs and assess. Please help him schedule in person instead    Luis Alberto Shoemaker MD

## 2024-01-04 NOTE — LETTER
[facility] CARE COORDINATION  [facility]  January 4, 2024    Jim Titus  [address] [place] [address]      Dear Jim,    I am a clinic care coordinator who works with Luis Alberto Fatima MD with the [facility]. I wanted to thank you for spending the time to talk with me.  Below is a description of clinic care coordination and how I can further assist you.       The clinic care coordination team is made up of a registered nurse, , financial resource worker and community health worker who understand the health care system. The goal of clinic care coordination is to help you manage your health and improve access to the health care system. Our team works alongside your provider to assist you in determining your health and social needs. We can help you obtain health care and community resources, providing you with necessary information and education. We can work with you through any barriers and develop a care plan that helps coordinate and strengthen the communication between you and your care team.  Our services are voluntary and are offered without charge to you personally.    Please feel free to contact me with any questions or concerns regarding care coordination and what we can offer.      We are focused on providing you with the highest-quality healthcare experience possible.    Sincerely,     Mena Alfaro,   [facility]  [phone]

## 2024-01-04 NOTE — PROGRESS NOTES
"Clinic Care Coordination Contact  [facility]: Post-Discharge Note  SITUATION                                                      Admission:    Admission Date: 12/20/23   Reason for Admission: Acute hypoxic respiratory failure  Discharge:   Discharge Date: 01/03/24  Discharge Diagnosis: Acute hypoxic respiratory failure    BACKGROUND                                                      Per hospital discharge summary and inpatient provider notes:  [facility]  Hospitalist Discharge Summary         Discharge Diagnoses  Acute hypoxic respiratory failure  Keytruda pneumonitis  Cryptogenic organizing pneumonia  Persistent asthma  Chronic intention tremor  Iron deficiency anemia  Atrial fibrillation with RVR     Clinically Significant Risk Factors      # Obesity: Estimated body mass index is 30.8 kg/m  as calculated from the following:    Height as of this encounter: 1.93 m (6' 4\").    Weight as of this encounter: 114.8 kg (253 lb).       Follow-ups Needed After Discharge  Follow-up Appointments     Follow-up and recommended labs and tests       Follow up with primary care provider, Luis Alberto Fatima, within 3-4 days.   Follow up with pulmonology as planned on 1/17. Repeat CT chest 1/15. PFT   with pulm as outpatient  Seroquel discontinued this admission due to tremors. Follow up with   psychiatry outpatient.      ASSESSMENT           Discharge Assessment  How are you doing now that you are home?: ok, weak  How are your symptoms? (Red Flag symptoms escalate to triage hotline per guidelines): Improved  Do you feel your condition is stable enough to be safe at home until your provider visit?: Yes  Does the patient have their discharge instructions? : Yes  Does the patient have questions regarding their discharge instructions? : No  Were you started on any new medications or were there changes to any of your previous medications? : Yes  Does the patient have all of their medications?: Yes  Do you " have questions regarding any of your medications? : No  Do you have all of your needed medical supplies or equipment (DME)?  (i.e. oxygen tank, CPAP, cane, etc.): Yes  Discharge follow-up appointment scheduled within 14 calendar days? : No  Is patient agreeable to assistance with scheduling? : Yes         Post-op (Clinicians Only)  Did the patient have surgery or a procedure: No  Fever: No  Chills: No  Eating & Drinking: eating and drinking without complaints/concerns  PO Intake: regular diet  Bowel Function: normal  Urinary Status: voiding without complaint/concerns    Doing ok since getting home.  He actually fell to the ground when leaving the hospital and skinned his knees.  He had two people help get him up. He thinks it was due to the heaviness of the oxygen.  He has oxygen at home and has portable units to use that may last an hour. He purchased a pulsometer to help him track.  He has gone up and down stairs in the home about 8 times.  He weak due to length of time in hospital, but hopes to get better quickly.  He is aware of upcoming appts. His mom can go get his meds which he sets up in a pill box.  Will route message to PCP about his post discharge appt.  Patient thinks it may be easiest to do a virtual visit if PCP is comfortable with that.  Otherwise, he can go to clinic for inperson appt.  Will send letter regarding care coordination and he will call if support is needed.     PLAN                                                      Outpatient Plan:  Will attend appts as scheduled.     Future Appointments   Date Time Provider Department [place]   1/15/2024 12:20 PM [facility]   1/17/2024 12:30 PM [facility] PFT RM 2 [facility] [place]   1/17/2024  1:30 PM Jamie Huitron MD [facility] [place]   1/31/2024  1:00 PM Lila Littlejohn APRN [facility]         For any urgent concerns, please contact our 24 hour nurse triage line: [phone] ([phone])         JENNIFER Zavala

## 2024-01-10 ENCOUNTER — OFFICE VISIT (OUTPATIENT)
Dept: FAMILY MEDICINE | Facility: CLINIC | Age: 59
End: 2024-01-10
Payer: COMMERCIAL

## 2024-01-10 ENCOUNTER — TELEPHONE (OUTPATIENT)
Dept: FAMILY MEDICINE | Facility: CLINIC | Age: 59
End: 2024-01-10

## 2024-01-10 VITALS
SYSTOLIC BLOOD PRESSURE: 107 MMHG | DIASTOLIC BLOOD PRESSURE: 67 MMHG | HEART RATE: 65 BPM | BODY MASS INDEX: 29.31 KG/M2 | RESPIRATION RATE: 14 BRPM | HEIGHT: 76 IN | TEMPERATURE: 98.4 F | OXYGEN SATURATION: 94 % | WEIGHT: 240.7 LBS

## 2024-01-10 DIAGNOSIS — Z86.711 HISTORY OF PULMONARY EMBOLISM: ICD-10-CM

## 2024-01-10 DIAGNOSIS — J98.4 PNEUMONITIS: ICD-10-CM

## 2024-01-10 DIAGNOSIS — D64.9 ANEMIA, UNSPECIFIED TYPE: Primary | ICD-10-CM

## 2024-01-10 DIAGNOSIS — E83.42 HYPOMAGNESEMIA: ICD-10-CM

## 2024-01-10 DIAGNOSIS — J18.9 PNEUMONIA DUE TO INFECTIOUS ORGANISM, UNSPECIFIED LATERALITY, UNSPECIFIED PART OF LUNG: ICD-10-CM

## 2024-01-10 DIAGNOSIS — I48.91 ATRIAL FIBRILLATION, UNSPECIFIED TYPE (H): ICD-10-CM

## 2024-01-10 DIAGNOSIS — I48.19 PERSISTENT ATRIAL FIBRILLATION (H): ICD-10-CM

## 2024-01-10 DIAGNOSIS — J45.40 MODERATE PERSISTENT ASTHMA WITHOUT COMPLICATION: ICD-10-CM

## 2024-01-10 DIAGNOSIS — J96.01 ACUTE RESPIRATORY FAILURE WITH HYPOXIA (H): ICD-10-CM

## 2024-01-10 DIAGNOSIS — C43.9 MELANOMA OF SKIN (H): ICD-10-CM

## 2024-01-10 PROBLEM — R74.8 ELEVATED ALKALINE PHOSPHATASE LEVEL: Status: RESOLVED | Noted: 2023-11-02 | Resolved: 2024-01-10

## 2024-01-10 PROBLEM — A41.9 SEPSIS, DUE TO UNSPECIFIED ORGANISM, UNSPECIFIED WHETHER ACUTE ORGAN DYSFUNCTION PRESENT (H): Status: RESOLVED | Noted: 2023-12-20 | Resolved: 2024-01-10

## 2024-01-10 LAB
ANION GAP SERPL CALCULATED.3IONS-SCNC: 10 MMOL/L (ref 7–15)
BUN SERPL-MCNC: 37.2 MG/DL (ref 6–20)
CALCIUM SERPL-MCNC: 9.4 MG/DL (ref 8.6–10)
CHLORIDE SERPL-SCNC: 105 MMOL/L (ref 98–107)
CREAT SERPL-MCNC: 1.11 MG/DL (ref 0.67–1.17)
DEPRECATED HCO3 PLAS-SCNC: 28 MMOL/L (ref 22–29)
EGFRCR SERPLBLD CKD-EPI 2021: 77 ML/MIN/1.73M2
ERYTHROCYTE [DISTWIDTH] IN BLOOD BY AUTOMATED COUNT: 22 % (ref 10–15)
GLUCOSE SERPL-MCNC: 82 MG/DL (ref 70–99)
HCT VFR BLD AUTO: 36.4 % (ref 40–53)
HGB BLD-MCNC: 11.6 G/DL (ref 13.3–17.7)
MAGNESIUM SERPL-MCNC: 2.2 MG/DL (ref 1.7–2.3)
MCH RBC QN AUTO: 29.7 PG (ref 26.5–33)
MCHC RBC AUTO-ENTMCNC: 31.9 G/DL (ref 31.5–36.5)
MCV RBC AUTO: 93 FL (ref 78–100)
PLATELET # BLD AUTO: 248 10E3/UL (ref 150–450)
POTASSIUM SERPL-SCNC: 4.5 MMOL/L (ref 3.4–5.3)
RBC # BLD AUTO: 3.9 10E6/UL (ref 4.4–5.9)
SODIUM SERPL-SCNC: 143 MMOL/L (ref 135–145)
WBC # BLD AUTO: 13.7 10E3/UL (ref 4–11)

## 2024-01-10 PROCEDURE — 85027 COMPLETE CBC AUTOMATED: CPT | Performed by: STUDENT IN AN ORGANIZED HEALTH CARE EDUCATION/TRAINING PROGRAM

## 2024-01-10 PROCEDURE — 80048 BASIC METABOLIC PNL TOTAL CA: CPT | Performed by: STUDENT IN AN ORGANIZED HEALTH CARE EDUCATION/TRAINING PROGRAM

## 2024-01-10 PROCEDURE — 83735 ASSAY OF MAGNESIUM: CPT | Performed by: STUDENT IN AN ORGANIZED HEALTH CARE EDUCATION/TRAINING PROGRAM

## 2024-01-10 PROCEDURE — 36415 COLL VENOUS BLD VENIPUNCTURE: CPT | Performed by: STUDENT IN AN ORGANIZED HEALTH CARE EDUCATION/TRAINING PROGRAM

## 2024-01-10 PROCEDURE — 99495 TRANSJ CARE MGMT MOD F2F 14D: CPT | Performed by: STUDENT IN AN ORGANIZED HEALTH CARE EDUCATION/TRAINING PROGRAM

## 2024-01-10 RX ORDER — FLUTICASONE PROPIONATE 110 UG/1
1 AEROSOL, METERED RESPIRATORY (INHALATION) 2 TIMES DAILY
Qty: 12 G | Refills: 11 | Status: SHIPPED | OUTPATIENT
Start: 2024-01-10 | End: 2024-02-25

## 2024-01-10 ASSESSMENT — PAIN SCALES - GENERAL: PAINLEVEL: NO PAIN (0)

## 2024-01-10 NOTE — PROGRESS NOTES
Hospital Follow-up Visit:    Assessment and Plan   58-year-old male who presents in hospital follow-up for pneumonitis thought to be secondary to Keytruda for melanoma treatment with complications of acute respiratory failure and associated asthma worsening symptoms.  He completed a course of IV steroids and now is on a prednisone taper over several months.  Has follow-up with pulmonology.  He is doing well today from a respiratory standpoint.  He was discharged on oxygen and his oxygen requirements have continued to decrease from 2-1/2 L down to 1 L at rest.  He feels much stronger and is not falling as he was when I saw him on his last real hospital discharge approximately a month ago.  We will check some labs looking at his hemoglobin as he was significantly anemic in the hospital as well as checking his kidney function and electrolytes.  Some of these were low as well.  Finally I recommended referral to cardiology to consider his long-term management of atrial fibrillation with new diagnosis in the hospital on a previous admission.  He does still have a tremor which is bothersome at times.  However given his significant illness we decided to wait on this and in the future may refer to neurology to work this up.    1. Anemia, unspecified type  - Basic metabolic panel  (Ca, Cl, CO2, Creat, Gluc, K, Na, BUN); Future  - Hemoglobin; Future  - CBC with platelets  - Basic metabolic panel  (Ca, Cl, CO2, Creat, Gluc, K, Na, BUN)    2. Acute respiratory failure with hypoxia (H)  3. Pneumonitis  5. Moderate persistent asthma without complication  6. Melanoma of skin (H)  7. Pneumonia due to infectious organism, unspecified laterality, unspecified part of lung  - fluticasone (FLOVENT HFA) 110 MCG/ACT inhaler; Inhale 1 puff into the lungs 2 times daily  Dispense: 12 g; Refill: 11    8. Atrial fibrillation, unspecified type (H)  9. Paroxysmal atrial fibrillation (H)  4. History of pulmonary embolism  Secondary to severe acute  respiratory failure and critical status with Keytruda induced pneumonitis in the fall 2023.  Associated with RVR at that time.  Patient started on diltiazem and amiodarone.  Also already on Eliquis as associated PEs found during that time.  Sent referral to cardiology to consider long-term management of this.  - Adult Cardiology Eval Ghanshyam Referral; Future    10. Hypomagnesemia  - Magnesium; Future  - Magnesium    Luis Alberto Shoemaker MD    [facility]  Hospital/Nursing Home/ Rehab Facility: [facility]  Date of Admission: 12/20/23  Date of Discharge: 1/3/24  Reason(s) for Admission: Pneumonitis    Post Discharge Medication Reconciliation: completed by nurse and myself    Summary of hospitalization:  [facility] discharge summary reviewed and copied below    Hospital Course  Jim Titus is a 58 year old male who was admitted on 12/20 for acute hypoxic respiratory failure possibly due to cryptogenic organizing pneumonia.  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  He has been treated with high-dose steroids with improved oxygen requirement.  Plan for prolonged steroid taper per pulmonology.       Acute respiratory failure with hypoxia  Keytruda pneumonitis  Possible cryptogenic organizing pneumonia.   Recent hospitalization in Oct and Nov for Keytruda pneumonitis requiring intubation.  This admission with respiratory failure could be due to ongoing pulmonary inflammation concern for organizing pneumonia.  Patient with high-dose steroid and completed a course of azithromycin and Zosyn per ID on 12/27.  - Started methylprednisolone and de-escalated to 60mg prednisone (1/1/24)  - pulm recommending slow prednisone taper; 60mg daily for 2 weeks, then decrease by 10mg every 2 weeks. Remain on 40mg daily until seen by pulmonary MD in clinic later this month.  Follow up [facility] pulm 1/17. Repeat CT chest 1/15  - PJP prophylaxis with Bactrim 12/30  - Continue duonebs, Flovent.   - pulm- ok for discharge      Bilateral pulmonary emboli  Continue Eliqus 5 mg BID     A-fib with RVR  Likely due to acute respiratory failure.  Was on diltiazem and amiodarone.    - Diltiazem was held given bradycardia  - Amiodarone 200 mg daily  - On Eliquis for CVA ppx     Iron deficiency anemia  Microcytic anemia  Hemoglobin 6.7, Status post 2 U PRBC on 12/22. Hemoglobin stable  - Ferrous sulfate 325 mg every other day      Melanoma  Seeing [place] [facility]   S/P 6 doses of pembrolizumab 200 mg IV every 3 weeks.  Last dose given 9/15/2023.     Patient with chronic tremor.  Intention tremor.   TSH suppressed, but free T4 normal.   MRI brain with/without contrast negative.  Outpatient follow up with neurology.  Repeat TSH in 6 to 8 weeks.   Possibly related to seroquel   Discussed with psychiatry who discontinued Seroquel and recommended follow-up outpatient.  Patient agreeable to follow-up with his psychiatrist after discharge.    Diagnostic Tests/Treatments reviewed:    CT chest 12/20:  IMPRESSION:  1.  Previously noted left basilar pulmonary emboli not seen today. No pulmonary emboli noted today. No evidence for right ventricular heart strain.     2.  Extensive patchy left lung infiltrate and atelectasis has developed since prior examination.      3.  Moderate clearing of extensive patchy right lung infiltrate. Mild residual right upper lobe infiltrate/atelectasis remains. Moderate to marked elevation right hemidiaphragm unchanged.     4.  Ectasia of the ascending thoracic aorta stable.    MRI brain 12/27:  IMPRESSION:  1.  No acute infarct, mass, mass effect, or hemorrhage.  2.  Mild atrophy.    Hemoglobin 6.7 on admission-> 8.9 1/1/23    Other Healthcare Providers Involved in Patient's Care:     Pulmonology appointment 1/17/23    Update since discharge: improved.    Chief Complaint   Patient  "presents with    Hospital F/U     Hospital stay from 12/20/23 to 1/3/24 Josh            Review of Systems:   Complete ROS negative except as noted in the HPI            Physical Exam:   /67   Pulse 65   Temp 98.4  F (36.9  C)   Resp 14   Ht 1.93 m (6' 4\")   Wt 109.2 kg (240 lb 11.2 oz)   SpO2 94%   BMI 29.30 kg/m      General appearance: Alert, cooperative, no distress, appears stated age  Head: Normocephalic, atraumatic, without obvious abnormality  Eyes: Pupils equal round, reactive.  Conjunctiva clear.  Nose: Nares normal, no drainage.  Throat: Lips, mucosa, tongue normal mucosa pink and moist  Neck: Supple, symmetric, trachea midline  Lungs: Clear to auscultation bilaterally, no wheezing or crackles present.  Respirations unlabored  Heart: Regular rate and rhythm, normal S1 and S2, no murmur, rub or gallop.  Extremities: Extremities normal, atraumatic.  No cyanosis or edema.  Skin: Skin color, texture, turgor normal no rashes or lesions on limited skin exam  Neurologic: CN II through XII intact, normal strength., tremor present    "

## 2024-01-10 NOTE — TELEPHONE ENCOUNTER
PA Initiation    Medication: Fluticasone Propionate HFA 110mcg/act aerosol  Insurance Company:  [employer]  Pharmacy Filling the Rx:  [facility]  Filling Pharmacy Phone:  [phone]  Filling Pharmacy Fax:  [phone]  Start Date:  1/10/2024

## 2024-01-11 NOTE — RESULT ENCOUNTER NOTE
Jim,  Your results from your recent clinic visit show:  Your BMP was normal with normal electrolytes and kidney function. Your Urea was high but this is not concerning alone  Your hemoglobin is improving nicely up to 11.6 from 8.9  Your Magnesium was normal    If you have more questions please call the clinic at [phone] or send me a Inaika message    Dr. Luis Alberto Shoemaker

## 2024-01-15 ENCOUNTER — HOSPITAL ENCOUNTER (OUTPATIENT)
Dept: CT IMAGING | Facility: CLINIC | Age: 59
Discharge: HOME | End: 2024-01-15
Attending: INTERNAL MEDICINE | Admitting: INTERNAL MEDICINE
Payer: COMMERCIAL

## 2024-01-15 ENCOUNTER — TELEPHONE (OUTPATIENT)
Dept: FAMILY MEDICINE | Facility: CLINIC | Age: 59
End: 2024-01-15
Payer: COMMERCIAL

## 2024-01-15 DIAGNOSIS — J96.01 ACUTE RESPIRATORY FAILURE WITH HYPOXIA (H): ICD-10-CM

## 2024-01-15 DIAGNOSIS — R93.89 ABNORMAL CHEST CT: ICD-10-CM

## 2024-01-15 DIAGNOSIS — J45.40 MODERATE PERSISTENT ASTHMA WITHOUT COMPLICATION: Primary | ICD-10-CM

## 2024-01-15 PROCEDURE — 71250 CT THORAX DX C-: CPT

## 2024-01-15 NOTE — TELEPHONE ENCOUNTER
PRIOR AUTHORIZATION DENIED    Medication: FLUTICASONE PROPIONATE  MCG/ACT IN AERO  Insurance Company: [employer] - Phone [phone] Fax [phone]  Denial Date: 1/15/2024    Denial Reason(s): Patient must have a history of trial & failure to 3 formulary alternatives or have a contraindication or intolerance to the formulary alternatives:  Arnuity, Ellipta, Asmanex HFA, Asmanex Twist Haler, and Qvar HFA      Appeal Information: If provider would like to appeal please provide a letter of medical necessity.

## 2024-01-15 NOTE — RESULT ENCOUNTER NOTE
Junior Fernandez of improving CT scan via [employer].  I will see him in clinic this week.    Jamie (Demina) MD Elana  [facility] ([facility])  Clinic [phone]  Fax [phone]

## 2024-01-15 NOTE — TELEPHONE ENCOUNTER
Central Prior Authorization Team  Phone: [phone]    PA Initiation    Medication: FLUTICASONE PROPIONATE  MCG/ACT IN AERO  Insurance Company: [employer] Clinical Review - Phone [phone] Fax [phone]  Pharmacy Filling the Rx: [id number] - [facility] [address]  Filling Pharmacy Phone: [phone]  Filling Pharmacy Fax:    Start Date: 1/15/2024

## 2024-01-15 NOTE — TELEPHONE ENCOUNTER
Reason for call:  Other     Patient called regarding (reason for call): prescription    Additional comments: Per pt insurance is not going to cover the flovent inhaler and is requesting an alternative. Pt has been out of medication for three days. Please send to [facility] on [address]    PA was initiated on 1/10 with no response so far.    Phone number to reach patient:  Cell number on file:    Telephone Information:   Mobile [phone]       Best Time:  Any    Can we leave a detailed message on this number?  YES

## 2024-01-15 NOTE — TELEPHONE ENCOUNTER
I would recommend working with our pharmacist to find coverage for inhaler. I placed a referral for her and they will call to schedule an appointment. Please call and inform    Luis Alberto Shoemaker MD

## 2024-01-17 ENCOUNTER — OFFICE VISIT (OUTPATIENT)
Dept: PULMONOLOGY | Facility: CLINIC | Age: 59
End: 2024-01-17
Payer: COMMERCIAL

## 2024-01-17 VITALS
HEIGHT: 76 IN | WEIGHT: 240 LBS | DIASTOLIC BLOOD PRESSURE: 76 MMHG | HEART RATE: 80 BPM | BODY MASS INDEX: 29.22 KG/M2 | OXYGEN SATURATION: 92 % | SYSTOLIC BLOOD PRESSURE: 128 MMHG

## 2024-01-17 DIAGNOSIS — J84.9 ILD (INTERSTITIAL LUNG DISEASE) (H): Primary | ICD-10-CM

## 2024-01-17 DIAGNOSIS — J84.89 ORGANIZING PNEUMONIA (H): ICD-10-CM

## 2024-01-17 DIAGNOSIS — J96.01 ACUTE RESPIRATORY FAILURE WITH HYPOXIA (H): ICD-10-CM

## 2024-01-17 DIAGNOSIS — R93.89 ABNORMAL CHEST CT: ICD-10-CM

## 2024-01-17 PROCEDURE — 94729 DIFFUSING CAPACITY: CPT | Performed by: INTERNAL MEDICINE

## 2024-01-17 PROCEDURE — 99214 OFFICE O/P EST MOD 30 MIN: CPT | Performed by: INTERNAL MEDICINE

## 2024-01-17 PROCEDURE — 94726 PLETHYSMOGRAPHY LUNG VOLUMES: CPT | Performed by: INTERNAL MEDICINE

## 2024-01-17 PROCEDURE — 94060 EVALUATION OF WHEEZING: CPT | Performed by: INTERNAL MEDICINE

## 2024-01-17 RX ORDER — SULFAMETHOXAZOLE/TRIMETHOPRIM 800-160 MG
1 TABLET ORAL DAILY
Status: ON HOLD | COMMUNITY
End: 2024-02-27

## 2024-01-17 NOTE — LETTER
1/17/2024         RE: Jim Titus  [address] [place] [address]        Dear Colleague,    Thank you for referring your patient, Jim Titus, to the [facility]. Please see a copy of my visit note below.    Pulmonary Clinic Follow-up Visit    Impression: 58 year old gentleman with a history of cutaneous melanoma, an episode of severe ARDS (requiring proning/paralysis/lung protective ventilation) in Nov 2023 thought 2/2 Keytruda-induced pneumonitis vs. Pneumonia vs. Organizing pneumonia, completed rapid steroid taper then had recurrent of respiratory failure with severe left-sided infiltrate in Dec 2023 (milder; was on HFNC but did not require intubation as he did the first time), who responded very well to steroids, presents for follow up. Symptomatically he has improved a great deal although still having some dyspnea on heavy exertion and hypoxemia.  Repeat CT chest showed significant improvement in the pulmonary infiltrates with residual inflammatory changes and possible fibrosis on the left. PFTs showed moderate restriction and moderate to severe diffusion impairment, consistent with the above. The working diagnosis at this point is recurrent organizing pneumonia. He is tolerating the slow steroid taper. We will continue with this as planned.     Recommendations:  - continue steroid taper as ordered (on 50mg daily currently, plan to decrease by 10mg every 2 weeks).  - continue Bactrim 1 DS tab daily while on prednisone doses 20mg daily or greater  - encouraged him to exercise and remain active as able  - pulmonary rehab referral  - repeat CT chest in 3 months; will also need to do repeat PFTs in the future.   - UTD with vaccinations.     Follow up in 3 months with CT chest, as above.  All questions answered.     Jamie Huitron MD (Avi)  [facility] ([facility])  Clinic [phone]  Fax [phone]     CCx: follow up of  recurrent organizing pneumonia vs. Keytruda-induced pneumonitis.     HPI: Interim history: I last saw Jim on 1/2/24 when he was hospitalized at [facility]. He was discharged on some oxygen and plans for prolonged steroid taper. Currently on 50mg daily with Bactrim DS daily for PJP ppx.  Today, he reports feeling much better. Still requiring O2 with activity.  He does feel deconditioned and has not been exercising as much.  No cough or hemoptysis. No chest pain.     ROS:  A 12-system review was obtained and was negative with the exception of the symptoms endorsed in the history of present illness.    PMH:  No past medical history on file.     PSH:  Past Surgical History:   Procedure Laterality Date     HERNIA REPAIR       ORTHOPEDIC SURGERY      Both ankles, left knee, right shoulder     pyloric stenosis repair         Allergies:  Allergies   Allergen Reactions     Chicken [Chicken Protein] Other (See Comments)     Throat closes to turkey as well.     Pembrolizumab Other (See Comments)     Severe pneumonitis     Wellbutrin [Bupropion] Unknown     Grass Pollen [Grass] Rash     Turkey [Poultry Meal] Rash       Family HX:  Family History   Problem Relation Age of Onset     Breast Cancer Mother      Heart Disease Father      Mental Illness Father      Hyperlipidemia Father      Mental Illness Brother        Social Hx:  Social History     Socioeconomic History     Marital status: Single     Spouse name: Not on file     Number of children: Not on file     Years of education: Not on file     Highest education level: Not on file   Occupational History     Not on file   Tobacco Use     Smoking status: Never     Passive exposure: Never     Smokeless tobacco: Former     Types: Chew     Quit date: 1998   Vaping Use     Vaping Use: Never used   Substance and Sexual Activity     Alcohol use: Yes     Comment: 3-4 times per week, 5-6 beers     Drug use: Yes     Types: Marijuana     Sexual activity: Not Currently     Partners:  Female   Other Topics Concern     Not on file   Social History Narrative     Not on file     Social Determinants of Health     Financial Resource Strain: Low Risk  (1/10/2024)    Financial Resource Strain      Within the past 12 months, have you or your family members you live with been unable to get utilities (heat, electricity) when it was really needed?: No   Food Insecurity: Low Risk  (1/10/2024)    Food Insecurity      Within the past 12 months, did you worry that your food would run out before you got money to buy more?: No      Within the past 12 months, did the food you bought just not last and you didn t have money to get more?: No   Transportation Needs: Low Risk  (1/10/2024)    Transportation Needs      Within the past 12 months, has lack of transportation kept you from medical appointments, getting your medicines, non-medical meetings or appointments, work, or from getting things that you need?: No   Physical Activity: Insufficiently Active (11/10/2021)    Exercise Vital Sign      Days of Exercise per Week: 1 day      Minutes of Exercise per Session: 30 min   Stress: Stress Concern Present (11/10/2021)    [employer] [employer] - Occupational Stress Questionnaire      Feeling of Stress : To some extent   Social Connections: Socially Isolated (11/10/2021)    Social Connection and Isolation Panel [NHANES]      Frequency of Communication with Friends and Family: Once a week      Frequency of Social Gatherings with Friends and Family: Once a week      Attends Yazidi Services: Never      Active Member of Clubs or Organizations: No      Attends Club or Organization Meetings: Not on file      Marital Status: Never    Interpersonal Safety: Low Risk  (10/23/2023)    Interpersonal Safety      Do you feel physically and emotionally safe where you currently live?: Yes      Within the past 12 months, have you been hit, slapped, kicked or otherwise physically hurt by someone?: No       Within the past 12 months, have you been humiliated or emotionally abused in other ways by your partner or ex-partner?: No   Housing Stability: Low Risk  (1/10/2024)    Housing Stability      Do you have housing? : Yes      Are you worried about losing your housing?: No       Current Meds:  Current Outpatient Medications   Medication Sig Dispense Refill     acetaminophen (TYLENOL) 325 MG tablet Take 325-650 mg by mouth every 6 hours as needed for mild pain       albuterol (PROAIR HFA/PROVENTIL HFA/VENTOLIN HFA) 108 (90 Base) MCG/ACT inhaler Inhale 2 puffs into the lungs every 6 hours as needed 18 g 11     amiodarone (PACERONE) 200 MG tablet Take 1 tablet (200 mg) by mouth daily 30 tablet 3     apixaban ANTICOAGULANT (ELIQUIS) 5 MG tablet Take 1 tablet (5 mg) by mouth 2 times daily 60 tablet 3     clonazePAM (KLONOPIN) 0.5 MG tablet Take 0.5-1 mg by mouth daily as needed for anxiety       ferrous sulfate (FEROSUL) 325 (65 Fe) MG tablet Take 1 tablet (325 mg) by mouth every other day for 30 days 15 tablet 0     loperamide HCl (IMODIUM A-D ORAL) Take 2 mg by mouth 4 times daily as needed (diarrhea)       loratadine (CLARITIN) 10 mg tablet Take 10 mg by mouth daily as needed for allergies       mirtazapine (REMERON) 15 MG tablet Take 15 mg by mouth at bedtime       predniSONE (DELTASONE) 20 MG tablet Take 3 tablets (60 mg) by mouth daily for 12 days, THEN 2.5 tablets (50 mg) daily for 14 days, THEN 2 tablets (40 mg) daily for 14 days, THEN 1.5 tablets (30 mg) daily for 14 days, THEN 1 tablet (20 mg) daily for 14 days. 134 tablet 0     simvastatin (ZOCOR) 40 MG tablet Take 40 mg by mouth at bedtime       sulfamethoxazole-trimethoprim (BACTRIM DS) 800-160 MG tablet Take 1 tablet by mouth daily       tadalafil (CIALIS) 5 MG tablet [TADALAFIL (CIALIS) 5 MG TABLET] Take 1 tablet (5 mg total) by mouth daily as needed for erectile dysfunction. 30 tablet 1     albuterol (PROVENTIL) (2.5 MG/3ML) 0.083% neb solution Take 1 vial  "(2.5 mg) by nebulization every 6 hours as needed for shortness of breath, wheezing or cough (Patient not taking: Reported on 1/10/2024) 90 mL 0     escitalopram (LEXAPRO) 20 MG tablet Take 20 mg by mouth daily (with lunch) (Patient not taking: Reported on 1/17/2024)       fluticasone (FLOVENT HFA) 110 MCG/ACT inhaler Inhale 1 puff into the lungs 2 times daily (Patient not taking: Reported on 1/17/2024) 12 g 11       Physical Exam:  /76 (BP Location: Right arm, Patient Position: Chair, Cuff Size: Adult Large)   Pulse 80   Ht 1.93 m (6' 4\")   Wt 108.9 kg (240 lb)   SpO2 92%   BMI 29.21 kg/m    Gen: awake, alert, oriented, no distress  HEENT: nasal turbinates are unremarkable, no oropharyngeal lesions, no cervical or supraclavicular lymphadenopathy  CV: RRR, no M/G/R  Resp: CTAB, no focal crackles or wheezes  Skin: no apparent rashes  Ext: no cyanosis, clubbing or edema  Neuro: alert, nonfocal    Labs:  reviewed    Imaging studies:  Personally reviewed    EXAM: CT CHEST W/O CONTRAST  LOCATION: [facility]  DATE: 1/15/2024     INDICATION:  Acute respiratory failure with hypoxia. Abnormal chest CT.  COMPARISON: 12/20/2023  TECHNIQUE: CT chest without IV contrast. Multiplanar reformats were obtained. Dose reduction techniques were used.  CONTRAST: None.     FINDINGS:   LUNGS AND PLEURA: Irregular reticular opacities with background of mild/ill-defined groundglass throughout the left lung, right upper lobe, and medial right middle lobe correspond to previous pulmonary opacities, significantly improved. Corresponding   airways in these abnormal regions are slightly dilated. No new confluent airspace consolidations, pleural effusion or pneumothorax.     MEDIASTINUM/AXILLAE: No lymphadenopathy. No thoracic aortic aneurysm. Heart size is normal without pericardial effusion.     CORONARY ARTERY CALCIFICATION: Minimal three-vessel coronary artery atherosclerotic calcifications.     UPPER " ABDOMEN: No significant finding.     MUSCULOSKELETAL: Mild degenerative disc space narrowing at T11/T12, unchanged. No suspicious osseous lesions or acute fractures.                                                                         IMPRESSION:      Diffuse irregular parenchymal reticulation with background of mild/ill-defined hazy groundglass throughout the left lung, right upper lobe, and medial right middle lobe in the areas of previous pulmonary opacities, most compatible with postinfectious   scarring, possibly with a mild component of residual inflammation and/or infection.    Pulmonary Function Testing  1/17/2024  FEV1 2.42L, 59%  FVC 58%  No BD response  Ratio 0.79  TLC 5.2L, 62%  Dlco 50% myron for hgb  Flow volume loop shows reduction in mid flow rates; +BD response of mid flow rates may suggest small airways disease such as asthma.     DME pt uses: [place]   Pt uses this DME for: oxygen    Eleanor Kee N     Again, thank you for allowing me to participate in the care of your patient.        Sincerely,        Jamie Huitron MD

## 2024-01-17 NOTE — PROGRESS NOTES
Pulmonary Clinic Follow-up Visit    Impression: 58 year old gentleman with a history of cutaneous melanoma, an episode of severe ARDS (requiring proning/paralysis/lung protective ventilation) in Nov 2023 thought 2/2 Keytruda-induced pneumonitis vs. Pneumonia vs. Organizing pneumonia, completed rapid steroid taper then had recurrent of respiratory failure with severe left-sided infiltrate in Dec 2023 (milder; was on HFNC but did not require intubation as he did the first time), who responded very well to steroids, presents for follow up. Symptomatically he has improved a great deal although still having some dyspnea on heavy exertion and hypoxemia.  Repeat CT chest showed significant improvement in the pulmonary infiltrates with residual inflammatory changes and possible fibrosis on the left. PFTs showed moderate restriction and moderate to severe diffusion impairment, consistent with the above. The working diagnosis at this point is recurrent organizing pneumonia. He is tolerating the slow steroid taper. We will continue with this as planned.     Recommendations:  - continue steroid taper as ordered (on 50mg daily currently, plan to decrease by 10mg every 2 weeks).  - continue Bactrim 1 DS tab daily while on prednisone doses 20mg daily or greater  - encouraged him to exercise and remain active as able  - pulmonary rehab referral  - repeat CT chest in 3 months; will also need to do repeat PFTs in the future.   - UTD with vaccinations.     Follow up in 3 months with CT chest, as above.  All questions answered.     Jamie Huitron MD (Avi)  [facility] ([facility])  Clinic [phone]  Fax [phone]     CCx: follow up of recurrent organizing pneumonia vs. Keytruda-induced pneumonitis.     HPI: Interim history: I last saw Jim on 1/2/24 when he was hospitalized at [facility]. He was discharged on some oxygen and plans for prolonged steroid taper. Currently on 50mg daily with  Bactrim DS daily for PJP ppx.  Today, he reports feeling much better. Still requiring O2 with activity.  He does feel deconditioned and has not been exercising as much.  No cough or hemoptysis. No chest pain.     ROS:  A 12-system review was obtained and was negative with the exception of the symptoms endorsed in the history of present illness.    PMH:  No past medical history on file.     PSH:  Past Surgical History:   Procedure Laterality Date    HERNIA REPAIR      ORTHOPEDIC SURGERY      Both ankles, left knee, right shoulder    pyloric stenosis repair         Allergies:  Allergies   Allergen Reactions    Chicken [Chicken Protein] Other (See Comments)     Throat closes to turkey as well.    Pembrolizumab Other (See Comments)     Severe pneumonitis    Wellbutrin [Bupropion] Unknown    Grass Pollen [Grass] Rash    Turkey [Poultry Meal] Rash       Family HX:  Family History   Problem Relation Age of Onset    Breast Cancer Mother     Heart Disease Father     Mental Illness Father     Hyperlipidemia Father     Mental Illness Brother        Social Hx:  Social History     Socioeconomic History    Marital status: Single     Spouse name: Not on file    Number of children: Not on file    Years of education: Not on file    Highest education level: Not on file   Occupational History    Not on file   Tobacco Use    Smoking status: Never     Passive exposure: Never    Smokeless tobacco: Former     Types: Chew     Quit date: 1998   Vaping Use    Vaping Use: Never used   Substance and Sexual Activity    Alcohol use: Yes     Comment: 3-4 times per week, 5-6 beers    Drug use: Yes     Types: Marijuana    Sexual activity: Not Currently     Partners: Female   Other Topics Concern    Not on file   Social History Narrative    Not on file     Social Determinants of Health     Financial Resource Strain: Low Risk  (1/10/2024)    Financial Resource Strain     Within the past 12 months, have you or your family members you live with been  unable to get utilities (heat, electricity) when it was really needed?: No   Food Insecurity: Low Risk  (1/10/2024)    Food Insecurity     Within the past 12 months, did you worry that your food would run out before you got money to buy more?: No     Within the past 12 months, did the food you bought just not last and you didn t have money to get more?: No   Transportation Needs: Low Risk  (1/10/2024)    Transportation Needs     Within the past 12 months, has lack of transportation kept you from medical appointments, getting your medicines, non-medical meetings or appointments, work, or from getting things that you need?: No   Physical Activity: Insufficiently Active (11/10/2021)    Exercise Vital Sign     Days of Exercise per Week: 1 day     Minutes of Exercise per Session: 30 min   Stress: Stress Concern Present (11/10/2021)    [employer] - Occupational Stress Questionnaire     Feeling of Stress : To some extent   Social Connections: Socially Isolated (11/10/2021)    Social Connection and Isolation Panel [NHANES]     Frequency of Communication with Friends and Family: Once a week     Frequency of Social Gatherings with Friends and Family: Once a week     Attends Oriental orthodox Services: Never     Active Member of Clubs or Organizations: No     Attends Club or Organization Meetings: Not on file     Marital Status: Never    Interpersonal Safety: Low Risk  (10/23/2023)    Interpersonal Safety     Do you feel physically and emotionally safe where you currently live?: Yes     Within the past 12 months, have you been hit, slapped, kicked or otherwise physically hurt by someone?: No     Within the past 12 months, have you been humiliated or emotionally abused in other ways by your partner or ex-partner?: No   Housing Stability: Low Risk  (1/10/2024)    Housing Stability     Do you have housing? : Yes     Are you worried about losing your housing?: No       Current Meds:  Current Outpatient  Medications   Medication Sig Dispense Refill    acetaminophen (TYLENOL) 325 MG tablet Take 325-650 mg by mouth every 6 hours as needed for mild pain      albuterol (PROAIR HFA/PROVENTIL HFA/VENTOLIN HFA) 108 (90 Base) MCG/ACT inhaler Inhale 2 puffs into the lungs every 6 hours as needed 18 g 11    amiodarone (PACERONE) 200 MG tablet Take 1 tablet (200 mg) by mouth daily 30 tablet 3    apixaban ANTICOAGULANT (ELIQUIS) 5 MG tablet Take 1 tablet (5 mg) by mouth 2 times daily 60 tablet 3    clonazePAM (KLONOPIN) 0.5 MG tablet Take 0.5-1 mg by mouth daily as needed for anxiety      ferrous sulfate (FEROSUL) 325 (65 Fe) MG tablet Take 1 tablet (325 mg) by mouth every other day for 30 days 15 tablet 0    loperamide HCl (IMODIUM A-D ORAL) Take 2 mg by mouth 4 times daily as needed (diarrhea)      loratadine (CLARITIN) 10 mg tablet Take 10 mg by mouth daily as needed for allergies      mirtazapine (REMERON) 15 MG tablet Take 15 mg by mouth at bedtime      predniSONE (DELTASONE) 20 MG tablet Take 3 tablets (60 mg) by mouth daily for 12 days, THEN 2.5 tablets (50 mg) daily for 14 days, THEN 2 tablets (40 mg) daily for 14 days, THEN 1.5 tablets (30 mg) daily for 14 days, THEN 1 tablet (20 mg) daily for 14 days. 134 tablet 0    simvastatin (ZOCOR) 40 MG tablet Take 40 mg by mouth at bedtime      sulfamethoxazole-trimethoprim (BACTRIM DS) 800-160 MG tablet Take 1 tablet by mouth daily      tadalafil (CIALIS) 5 MG tablet [TADALAFIL (CIALIS) 5 MG TABLET] Take 1 tablet (5 mg total) by mouth daily as needed for erectile dysfunction. 30 tablet 1    albuterol (PROVENTIL) (2.5 MG/3ML) 0.083% neb solution Take 1 vial (2.5 mg) by nebulization every 6 hours as needed for shortness of breath, wheezing or cough (Patient not taking: Reported on 1/10/2024) 90 mL 0    escitalopram (LEXAPRO) 20 MG tablet Take 20 mg by mouth daily (with lunch) (Patient not taking: Reported on 1/17/2024)      fluticasone (FLOVENT HFA) 110 MCG/ACT inhaler Inhale 1  "puff into the lungs 2 times daily (Patient not taking: Reported on 1/17/2024) 12 g 11       Physical Exam:  /76 (BP Location: Right arm, Patient Position: Chair, Cuff Size: Adult Large)   Pulse 80   Ht 1.93 m (6' 4\")   Wt 108.9 kg (240 lb)   SpO2 92%   BMI 29.21 kg/m    Gen: awake, alert, oriented, no distress  HEENT: nasal turbinates are unremarkable, no oropharyngeal lesions, no cervical or supraclavicular lymphadenopathy  CV: RRR, no M/G/R  Resp: CTAB, no focal crackles or wheezes  Skin: no apparent rashes  Ext: no cyanosis, clubbing or edema  Neuro: alert, nonfocal    Labs:  reviewed    Imaging studies:  Personally reviewed    EXAM: CT CHEST W/O CONTRAST  LOCATION: [facility]  DATE: 1/15/2024     INDICATION:  Acute respiratory failure with hypoxia. Abnormal chest CT.  COMPARISON: 12/20/2023  TECHNIQUE: CT chest without IV contrast. Multiplanar reformats were obtained. Dose reduction techniques were used.  CONTRAST: None.     FINDINGS:   LUNGS AND PLEURA: Irregular reticular opacities with background of mild/ill-defined groundglass throughout the left lung, right upper lobe, and medial right middle lobe correspond to previous pulmonary opacities, significantly improved. Corresponding   airways in these abnormal regions are slightly dilated. No new confluent airspace consolidations, pleural effusion or pneumothorax.     MEDIASTINUM/AXILLAE: No lymphadenopathy. No thoracic aortic aneurysm. Heart size is normal without pericardial effusion.     CORONARY ARTERY CALCIFICATION: Minimal three-vessel coronary artery atherosclerotic calcifications.     UPPER ABDOMEN: No significant finding.     MUSCULOSKELETAL: Mild degenerative disc space narrowing at T11/T12, unchanged. No suspicious osseous lesions or acute fractures.                                                                         IMPRESSION:      Diffuse irregular parenchymal reticulation with background of mild/ill-defined " hazy groundglass throughout the left lung, right upper lobe, and medial right middle lobe in the areas of previous pulmonary opacities, most compatible with postinfectious   scarring, possibly with a mild component of residual inflammation and/or infection.    Pulmonary Function Testing  1/17/2024  FEV1 2.42L, 59%  FVC 58%  No BD response  Ratio 0.79  TLC 5.2L, 62%  Dlco 50% ymron for hgb  Flow volume loop shows reduction in mid flow rates; +BD response of mid flow rates may suggest small airways disease such as asthma.

## 2024-01-18 NOTE — TELEPHONE ENCOUNTER
If patient cannot afford I would advise appointment with pharmacy to find inhaler that is covered    Luis Alberto Shoemaker MD

## 2024-01-19 ENCOUNTER — TELEPHONE (OUTPATIENT)
Dept: FAMILY MEDICINE | Facility: CLINIC | Age: 59
End: 2024-01-19
Payer: COMMERCIAL

## 2024-01-19 NOTE — TELEPHONE ENCOUNTER
MTM referral from: Transitions of Care (recent hospital discharge or ED visit)    MTM referral outreach attempt #2 on January 19, 2024 at 10:17 AM      Outcome: Patient not reachable after several attempts, will route to MTM Pharmacist/Provider as an FYI.  [facility] scheduling number is .  Thank you for the referral.    Use kimberly for the carrier/Plan on the flowsheet      Voxeo Message Sent    Fawn Coker CPhT  [facility]

## 2024-01-19 NOTE — TELEPHONE ENCOUNTER
Will reach out to patient in 1 week if not heard from sooner.     Soraya Aggarwal, PharmD  Medication Therapy Management Pharmacist   [facility]

## 2024-01-21 LAB
DLCOCOR-%PRED-PRE: 50 %
DLCOCOR-PRE: 16.62 ML/MIN/MMHG
DLCOUNC-%PRED-PRE: 45 %
DLCOUNC-PRE: 15.02 ML/MIN/MMHG
DLCOUNC-PRED: 32.82 ML/MIN/MMHG
ERV-%PRED-PRE: 36 %
ERV-PRE: 0.7 L
ERV-PRED: 1.93 L
EXPTIME-PRE: 5.46 SEC
FEF2575-%PRED-POST: 71 %
FEF2575-%PRED-PRE: 63 %
FEF2575-POST: 2.37 L/SEC
FEF2575-PRE: 2.1 L/SEC
FEF2575-PRED: 3.33 L/SEC
FEFMAX-%PRED-PRE: 78 %
FEFMAX-PRE: 8.43 L/SEC
FEFMAX-PRED: 10.72 L/SEC
FEV1-%PRED-PRE: 59 %
FEV1-PRE: 2.42 L
FEV1FEV6-PRE: 79 %
FEV1FEV6-PRED: 79 %
FEV1FVC-PRE: 79 %
FEV1FVC-PRED: 77 %
FEV1SVC-PRE: 77 %
FEV1SVC-PRED: 79 %
FIFMAX-PRE: 6.25 L/SEC
FRCPLETH-%PRED-PRE: 70 %
FRCPLETH-PRE: 2.78 L
FRCPLETH-PRED: 3.96 L
FVC-%PRED-PRE: 58 %
FVC-PRE: 3.07 L
FVC-PRED: 5.26 L
IC-%PRED-PRE: 62 %
IC-PRE: 2.42 L
IC-PRED: 3.87 L
RVPLETH-%PRED-PRE: 79 %
RVPLETH-PRE: 2.07 L
RVPLETH-PRED: 2.59 L
TLCPLETH-%PRED-PRE: 62 %
TLCPLETH-PRE: 5.2 L
TLCPLETH-PRED: 8.34 L
VA-%PRED-PRE: 57 %
VA-PRE: 4.61 L
VC-%PRED-PRE: 60 %
VC-PRE: 3.13 L
VC-PRED: 5.15 L

## 2024-01-22 ENCOUNTER — MYC MEDICAL ADVICE (OUTPATIENT)
Dept: FAMILY MEDICINE | Facility: CLINIC | Age: 59
End: 2024-01-22
Payer: COMMERCIAL

## 2024-01-22 DIAGNOSIS — J45.40 MODERATE PERSISTENT ASTHMA WITHOUT COMPLICATION: Primary | ICD-10-CM

## 2024-02-12 DIAGNOSIS — J45.20 MILD INTERMITTENT ASTHMA WITHOUT COMPLICATION: ICD-10-CM

## 2024-02-12 RX ORDER — ALBUTEROL SULFATE 90 UG/1
AEROSOL, METERED RESPIRATORY (INHALATION)
Qty: 8.5 G | Refills: 0 | OUTPATIENT
Start: 2024-02-12

## 2024-02-25 ENCOUNTER — HOSPITAL ENCOUNTER (INPATIENT)
Facility: CLINIC | Age: 59
LOS: 2 days | Discharge: HOME | End: 2024-02-27
Attending: EMERGENCY MEDICINE | Admitting: STUDENT IN AN ORGANIZED HEALTH CARE EDUCATION/TRAINING PROGRAM
Payer: COMMERCIAL

## 2024-02-25 ENCOUNTER — APPOINTMENT (OUTPATIENT)
Dept: RADIOLOGY | Facility: CLINIC | Age: 59
End: 2024-02-25
Attending: EMERGENCY MEDICINE
Payer: COMMERCIAL

## 2024-02-25 DIAGNOSIS — J45.20 MILD INTERMITTENT ASTHMA WITHOUT COMPLICATION: ICD-10-CM

## 2024-02-25 DIAGNOSIS — J84.9 ILD (INTERSTITIAL LUNG DISEASE) (H): ICD-10-CM

## 2024-02-25 DIAGNOSIS — J10.1 INFLUENZA A: ICD-10-CM

## 2024-02-25 DIAGNOSIS — I48.19 PERSISTENT ATRIAL FIBRILLATION (H): ICD-10-CM

## 2024-02-25 DIAGNOSIS — J98.4 PNEUMONITIS: Primary | ICD-10-CM

## 2024-02-25 DIAGNOSIS — J96.01 ACUTE HYPOXEMIC RESPIRATORY FAILURE (H): ICD-10-CM

## 2024-02-25 DIAGNOSIS — J45.40 MODERATE PERSISTENT ASTHMA WITHOUT COMPLICATION: ICD-10-CM

## 2024-02-25 LAB
ALBUMIN SERPL BCG-MCNC: 4.3 G/DL (ref 3.5–5.2)
ALP SERPL-CCNC: 62 U/L (ref 40–150)
ALT SERPL W P-5'-P-CCNC: 29 U/L (ref 0–70)
ANION GAP SERPL CALCULATED.3IONS-SCNC: 11 MMOL/L (ref 7–15)
AST SERPL W P-5'-P-CCNC: 36 U/L (ref 0–45)
ATRIAL RATE - MUSE: 118 BPM
BASOPHILS # BLD AUTO: ABNORMAL 10*3/UL
BASOPHILS # BLD MANUAL: 0.1 10E3/UL (ref 0–0.2)
BASOPHILS NFR BLD AUTO: ABNORMAL %
BASOPHILS NFR BLD MANUAL: 1 %
BILIRUB SERPL-MCNC: 0.3 MG/DL
BUN SERPL-MCNC: 24 MG/DL (ref 6–20)
CALCIUM SERPL-MCNC: 9.3 MG/DL (ref 8.6–10)
CHLORIDE SERPL-SCNC: 102 MMOL/L (ref 98–107)
CREAT SERPL-MCNC: 1.24 MG/DL (ref 0.67–1.17)
DEPRECATED HCO3 PLAS-SCNC: 27 MMOL/L (ref 22–29)
DIASTOLIC BLOOD PRESSURE - MUSE: 75 MMHG
EGFRCR SERPLBLD CKD-EPI 2021: 67 ML/MIN/1.73M2
EOSINOPHIL # BLD AUTO: ABNORMAL 10*3/UL
EOSINOPHIL # BLD MANUAL: 0 10E3/UL (ref 0–0.7)
EOSINOPHIL NFR BLD AUTO: ABNORMAL %
EOSINOPHIL NFR BLD MANUAL: 0 %
ERYTHROCYTE [DISTWIDTH] IN BLOOD BY AUTOMATED COUNT: 20.1 % (ref 10–15)
FLUAV RNA SPEC QL NAA+PROBE: POSITIVE
FLUBV RNA RESP QL NAA+PROBE: NEGATIVE
GLUCOSE SERPL-MCNC: 99 MG/DL (ref 70–99)
HCT VFR BLD AUTO: 34.5 % (ref 40–53)
HGB BLD-MCNC: 11.2 G/DL (ref 13.3–17.7)
IMM GRANULOCYTES # BLD: ABNORMAL 10*3/UL
IMM GRANULOCYTES NFR BLD: ABNORMAL %
INTERPRETATION ECG - MUSE: NORMAL
LACTATE SERPL-SCNC: 1.3 MMOL/L (ref 0.7–2)
LYMPHOCYTES # BLD AUTO: ABNORMAL 10*3/UL
LYMPHOCYTES # BLD MANUAL: 0.6 10E3/UL (ref 0.8–5.3)
LYMPHOCYTES NFR BLD AUTO: ABNORMAL %
LYMPHOCYTES NFR BLD MANUAL: 8 %
MCH RBC QN AUTO: 32.8 PG (ref 26.5–33)
MCHC RBC AUTO-ENTMCNC: 32.5 G/DL (ref 31.5–36.5)
MCV RBC AUTO: 101 FL (ref 78–100)
METAMYELOCYTES # BLD MANUAL: 0.1 10E3/UL
METAMYELOCYTES NFR BLD MANUAL: 2 %
MONOCYTES # BLD AUTO: ABNORMAL 10*3/UL
MONOCYTES # BLD MANUAL: 0.3 10E3/UL (ref 0–1.3)
MONOCYTES NFR BLD AUTO: ABNORMAL %
MONOCYTES NFR BLD MANUAL: 4 %
MRSA DNA SPEC QL NAA+PROBE: NEGATIVE
MYELOCYTES # BLD MANUAL: 0.1 10E3/UL
MYELOCYTES NFR BLD MANUAL: 2 %
NEUTROPHILS # BLD AUTO: ABNORMAL 10*3/UL
NEUTROPHILS # BLD MANUAL: 6.1 10E3/UL (ref 1.6–8.3)
NEUTROPHILS NFR BLD AUTO: ABNORMAL %
NEUTROPHILS NFR BLD MANUAL: 83 %
NRBC # BLD AUTO: 0.1 10E3/UL
NRBC BLD AUTO-RTO: 1 /100
NT-PROBNP SERPL-MCNC: 393 PG/ML (ref 0–900)
P AXIS - MUSE: 24 DEGREES
PLAT MORPH BLD: ABNORMAL
PLATELET # BLD AUTO: 215 10E3/UL (ref 150–450)
POTASSIUM SERPL-SCNC: 4.3 MMOL/L (ref 3.4–5.3)
PR INTERVAL - MUSE: 144 MS
PROCALCITONIN SERPL IA-MCNC: 0.34 NG/ML
PROT SERPL-MCNC: 6.3 G/DL (ref 6.4–8.3)
QRS DURATION - MUSE: 82 MS
QT - MUSE: 296 MS
QTC - MUSE: 414 MS
R AXIS - MUSE: 77 DEGREES
RBC # BLD AUTO: 3.41 10E6/UL (ref 4.4–5.9)
RBC MORPH BLD: ABNORMAL
RSV RNA SPEC NAA+PROBE: NEGATIVE
SA TARGET DNA: NEGATIVE
SARS-COV-2 RNA RESP QL NAA+PROBE: NEGATIVE
SODIUM SERPL-SCNC: 140 MMOL/L (ref 135–145)
SYSTOLIC BLOOD PRESSURE - MUSE: 143 MMHG
T AXIS - MUSE: 54 DEGREES
TROPONIN T SERPL HS-MCNC: 27 NG/L
TROPONIN T SERPL HS-MCNC: 33 NG/L
VENTRICULAR RATE- MUSE: 118 BPM
WBC # BLD AUTO: 7.3 10E3/UL (ref 4–11)

## 2024-02-25 PROCEDURE — 250N000009 HC RX 250

## 2024-02-25 PROCEDURE — 93005 ELECTROCARDIOGRAM TRACING: CPT | Performed by: EMERGENCY MEDICINE

## 2024-02-25 PROCEDURE — 83605 ASSAY OF LACTIC ACID: CPT | Performed by: EMERGENCY MEDICINE

## 2024-02-25 PROCEDURE — 5A09357 ASSISTANCE WITH RESPIRATORY VENTILATION, LESS THAN 24 CONSECUTIVE HOURS, CONTINUOUS POSITIVE AIRWAY PRESSURE: ICD-10-PCS | Performed by: STUDENT IN AN ORGANIZED HEALTH CARE EDUCATION/TRAINING PROGRAM

## 2024-02-25 PROCEDURE — 36415 COLL VENOUS BLD VENIPUNCTURE: CPT

## 2024-02-25 PROCEDURE — 99223 1ST HOSP IP/OBS HIGH 75: CPT | Mod: AI

## 2024-02-25 PROCEDURE — 87641 MR-STAPH DNA AMP PROBE: CPT | Performed by: EMERGENCY MEDICINE

## 2024-02-25 PROCEDURE — 250N000013 HC RX MED GY IP 250 OP 250 PS 637

## 2024-02-25 PROCEDURE — 87637 SARSCOV2&INF A&B&RSV AMP PRB: CPT | Performed by: EMERGENCY MEDICINE

## 2024-02-25 PROCEDURE — 36415 COLL VENOUS BLD VENIPUNCTURE: CPT | Performed by: EMERGENCY MEDICINE

## 2024-02-25 PROCEDURE — 84484 ASSAY OF TROPONIN QUANT: CPT

## 2024-02-25 PROCEDURE — 250N000013 HC RX MED GY IP 250 OP 250 PS 637: Performed by: EMERGENCY MEDICINE

## 2024-02-25 PROCEDURE — 87640 STAPH A DNA AMP PROBE: CPT | Performed by: EMERGENCY MEDICINE

## 2024-02-25 PROCEDURE — 71045 X-RAY EXAM CHEST 1 VIEW: CPT

## 2024-02-25 PROCEDURE — 96367 TX/PROPH/DG ADDL SEQ IV INF: CPT

## 2024-02-25 PROCEDURE — 94660 CPAP INITIATION&MGMT: CPT

## 2024-02-25 PROCEDURE — 999N000157 HC STATISTIC RCP TIME EA 10 MIN

## 2024-02-25 PROCEDURE — 87205 SMEAR GRAM STAIN: CPT | Performed by: INTERNAL MEDICINE

## 2024-02-25 PROCEDURE — 120N000013 HC R&B IMCU

## 2024-02-25 PROCEDURE — 99291 CRITICAL CARE FIRST HOUR: CPT | Mod: 25

## 2024-02-25 PROCEDURE — 94640 AIRWAY INHALATION TREATMENT: CPT

## 2024-02-25 PROCEDURE — 87040 BLOOD CULTURE FOR BACTERIA: CPT | Performed by: EMERGENCY MEDICINE

## 2024-02-25 PROCEDURE — 84484 ASSAY OF TROPONIN QUANT: CPT | Performed by: EMERGENCY MEDICINE

## 2024-02-25 PROCEDURE — 250N000012 HC RX MED GY IP 250 OP 636 PS 637: Performed by: INTERNAL MEDICINE

## 2024-02-25 PROCEDURE — 85007 BL SMEAR W/DIFF WBC COUNT: CPT | Performed by: EMERGENCY MEDICINE

## 2024-02-25 PROCEDURE — 250N000011 HC RX IP 250 OP 636: Performed by: EMERGENCY MEDICINE

## 2024-02-25 PROCEDURE — 80053 COMPREHEN METABOLIC PANEL: CPT | Performed by: EMERGENCY MEDICINE

## 2024-02-25 PROCEDURE — 96365 THER/PROPH/DIAG IV INF INIT: CPT

## 2024-02-25 PROCEDURE — 99221 1ST HOSP IP/OBS SF/LOW 40: CPT | Performed by: INTERNAL MEDICINE

## 2024-02-25 PROCEDURE — 83880 ASSAY OF NATRIURETIC PEPTIDE: CPT | Performed by: EMERGENCY MEDICINE

## 2024-02-25 PROCEDURE — 999N000254 HC STATISTIC VENTILATOR TRANSFER

## 2024-02-25 PROCEDURE — 250N000009 HC RX 250: Performed by: EMERGENCY MEDICINE

## 2024-02-25 PROCEDURE — 272N000064 HC CIRCUIT HUMIDITY W/CPAP BIPAP

## 2024-02-25 PROCEDURE — 84145 PROCALCITONIN (PCT): CPT | Performed by: EMERGENCY MEDICINE

## 2024-02-25 PROCEDURE — 258N000003 HC RX IP 258 OP 636: Performed by: EMERGENCY MEDICINE

## 2024-02-25 PROCEDURE — 85027 COMPLETE CBC AUTOMATED: CPT | Performed by: EMERGENCY MEDICINE

## 2024-02-25 PROCEDURE — 94640 AIRWAY INHALATION TREATMENT: CPT | Mod: 76

## 2024-02-25 RX ORDER — OSELTAMIVIR PHOSPHATE 75 MG/1
75 CAPSULE ORAL ONCE
Status: COMPLETED | OUTPATIENT
Start: 2024-02-25 | End: 2024-02-25

## 2024-02-25 RX ORDER — ONDANSETRON 4 MG/1
4 TABLET, ORALLY DISINTEGRATING ORAL EVERY 6 HOURS PRN
Status: DISCONTINUED | OUTPATIENT
Start: 2024-02-25 | End: 2024-02-27 | Stop reason: HOSPADM

## 2024-02-25 RX ORDER — PROCHLORPERAZINE 25 MG
25 SUPPOSITORY, RECTAL RECTAL EVERY 12 HOURS PRN
Status: DISCONTINUED | OUTPATIENT
Start: 2024-02-25 | End: 2024-02-27 | Stop reason: HOSPADM

## 2024-02-25 RX ORDER — PROCHLORPERAZINE MALEATE 10 MG
10 TABLET ORAL EVERY 6 HOURS PRN
Status: DISCONTINUED | OUTPATIENT
Start: 2024-02-25 | End: 2024-02-27 | Stop reason: HOSPADM

## 2024-02-25 RX ORDER — SULFAMETHOXAZOLE/TRIMETHOPRIM 800-160 MG
1 TABLET ORAL DAILY
Status: DISCONTINUED | OUTPATIENT
Start: 2024-02-25 | End: 2024-02-25

## 2024-02-25 RX ORDER — OSELTAMIVIR PHOSPHATE 75 MG/1
75 CAPSULE ORAL 2 TIMES DAILY
Status: DISCONTINUED | OUTPATIENT
Start: 2024-02-25 | End: 2024-02-27 | Stop reason: HOSPADM

## 2024-02-25 RX ORDER — ACETAMINOPHEN 650 MG/1
650 SUPPOSITORY RECTAL EVERY 4 HOURS PRN
Status: DISCONTINUED | OUTPATIENT
Start: 2024-02-25 | End: 2024-02-27 | Stop reason: HOSPADM

## 2024-02-25 RX ORDER — AMIODARONE HYDROCHLORIDE 200 MG/1
200 TABLET ORAL DAILY
Status: DISCONTINUED | OUTPATIENT
Start: 2024-02-25 | End: 2024-02-27 | Stop reason: HOSPADM

## 2024-02-25 RX ORDER — PREDNISONE 20 MG/1
40 TABLET ORAL DAILY
Status: DISCONTINUED | OUTPATIENT
Start: 2024-02-26 | End: 2024-02-25

## 2024-02-25 RX ORDER — AMOXICILLIN 250 MG
1 CAPSULE ORAL 2 TIMES DAILY PRN
Status: DISCONTINUED | OUTPATIENT
Start: 2024-02-25 | End: 2024-02-27 | Stop reason: HOSPADM

## 2024-02-25 RX ORDER — SULFAMETHOXAZOLE/TRIMETHOPRIM 800-160 MG
1 TABLET ORAL DAILY
Status: DISCONTINUED | OUTPATIENT
Start: 2024-02-26 | End: 2024-02-27 | Stop reason: HOSPADM

## 2024-02-25 RX ORDER — LIDOCAINE 40 MG/G
CREAM TOPICAL
Status: DISCONTINUED | OUTPATIENT
Start: 2024-02-25 | End: 2024-02-27 | Stop reason: HOSPADM

## 2024-02-25 RX ORDER — PIPERACILLIN SODIUM, TAZOBACTAM SODIUM 3; .375 G/15ML; G/15ML
3.38 INJECTION, POWDER, LYOPHILIZED, FOR SOLUTION INTRAVENOUS ONCE
Status: COMPLETED | OUTPATIENT
Start: 2024-02-25 | End: 2024-02-25

## 2024-02-25 RX ORDER — SIMVASTATIN 20 MG
40 TABLET ORAL AT BEDTIME
Status: DISCONTINUED | OUTPATIENT
Start: 2024-02-25 | End: 2024-02-26

## 2024-02-25 RX ORDER — IPRATROPIUM BROMIDE AND ALBUTEROL SULFATE 2.5; .5 MG/3ML; MG/3ML
3 SOLUTION RESPIRATORY (INHALATION)
Status: DISCONTINUED | OUTPATIENT
Start: 2024-02-25 | End: 2024-02-27 | Stop reason: HOSPADM

## 2024-02-25 RX ORDER — CALCIUM CARBONATE 500 MG/1
1000 TABLET, CHEWABLE ORAL 4 TIMES DAILY PRN
Status: DISCONTINUED | OUTPATIENT
Start: 2024-02-25 | End: 2024-02-27 | Stop reason: HOSPADM

## 2024-02-25 RX ORDER — ESCITALOPRAM OXALATE 20 MG/1
20 TABLET ORAL
Status: DISCONTINUED | OUTPATIENT
Start: 2024-02-25 | End: 2024-02-27 | Stop reason: HOSPADM

## 2024-02-25 RX ORDER — PREDNISONE 20 MG/1
40 TABLET ORAL DAILY
Status: DISCONTINUED | OUTPATIENT
Start: 2024-02-25 | End: 2024-02-27 | Stop reason: HOSPADM

## 2024-02-25 RX ORDER — CLONAZEPAM 0.5 MG/1
.5-1 TABLET ORAL DAILY PRN
Status: DISCONTINUED | OUTPATIENT
Start: 2024-02-25 | End: 2024-02-26

## 2024-02-25 RX ORDER — IPRATROPIUM BROMIDE AND ALBUTEROL SULFATE 2.5; .5 MG/3ML; MG/3ML
3 SOLUTION RESPIRATORY (INHALATION) ONCE
Status: COMPLETED | OUTPATIENT
Start: 2024-02-25 | End: 2024-02-25

## 2024-02-25 RX ORDER — AMOXICILLIN 250 MG
2 CAPSULE ORAL 2 TIMES DAILY PRN
Status: DISCONTINUED | OUTPATIENT
Start: 2024-02-25 | End: 2024-02-27 | Stop reason: HOSPADM

## 2024-02-25 RX ORDER — ONDANSETRON 2 MG/ML
4 INJECTION INTRAMUSCULAR; INTRAVENOUS EVERY 6 HOURS PRN
Status: DISCONTINUED | OUTPATIENT
Start: 2024-02-25 | End: 2024-02-27 | Stop reason: HOSPADM

## 2024-02-25 RX ORDER — MIRTAZAPINE 15 MG/1
15 TABLET, FILM COATED ORAL AT BEDTIME
Status: DISCONTINUED | OUTPATIENT
Start: 2024-02-25 | End: 2024-02-27 | Stop reason: HOSPADM

## 2024-02-25 RX ORDER — ACETAMINOPHEN 325 MG/1
650 TABLET ORAL EVERY 4 HOURS PRN
Status: DISCONTINUED | OUTPATIENT
Start: 2024-02-25 | End: 2024-02-27 | Stop reason: HOSPADM

## 2024-02-25 RX ADMIN — IPRATROPIUM BROMIDE AND ALBUTEROL SULFATE 3 ML: .5; 3 SOLUTION RESPIRATORY (INHALATION) at 15:26

## 2024-02-25 RX ADMIN — IPRATROPIUM BROMIDE AND ALBUTEROL SULFATE 3 ML: .5; 3 SOLUTION RESPIRATORY (INHALATION) at 08:09

## 2024-02-25 RX ADMIN — APIXABAN 5 MG: 5 TABLET, FILM COATED ORAL at 20:02

## 2024-02-25 RX ADMIN — PREDNISONE 40 MG: 20 TABLET ORAL at 12:35

## 2024-02-25 RX ADMIN — IPRATROPIUM BROMIDE AND ALBUTEROL SULFATE 3 ML: .5; 3 SOLUTION RESPIRATORY (INHALATION) at 11:22

## 2024-02-25 RX ADMIN — AMIODARONE HYDROCHLORIDE 200 MG: 200 TABLET ORAL at 10:01

## 2024-02-25 RX ADMIN — ACETAMINOPHEN 650 MG: 325 TABLET ORAL at 16:26

## 2024-02-25 RX ADMIN — SULFAMETHOXAZOLE AND TRIMETHOPRIM 1 TABLET: 800; 160 TABLET ORAL at 12:27

## 2024-02-25 RX ADMIN — IPRATROPIUM BROMIDE AND ALBUTEROL SULFATE 3 ML: .5; 3 SOLUTION RESPIRATORY (INHALATION) at 05:11

## 2024-02-25 RX ADMIN — CLONAZEPAM 0.5 MG: 0.5 TABLET ORAL at 10:01

## 2024-02-25 RX ADMIN — PIPERACILLIN AND TAZOBACTAM 3.38 G: 3; .375 INJECTION, POWDER, FOR SOLUTION INTRAVENOUS at 05:05

## 2024-02-25 RX ADMIN — SODIUM CHLORIDE 1000 ML: 9 INJECTION, SOLUTION INTRAVENOUS at 04:59

## 2024-02-25 RX ADMIN — IPRATROPIUM BROMIDE AND ALBUTEROL SULFATE 3 ML: .5; 3 SOLUTION RESPIRATORY (INHALATION) at 19:16

## 2024-02-25 RX ADMIN — OSELTAMIVIR PHOSPHATE 75 MG: 75 CAPSULE ORAL at 06:13

## 2024-02-25 RX ADMIN — MIRTAZAPINE 15 MG: 15 TABLET, FILM COATED ORAL at 21:03

## 2024-02-25 RX ADMIN — VANCOMYCIN HYDROCHLORIDE 2000 MG: 5 INJECTION, POWDER, LYOPHILIZED, FOR SOLUTION INTRAVENOUS at 05:35

## 2024-02-25 RX ADMIN — APIXABAN 5 MG: 5 TABLET, FILM COATED ORAL at 10:03

## 2024-02-25 RX ADMIN — ACETAMINOPHEN 650 MG: 325 TABLET ORAL at 07:37

## 2024-02-25 RX ADMIN — OSELTAMIVIR PHOSPHATE 75 MG: 75 CAPSULE ORAL at 20:02

## 2024-02-25 RX ADMIN — ESCITALOPRAM OXALATE 20 MG: 10 TABLET ORAL at 12:26

## 2024-02-25 ASSESSMENT — COLUMBIA-SUICIDE SEVERITY RATING SCALE - C-SSRS
6. HAVE YOU EVER DONE ANYTHING, STARTED TO DO ANYTHING, OR PREPARED TO DO ANYTHING TO END YOUR LIFE?: NO
2. HAVE YOU ACTUALLY HAD ANY THOUGHTS OF KILLING YOURSELF IN THE PAST MONTH?: NO
1. IN THE PAST MONTH, HAVE YOU WISHED YOU WERE DEAD OR WISHED YOU COULD GO TO SLEEP AND NOT WAKE UP?: NO

## 2024-02-25 ASSESSMENT — ACTIVITIES OF DAILY LIVING (ADL)
ADLS_ACUITY_SCORE: 23
ADLS_ACUITY_SCORE: 23
ADLS_ACUITY_SCORE: 38
ADLS_ACUITY_SCORE: 40
ADLS_ACUITY_SCORE: 23
ADLS_ACUITY_SCORE: 38
ADLS_ACUITY_SCORE: 38
ADLS_ACUITY_SCORE: 23
ADLS_ACUITY_SCORE: 22
ADLS_ACUITY_SCORE: 23
ADLS_ACUITY_SCORE: 38
ADLS_ACUITY_SCORE: 23
ADLS_ACUITY_SCORE: 23
ADLS_ACUITY_SCORE: 38

## 2024-02-25 NOTE — PHARMACY-ADMISSION MEDICATION HISTORY
Pharmacist Admission Medication History    Admission medication history is complete. The information provided in this note is only as accurate as the sources available at the time of the update.    Information Source(s): Clinic records, Hospital records, and [facility]    Pertinent Information:  Patient is currently on BiPAP so I was unable to speak with him . He was just discharged from [facility] on 1/3/24 . This medication list was compiled from his last discharge medication list , and information from surescripts and office visits. I was unable to confirm where he is in his prednisone taper , or his compliance in taking any of these medications . Please note that a refill request for albuterol inhaler was refused 2/12/24. It was recommended for him to see an [facility] pharmacist to review his medications. Also , Please note that cialis had not been filled in the last 12 months. All other medications had been recently filled    Changes made to PTA medication list:  Added: Q melodie inhaler  Deleted: fluticasone nasal spray - pt reported previously not taken and last filled 9/23 .   Changed: None    Allergies reviewed with patient and updates made in EHR: unable to assess    Medication History Completed By: Demi Ceballos [facility] 2/25/2024 7:52 AM    PTA Med List   Medication Sig Note Last Dose    acetaminophen (TYLENOL) 325 MG tablet Take 325-650 mg by mouth every 6 hours as needed for mild pain  Unknown at prn    albuterol (PROAIR HFA/PROVENTIL HFA/VENTOLIN HFA) 108 (90 Base) MCG/ACT inhaler Inhale 2 puffs into the lungs every 6 hours as needed 2/25/2024: Refill refuses on 2/12/24 Unknown at prn    albuterol (PROVENTIL) (2.5 MG/3ML) 0.083% neb solution Take 1 vial (2.5 mg) by nebulization every 6 hours as needed for shortness of breath, wheezing or cough  Unknown    amiodarone (PACERONE) 200 MG tablet Take 1 tablet (200 mg) by mouth daily  Unknown    apixaban ANTICOAGULANT (ELIQUIS) 5 MG  tablet Take 1 tablet (5 mg) by mouth 2 times daily  Unknown    beclomethasone HFA (QVAR REDIHALER) 80 MCG/ACT inhaler Inhale 1 puff into the lungs 2 times daily  Unknown    clonazePAM (KLONOPIN) 0.5 MG tablet Take 0.5-1 mg by mouth daily as needed for anxiety  Unknown at prn    escitalopram (LEXAPRO) 20 MG tablet Take 20 mg by mouth daily (with lunch)  Unknown    loperamide HCl (IMODIUM A-D ORAL) Take 2 mg by mouth 4 times daily as needed (diarrhea)  Unknown at prn    loratadine (CLARITIN) 10 mg tablet Take 10 mg by mouth daily as needed for allergies  Unknown at prn    mirtazapine (REMERON) 15 MG tablet Take 15 mg by mouth at bedtime  Unknown    predniSONE (DELTASONE) 20 MG tablet Take 3 tablets (60 mg) by mouth daily for 12 days, THEN 2.5 tablets (50 mg) daily for 14 days, THEN 2 tablets (40 mg) daily for 14 days, THEN 1.5 tablets (30 mg) daily for 14 days, THEN 1 tablet (20 mg) daily for 14 days.  Unknown    simvastatin (ZOCOR) 40 MG tablet Take 40 mg by mouth at bedtime  Unknown    sulfamethoxazole-trimethoprim (BACTRIM DS) 800-160 MG tablet Take 1 tablet by mouth daily  Unknown    tadalafil (CIALIS) 5 MG tablet [TADALAFIL (CIALIS) 5 MG TABLET] Take 1 tablet (5 mg total) by mouth daily as needed for erectile dysfunction.  Unknown at prn

## 2024-02-25 NOTE — CONSULTS
Pulmonary Service Consult Note  Date of Service: 02/25/2024    Reason for Consultation: ILD    Assessment:     Jim Titus is a 58 year old male, lifelong non-smoker, with history of asthma, cutaneous melanoma status post pembrolizumab, history of severe ARDS requiring intubation, proning, and paralysis in November 2023 secondary to Keytruda induced pneumonitis likely.  He was weaned off steroids but then had respiratory failure that required high flow nasal cannula on December 2023 when he was being weaned off prednisone rapidly.  When patient saw my colleague in pulmonary clinic, his prednisone was 50 mg daily.  Plan was to decrease  prednisone by 10 mg every 2 weeks.  He was also on Bactrim for PJP prophylaxis.  Patient was admitted  this time with shortness of breath and was found to have influenza A.  He was started on Tamiflu.  He was also restarted on prednisone 50 mg daily along with bactrim. Chest x-ray showed no obvious infiltrates.  Currently, patient is on 4 L oxygen via nasal cannula.    Discussed with the patient regarding his current prednisone usage as outpatient.  He is on 1.5 tabs daily, which appears to be about 30 mg daily that he has started only a few days ago.  Patient here was started on 50 mg daily.  I will go ahead and change it to 40 mg and patient can resume his taper after discharge.      Plan:   Titrate FiO2.  Agree with Tamiflu  Agree with holding off antibiotics  Check MRSA screen  Check sputum from grams stain and cultures  Prednisone 40 mg daily (home dose 30 mg as of a few days ago)  Continue Bactrim for PJP prophylaxis  Scheduled and as needed nebs      TTS greater than 50 min, more than 50% on counseling and coordination of care    I appreciate the opportunity to participate in the care of Jim Titus.  Please feel free to contact me at any time.    Monisha Caldwell MD  Pulmonary and Critical Care Medicine    History:     HPI: Patient is a pleasant 58-year-old male with past  medical history significant for asthma, anxiety, cutaneous melanoma status post pembrolizumab, history of severe ARDS requiring intubation, proning, paralysis in November 2023 secondary to Keytruda induced pneumonitis versus pneumonia versus organizing pneumonia, recurrent respiratory failure after being weaned off steroids in December 2023 (fortunately this time did not require intubation as the previous month), residual fibrosis more on the left, restrictive lung disease on PFTs who saw my colleague back in January 17, 2024 at which time his steroid was being tapered by 10 mg every 2 weeks.  Patient also received Bactrim 1 double strength DS daily while on high-dose steroids for prolonged duration.  Patient presented to the hospital with shortness of breath.  He was found to be febrile with a temperature of 102.4.  His heart rate was in the 110 120 range.  Procalcitonin was negative.  Lactic acid was 1.3.  BNP was mildly elevated at 393.  Patient was ultimately found to have influenza A.  COVID-19 was negative.  Pulmonary was consulted for assistance.    PMHx/PSHx:  No past medical history on file.  Past Surgical History:   Procedure Laterality Date    HERNIA REPAIR      ORTHOPEDIC SURGERY      Both ankles, left knee, right shoulder    pyloric stenosis repair       Social History     Socioeconomic History    Marital status: Single     Spouse name: Not on file    Number of children: Not on file    Years of education: Not on file    Highest education level: Not on file   Occupational History    Not on file   Tobacco Use    Smoking status: Never     Passive exposure: Never    Smokeless tobacco: Former     Types: Chew     Quit date: 1998   Vaping Use    Vaping Use: Never used   Substance and Sexual Activity    Alcohol use: Yes     Comment: 3-4 times per week, 5-6 beers    Drug use: Yes     Types: Marijuana    Sexual activity: Not Currently     Partners: Female   Other Topics Concern    Not on file   Social History  Narrative    Not on file     Social Determinants of Health     Financial Resource Strain: Low Risk  (1/10/2024)    Financial Resource Strain     Within the past 12 months, have you or your family members you live with been unable to get utilities (heat, electricity) when it was really needed?: No   Food Insecurity: Low Risk  (1/10/2024)    Food Insecurity     Within the past 12 months, did you worry that your food would run out before you got money to buy more?: No     Within the past 12 months, did the food you bought just not last and you didn t have money to get more?: No   Transportation Needs: Low Risk  (1/10/2024)    Transportation Needs     Within the past 12 months, has lack of transportation kept you from medical appointments, getting your medicines, non-medical meetings or appointments, work, or from getting things that you need?: No   Physical Activity: Insufficiently Active (11/10/2021)    Exercise Vital Sign     Days of Exercise per Week: 1 day     Minutes of Exercise per Session: 30 min   Stress: Stress Concern Present (11/10/2021)    [employer] - Occupational Stress Questionnaire     Feeling of Stress : To some extent   Social Connections: Socially Isolated (11/10/2021)    Social Connection and Isolation Panel [NHANES]     Frequency of Communication with Friends and Family: Once a week     Frequency of Social Gatherings with Friends and Family: Once a week     Attends Catholic Services: Never     Active Member of Clubs or Organizations: No     Attends Club or Organization Meetings: Not on file     Marital Status: Never    Interpersonal Safety: Low Risk  (10/23/2023)    Interpersonal Safety     Do you feel physically and emotionally safe where you currently live?: Yes     Within the past 12 months, have you been hit, slapped, kicked or otherwise physically hurt by someone?: No     Within the past 12 months, have you been humiliated or emotionally abused in other  "ways by your partner or ex-partner?: No   Housing Stability: Low Risk  (1/10/2024)    Housing Stability     Do you have housing? : Yes     Are you worried about losing your housing?: No       Review of Systems - 10 point review of system negative except for what is mentioned in the HPI.    Exam/Data:   /67   Pulse 84   Temp 100.3  F (37.9  C) (Oral)   Resp (!) 33   Ht 1.93 m (6' 4\")   SpO2 95%   BMI 29.21 kg/m      GEN: comfortable, NAD  HEENT: NCAT, EMOI, mmm  CVS: S1S2, RRR  Lung: Good air entry bilaterally, scattered rhonchi  Abd: Soft, NT, ND,  + BS.   Ext: no c/c/e  Neuro: nonfocal  Skin: no visible rash  Musculoskeletal: FROM all extremities  Psych: appropriate    DATA    Labs: Reviewed in EMR and outside records where pertinent.       IMAGING: Personally reviewed images. Formal radiology interpretation noted below.    XR Chest Port 1 View    Result Date: 2/25/2024  EXAM: CHEST SINGLE VIEW PORTABLE LOCATION: [facility] DATE: 02/25/2024 INDICATION: Cough and dyspnea. COMPARISON: 01/15/2024 - CT chest. FINDINGS: A few curvilinear opacities in the inferior left lung correspond to sites of scarring on the recent CT chest. The lungs are otherwise clear. Normal-sized cardiac silhouette. Mild elevation of the right hemidiaphragm.     IMPRESSION: No convincing evidence of acute cardiopulmonary disease.       PFT DATA on 1/2024:  The FEV1 and FVC are reduced but the FEV1/FVC ratio is normal.  The inspiratory flow rates are within normal limits.  The TLC and SVC are reduced, but the FRC is normal.  Following administration of bronchodilators, there is no significant response.  The    diffusing capacity is moderately reduced.   IMPRESSION:   Moderate restrictive lung disease.   Moderate reduction of diffusion capacity.       Family History   Problem Relation Age of Onset    Breast Cancer Mother     Heart Disease Father     Mental Illness Father     Hyperlipidemia Father     Mental " Illness Brother      Allergies   Allergen Reactions    Chicken [Chicken Protein] Other (See Comments)     Throat closes to turkey as well.    Pembrolizumab Other (See Comments)     Severe pneumonitis    Wellbutrin [Bupropion] Unknown    Grass Pollen [Grass] Rash    Turkey [Poultry Meal] Rash       Medications:     No current outpatient medications on file.       Much or all of the text in this note was generated through the use of the Dragon Dictate voice-to-text software. Errors in spelling or words which seem out of context are unintentional. Sound alike errors, in particular, may have escaped editing.

## 2024-02-25 NOTE — PLAN OF CARE
Problem: Adult Inpatient Plan of Care  Goal: Readiness for Transition of Care  Outcome: Progressing     Problem: Gas Exchange Impaired  Goal: Optimal Gas Exchange  Outcome: Progressing     Problem: Infection  Goal: Absence of Infection Signs and Symptoms  Outcome: Progressing    Arrived to unit ~0900 on Bipap.  A&O.  VSS.  Transitioned to 4L n/c.  Tolerating.  Does get quite dyspneic with minimal activity.  Sputum sample sent.  Tolerating PO intake.  Up SBA to bathroom.  C/o HA this afternoon. PRN tylenol given.  Tmax 102 > 99.  He's hoping to get some rest tonight.  Will attempt to bundle cares.  Calls appropriately.

## 2024-02-25 NOTE — ED NOTES
Bed: [facility]-15  Expected date: 2/25/24  Expected time: 4:30 AM  Means of arrival: Ambulance  Comments:  58M Pneumonia, SOB

## 2024-02-25 NOTE — PROGRESS NOTES
"/63 (BP Location: Right arm)   Pulse 80   Temp 100.1  F (37.8  C) (Oral)   Resp (!) 33   Ht 1.93 m (6' 4\")   Wt 123.7 kg (272 lb 12.8 oz)   SpO2 95%   BMI 33.21 kg/m      Pt was on 4 lpm NC when last seen. BS were diminished throughout pre and post neb. Earlier today pt did have some exp whz bilaterally. Bipap is in room on standby, 12/5, rr 14 and 30% fio2. Rt will continue to follow.   "

## 2024-02-25 NOTE — H&P
[facility]    History and Physical - Hospitalist Service       Date of Admission:  2/25/2024    Assessment & Plan      Jim Titus is a 58 year old male who has a history of asthma, ILD, anxiety, cutaneous melanoma s/p pembrolizumab, recent hospitalizations for Pna (12/20/23 - 1/3/24), (11/2-11/20), (10/23/23-10/25/23), keytruda pneumonitis,  and is presenting for SOB admitted for acute hypoxic respiratory failure 2/2 influenza A.    Influenza A infection  Recurrent pneumonia  Acute hypoxic respiratory failure  Sepsis  ILD  Patient presenting with dyspnea, fever starting 2/23. Febrile to 102.4  F, -120, RR 30, satting 99% on 10 L nonrebreather mask. CMP with BUN 24, creatinine 1.24.  Otherwise unremarkable.  Lactic acid 1.3.  proBNP 393.  Procalcitonin 0.34.  CBC without leukocytosis, hemoglobin 11.2.  .  Blood cultures obtained.  Positive for influenza A, negative for influenza B, COVID-19, RSV.  EKG showing sinus tachycardia.  CXR without acute pathology. In the ED received DuoNebs x 1, Tamiflu, Zosyn, 1 L NS IV, vancomycin.  ED provider spoke with ICU telemetry who reports they can hold off on further antibiotics for now.  Wheeze present on exam, possibly asthma exacerbation.  Patient also has bilateral lower extremity edema though BNP is not significantly elevated and patient reports this is unchanged from previous.   -Admit to inpatient, ICU status  -ICU consulted  -Blood cultures: Pending  -DuoNebs  -Supplementary O2  -Tamiflu daily  -Tylenol PRN    Elevated troponin  No chest pain, EKG showing sinus tach.  Troponin 27 on admission.  -Repeat troponin      A-fib  EKG showing sinus tach likely related to fever.  -Pending med rec continue PTA amiodarone 200 mg daily  -Pending med rec continue PTA apixaban 5 mg twice daily     Anemia, iron deficiency  Has a history of microcytic, iron deficiency anemia.  11.2, similar to previous however now MCV is 101.      Anxiety  -Pending med rec continue PTA mirtazapine, clonazepam, escitalopram     Melanoma  Seeing [place] [facility]   S/P 6 doses of pembrolizumab 200 mg IV every 3 weeks.  Last dose given 9/15/2023.        Diet: Combination Diet Regular Diet Adult  DVT Prophylaxis: apixaban   Lamas Catheter: Not present  Fluids: PO  Lines: None     Cardiac Monitoring: None  Code Status: Full Code      Disposition Plan      Expected Discharge Date: 02/27/2024                The patient's care was discussed with the Attending Physician, Dr. Elba Boucher .      Jessica Wiley MD  Hospitalist Service  [facility]  Securely message with [employer] (more info)  Text page via [facility] Paging/Directory   ______________________________________________________________________    Chief Complaint   Dyspnea    History is obtained from the patient    History of Present Illness   Jim Titus is a 58 year old male who has a history of asthma, ILD, anxiety, cutaneous melanoma s/p pembrolizumab, recent hospitalizations for Pna (12/20/23 - 1/3/24), (11/2-11/20 including ARDS/sepsis requiring intubation), (10/23/23-10/25/23), keytruda pneumonitis,  and is presenting for SOB    Patient reports symptoms of shortness of breath, wheezing, cough, fever/chills started 2/23/2024.  Since hospitalization 11/2 with ARDS patient has had ongoing ILD requiring oxygen at home though has been off oxygen recently.  With recent SOB leading home O2 again but with ongoing symptoms decided to come in.    Patient reports no history of smoking, minimal alcohol use, no other substance use.  Reports he lives at home with his mom, he is her primary caretaker though she is somewhat independent.    ED course:  Presents to the ED febrile to 102.4  F, -120, RR 30, satting 99% on 10 L nonrebreather mask. CMP with BUN 24, creatinine 1.24.  Otherwise unremarkable.  Lactic acid 1.3.  proBNP 393.  Procalcitonin 0.34.  Troponin 27.  CBC without  leukocytosis, hemoglobin 11.2.  .  Blood cultures obtained.  Positive for influenza A, negative for influenza B, COVID-19, RSV.  EKG showing sinus tachycardia.  CXR without acute pathology    In the ED received DuoNebs x 1, Tamiflu, Zosyn, 1 L NS IV, vancomycin.  ED provider spoke with ICU telemetry who reports they can hold off on further antibiotics for now.      Past Medical History    No past medical history on file.    Past Surgical History   Past Surgical History:   Procedure Laterality Date    HERNIA REPAIR      ORTHOPEDIC SURGERY      Both ankles, left knee, right shoulder    pyloric stenosis repair         Prior to Admission Medications   Prior to Admission Medications   Prescriptions Last Dose Informant Patient Reported? Taking?   acetaminophen (TYLENOL) 325 MG tablet   Yes No   Sig: Take 325-650 mg by mouth every 6 hours as needed for mild pain   albuterol (PROAIR HFA/PROVENTIL HFA/VENTOLIN HFA) 108 (90 Base) MCG/ACT inhaler   No No   Sig: Inhale 2 puffs into the lungs every 6 hours as needed   albuterol (PROVENTIL) (2.5 MG/3ML) 0.083% neb solution   No No   Sig: Take 1 vial (2.5 mg) by nebulization every 6 hours as needed for shortness of breath, wheezing or cough   Patient not taking: Reported on 1/10/2024   amiodarone (PACERONE) 200 MG tablet   No No   Sig: Take 1 tablet (200 mg) by mouth daily   apixaban ANTICOAGULANT (ELIQUIS) 5 MG tablet   No No   Sig: Take 1 tablet (5 mg) by mouth 2 times daily   beclomethasone HFA (QVAR REDIHALER) 80 MCG/ACT inhaler   No No   Sig: Inhale 1 puff into the lungs 2 times daily   clonazePAM (KLONOPIN) 0.5 MG tablet   Yes No   Sig: Take 0.5-1 mg by mouth daily as needed for anxiety   escitalopram (LEXAPRO) 20 MG tablet   Yes No   Sig: Take 20 mg by mouth daily (with lunch)   Patient not taking: Reported on 1/17/2024   fluticasone (FLOVENT HFA) 110 MCG/ACT inhaler   No No   Sig: Inhale 1 puff into the lungs 2 times daily   Patient not taking: Reported on 1/17/2024    loperamide HCl (IMODIUM A-D ORAL)   Yes No   Sig: Take 2 mg by mouth 4 times daily as needed (diarrhea)   loratadine (CLARITIN) 10 mg tablet   Yes No   Sig: Take 10 mg by mouth daily as needed for allergies   mirtazapine (REMERON) 15 MG tablet   Yes No   Sig: Take 15 mg by mouth at bedtime   predniSONE (DELTASONE) 20 MG tablet   No No   Sig: Take 3 tablets (60 mg) by mouth daily for 12 days, THEN 2.5 tablets (50 mg) daily for 14 days, THEN 2 tablets (40 mg) daily for 14 days, THEN 1.5 tablets (30 mg) daily for 14 days, THEN 1 tablet (20 mg) daily for 14 days.   simvastatin (ZOCOR) 40 MG tablet   Yes No   Sig: Take 40 mg by mouth at bedtime   sulfamethoxazole-trimethoprim (BACTRIM DS) 800-160 MG tablet   Yes No   Sig: Take 1 tablet by mouth daily   tadalafil (CIALIS) 5 MG tablet   No No   Sig: [TADALAFIL (CIALIS) 5 MG TABLET] Take 1 tablet (5 mg total) by mouth daily as needed for erectile dysfunction.      Facility-Administered Medications: None           Physical Exam   Vital Signs: Temp: (!) 102.4  F (39.1  C) Temp src: Oral BP: 109/64 Pulse: 109   Resp: 23 SpO2: 98 % O2 Device: BiPAP/CPAP Oxygen Delivery: 10 LPM  Weight: 0 lbs 0 oz    Physical Exam  Constitutional:       General: He is not in acute distress.     Comments: BiPAP in place   Eyes:      Extraocular Movements: Extraocular movements intact.      Conjunctiva/sclera: Conjunctivae normal.   Cardiovascular:      Rate and Rhythm: Normal rate and regular rhythm.      Pulses: Normal pulses.      Heart sounds: Normal heart sounds.   Pulmonary:      Effort: Pulmonary effort is normal.      Breath sounds: Wheezing present.   Abdominal:      General: There is no distension.      Tenderness: There is no abdominal tenderness.   Musculoskeletal:         General: No tenderness.      Right lower leg: Edema present.      Left lower leg: Edema present.      Comments: +1 pitting edema to mid calf bilaterally   Skin:     General: Skin is warm and dry.   Neurological:       General: No focal deficit present.      Mental Status: He is alert and oriented to person, place, and time.   Psychiatric:         Mood and Affect: Mood normal.         Behavior: Behavior normal.         Data     I have personally reviewed the following data over the past 24 hrs:    7.3  \   11.2 (L)   / 215     140 102 24.0 (H) /  99   4.3 27 1.24 (H) \     ALT: 29 AST: 36 AP: 62 TBILI: 0.3   ALB: 4.3 TOT PROTEIN: 6.3 (L) LIPASE: N/A     Trop: 27 (H) BNP: 393     Procal: 0.34 CRP: N/A Lactic Acid: 1.3         Imaging results reviewed over the past 24 hrs:   Recent Results (from the past 24 hour(s))   XR Chest Port 1 View    Narrative    EXAM: CHEST SINGLE VIEW PORTABLE  LOCATION: [facility]  DATE: 02/25/2024    INDICATION: Cough and dyspnea.  COMPARISON: 01/15/2024 - CT chest.    FINDINGS: A few curvilinear opacities in the inferior left lung correspond to sites of scarring on the recent CT chest. The lungs are otherwise clear. Normal-sized cardiac silhouette. Mild elevation of the right hemidiaphragm.      Impression    IMPRESSION: No convincing evidence of acute cardiopulmonary disease.

## 2024-02-25 NOTE — ED TRIAGE NOTES
EMS from home for shortness of breath; on room air he was 78-79% at lowest point per pt report. EMS said  had put him on NC and he was in the low 90%    Has been seen frequently over the last few months for PNA.    Pt tolerating 10L nonrebreather, is still having increased work of breathing.      Triage Assessment (Adult)       Row Name 02/25/24 0447          Respiratory WDL    Respiratory WDL X;rhythm/pattern;cough     Rhythm/Pattern, Respiratory dyspnea upon exertion;shortness of breath     Cough Frequency infrequent     Cough Type dry        Skin Circulation/Temperature WDL    Skin Circulation/Temperature WDL temperature;X     Skin Temperature cool        Cardiac WDL    Cardiac WDL X     Cardiac Rhythm ST        Peripheral/Neurovascular WDL    Peripheral Neurovascular WDL WDL        Cognitive/Neuro/Behavioral WDL    Cognitive/Neuro/Behavioral WDL WDL

## 2024-02-25 NOTE — ED PROVIDER NOTES
EMERGENCY DEPARTMENT ENCOUNTER      NAME: Jim Titus  AGE: 58 year old male  YOB: 1965  MRN: [id number]  EVALUATION DATE & TIME: 2/25/2024  4:44 AM    PCP: Luis Alberto Fatima    ED PROVIDER: Ulysses Suero M.D.      Chief Complaint   Patient presents with    Shortness of Breath         FINAL IMPRESSION:  1. Influenza A    2. Acute hypoxemic respiratory failure (H)    3. ILD (interstitial lung disease) (H)          ED COURSE & MEDICAL DECISION MAKING:    Pertinent Labs & Imaging studies reviewed. (See chart for details)  58 year old male presents to the Emergency Department for evaluation of shortness of breath.  Did review chart.  Patient has a history of interstitial lung disease secondary to pneumonitis/arts back in September which had him intubated and proned in the ICU.  Had been getting better until Friday.  Now having fever and cough.  Looks like infectious etiology is febrile here.  Does meet sepsis criteria.  Did initially start IV Zosyn.  Chest x-ray does not appear worsened.  EKG is normal.  Does not seem to cardiac.  Seems to be more his known lung disease and infectious etiology.  Influenza A is positive.  Patient has significant respiratory distress and was started on BiPAP.  Improved on this.  Also given a DuoNeb.  Patient's procalcitonin is normal.  Lactic acid is normal.  White count is normal.  I suspect most of this is due to influenza.  Discussed with ICU.  Will continue antibiotics until cultures are back.  Will admit to the ICU.  Discussed with the resident who excepted the patient.    4:48 AM I met with the patient to gather history and to perform my initial exam. I discussed the plan for care while in the Emergency Department.   6:12 AM discussed with ICU, Dr. Low.   6:26 AM discussed with admitting resident who accepted the patient    At the conclusion of the encounter I discussed the results of all of the tests and the disposition. The questions were answered. The patient  or family acknowledged understanding and was agreeable with the care plan.     Medical Decision Making  Obtained supplemental history:Supplemental history obtained?: Documented in chart and EMS  Reviewed external records: External records reviewed?: Documented in chart and Other: Pulmonology note from 1/17/24  Care impacted by chronic illness:Chronic Lung Disease  Care significantly affected by social determinants of health:Access to Medical Care  Did you consider but not order tests?: Work up considered but not performed and documented in chart, if applicable  Did you interpret images independently?: Independent interpretation of ECG and images noted in documentation, when applicable.  Consultation discussion with other provider:Did you involve another provider (consultant, , pharmacy, etc.)?: I discussed the care with another health care provider, see documentation for details.  Admit.      Critical Care     Performed by: Dr Ulysses Suero  Authorized by: Dr Ulysses Suero  Total critical care time: 90 minutes  Critical care was necessary to treat or prevent imminent or life-threatening deterioration of the following conditions: Acute respiratory failure  Critical care was time spent personally by me on the following activities: development of treatment plan with patient or surrogate, discussions with consultants, examination of patient, evaluation of patient's response to treatment, obtaining history from patient or surrogate, ordering and performing treatments and interventions, ordering and review of laboratory studies, ordering and review of radiographic studies, re-evaluation of patient's condition and monitoring for potential decompensation.  Critical care time was exclusive of separately billable procedures and treating other patients.     MEDICATIONS GIVEN IN THE EMERGENCY:  Medications   ipratropium - albuterol 0.5 mg/2.5 mg/3 mL (DUONEB) neb solution 3 mL (3 mLs Nebulization $Given 2/25/24 0511)    piperacillin-tazobactam (ZOSYN) 3.375 g vial to attach to  mL bag (0 g Intravenous Stopped 2/25/24 05)   sodium chloride 0.9% BOLUS 1,000 mL (0 mLs Intravenous Stopped 2/25/24 0617)   oseltamivir (TAMIFLU) capsule 75 mg (75 mg Oral $Given 2/25/24 0616)       NEW PRESCRIPTIONS STARTED AT TODAY'S ER VISIT  New Prescriptions    No medications on file          =================================================================    HPI    Patient information was obtained from: Patient    Use of : N/A      Jim Titus is a 58 year old male with a pertinent history of lung disease, ARDS, and a-fib, who presents to this ED for evaluation of shortness of breath.    The patient reports here with shortness of breath for the last two to three days.  He reports a productive cough during initial onset but is now more of a dry cough.  He reports using oxygen on Friday, when shortness of breath onset, and was using about two liters while at rest.  He denies any sick contacts.  He reports still being on the Prednisone taper but is unsure how much he is taking.  He notes it is one and a half pills daily.  He is using an incentive spirometer at home but no nebulizer.  He denies any pain, nausea, vomiting, bowel or bladder changes, or other complaints at this time. No sick contacts.     Per chart review, the patient saw Pulmonology on 1/17/24.  He had n episode of severe ARDS (requiring proning/paralysis/lung protective ventilation) in Nov 2023 thought 2/2 Keytruda-induced pneumonitis vs. Pneumonia vs. Organizing pneumonia, completed rapid steroid taper then had recurrent of respiratory failure with severe left-sided infiltrate in Dec 2023 (milder; was on HFNC but did not require intubation as he did the first time), who responded very well to steroids, presents for follow up.  Plan was to continue steroid taper, he was on 50 mg daily and plan was to decrease by ten mg every two weeks.  He was to continue Bactrim 1  DS tab daily while on Prednisone dose over 20 mg.       PAST MEDICAL HISTORY:  No past medical history on file.    PAST SURGICAL HISTORY:  Past Surgical History:   Procedure Laterality Date    HERNIA REPAIR      ORTHOPEDIC SURGERY      Both ankles, left knee, right shoulder    pyloric stenosis repair       CURRENT MEDICATIONS:    No current facility-administered medications for this encounter.     Current Outpatient Medications   Medication    acetaminophen (TYLENOL) 325 MG tablet    albuterol (PROAIR HFA/PROVENTIL HFA/VENTOLIN HFA) 108 (90 Base) MCG/ACT inhaler    albuterol (PROVENTIL) (2.5 MG/3ML) 0.083% neb solution    amiodarone (PACERONE) 200 MG tablet    apixaban ANTICOAGULANT (ELIQUIS) 5 MG tablet    beclomethasone HFA (QVAR REDIHALER) 80 MCG/ACT inhaler    clonazePAM (KLONOPIN) 0.5 MG tablet    escitalopram (LEXAPRO) 20 MG tablet    fluticasone (FLOVENT HFA) 110 MCG/ACT inhaler    loperamide HCl (IMODIUM A-D ORAL)    loratadine (CLARITIN) 10 mg tablet    mirtazapine (REMERON) 15 MG tablet    predniSONE (DELTASONE) 20 MG tablet    simvastatin (ZOCOR) 40 MG tablet    sulfamethoxazole-trimethoprim (BACTRIM DS) 800-160 MG tablet    tadalafil (CIALIS) 5 MG tablet         ALLERGIES:  Allergies   Allergen Reactions    Chicken [Chicken Protein] Other (See Comments)     Throat closes to turkey as well.    Pembrolizumab Other (See Comments)     Severe pneumonitis    Wellbutrin [Bupropion] Unknown    Grass Pollen [Grass] Rash    Turkey [Poultry Meal] Rash       FAMILY HISTORY:  Family History   Problem Relation Age of Onset    Breast Cancer Mother     Heart Disease Father     Mental Illness Father     Hyperlipidemia Father     Mental Illness Brother        SOCIAL HISTORY:   Social History     Socioeconomic History    Marital status: Single   Tobacco Use    Smoking status: Never     Passive exposure: Never    Smokeless tobacco: Former     Types: Chew     Quit date: 1998   Vaping Use    Vaping Use: Never used   Substance  and Sexual Activity    Alcohol use: Yes     Comment: 3-4 times per week, 5-6 beers    Drug use: Yes     Types: Marijuana    Sexual activity: Not Currently     Partners: Female     Social Determinants of Health     Financial Resource Strain: Low Risk  (1/10/2024)    Financial Resource Strain     Within the past 12 months, have you or your family members you live with been unable to get utilities (heat, electricity) when it was really needed?: No   Food Insecurity: Low Risk  (1/10/2024)    Food Insecurity     Within the past 12 months, did you worry that your food would run out before you got money to buy more?: No     Within the past 12 months, did the food you bought just not last and you didn t have money to get more?: No   Transportation Needs: Low Risk  (1/10/2024)    Transportation Needs     Within the past 12 months, has lack of transportation kept you from medical appointments, getting your medicines, non-medical meetings or appointments, work, or from getting things that you need?: No   Physical Activity: Insufficiently Active (11/10/2021)    Exercise Vital Sign     Days of Exercise per Week: 1 day     Minutes of Exercise per Session: 30 min   Stress: Stress Concern Present (11/10/2021)    [employer] [place] [employer] - Occupational Stress Questionnaire     Feeling of Stress : To some extent   Social Connections: Socially Isolated (11/10/2021)    Social Connection and Isolation Panel [NHANES]     Frequency of Communication with Friends and Family: Once a week     Frequency of Social Gatherings with Friends and Family: Once a week     Attends Buddhist Services: Never     Active Member of Clubs or Organizations: No     Marital Status: Never    Interpersonal Safety: Low Risk  (10/23/2023)    Interpersonal Safety     Do you feel physically and emotionally safe where you currently live?: Yes     Within the past 12 months, have you been hit, slapped, kicked or otherwise physically hurt by  someone?: No     Within the past 12 months, have you been humiliated or emotionally abused in other ways by your partner or ex-partner?: No   Housing Stability: Low Risk  (1/10/2024)    Housing Stability     Do you have housing? : Yes     Are you worried about losing your housing?: No       VITALS:  /59   Pulse 112   Temp (!) 102.4  F (39.1  C) (Oral)   Resp (!) 31   SpO2 96%     PHYSICAL EXAM    Physical Exam  Vitals and nursing note reviewed.   Constitutional:       General: He is in acute distress.      Appearance: He is ill-appearing, toxic-appearing and diaphoretic.   HENT:      Head: Atraumatic.   Eyes:      General: No scleral icterus.     Pupils: Pupils are equal, round, and reactive to light.   Cardiovascular:      Rate and Rhythm: Regular rhythm. Tachycardia present.      Heart sounds: Normal heart sounds.   Pulmonary:      Effort: Tachypnea, accessory muscle usage and respiratory distress present.      Breath sounds: Rhonchi present.   Abdominal:      Palpations: Abdomen is soft.      Tenderness: There is no abdominal tenderness.   Musculoskeletal:         General: No tenderness.      Right lower leg: No edema.      Left lower leg: No edema.   Lymphadenopathy:      Cervical: No cervical adenopathy.   Skin:     General: Skin is warm.      Findings: No rash.      Comments: Febrile             LAB:  All pertinent labs reviewed and interpreted.  Labs Ordered and Resulted from Time of ED Arrival to Time of ED Departure   COMPREHENSIVE METABOLIC PANEL - Abnormal       Result Value    Sodium 140      Potassium 4.3      Carbon Dioxide (CO2) 27      Anion Gap 11      Urea Nitrogen 24.0 (*)     Creatinine 1.24 (*)     GFR Estimate 67      Calcium 9.3      Chloride 102      Glucose 99      Alkaline Phosphatase 62      AST 36      ALT 29      Protein Total 6.3 (*)     Albumin 4.3      Bilirubin Total 0.3     CBC WITH PLATELETS AND DIFFERENTIAL - Abnormal    WBC Count 7.3      RBC Count 3.41 (*)      Hemoglobin 11.2 (*)     Hematocrit 34.5 (*)      (*)     MCH 32.8      MCHC 32.5      RDW 20.1 (*)     Platelet Count 215      % Neutrophils        % Lymphocytes        % Monocytes        % Eosinophils        % Basophils        % Immature Granulocytes        Absolute Neutrophils        Absolute Lymphocytes        Absolute Monocytes        Absolute Eosinophils        Absolute Basophils        Absolute Immature Granulocytes       INFLUENZA A/B, RSV, & SARS-COV2 PCR - Abnormal    Influenza A PCR Positive (*)     Influenza B PCR Negative      RSV PCR Negative      SARS CoV2 PCR Negative     TROPONIN T, HIGH SENSITIVITY - Abnormal    Troponin T, High Sensitivity 27 (*)    LACTIC ACID WHOLE BLOOD - Normal    Lactic Acid 1.3     PROCALCITONIN - Normal    Procalcitonin 0.34     N TERMINAL PRO BNP OUTPATIENT - Normal    N Terminal Pro BNP Outpatient 393     BLOOD CULTURE   BLOOD CULTURE   MRSA MSSA PCR, NASAL SWAB       RADIOLOGY:  Reviewed all pertinent imaging. Please see official radiology report.  XR Chest Port 1 View   Final Result   IMPRESSION: No convincing evidence of acute cardiopulmonary disease.              EKG:    Performed at: 447  Impression: Sinus tachycardia otherwise normal.  No signs of ischemia.  When compared to previous dated January 2, 2024 no tachycardia  Sinus tachycardia with a rate of 118.  .  QRS 82.  QTc 414    I have independently reviewed and interpreted the EKG(s) documented above.    PROCEDURES:   None      I, Ezra Bertrand, am serving as a scribe to document services personally performed by Dr. Ulysses Suero, based on my observation and the provider's statements to me. I, Ulysses Suero MD attest that Ezra Bertrand is acting in a scribe capacity, has observed my performance of the services and has documented them in accordance with my direction.    Ulysses Suero M.D.  Emergency Medicine  [facility] EMERGENCY ROOM  [address]  [facility] [id number]  [phone]  Dept: [phone]      Ulysses Suero MD  02/25/24 0641

## 2024-02-25 NOTE — PHARMACY-VANCOMYCIN DOSING SERVICE
Pharmacy Vancomycin Initial Note  Date of Service 2024  Patient's  1965  58 year old, male    Indication: Healthcare-Associated Pneumonia    Current estimated CrCl = Estimated Creatinine Clearance: 98.1 mL/min (based on SCr of 1.11 mg/dL).    Creatinine for last 3 days  No results found for requested labs within last 3 days.    Recent Vancomycin Level(s) for last 3 days  No results found for requested labs within last 3 days.      Vancomycin IV Administrations (past 72 hours)        No vancomycin orders with administrations in past 72 hours.                    Nephrotoxins and other renal medications (From now, onward)      Start     Dose/Rate Route Frequency Ordered Stop    24 0530  vancomycin (VANCOCIN) 2,000 mg in 0.9% NaCl 500 mL intermittent infusion         2,000 mg  over 2 Hours Intravenous ONCE 24 0500      24 0500  piperacillin-tazobactam (ZOSYN) 3.375 g vial to attach to  mL bag         3.375 g  over 30 Minutes Intravenous ONCE 24 0456              Contrast Orders - past 72 hours (72h ago, onward)      None                  Plan:  Start vancomycin  2000mg x 1 in ER    Eduardo Whitman [facility]

## 2024-02-25 NOTE — PROGRESS NOTES
[facility]    Progress Note - Hospitalist Service       Date of Admission:  2/25/2024    Assessment & Plan   Jim Titus is a 58 year old male admitted on 2/25/2024. He has a history of asthma, ILD, anxiety, cutaneous melanoma s/p pembrolizumab, recent hospitalizations for PNA (12/20/23 - 1/3/24), (11/2-11/20), (10/23/23-10/25/23), keytruda pneumonitis, and is presenting for SOB admitted for acute hypoxic respiratory failure 2/2 influenza A.     Influenza A infection  Recurrent pneumonia  Acute hypoxic respiratory failure  Sepsis  ILD  Patient presenting with dyspnea, fever starting 2/23/24. Febrile to 102.4  F, -120, RR 30, satting 99% on 10 L nonrebreather mask. CMP with BUN 24 and creatinine 1.24 but otherwise unremarkable. Lactic acid 1.3. proBNP 393. Procalcitonin 0.34. CBC without leukocytosis, hemoglobin 11.2. . Blood cultures obtained. Positive for influenza A, negative for influenza B, COVID-19, RSV. EKG showing sinus tachycardia. CXR without acute pathology. In the ED received DuoNebs x 1, Tamiflu, Zosyn, 1 L NS IV, vancomycin. ED provider spoke with ICU telemetry who reports they can hold off on further antibiotics for now. Diffuse expiratory wheezes present on exam. Patient was on BiPAP at the time of this writer's exam, now saturating appropriately on NC 4 LPM.  -Appropriate for IM status  -Pulmonology consulted, appreciate recommendations  -Blood cultures NGTD  -DuoNebs  -Supplementary O2  -Tamiflu daily  -Tylenol PRN  -Per chart review, patient has been on prednisone taper since last hospitalization. It appears he was started on prednisone 60 mg daily on 1/1/24 with plan to taper down by 10 mg every 2 weeks. Based on this, he would be at a dose of 20 mg daily at this time. However given acute illness/change in clinical status, will increase to 50 mg daily. Also per chart review and 1/17/24 Pulmonology note, plan was to take Bactrim daily until prednisone  taper was <20 mg. Therefore will resume PTA Bactrim.     Elevated troponin  No chest pain, EKG showing sinus tach. Troponin 27 --> 33.     A-fib  EKG showing sinus tach likely related to fever, anxiety.  -Continue PTA amiodarone 200 mg daily  -Continue PTA apixaban 5 mg twice daily     Anemia, iron deficiency  Has a history of microcytic, iron deficiency anemia. Hgb 11.2 on admission, similar to previous however now MCV is 101.  -Continue to monitor     Anxiety  -Continue PTA mirtazapine, clonazepam, escitalopram     Melanoma  Seeing [place] [facility]   S/P 6 doses of pembrolizumab 200 mg IV every 3 weeks. Last dose given 9/15/2023.     PTA medication list includes simvastatin 40 mg daily. Per PharmD, when this is taken concomitant with amiodarone, the max dose of simvastatin should be 20 mg. Therefore will hold simvastatin for the time being. Will discuss changing to rosuvastatin or atorvastatin at discharge.        Diet: Combination Diet Regular Diet Adult    DVT Prophylaxis: DOAC  Lamas Catheter: Not present  Fluids: PO  Lines: None     Cardiac Monitoring: None  Code Status: Full Code      Clinically Significant Risk Factors Present on Admission               # Drug Induced Coagulation Defect: home medication list includes an anticoagulant medication             # Financial/Environmental Concerns:    # Asthma: noted on problem list        Disposition Plan      Expected Discharge Date: 02/27/2024                The patient's care was discussed with the Attending Physician, Dr. Boucher .    MARIA TERESA TIAN MD  Hospitalist Service  [facility]  Securely message with [employer] (more info)  Text page via [facility] Paging/Directory   ______________________________________________________________________    Interval History   No new interval events. Patient able to converse well despite being on BiPAP. He endorses continued SOB and intermittent cough. No chest pain or pain with deep breaths.  Reports a history of asthma diagnosed in adulthood; recalls being told it is exercise-induced asthma and has not needed to use his PRN inhaler much recently. Has not needed supplemental oxygen at home recently until onset of this current illness. Denies any subjective fevers, chills, myalgias. He is unsure if he got his annual influenza vaccine; he recalls getting a pneumonia vaccine and is unsure if they were administered at the same time. Big concern this morning was poor sleep. He is hopeful to get to his room and be able to take a long nap while his body works on recuperating.     Physical Exam   Vital Signs: Temp: 100.3  F (37.9  C) Temp src: Oral BP: 125/67 Pulse: 104   Resp: (!) 33 SpO2: 95 % O2 Device: Nasal cannula Oxygen Delivery: 4 LPM  Weight: 0 lbs 0 oz    Constitutional: Awake, alert, cooperative, no apparent distress. BiPAP in place but appears comfortable and is conversing appropriately.  HEENT: Normocephalic, without obvious abnormality, atraumatic. Sclera clear, conjunctiva normal. No rhinorrhea. Moist mucus membranes.  Respiratory: BiPAP in place but able to converse, speak in complete sentences.   Cardiovascular: Tachycardic rate and rhythm, no murmur.  GI: Soft, non-distended.  Skin: No bruising or bleeding and normal skin color, texture, turgor. No rash or lesion on exposed skin.  Musculoskeletal: Tone is normal. Trace lower extremity edema bilaterally.  Neurologic: Awake, alert, oriented to name, place and time.   Psychiatric: Appropriate mood and affect.      Data     I have personally reviewed the following data over the past 24 hrs:    7.3  \   11.2 (L)   / 215     140 102 24.0 (H) /  99   4.3 27 1.24 (H) \     ALT: 29 AST: 36 AP: 62 TBILI: 0.3   ALB: 4.3 TOT PROTEIN: 6.3 (L) LIPASE: N/A     Trop: 33 (H) BNP: 393     Procal: 0.34 CRP: N/A Lactic Acid: 1.3         Imaging results reviewed over the past 24 hrs:   Recent Results (from the past 24 hour(s))   XR Chest Port 1 View    Narrative     EXAM: CHEST SINGLE VIEW PORTABLE  LOCATION: [facility]  DATE: 02/25/2024    INDICATION: Cough and dyspnea.  COMPARISON: 01/15/2024 - CT chest.    FINDINGS: A few curvilinear opacities in the inferior left lung correspond to sites of scarring on the recent CT chest. The lungs are otherwise clear. Normal-sized cardiac silhouette. Mild elevation of the right hemidiaphragm.      Impression    IMPRESSION: No convincing evidence of acute cardiopulmonary disease.

## 2024-02-25 NOTE — PROVIDER NOTIFICATION
02/25/24 0511   CPAP/BiPAP/Settings   $CPAP/BiPAP Initial completed   BIPAP/CPAP On Standby On   IPAP/EPAP (cmH2O) 15/5   Rate (breaths/min) 12   Oxygen (%) 30   Timed Inspiration (sec) 0.9   IPAP RISE  Settings (V60) 3     Patient placed on BIPAP for SOB and respiratory distress. Given one nebulizer in line. Tolerating very well.     Valerie Vallejo, RT on 2/25/2024 at 5:21 AM

## 2024-02-26 LAB
ANION GAP SERPL CALCULATED.3IONS-SCNC: 10 MMOL/L (ref 7–15)
BUN SERPL-MCNC: 19.9 MG/DL (ref 6–20)
CALCIUM SERPL-MCNC: 8.7 MG/DL (ref 8.6–10)
CHLORIDE SERPL-SCNC: 101 MMOL/L (ref 98–107)
CREAT SERPL-MCNC: 0.96 MG/DL (ref 0.67–1.17)
DEPRECATED HCO3 PLAS-SCNC: 26 MMOL/L (ref 22–29)
EGFRCR SERPLBLD CKD-EPI 2021: >90 ML/MIN/1.73M2
ERYTHROCYTE [DISTWIDTH] IN BLOOD BY AUTOMATED COUNT: 19.9 % (ref 10–15)
GLUCOSE SERPL-MCNC: 99 MG/DL (ref 70–99)
HCT VFR BLD AUTO: 29.1 % (ref 40–53)
HGB BLD-MCNC: 9.5 G/DL (ref 13.3–17.7)
MCH RBC QN AUTO: 32.8 PG (ref 26.5–33)
MCHC RBC AUTO-ENTMCNC: 32.6 G/DL (ref 31.5–36.5)
MCV RBC AUTO: 100 FL (ref 78–100)
PLATELET # BLD AUTO: 146 10E3/UL (ref 150–450)
POTASSIUM SERPL-SCNC: 4.1 MMOL/L (ref 3.4–5.3)
RBC # BLD AUTO: 2.9 10E6/UL (ref 4.4–5.9)
SODIUM SERPL-SCNC: 137 MMOL/L (ref 135–145)
WBC # BLD AUTO: 6.3 10E3/UL (ref 4–11)

## 2024-02-26 PROCEDURE — 99233 SBSQ HOSP IP/OBS HIGH 50: CPT | Mod: GC

## 2024-02-26 PROCEDURE — 250N000009 HC RX 250

## 2024-02-26 PROCEDURE — 94640 AIRWAY INHALATION TREATMENT: CPT | Mod: 76

## 2024-02-26 PROCEDURE — 250N000012 HC RX MED GY IP 250 OP 636 PS 637: Performed by: INTERNAL MEDICINE

## 2024-02-26 PROCEDURE — 120N000001 HC R&B MED SURG/OB

## 2024-02-26 PROCEDURE — 250N000013 HC RX MED GY IP 250 OP 250 PS 637

## 2024-02-26 PROCEDURE — 999N000157 HC STATISTIC RCP TIME EA 10 MIN

## 2024-02-26 PROCEDURE — 36415 COLL VENOUS BLD VENIPUNCTURE: CPT

## 2024-02-26 PROCEDURE — 80048 BASIC METABOLIC PNL TOTAL CA: CPT

## 2024-02-26 PROCEDURE — 85027 COMPLETE CBC AUTOMATED: CPT

## 2024-02-26 PROCEDURE — 87205 SMEAR GRAM STAIN: CPT | Performed by: INTERNAL MEDICINE

## 2024-02-26 PROCEDURE — 99232 SBSQ HOSP IP/OBS MODERATE 35: CPT | Performed by: INTERNAL MEDICINE

## 2024-02-26 RX ORDER — ROSUVASTATIN CALCIUM 10 MG/1
10 TABLET, COATED ORAL DAILY
Status: DISCONTINUED | OUTPATIENT
Start: 2024-02-26 | End: 2024-02-27 | Stop reason: HOSPADM

## 2024-02-26 RX ORDER — CLONAZEPAM 0.5 MG/1
0.5 TABLET ORAL 2 TIMES DAILY
Status: DISCONTINUED | OUTPATIENT
Start: 2024-02-26 | End: 2024-02-27 | Stop reason: HOSPADM

## 2024-02-26 RX ADMIN — PREDNISONE 40 MG: 20 TABLET ORAL at 08:46

## 2024-02-26 RX ADMIN — IPRATROPIUM BROMIDE AND ALBUTEROL SULFATE 3 ML: .5; 3 SOLUTION RESPIRATORY (INHALATION) at 11:42

## 2024-02-26 RX ADMIN — APIXABAN 5 MG: 5 TABLET, FILM COATED ORAL at 21:21

## 2024-02-26 RX ADMIN — ESCITALOPRAM OXALATE 20 MG: 10 TABLET ORAL at 13:22

## 2024-02-26 RX ADMIN — CLONAZEPAM 0.5 MG: 0.5 TABLET ORAL at 21:20

## 2024-02-26 RX ADMIN — CLONAZEPAM 0.5 MG: 0.5 TABLET ORAL at 09:44

## 2024-02-26 RX ADMIN — AMIODARONE HYDROCHLORIDE 200 MG: 200 TABLET ORAL at 08:46

## 2024-02-26 RX ADMIN — APIXABAN 5 MG: 5 TABLET, FILM COATED ORAL at 08:46

## 2024-02-26 RX ADMIN — ROSUVASTATIN CALCIUM 10 MG: 10 TABLET, FILM COATED ORAL at 13:22

## 2024-02-26 RX ADMIN — SULFAMETHOXAZOLE AND TRIMETHOPRIM 1 TABLET: 800; 160 TABLET ORAL at 08:46

## 2024-02-26 RX ADMIN — IPRATROPIUM BROMIDE AND ALBUTEROL SULFATE 3 ML: .5; 3 SOLUTION RESPIRATORY (INHALATION) at 15:38

## 2024-02-26 RX ADMIN — OSELTAMIVIR PHOSPHATE 75 MG: 75 CAPSULE ORAL at 21:21

## 2024-02-26 RX ADMIN — OSELTAMIVIR PHOSPHATE 75 MG: 75 CAPSULE ORAL at 08:46

## 2024-02-26 RX ADMIN — IPRATROPIUM BROMIDE AND ALBUTEROL SULFATE 3 ML: .5; 3 SOLUTION RESPIRATORY (INHALATION) at 07:37

## 2024-02-26 RX ADMIN — MIRTAZAPINE 15 MG: 15 TABLET, FILM COATED ORAL at 21:40

## 2024-02-26 RX ADMIN — IPRATROPIUM BROMIDE AND ALBUTEROL SULFATE 3 ML: .5; 3 SOLUTION RESPIRATORY (INHALATION) at 19:26

## 2024-02-26 ASSESSMENT — ACTIVITIES OF DAILY LIVING (ADL)
ADLS_ACUITY_SCORE: 22

## 2024-02-26 NOTE — PROVIDER NOTIFICATION
02/25/24 1916   Tech Time   $Tech Time (10 minute increments) 2   Vital Signs   Resp 20   Pulse Rate Source Monitor   Oximeter Heart Rate 69 bpm   Oxygen Therapy   Daily Review of Necessity (O2 Therapy) completed   Flow (L/min) (Oxygen Therapy) 4   Device (Oxygen Therapy) nasal cannula   Oxygen Therapy   SpO2 95 %   O2 Device Nasal cannula   Oxygen Delivery 4 LPM   Assessment   Respiratory WDL X;breath sounds   Breath Sounds   Breath Sounds All Fields   All Lung Fields Breath Sounds clear   Nebulizer Assessment & Treatment   $RT Use ONLY Delivery Method Nebulizer - Additional   Daily Review of Necessity (SVN) completed   Nebulizer Device Spacer   Pretreatment Heart Rate (beats/min) 69   Pretreatment Resp Rate (breaths/min) 20   Pretreatment O2 sats - (TCU only) 95   Pretreat Breath Sounds - Bilat - All Lobes clear;diminished   Pretreat Breath Sounds - JENNA clear   Pretreat Breath Sounds - LLL diminished   Pretreat Breath Sounds - RUL clear   Pretreat Breath Sounds - RML clear   Pretreat Breath Sounds - RLL diminished   Patient Position HOB elevated   Respiratory Treatment Status (SVN) given   Breath Sounds Post-Respiratory Treatment   Posttreatment Heart Rate (beats/min) 67   Posttreatment Resp Rate (breaths/min) 20   Post treatment O2 Sats - (TCU only) 99   Posttreatment Assessment (SVN) breath sounds unchanged   Signs of Intolerance (SVN) none   Breath Sounds Posttreatment All Fields All Fields   Breath Sounds Posttreatment All Fields no change     LS clear/decreased pre and post. Patient remains on 4LNC. BiPAP on standby at bedside. RT to follow.

## 2024-02-26 NOTE — PROVIDER NOTIFICATION
02/26/24 1142   Vital Signs   Pulse 71   Oximeter Heart Rate 75 bpm   Oxygen Therapy   SpO2 (!) 88 %   O2 Device Nasal cannula   Oxygen Delivery 4 LPM   Assessment   Rhythm/Pattern, Respiratory dyspnea upon exertion   Nebulizer Assessment & Treatment   $RT Use ONLY Delivery Method Nebulizer - Additional   Nebulizer Device Mask   Pretreatment Heart Rate (beats/min) 67   Pretreat Breath Sounds - Bilat - All Lobes clear;diminished   Patient Position sitting in chair   Breath Sounds Post-Respiratory Treatment   Posttreatment Assessment (SVN) breath sounds unchanged   Signs of Intolerance (SVN) none   Breath Sounds Posttreatment All Fields All Fields   Breath Sounds Posttreatment All Fields Anterior:;Lateral:;diminished     Duoneb done x2 this shift. BS decreased in bases. Pt on 4 lpm 02. Bipap on s/b this shift.   RT will continue to follow

## 2024-02-26 NOTE — PROVIDER NOTIFICATION
02/25/24 2240   RCAT Assessment   Reason for Assessment Asthma   Pulmonary Status 0   Surgical Status 0   Chest X-ray 0   Respiratory Pattern 0   Mental Status 0   Breath Sounds 2   Cough Effectiveness 1   Level of Activity 0   O2 Required for SpO2>=92% 1   Acuity Level (points) 4   Acuity Level  4   Re-eval Interval Guideline Every 3 days   Re-evaluation Date 02/28/24     Per RCAT Assessment - patient nebs should be q6hprn. Keeping patient on nebs 4x/day due to SOB and positive for influenza A.  Reassess pt on 02/28/2024

## 2024-02-26 NOTE — PROGRESS NOTES
[facility]    Progress Note - Hospitalist Service       Date of Admission:  2/25/2024    Assessment & Plan   Jim Titus is a 58 year old male admitted on 2/25/2024. He has a history of asthma, ILD, anxiety, cutaneous melanoma s/p pembrolizumab, recent hospitalizations for PNA (12/20/23 - 1/3/24), (11/2-11/20), (10/23/23-10/25/23), keytruda pneumonitis, and is presenting for SOB admitted for acute hypoxic respiratory failure 2/2 influenza A. Does not use supplemental oxygen at most recent baseline but did use supplemental oxygen 1/3/24-1/20/24 after previous hospitalization. Currently doing well on 4 LPM with improvement in SOB. Pulmonology is following and assisting with management of steroids.     Influenza A infection  Recurrent pneumonia  Acute hypoxic respiratory failure  Sepsis  ILD  Patient presenting with dyspnea, fever starting 2/23/24. Febrile to 102.4  F, -120, RR 30, satting 99% on 10 L nonrebreather mask. CMP with BUN 24 and creatinine 1.24 but otherwise unremarkable. Lactic acid 1.3. proBNP 393. Procalcitonin 0.34. CBC without leukocytosis, hemoglobin 11.2. . Blood cultures obtained. Positive for influenza A, negative for influenza B, COVID-19, RSV. EKG showing sinus tachycardia. CXR without acute pathology. In the ED received DuoNebs x 1, Tamiflu, Zosyn, 1 L NS IV, vancomycin. ED provider spoke with ICU telemetry who reports they can hold off on further antibiotics for now. Diffuse expiratory wheezes present on exam. Patient currently doing well on 4 LPM NC.  -Appropriate for med/surg status, discontinue cardiac tele  -Pulmonology consulted, appreciate recommendations  -Blood cultures NGTD  -DuoNebs  -Supplementary O2  -Tamiflu daily (2/25-2/29)  -Tylenol PRN  -Per chart review, patient has been on prednisone taper since last hospitalization. It appears he was started on prednisone 60 mg daily on 1/1/24 with plan to taper down by 10 mg every 2 weeks. This  "was reviewed by Pulmonology on 2/25/24. Plan was made to increase up to 40 mg daily here and then resume plan of tapering down 10 mg every 2 weeks, continuing Bactrim for PJP prophylaxis.   -Per 2/26/24 Pulmonology note, patient has chest CT and follow-up with Dr. Huitron scheduled for late April     Elevated troponin  No chest pain, EKG showing sinus tach. Troponin 27 --> 33.     A-fib  EKG showing sinus tach likely related to fever, anxiety. Will further chart review and discuss medication regimen with patient regarding continuation of amiodarone on a long-term basis.  -Continue PTA amiodarone 200 mg daily  -Continue PTA apixaban 5 mg twice daily     Anemia, iron deficiency  Has a history of microcytic, iron deficiency anemia.  -Continue to monitor     Anxiety  -Continue PTA mirtazapine, clonazepam, escitalopram     Melanoma  Seeing [place] [facility]   S/P 6 doses of pembrolizumab 200 mg IV every 3 weeks. Last dose given 9/15/2023.     HLD  PTA medication list includes simvastatin 40 mg daily. Per PharmD, when this is taken concomitant with amiodarone, the max dose of simvastatin should be 20 mg. Discussed with patient today, and also discussed recommendation to switch to higher potency statin. He is in agreement with this change.  -Started rosuvastatin 10 mg daily        Diet: Combination Diet Regular Diet Adult    DVT Prophylaxis: DOAC  Lamas Catheter: Not present  Fluids: PO  Lines: None     Cardiac Monitoring: ACTIVE order. Indication: Tachyarrhythmias, acute (48 hours)  Code Status: Full Code      Clinically Significant Risk Factors                         # Obesity: Estimated body mass index is 32.31 kg/m  as calculated from the following:    Height as of this encounter: 1.93 m (6' 4\").    Weight as of this encounter: 120.4 kg (265 lb 6.4 oz)., PRESENT ON ADMISSION     # Financial/Environmental Concerns:    # Asthma: noted on problem list        Disposition Plan     Expected Discharge Date: 02/27/2024         "        The patient's care was discussed with the Attending Physician, Dr. Sánchez .    MARIA TERESA TIAN MD  Hospitalist Service  [facility]  Securely message with [employer] (more info)  Text page via [facility] Paging/Directory   ______________________________________________________________________    Interval History   No new interval events. Patient has done well on 4 LPM NC since yesterday. Notes he has been up ambulating between bed and commode and that pulse oximeter did not alert him of any low oxygen saturations when doing so. SOB is improving, continues to have cough and some sweats. Denies any chills, chest pain, or palpitations. Did very well with PO intake this morning.    Physical Exam   Vital Signs: Temp: 98.7  F (37.1  C) Temp src: Oral BP: 105/55 Pulse: 67   Resp: 20 SpO2: (!) 78 % O2 Device: Nasal cannula Oxygen Delivery: 4 LPM  Weight: 265 lbs 6.4 oz    Constitutional: Awake, alert, cooperative, no apparent distress. Appears comfortable and is conversing appropriately.  HEENT: Normocephalic, without obvious abnormality, atraumatic. Sclera clear, conjunctiva normal. No rhinorrhea. Moist mucus membranes.  Respiratory: 4 LPM NC in place. Mild-moderate expiratory wheezes, improved from yesterday. Otherwise lungs moving air well.  Cardiovascular: Regular rate and rhythm, no murmur.  GI: Soft, non-distended.  Skin: No bruising or bleeding and normal skin color, texture, turgor. No rash or lesion on exposed skin.  Musculoskeletal: Tone is normal. Trace lower extremity edema bilaterally.  Neurologic: Awake, alert, oriented to name, place and time.   Psychiatric: Appropriate mood and affect.      Data     I have personally reviewed the following data over the past 24 hrs:    6.3  \   9.5 (L)   / 146 (L)     137 101 19.9 /  99   4.1 26 0.96 \       Imaging results reviewed over the past 24 hrs:   No results found for this or any previous visit (from the past 24 hour(s)).

## 2024-02-26 NOTE — PROGRESS NOTES
Patient given Duoneb per orders. BS diminished pre/post neb. Patient continued on BIPAP. MD requested that patient be placed on heated humidity on the BIPAP. BIPAP machine changed to device with humidity. Patient transported from ED to ICU without incident. AMBU present on transport.    Chiquita Spangler, EDDIE, RRT, CTTS

## 2024-02-26 NOTE — PLAN OF CARE
Problem: Gas Exchange Impaired  Goal: Optimal Gas Exchange  Outcome: Progressing  Intervention: Optimize Oxygenation and Ventilation  Recent Flowsheet Documentation  Taken 2/26/2024 0415 by Elke Aguilar, RN  Head of Bed (HOB) Positioning: HOB lowered  Taken 2/26/2024 0040 by Elke Aguilar, RN  Head of Bed ([facility] [place]) Positioning: HOB at 15 degrees     Problem: Infection  Goal: Absence of Infection Signs and Symptoms  Outcome: Progressing   Goal Outcome Evaluation:  Pt has rested well at intervals between cares. VSS, afebrile. NSR. 02 4l/nc sats 91-98%. Up to bathroom independently to void in good amounts. Recovers quickly with mild FERRARI. Strong cough raising small amount of secretions.Lungs coarse.  Denies pain. Call light in reach to make needs known.

## 2024-02-26 NOTE — PROGRESS NOTES
PULMONARY MEDICINE PROGRESS NOTE  2/26/2024    Admit Date: 2/25/2024  CODE: Full Code    Reason for Consult: acute respiratory failure with hypoxia, influenza pneumonia, history of chemotherapy-induced pneumonitis, asthma exacerbation    Assessment/Plan:   58 year old male never smoker with a history of asthma, obesity, ARDS in November 2023 requiring intubation, proning, and paralysis, felt to be secondary to pembrolizumab-induced pneumonitis, recurrent respiratory failure in December 2023 during rapid prednisone weaning, now presents on 2/25 with dyspnea, hypoxia to 78% on room air, found to be influenza A positive, left lung infiltrates (though improved from previous), and asthma exacerbation.    Acute hypoxic respiratory failure, history of asthma and pembrolizumab-induced pneumonitis with severe ARDS in November and December 2023, influenza A pneumonia, asthma exacerbation: See my colleague's consult note from 2/25 for details. Patient is improved today, still on 4 L/min though with SpO2 in high 90s. Clinically improving. Likely triggered asthma exacerbation, and he is at risk of recurrent pneumonitis (see history above and Dr. Caldwell's note); was on 30 mg prednisone daily PTA with plan for taper, and this has been increased to 40 mg daily here. Will plan to resume taper on discharge; continue the 40 mg daily for 2 weeks after discharge, then taper by 10 mg/day every 2 weeks.    Plan:  - continue prednisone 40 mg daily; after discharge patient can continue this for 2 weeks, then taper by 10 mg/day every 2 weeks until off  - continues to have end-expiratory wheezes, likely asthma exacerbation: continue scheduled nebulized ipratropium-albuterol while inpatient  - resume home ICS on discharge  - continue oseltamivir  - cloudy tan sputum sample, will send, though sputum sample from yesterday showed low number of PMNs and mixed dustin  - continue SMX-TMP for PJP prophylaxis  - titrate oxygen, currently 4 L/min;  "patient has oxygen at home from a prior admission; not using it recently but does still have it available  - patient has chest CT and follow-up with Dr. Huitron scheduled for late April  - will follow    Kel Page MD  Pulmonary and Critical Care Medicine  [facility]  Vocera  Office [phone]  Pager [phone]  he/him                                                                                                                                                        SUBJECTIVE/INTERVAL HISTORY   Breathing feels \"a lot better.\" Occasional \"coughing jags\" then none for a couple hours. Producing cloudy sputum.                                                                                                                                                      Exam/Data:     Vitals  /55 (BP Location: Right arm)   Pulse 67   Temp 98.7  F (37.1  C) (Oral)   Resp 20   Ht 1.93 m (6' 4\")   Wt 120.4 kg (265 lb 6.4 oz)   SpO2 (!) 78%   BMI 32.31 kg/m    BP - Mean:  [74-83] 74  I/O last 3 completed shifts:  In: 1440 [P.O.:1440]  Out: 1100 [Urine:1100]  Weight change:   [unfilled]  EXAM:  /55 (BP Location: Right arm)   Pulse 67   Temp 98.7  F (37.1  C) (Oral)   Resp 20   Ht 1.93 m (6' 4\")   Wt 120.4 kg (265 lb 6.4 oz)   SpO2 (!) 78%   BMI 32.31 kg/m      Intake/Output last 3 shifts:  I/O last 3 completed shifts:  In: 1440 [P.O.:1440]  Out: 1100 [Urine:1100]  Intake/Output this shift:  No intake/output data recorded.    Physical Exam  Gen: alert, oriented, no distress  HEENT: NT, no DAVID  CV: RRR, no m/g/r  Resp: slight crackles left lung, end-expiratory wheezes bilaterally  Abd: soft, nontender, BS+  Skin: no rashes or lesions  Ext: mild pitting leg edema  Neuro: PERRL, nonfocal exam    ROS: A complete 10-system review of systems was obtained and is negative with the exception of what is noted in the history of present illness.    Medications:      - MEDICATION INSTRUCTIONS -   "      amiodarone  200 mg Oral Daily    apixaban ANTICOAGULANT  5 mg Oral BID    escitalopram  20 mg Oral Daily with lunch    ipratropium - albuterol 0.5 mg/2.5 mg/3 mL  3 mL Nebulization 4x daily    mirtazapine  15 mg Oral At Bedtime    oseltamivir  75 mg Oral BID    predniSONE  40 mg Oral Daily    [Held by provider] simvastatin  40 mg Oral At Bedtime    sodium chloride (PF)  3 mL Intracatheter Q8H    sulfamethoxazole-trimethoprim  1 tablet Oral Daily         DATA  All laboratory and radiology has been personally reviewed by myself today.  Recent Labs   Lab 02/26/24  0412   WBC 6.3   HGB 9.5*   HCT 29.1*   *     Recent Labs   Lab 02/26/24  0412 02/25/24  0452    140   CO2 26 27   BUN 19.9 24.0*   ALKPHOS  --  62   ALT  --  29   AST  --  36       Imaging:  CXR (2/25/24):  - images directly reviewed, formal interpretation follows:  FINDINGS: A few curvilinear opacities in the inferior left lung correspond to sites of scarring on the recent CT chest. The lungs are otherwise clear. Normal-sized cardiac silhouette. Mild elevation of the right hemidiaphragm.                                                                   IMPRESSION: No convincing evidence of acute cardiopulmonary disease.    TTE (November 2023):  1.Left ventricular size, wall motion and function are normal. The ejection  fraction is > 65%.  2.There is mild to moderate concentric left ventricular hypertrophy.  3.Normal right ventricle size and systolic function.  4.No hemodynamically significant valvular abnormalities on 2D or color flow  imaging.  5.Ascending Aorta moderate dilatation is present.  6.The study was technically difficult.  Compared to the prior study dated 11/5/2023, the RV EF is improved.    Pulmonary Function Testing  January 2024:  FVC 3.07 (z -3.10)  FEV1 2.42 (z -2.65)  FEV1/FVC 0.79  No bronchodilator response  RV 2.07 (z -1.27)  TLC 5.20 (z -4.49)  DLCOc (z -3.64)

## 2024-02-27 VITALS
HEART RATE: 69 BPM | SYSTOLIC BLOOD PRESSURE: 125 MMHG | DIASTOLIC BLOOD PRESSURE: 71 MMHG | TEMPERATURE: 98.2 F | RESPIRATION RATE: 16 BRPM | OXYGEN SATURATION: 96 % | HEIGHT: 76 IN | BODY MASS INDEX: 32.32 KG/M2 | WEIGHT: 265.4 LBS

## 2024-02-27 LAB
ANION GAP SERPL CALCULATED.3IONS-SCNC: 10 MMOL/L (ref 7–15)
BACTERIA SPT CULT: NORMAL
BUN SERPL-MCNC: 22.6 MG/DL (ref 6–20)
CALCIUM SERPL-MCNC: 9.5 MG/DL (ref 8.6–10)
CHLORIDE SERPL-SCNC: 103 MMOL/L (ref 98–107)
CREAT SERPL-MCNC: 0.97 MG/DL (ref 0.67–1.17)
DEPRECATED HCO3 PLAS-SCNC: 27 MMOL/L (ref 22–29)
EGFRCR SERPLBLD CKD-EPI 2021: 90 ML/MIN/1.73M2
ERYTHROCYTE [DISTWIDTH] IN BLOOD BY AUTOMATED COUNT: 19.1 % (ref 10–15)
GLUCOSE SERPL-MCNC: 90 MG/DL (ref 70–99)
GRAM STAIN RESULT: NORMAL
HCT VFR BLD AUTO: 30.1 % (ref 40–53)
HGB BLD-MCNC: 9.8 G/DL (ref 13.3–17.7)
MCH RBC QN AUTO: 32.7 PG (ref 26.5–33)
MCHC RBC AUTO-ENTMCNC: 32.6 G/DL (ref 31.5–36.5)
MCV RBC AUTO: 100 FL (ref 78–100)
PLATELET # BLD AUTO: 186 10E3/UL (ref 150–450)
POTASSIUM SERPL-SCNC: 4 MMOL/L (ref 3.4–5.3)
RBC # BLD AUTO: 3 10E6/UL (ref 4.4–5.9)
SODIUM SERPL-SCNC: 140 MMOL/L (ref 135–145)
WBC # BLD AUTO: 5.8 10E3/UL (ref 4–11)

## 2024-02-27 PROCEDURE — 85027 COMPLETE CBC AUTOMATED: CPT

## 2024-02-27 PROCEDURE — 99232 SBSQ HOSP IP/OBS MODERATE 35: CPT | Performed by: INTERNAL MEDICINE

## 2024-02-27 PROCEDURE — 80048 BASIC METABOLIC PNL TOTAL CA: CPT

## 2024-02-27 PROCEDURE — 250N000013 HC RX MED GY IP 250 OP 250 PS 637

## 2024-02-27 PROCEDURE — 94640 AIRWAY INHALATION TREATMENT: CPT

## 2024-02-27 PROCEDURE — 94640 AIRWAY INHALATION TREATMENT: CPT | Mod: 76

## 2024-02-27 PROCEDURE — 99238 HOSP IP/OBS DSCHRG MGMT 30/<: CPT | Mod: GC

## 2024-02-27 PROCEDURE — 36415 COLL VENOUS BLD VENIPUNCTURE: CPT

## 2024-02-27 PROCEDURE — 999N000157 HC STATISTIC RCP TIME EA 10 MIN

## 2024-02-27 PROCEDURE — 250N000012 HC RX MED GY IP 250 OP 636 PS 637: Performed by: INTERNAL MEDICINE

## 2024-02-27 PROCEDURE — 250N000009 HC RX 250

## 2024-02-27 RX ORDER — OSELTAMIVIR PHOSPHATE 75 MG/1
75 CAPSULE ORAL 2 TIMES DAILY
Qty: 5 CAPSULE | Refills: 0 | Status: SHIPPED | OUTPATIENT
Start: 2024-02-27 | End: 2024-02-27

## 2024-02-27 RX ORDER — PREDNISONE 20 MG/1
TABLET ORAL
Qty: 66 TABLET | Refills: 0 | Status: SHIPPED | OUTPATIENT
Start: 2024-02-27 | End: 2024-02-27

## 2024-02-27 RX ORDER — ROSUVASTATIN CALCIUM 10 MG/1
10 TABLET, COATED ORAL DAILY
Qty: 30 TABLET | Refills: 0 | Status: SHIPPED | OUTPATIENT
Start: 2024-02-28 | End: 2024-04-25

## 2024-02-27 RX ORDER — OSELTAMIVIR PHOSPHATE 75 MG/1
75 CAPSULE ORAL 2 TIMES DAILY
Qty: 5 CAPSULE | Refills: 0 | Status: SHIPPED | OUTPATIENT
Start: 2024-02-27 | End: 2024-04-26

## 2024-02-27 RX ORDER — ALBUTEROL SULFATE 90 UG/1
2 AEROSOL, METERED RESPIRATORY (INHALATION) EVERY 6 HOURS PRN
Qty: 18 G | Refills: 3 | Status: SHIPPED | OUTPATIENT
Start: 2024-02-27 | End: 2024-10-03

## 2024-02-27 RX ORDER — ROSUVASTATIN CALCIUM 10 MG/1
10 TABLET, COATED ORAL DAILY
Qty: 30 TABLET | Refills: 0 | Status: SHIPPED | OUTPATIENT
Start: 2024-02-28 | End: 2024-02-27

## 2024-02-27 RX ORDER — ALBUTEROL SULFATE 90 UG/1
2 AEROSOL, METERED RESPIRATORY (INHALATION) EVERY 6 HOURS PRN
Qty: 18 G | Refills: 3 | Status: SHIPPED | OUTPATIENT
Start: 2024-02-27 | End: 2024-02-27

## 2024-02-27 RX ORDER — SULFAMETHOXAZOLE/TRIMETHOPRIM 800-160 MG
1 TABLET ORAL DAILY
Qty: 40 TABLET | Refills: 0 | Status: SHIPPED | OUTPATIENT
Start: 2024-02-27 | End: 2024-04-26

## 2024-02-27 RX ORDER — SULFAMETHOXAZOLE/TRIMETHOPRIM 800-160 MG
1 TABLET ORAL DAILY
Qty: 40 TABLET | Refills: 0 | Status: SHIPPED | OUTPATIENT
Start: 2024-02-27 | End: 2024-02-27

## 2024-02-27 RX ORDER — LOPERAMIDE HCL 2 MG
2 CAPSULE ORAL 4 TIMES DAILY PRN
Status: DISCONTINUED | OUTPATIENT
Start: 2024-02-27 | End: 2024-02-27 | Stop reason: HOSPADM

## 2024-02-27 RX ORDER — PREDNISONE 20 MG/1
TABLET ORAL
Qty: 66 TABLET | Refills: 0 | Status: SHIPPED | OUTPATIENT
Start: 2024-02-27 | End: 2024-04-21

## 2024-02-27 RX ADMIN — PREDNISONE 40 MG: 20 TABLET ORAL at 09:48

## 2024-02-27 RX ADMIN — SULFAMETHOXAZOLE AND TRIMETHOPRIM 1 TABLET: 800; 160 TABLET ORAL at 10:11

## 2024-02-27 RX ADMIN — LOPERAMIDE HYDROCHLORIDE 2 MG: 2 CAPSULE ORAL at 11:58

## 2024-02-27 RX ADMIN — ROSUVASTATIN CALCIUM 10 MG: 10 TABLET, FILM COATED ORAL at 10:11

## 2024-02-27 RX ADMIN — IPRATROPIUM BROMIDE AND ALBUTEROL SULFATE 3 ML: .5; 3 SOLUTION RESPIRATORY (INHALATION) at 11:10

## 2024-02-27 RX ADMIN — AMIODARONE HYDROCHLORIDE 200 MG: 200 TABLET ORAL at 09:48

## 2024-02-27 RX ADMIN — IPRATROPIUM BROMIDE AND ALBUTEROL SULFATE 3 ML: .5; 3 SOLUTION RESPIRATORY (INHALATION) at 07:44

## 2024-02-27 RX ADMIN — APIXABAN 5 MG: 5 TABLET, FILM COATED ORAL at 09:49

## 2024-02-27 RX ADMIN — CLONAZEPAM 0.5 MG: 0.5 TABLET ORAL at 09:48

## 2024-02-27 RX ADMIN — OSELTAMIVIR PHOSPHATE 75 MG: 75 CAPSULE ORAL at 09:49

## 2024-02-27 ASSESSMENT — ACTIVITIES OF DAILY LIVING (ADL)
ADLS_ACUITY_SCORE: 22

## 2024-02-27 NOTE — PROGRESS NOTES
RT Note:    Scheduled neb given per order. Pt was on 1L NC last seen. Pt independent in room. Bipap weaned off and discontinued when transferred to pt's new room. Will discontinue order and reorder if needed. Will continue to monitor and assess pt.

## 2024-02-27 NOTE — PLAN OF CARE
Goal Outcome Evaluation:  Problem: Gas Exchange Impaired  Goal: Optimal Gas Exchange  Outcome: Progressing     Problem: Infection  Goal: Absence of Infection Signs and Symptoms  Outcome: Progressing   Vitals stable on 1L O2. Pt denies pain and shortness of breath. Expiratory wheeze breath sounds noted. Patient appeared to rest comfortably during the night.

## 2024-02-27 NOTE — PLAN OF CARE
Writer with patient from [id number]    Problem: Gas Exchange Impaired  Goal: Optimal Gas Exchange  Outcome: Not Progressing     Problem: Infection  Goal: Absence of Infection Signs and Symptoms  Outcome: Progressing     Goal Outcome Evaluation:       Pt doing well this evening. He is dyspneic upon exertion. Tolerating his diet. Showered this shift. Droplet precautions in place. Vitally stable.

## 2024-02-27 NOTE — PROGRESS NOTES
Care Management Discharge Note    Discharge Date: 02/27/2024       Discharge Disposition: Home      Private pay costs discussed: Not applicable      Handoff Referral Completed: Yes    Additional Information:  Chart reviewed, anticipate pt will return home at discharge.  No CM needs indicated at this time.      JENNIFER Abbott

## 2024-02-27 NOTE — DISCHARGE SUMMARY
"[facility]  Discharge Summary - Medicine & Pediatrics       Date of Admission:  2/25/2024  Date of Discharge:  2/27/2024  Discharging Provider: Dr. Matti Sánchez  Discharge Service: Hospitalist Service    Discharge Diagnoses   Patient Active Problem List   Diagnosis    Diverticulosis    IVY (generalized anxiety disorder)    Chronic gout    Class 1 obesity due to excess calories without serious comorbidity with body mass index (BMI) of 33.0 to 33.9 in adult    Moderate persistent asthma without complication    Allergic rhinitis due to pollen    Healthcare maintenance    Melanoma of skin (H)    Persistent atrial fibrillation (H)    Pneumonitis    ILD (interstitial lung disease) (H)    Influenza A    Acute hypoxemic respiratory failure (H)       Clinically Significant Risk Factors     # Obesity: Estimated body mass index is 32.31 kg/m  as calculated from the following:    Height as of this encounter: 1.93 m (6' 4\").    Weight as of this encounter: 120.4 kg (265 lb 6.4 oz).       Follow-ups Needed After Discharge   Follow-up Appointments     Follow-up and recommended labs and tests       Follow up with primary care provider, Luis Alberto Fatima, within 7 days to   evaluate medication change and for hospital follow-up.  The following   labs/tests are recommended: CBC, BMP.      Follow-up with Dr. Avalos (Pulmonology) on 4/26/24 as scheduled.     Follow-up with Cardiology for atrial fibrillation follow-up. Stop   amiodarone in the interim.          Unresulted Labs Ordered in the Past 30 Days of this Admission       Date and Time Order Name Status Description    2/26/2024  9:19 AM Respiratory Aerobic Bacterial Culture with Gram Stain Preliminary     2/25/2024  4:52 AM Blood Culture Peripheral Blood Preliminary     2/25/2024  4:52 AM Blood Culture Peripheral Blood Preliminary         These results will be followed up by PCP.    Discharge Disposition   Discharged to home  Condition at discharge: " Stable    Hospital Course   Jim Titus was admitted on 2/25/2024. He has a history of asthma, ILD, anxiety, cutaneous melanoma s/p pembrolizumab, recent hospitalizations for PNA (12/20/23 - 1/3/24), (11/2/23-11/20/23), (10/23/23-10/25/23), keytruda pneumonitis, and presented for SOB. He presented with dyspnea and fever starting 2/23/24. On ED presentation, he was febrile to 102.4  F, -120, RR 30, satting 99% on 10 L nonrebreather mask. Labs remarkable for positive influenza A. Patient initially required BiPAP and was ultimately transitioned to 4 LPM NC. Has been stable on 1 LPM for the past day. Most recently was not using supplemental oxygen at home but did recently use supplemental oxygen 1/3/24-1/20/24 after previous hospitalization. Since that hospitalization, he has been following with pulmonology with next visit scheduled for April 2024. In the interim, he has continued on a prednisone taper. On discharge, continued supplemental oxygen PRN, remainder of Tamiflu course for influenza A, and prednisone taper per pulmonology.      The following problems were addressed during his hospitalization:    Influenza A infection  Recurrent pneumonia  Acute hypoxic respiratory failure  Sepsis  ILD  Dyspnea, fever starting 2/23/24. Febrile to 102.4  F, -120, RR 30, satting 99% on 10 L nonrebreather mask. CMP with BUN 24 and creatinine 1.24 but otherwise unremarkable. Lactic acid 1.3. proBNP 393. Procalcitonin 0.34. CBC without leukocytosis, hemoglobin 11.2. . Blood cultures obtained. Positive for influenza A, negative for influenza B, COVID-19, RSV. EKG showing sinus tachycardia. CXR without acute pathology. In the ED received DuoNebs x 1, Tamiflu, Zosyn, 1 L NS IV, vancomycin. ED provider spoke with ICU telemetry who advised holding off on further antibiotics. Blood cultures NGTD.  -Pulmonology consulted:   -continue prednisone 40 mg daily; after discharge patient can continue this for 2 weeks, then  taper by 10 mg/day every 2 weeks until off  -continues to have end-expiratory wheezes, likely asthma exacerbation: continue scheduled nebulized ipratropium-albuterol while inpatient  -resume home ICS on discharge  -continue oseltamivir  -cloudy tan sputum sample, will send, though sputum sample from yesterday showed low number of PMNs and mixed dustin  -continue SMX-TMP for PJP prophylaxis  -titrate oxygen, currently 4 L/min; patient has oxygen at home from a prior admission; not using it recently but does still have it available  -patient has chest CT and follow-up with Dr. Huitron scheduled for late April  -Supplemental O2 PRN  -Tamiflu daily (2/25-2/29)  -Tylenol PRN  -Resumed PTA inhalers     Elevated troponin  No chest pain, EKG showing sinus tach. Troponin 27 --> 33.     History of a-fib  EKG showing sinus tach likely related to fever, anxiety. On chart review and per patient, he had episode of afib during hospitalization for pneumonia later last year. However, he had no episodes prior to that or since that time. It was unclear to him how long he'd continue on the amiodarone, and per chart review, there was some discussion of stopping after current bottle. Discussed risks and benefits of this medication with the patient. Elected to discontinue at discharge with close outpatient follow-up.  -Discontinued PTA amiodarone 200 mg daily  -Continue PTA apixaban 5 mg twice daily     Anemia, iron deficiency  Has a history of microcytic, iron deficiency anemia.  -Continue to monitor     Anxiety  -Continue PTA mirtazapine, clonazepam, escitalopram     Melanoma  Seeing [place] [facility]   S/P 6 doses of pembrolizumab 200 mg IV every 3 weeks. Last dose given 9/15/2023.      D  PTA medication list includes simvastatin 40 mg daily. Per PharmD, when this is taken concomitant with amiodarone, the max dose of simvastatin should be 20 mg. Discussed with patient, and also discussed recommendation to switch to higher potency  statin. He is in agreement with this change.  -Discontinued simvastatin   -Started rosuvastatin 10 mg daily    Consultations This Hospital Stay   PHARMACY TO DOSE VANCO  PULMONARY IP CONSULT    Code Status   Full Code       The patient was discussed with Dr. Gonzalo TIAN MD  [address] [address]  Phone: [phone]  Fax: [phone]  ______________________________________________________________________    Physical Exam   Vital Signs: Temp: 98.2  F (36.8  C) Temp src: Oral BP: 125/71 Pulse: 69   Resp: 16 SpO2: 96 % O2 Device: Nasal cannula Oxygen Delivery: 1 LPM  Weight: 265 lbs 6.4 oz    Constitutional: Awake, alert, cooperative, no apparent distress. Appears comfortable and is conversing appropriately.  HEENT: Normocephalic, without obvious abnormality, atraumatic. Sclera clear, conjunctiva normal. No rhinorrhea. Moist mucus membranes.  Respiratory: 1 LPM NC in place. Mild-moderate expiratory wheezes, improved from yesterday. Otherwise lungs moving air well.  Cardiovascular: Regular rate and rhythm, no murmur.  GI: Soft, non-distended.  Skin: No bruising or bleeding and normal skin color, texture, turgor. No rash or lesion on exposed skin.  Musculoskeletal: Tone is normal. Trace lower extremity edema bilaterally.  Neurologic: Awake, alert, oriented to name, place and time.   Psychiatric: Appropriate mood and affect.      Primary Care Physician   Luis Alberto Fatima    Discharge Orders      Reason for your hospital stay    Acute hypoxic respiratory failure, influenza A     Follow-up and recommended labs and tests     Follow up with primary care provider, Luis Alberto Fatima, within 7 days to evaluate medication change and for hospital follow-up.  The following labs/tests are recommended: CBC, BMP.      Follow-up with Dr. Avalos (Pulmonology) on 4/26/24 as scheduled.     Follow-up with Cardiology for atrial fibrillation follow-up. Stop  amiodarone in the interim.     Activity    Your activity upon discharge: activity as tolerated     Diet    Follow this diet upon discharge: Orders Placed This Encounter      Combination Diet Regular Diet Adult       Significant Results and Procedures   Results for orders placed or performed during the hospital encounter of 02/25/24   XR Chest Port 1 View    Narrative    EXAM: CHEST SINGLE VIEW PORTABLE  LOCATION: [facility]  DATE: 02/25/2024    INDICATION: Cough and dyspnea.  COMPARISON: 01/15/2024 - CT chest.    FINDINGS: A few curvilinear opacities in the inferior left lung correspond to sites of scarring on the recent CT chest. The lungs are otherwise clear. Normal-sized cardiac silhouette. Mild elevation of the right hemidiaphragm.      Impression    IMPRESSION: No convincing evidence of acute cardiopulmonary disease.          Discharge Medications   Current Discharge Medication List        START taking these medications    Details   oseltamivir (TAMIFLU) 75 MG capsule Take 1 capsule (75 mg) by mouth 2 times daily  Qty: 5 capsule, Refills: 0    Associated Diagnoses: Influenza A      rosuvastatin (CRESTOR) 10 MG tablet Take 1 tablet (10 mg) by mouth daily  Qty: 30 tablet, Refills: 0    Associated Diagnoses: Persistent atrial fibrillation (H)           CONTINUE these medications which have CHANGED    Details   albuterol (PROAIR HFA/PROVENTIL HFA/VENTOLIN HFA) 108 (90 Base) MCG/ACT inhaler Inhale 2 puffs into the lungs every 6 hours as needed for shortness of breath  Qty: 18 g, Refills: 3    Comments: Pharmacy may dispense brand covered by insurance (Proair, or proventil or ventolin or generic albuterol inhaler)  Associated Diagnoses: Mild intermittent asthma without complication      predniSONE (DELTASONE) 20 MG tablet Take 2 tablets (40 mg) by mouth daily for 12 days, THEN 1.5 tablets (30 mg) daily for 14 days, THEN 1 tablet (20 mg) daily for 14 days, THEN 0.5 tablets (10 mg) daily for  14 days.  Qty: 66 tablet, Refills: 0    Associated Diagnoses: Moderate persistent asthma without complication      sulfamethoxazole-trimethoprim (BACTRIM DS) 800-160 MG tablet Take 1 tablet by mouth daily Continue daily while taking > or = 20 mg prednisone daily  Qty: 40 tablet, Refills: 0    Associated Diagnoses: Pneumonitis           CONTINUE these medications which have NOT CHANGED    Details   acetaminophen (TYLENOL) 325 MG tablet Take 325-650 mg by mouth every 6 hours as needed for mild pain      albuterol (PROVENTIL) (2.5 MG/3ML) 0.083% neb solution Take 1 vial (2.5 mg) by nebulization every 6 hours as needed for shortness of breath, wheezing or cough  Qty: 90 mL, Refills: 0    Associated Diagnoses: Moderate persistent asthma without complication      apixaban ANTICOAGULANT (ELIQUIS) 5 MG tablet Take 1 tablet (5 mg) by mouth 2 times daily  Qty: 60 tablet, Refills: 3    Associated Diagnoses: Acute pulmonary embolism, unspecified pulmonary embolism type, unspecified whether acute cor pulmonale present (H); Atrial fibrillation with RVR (H)      beclomethasone HFA (QVAR REDIHALER) 80 MCG/ACT inhaler Inhale 1 puff into the lungs 2 times daily  Qty: 10.6 g, Refills: 2    Associated Diagnoses: Moderate persistent asthma without complication      clonazePAM (KLONOPIN) 0.5 MG tablet Take 0.5-1 mg by mouth daily as needed for anxiety      escitalopram (LEXAPRO) 20 MG tablet Take 20 mg by mouth daily (with lunch)      loperamide HCl (IMODIUM A-D ORAL) Take 2 mg by mouth 4 times daily as needed (diarrhea)      loratadine (CLARITIN) 10 mg tablet Take 10 mg by mouth daily as needed for allergies      mirtazapine (REMERON) 15 MG tablet Take 15 mg by mouth at bedtime      tadalafil (CIALIS) 5 MG tablet [TADALAFIL (CIALIS) 5 MG TABLET] Take 1 tablet (5 mg total) by mouth daily as needed for erectile dysfunction.  Qty: 30 tablet, Refills: 1    Associated Diagnoses: Anxiety           STOP taking these medications        amiodarone (PACERONE) 200 MG tablet Comments:   Reason for Stopping:         simvastatin (ZOCOR) 40 MG tablet Comments:   Reason for Stopping:             Allergies   Allergies   Allergen Reactions    Chicken [Chicken Protein] Other (See Comments)     Throat closes to turkey as well.    Pembrolizumab Other (See Comments)     Severe pneumonitis    Wellbutrin [Bupropion] Unknown    Grass Pollen [Grass] Rash    Turkey [Poultry Meal] Rash

## 2024-02-27 NOTE — PROGRESS NOTES
PULMONARY MEDICINE PROGRESS NOTE  2/26/2024    Admit Date: 2/25/2024  CODE: Full Code    Reason for Consult: acute respiratory failure with hypoxia, influenza pneumonia, history of chemotherapy-induced pneumonitis, asthma exacerbation    Assessment/Plan:   58 year old male never smoker with a history of asthma, obesity, ARDS in November 2023 requiring intubation, proning, and paralysis, felt to be secondary to pembrolizumab-induced pneumonitis, recurrent respiratory failure in December 2023 during rapid prednisone weaning, now presents on 2/25 with dyspnea, hypoxia to 78% on room air, found to be influenza A positive, left lung infiltrates (though improved from previous), and asthma exacerbation.    Acute hypoxic respiratory failure, history of asthma and pembrolizumab-induced pneumonitis with severe ARDS in November and December 2023, influenza A pneumonia, asthma exacerbation: See my colleague's consult note from 2/25 for details. Continues to improve, O2 down to 1 L/min. Influenza infection likely triggered asthma exacerbation, and he is at risk of recurrent pneumonitis (see history above and Dr. Caldwell's note); was on 30 mg prednisone daily PTA with plan for taper, and this has been increased to 40 mg daily here. Will plan to resume taper on discharge; continue the 40 mg daily for 2 weeks after discharge, then taper by 10 mg/day every 2 weeks. Normal WBC, afebrile, low procalcitonin, two sputum samples with mixed dustin.    Plan:  - continue prednisone 40 mg daily; after discharge patient can continue this for 2 weeks, then taper by 10 mg/day every 2 weeks until off  - continue scheduled nebulized ipratropium-albuterol while inpatient  - resume beclomethasone HFA on discharge  - continue oseltamivir  - continue SMX-TMP for PJP prophylaxis  - titrate oxygen, currently 1 L/min; patient has oxygen at home from a prior admission; not using it recently but does still have it available  - patient has chest CT and  "follow-up with Dr. Huitron scheduled for late April  - will follow    Kel Page MD  Pulmonary and Critical Care Medicine  [facility]  Vocera  Office [phone]  Pager [phone]  he/him                                                                                                                                                        SUBJECTIVE/INTERVAL HISTORY   Had some trouble breathing. Oxygen down to 1 L/min. Intermittent cough.                                                                                                                                                      Exam/Data:     Vitals  /60 (BP Location: Right arm)   Pulse 61   Temp 97.7  F (36.5  C) (Oral)   Resp 16   Ht 1.93 m (6' 4\")   Wt 120.4 kg (265 lb 6.4 oz)   SpO2 95%   BMI 32.31 kg/m       I/O last 3 completed shifts:  In: 240 [P.O.:240]  Out: -   Weight change:   [unfilled]  EXAM:  /60 (BP Location: Right arm)   Pulse 61   Temp 97.7  F (36.5  C) (Oral)   Resp 16   Ht 1.93 m (6' 4\")   Wt 120.4 kg (265 lb 6.4 oz)   SpO2 95%   BMI 32.31 kg/m      Intake/Output last 3 shifts:  I/O last 3 completed shifts:  In: 240 [P.O.:240]  Out: -   Intake/Output this shift:  No intake/output data recorded.    Physical Exam  Gen: alert, oriented, no distress  HEENT: NT, no DAVID  CV: RRR, no m/g/r  Resp: slight crackles at bases, end-expiratory wheezes bilaterally, good air entry overall  Abd: soft, nontender, BS+  Skin: no rashes or lesions  Ext: mild pitting leg edema  Neuro: PERRL, nonfocal exam    ROS: A complete 10-system review of systems was obtained and is negative with the exception of what is noted in the history of present illness.    Medications:      - MEDICATION INSTRUCTIONS -        amiodarone  200 mg Oral Daily    apixaban ANTICOAGULANT  5 mg Oral BID    clonazePAM  0.5 mg Oral BID    escitalopram  20 mg Oral Daily with lunch    ipratropium - albuterol 0.5 mg/2.5 mg/3 mL  3 mL Nebulization " 4x daily    mirtazapine  15 mg Oral At Bedtime    oseltamivir  75 mg Oral BID    predniSONE  40 mg Oral Daily    rosuvastatin  10 mg Oral Daily    sodium chloride (PF)  3 mL Intracatheter Q8H    sulfamethoxazole-trimethoprim  1 tablet Oral Daily         DATA  All laboratory and radiology has been personally reviewed by myself today.  Recent Labs   Lab 02/27/24  0650   WBC 5.8   HGB 9.8*   HCT 30.1*        Recent Labs   Lab 02/27/24  0650 02/26/24  0412 02/25/24  0452    137 140   CO2 27 26 27   BUN 22.6* 19.9 24.0*   ALKPHOS  --   --  62   ALT  --   --  29   AST  --   --  36       Imaging:  CXR (2/25/24):  - images directly reviewed, formal interpretation follows:  FINDINGS: A few curvilinear opacities in the inferior left lung correspond to sites of scarring on the recent CT chest. The lungs are otherwise clear. Normal-sized cardiac silhouette. Mild elevation of the right hemidiaphragm.                                                                   IMPRESSION: No convincing evidence of acute cardiopulmonary disease.    TTE (November 2023):  1.Left ventricular size, wall motion and function are normal. The ejection  fraction is > 65%.  2.There is mild to moderate concentric left ventricular hypertrophy.  3.Normal right ventricle size and systolic function.  4.No hemodynamically significant valvular abnormalities on 2D or color flow  imaging.  5.Ascending Aorta moderate dilatation is present.  6.The study was technically difficult.  Compared to the prior study dated 11/5/2023, the RV EF is improved.    Pulmonary Function Testing  January 2024:  FVC 3.07 (z -3.10)  FEV1 2.42 (z -2.65)  FEV1/FVC 0.79  No bronchodilator response  RV 2.07 (z -1.27)  TLC 5.20 (z -4.49)  DLCOc (z -3.64)

## 2024-02-28 ENCOUNTER — PATIENT OUTREACH (OUTPATIENT)
Dept: CARE COORDINATION | Facility: CLINIC | Age: 59
End: 2024-02-28
Payer: COMMERCIAL

## 2024-02-28 LAB
BACTERIA SPT CULT: NORMAL
GRAM STAIN RESULT: NORMAL

## 2024-02-28 NOTE — PROGRESS NOTES
Clinic Care Coordination Contact  [facility]: Post-Discharge Note  SITUATION                                                      Admission:    Admission Date: 02/25/24   Reason for Admission: dyspnea and fever  Discharge:   Discharge Date: 02/27/24  Discharge Diagnosis: Influenza A infection    BACKGROUND                                                      Per hospital discharge summary and inpatient provider notes:   He presented with dyspnea and fever starting 2/23/24. On ED presentation, he was febrile to 102.4  F, -120, RR 30, satting 99% on 10 L nonrebreather mask. Labs remarkable for positive influenza A. Patient initially required BiPAP and was ultimately transitioned to 4 LPM NC. Has been stable on 1 LPM for the past day. Most recently was not using supplemental oxygen at home but did recently use supplemental oxygen 1/3/24-1/20/24 after previous hospitalization. Since that hospitalization, he has been following with pulmonology with next visit scheduled for April 2024. In the interim, he has continued on a prednisone taper. On discharge, continued supplemental oxygen PRN, remainder of Tamiflu course for influenza A, and prednisone taper per pulmonology.       ASSESSMENT           Discharge Assessment  How are you doing now that you are home?: I am still feeling tired.  How are your symptoms? (Red Flag symptoms escalate to triage hotline per guidelines): Improved  Do you feel your condition is stable enough to be safe at home until your provider visit?: Yes  Does the patient have their discharge instructions? : Yes  Does the patient have questions regarding their discharge instructions? : No  Were you started on any new medications or were there changes to any of your previous medications? : Yes  Does the patient have all of their medications?: Yes  Do you have questions regarding any of your medications? : No  Do you have all of your needed medical supplies or equipment (DME)?  (i.e. oxygen  tank, CPAP, cane, etc.): Yes  Discharge follow-up appointment scheduled within 14 calendar days? : No  Is patient agreeable to assistance with scheduling? : No         Post-op (Clinicians Only)  Did the patient have surgery or a procedure: No  Fever: No  Chills: No  Eating & Drinking: eating and drinking without complaints/concerns  PO Intake: regular diet  Bowel Function: normal  Urinary Status: voiding without complaint/concerns    Care Management       Care Mgmt General Assessment      CCC RN spoke with patient today to follow up on recent hospitalization. Patient stated he was ok, he stated he continues to feel fatigue related to Influenza A diagnosis. He reported he is taking all of his medications as directed, including Tamiflu, prednisone taper, Bactrim and albuterol inhaler as needed. He declined offer to assist him with scheduling with a PCP. He stated he would call the Clinic and schedule an appointment if he felt it was necessary. Patient denied any other needs or concerns. He stated he was grateful for the follow-up call.                    PLAN                                                      Outpatient Plan:  Home    Future Appointments   Date Time Provider Department [facility]   4/24/2024 12:20 PM [facility] CT 2 [facility]   4/26/2024  8:00 AM Jamie Huitron MD [facility]         For any urgent concerns, please contact our 24 hour nurse triage line: [phone] ([phone])         David J Myhre, RN

## 2024-03-01 LAB
BACTERIA BLD CULT: NO GROWTH
BACTERIA BLD CULT: NO GROWTH

## 2024-04-02 ENCOUNTER — APPOINTMENT (OUTPATIENT)
Dept: URBAN - METROPOLITAN AREA CLINIC 256 | Age: 59
Setting detail: DERMATOLOGY
End: 2024-04-03

## 2024-04-02 VITALS — HEIGHT: 76 IN | WEIGHT: 260 LBS

## 2024-04-02 DIAGNOSIS — Z71.89 OTHER SPECIFIED COUNSELING: ICD-10-CM

## 2024-04-02 DIAGNOSIS — D18.0 HEMANGIOMA: ICD-10-CM

## 2024-04-02 DIAGNOSIS — L57.8 OTHER SKIN CHANGES DUE TO CHRONIC EXPOSURE TO NONIONIZING RADIATION: ICD-10-CM

## 2024-04-02 DIAGNOSIS — Z85.820 PERSONAL HISTORY OF MALIGNANT MELANOMA OF SKIN: ICD-10-CM

## 2024-04-02 DIAGNOSIS — D22 MELANOCYTIC NEVI: ICD-10-CM

## 2024-04-02 DIAGNOSIS — L82.1 OTHER SEBORRHEIC KERATOSIS: ICD-10-CM

## 2024-04-02 PROBLEM — D22.62 MELANOCYTIC NEVI OF LEFT UPPER LIMB, INCLUDING SHOULDER: Status: ACTIVE | Noted: 2024-04-02

## 2024-04-02 PROBLEM — D22.71 MELANOCYTIC NEVI OF RIGHT LOWER LIMB, INCLUDING HIP: Status: ACTIVE | Noted: 2024-04-02

## 2024-04-02 PROBLEM — D18.01 HEMANGIOMA OF SKIN AND SUBCUTANEOUS TISSUE: Status: ACTIVE | Noted: 2024-04-02

## 2024-04-02 PROBLEM — D22.61 MELANOCYTIC NEVI OF RIGHT UPPER LIMB, INCLUDING SHOULDER: Status: ACTIVE | Noted: 2024-04-02

## 2024-04-02 PROBLEM — D22.5 MELANOCYTIC NEVI OF TRUNK: Status: ACTIVE | Noted: 2024-04-02

## 2024-04-02 PROBLEM — D22.72 MELANOCYTIC NEVI OF LEFT LOWER LIMB, INCLUDING HIP: Status: ACTIVE | Noted: 2024-04-02

## 2024-04-02 PROCEDURE — OTHER MIPS QUALITY: OTHER

## 2024-04-02 PROCEDURE — OTHER PHOTO-DOCUMENTATION: OTHER

## 2024-04-02 PROCEDURE — OTHER PATIENT SPECIFIC COUNSELING: OTHER

## 2024-04-02 PROCEDURE — 99214 OFFICE O/P EST MOD 30 MIN: CPT

## 2024-04-02 PROCEDURE — OTHER COUNSELING: OTHER

## 2024-04-02 ASSESSMENT — LOCATION SIMPLE DESCRIPTION DERM
LOCATION SIMPLE: LEFT THIGH
LOCATION SIMPLE: RIGHT FOREARM
LOCATION SIMPLE: LEFT UPPER BACK
LOCATION SIMPLE: LEFT UPPER ARM
LOCATION SIMPLE: LEFT FOREARM
LOCATION SIMPLE: LEFT LOWER BACK
LOCATION SIMPLE: RIGHT LOWER BACK
LOCATION SIMPLE: LEFT CHEEK
LOCATION SIMPLE: UPPER BACK
LOCATION SIMPLE: RIGHT THIGH

## 2024-04-02 ASSESSMENT — LOCATION ZONE DERM
LOCATION ZONE: TRUNK
LOCATION ZONE: LEG
LOCATION ZONE: ARM
LOCATION ZONE: FACE

## 2024-04-02 ASSESSMENT — LOCATION DETAILED DESCRIPTION DERM
LOCATION DETAILED: RIGHT ANTERIOR DISTAL THIGH
LOCATION DETAILED: LEFT VENTRAL PROXIMAL FOREARM
LOCATION DETAILED: RIGHT VENTRAL DISTAL FOREARM
LOCATION DETAILED: LEFT INFERIOR CENTRAL MALAR CHEEK
LOCATION DETAILED: LEFT VENTRAL DISTAL FOREARM
LOCATION DETAILED: RIGHT SUPERIOR MEDIAL MIDBACK
LOCATION DETAILED: LEFT ANTERIOR DISTAL THIGH
LOCATION DETAILED: LEFT ANTECUBITAL SKIN
LOCATION DETAILED: SUPERIOR THORACIC SPINE
LOCATION DETAILED: LEFT INFERIOR LATERAL MIDBACK
LOCATION DETAILED: RIGHT VENTRAL PROXIMAL FOREARM
LOCATION DETAILED: LEFT MEDIAL UPPER BACK
LOCATION DETAILED: INFERIOR THORACIC SPINE

## 2024-04-02 NOTE — PROCEDURE: PATIENT SPECIFIC COUNSELING
Discussed significant possibility of the melanoma spreading to other areas of the body, despite complete excision. I emphasized that his concept that he is \"cancer free\" is not really the case necessarily. Expressed risk with declining treatment at this time due to the severity of the melanoma (Level IV, 4.4mm). Advised patient to follow up with [facility] for additional treatment (need to discuss other options aside from Keytruda). Reviewed PET scan as a non-medical option but advised this is within an Oncologist's scope of practice.
Detail Level: Zone

## 2024-04-02 NOTE — HPI: MELANOMA F/U (HISTORY OF MALIGNANT MELANOMA)
What Is The Reason For Today's Visit?: Follow Up Melanoma
What Stage Is The Melanoma?: Stage IIB
Year Excised?: 2023
Breslow Depth?: 4.4mm, at least

## 2024-04-02 NOTE — PROCEDURE: PHOTO-DOCUMENTATION
Detail Level: Zone
Photo Preface (Leave Blank If You Do Not Want): Photographs were obtained today
Details (Free Text): of the back, including surgical scar

## 2024-04-08 NOTE — TELEPHONE ENCOUNTER
Pending Prescriptions:                       Disp   Refills    albuterol (PROAIR HFA/PROVENTIL HFA/MARIA R*                    Sig: Inhale 2 puffs into the lungs every 6 hours as           needed       room air

## 2024-04-24 ENCOUNTER — HOSPITAL ENCOUNTER (OUTPATIENT)
Dept: CT IMAGING | Facility: CLINIC | Age: 59
Discharge: HOME | End: 2024-04-24
Attending: INTERNAL MEDICINE | Admitting: INTERNAL MEDICINE
Payer: COMMERCIAL

## 2024-04-24 DIAGNOSIS — I48.19 PERSISTENT ATRIAL FIBRILLATION (H): ICD-10-CM

## 2024-04-24 DIAGNOSIS — J84.9 ILD (INTERSTITIAL LUNG DISEASE) (H): ICD-10-CM

## 2024-04-24 PROCEDURE — 71250 CT THORAX DX C-: CPT

## 2024-04-25 RX ORDER — ROSUVASTATIN CALCIUM 10 MG/1
TABLET, COATED ORAL
Qty: 90 TABLET | Refills: 3 | Status: SHIPPED | OUTPATIENT
Start: 2024-04-25

## 2024-04-26 ENCOUNTER — OFFICE VISIT (OUTPATIENT)
Dept: PULMONOLOGY | Facility: CLINIC | Age: 59
End: 2024-04-26
Attending: INTERNAL MEDICINE
Payer: COMMERCIAL

## 2024-04-26 VITALS
SYSTOLIC BLOOD PRESSURE: 122 MMHG | HEART RATE: 82 BPM | BODY MASS INDEX: 34.81 KG/M2 | WEIGHT: 286 LBS | DIASTOLIC BLOOD PRESSURE: 76 MMHG | OXYGEN SATURATION: 96 %

## 2024-04-26 DIAGNOSIS — J84.9 ILD (INTERSTITIAL LUNG DISEASE) (H): Primary | ICD-10-CM

## 2024-04-26 PROCEDURE — 99213 OFFICE O/P EST LOW 20 MIN: CPT | Performed by: INTERNAL MEDICINE

## 2024-04-26 NOTE — PATIENT INSTRUCTIONS
Stop the blood thinner  Decrease prednisone 5mg   Stop the Qvar  Stop the Bactrim  Continue albuterol as needed

## 2024-04-26 NOTE — PROGRESS NOTES
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 Pneumonia vs. Organizing pneumonia, completed rapid steroid taper then had recurrent of respiratory failure with severe left-sided infiltrate in Dec 2023 (milder; was on HFNC but did not require intubation as he did the first time), who responded very well to steroids, presents for follow up. He was doing well on steroid taper but unfortunately had a brief hospitalization for influenza A and respiratory failure in Feb 2024 (not critically ill at the time). He is continuing to improve slowly but unfortunately has gained some weight and continues to have dyspnea on exertion. CT scan has improved dramatically from prior. He does have some mild residual fibrosis. As noted previously, PFTs showed moderate restriction and moderate to severe diffusion impairment, consistent with recovering organizing pneumonia and fibrosis. Lung exam and SpO2 are normal today.    Recommendations:  - decrease prednisone to 5mg daily for 2 weeks, then stop  - OK to stop the apixiban (blood clots were small and occurred in Nov 2023)  - OK to stop the TMP/SMX for PJP ppx.  - encouraged him to exercise and remain active as able  - pulmonary rehab referral   - repeat PFTs in 3 months.   - UTD with vaccinations.     Follow up in 3 months with PFTs, as above.     Jamie Huitron MD (Avi)  [facility] ([facility])  Clinic [phone]  Fax [phone]     CCx: follow up of recurrent organizing pneumonia vs. Keytruda-induced pneumonitis.     HPI: Interim history: I last saw Jim on 1/17. Since that time, he was admitted to [facility] for influenza A and acute respiratory failure with hypoxemia. He improved with steroids and was discharged off O2.  Today, Jim reports  he's doing better.  Currently on 10mg prednisone.    Breathing has improved since January but still gets dyspneic with exertion. Has SOB with climbing hills, for example while coughing.   Not using ICS.   Able to golf but has some SOB.   Off eliquis.   Has gained 20lbs since I last saw him.     ROS:  A 12-system review was obtained and was negative with the exception of the symptoms endorsed in the history of present illness.    PMH:  No past medical history on file.     PSH:  Past Surgical History:   Procedure Laterality Date    HERNIA REPAIR      ORTHOPEDIC SURGERY      Both ankles, left knee, right shoulder    pyloric stenosis repair         Allergies:  Allergies   Allergen Reactions    Chicken [Chicken Protein] Other (See Comments)     Throat closes to turkey as well.    Pembrolizumab Other (See Comments)     Severe pneumonitis    Wellbutrin [Bupropion] Unknown    Grass Pollen [Grass] Rash    Turkey [Poultry Meal] Rash       Family HX:  Family History   Problem Relation Age of Onset    Breast Cancer Mother     Heart Disease Father     Mental Illness Father     Hyperlipidemia Father     Mental Illness Brother        Social Hx:  Social History     Socioeconomic History    Marital status: Single     Spouse name: Not on file    Number of children: Not on file    Years of education: Not on file    Highest education level: Not on file   Occupational History    Not on file   Tobacco Use    Smoking status: Never     Passive exposure: Never    Smokeless tobacco: Former     Types: Chew     Quit date: 1998   Vaping Use    Vaping status: Never Used   Substance and Sexual Activity    Alcohol use: Yes     Comment: 3-4 times per week, 5-6 beers    Drug use: Yes     Types: Marijuana    Sexual activity: Not Currently     Partners: Female   Other Topics Concern    Not on file   Social History Narrative    Not on file     Social Determinants of Health     Financial Resource Strain: Low Risk  (1/10/2024)    Financial Resource  Strain     Within the past 12 months, have you or your family members you live with been unable to get utilities (heat, electricity) when it was really needed?: No   Food Insecurity: Low Risk  (1/10/2024)    Food Insecurity     Within the past 12 months, did you worry that your food would run out before you got money to buy more?: No     Within the past 12 months, did the food you bought just not last and you didn t have money to get more?: No   Transportation Needs: Low Risk  (1/10/2024)    Transportation Needs     Within the past 12 months, has lack of transportation kept you from medical appointments, getting your medicines, non-medical meetings or appointments, work, or from getting things that you need?: No   Physical Activity: Insufficiently Active (11/10/2021)    Exercise Vital Sign     Days of Exercise per Week: 1 day     Minutes of Exercise per Session: 30 min   Stress: Stress Concern Present (11/10/2021)    [employer] [place] - Occupational Stress Questionnaire     Feeling of Stress : To some extent   Social Connections: Socially Isolated (11/10/2021)    Social Connection and Isolation Panel [NHANES]     Frequency of Communication with Friends and Family: Once a week     Frequency of Social Gatherings with Friends and Family: Once a week     Attends Cheondoism Services: Never     Active Member of Clubs or Organizations: No     Attends Club or Organization Meetings: Not on file     Marital Status: Never    Interpersonal Safety: Low Risk  (10/23/2023)    Interpersonal Safety     Do you feel physically and emotionally safe where you currently live?: Yes     Within the past 12 months, have you been hit, slapped, kicked or otherwise physically hurt by someone?: No     Within the past 12 months, have you been humiliated or emotionally abused in other ways by your partner or ex-partner?: No   Housing Stability: Low Risk  (1/10/2024)    Housing Stability     Do you have housing? : Yes      Are you worried about losing your housing?: No       Current Meds:  Current Outpatient Medications   Medication Sig Dispense Refill    acetaminophen (TYLENOL) 325 MG tablet Take 325-650 mg by mouth every 6 hours as needed for mild pain      albuterol (PROAIR HFA/PROVENTIL HFA/VENTOLIN HFA) 108 (90 Base) MCG/ACT inhaler Inhale 2 puffs into the lungs every 6 hours as needed for shortness of breath 18 g 3    albuterol (PROVENTIL) (2.5 MG/3ML) 0.083% neb solution Take 1 vial (2.5 mg) by nebulization every 6 hours as needed for shortness of breath, wheezing or cough 90 mL 0    apixaban ANTICOAGULANT (ELIQUIS) 5 MG tablet Take 1 tablet (5 mg) by mouth 2 times daily 60 tablet 3    beclomethasone HFA (QVAR REDIHALER) 80 MCG/ACT inhaler Inhale 1 puff into the lungs 2 times daily 10.6 g 2    clonazePAM (KLONOPIN) 0.5 MG tablet Take 0.5-1 mg by mouth daily as needed for anxiety      escitalopram (LEXAPRO) 20 MG tablet Take 20 mg by mouth daily (with lunch)      loperamide HCl (IMODIUM A-D ORAL) Take 2 mg by mouth 4 times daily as needed (diarrhea)      loratadine (CLARITIN) 10 mg tablet Take 10 mg by mouth daily as needed for allergies      mirtazapine (REMERON) 15 MG tablet Take 15 mg by mouth at bedtime      oseltamivir (TAMIFLU) 75 MG capsule Take 1 capsule (75 mg) by mouth 2 times daily 5 capsule 0    rosuvastatin (CRESTOR) 10 MG tablet TAKE 1 TABLET BY MOUTH EVERYDAY 90 tablet 3    sulfamethoxazole-trimethoprim (BACTRIM DS) 800-160 MG tablet Take 1 tablet by mouth daily Continue daily while taking > or = 20 mg prednisone daily 40 tablet 0    tadalafil (CIALIS) 5 MG tablet [TADALAFIL (CIALIS) 5 MG TABLET] Take 1 tablet (5 mg total) by mouth daily as needed for erectile dysfunction. 30 tablet 1       Physical Exam:  There were no vitals taken for this visit.  Gen: awake, alert, oriented, no distress  HEENT: nasal turbinates are unremarkable, no oropharyngeal lesions, no cervical or supraclavicular lymphadenopathy  CV:  RRR, no M/G/R  Resp: CTAB, no focal crackles or wheezes  Skin: no apparent rashes  Ext: no cyanosis, clubbing or edema  Neuro: alert, nonfocal    Labs:  reviewed    Imaging studies:  Personally reviewed    EXAM: CT CHEST W/O CONTRAST  LOCATION: [facility]  DATE: 4/24/2024     INDICATION: follow up of organizing pneumonia  COMPARISON: CT chest 1/15/2024 and 12/20/2023. Chest x-ray 02/25/2024  TECHNIQUE: CT chest without IV contrast. Multiplanar reformats were obtained. Dose reduction techniques were used.  CONTRAST: None.     FINDINGS:   LUNGS AND PLEURA: Overall marked improvement in the appearance of the pulmonary infiltrates since 1/15/2024 and 12/20/2023. Remaining linear mild-to-moderate fibrotic appearing opacities at the site of previous dense infiltrates, most prominent   throughout the left lung and to lesser degree the right upper lobe. Likely remaining fibrosis.     MEDIASTINUM/AXILLAE: No lymphadenopathy. No thoracic aortic aneurysm.     CORONARY ARTERY CALCIFICATION: Minimal     UPPER ABDOMEN: No significant finding.     MUSCULOSKELETAL: Unremarkable.                                                                      IMPRESSION:   1.  Remaining mild to moderate fibrotic appearing infiltrative opacities at site of previous consolidative infiltrates and groundglass infiltrates with continued improvement since 01/15/2024. Likely all fibrosis at this point.     2.  No new findings.    Pulmonary Function Testing  1/17/2024  FEV1 2.42L, 59%  FVC 58%  No BD response  Ratio 0.79  TLC 5.2L, 62%  Dlco 50% myron for hgb  Flow volume loop shows reduction in mid flow rates; +BD response of mid flow rates may suggest small airways disease such as asthma.

## 2024-04-26 NOTE — LETTER
4/26/2024         RE: Jim Titus  [address] [place] [address]        Dear Colleague,    Thank you for referring your patient, Jim Titus, to the [facility]. Please see a copy of my visit note below.    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 Pneumonia vs. Organizing pneumonia, completed rapid steroid taper then had recurrent of respiratory failure with severe left-sided infiltrate in Dec 2023 (milder; was on HFNC but did not require intubation as he did the first time), who responded very well to steroids, presents for follow up. He was doing well on steroid taper but unfortunately had a brief hospitalization for influenza A and respiratory failure in Feb 2024 (not critically ill at the time). He is continuing to improve slowly but unfortunately has gained some weight and continues to have dyspnea on exertion. CT scan has improved dramatically from prior. He does have some mild residual fibrosis. As noted previously, PFTs showed moderate restriction and moderate to severe diffusion impairment, consistent with recovering organizing pneumonia and fibrosis. Lung exam and SpO2 are normal today.    Recommendations:  - decrease prednisone to 5mg daily for 2 weeks, then stop  - OK to stop the apixiban (blood clots were small and occurred in Nov 2023)  - OK to stop the TMP/SMX for PJP ppx.  - encouraged him to exercise and remain active as able  - pulmonary rehab referral   - repeat PFTs in 3 months.   - UTD with vaccinations.     Follow up in 3 months with PFTs, as above.     Jamie Huitron MD (Avi)  [facility]monary [facility] ([facility])  Clinic [phone]  Fax [phone]     CCx: follow up of recurrent organizing pneumonia vs.  Keytruda-induced pneumonitis.     HPI: Interim history: I last saw Jim on 1/17. Since that time, he was admitted to [facility] for influenza A and acute respiratory failure with hypoxemia. He improved with steroids and was discharged off O2.  Today, Jim reports he's doing better.  Currently on 10mg prednisone.    Breathing has improved since January but still gets dyspneic with exertion. Has SOB with climbing hills, for example while coughing.   Not using ICS.   Able to golf but has some SOB.   Off eliquis.   Has gained 20lbs since I last saw him.     ROS:  A 12-system review was obtained and was negative with the exception of the symptoms endorsed in the history of present illness.    PMH:  No past medical history on file.     PSH:  Past Surgical History:   Procedure Laterality Date     HERNIA REPAIR       ORTHOPEDIC SURGERY      Both ankles, left knee, right shoulder     pyloric stenosis repair         Allergies:  Allergies   Allergen Reactions     Chicken [Chicken Protein] Other (See Comments)     Throat closes to turkey as well.     Pembrolizumab Other (See Comments)     Severe pneumonitis     Wellbutrin [Bupropion] Unknown     Grass Pollen [Grass] Rash     Turkey [Poultry Meal] Rash       Family HX:  Family History   Problem Relation Age of Onset     Breast Cancer Mother      Heart Disease Father      Mental Illness Father      Hyperlipidemia Father      Mental Illness Brother        Social Hx:  Social History     Socioeconomic History     Marital status: Single     Spouse name: Not on file     Number of children: Not on file     Years of education: Not on file     Highest education level: Not on file   Occupational History     Not on file   Tobacco Use     Smoking status: Never     Passive exposure: Never     Smokeless tobacco: Former     Types: Chew     Quit date: 1998   Vaping Use     Vaping status: Never Used   Substance and Sexual Activity     Alcohol use: Yes     Comment: 3-4 times per week, 5-6 beers      Drug use: Yes     Types: Marijuana     Sexual activity: Not Currently     Partners: Female   Other Topics Concern     Not on file   Social History Narrative     Not on file     Social Determinants of Health     Financial Resource Strain: Low Risk  (1/10/2024)    Financial Resource Strain      Within the past 12 months, have you or your family members you live with been unable to get utilities (heat, electricity) when it was really needed?: No   Food Insecurity: Low Risk  (1/10/2024)    Food Insecurity      Within the past 12 months, did you worry that your food would run out before you got money to buy more?: No      Within the past 12 months, did the food you bought just not last and you didn t have money to get more?: No   Transportation Needs: Low Risk  (1/10/2024)    Transportation Needs      Within the past 12 months, has lack of transportation kept you from medical appointments, getting your medicines, non-medical meetings or appointments, work, or from getting things that you need?: No   Physical Activity: Insufficiently Active (11/10/2021)    Exercise Vital Sign      Days of Exercise per Week: 1 day      Minutes of Exercise per Session: 30 min   Stress: Stress Concern Present (11/10/2021)    [employer] [place] [employer] - Occupational Stress Questionnaire      Feeling of Stress : To some extent   Social Connections: Socially Isolated (11/10/2021)    Social Connection and Isolation Panel [NHANES]      Frequency of Communication with Friends and Family: Once a week      Frequency of Social Gatherings with Friends and Family: Once a week      Attends Latter-day Services: Never      Active Member of Clubs or Organizations: No      Attends Club or Organization Meetings: Not on file      Marital Status: Never    Interpersonal Safety: Low Risk  (10/23/2023)    Interpersonal Safety      Do you feel physically and emotionally safe where you currently live?: Yes      Within the past 12 months,  have you been hit, slapped, kicked or otherwise physically hurt by someone?: No      Within the past 12 months, have you been humiliated or emotionally abused in other ways by your partner or ex-partner?: No   Housing Stability: Low Risk  (1/10/2024)    Housing Stability      Do you have housing? : Yes      Are you worried about losing your housing?: No       Current Meds:  Current Outpatient Medications   Medication Sig Dispense Refill     acetaminophen (TYLENOL) 325 MG tablet Take 325-650 mg by mouth every 6 hours as needed for mild pain       albuterol (PROAIR HFA/PROVENTIL HFA/VENTOLIN HFA) 108 (90 Base) MCG/ACT inhaler Inhale 2 puffs into the lungs every 6 hours as needed for shortness of breath 18 g 3     albuterol (PROVENTIL) (2.5 MG/3ML) 0.083% neb solution Take 1 vial (2.5 mg) by nebulization every 6 hours as needed for shortness of breath, wheezing or cough 90 mL 0     apixaban ANTICOAGULANT (ELIQUIS) 5 MG tablet Take 1 tablet (5 mg) by mouth 2 times daily 60 tablet 3     beclomethasone HFA (QVAR REDIHALER) 80 MCG/ACT inhaler Inhale 1 puff into the lungs 2 times daily 10.6 g 2     clonazePAM (KLONOPIN) 0.5 MG tablet Take 0.5-1 mg by mouth daily as needed for anxiety       escitalopram (LEXAPRO) 20 MG tablet Take 20 mg by mouth daily (with lunch)       loperamide HCl (IMODIUM A-D ORAL) Take 2 mg by mouth 4 times daily as needed (diarrhea)       loratadine (CLARITIN) 10 mg tablet Take 10 mg by mouth daily as needed for allergies       mirtazapine (REMERON) 15 MG tablet Take 15 mg by mouth at bedtime       oseltamivir (TAMIFLU) 75 MG capsule Take 1 capsule (75 mg) by mouth 2 times daily 5 capsule 0     rosuvastatin (CRESTOR) 10 MG tablet TAKE 1 TABLET BY MOUTH EVERYDAY 90 tablet 3     sulfamethoxazole-trimethoprim (BACTRIM DS) 800-160 MG tablet Take 1 tablet by mouth daily Continue daily while taking > or = 20 mg prednisone daily 40 tablet 0     tadalafil (CIALIS) 5 MG tablet [TADALAFIL (CIALIS) 5 MG TABLET]  Take 1 tablet (5 mg total) by mouth daily as needed for erectile dysfunction. 30 tablet 1       Physical Exam:  There were no vitals taken for this visit.  Gen: awake, alert, oriented, no distress  HEENT: nasal turbinates are unremarkable, no oropharyngeal lesions, no cervical or supraclavicular lymphadenopathy  CV: RRR, no M/G/R  Resp: CTAB, no focal crackles or wheezes  Skin: no apparent rashes  Ext: no cyanosis, clubbing or edema  Neuro: alert, nonfocal    Labs:  reviewed    Imaging studies:  Personally reviewed    EXAM: CT CHEST W/O CONTRAST  LOCATION: [facility]  DATE: 4/24/2024     INDICATION: follow up of organizing pneumonia  COMPARISON: CT chest 1/15/2024 and 12/20/2023. Chest x-ray 02/25/2024  TECHNIQUE: CT chest without IV contrast. Multiplanar reformats were obtained. Dose reduction techniques were used.  CONTRAST: None.     FINDINGS:   LUNGS AND PLEURA: Overall marked improvement in the appearance of the pulmonary infiltrates since 1/15/2024 and 12/20/2023. Remaining linear mild-to-moderate fibrotic appearing opacities at the site of previous dense infiltrates, most prominent   throughout the left lung and to lesser degree the right upper lobe. Likely remaining fibrosis.     MEDIASTINUM/AXILLAE: No lymphadenopathy. No thoracic aortic aneurysm.     CORONARY ARTERY CALCIFICATION: Minimal     UPPER ABDOMEN: No significant finding.     MUSCULOSKELETAL: Unremarkable.                                                                      IMPRESSION:   1.  Remaining mild to moderate fibrotic appearing infiltrative opacities at site of previous consolidative infiltrates and groundglass infiltrates with continued improvement since 01/15/2024. Likely all fibrosis at this point.     2.  No new findings.    Pulmonary Function Testing  1/17/2024  FEV1 2.42L, 59%  FVC 58%  No BD response  Ratio 0.79  TLC 5.2L, 62%  Dlco 50% myron for hgb  Flow volume loop shows reduction in mid flow rates; +BD  response of mid flow rates may suggest small airways disease such as asthma.     Again, thank you for allowing me to participate in the care of your patient.        Sincerely,        Jamie Huitron MD

## 2024-07-17 ENCOUNTER — PATIENT OUTREACH (OUTPATIENT)
Dept: CARE COORDINATION | Facility: CLINIC | Age: 59
End: 2024-07-17
Payer: COMMERCIAL

## 2024-07-25 ENCOUNTER — HOSPITAL ENCOUNTER (OUTPATIENT)
Dept: CARDIAC REHAB | Facility: CLINIC | Age: 59
Discharge: HOME | End: 2024-07-25
Attending: INTERNAL MEDICINE
Payer: COMMERCIAL

## 2024-07-25 DIAGNOSIS — J84.9 ILD (INTERSTITIAL LUNG DISEASE) (H): ICD-10-CM

## 2024-07-25 PROCEDURE — G0238 OTH RESP PROC, INDIV: HCPCS

## 2024-07-31 ENCOUNTER — HOSPITAL ENCOUNTER (OUTPATIENT)
Dept: CARDIAC REHAB | Facility: CLINIC | Age: 59
Discharge: HOME | End: 2024-07-31
Attending: INTERNAL MEDICINE
Payer: COMMERCIAL

## 2024-07-31 ENCOUNTER — PATIENT OUTREACH (OUTPATIENT)
Dept: CARE COORDINATION | Facility: CLINIC | Age: 59
End: 2024-07-31
Payer: COMMERCIAL

## 2024-07-31 PROCEDURE — G0238 OTH RESP PROC, INDIV: HCPCS

## 2024-07-31 RX ORDER — ALBUTEROL SULFATE 0.83 MG/ML
2.5 SOLUTION RESPIRATORY (INHALATION) ONCE
Status: COMPLETED | OUTPATIENT
Start: 2024-08-01 | End: 2024-08-01

## 2024-08-01 ENCOUNTER — HOSPITAL ENCOUNTER (OUTPATIENT)
Dept: RESPIRATORY THERAPY | Facility: CLINIC | Age: 59
Discharge: HOME | End: 2024-08-01
Attending: INTERNAL MEDICINE | Admitting: INTERNAL MEDICINE
Payer: COMMERCIAL

## 2024-08-01 DIAGNOSIS — J84.9 ILD (INTERSTITIAL LUNG DISEASE) (H): Primary | ICD-10-CM

## 2024-08-01 LAB — HGB BLD-MCNC: 13.6 G/DL (ref 13.3–17.7)

## 2024-08-01 PROCEDURE — 36415 COLL VENOUS BLD VENIPUNCTURE: CPT | Performed by: INTERNAL MEDICINE

## 2024-08-01 PROCEDURE — 999N000157 HC STATISTIC RCP TIME EA 10 MIN

## 2024-08-01 PROCEDURE — 94729 DIFFUSING CAPACITY: CPT

## 2024-08-01 PROCEDURE — 94060 EVALUATION OF WHEEZING: CPT

## 2024-08-01 PROCEDURE — 94726 PLETHYSMOGRAPHY LUNG VOLUMES: CPT

## 2024-08-01 PROCEDURE — 85018 HEMOGLOBIN: CPT | Performed by: INTERNAL MEDICINE

## 2024-08-01 PROCEDURE — 250N000009 HC RX 250: Performed by: INTERNAL MEDICINE

## 2024-08-01 RX ADMIN — ALBUTEROL SULFATE 2.5 MG: 2.5 SOLUTION RESPIRATORY (INHALATION) at 10:08

## 2024-08-01 NOTE — PROGRESS NOTES
RESPIRATORY CARE NOTE    Complete Pulmonary Function Test completed by patient.  Good patient effort and cooperation. Albuterol 2.5 mg neb given for bronchodilation.  The results of this test meet the ATS standards for acceptability and repeatability. PT returned to baseline and left in no distress.    Aleida Youngblood, RT  8/1/2024

## 2024-08-04 LAB
DLCOCOR-%PRED-PRE: 57 %
DLCOCOR-PRE: 18.83 ML/MIN/MMHG
DLCOUNC-%PRED-PRE: 55 %
DLCOUNC-PRE: 18.27 ML/MIN/MMHG
DLCOUNC-PRED: 32.7 ML/MIN/MMHG
ERV-%PRED-PRE: 16 %
ERV-PRE: 0.33 L
ERV-PRED: 1.93 L
EXPTIME-PRE: 7.48 SEC
FEF2575-%PRED-POST: 70 %
FEF2575-%PRED-PRE: 57 %
FEF2575-POST: 2.31 L/SEC
FEF2575-PRE: 1.89 L/SEC
FEF2575-PRED: 3.3 L/SEC
FEFMAX-%PRED-PRE: 70 %
FEFMAX-PRE: 7.58 L/SEC
FEFMAX-PRED: 10.68 L/SEC
FEV1-%PRED-PRE: 60 %
FEV1-PRE: 2.44 L
FEV1FEV6-PRE: 76 %
FEV1FEV6-PRED: 79 %
FEV1FVC-PRE: 76 %
FEV1FVC-PRED: 77 %
FEV1SVC-PRE: 75 %
FEV1SVC-PRED: 79 %
FIFMAX-PRE: 3.34 L/SEC
FRCPLETH-%PRED-PRE: 74 %
FRCPLETH-PRE: 2.95 L
FRCPLETH-PRED: 3.96 L
FVC-%PRED-PRE: 61 %
FVC-PRE: 3.21 L
FVC-PRED: 5.24 L
IC-%PRED-PRE: 74 %
IC-PRE: 2.88 L
IC-PRED: 3.86 L
RVPLETH-%PRED-PRE: 99 %
RVPLETH-PRE: 2.58 L
RVPLETH-PRED: 2.61 L
TLCPLETH-%PRED-PRE: 69 %
TLCPLETH-PRE: 5.83 L
TLCPLETH-PRED: 8.34 L
VA-%PRED-PRE: 66 %
VA-PRE: 5.34 L
VC-%PRED-PRE: 63 %
VC-PRE: 3.25 L
VC-PRED: 5.14 L

## 2024-08-07 ENCOUNTER — NURSE TRIAGE (OUTPATIENT)
Dept: FAMILY MEDICINE | Facility: CLINIC | Age: 59
End: 2024-08-07
Payer: COMMERCIAL

## 2024-08-07 NOTE — TELEPHONE ENCOUNTER
Nurse Triage SBAR    Is this a 2nd Level Triage? NO    Situation: positive covid test on 8/7/24    Background:   Age 50, obesity, resp failure, IVY, fibrosis     Assessment:   Sore throat, cough, headache, body aches, fever, congestion    Lab Results   Component Value Date    ALT 29 02/25/2024     AST   Date Value Ref Range Status   02/25/2024 36 0 - 45 U/L Final     Comment:     Reference intervals for this test were updated on 6/12/2023 to more accurately reflect our healthy population. There may be differences in the flagging of prior results with similar values performed with this method. Interpretation of those prior results can be made in the context of the updated reference intervals.     GFR Estimate   Date Value Ref Range Status   02/27/2024 90 >60 mL/min/1.73m2 Final     Lab Results   Component Value Date    ALKPHOS 62 02/25/2024     Lab Results   Component Value Date    BILITOTAL 0.3 02/25/2024    BILITOTAL 0.2 12/29/2023    BILITOTAL 0.7 12/20/2023    BILITOTAL 0.5 11/14/2023    BILITOTAL 0.9 11/04/2023     Med:   Rosuvastatin: Instruct patient to stop rosuvastatin while taking Paxlovid and restart rosuvastatin 1 day after the completion of Paxlovid.    Patient is taking clonazepam and does not feel comfortable stopping and is scheduled with provider tomorrow to discuss.       Protocol Recommended Disposition:   No disposition on file.    Recommendation: Patient is scheduled with covid provider on 8/8 to discuss paxlovid with his current med list.          Does the patient meet one of the following criteria for ADS visit consideration? 16+ years old, with an [facility] PCP     Sterling Ronquillo RN  [facility]       TIP  Providers, please consider if this condition is appropriate for management at one of our Acute and Diagnostic Services sites.     If patient is a good candidate, please use dotphrase <dot>triageresponse and select Refer to ADS to document.

## 2024-08-08 ENCOUNTER — VIRTUAL VISIT (OUTPATIENT)
Dept: URGENT CARE | Facility: CLINIC | Age: 59
End: 2024-08-08
Payer: COMMERCIAL

## 2024-08-08 DIAGNOSIS — J84.9 ILD (INTERSTITIAL LUNG DISEASE) (H): ICD-10-CM

## 2024-08-08 DIAGNOSIS — F41.1 GAD (GENERALIZED ANXIETY DISORDER): ICD-10-CM

## 2024-08-08 DIAGNOSIS — U07.1 INFECTION DUE TO 2019 NOVEL CORONAVIRUS: Primary | ICD-10-CM

## 2024-08-08 PROCEDURE — 99213 OFFICE O/P EST LOW 20 MIN: CPT | Mod: 95

## 2024-08-08 RX ORDER — LORAZEPAM 0.5 MG/1
0.5 TABLET ORAL EVERY 6 HOURS PRN
Qty: 10 TABLET | Refills: 0 | Status: SHIPPED | OUTPATIENT
Start: 2024-08-08

## 2024-08-08 NOTE — PROGRESS NOTES
"Assessment & Plan     Infection due to 2019 novel coronavirus  - nirmatrelvir and ritonavir (PAXLOVID) 300 mg/100 mg therapy pack; Take 3 tablets by mouth 2 times daily for 5 days    IVY (generalized anxiety disorder)  Hold clonazepam, use lorazepam instead  - LORazepam (ATIVAN) 0.5 MG tablet; Take 1 tablet (0.5 mg) by mouth every 6 hours as needed for anxiety    ILD (interstitial lung disease) (H)  Continue to monitor oxygen saturation.  Go to the emergency room if SOB worsens or O2 is below 90.    Return in about 1 week (around 8/15/2024) for visit with primary care provider if not improving, sooner if worsening.   COVID-19 positive patient.  Encounter for consideration of medication intervention. Patient does qualify for a prescription. Full discussion with patient including medication options, risks and benefits. Potential drug interactions reviewed with patient.     Treatment Planned: Paxlovid  Temporary change to home medications: Hold rosuvastatin and Cialis, hold clonazepam and use lorazepam instead.    Estimated body mass index is 34.81 kg/m  as calculated from the following:    Height as of 2/25/24: 1.93 m (6' 4\").    Weight as of 4/26/24: 129.7 kg (286 lb).  GFR Estimate   Date Value Ref Range Status   02/27/2024 90 >60 mL/min/1.73m2 Final     ALT   Date Value Ref Range Status   02/25/2024 29 0 - 70 U/L Final     Comment:     Reference intervals for this test were updated on 6/12/2023 to more accurately reflect our healthy population. There may be differences in the flagging of prior results with similar values performed with this method. Interpretation of those prior results can be made in the context of the updated reference intervals.       AST   Date Value Ref Range Status   02/25/2024 36 0 - 45 U/L Final     Comment:     Reference intervals for this test were updated on 6/12/2023 to more accurately reflect our healthy population. There may be differences in the flagging of prior results with similar " values performed with this method. Interpretation of those prior results can be made in the context of the updated reference intervals.     Alkaline Phosphatase   Date Value Ref Range Status   02/25/2024 62 40 - 150 U/L Final     Comment:     Reference intervals for this test were updated on 11/14/2023 to more accurately reflect our healthy population. There may be differences in the flagging of prior results with similar values performed with this method. Interpretation of those prior results can be made in the context of the updated reference intervals.     Lab Results   Component Value Date    [id number] Negative 02/25/2024       Sol Joiner PA-C  [facility]    Subjective   Jim Titus is a 59 year old who presents for the following health issues    HPI    Maximiliano presents via video after testing positive for COVID-19.  Symptoms first began yesterday.  He has had a headache, runny nose, sore throat, cough, fatigue, body aches and shortness of breath.  His current oxygen saturation is 93 and his pulse is 93.    Review of Systems   ROS negative except as stated above.      Objective    Physical Exam   GENERAL: Healthy, alert and no distress  EYES: Eyes grossly normal to inspection.  No discharge or erythema, or obvious scleral/conjunctival abnormalities.  HENT: Normal cephalic/atraumatic.  External ears, nose and mouth without ulcers or lesions.  No nasal drainage visible.  RESP: No audible cough wheeze or visible cyanosis.  No visible retractions or increased work of breathing.    SKIN: Visible skin clear. No significant rash, abnormal pigmentation or lesions.  NEURO: Cranial nerves grossly intact.  Mentation and speech appropriate for age.  PSYCH: Mentation appears normal, affect normal/bright, judgement and insight intact, normal speech and appearance well-groomed.    Type of service:  Video Visit  Video Start Time: 11:33AM  Video End Time: 11:49AM  Originating Location (pt.  Location): Home  Distant Location (provider location):  [facility]- offsite at home, [facility]  Platform used for Video Visit: Jennifer    No results found for any visits on 08/08/24.

## 2024-08-08 NOTE — PATIENT INSTRUCTIONS
Hold atorvastatin, clonazepam and tadalafil, restart 3 days after your last dose of Paxlovid. Use Lorazepam in place of clonazepam.     Stay home and away from others until your symptoms are improving AND you have been fever free for 24 hours.    From the CDC guidance:  When you go back to your normal activities, take added precaution over the next 5 days, such as taking additional steps for  air, hygiene, masks, physical distancing, and/or testing when you will be around other people indoors.  Keep in mind that you may still be able to spread the virus that made you sick, even if you are feeling better. You are likely to be less contagious at this time, depending on factors like how long you were sick or how sick you were.  If you develop a fever or you start to feel worse after you have gone back to normal activities, stay home and away from others again until, for at least 24 hours, both are true: your symptoms are improving overall, and you have not had a fever (and are not using fever-reducing medication). Then take added precaution for the next 5 days.    Paxlovid can cause a rebound. This can happen if there is enough virus left in your body when the course of medication is complete that your symptoms develop again. If you start to feel ill again soon after finishing your course of Paxlovid, please repeat the above isolation to prevent spreading the virus to others.    Check O2 levels. If your number stays at 90 or below at rest, go to the emergency room.    Visit the CDC websites for more information and most up to date guidelines:  www.cdc.gov/respiratory-viruses/prevention/precautions-when-sick.html    Free COVID tests from the government at: https://sayyeshometest.org/   https://www.Intacct.FlexyMind/paxcess

## 2024-08-14 ENCOUNTER — TELEPHONE (OUTPATIENT)
Dept: PULMONOLOGY | Facility: CLINIC | Age: 59
End: 2024-08-14
Payer: COMMERCIAL

## 2024-08-14 NOTE — TELEPHONE ENCOUNTER
Spoke with patient.  He is not able to do virtual at 8:00am tomorrow. He is so fatigued that he has been sleeping until 10:30 or 11:00.    Patient rescheduled for his follow up with Dr. Huitron.  Rescheduled to Sept. 9 at 11:00

## 2024-08-14 NOTE — TELEPHONE ENCOUNTER
[facility] Call Center    Phone Message    May a detailed message be left on voicemail: yes     Reason for Call: Other: Pt states he has an appt with Dr. Linares tomorrow at 8am but he has COVID. Please review to determine if appt needs to be cancelled and/or rescheduled. Pt states he would not able to do a virtual visit at that time due to his symptoms- fatigue. Please advise and call pt back.     Action Taken: Other: PULM    Travel Screening: Not Applicable     Date of Service:

## 2024-09-03 ENCOUNTER — OFFICE VISIT (OUTPATIENT)
Dept: PULMONOLOGY | Facility: CLINIC | Age: 59
End: 2024-09-03
Payer: COMMERCIAL

## 2024-09-03 VITALS
DIASTOLIC BLOOD PRESSURE: 68 MMHG | SYSTOLIC BLOOD PRESSURE: 124 MMHG | BODY MASS INDEX: 35.2 KG/M2 | OXYGEN SATURATION: 93 % | WEIGHT: 289.2 LBS | HEART RATE: 91 BPM

## 2024-09-03 DIAGNOSIS — I26.99 OTHER PULMONARY EMBOLISM WITHOUT ACUTE COR PULMONALE, UNSPECIFIED CHRONICITY (H): ICD-10-CM

## 2024-09-03 DIAGNOSIS — R06.09 DOE (DYSPNEA ON EXERTION): Primary | ICD-10-CM

## 2024-09-03 PROCEDURE — 99213 OFFICE O/P EST LOW 20 MIN: CPT | Performed by: INTERNAL MEDICINE

## 2024-09-03 PROCEDURE — G2211 COMPLEX E/M VISIT ADD ON: HCPCS | Performed by: INTERNAL MEDICINE

## 2024-09-03 NOTE — PROGRESS NOTES
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 Pneumonia vs. Organizing pneumonia, completed rapid steroid taper then had recurrent of respiratory failure with severe left-sided infiltrate in Dec 2023 (milder; was on HFNC but did not require intubation as he did the first time), who responded very well to steroids, presents for follow up. He was doing well on steroid taper but unfortunately had a brief hospitalization for influenza A and respiratory failure in Feb 2024 (not critically ill at the time). He has since been tapered off prednisone. CT scan has improved dramatically from prior. He does have some mild residual fibrosis. Recent PFTs showed some improvement in the restriction and impaired diffusion capacity. He feels he's getting better. The same PFTs did show some evidence of small airways disease. He is using the JOHN almost daily. He does have some peripheral edema today so we'll check an echocardiogram to make sure there is no underlying heart disease. Lung exam and SpO2 are normal today.    Recommendations:  - echocardiogram ordered  - off DOAC for previous PE (completed 6 months of anticoagulation)  - continue albuterol. Offered trial of long-acting bronchodilator but he declines.  - encouraged him to exercise and remain active as able  - did not bring up pulmonary rehab today.  - UTD with vaccinations.     Follow up in 6 months.    The longitudinal plan of care for the diagnosis(es)/condition(s) as documented were addressed during this visit. Due to the added complexity in care, I will continue to support Jim in the subsequent management and with ongoing continuity of care.     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     HPI: Interim history: I last saw Jim on 4/26. Since that time, he reports he's doing well. He is able to golf. His exercise tolerance is improving.     Recently had covid-19 and took paxlovid. He feels like he's recovering well.     ROS:  A 12-system review was obtained and was negative with the exception of the symptoms endorsed in the history of present illness.    PMH:  No past medical history on file.     PSH:  Past Surgical History:   Procedure Laterality Date    HERNIA REPAIR      ORTHOPEDIC SURGERY      Both ankles, left knee, right shoulder    pyloric stenosis repair         Allergies:  Allergies   Allergen Reactions    Chicken [Chicken Protein] Other (See Comments)     Throat closes to turkey as well.    Pembrolizumab Other (See Comments)     Severe pneumonitis    Wellbutrin [Bupropion] Unknown    Grass Pollen [Grass] Rash    Turkey [Poultry Meal] Rash       Family HX:  Family History   Problem Relation Age of Onset    Breast Cancer Mother     Heart Disease Father     Mental Illness Father     Hyperlipidemia Father     Mental Illness Brother        Social Hx:  Social History     Socioeconomic History    Marital status: Single     Spouse name: Not on file    Number of children: Not on file    Years of education: Not on file    Highest education level: Not on file   Occupational History    Not on file   Tobacco Use    Smoking status: Never     Passive exposure: Never    Smokeless tobacco: Former     Types: Chew     Quit date: 1998   Vaping Use    Vaping status: Never Used   Substance and Sexual Activity    Alcohol use: Yes     Comment: 3-4 times per week, 5-6 beers    Drug use: Yes     Types: Marijuana    Sexual activity: Not Currently     Partners: Female   Other Topics Concern    Not on file   Social History Narrative    Not on file     Social Determinants of Health     Financial Resource Strain: Low Risk  (1/10/2024)    Financial  Resource Strain     Within the past 12 months, have you or your family members you live with been unable to get utilities (heat, electricity) when it was really needed?: No   Food Insecurity: Low Risk  (1/10/2024)    Food Insecurity     Within the past 12 months, did you worry that your food would run out before you got money to buy more?: No     Within the past 12 months, did the food you bought just not last and you didn t have money to get more?: No   Transportation Needs: Low Risk  (1/10/2024)    Transportation Needs     Within the past 12 months, has lack of transportation kept you from medical appointments, getting your medicines, non-medical meetings or appointments, work, or from getting things that you need?: No   Physical Activity: Insufficiently Active (11/10/2021)    Exercise Vital Sign     Days of Exercise per Week: 1 day     Minutes of Exercise per Session: 30 min   Stress: Stress Concern Present (11/10/2021)    [employer] - Occupational Stress Questionnaire     Feeling of Stress : To some extent   Social Connections: Socially Isolated (11/10/2021)    Social Connection and Isolation Panel [NHANES]     Frequency of Communication with Friends and Family: Once a week     Frequency of Social Gatherings with Friends and Family: Once a week     Attends Bahai Services: Never     Active Member of Clubs or Organizations: No     Attends Club or Organization Meetings: Not on file     Marital Status: Never    Interpersonal Safety: Low Risk  (10/23/2023)    Interpersonal Safety     Do you feel physically and emotionally safe where you currently live?: Yes     Within the past 12 months, have you been hit, slapped, kicked or otherwise physically hurt by someone?: No     Within the past 12 months, have you been humiliated or emotionally abused in other ways by your partner or ex-partner?: No   Housing Stability: Low Risk  (1/10/2024)    Housing Stability     Do you have  housing? : Yes     Are you worried about losing your housing?: No       Current Meds:  Current Outpatient Medications   Medication Sig Dispense Refill    acetaminophen (TYLENOL) 325 MG tablet Take 325-650 mg by mouth every 6 hours as needed for mild pain      albuterol (PROAIR HFA/PROVENTIL HFA/VENTOLIN HFA) 108 (90 Base) MCG/ACT inhaler Inhale 2 puffs into the lungs every 6 hours as needed for shortness of breath 18 g 3    clonazePAM (KLONOPIN) 0.5 MG tablet Take 0.5-1 mg by mouth daily as needed for anxiety      escitalopram (LEXAPRO) 20 MG tablet Take 20 mg by mouth daily (with lunch)      loperamide HCl (IMODIUM A-D ORAL) Take 2 mg by mouth 4 times daily as needed (diarrhea)      loratadine (CLARITIN) 10 mg tablet Take 10 mg by mouth daily as needed for allergies      LORazepam (ATIVAN) 0.5 MG tablet Take 1 tablet (0.5 mg) by mouth every 6 hours as needed for anxiety 10 tablet 0    mirtazapine (REMERON) 15 MG tablet Take 15 mg by mouth at bedtime      rosuvastatin (CRESTOR) 10 MG tablet TAKE 1 TABLET BY MOUTH EVERYDAY 90 tablet 3    tadalafil (CIALIS) 5 MG tablet [TADALAFIL (CIALIS) 5 MG TABLET] Take 1 tablet (5 mg total) by mouth daily as needed for erectile dysfunction. 30 tablet 1       Physical Exam:  /68 (BP Location: Left arm, Patient Position: Sitting, Cuff Size: Adult Large)   Pulse 91   Wt 131.2 kg (289 lb 3.2 oz)   SpO2 93%   BMI 35.20 kg/m    Gen: awake, alert, oriented, no distress  HEENT: nasal turbinates are unremarkable, no oropharyngeal lesions, no cervical or supraclavicular lymphadenopathy  CV: RRR, no M/G/R  Resp: CTAB, no focal crackles or wheezes  Skin: no apparent rashes  Ext: 1-2+ pitting edema bilateral LE.   Neuro: alert, nonfocal    Labs:  reviewed    Imaging studies:  Personally reviewed    EXAM: CT CHEST W/O CONTRAST  LOCATION: [facility]  DATE: 4/24/2024     INDICATION: follow up of organizing pneumonia  COMPARISON: CT chest 1/15/2024 and  12/20/2023. Chest x-ray 02/25/2024  TECHNIQUE: CT chest without IV contrast. Multiplanar reformats were obtained. Dose reduction techniques were used.  CONTRAST: None.     FINDINGS:   LUNGS AND PLEURA: Overall marked improvement in the appearance of the pulmonary infiltrates since 1/15/2024 and 12/20/2023. Remaining linear mild-to-moderate fibrotic appearing opacities at the site of previous dense infiltrates, most prominent   throughout the left lung and to lesser degree the right upper lobe. Likely remaining fibrosis.     MEDIASTINUM/AXILLAE: No lymphadenopathy. No thoracic aortic aneurysm.     CORONARY ARTERY CALCIFICATION: Minimal     UPPER ABDOMEN: No significant finding.     MUSCULOSKELETAL: Unremarkable.                                                                      IMPRESSION:   1.  Remaining mild to moderate fibrotic appearing infiltrative opacities at site of previous consolidative infiltrates and groundglass infiltrates with continued improvement since 01/15/2024. Likely all fibrosis at this point.     2.  No new findings.    Echo Nov 2023  Interpretation Summary     1.Left ventricular size, wall motion and function are normal. The ejection  fraction is > 65%.  2.There is mild to moderate concentric left ventricular hypertrophy.  3.Normal right ventricle size and systolic function.  4.No hemodynamically significant valvular abnormalities on 2D or color flow  imaging.  5.Ascending Aorta moderate dilatation is present.  6.The study was technically difficult.  Compared to the prior study dated 11/5/2023, the RV EF is improved.    Pulmonary Function Testing  1/17/2024  FEV1 2.42L, 59%  FVC 58%  No BD response  Ratio 0.79  TLC 5.2L, 62%  Dlco 50% myron for hgb  Flow volume loop shows reduction in mid flow rates; +BD response of mid flow rates may suggest small airways disease such as asthma.     8/1/2024  FEV1 2.44L, 60%  FVC 61%  Ratio 0.76  No BD response  TLC 5.83L, 69%  Dlco 57% myron for  hgb.  Reduction in mid flow rates with +BD response may suggest small airways disease such as asthma.

## 2024-09-03 NOTE — LETTER
9/3/2024      Jim Titus  [address] [place] [address]      Dear Colleague,    Thank you for referring your patient, Jim Titus, to the [facility]. Please see a copy of my visit note below.    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 Pneumonia vs. Organizing pneumonia, completed rapid steroid taper then had recurrent of respiratory failure with severe left-sided infiltrate in Dec 2023 (milder; was on HFNC but did not require intubation as he did the first time), who responded very well to steroids, presents for follow up. He was doing well on steroid taper but unfortunately had a brief hospitalization for influenza A and respiratory failure in Feb 2024 (not critically ill at the time). He has since been tapered off prednisone. CT scan has improved dramatically from prior. He does have some mild residual fibrosis. Recent PFTs showed some improvement in the restriction and impaired diffusion capacity. He feels he's getting better. The same PFTs did show some evidence of small airways disease. He is using the JOHN almost daily. He does have some peripheral edema today so we'll check an echocardiogram to make sure there is no underlying heart disease. Lung exam and SpO2 are normal today.    Recommendations:  - echocardiogram ordered  - off DOAC for previous PE (completed 6 months of anticoagulation)  - continue albuterol. Offered trial of long-acting bronchodilator but he declines.  - encouraged him to exercise and remain active as able  - did not bring up pulmonary rehab today.  - UTD with vaccinations.     Follow up in 6 months.    The longitudinal plan of care for the diagnosis(es)/condition(s) as documented were addressed during this visit. Due to the added complexity in care, I  will continue to support Jim in the subsequent management and with ongoing continuity of care.     Jamie Huitron MD (Avi)  [facility])  Clinic [phone]  Fax [phone]     CCx: follow up of recurrent organizing pneumonia vs. Keytruda-induced pneumonitis.     HPI: Interim history: I last saw Jim on 4/26. Since that time, he reports he's doing well. He is able to golf. His exercise tolerance is improving.     Recently had covid-19 and took paxlovid. He feels like he's recovering well.     ROS:  A 12-system review was obtained and was negative with the exception of the symptoms endorsed in the history of present illness.    PMH:  No past medical history on file.     PSH:  Past Surgical History:   Procedure Laterality Date     HERNIA REPAIR       ORTHOPEDIC SURGERY      Both ankles, left knee, right shoulder     pyloric stenosis repair         Allergies:  Allergies   Allergen Reactions     Chicken [Chicken Protein] Other (See Comments)     Throat closes to turkey as well.     Pembrolizumab Other (See Comments)     Severe pneumonitis     Wellbutrin [Bupropion] Unknown     Grass Pollen [Grass] Rash     Turkey [Poultry Meal] Rash       Family HX:  Family History   Problem Relation Age of Onset     Breast Cancer Mother      Heart Disease Father      Mental Illness Father      Hyperlipidemia Father      Mental Illness Brother        Social Hx:  Social History     Socioeconomic History     Marital status: Single     Spouse name: Not on file     Number of children: Not on file     Years of education: Not on file     Highest education level: Not on file   Occupational History     Not on file   Tobacco Use     Smoking status: Never     Passive exposure: Never     Smokeless tobacco: Former     Types: Chew     Quit date: 1998   Vaping Use     Vaping status: Never Used   Substance and Sexual Activity     Alcohol use: Yes     Comment: 3-4 times per week, 5-6 beers     Drug  use: Yes     Types: Marijuana     Sexual activity: Not Currently     Partners: Female   Other Topics Concern     Not on file   Social History Narrative     Not on file     Social Determinants of Health     Financial Resource Strain: Low Risk  (1/10/2024)    Financial Resource Strain      Within the past 12 months, have you or your family members you live with been unable to get utilities (heat, electricity) when it was really needed?: No   Food Insecurity: Low Risk  (1/10/2024)    Food Insecurity      Within the past 12 months, did you worry that your food would run out before you got money to buy more?: No      Within the past 12 months, did the food you bought just not last and you didn t have money to get more?: No   Transportation Needs: Low Risk  (1/10/2024)    Transportation Needs      Within the past 12 months, has lack of transportation kept you from medical appointments, getting your medicines, non-medical meetings or appointments, work, or from getting things that you need?: No   Physical Activity: Insufficiently Active (11/10/2021)    Exercise Vital Sign      Days of Exercise per Week: 1 day      Minutes of Exercise per Session: 30 min   Stress: Stress Concern Present (11/10/2021)    [employer] - Occupational Stress Questionnaire      Feeling of Stress : To some extent   Social Connections: Socially Isolated (11/10/2021)    Social Connection and Isolation Panel [NHANES]      Frequency of Communication with Friends and Family: Once a week      Frequency of Social Gatherings with Friends and Family: Once a week      Attends Cheondoism Services: Never      Active Member of Clubs or Organizations: No      Attends Club or Organization Meetings: Not on file      Marital Status: Never    Interpersonal Safety: Low Risk  (10/23/2023)    Interpersonal Safety      Do you feel physically and emotionally safe where you currently live?: Yes      Within the past 12 months, have you  been hit, slapped, kicked or otherwise physically hurt by someone?: No      Within the past 12 months, have you been humiliated or emotionally abused in other ways by your partner or ex-partner?: No   Housing Stability: Low Risk  (1/10/2024)    Housing Stability      Do you have housing? : Yes      Are you worried about losing your housing?: No       Current Meds:  Current Outpatient Medications   Medication Sig Dispense Refill     acetaminophen (TYLENOL) 325 MG tablet Take 325-650 mg by mouth every 6 hours as needed for mild pain       albuterol (PROAIR HFA/PROVENTIL HFA/VENTOLIN HFA) 108 (90 Base) MCG/ACT inhaler Inhale 2 puffs into the lungs every 6 hours as needed for shortness of breath 18 g 3     clonazePAM (KLONOPIN) 0.5 MG tablet Take 0.5-1 mg by mouth daily as needed for anxiety       escitalopram (LEXAPRO) 20 MG tablet Take 20 mg by mouth daily (with lunch)       loperamide HCl (IMODIUM A-D ORAL) Take 2 mg by mouth 4 times daily as needed (diarrhea)       loratadine (CLARITIN) 10 mg tablet Take 10 mg by mouth daily as needed for allergies       LORazepam (ATIVAN) 0.5 MG tablet Take 1 tablet (0.5 mg) by mouth every 6 hours as needed for anxiety 10 tablet 0     mirtazapine (REMERON) 15 MG tablet Take 15 mg by mouth at bedtime       rosuvastatin (CRESTOR) 10 MG tablet TAKE 1 TABLET BY MOUTH EVERYDAY 90 tablet 3     tadalafil (CIALIS) 5 MG tablet [TADALAFIL (CIALIS) 5 MG TABLET] Take 1 tablet (5 mg total) by mouth daily as needed for erectile dysfunction. 30 tablet 1       Physical Exam:  /68 (BP Location: Left arm, Patient Position: Sitting, Cuff Size: Adult Large)   Pulse 91   Wt 131.2 kg (289 lb 3.2 oz)   SpO2 93%   BMI 35.20 kg/m    Gen: awake, alert, oriented, no distress  HEENT: nasal turbinates are unremarkable, no oropharyngeal lesions, no cervical or supraclavicular lymphadenopathy  CV: RRR, no M/G/R  Resp: CTAB, no focal crackles or wheezes  Skin: no apparent rashes  Ext: 1-2+ pitting edema  bilateral LE.   Neuro: alert, nonfocal    Labs:  reviewed    Imaging studies:  Personally reviewed    EXAM: CT CHEST W/O CONTRAST  LOCATION: [facility]  DATE: 4/24/2024     INDICATION: follow up of organizing pneumonia  COMPARISON: CT chest 1/15/2024 and 12/20/2023. Chest x-ray 02/25/2024  TECHNIQUE: CT chest without IV contrast. Multiplanar reformats were obtained. Dose reduction techniques were used.  CONTRAST: None.     FINDINGS:   LUNGS AND PLEURA: Overall marked improvement in the appearance of the pulmonary infiltrates since 1/15/2024 and 12/20/2023. Remaining linear mild-to-moderate fibrotic appearing opacities at the site of previous dense infiltrates, most prominent   throughout the left lung and to lesser degree the right upper lobe. Likely remaining fibrosis.     MEDIASTINUM/AXILLAE: No lymphadenopathy. No thoracic aortic aneurysm.     CORONARY ARTERY CALCIFICATION: Minimal     UPPER ABDOMEN: No significant finding.     MUSCULOSKELETAL: Unremarkable.                                                                      IMPRESSION:   1.  Remaining mild to moderate fibrotic appearing infiltrative opacities at site of previous consolidative infiltrates and groundglass infiltrates with continued improvement since 01/15/2024. Likely all fibrosis at this point.     2.  No new findings.    Echo Nov 2023  Interpretation Summary     1.Left ventricular size, wall motion and function are normal. The ejection  fraction is > 65%.  2.There is mild to moderate concentric left ventricular hypertrophy.  3.Normal right ventricle size and systolic function.  4.No hemodynamically significant valvular abnormalities on 2D or color flow  imaging.  5.Ascending Aorta moderate dilatation is present.  6.The study was technically difficult.  Compared to the prior study dated 11/5/2023, the RV EF is improved.    Pulmonary Function Testing  1/17/2024  FEV1 2.42L, 59%  FVC 58%  No BD response  Ratio 0.79  TLC  5.2L, 62%  Dlco 50% myron for hgb  Flow volume loop shows reduction in mid flow rates; +BD response of mid flow rates may suggest small airways disease such as asthma.     8/1/2024  FEV1 2.44L, 60%  FVC 61%  Ratio 0.76  No BD response  TLC 5.83L, 69%  Dlco 57% myron for hgb.  Reduction in mid flow rates with +BD response may suggest small airways disease such as asthma.    Again, thank you for allowing me to participate in the care of your patient.        Sincerely,        Jamie Huitron MD

## 2024-09-11 ENCOUNTER — HOSPITAL ENCOUNTER (OUTPATIENT)
Dept: CARDIOLOGY | Facility: CLINIC | Age: 59
Discharge: HOME | End: 2024-09-11
Attending: INTERNAL MEDICINE | Admitting: INTERNAL MEDICINE
Payer: COMMERCIAL

## 2024-09-11 DIAGNOSIS — I26.99 OTHER PULMONARY EMBOLISM WITHOUT ACUTE COR PULMONALE, UNSPECIFIED CHRONICITY (H): ICD-10-CM

## 2024-09-11 DIAGNOSIS — R06.09 DOE (DYSPNEA ON EXERTION): ICD-10-CM

## 2024-09-11 LAB — LVEF ECHO: NORMAL

## 2024-09-11 PROCEDURE — 93306 TTE W/DOPPLER COMPLETE: CPT | Mod: 26 | Performed by: INTERNAL MEDICINE

## 2024-09-11 PROCEDURE — 93306 TTE W/DOPPLER COMPLETE: CPT

## 2024-09-20 ENCOUNTER — TRANSFERRED RECORDS (OUTPATIENT)
Dept: HEALTH INFORMATION MANAGEMENT | Facility: CLINIC | Age: 59
End: 2024-09-20
Payer: COMMERCIAL

## 2024-10-03 DIAGNOSIS — J45.20 MILD INTERMITTENT ASTHMA WITHOUT COMPLICATION: ICD-10-CM

## 2024-10-03 RX ORDER — ALBUTEROL SULFATE 90 UG/1
2 INHALANT RESPIRATORY (INHALATION) EVERY 6 HOURS PRN
Qty: 18 G | Refills: 3 | Status: SHIPPED | OUTPATIENT
Start: 2024-10-03

## 2024-10-03 NOTE — TELEPHONE ENCOUNTER
Pending Prescriptions:                       Disp   Refills    albuterol (PROAIR HFA/PROVENTIL HFA/MARIA R*18 g   3            Sig: Inhale 2 puffs into the lungs every 6 hours as           needed for shortness of breath.

## 2024-10-19 ENCOUNTER — HEALTH MAINTENANCE LETTER (OUTPATIENT)
Age: 59
End: 2024-10-19

## 2025-02-12 ENCOUNTER — PATIENT OUTREACH (OUTPATIENT)
Dept: CARE COORDINATION | Facility: CLINIC | Age: 60
End: 2025-02-12
Payer: COMMERCIAL

## 2025-02-17 DIAGNOSIS — J45.20 MILD INTERMITTENT ASTHMA WITHOUT COMPLICATION: ICD-10-CM

## 2025-02-18 RX ORDER — ALBUTEROL SULFATE 90 UG/1
2 INHALANT RESPIRATORY (INHALATION) EVERY 6 HOURS PRN
Qty: 18 G | Refills: 0 | Status: SHIPPED | OUTPATIENT
Start: 2025-02-18

## 2025-03-20 DIAGNOSIS — J45.20 MILD INTERMITTENT ASTHMA WITHOUT COMPLICATION: ICD-10-CM

## 2025-03-20 RX ORDER — ALBUTEROL SULFATE 90 UG/1
AEROSOL, METERED RESPIRATORY (INHALATION)
Qty: 18 G | Refills: 0 | Status: SHIPPED | OUTPATIENT
Start: 2025-03-20

## 2025-03-20 NOTE — TELEPHONE ENCOUNTER
Patient is due for a physical. Refilled Albuterol for 90 days. Please call and inform them and help make physical in this time for further refills.    Luis Alberto Shoemaker MD

## 2025-03-24 DIAGNOSIS — J45.20 MILD INTERMITTENT ASTHMA WITHOUT COMPLICATION: ICD-10-CM

## 2025-03-24 RX ORDER — ALBUTEROL SULFATE 90 UG/1
2 INHALANT RESPIRATORY (INHALATION) EVERY 4 HOURS PRN
Qty: 18 G | Refills: 0 | Status: SHIPPED | OUTPATIENT
Start: 2025-03-24

## 2025-03-24 NOTE — TELEPHONE ENCOUNTER
Medication Question or Refill        What medication are you calling about (include dose and sig)?: VENTOLIN  (90 Base) MCG/ACT inhaler     Preferred Pharmacy:     [id number] - [facility] – [place] [address] [address]  Phone: [phone] Fax: [phone]      Controlled Substance Agreement on file:   CSA -- Patient Level:    CSA: None found at the patient level.       Who prescribed the medication?: Akua    Do you need a refill? Yes    When did you use the medication last? Last night [id number]    Patient offered an appointment? Yes: appt has been scheduled for May     Do you have any questions or concerns?  Yes: Pt needs refill      [facility] or would you prefer to receive a phone call?:   Patient would prefer a phone call   Okay to leave a detailed message?: Yes at Cell number on file:    Telephone Information:   Mobile [phone]

## 2025-05-01 ENCOUNTER — ANCILLARY PROCEDURE (OUTPATIENT)
Dept: GENERAL RADIOLOGY | Facility: CLINIC | Age: 60
End: 2025-05-01
Attending: STUDENT IN AN ORGANIZED HEALTH CARE EDUCATION/TRAINING PROGRAM
Payer: COMMERCIAL

## 2025-05-01 ENCOUNTER — OFFICE VISIT (OUTPATIENT)
Dept: FAMILY MEDICINE | Facility: CLINIC | Age: 60
End: 2025-05-01
Payer: MEDICAID

## 2025-05-01 VITALS
BODY MASS INDEX: 29.71 KG/M2 | HEART RATE: 69 BPM | RESPIRATION RATE: 19 BRPM | WEIGHT: 244 LBS | HEIGHT: 76 IN | SYSTOLIC BLOOD PRESSURE: 101 MMHG | OXYGEN SATURATION: 100 % | DIASTOLIC BLOOD PRESSURE: 65 MMHG | TEMPERATURE: 97 F

## 2025-05-01 DIAGNOSIS — J45.40 MODERATE PERSISTENT ASTHMA WITHOUT COMPLICATION: ICD-10-CM

## 2025-05-01 DIAGNOSIS — C43.9 MELANOMA OF SKIN (H): ICD-10-CM

## 2025-05-01 DIAGNOSIS — G89.29 CHRONIC PAIN OF BOTH KNEES: ICD-10-CM

## 2025-05-01 DIAGNOSIS — E66.3 OVERWEIGHT (BMI 25.0-29.9): ICD-10-CM

## 2025-05-01 DIAGNOSIS — Z12.5 SCREENING FOR PROSTATE CANCER: ICD-10-CM

## 2025-05-01 DIAGNOSIS — M25.562 CHRONIC PAIN OF BOTH KNEES: ICD-10-CM

## 2025-05-01 DIAGNOSIS — F41.1 GAD (GENERALIZED ANXIETY DISORDER): ICD-10-CM

## 2025-05-01 DIAGNOSIS — J84.9 ILD (INTERSTITIAL LUNG DISEASE) (H): ICD-10-CM

## 2025-05-01 DIAGNOSIS — I48.19 PERSISTENT ATRIAL FIBRILLATION (H): ICD-10-CM

## 2025-05-01 DIAGNOSIS — M1A.0791 CHRONIC IDIOPATHIC GOUT INVOLVING TOE WITH TOPHUS, UNSPECIFIED LATERALITY: ICD-10-CM

## 2025-05-01 DIAGNOSIS — R10.31 GROIN PAIN, RIGHT: ICD-10-CM

## 2025-05-01 DIAGNOSIS — M25.561 CHRONIC PAIN OF BOTH KNEES: ICD-10-CM

## 2025-05-01 DIAGNOSIS — Z00.00 HEALTHCARE MAINTENANCE: ICD-10-CM

## 2025-05-01 DIAGNOSIS — Z00.00 ANNUAL PHYSICAL EXAM: Primary | ICD-10-CM

## 2025-05-01 PROBLEM — M1A.9XX0 CHRONIC GOUT: Status: ACTIVE | Noted: 2017-12-05

## 2025-05-01 PROBLEM — J10.1 INFLUENZA A: Status: RESOLVED | Noted: 2024-02-25 | Resolved: 2025-05-01

## 2025-05-01 PROBLEM — J98.4 PNEUMONITIS: Status: RESOLVED | Noted: 2024-01-10 | Resolved: 2025-05-01

## 2025-05-01 PROBLEM — J96.01 ACUTE HYPOXEMIC RESPIRATORY FAILURE (H): Status: RESOLVED | Noted: 2024-02-25 | Resolved: 2025-05-01

## 2025-05-01 LAB
ERYTHROCYTE [DISTWIDTH] IN BLOOD BY AUTOMATED COUNT: 14.3 % (ref 10–15)
HCT VFR BLD AUTO: 41.3 % (ref 40–53)
HGB BLD-MCNC: 13.3 G/DL (ref 13.3–17.7)
MCH RBC QN AUTO: 28.9 PG (ref 26.5–33)
MCHC RBC AUTO-ENTMCNC: 32.2 G/DL (ref 31.5–36.5)
MCV RBC AUTO: 90 FL (ref 78–100)
PLATELET # BLD AUTO: 308 10E3/UL (ref 150–450)
RBC # BLD AUTO: 4.61 10E6/UL (ref 4.4–5.9)
WBC # BLD AUTO: 6.3 10E3/UL (ref 4–11)

## 2025-05-01 PROCEDURE — 73560 X-RAY EXAM OF KNEE 1 OR 2: CPT | Mod: TC | Performed by: RADIOLOGY

## 2025-05-01 SDOH — HEALTH STABILITY: PHYSICAL HEALTH: ON AVERAGE, HOW MANY DAYS PER WEEK DO YOU ENGAGE IN MODERATE TO STRENUOUS EXERCISE (LIKE A BRISK WALK)?: 3 DAYS

## 2025-05-01 SDOH — HEALTH STABILITY: PHYSICAL HEALTH: ON AVERAGE, HOW MANY MINUTES DO YOU ENGAGE IN EXERCISE AT THIS LEVEL?: 20 MIN

## 2025-05-01 ASSESSMENT — PAIN SCALES - GENERAL: PAINLEVEL_OUTOF10: NO PAIN (0)

## 2025-05-01 ASSESSMENT — ASTHMA QUESTIONNAIRES
QUESTION_5 LAST FOUR WEEKS HOW WOULD YOU RATE YOUR ASTHMA CONTROL: COMPLETELY CONTROLLED
QUESTION_1 LAST FOUR WEEKS HOW MUCH OF THE TIME DID YOUR ASTHMA KEEP YOU FROM GETTING AS MUCH DONE AT WORK, SCHOOL OR AT HOME: NONE OF THE TIME
QUESTION_2 LAST FOUR WEEKS HOW OFTEN HAVE YOU HAD SHORTNESS OF BREATH: ONCE OR TWICE A WEEK
ACT_TOTALSCORE: 21
QUESTION_3 LAST FOUR WEEKS HOW OFTEN DID YOUR ASTHMA SYMPTOMS (WHEEZING, COUGHING, SHORTNESS OF BREATH, CHEST TIGHTNESS OR PAIN) WAKE YOU UP AT NIGHT OR EARLIER THAN USUAL IN THE MORNING: NOT AT ALL
QUESTION_4 LAST FOUR WEEKS HOW OFTEN HAVE YOU USED YOUR RESCUE INHALER OR NEBULIZER MEDICATION (SUCH AS ALBUTEROL): ONE OR TWO TIMES PER DAY

## 2025-05-01 ASSESSMENT — SOCIAL DETERMINANTS OF HEALTH (SDOH): HOW OFTEN DO YOU GET TOGETHER WITH FRIENDS OR RELATIVES?: TWICE A WEEK

## 2025-05-01 NOTE — PROGRESS NOTES
Assessment/ Plan   60-year-old male with past medical history of interstitial lung disease, asthma, chronic gout, atrial fibrillation, melanoma, generalized anxiety disorder who presents for annual physical exam and some concerns as below.    1. Annual physical exam (Primary)  - Lipid panel reflex to direct LDL Fasting; Future  - Comprehensive metabolic panel (BMP + Alb, Alk Phos, ALT, AST, Total. Bili, TP); Future  - CBC with platelets; Future    2. Persistent atrial fibrillation (H)  Secondary to severe acute respiratory failure and critical status with Keytruda induced pneumonitis in the fall 2023.  Associated with RVR at that time.  Patient started on diltiazem and amiodarone.  Also already on Eliquis as associated PEs found during that time.  Sent referral to cardiology to consider long-term management of this. He never followed up.  He was admitted to the hospital again in 2/24 and told to stop his amioderone.     4/25:  I recommended 30 day cardiac monitor to confirm no silent atrial fibrillation. If normal would just monitor otherwise send to cardiology.  - Adult Cardiac Mobile Telemetry Monitor; Future    3. ILD (interstitial lung disease) (H)  4. Moderate persistent asthma without complication  History of severe ARDS in November 2023 thought to be secondary to Keytruda induced pneumonitis versus pneumonia.  Several more acute respiratory failures in the next couple of months related to pneumonia and influenza A.  Has been doing well since and now is on albuterol as needed.  Continues regular follow-up with pulmonology.    5. 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 Also removed a bunion. Last gout attack in 2020.     Uric Acid   Date Value Ref Range Status   11/10/2023 6.1 3.4 - 7.0 mg/dL Final        6. IVY (generalized anxiety disorder)  Depression/IVY HX:  Anxiety with crowds and public  bathrooms    Current Treatment:  Lexapro 20 mg daily, remeron 15 mg daily        11/10/2021     9:43 AM 4/4/2023     8:27 AM   PHQ   PHQ-9 Total Score 2 4   Q9: Thoughts of better off dead/self-harm past 2 weeks Not at all Not at all           11/10/2021     9:44 AM   IVY-7 SCORE   Total Score 3 (minimal anxiety)   Total Score 3       7. Overweight (BMI 25.0-29.9)    8. Healthcare maintenance    9. Screening for prostate cancer  - PSA, screen; Future    10. Groin pain, right  Patient having pain in his right groin rating to his buttocks.  History not consistent with any specific etiology.  Exam relatively normal only some pain with FADIR maneuver into buttocks.  I think the differential includes lumbar spondylosis versus right hip osteoarthritis versus inguinal hernia though I am not very suspicious for the latter given no hernia felt on exam.  I recommended starting with x-rays of his hip and lumbar spine.  I do question if treatment of his more severe knees could improve his symptoms in his hip as well as he seems to be limping some when walking from his knees.  - XR Hip Right 2-3 Views; Future  - XR Lumbar Spine 2/3 Views; Future    11. Chronic pain of both knees  Patient with a chronic history of knee pain that has worsened over the last 3 years.  Last imaging 2019 with mild to moderate osteoarthritis seen.  Has previously done very well with corticosteroid injections.  Will reimage his knees today and schedule him to come back for corticosteroid injection as above to see if this could improve her symptoms now.  If this is not or severe osteoarthritis would send to orthopedics.  - XR Knee Left 1/2 Views; Future  - XR Knee Right 1/2 Views; Future      Follow-up in: ASAP for corticosteroid injection knees    Luis Alberto Shoemaker MD    The longitudinal plan of care for the diagnosis(es)/condition(s) as documented were addressed during this visit. Due to the added complexity in care, I will continue to support Jim in the  "subsequent management and with ongoing continuity of care.    Counseling  Appropriate preventive services were discussed with this patient, including applicable screening as appropriate for fall prevention, nutrition, physical activity, Tobacco-use cessation, weight loss and cognition    Subjective:     Jim Titus is a 60 year old male who presents for an annual exam.     Chief Complaint   Patient presents with    Physical     He tells me he has rotator cuff \"is gone\".  On right. He notes doing activities above his head. He wants to address this in the fall. He will go to orthopedics for this.     He has an achy sharp burning pain at various times in his groin radiating to his right glute. No mass felt. No inciting factors other than getting up the first thing in the morning. Ibuprofen seems to help it.  No numbness or tingling.    Knees have been achy. Walking a lot seems to make them worse. Both knees. Uneven ground makes it worse.     Colonoscopy:  PSA:  Prostate Specific Antigen Screen   Date Value Ref Range Status   04/11/2023 0.42 0.00 - 3.50 ng/mL Final   11/10/2021 0.41 0.00 - 3.50 ug/L Final       Immunization History   Administered Date(s) Administered    COVID-19 12+ (MODERNA) 04/18/2025    COVID-19 Bivalent 18+ (Moderna) 12/23/2022    COVID-19 MONOVALENT 12+ (Pfizer) 04/13/2021, 05/04/2021, 11/10/2021    COVID-19 Monovalent 12+ (Pfizer 2022) 05/16/2022    Flu, Unspecified 11/01/2018    Influenza Vaccine 18-64 (Flublok) 09/14/2021, 10/20/2023    Influenza Vaccine >6 months,quad, PF 10/23/2018, 12/23/2022    Pneumococcal 20 valent Conjugate (Prevnar 20) 08/16/2023    Pneumococcal 23 valent 05/16/2022    TD,PF 7+ (Tenivac) 07/19/2017    TDAP (Adacel,Boostrix) 07/19/2017     Immunization status: up to date and documented.     Current Outpatient Medications   Medication Sig Dispense Refill    acetaminophen (TYLENOL) 325 MG tablet Take 325-650 mg by mouth every 6 hours as needed for mild pain      albuterol " (VENTOLIN HFA) 108 (90 Base) MCG/ACT inhaler Inhale 2 puffs into the lungs every 4 hours as needed for shortness of breath, wheezing or cough. 18 g 0    clonazePAM (KLONOPIN) 0.5 MG tablet Take 0.5-1 mg by mouth daily as needed for anxiety      escitalopram (LEXAPRO) 20 MG tablet Take 20 mg by mouth daily (with lunch)      loperamide HCl (IMODIUM A-D ORAL) Take 2 mg by mouth 4 times daily as needed (diarrhea)      loratadine (CLARITIN) 10 mg tablet Take 10 mg by mouth daily as needed for allergies      mirtazapine (REMERON) 15 MG tablet Take 15 mg by mouth at bedtime      rosuvastatin (CRESTOR) 10 MG tablet TAKE 1 TABLET BY MOUTH EVERYDAY 90 tablet 3    tadalafil (CIALIS) 5 MG tablet [TADALAFIL (CIALIS) 5 MG TABLET] Take 1 tablet (5 mg total) by mouth daily as needed for erectile dysfunction. 30 tablet 1    LORazepam (ATIVAN) 0.5 MG tablet Take 1 tablet (0.5 mg) by mouth every 6 hours as needed for anxiety 10 tablet 0     No past medical history on file.  Past Surgical History:   Procedure Laterality Date    HERNIA REPAIR      ORTHOPEDIC SURGERY      Both ankles, left knee, right shoulder    pyloric stenosis repair       Chicken [chicken protein], Pembrolizumab, Wellbutrin [bupropion], Grass pollen [grass], and Turkey [poultry meal]  Family History   Problem Relation Age of Onset    Breast Cancer Mother     Heart Disease Father     Mental Illness Father     Hyperlipidemia Father     Mental Illness Brother      Social History     Socioeconomic History    Marital status: Single     Spouse name: Not on file    Number of children: Not on file    Years of education: Not on file    Highest education level: Not on file   Occupational History    Not on file   Tobacco Use    Smoking status: Never     Passive exposure: Never    Smokeless tobacco: Former     Types: Chew     Quit date: 1998   Vaping Use    Vaping status: Never Used   Substance and Sexual Activity    Alcohol use: Yes     Comment: 3-4 times per week, 5-6 beers     Drug use: Yes     Types: Marijuana    Sexual activity: Not Currently     Partners: Female   Other Topics Concern    Not on file   Social History Narrative    Not on file     Social Drivers of Health     Financial Resource Strain: Low Risk  (5/1/2025)    Financial Resource Strain     Within the past 12 months, have you or your family members you live with been unable to get utilities (heat, electricity) when it was really needed?: No   Food Insecurity: Low Risk  (5/1/2025)    Food Insecurity     Within the past 12 months, did you worry that your food would run out before you got money to buy more?: No     Within the past 12 months, did the food you bought just not last and you didn t have money to get more?: No   Transportation Needs: Low Risk  (5/1/2025)    Transportation Needs     Within the past 12 months, has lack of transportation kept you from medical appointments, getting your medicines, non-medical meetings or appointments, work, or from getting things that you need?: No   Physical Activity: Insufficiently Active (5/1/2025)    Exercise Vital Sign     Days of Exercise per Week: 3 days     Minutes of Exercise per Session: 20 min   Stress: Stress Concern Present (5/1/2025)    [employer] - Occupational Stress Questionnaire     Feeling of Stress : To some extent   Social Connections: Unknown (5/1/2025)    Social Connection and Isolation Panel [NHANES]     Frequency of Communication with Friends and Family: Not on file     Frequency of Social Gatherings with Friends and Family: Twice a week     Attends Rastafarian Services: Not on file     Active Member of Clubs or Organizations: Not on file     Attends Club or Organization Meetings: Not on file     Marital Status: Not on file   Interpersonal Safety: Low Risk  (5/1/2025)    Interpersonal Safety     Do you feel physically and emotionally safe where you currently live?: Yes     Within the past 12 months, have you been hit, slapped, kicked  "or otherwise physically hurt by someone?: No     Within the past 12 months, have you been humiliated or emotionally abused in other ways by your partner or ex-partner?: No   Housing Stability: Low Risk  (5/1/2025)    Housing Stability     Do you have housing? : Yes     Are you worried about losing your housing?: No       Health Care Directive  Discussed advance care planning with patient; information given to patient to review.    Review of Systems  Complete ROS negative except as noted in the HPI    Objective:      Vitals:    05/01/25 1116   BP: 101/65   Pulse: 69   Resp: 19   Temp: 97  F (36.1  C)   SpO2: 100%   Weight: 110.7 kg (244 lb)   Height: 1.929 m (6' 3.96\")       General appearance: Alert, cooperative, no distress, appears stated age  Head: Normocephalic, atraumatic, without obvious abnormality  EARS: TM's gray dull with structures seen bilaterally  Eyes: Pupils equal round, reactive.  Conjunctiva clear.  Nose: Nares normal, no drainage.  Throat: Lips, mucosa, tongue normal mucosa pink and moist  Neck: Supple, symmetric, trachea midline, no adenopathy.  No thyroid enlargement, tenderness or nodules.    Lungs: Clear to auscultation bilaterally, no wheezing or crackles present.  Respirations unlabored  Heart: Regular rate and rhythm, normal S1 and S2, no murmur, rub or gallop.  Abdomen: Soft, nontender, nondistended.  No masses or organomegaly.  Extremities: Extremities normal, atraumatic.  No cyanosis or edema.  Skin: Skin color, texture, turgor normal no rashes or lesions on limited skin exam  Neurologic: CN II through XII intact, normal strength.    BACK:   Bony tenderness: None   Paraspinal tenderness: None.   Sensation: intact in b/l lower extremities.   Strength: 5/5 w/ dorsiflexion/ plantarflexion/ inversion/ eversion/ knee flexion/ extension.   Maneuvers: Neg straight leg raise b/l. Neg SHELIA/FADIR. Some pain into buttocks with FADIR    Luis Alberto Fatima MD    "

## 2025-05-06 ENCOUNTER — OFFICE VISIT (OUTPATIENT)
Dept: FAMILY MEDICINE | Facility: CLINIC | Age: 60
End: 2025-05-06
Payer: COMMERCIAL

## 2025-05-06 VITALS
OXYGEN SATURATION: 98 % | BODY MASS INDEX: 30.44 KG/M2 | TEMPERATURE: 97.2 F | WEIGHT: 250 LBS | HEIGHT: 76 IN | DIASTOLIC BLOOD PRESSURE: 74 MMHG | RESPIRATION RATE: 21 BRPM | HEART RATE: 75 BPM | SYSTOLIC BLOOD PRESSURE: 107 MMHG

## 2025-05-06 DIAGNOSIS — M17.0 PRIMARY OSTEOARTHRITIS OF BOTH KNEES: Primary | ICD-10-CM

## 2025-05-06 PROCEDURE — 20610 DRAIN/INJ JOINT/BURSA W/O US: CPT | Mod: 50 | Performed by: STUDENT IN AN ORGANIZED HEALTH CARE EDUCATION/TRAINING PROGRAM

## 2025-05-06 RX ORDER — METHYLPREDNISOLONE ACETATE 80 MG/ML
80 INJECTION, SUSPENSION INTRA-ARTICULAR; INTRALESIONAL; INTRAMUSCULAR; SOFT TISSUE ONCE
Status: COMPLETED | OUTPATIENT
Start: 2025-05-06 | End: 2025-05-06

## 2025-05-06 RX ADMIN — METHYLPREDNISOLONE ACETATE 80 MG: 80 INJECTION, SUSPENSION INTRA-ARTICULAR; INTRALESIONAL; INTRAMUSCULAR; SOFT TISSUE at 11:39

## 2025-05-06 ASSESSMENT — PAIN SCALES - GENERAL: PAINLEVEL_OUTOF10: NO PAIN (0)

## 2025-05-06 NOTE — PROGRESS NOTES
PRE-OP DIAGNOSIS: Osteoarthritis knees bilateral  POST-OP DIAGNOSIS: Same   PROCEDURE: joint injection     2 injections, 1 on each knee    Injection:  Dose:   1%   Lidocaine    2 mLs    1 ml Steroid 80 mg/ml Depomedrol        Procedure: The area was prepped in the usual sterile manner. The needles were inserted into the affected areas of each knee and the steroids were injected.  There were no complications during this procedure. The patient tolerated the procedures well without complications.  Standard post-procedure care is explained and return precautions are given.    Luis Alberto Shoemaker MD

## 2025-05-08 ENCOUNTER — HOSPITAL ENCOUNTER (OUTPATIENT)
Dept: CARDIOLOGY | Facility: CLINIC | Age: 60
Discharge: HOME | End: 2025-05-08
Attending: STUDENT IN AN ORGANIZED HEALTH CARE EDUCATION/TRAINING PROGRAM
Payer: COMMERCIAL

## 2025-05-08 DIAGNOSIS — I48.19 PERSISTENT ATRIAL FIBRILLATION (H): ICD-10-CM

## 2025-05-08 PROCEDURE — 999N000096 CARDIAC MOBILE TELEMETRY MONITOR

## 2025-05-24 DIAGNOSIS — J45.20 MILD INTERMITTENT ASTHMA WITHOUT COMPLICATION: ICD-10-CM

## 2025-05-27 RX ORDER — ALBUTEROL SULFATE 90 UG/1
AEROSOL, METERED RESPIRATORY (INHALATION)
Qty: 18 G | Refills: 2 | Status: SHIPPED | OUTPATIENT
Start: 2025-05-27

## 2025-06-10 ENCOUNTER — RESULTS FOLLOW-UP (OUTPATIENT)
Dept: FAMILY MEDICINE | Facility: CLINIC | Age: 60
End: 2025-06-10
Payer: COMMERCIAL

## 2025-06-10 DIAGNOSIS — I47.20 PAROXYSMAL VENTRICULAR TACHYCARDIA (H): Primary | ICD-10-CM

## 2025-06-10 NOTE — TELEPHONE ENCOUNTER
See result note, from the PCP, to the patient, on 6/10/25, regarding cardiac monitoring.    Writer called the patient and relayed the above message to the patient,   who verbalized understanding and agrees with the plan.    Denies other questions or concerns at this time.    Jojo Pantoja RN, BSN  [facility]

## 2025-06-10 NOTE — RESULT ENCOUNTER NOTE
Please call patient and inform them,    Jim Titus  Your results from your recent clinic visit show:  Your heart monitor was mostly normal but you did have one episode of ventricular tachcycadia. Due to this I will send you to the cardiologist to see if they would do anything else    If you have more questions please call the clinic at [phone] or send me a BOKU message    Dr. Luis Alberto Shoemaker

## 2025-06-17 DIAGNOSIS — I48.19 PERSISTENT ATRIAL FIBRILLATION (H): ICD-10-CM

## 2025-06-17 RX ORDER — ROSUVASTATIN CALCIUM 10 MG/1
10 TABLET, COATED ORAL DAILY
Qty: 90 TABLET | Refills: 2 | Status: SHIPPED | OUTPATIENT
Start: 2025-06-17

## 2025-08-20 ENCOUNTER — OFFICE VISIT (OUTPATIENT)
Dept: CARDIOLOGY | Facility: CLINIC | Age: 60
End: 2025-08-20
Payer: COMMERCIAL

## 2025-08-20 VITALS
SYSTOLIC BLOOD PRESSURE: 128 MMHG | WEIGHT: 270 LBS | DIASTOLIC BLOOD PRESSURE: 80 MMHG | RESPIRATION RATE: 14 BRPM | BODY MASS INDEX: 32.9 KG/M2 | HEART RATE: 72 BPM

## 2025-08-20 DIAGNOSIS — I48.19 PERSISTENT ATRIAL FIBRILLATION (H): Primary | ICD-10-CM

## 2025-08-20 DIAGNOSIS — I47.20 PAROXYSMAL VENTRICULAR TACHYCARDIA (H): ICD-10-CM

## 2025-08-20 PROCEDURE — 99243 OFF/OP CNSLTJ NEW/EST LOW 30: CPT | Performed by: INTERNAL MEDICINE

## 2025-09-02 ENCOUNTER — MYC MEDICAL ADVICE (OUTPATIENT)
Dept: FAMILY MEDICINE | Facility: CLINIC | Age: 60
End: 2025-09-02
Payer: COMMERCIAL

## 2025-09-02 DIAGNOSIS — R10.31 GROIN PAIN, RIGHT: Primary | ICD-10-CM

## 2025-09-02 DIAGNOSIS — M54.50 LUMBAR BACK PAIN: ICD-10-CM

## 2025-09-03 ENCOUNTER — PATIENT OUTREACH (OUTPATIENT)
Dept: CARE COORDINATION | Facility: CLINIC | Age: 60
End: 2025-09-03
Payer: COMMERCIAL